# Patient Record
Sex: FEMALE | Race: WHITE | NOT HISPANIC OR LATINO | Employment: OTHER | URBAN - METROPOLITAN AREA
[De-identification: names, ages, dates, MRNs, and addresses within clinical notes are randomized per-mention and may not be internally consistent; named-entity substitution may affect disease eponyms.]

---

## 2017-01-03 ENCOUNTER — TRANSFERRED RECORDS (OUTPATIENT)
Dept: HEALTH INFORMATION MANAGEMENT | Facility: CLINIC | Age: 67
End: 2017-01-03

## 2017-02-20 DIAGNOSIS — G43.719 INTRACTABLE CHRONIC MIGRAINE WITHOUT AURA AND WITHOUT STATUS MIGRAINOSUS: Primary | ICD-10-CM

## 2017-02-20 RX ORDER — ZOLMITRIPTAN 5 MG/1
5 TABLET, FILM COATED ORAL
Qty: 12 TABLET | Refills: 6 | Status: SHIPPED | OUTPATIENT
Start: 2017-02-20 | End: 2017-12-04

## 2017-02-20 NOTE — TELEPHONE ENCOUNTER
Call to patient, reviewed provider message.  Patient is in agreement with this plan.  She has no further questions.  Ivette Land RN

## 2017-02-20 NOTE — TELEPHONE ENCOUNTER
"Insurance won't pay for the 2.5 mg Zomig until March.  States her migraines have been terrible lately, so has been requiring 5 mg much of the time. They vary in frequency a great deal. Quite a bit of stress right now.    Confirmed with pharmacy.  Insurance will only approve a certain \"quantity\", so she goes through the 12 tablets rather quickly.    If new Rx is placed for the 5 mg tabs, this can be filled now. She is completely out of medication now.  She understands she cannot exceed 10 mg/24 hours.    Please advise if dose can be increased so she can  Rx soon.  Thank you,  Karen Avila RN      "

## 2017-02-20 NOTE — TELEPHONE ENCOUNTER
Reason for call: Pt requesting Zomig 5mg tablets.  She has had a lot of severe migraines lately and has used up supply of current rx.  Pharmacist suggested she request 5mg tablets as she takes 2 tabs of 2.5mg now.  Please approve asap as she is out of medication.  She uses "[facility] [address] [facility] pharmacy    Phone Number Pt can be reached at: [phone]  Best Time: Anytime  Can we leave a detailed message on this number? Yes

## 2017-03-07 ENCOUNTER — TRANSFERRED RECORDS (OUTPATIENT)
Dept: HEALTH INFORMATION MANAGEMENT | Facility: CLINIC | Age: 67
End: 2017-03-07

## 2017-05-02 ENCOUNTER — TRANSFERRED RECORDS (OUTPATIENT)
Dept: HEALTH INFORMATION MANAGEMENT | Facility: CLINIC | Age: 67
End: 2017-05-02

## 2017-05-15 ENCOUNTER — HOSPITAL ENCOUNTER (OUTPATIENT)
Dept: MAMMOGRAPHY | Facility: CLINIC | Age: 67
Discharge: HOME | End: 2017-05-15
Attending: OBSTETRICS & GYNECOLOGY | Admitting: OBSTETRICS & GYNECOLOGY
Payer: COMMERCIAL

## 2017-05-15 DIAGNOSIS — Z12.31 ENCOUNTER FOR SCREENING MAMMOGRAM FOR HIGH-RISK PATIENT: ICD-10-CM

## 2017-05-15 PROCEDURE — 77063 BREAST TOMOSYNTHESIS BI: CPT

## 2017-05-22 ENCOUNTER — TRANSFERRED RECORDS (OUTPATIENT)
Dept: HEALTH INFORMATION MANAGEMENT | Facility: CLINIC | Age: 67
End: 2017-05-22

## 2017-06-01 RX ORDER — IPRATROPIUM BROMIDE 42 UG/1
3 SPRAY, METERED NASAL DAILY
COMMUNITY
End: 2018-01-30

## 2017-06-01 RX ORDER — CARBOXYMETHYLCELLULOSE SODIUM 5 MG/ML
1 SOLUTION/ DROPS OPHTHALMIC 3 TIMES DAILY PRN
COMMUNITY

## 2017-06-01 RX ORDER — PAROXETINE HYDROCHLORIDE HEMIHYDRATE 25 MG/1
25 TABLET, FILM COATED, EXTENDED RELEASE ORAL 2 TIMES DAILY
COMMUNITY
End: 2020-12-08

## 2017-06-01 RX ORDER — ZOLPIDEM TARTRATE 12.5 MG/1
12.5 TABLET, FILM COATED, EXTENDED RELEASE ORAL AT BEDTIME
COMMUNITY

## 2017-06-01 RX ORDER — HYDROCODONE BITARTRATE AND ACETAMINOPHEN 10; 300 MG/1; MG/1
2 TABLET ORAL EVERY 4 HOURS
Status: ON HOLD | COMMUNITY
End: 2023-09-02

## 2017-06-02 ENCOUNTER — OFFICE VISIT (OUTPATIENT)
Dept: FAMILY MEDICINE | Facility: CLINIC | Age: 67
End: 2017-06-02
Payer: COMMERCIAL

## 2017-06-02 VITALS
BODY MASS INDEX: 21.91 KG/M2 | WEIGHT: 131.5 LBS | HEIGHT: 65 IN | OXYGEN SATURATION: 98 % | DIASTOLIC BLOOD PRESSURE: 79 MMHG | HEART RATE: 67 BPM | SYSTOLIC BLOOD PRESSURE: 113 MMHG | TEMPERATURE: 97.6 F

## 2017-06-02 DIAGNOSIS — F11.90 CHRONIC NARCOTIC USE: ICD-10-CM

## 2017-06-02 DIAGNOSIS — F41.9 ANXIETY: ICD-10-CM

## 2017-06-02 DIAGNOSIS — F33.41 RECURRENT MAJOR DEPRESSIVE DISORDER, IN PARTIAL REMISSION (H): ICD-10-CM

## 2017-06-02 DIAGNOSIS — Z01.818 PREOP GENERAL PHYSICAL EXAM: Primary | ICD-10-CM

## 2017-06-02 DIAGNOSIS — M25.532 LEFT WRIST PAIN: ICD-10-CM

## 2017-06-02 PROCEDURE — 99214 OFFICE O/P EST MOD 30 MIN: CPT | Performed by: INTERNAL MEDICINE

## 2017-06-02 RX ORDER — METOPROLOL SUCCINATE 25 MG/1
25 TABLET, EXTENDED RELEASE ORAL EVERY EVENING
Qty: 30 TABLET | COMMUNITY
End: 2018-01-30

## 2017-06-02 RX ORDER — LORAZEPAM 1 MG/1
1 TABLET ORAL 4 TIMES DAILY
Qty: 30 TABLET | COMMUNITY

## 2017-06-02 NOTE — MR AVS SNAPSHOT
After Visit Summary   6/2/2017    Laurel Parra    MRN: [id number]           Patient Information     Date Of Birth          1950        Visit Information        Provider Department      6/2/2017 3:00 PM Georges Lebron MD [facility]        Today's Diagnoses     Preop general physical exam    -  1      Care Instructions      Before Your Surgery      Call your surgeon if there is any change in your health. This includes signs of a cold or flu (such as a sore throat, runny nose, cough, rash or fever).    Do not smoke, drink alcohol or take over the counter medicine (unless your surgeon or primary care doctor tells you to) for the 24 hours before and after surgery.    If you take prescribed drugs: Follow your doctor s orders about which medicines to take and which to stop until after surgery.    Eating and drinking prior to surgery: follow the instructions from your surgeon    Take a shower or bath the night before surgery. Use the soap your surgeon gave you to gently clean your skin. If you do not have soap from your surgeon, use your regular soap. Do not shave or scrub the surgery site.  Wear clean pajamas and have clean sheets on your bed.           Follow-ups after your visit        Your next 10 appointments already scheduled     Jun 07, 2017   Procedure with Beatrice Philippe MD   [facility] (--)    [address]  [facility] [id number] [phone]              Who to contact     If you have questions or need follow up information about today's clinic visit or your schedule please contact [facility] directly at [phone].  Normal or non-critical lab and imaging results will be communicated to you by MyChart, letter or phone within 4 business days after the clinic has received the results. If you do not hear from us within 7 days, please contact the clinic through MyChart or phone. If you have a critical or abnormal lab result,  "we will notify you by phone as soon as possible.  Submit refill requests through [employer] or call your pharmacy and they will forward the refill request to us. Please allow 3 business days for your refill to be completed.          Additional Information About Your Visit        [employer] [facility] Information     [employer] gives you secure access to your electronic health record. If you see a primary care provider, you can also send messages to your care team and make appointments. If you have questions, please call your primary care clinic.  If you do not have a primary care provider, please call [phone] and they will assist you.        Care EveryWhere ID     This is your Care EveryWhere ID. This could be used by other organizations to access your [facility] medical records  [id number]        Your Vitals Were     Pulse Temperature Height Pulse Oximetry Breastfeeding? BMI (Body Mass Index)    67 97.6  F (36.4  C) (Oral) 5' 5.25\" (1.657 m) 98% No 21.72 kg/m2       Blood Pressure from Last 3 Encounters:   06/02/17 113/79   12/15/16 121/80   12/18/15 100/70    Weight from Last 3 Encounters:   06/02/17 131 lb 8 oz (59.6 kg)   12/15/16 130 lb (59 kg)   12/18/15 125 lb 3.2 oz (56.8 kg)              Today, you had the following     No orders found for display         Today's Medication Changes          These changes are accurate as of: 6/2/17  3:23 PM.  If you have any questions, ask your nurse or doctor.               These medicines have changed or have updated prescriptions.        Dose/Directions    ATIVAN 1 MG tablet   This may have changed:  medication strength   Generic drug:  LORazepam   Changed by:  Georges Lebron MD        Dose:  1-2 mg   Take 1-2 tablets (1-2 mg) by mouth 4 times daily as needed for anxiety (up to 6 tablets per day)   Quantity:  30 tablet   Refills:  0         Stop taking these medicines if you haven't already. Please contact your care team if you have questions.     DOCUSATE SODIUM PO   Stopped by: "  Georges Lebron MD                    Primary Care Provider Office Phone # Fax #    Georges Lebron [phone]       [facility] [address] [address]        Thank you!     Thank you for choosing [facility]  for your care. Our goal is always to provide you with excellent care. Hearing back from our patients is one way we can continue to improve our services. Please take a few minutes to complete the written survey that you may receive in the mail after your visit with us. Thank you!             Your Updated Medication List - Protect others around you: Learn how to safely use, store and throw away your medicines at www.disposemymeds.org.          This list is accurate as of: 6/2/17  3:23 PM.  Always use your most recent med list.                   Brand Name Dispense Instructions for use    AMBIEN CR PO      Take 12.5 mg by mouth At Bedtime       ATIVAN 1 MG tablet   Generic drug:  LORazepam     30 tablet    Take 1-2 tablets (1-2 mg) by mouth 4 times daily as needed for anxiety (up to 6 tablets per day)       THOR-MAG-ZINC-D PO      Take 1 tablet by mouth daily (with dinner) (333 mg- 133 mg- 5 mg- 200 units)       carboxymethylcellulose 0.5 % Soln ophthalmic solution    REFRESH PLUS     Place 1 drop into both eyes 3 times daily as needed for dry eyes       CLARITIN 10 MG tablet   Generic drug:  loratadine      Take 10 mg by mouth daily       ESTRACE 1 MG tablet   Generic drug:  estradiol      Take 1 mg by mouth daily (with dinner) MUST BE  TEVA       FLEXERIL PO      Take 10-20 mg by mouth 2 times daily as needed for muscle spasms       HYDROcodone Bitartrate 10 MG C12a      Take 10 mg by mouth 2 times daily -Compounded Medication       Hydrocodone-Acetaminophen  MG Tabs      Take 2 tablets by mouth every 4 hours as needed       ipratropium 0.06 % spray    ATROVENT     Spray 3 sprays into both nostrils daily       PAXIL CR PO       Take 25 mg by mouth 2 times daily NAME BRAND PAXIL CR       PROMETRIUM 100 MG capsule   Generic drug:  progesterone      Take 100 mg by mouth daily (with dinner)       TOPROL XL 25 MG 24 hr tablet   Generic drug:  metoprolol     30 tablet    Take 1 tablet (25 mg) by mouth every evening       ZOLMitriptan 5 MG tablet    ZOMIG    12 tablet    Take 1 tablet (5 mg) by mouth at onset of headache for migraine May repeat in 2 hours. Max 2 tablets/24 hours.

## 2017-06-02 NOTE — NURSING NOTE
"Chief Complaint   Patient presents with     Pre-Op Exam       Initial /79 (BP Location: Left arm, Patient Position: Chair, Cuff Size: Adult Regular)  Pulse 67  Temp 97.6  F (36.4  C) (Oral)  Ht 5' 5.25\" (1.657 m)  Wt 131 lb 8 oz (59.6 kg)  SpO2 98%  Breastfeeding? No  BMI 21.72 kg/m2 Estimated body mass index is 21.72 kg/(m^2) as calculated from the following:    Height as of this encounter: 5' 5.25\" (1.657 m).    Weight as of this encounter: 131 lb 8 oz (59.6 kg).  Medication Reconciliation: complete.  Gifty Michaels CMA    "

## 2017-06-02 NOTE — PROGRESS NOTES
[address] [address]  [phone]  Dept: [phone]    PRE-OP EVALUATION:  Today's date: 2017    Laurel Parra (: 1950) presents for pre-operative evaluation assessment as requested by Dr. Philippe.  She requires evaluation and anesthesia risk assessment prior to undergoing surgery/procedure for treatment of left thumb .  Proposed procedure: ARTHROPLASTY CARPOMETACARPAL (THUMB JOINT)    Date of Surgery/ Procedure: 17  Time of Surgery/ Procedure: 1230  Hospital/Surgical Facility: [facility]  Fax number for surgical facility:   Primary Physician: Georges Lebron  Type of Anesthesia Anticipated: other    Patient has a Health Care Directive or Living Will:  NO    The patient is having surgery as noted above for thumb pain.  She has mmp noted, some chronic pain issues, using norco as noted, 10 a day.  She has depression and reg psyche follow up and stable.  Does use lorazepam up to 6 a day.  No chest pain or shortness of breath, not overly active due to her pain.  Plans to get colonoscopy in the fall.  Her migraines have been stable.                  Past Medical History:      Past Medical History:   Diagnosis Date     Anxiety     Dr. Adelina Meehan     Chronic narcotic use     [facility]     Chronic pain     Dr. Orta as of      Chronic urethritis     Dr. Little     Colonic polyp     one polyp, fu 5 years     Depression, major, in partial remission (H)     Dr. Meehan     Diarrhea 2012    eval by [facility], resolved with gluten free diet     DJD (degenerative joint disease)      LBP (low back pain)     Dr. Jae Tong in [place], then seeing [facility] Dr. Orta     Migraines     neuro eval     Palpitations 2006    holter with pvc's and pac's, echo nl     Screening 2010    dexa nl at gyn             Past Surgical History:      Past Surgical History:   Procedure Laterality Date     CARPAL TUNNEL RELEASE RT/LT   2006      SECTION  1986     EXCIS BARTHOLIN GLAND/CYST  2005     HERNIORRHAPHY INGUINAL  70's    x5     ROTATOR CUFF REPAIR RT/LT  2006     SHOULDER SURGERY  2000     thumb surgery  2000 and ,              Social History:     Social History   Substance Use Topics     Smoking status: Former Smoker     Quit date: 10/29/1979     Smokeless tobacco: Never Used     Alcohol use No      Comment: minimal since on norco             Family History:   No family history of bleeding difficulty, anesthesia problems or blood clots x for father with dvt and also some sort of anesthesia issue, not clear.         Allergies:     Allergies   Allergen Reactions     Compazine [Prochlorperazine]      Penicillins              Medications:     Current Outpatient Prescriptions   Medication Sig Dispense Refill     LORazepam (ATIVAN) 1 MG tablet Take 1-2 tablets (1-2 mg) by mouth 4 times daily as needed for anxiety (up to 6 tablets per day) 30 tablet      metoprolol (TOPROL XL) 25 MG 24 hr tablet Take 1 tablet (25 mg) by mouth every evening 30 tablet      ipratropium (ATROVENT) 0.06 % spray Spray 3 sprays into both nostrils daily       Hydrocodone-Acetaminophen  MG TABS Take 2 tablets by mouth every 4 hours as needed       PARoxetine HCl (PAXIL CR PO) Take 25 mg by mouth 2 times daily NAME BRAND PAXIL CR       Zolpidem Tartrate (AMBIEN CR PO) Take 12.5 mg by mouth At Bedtime       HYDROcodone Bitartrate 10 MG C12A Take 10 mg by mouth 2 times daily -Compounded Medication       Multiple Minerals-Vitamins (THOR-MAG-ZINC-D PO) Take 1 tablet by mouth daily (with dinner) (333 mg- 133 mg- 5 mg- 200 units)       carboxymethylcellulose (REFRESH PLUS) 0.5 % SOLN ophthalmic solution Place 1 drop into both eyes 3 times daily as needed for dry eyes       ZOLMitriptan (ZOMIG) 5 MG tablet Take 1 tablet (5 mg) by mouth at onset of headache for migraine May repeat in 2 hours. Max 2 tablets/24 hours. 12 tablet 6     Cyclobenzaprine HCl  "(FLEXERIL PO) Take 10-20 mg by mouth 2 times daily as needed for muscle spasms        loratadine (CLARITIN) 10 MG tablet Take 10 mg by mouth daily       estradiol (ESTRACE) 1 MG tablet Take 1 mg by mouth daily (with dinner) MUST BE  TEVA       PROgesterone (PROMETRIUM) 100 MG capsule Take 100 mg by mouth daily (with dinner)        [DISCONTINUED] Metoprolol Succinate (TOPROL XL PO) Take 25 mg by mouth every evening                 Review of Systems:   No history of bleeding difficulty, anesthesia problems or blood clots.  The 10 point Review of Systems is negative other than noted in the HPI           Physical Exam:   Blood pressure 113/79, pulse 67, temperature 97.6  F (36.4  C), temperature source Oral, height 5' 5.25\" (1.657 m), weight 131 lb 8 oz (59.6 kg), SpO2 98 %, not currently breastfeeding.    Constitutional: healthy appearing, alert and in no distress  Heent: Normocephalic. Head without obvious masses or lesions. PERRLDC, EOMI. Mouth exam within normal limits: tongue, mucous membranes, posterior pharynx all normal, no lesions or abnormalities seen.  Tm's and canals within normal limits bilaterally. Neck supple, no nuchal rigidity or masses. No supraclavicular, or cervical adenopathy. Thyroid symmetric, no masses.  Cardiovascular: Regular rate and rhythm, no murmer, rub or gallops.  JVP not elevated, no edema.  Carotids within normal limits bilaterally, no bruits.  Respiratory: Normal respiratory effort.  Lungs clear, normal flow, no wheezing or crackles.  Gastrointestinal: Normal active bowel sounds.   Soft, not tender, no masses, guarding or rebound.  No hepatosplenomegaly.   Musculoskeletal: extremities normal, no gross deformities noted.  Skin: no suspicious lesions or rashes   Neurologic: Mental status within normal limits.  Speech fluent.  No gross motor abnormalities and gait intact.  Psychiatric: mentation appears normal and affect normal.         Data:   Noted from December        " Assessment:   1. Normal pre op exam, this patient should be low risk for the procedure  2. Chronic pain med use, no signs abuse, managed by pain clinic  3. Chronic bzd use, no signs abuse, has close psyche follow up for this and depressioin  4. Depression, on med, follow up with psyche         Plan:   The patient is ok for the procedure  She can take her am meds day of procedure, including her norco and ativan  ANESTHESIA SHOULD BE AWARE OF HER CHRONIC NARCOTIC AND BZD USE AS WELL AS FH OF ANESTHESIA ISSUES IN FATHER, ALTHOUGH PATIENT HAS NOT HAD ANY  Monitor closely post op, consider pulse ox and telemetry given her narcotic use and what will likely be need for more pain meds post op      Georges Lebron M.D.

## 2017-06-03 ASSESSMENT — PATIENT HEALTH QUESTIONNAIRE - PHQ9: SUM OF ALL RESPONSES TO PHQ QUESTIONS 1-9: 18

## 2017-06-05 NOTE — H&P (VIEW-ONLY)
[address] [address]  [phone]  Dept: [phone]    PRE-OP EVALUATION:  Today's date: 2017    Laurel Parra (: 1950) presents for pre-operative evaluation assessment as requested by Dr. Philippe.  She requires evaluation and anesthesia risk assessment prior to undergoing surgery/procedure for treatment of left thumb .  Proposed procedure: ARTHROPLASTY CARPOMETACARPAL (THUMB JOINT)    Date of Surgery/ Procedure: 17  Time of Surgery/ Procedure: 1230  Hospital/Surgical Facility: [facility]  Fax number for surgical facility:   Primary Physician: Georges Lebron  Type of Anesthesia Anticipated: other    Patient has a Health Care Directive or Living Will:  NO    The patient is having surgery as noted above for thumb pain.  She has mmp noted, some chronic pain issues, using norco as noted, 10 a day.  She has depression and reg psyche follow up and stable.  Does use lorazepam up to 6 a day.  No chest pain or shortness of breath, not overly active due to her pain.  Plans to get colonoscopy in the fall.  Her migraines have been stable.                  Past Medical History:      Past Medical History:   Diagnosis Date     Anxiety     Dr. Adelina Meehan     Chronic narcotic use     [facility]     Chronic pain     Dr. Orta as of      Chronic urethritis     Dr. Little     Colonic polyp     one polyp, fu 5 years     Depression, major, in partial remission (H)     Dr. Meehan     Diarrhea 2012    eval by [facility], resolved with gluten free diet     DJD (degenerative joint disease)      LBP (low back pain)     Dr. Jae Tong in [employer], then seeing [facility] Dr. Orta     Migraines     neuro eval     Palpitations 2006    holter with pvc's and pac's, echo nl     Screening 2010    dexa nl at gyn             Past Surgical History:      Past Surgical History:   Procedure Laterality Date     CARPAL TUNNEL RELEASE RT/LT   2006      SECTION  1986     EXCIS BARTHOLIN GLAND/CYST  2005     HERNIORRHAPHY INGUINAL  70's    x5     ROTATOR CUFF REPAIR RT/LT  2006     SHOULDER SURGERY  2000     thumb surgery  2000 and ,              Social History:     Social History   Substance Use Topics     Smoking status: Former Smoker     Quit date: 10/29/1979     Smokeless tobacco: Never Used     Alcohol use No      Comment: minimal since on norco             Family History:   No family history of bleeding difficulty, anesthesia problems or blood clots x for father with dvt and also some sort of anesthesia issue, not clear.         Allergies:     Allergies   Allergen Reactions     Compazine [Prochlorperazine]      Penicillins              Medications:     Current Outpatient Prescriptions   Medication Sig Dispense Refill     LORazepam (ATIVAN) 1 MG tablet Take 1-2 tablets (1-2 mg) by mouth 4 times daily as needed for anxiety (up to 6 tablets per day) 30 tablet      metoprolol (TOPROL XL) 25 MG 24 hr tablet Take 1 tablet (25 mg) by mouth every evening 30 tablet      ipratropium (ATROVENT) 0.06 % spray Spray 3 sprays into both nostrils daily       Hydrocodone-Acetaminophen  MG TABS Take 2 tablets by mouth every 4 hours as needed       PARoxetine HCl (PAXIL CR PO) Take 25 mg by mouth 2 times daily NAME BRAND PAXIL CR       Zolpidem Tartrate (AMBIEN CR PO) Take 12.5 mg by mouth At Bedtime       HYDROcodone Bitartrate 10 MG C12A Take 10 mg by mouth 2 times daily -Compounded Medication       Multiple Minerals-Vitamins (THOR-MAG-ZINC-D PO) Take 1 tablet by mouth daily (with dinner) (333 mg- 133 mg- 5 mg- 200 units)       carboxymethylcellulose (REFRESH PLUS) 0.5 % SOLN ophthalmic solution Place 1 drop into both eyes 3 times daily as needed for dry eyes       ZOLMitriptan (ZOMIG) 5 MG tablet Take 1 tablet (5 mg) by mouth at onset of headache for migraine May repeat in 2 hours. Max 2 tablets/24 hours. 12 tablet 6     Cyclobenzaprine HCl  "(FLEXERIL PO) Take 10-20 mg by mouth 2 times daily as needed for muscle spasms        loratadine (CLARITIN) 10 MG tablet Take 10 mg by mouth daily       estradiol (ESTRACE) 1 MG tablet Take 1 mg by mouth daily (with dinner) MUST BE  TEVA       PROgesterone (PROMETRIUM) 100 MG capsule Take 100 mg by mouth daily (with dinner)        [DISCONTINUED] Metoprolol Succinate (TOPROL XL PO) Take 25 mg by mouth every evening                 Review of Systems:   No history of bleeding difficulty, anesthesia problems or blood clots.  The 10 point Review of Systems is negative other than noted in the HPI           Physical Exam:   Blood pressure 113/79, pulse 67, temperature 97.6  F (36.4  C), temperature source Oral, height 5' 5.25\" (1.657 m), weight 131 lb 8 oz (59.6 kg), SpO2 98 %, not currently breastfeeding.    Constitutional: healthy appearing, alert and in no distress  Heent: Normocephalic. Head without obvious masses or lesions. PERRLDC, EOMI. Mouth exam within normal limits: tongue, mucous membranes, posterior pharynx all normal, no lesions or abnormalities seen.  Tm's and canals within normal limits bilaterally. Neck supple, no nuchal rigidity or masses. No supraclavicular, or cervical adenopathy. Thyroid symmetric, no masses.  Cardiovascular: Regular rate and rhythm, no murmer, rub or gallops.  JVP not elevated, no edema.  Carotids within normal limits bilaterally, no bruits.  Respiratory: Normal respiratory effort.  Lungs clear, normal flow, no wheezing or crackles.  Gastrointestinal: Normal active bowel sounds.   Soft, not tender, no masses, guarding or rebound.  No hepatosplenomegaly.   Musculoskeletal: extremities normal, no gross deformities noted.  Skin: no suspicious lesions or rashes   Neurologic: Mental status within normal limits.  Speech fluent.  No gross motor abnormalities and gait intact.  Psychiatric: mentation appears normal and affect normal.         Data:   Noted from December        " Assessment:   1. Normal pre op exam, this patient should be low risk for the procedure  2. Chronic pain med use, no signs abuse, managed by pain clinic  3. Chronic bzd use, no signs abuse, has close psyche follow up for this and depressioin  4. Depression, on med, follow up with psyche         Plan:   The patient is ok for the procedure  She can take her am meds day of procedure, including her norco and ativan  ANESTHESIA SHOULD BE AWARE OF HER CHRONIC NARCOTIC AND BZD USE AS WELL AS FH OF ANESTHESIA ISSUES IN FATHER, ALTHOUGH PATIENT HAS NOT HAD ANY  Monitor closely post op, consider pulse ox and telemetry given her narcotic use and what will likely be need for more pain meds post op      Georges Lebron M.D.

## 2017-06-07 ENCOUNTER — ANESTHESIA EVENT (OUTPATIENT)
Dept: SURGERY | Facility: CLINIC | Age: 67
End: 2017-06-07
Payer: COMMERCIAL

## 2017-06-07 ENCOUNTER — APPOINTMENT (OUTPATIENT)
Dept: GENERAL RADIOLOGY | Facility: CLINIC | Age: 67
End: 2017-06-07
Attending: ORTHOPAEDIC SURGERY
Payer: COMMERCIAL

## 2017-06-07 ENCOUNTER — HOSPITAL ENCOUNTER (OUTPATIENT)
Facility: CLINIC | Age: 67
Discharge: HOME | End: 2017-06-08
Attending: ORTHOPAEDIC SURGERY | Admitting: ORTHOPAEDIC SURGERY
Payer: COMMERCIAL

## 2017-06-07 ENCOUNTER — ANESTHESIA (OUTPATIENT)
Dept: SURGERY | Facility: CLINIC | Age: 67
End: 2017-06-07
Payer: COMMERCIAL

## 2017-06-07 DIAGNOSIS — M18.12 PRIMARY OSTEOARTHRITIS OF FIRST CARPOMETACARPAL JOINT OF LEFT HAND: Primary | ICD-10-CM

## 2017-06-07 PROBLEM — G89.18 POST-OPERATIVE PAIN: Status: ACTIVE | Noted: 2017-06-07

## 2017-06-07 PROCEDURE — 37000009 ZZH ANESTHESIA TECHNICAL FEE, EACH ADDTL 15 MIN: Performed by: ORTHOPAEDIC SURGERY

## 2017-06-07 PROCEDURE — 36000056 ZZH SURGERY LEVEL 3 1ST 30 MIN: Performed by: ORTHOPAEDIC SURGERY

## 2017-06-07 PROCEDURE — C1713 ANCHOR/SCREW BN/BN,TIS/BN: HCPCS | Performed by: ORTHOPAEDIC SURGERY

## 2017-06-07 PROCEDURE — 25000132 ZZH RX MED GY IP 250 OP 250 PS 637: Performed by: ANESTHESIOLOGY

## 2017-06-07 PROCEDURE — S0077 INJECTION, CLINDAMYCIN PHOSP: HCPCS | Performed by: ORTHOPAEDIC SURGERY

## 2017-06-07 PROCEDURE — 71000012 ZZH RECOVERY PHASE 1 LEVEL 1 FIRST HR: Performed by: ORTHOPAEDIC SURGERY

## 2017-06-07 PROCEDURE — 25000128 H RX IP 250 OP 636: Performed by: ANESTHESIOLOGY

## 2017-06-07 PROCEDURE — 25000125 ZZHC RX 250: Performed by: ORTHOPAEDIC SURGERY

## 2017-06-07 PROCEDURE — 25000128 H RX IP 250 OP 636: Performed by: NURSE ANESTHETIST, CERTIFIED REGISTERED

## 2017-06-07 PROCEDURE — 36000058 ZZH SURGERY LEVEL 3 EA 15 ADDTL MIN: Performed by: ORTHOPAEDIC SURGERY

## 2017-06-07 PROCEDURE — 25000132 ZZH RX MED GY IP 250 OP 250 PS 637: Performed by: ORTHOPAEDIC SURGERY

## 2017-06-07 PROCEDURE — 37000008 ZZH ANESTHESIA TECHNICAL FEE, 1ST 30 MIN: Performed by: ORTHOPAEDIC SURGERY

## 2017-06-07 PROCEDURE — 25800025 ZZH RX 258: Performed by: ORTHOPAEDIC SURGERY

## 2017-06-07 PROCEDURE — 25000125 ZZHC RX 250: Performed by: ANESTHESIOLOGY

## 2017-06-07 PROCEDURE — 71000013 ZZH RECOVERY PHASE 1 LEVEL 1 EA ADDTL HR: Performed by: ORTHOPAEDIC SURGERY

## 2017-06-07 PROCEDURE — 27210794 ZZH OR GENERAL SUPPLY STERILE: Performed by: ORTHOPAEDIC SURGERY

## 2017-06-07 PROCEDURE — 40000170 ZZH STATISTIC PRE-PROCEDURE ASSESSMENT II: Performed by: ORTHOPAEDIC SURGERY

## 2017-06-07 PROCEDURE — 25000128 H RX IP 250 OP 636: Performed by: ORTHOPAEDIC SURGERY

## 2017-06-07 PROCEDURE — 40000277 XR SURGERY CARM FLUORO LESS THAN 5 MIN W STILLS

## 2017-06-07 PROCEDURE — 25000125 ZZHC RX 250: Performed by: NURSE ANESTHETIST, CERTIFIED REGISTERED

## 2017-06-07 DEVICE — IMPLANTABLE DEVICE
Type: IMPLANTABLE DEVICE | Site: THUMB | Status: NON-FUNCTIONAL
Removed: 2019-08-26

## 2017-06-07 DEVICE — IMPLANTABLE DEVICE: Type: IMPLANTABLE DEVICE | Site: THUMB | Status: FUNCTIONAL

## 2017-06-07 RX ORDER — LORAZEPAM 1 MG/1
1-2 TABLET ORAL 4 TIMES DAILY PRN
Status: DISCONTINUED | OUTPATIENT
Start: 2017-06-07 | End: 2017-06-08 | Stop reason: HOSPADM

## 2017-06-07 RX ORDER — ONDANSETRON 2 MG/ML
4 INJECTION INTRAMUSCULAR; INTRAVENOUS EVERY 6 HOURS PRN
Status: DISCONTINUED | OUTPATIENT
Start: 2017-06-07 | End: 2017-06-08 | Stop reason: HOSPADM

## 2017-06-07 RX ORDER — CLINDAMYCIN PHOSPHATE 900 MG/50ML
900 INJECTION, SOLUTION INTRAVENOUS EVERY 8 HOURS
Status: COMPLETED | OUTPATIENT
Start: 2017-06-07 | End: 2017-06-08

## 2017-06-07 RX ORDER — ZOLMITRIPTAN 5 MG/1
5 TABLET, FILM COATED ORAL
Status: DISCONTINUED | OUTPATIENT
Start: 2017-06-07 | End: 2017-06-08 | Stop reason: HOSPADM

## 2017-06-07 RX ORDER — ONDANSETRON 2 MG/ML
INJECTION INTRAMUSCULAR; INTRAVENOUS PRN
Status: DISCONTINUED | OUTPATIENT
Start: 2017-06-07 | End: 2017-06-07

## 2017-06-07 RX ORDER — DOCUSATE SODIUM 250 MG
250 CAPSULE ORAL
Status: DISCONTINUED | OUTPATIENT
Start: 2017-06-07 | End: 2017-06-07

## 2017-06-07 RX ORDER — NALOXONE HYDROCHLORIDE 0.4 MG/ML
.1-.4 INJECTION, SOLUTION INTRAMUSCULAR; INTRAVENOUS; SUBCUTANEOUS
Status: DISCONTINUED | OUTPATIENT
Start: 2017-06-07 | End: 2017-06-07

## 2017-06-07 RX ORDER — HYDROMORPHONE HYDROCHLORIDE 1 MG/ML
.3-.5 INJECTION, SOLUTION INTRAMUSCULAR; INTRAVENOUS; SUBCUTANEOUS
Status: DISCONTINUED | OUTPATIENT
Start: 2017-06-07 | End: 2017-06-08 | Stop reason: HOSPADM

## 2017-06-07 RX ORDER — HYDROXYZINE HYDROCHLORIDE 25 MG/1
25 TABLET, FILM COATED ORAL EVERY 6 HOURS PRN
Status: DISCONTINUED | OUTPATIENT
Start: 2017-06-07 | End: 2017-06-08 | Stop reason: HOSPADM

## 2017-06-07 RX ORDER — ONDANSETRON 2 MG/ML
4 INJECTION INTRAMUSCULAR; INTRAVENOUS EVERY 30 MIN PRN
Status: DISCONTINUED | OUTPATIENT
Start: 2017-06-07 | End: 2017-06-07

## 2017-06-07 RX ORDER — ONDANSETRON 4 MG/1
4 TABLET, ORALLY DISINTEGRATING ORAL EVERY 6 HOURS PRN
Status: DISCONTINUED | OUTPATIENT
Start: 2017-06-07 | End: 2017-06-08 | Stop reason: HOSPADM

## 2017-06-07 RX ORDER — PROPOFOL 10 MG/ML
INJECTION, EMULSION INTRAVENOUS PRN
Status: DISCONTINUED | OUTPATIENT
Start: 2017-06-07 | End: 2017-06-07

## 2017-06-07 RX ORDER — PAROXETINE HYDROCHLORIDE HEMIHYDRATE 25 MG/1
25 TABLET, FILM COATED, EXTENDED RELEASE ORAL 2 TIMES DAILY
Status: DISCONTINUED | OUTPATIENT
Start: 2017-06-07 | End: 2017-06-08 | Stop reason: HOSPADM

## 2017-06-07 RX ORDER — FENTANYL CITRATE 50 UG/ML
25-50 INJECTION, SOLUTION INTRAMUSCULAR; INTRAVENOUS EVERY 5 MIN PRN
Status: DISCONTINUED | OUTPATIENT
Start: 2017-06-07 | End: 2017-06-07 | Stop reason: HOSPADM

## 2017-06-07 RX ORDER — CLINDAMYCIN PHOSPHATE 900 MG/50ML
900 INJECTION, SOLUTION INTRAVENOUS
Status: COMPLETED | OUTPATIENT
Start: 2017-06-07 | End: 2017-06-07

## 2017-06-07 RX ORDER — LORATADINE 10 MG/1
10 TABLET ORAL DAILY
Status: DISCONTINUED | OUTPATIENT
Start: 2017-06-08 | End: 2017-06-08 | Stop reason: HOSPADM

## 2017-06-07 RX ORDER — CLINDAMYCIN PHOSPHATE 900 MG/50ML
900 INJECTION, SOLUTION INTRAVENOUS
Status: DISCONTINUED | OUTPATIENT
Start: 2017-06-07 | End: 2017-06-07 | Stop reason: HOSPADM

## 2017-06-07 RX ORDER — NALOXONE HYDROCHLORIDE 0.4 MG/ML
.1-.4 INJECTION, SOLUTION INTRAMUSCULAR; INTRAVENOUS; SUBCUTANEOUS
Status: DISCONTINUED | OUTPATIENT
Start: 2017-06-07 | End: 2017-06-08 | Stop reason: HOSPADM

## 2017-06-07 RX ORDER — DEXAMETHASONE SODIUM PHOSPHATE 4 MG/ML
INJECTION, SOLUTION INTRA-ARTICULAR; INTRALESIONAL; INTRAMUSCULAR; INTRAVENOUS; SOFT TISSUE PRN
Status: DISCONTINUED | OUTPATIENT
Start: 2017-06-07 | End: 2017-06-07

## 2017-06-07 RX ORDER — METOPROLOL SUCCINATE 25 MG/1
25 TABLET, EXTENDED RELEASE ORAL EVERY EVENING
Status: DISCONTINUED | OUTPATIENT
Start: 2017-06-07 | End: 2017-06-08 | Stop reason: HOSPADM

## 2017-06-07 RX ORDER — METOCLOPRAMIDE HYDROCHLORIDE 5 MG/ML
5 INJECTION INTRAMUSCULAR; INTRAVENOUS EVERY 6 HOURS PRN
Status: DISCONTINUED | OUTPATIENT
Start: 2017-06-07 | End: 2017-06-08 | Stop reason: HOSPADM

## 2017-06-07 RX ORDER — LORAZEPAM 1 MG/1
1 TABLET ORAL EVERY 4 HOURS PRN
Status: COMPLETED | OUTPATIENT
Start: 2017-06-07 | End: 2017-06-07

## 2017-06-07 RX ORDER — PROPOFOL 10 MG/ML
INJECTION, EMULSION INTRAVENOUS CONTINUOUS PRN
Status: DISCONTINUED | OUTPATIENT
Start: 2017-06-07 | End: 2017-06-07

## 2017-06-07 RX ORDER — SODIUM CHLORIDE, SODIUM LACTATE, POTASSIUM CHLORIDE, CALCIUM CHLORIDE 600; 310; 30; 20 MG/100ML; MG/100ML; MG/100ML; MG/100ML
INJECTION, SOLUTION INTRAVENOUS CONTINUOUS
Status: DISCONTINUED | OUTPATIENT
Start: 2017-06-07 | End: 2017-06-07 | Stop reason: HOSPADM

## 2017-06-07 RX ORDER — HYDROCODONE BITARTRATE AND ACETAMINOPHEN 10; 325 MG/1; MG/1
2 TABLET ORAL EVERY 4 HOURS PRN
Status: DISCONTINUED | OUTPATIENT
Start: 2017-06-07 | End: 2017-06-08 | Stop reason: HOSPADM

## 2017-06-07 RX ORDER — FENTANYL CITRATE 50 UG/ML
25-50 INJECTION, SOLUTION INTRAMUSCULAR; INTRAVENOUS
Status: DISCONTINUED | OUTPATIENT
Start: 2017-06-07 | End: 2017-06-07 | Stop reason: HOSPADM

## 2017-06-07 RX ORDER — BUPIVACAINE HYDROCHLORIDE 2.5 MG/ML
INJECTION, SOLUTION EPIDURAL; INFILTRATION; INTRACAUDAL
Status: DISCONTINUED
Start: 2017-06-07 | End: 2017-06-07 | Stop reason: HOSPADM

## 2017-06-07 RX ORDER — LIDOCAINE HYDROCHLORIDE 20 MG/ML
INJECTION, SOLUTION INFILTRATION; PERINEURAL PRN
Status: DISCONTINUED | OUTPATIENT
Start: 2017-06-07 | End: 2017-06-07

## 2017-06-07 RX ORDER — ONDANSETRON 4 MG/1
4 TABLET, ORALLY DISINTEGRATING ORAL EVERY 30 MIN PRN
Status: DISCONTINUED | OUTPATIENT
Start: 2017-06-07 | End: 2017-06-07

## 2017-06-07 RX ORDER — METOCLOPRAMIDE 5 MG/1
5 TABLET ORAL EVERY 6 HOURS PRN
Status: DISCONTINUED | OUTPATIENT
Start: 2017-06-07 | End: 2017-06-08 | Stop reason: HOSPADM

## 2017-06-07 RX ORDER — SODIUM CHLORIDE, SODIUM LACTATE, POTASSIUM CHLORIDE, CALCIUM CHLORIDE 600; 310; 30; 20 MG/100ML; MG/100ML; MG/100ML; MG/100ML
INJECTION, SOLUTION INTRAVENOUS CONTINUOUS PRN
Status: DISCONTINUED | OUTPATIENT
Start: 2017-06-07 | End: 2017-06-07

## 2017-06-07 RX ORDER — ESTRADIOL 1 MG/1
1 TABLET ORAL
Status: DISCONTINUED | OUTPATIENT
Start: 2017-06-07 | End: 2017-06-08 | Stop reason: HOSPADM

## 2017-06-07 RX ORDER — CYCLOBENZAPRINE HCL 10 MG
10 TABLET ORAL 2 TIMES DAILY PRN
Status: DISCONTINUED | OUTPATIENT
Start: 2017-06-07 | End: 2017-06-08 | Stop reason: HOSPADM

## 2017-06-07 RX ORDER — DEXTROSE MONOHYDRATE, SODIUM CHLORIDE, AND POTASSIUM CHLORIDE 50; 1.49; 4.5 G/1000ML; G/1000ML; G/1000ML
INJECTION, SOLUTION INTRAVENOUS CONTINUOUS
Status: DISCONTINUED | OUTPATIENT
Start: 2017-06-07 | End: 2017-06-08 | Stop reason: HOSPADM

## 2017-06-07 RX ORDER — CARBOXYMETHYLCELLULOSE SODIUM 5 MG/ML
1 SOLUTION/ DROPS OPHTHALMIC 3 TIMES DAILY PRN
Status: DISCONTINUED | OUTPATIENT
Start: 2017-06-07 | End: 2017-06-08 | Stop reason: HOSPADM

## 2017-06-07 RX ORDER — ZOLPIDEM TARTRATE 6.25 MG/1
12.5 TABLET, FILM COATED, EXTENDED RELEASE ORAL AT BEDTIME
Status: DISCONTINUED | OUTPATIENT
Start: 2017-06-07 | End: 2017-06-08 | Stop reason: HOSPADM

## 2017-06-07 RX ORDER — OXYCODONE HYDROCHLORIDE 5 MG/1
5 TABLET ORAL
Status: DISCONTINUED | OUTPATIENT
Start: 2017-06-07 | End: 2017-06-07

## 2017-06-07 RX ORDER — MEPERIDINE HYDROCHLORIDE 25 MG/ML
12.5 INJECTION INTRAMUSCULAR; INTRAVENOUS; SUBCUTANEOUS
Status: DISCONTINUED | OUTPATIENT
Start: 2017-06-07 | End: 2017-06-07 | Stop reason: HOSPADM

## 2017-06-07 RX ADMIN — LORAZEPAM 1 MG: 1 TABLET ORAL at 18:39

## 2017-06-07 RX ADMIN — PAROXETINE HYDROCHLORIDE HEMIHYDRATE 25 MG: 25 TABLET, FILM COATED, EXTENDED RELEASE ORAL at 18:42

## 2017-06-07 RX ADMIN — POTASSIUM CHLORIDE, DEXTROSE MONOHYDRATE AND SODIUM CHLORIDE: 150; 5; 450 INJECTION, SOLUTION INTRAVENOUS at 18:39

## 2017-06-07 RX ADMIN — ROPIVACAINE HYDROCHLORIDE 35 ML: 5 INJECTION, SOLUTION EPIDURAL; INFILTRATION; PERINEURAL at 12:08

## 2017-06-07 RX ADMIN — HYDROMORPHONE HYDROCHLORIDE 0.5 MG: 1 INJECTION, SOLUTION INTRAMUSCULAR; INTRAVENOUS; SUBCUTANEOUS at 20:56

## 2017-06-07 RX ADMIN — PROPOFOL 150 MCG/KG/MIN: 10 INJECTION, EMULSION INTRAVENOUS at 12:42

## 2017-06-07 RX ADMIN — HYDROMORPHONE HYDROCHLORIDE 0.5 MG: 1 INJECTION, SOLUTION INTRAMUSCULAR; INTRAVENOUS; SUBCUTANEOUS at 18:40

## 2017-06-07 RX ADMIN — MIDAZOLAM HYDROCHLORIDE 2 MG: 1 INJECTION, SOLUTION INTRAMUSCULAR; INTRAVENOUS at 11:58

## 2017-06-07 RX ADMIN — LORAZEPAM 1 MG: 1 TABLET ORAL at 15:31

## 2017-06-07 RX ADMIN — PROGESTERONE 100 MG: 100 CAPSULE ORAL at 20:15

## 2017-06-07 RX ADMIN — PROPOFOL 200 MG: 10 INJECTION, EMULSION INTRAVENOUS at 12:38

## 2017-06-07 RX ADMIN — LIDOCAINE HYDROCHLORIDE 60 MG: 20 INJECTION, SOLUTION INFILTRATION; PERINEURAL at 12:38

## 2017-06-07 RX ADMIN — DEXAMETHASONE SODIUM PHOSPHATE 4 MG: 4 INJECTION, SOLUTION INTRA-ARTICULAR; INTRALESIONAL; INTRAMUSCULAR; INTRAVENOUS; SOFT TISSUE at 12:56

## 2017-06-07 RX ADMIN — ESTRADIOL 1 MG: 1 TABLET ORAL at 18:40

## 2017-06-07 RX ADMIN — SODIUM CHLORIDE, POTASSIUM CHLORIDE, SODIUM LACTATE AND CALCIUM CHLORIDE: 600; 310; 30; 20 INJECTION, SOLUTION INTRAVENOUS at 11:01

## 2017-06-07 RX ADMIN — ONDANSETRON 4 MG: 2 SOLUTION INTRAMUSCULAR; INTRAVENOUS at 15:01

## 2017-06-07 RX ADMIN — ONDANSETRON 4 MG: 2 INJECTION INTRAMUSCULAR; INTRAVENOUS at 12:56

## 2017-06-07 RX ADMIN — ZOLPIDEM TARTRATE 12.5 MG: 6.25 TABLET, FILM COATED, EXTENDED RELEASE ORAL at 22:05

## 2017-06-07 RX ADMIN — FENTANYL CITRATE 50 MCG: 50 INJECTION, SOLUTION INTRAMUSCULAR; INTRAVENOUS at 15:51

## 2017-06-07 RX ADMIN — METOPROLOL SUCCINATE 25 MG: 25 TABLET, EXTENDED RELEASE ORAL at 20:15

## 2017-06-07 RX ADMIN — CLINDAMYCIN PHOSPHATE 900 MG: 18 INJECTION, SOLUTION INTRAVENOUS at 12:36

## 2017-06-07 RX ADMIN — ZOLMITRIPTAN 5 MG: 5 TABLET, FILM COATED ORAL at 19:38

## 2017-06-07 RX ADMIN — CLINDAMYCIN PHOSPHATE 900 MG: 18 INJECTION, SOLUTION INTRAVENOUS at 21:09

## 2017-06-07 RX ADMIN — DOCUSATE SODIUM 250 MG: 50 CAPSULE, LIQUID FILLED ORAL at 20:15

## 2017-06-07 RX ADMIN — SODIUM CHLORIDE, POTASSIUM CHLORIDE, SODIUM LACTATE AND CALCIUM CHLORIDE: 600; 310; 30; 20 INJECTION, SOLUTION INTRAVENOUS at 12:36

## 2017-06-07 RX ADMIN — FENTANYL CITRATE 50 MCG: 50 INJECTION, SOLUTION INTRAMUSCULAR; INTRAVENOUS at 16:15

## 2017-06-07 ASSESSMENT — ACTIVITIES OF DAILY LIVING (ADL)
EATING: 0-->INDEPENDENT
BATHING: 0-->INDEPENDENT
TRANSFERRING: 0-->INDEPENDENT
SWALLOWING: 0-->SWALLOWS FOODS/LIQUIDS WITHOUT DIFFICULTY
COMMUNICATION: 0-->UNDERSTANDS/COMMUNICATES WITHOUT DIFFICULTY
FALL_HISTORY_WITHIN_LAST_SIX_MONTHS: NO
TOILETING: 0-->INDEPENDENT
AMBULATION: 0-->INDEPENDENT
DRESS: 0-->INDEPENDENT

## 2017-06-07 ASSESSMENT — LIFESTYLE VARIABLES: TOBACCO_USE: 1

## 2017-06-07 NOTE — PLAN OF CARE
Report called to Obs RN.  Pt remains comfortable, feeling anxious-gave additional 50mcg of fentanyl. All belongings sent with pt to floor.

## 2017-06-07 NOTE — DISCHARGE INSTRUCTIONS
**If you have questions or concerns about your procedure,  call Dr. Philippe at [phone]**        [facility]   Discharge Instructions   Fatou Philippe M.D.      PAIN    You may have numbing medication in you incisional area.  As this medication wears off you can take the pills prescribed for you.     Elevate your arm for the first 24 hours to reduce swelling and pain.  Use ice as needed.    DRESSINGS    Keep your dressing dry and intact as instructed. Cover with plastic wrap or a plastic bag to shower.     Trigger finger release: may change dressings in 3 days, cover incision with band aid.    Endoscopic Carpal tunnel release: may change dressing in 3 days.  Cover incision with band aid and use brace full time until return appointment.       ACTIVITIES    After the surgery, begin moving your fingers immediately.    You may use your hand for light activities and driving.     You may also return to work for light duty or as discussed with your doctor.    No heavy lifting, pushing, or pulling, with your surgical hand.    FOLLOW-UP    If a follow-up appointment was not made for you, call the office, as soon as possible, after your surgery to schedule a follow up appointment for 10-14 days post-operatively.       [facility] patients:  you can be seen at the [place] office on Mondays or alternating Thursdays at:  [address], [place] [address]            [facility] patients:  you can be seen at the [place] office on Wednesdays at  [address] [place] [address]      Call [phone] for your follow-up appointment.     CALL OUR OFFICE, AT [phone] IF:    You develop a fever (101   or above).    You develop redness, swelling or drainage around the incisional area.    You have problems such as rash or continued nausea and/or vomiting with medication.    You have any questions, problems or concerns.      **For emergencies after 5 pm, call  the on-call physician at [phone]**

## 2017-06-07 NOTE — ANESTHESIA PREPROCEDURE EVALUATION
Anesthesia Evaluation     . Pt has had prior anesthetic. Type: General and Regional    No history of anesthetic complications          ROS/MED HX    ENT/Pulmonary:     (+)tobacco use, Past use , . .    Neurologic:     (+)migraines,     Cardiovascular:         METS/Exercise Tolerance:     Hematologic:         Musculoskeletal:         GI/Hepatic:     (+) GERD Asymptomatic on medication,       Renal/Genitourinary:         Endo:         Psychiatric:     (+) psychiatric history anxiety and depression      Infectious Disease:         Malignancy:         Other: Comment: benzodiazipine abuse   (+) H/O Chronic Pain,H/O chronic opiod use ,                    Physical Exam  Normal systems: dental    Airway   Mallampati: I  TM distance: >3 FB  Neck ROM: full    Dental     Cardiovascular   Rhythm and rate: regular and normal      Pulmonary                     Anesthesia Plan      History & Physical Review  History and physical reviewed and following examination; no interval change.    ASA Status:  2 .        Plan for General and Periph. Nerve Block for postop pain with Propofol induction. Maintenance will be TIVA.    PONV prophylaxis:  Ondansetron (or other 5HT-3) and Dexamethasone or Solumedrol  Chronic pain, narcotic and ativan use      Postoperative Care  Postoperative pain management:  IV analgesics and Oral pain medications.      Consents  Anesthetic plan, risks, benefits and alternatives discussed with:  Patient..                          .

## 2017-06-07 NOTE — ANESTHESIA POSTPROCEDURE EVALUATION
Patient: Laurel Parra    Procedure(s):  REVISION  LEFT THUMB CARPOMETACARPAL ARTHROPOASTY WITH 1.1 TIGHT ROPE - Wound Class: I-Clean    Diagnosis:LEFT THUMB CMC ARTHRITIS  Diagnosis Additional Information: No value filed.    Anesthesia Type:  General, Periph. Nerve Block for postop pain    Note:  Anesthesia Post Evaluation    Patient location during evaluation: PACU  Patient participation: Able to fully participate in evaluation  Level of consciousness: awake  Pain management: adequate  Airway patency: patent  Cardiovascular status: acceptable  Respiratory status: acceptable  Hydration status: acceptable  PONV: none     Anesthetic complications: None          Last vitals:  Vitals:    06/07/17 1041 06/07/17 1416 06/07/17 1430   BP: 125/86 116/81 121/82   Resp: 16 27 17   Temp: 36.4  C (97.6  F) 36.3  C (97.4  F) 36.1  C (97  F)   SpO2: 96% 98% 97%         Electronically Signed By: Jae Vazquez MD  June 7, 2017  2:38 PM

## 2017-06-07 NOTE — BRIEF OP NOTE
[facility] Brief Operative Note    Pre-operative diagnosis: LEFT THUMB CMC ARTHRITIS, failing CMC arthroplasty   Post-operative diagnosis same   Procedure: Procedure(s):  REVISION  LEFT THUMB CARPOMETACARPAL ARTHROPOASTY WITH 1.1 TIGHT ROPE - Wound Class: I-Clean   Surgeon(s): Surgeon(s) and Role:     * Beatrice Philippe MD - Primary   Estimated blood loss: 5 mL    Specimens: * No specimens in log *   Findings: No concerns.  Stable reconsrtuction.

## 2017-06-07 NOTE — PROGRESS NOTES
Admission medication history interview status for the 6/7/2017  admission is complete. See EPIC admission navigator for prior to admission medications     Medication history source reliability:Good    Medication history interview source(s):Patient    Medication history resources (including written lists, pill bottles, clinic record):Patient phoned in her medication list prior to surgery.      [id number]    Additional medication history information not noted on PTA med list :None    Time spent in this activity: 60 minutes    Prior to Admission medications    Medication Sig Last Dose Taking? Auth Provider   DOCUSATE SODIUM PO Take 250 mg by mouth daily (with dinner) 6/6/2017 at PM Yes Reported, Patient   LORazepam (ATIVAN) 1 MG tablet Take 1-2 tablets (1-2 mg) by mouth 4 times daily as needed for anxiety (up to 6 tablets per day) 6/7/2017 at 0830 Yes Georges Lebron MD   metoprolol (TOPROL XL) 25 MG 24 hr tablet Take 1 tablet (25 mg) by mouth every evening 6/6/2017 at PM Yes Georges Lebron MD   ipratropium (ATROVENT) 0.06 % spray Spray 3 sprays into both nostrils daily 6/7/2017 at AM Yes Reported, Patient   Hydrocodone-Acetaminophen  MG TABS Take 2 tablets by mouth every 4 hours as needed 6/7/2017 at 0830 Yes Reported, Patient   PARoxetine HCl (PAXIL CR PO) Take 25 mg by mouth 2 times daily NAME BRAND PAXIL CR 6/7/2017 at 0530 Yes Reported, Patient   Zolpidem Tartrate (AMBIEN CR PO) Take 12.5 mg by mouth At Bedtime 6/6/2017 at HS Yes Reported, Patient   HYDROcodone Bitartrate 10 MG C12A Take 10 mg by mouth 2 times daily -Compounded Medication 6/6/2017 at 2000 Yes Reported, Patient   Multiple Minerals-Vitamins (THOR-MAG-ZINC-D PO) Take 1 tablet by mouth daily (with dinner) (333 mg- 133 mg- 5 mg- 200 units) 6/6/2017 at PM Yes Reported, Patient   carboxymethylcellulose (REFRESH PLUS) 0.5 % SOLN ophthalmic solution Place 1 drop into both eyes 3 times daily as needed for dry eyes  6/6/2017 at HS Yes Reported, Patient   ZOLMitriptan (ZOMIG) 5 MG tablet Take 1 tablet (5 mg) by mouth at onset of headache for migraine May repeat in 2 hours. Max 2 tablets/24 hours. 6/2/2017 at PRN Yes Georges Lebron MD   Cyclobenzaprine HCl (FLEXERIL PO) Take 10-20 mg by mouth 2 times daily as needed for muscle spasms  6/4/2017 at PRN Yes Reported, Patient   loratadine (CLARITIN) 10 MG tablet Take 10 mg by mouth daily 6/7/2017 at 0530 Yes Reported, Patient   estradiol (ESTRACE) 1 MG tablet Take 1 mg by mouth daily (with dinner) MUST BE  TEVA 6/6/2017 at PM Yes Reported, Patient   PROgesterone (PROMETRIUM) 100 MG capsule Take 100 mg by mouth daily (with dinner)  6/6/2017 at PM Yes Reported, Patient

## 2017-06-07 NOTE — IP AVS SNAPSHOT
[facility] Observation Unit    [address] [place] [id number]    Phone:  [phone]                                       After Visit Summary   6/7/2017    Laurel Parra    MRN: [id number]           After Visit Summary Signature Page     I have received my discharge instructions, and my questions have been answered. I have discussed any challenges I see with this plan with the nurse or doctor.    ..........................................................................................................................................  Patient/Patient Representative Signature      ..........................................................................................................................................  Patient Representative Print Name and Relationship to Patient    ..................................................               ................................................  Date                                            Time    ..........................................................................................................................................  Reviewed by Signature/Title    ...................................................              ..............................................  Date                                                            Time

## 2017-06-07 NOTE — IP AVS SNAPSHOT
MRN:[id number]                      After Visit Summary   6/7/2017    Laurel Parra    MRN: [id number]           Thank you!     Thank you for choosing [facility] for your care. Our goal is always to provide you with excellent care. Hearing back from our patients is one way we can continue to improve our services. Please take a few minutes to complete the written survey that you may receive in the mail after you visit with us. Thank you!        Patient Information     Date Of Birth          1950        Designated Caregiver       Most Recent Value    Caregiver    Will someone help with your care after discharge? yes    Name of designated caregiver HESHAM    Phone number of caregiver     Caregiver address [address] [place] [address]      About your hospital stay     You were admitted on:  June 7, 2017 You last received care in the:  [facility]    You were discharged on:  June 8, 2017       Who to Call     For medical emergencies, please call 911.  For non-urgent questions about your medical care, please call your primary care provider or clinic, [phone]  For questions related to your surgery, please call your surgery clinic        Attending Provider     Provider Specialty    Beatrice Philippe MD Orthopedics       Primary Care Provider Office Phone # Fax #    Georges Lebron [phone]      After Care Instructions     Discharge Instructions       Follow up appointment as instructed by Surgeon and or RN            Discharge Instructions       Review outpatient procedure discharge instructions with patient as directed by Provider            Ice to affected area       Ice to operative site PRN                  Further instructions from your care team         **If you have questions or concerns about your procedure,  call Dr. Philippe at [phone]**        [facility]   Discharge Instructions   Fatou Philippe  M.D.      PAIN    You may have numbing medication in you incisional area.  As this medication wears off you can take the pills prescribed for you.     Elevate your arm for the first 24 hours to reduce swelling and pain.  Use ice as needed.    DRESSINGS    Keep your dressing dry and intact as instructed. Cover with plastic wrap or a plastic bag to shower.     Trigger finger release: may change dressings in 3 days, cover incision with band aid.    Endoscopic Carpal tunnel release: may change dressing in 3 days.  Cover incision with band aid and use brace full time until return appointment.       ACTIVITIES    After the surgery, begin moving your fingers immediately.    You may use your hand for light activities and driving.     You may also return to work for light duty or as discussed with your doctor.    No heavy lifting, pushing, or pulling, with your surgical hand.    FOLLOW-UP    If a follow-up appointment was not made for you, call the office, as soon as possible, after your surgery to schedule a follow up appointment for 10-14 days post-operatively.       [facility] patients:  you can be seen at the [place] office on Mondays or alternating Thursdays at:  [address], [place] [address]            [facility] patients:  you can be seen at the [place] office on Wednesdays at  [address] [place] [address]      Call [phone] for your follow-up appointment.     CALL OUR OFFICE, AT [phone] IF:    You develop a fever (101   or above).    You develop redness, swelling or drainage around the incisional area.    You have problems such as rash or continued nausea and/or vomiting with medication.    You have any questions, problems or concerns.      **For emergencies after 5 pm, call the on-call physician at [phone]**                   Pending Results     No orders found for last 3 day(s).            Admission Information     Date & Time Provider  "Department Dept. Phone    6/7/2017 Beatrice Philippe MD [facility] Observation Unit [phone]      Your Vitals Were     Blood Pressure Pulse Temperature Respirations Height Weight    110/73 (BP Location: Right arm) 59 97.9  F (36.6  C) (Oral) 16 1.657 m (5' 5.25\") 57.6 kg (127 lb)    Pulse Oximetry BMI (Body Mass Index)                97% 20.97 kg/m2          [employer] Information     [employer] gives you secure access to your electronic health record. If you see a primary care provider, you can also send messages to your care team and make appointments. If you have questions, please call your primary care clinic.  If you do not have a primary care provider, please call [phone] and they will assist you.        Care EveryWhere ID     This is your Care EveryWhere ID. This could be used by other organizations to access your [facility] medical records  [id number]           Review of your medicines      START taking        Dose / Directions    hydrOXYzine 25 MG tablet   Commonly known as:  ATARAX   Used for:  Primary osteoarthritis of first carpometacarpal joint of left hand        Dose:  25-50 mg   Take 1-2 tablets (25-50 mg) by mouth every 6 hours as needed for itching   Quantity:  45 tablet   Refills:  1       * oxyCODONE 5 MG IR tablet   Commonly known as:  ROXICODONE   Used for:  Primary osteoarthritis of first carpometacarpal joint of left hand        Dose:  5-10 mg   Take 1-2 tablets (5-10 mg) by mouth every 4 hours as needed for pain or other (Moderate to Severe)   Quantity:  50 tablet   Refills:  0       * oxyCODONE 5 MG IR tablet   Commonly known as:  ROXICODONE   Used for:  Primary osteoarthritis of first carpometacarpal joint of left hand        Dose:  5-10 mg   Take 1-2 tablets (5-10 mg) by mouth every 3 hours as needed for pain or other (Moderate to Severe)   Quantity:  60 tablet   Refills:  0       * Notice:  This list has 2 medication(s) that are the same as other medications prescribed for you. Read the " directions carefully, and ask your doctor or other care provider to review them with you.      CONTINUE these medicines which have NOT CHANGED        Dose / Directions    AMBIEN CR PO        Dose:  12.5 mg   Take 12.5 mg by mouth At Bedtime   Refills:  0       ATIVAN 1 MG tablet   Generic drug:  LORazepam        Dose:  1-2 mg   Take 1-2 tablets (1-2 mg) by mouth 4 times daily as needed for anxiety (up to 6 tablets per day)   Quantity:  30 tablet   Refills:  0       THOR-MAG-ZINC-D PO        Dose:  1 tablet   Take 1 tablet by mouth daily (with dinner) (333 mg- 133 mg- 5 mg- 200 units)   Refills:  0       carboxymethylcellulose 0.5 % Soln ophthalmic solution   Commonly known as:  REFRESH PLUS        Dose:  1 drop   Place 1 drop into both eyes 3 times daily as needed for dry eyes   Refills:  0       CLARITIN 10 MG tablet   Generic drug:  loratadine        Dose:  10 mg   Take 10 mg by mouth daily   Refills:  0       DOCUSATE SODIUM PO        Dose:  250 mg   Take 250 mg by mouth daily (with dinner)   Refills:  0       ESTRACE 1 MG tablet   Generic drug:  estradiol        Dose:  1 mg   Take 1 mg by mouth daily (with dinner) MUST BE  TEVA   Refills:  0       FLEXERIL PO        Dose:  10-20 mg   Take 10-20 mg by mouth 2 times daily as needed for muscle spasms   Refills:  0       HYDROcodone Bitartrate 10 MG C12a        Dose:  10 mg   Take 10 mg by mouth 2 times daily -Compounded Medication   Refills:  0       Hydrocodone-Acetaminophen  MG Tabs        Dose:  2 tablet   Take 2 tablets by mouth every 4 hours as needed   Refills:  0       ipratropium 0.06 % spray   Commonly known as:  ATROVENT        Dose:  3 spray   Spray 3 sprays into both nostrils daily   Refills:  0       PAXIL CR PO        Dose:  25 mg   Take 25 mg by mouth 2 times daily NAME BRAND PAXIL CR   Refills:  0       PROMETRIUM 100 MG capsule   Generic drug:  progesterone        Dose:  100 mg   Take 100 mg by mouth daily (with dinner)   Refills:   0       TOPROL XL 25 MG 24 hr tablet   Generic drug:  metoprolol        Dose:  25 mg   Take 1 tablet (25 mg) by mouth every evening   Quantity:  30 tablet   Refills:  0       ZOLMitriptan 5 MG tablet   Commonly known as:  ZOMIG   Used for:  Intractable chronic migraine without aura and without status migrainosus        Dose:  5 mg   Take 1 tablet (5 mg) by mouth at onset of headache for migraine May repeat in 2 hours. Max 2 tablets/24 hours.   Quantity:  12 tablet   Refills:  6            Where to get your medicines      These medications were sent to [facility] [place], [place] - [address]  [address], [place] [address]     Phone:  [phone]     hydrOXYzine 25 MG tablet         Some of these will need a paper prescription and others can be bought over the counter. Ask your nurse if you have questions.     Bring a paper prescription for each of these medications     oxyCODONE 5 MG IR tablet    oxyCODONE 5 MG IR tablet                Protect others around you: Learn how to safely use, store and throw away your medicines at www.disposemymeds.org.             Medication List: This is a list of all your medications and when to take them. Check marks below indicate your daily home schedule. Keep this list as a reference.      Medications           Morning Afternoon Evening Bedtime As Needed    AMBIEN CR PO   Take 12.5 mg by mouth At Bedtime   Last time this was given:  12.5 mg on 6/7/2017 10:05 PM                                ATIVAN 1 MG tablet   Take 1-2 tablets (1-2 mg) by mouth 4 times daily as needed for anxiety (up to 6 tablets per day)   Last time this was given:  1 mg on 6/8/2017 11:31 AM   Generic drug:  LORazepam                                THOR-MAG-ZINC-D PO   Take 1 tablet by mouth daily (with dinner) (333 mg- 133 mg- 5 mg- 200 units)                                carboxymethylcellulose 0.5 % Soln ophthalmic solution   Commonly known as:  REFRESH PLUS   Place 1 drop  into both eyes 3 times daily as needed for dry eyes                                CLARITIN 10 MG tablet   Take 10 mg by mouth daily   Last time this was given:  10 mg on 6/8/2017  7:43 AM   Generic drug:  loratadine                                DOCUSATE SODIUM PO   Take 250 mg by mouth daily (with dinner)   Last time this was given:  250 mg on 6/7/2017  8:15 PM                                ESTRACE 1 MG tablet   Take 1 mg by mouth daily (with dinner) MUST BE  TEVA   Last time this was given:  1 mg on 6/7/2017  6:40 PM   Generic drug:  estradiol                                FLEXERIL PO   Take 10-20 mg by mouth 2 times daily as needed for muscle spasms   Last time this was given:  10 mg on 6/8/2017  7:43 AM                                HYDROcodone Bitartrate 10 MG C12a   Take 10 mg by mouth 2 times daily -Compounded Medication                                Hydrocodone-Acetaminophen  MG Tabs   Take 2 tablets by mouth every 4 hours as needed                                hydrOXYzine 25 MG tablet   Commonly known as:  ATARAX   Take 1-2 tablets (25-50 mg) by mouth every 6 hours as needed for itching   Last time this was given:  25 mg on 6/8/2017  4:36 AM                                ipratropium 0.06 % spray   Commonly known as:  ATROVENT   Spray 3 sprays into both nostrils daily                                * oxyCODONE 5 MG IR tablet   Commonly known as:  ROXICODONE   Take 1-2 tablets (5-10 mg) by mouth every 4 hours as needed for pain or other (Moderate to Severe)   Last time this was given:  10 mg on 6/8/2017 10:16 AM                                * oxyCODONE 5 MG IR tablet   Commonly known as:  ROXICODONE   Take 1-2 tablets (5-10 mg) by mouth every 3 hours as needed for pain or other (Moderate to Severe)   Last time this was given:  10 mg on 6/8/2017 10:16 AM                                PAXIL CR PO   Take 25 mg by mouth 2 times daily NAME BRAND PAXIL CR   Last time this was given:   25 mg on 6/8/2017  5:57 AM                                PROMETRIUM 100 MG capsule   Take 100 mg by mouth daily (with dinner)   Last time this was given:  100 mg on 6/7/2017  8:15 PM   Generic drug:  progesterone                                TOPROL XL 25 MG 24 hr tablet   Take 1 tablet (25 mg) by mouth every evening   Last time this was given:  25 mg on 6/7/2017  8:15 PM   Generic drug:  metoprolol                                ZOLMitriptan 5 MG tablet   Commonly known as:  ZOMIG   Take 1 tablet (5 mg) by mouth at onset of headache for migraine May repeat in 2 hours. Max 2 tablets/24 hours.   Last time this was given:  5 mg on 6/7/2017  7:38 PM                                * Notice:  This list has 2 medication(s) that are the same as other medications prescribed for you. Read the directions carefully, and ask your doctor or other care provider to review them with you.

## 2017-06-07 NOTE — ANESTHESIA PROCEDURE NOTES
Procedures  Left axillary block for pain relief at surgeon's request;  After Versed 2mg IV for sedation and with pulse oximeter in place; 22GA; 35cc 1/2% Ropivicaine; 1;200,000 EPI ,brief paresthesia to thumb, none with injection.  Ultrasound guidance

## 2017-06-07 NOTE — ANESTHESIA CARE TRANSFER NOTE
Patient: Laurel Parra    Procedure(s):  REVISION  LEFT THUMB CARPOMETACARPAL ARTHROPOASTY WITH 1.1 TIGHT ROPE - Wound Class: I-Clean    Diagnosis: LEFT THUMB CMC ARTHRITIS  Diagnosis Additional Information: No value filed.    Anesthesia Type:   General, Periph. Nerve Block for postop pain     Note:  Airway :Face Mask  Patient transferred to:PACU  Comments: Pt exhibits spontaneous respirations, follows commands, suctioned, LMA removed, exchanging well, transferred to pacu with O2 @ 10L via mask, all monitors and alarms on, VSS, patent IV, report and transfer of care to RN.        Vitals: (Last set prior to Anesthesia Care Transfer)    CRNA VITALS  6/7/2017 1340 - 6/7/2017 1419      6/7/2017             NIBP: 106/74    NIBP Mean: 77      CRNA VITALS  6/7/2017 1344 - 6/7/2017 1419      6/7/2017             NIBP: 106/74    NIBP Mean: 77                Electronically Signed By: FAVIO Zepeda CRNA  June 7, 2017  2:19 PM

## 2017-06-08 VITALS
OXYGEN SATURATION: 97 % | HEART RATE: 59 BPM | RESPIRATION RATE: 16 BRPM | HEIGHT: 65 IN | SYSTOLIC BLOOD PRESSURE: 110 MMHG | BODY MASS INDEX: 21.16 KG/M2 | TEMPERATURE: 97.9 F | WEIGHT: 127 LBS | DIASTOLIC BLOOD PRESSURE: 73 MMHG

## 2017-06-08 LAB — GLUCOSE BLDC GLUCOMTR-MCNC: 98 MG/DL (ref 70–99)

## 2017-06-08 PROCEDURE — 82962 GLUCOSE BLOOD TEST: CPT

## 2017-06-08 PROCEDURE — 25000132 ZZH RX MED GY IP 250 OP 250 PS 637: Performed by: ORTHOPAEDIC SURGERY

## 2017-06-08 PROCEDURE — 25000125 ZZHC RX 250: Performed by: ORTHOPAEDIC SURGERY

## 2017-06-08 PROCEDURE — S0077 INJECTION, CLINDAMYCIN PHOSP: HCPCS | Performed by: ORTHOPAEDIC SURGERY

## 2017-06-08 PROCEDURE — 25000128 H RX IP 250 OP 636: Performed by: ORTHOPAEDIC SURGERY

## 2017-06-08 RX ORDER — OXYCODONE HYDROCHLORIDE 5 MG/1
5-10 TABLET ORAL
Qty: 60 TABLET | Refills: 0 | Status: SHIPPED | OUTPATIENT
Start: 2017-06-08 | End: 2017-10-26

## 2017-06-08 RX ORDER — OXYCODONE HYDROCHLORIDE 5 MG/1
5-10 TABLET ORAL EVERY 4 HOURS PRN
Qty: 50 TABLET | Refills: 0 | Status: SHIPPED | OUTPATIENT
Start: 2017-06-08 | End: 2017-10-26

## 2017-06-08 RX ORDER — OXYCODONE HYDROCHLORIDE 5 MG/1
5-10 TABLET ORAL
Status: DISCONTINUED | OUTPATIENT
Start: 2017-06-08 | End: 2017-06-08 | Stop reason: HOSPADM

## 2017-06-08 RX ORDER — HYDROXYZINE HYDROCHLORIDE 25 MG/1
25-50 TABLET, FILM COATED ORAL EVERY 6 HOURS PRN
Qty: 45 TABLET | Refills: 1 | Status: SHIPPED | OUTPATIENT
Start: 2017-06-08 | End: 2017-10-26

## 2017-06-08 RX ADMIN — LORAZEPAM 1 MG: 1 TABLET ORAL at 11:31

## 2017-06-08 RX ADMIN — HYDROCODONE BITARTRATE AND ACETAMINOPHEN 2 TABLET: 10; 325 TABLET ORAL at 04:36

## 2017-06-08 RX ADMIN — LORAZEPAM 1 MG: 1 TABLET ORAL at 00:37

## 2017-06-08 RX ADMIN — OXYCODONE HYDROCHLORIDE 10 MG: 5 TABLET ORAL at 10:16

## 2017-06-08 RX ADMIN — HYDROCODONE BITARTRATE AND ACETAMINOPHEN 2 TABLET: 10; 325 TABLET ORAL at 08:39

## 2017-06-08 RX ADMIN — OXYCODONE HYDROCHLORIDE 10 MG: 5 TABLET ORAL at 06:53

## 2017-06-08 RX ADMIN — HYDROMORPHONE HYDROCHLORIDE 0.5 MG: 1 INJECTION, SOLUTION INTRAMUSCULAR; INTRAVENOUS; SUBCUTANEOUS at 01:39

## 2017-06-08 RX ADMIN — CYCLOBENZAPRINE HYDROCHLORIDE 10 MG: 10 TABLET, FILM COATED ORAL at 07:43

## 2017-06-08 RX ADMIN — LORATADINE 10 MG: 10 TABLET ORAL at 07:43

## 2017-06-08 RX ADMIN — PAROXETINE HYDROCHLORIDE HEMIHYDRATE 25 MG: 25 TABLET, FILM COATED, EXTENDED RELEASE ORAL at 05:57

## 2017-06-08 RX ADMIN — HYDROCODONE BITARTRATE AND ACETAMINOPHEN 2 TABLET: 10; 325 TABLET ORAL at 00:37

## 2017-06-08 RX ADMIN — HYDROXYZINE HYDROCHLORIDE 25 MG: 25 TABLET ORAL at 04:36

## 2017-06-08 RX ADMIN — CLINDAMYCIN PHOSPHATE 900 MG: 18 INJECTION, SOLUTION INTRAVENOUS at 04:34

## 2017-06-08 RX ADMIN — LORAZEPAM 1 MG: 1 TABLET ORAL at 05:56

## 2017-06-08 RX ADMIN — HYDROCODONE BITARTRATE AND ACETAMINOPHEN 2 TABLET: 10; 325 TABLET ORAL at 12:48

## 2017-06-08 NOTE — PLAN OF CARE
Problem: Discharge Planning  Goal: Discharge Planning (Adult, OB, Behavioral, Peds)  Outcome: Improving  Discharge Goals:  Outpatient in a Bed discharge goals PRIOR TO DISCHARGE     Comments: List all Outpatient in a Bed discharge goals to be met before discharge home:     ~ Patient able to ambulate as they were prior to admission or with assist devices provided by therapies during their stay. - Met  ~ Nurse to Notify Provider when Outpatient in a Bed discharge goals have been met and patient is ready for discharge. - Met

## 2017-06-08 NOTE — PLAN OF CARE
Problem: Goal Outcome Summary  Goal: Goal Outcome Summary  A&Ox4. SBA. VSS, RA. L arm in sling with dressing-CDI. LUE CMS intact-feeling coming back into left hand. IV SL. Tolerates regular diet. Voiding. PRN Dilaudid, Norco, and Ativan given overnight for incisional pain. Will continue to monitor.

## 2017-06-08 NOTE — PLAN OF CARE
Problem: Discharge Planning  Goal: Discharge Planning (Adult, OB, Behavioral, Peds)  Outcome: Improving  Discharge Goals:  Outpatient in a Bed discharge goals PRIOR TO DISCHARGE     Comments: List all Outpatient in a Bed discharge goals to be met before discharge home:     ~ Patient able to ambulate as they were prior to admission or with assist devices provided by therapies during their stay. - Met  ~ Nurse to Notify Provider when Outpatient in a Bed discharge goals have been met and patient is ready for discharge. - Met      Pt has received oxycodone x2. Reported better pain control following second administration. D/C orders in place,  to  pt at 1300.

## 2017-06-08 NOTE — PLAN OF CARE
Problem: Discharge Planning  Goal: Discharge Planning (Adult, OB, Behavioral, Peds)  Outcome: Improving  Pain controlled with meds, cast dry and intact with sling. Finger warm witjh good refill denies feeling in hand.

## 2017-06-08 NOTE — PLAN OF CARE
Problem: Goal Outcome Summary  Goal: Goal Outcome Summary  Pt VSS, c/o intense pain in L thumb, radiating through top of hand. Received prn flexeril x1, oxy x2, norco x2. Pt reported better pain control following second oxycodone administration. Pt tolerated breakfast diet, drinking fluids and voiding adequate amounts. PIV removed.  Discharge instructions explained, pt denied questions. All of pt's medications in pt's possession. Pt d/c'd in wheelchair via stepforce to pt's 's car.

## 2017-06-08 NOTE — PROGRESS NOTES
POD#1  Required Dilaudid IV overnight  Pain poorly controlled on Norco  Good digital ROM  Dressing intact.  NVI  Will d/c today on Oxycodone/visteril  She will f/u as scheduled for casting.  Fatou Philippe  [phone]

## 2017-06-12 NOTE — OP NOTE
DATE OF SURGERY:  06/07/2017.      DIAGNOSIS:  Failed left thumb carpometacarpal arthroplasty.      PROCEDURES:   1.  Revision left thumb carpometacarpal arthroplasty.   2.  Suspension with FiberTape.   3.  1.1 TightRope.      SURGEON:  Beatrice Philippe MD       ANESTHESIA:  Axillary block with general anesthetic.      INDICATIONS:  Laurel Parra has had progressive left thumb pain following CMC arthroplasty.  X-rays demonstrate some subsidence.  She has no significant synovitis component.  Symptoms have not improved with use of splint and reduction activities.      Treatment was discussed.  Risks and benefits of surgery were reviewed.  Pain management was discussed postoperatively  She understands and agrees to proceed.      DESCRIPTION OF PROCEDURE:  A brachial plexus block was placed preoperatively.  She was brought to the operating room and general anesthesia was induced without difficulty.  A pillow was placed under her legs to support her back comfortably.  Her left upper extremity was prepped and draped below an upper arm tourniquet.  A pause was performed to confirm the site and the procedure planned.  Limb was exsanguinated with an Esmarch and the tourniquet was inflated 250 mmHg.      The previous incision at the base of the thumb was used.  Skin and subcutaneous tissues were divided.  Care was taken to identify and preserve the superficial radial nerve branches in the area.  There was moderate scar in the region of the abductor pollicis longus tendon slips.  Initially, a tendon graft was planned; however, given the potential for additional trauma, scarring and potential nerve symptoms, we elected to proceed with a suture reconstruction.  The radial base of the metacarpal was exposed.  A guidewire was advanced.  The soft tissues were protected and a drill hole was created for placement of a 3.5 mm SwiveLock anchor.  FiberTape was then used and secured to the base of the metacarpal nicely.  With some  distraction, the base of the index metacarpal was exposed.  There was some evidence of bony impingement at this level and a rongeur was used to debride the base of the metacarpal.  Also, the scaphotrapezoid joint was inspected and a minimal portion was resected.      Preparation for a second anchor was then performed.  X-rays were used to confirm placement.  The FiberTape was then tensioned and then the anchor was advanced.  Excellent suspension was obtained with this method, with good secure fixation.      To augment the suspension, a standard 1.1 TightRope was used.  The technique was performed in standard fashion, protecting soft tissues, with advancement of a K-wire from the radial base of the thumb metacarpal to emerge in the dorsal ulnar cortex of the second metacarpal.  The FiberWire was advanced and tightened appropriately.  Final x-rays were satisfactory.  The wound was irrigated thoroughly and the tourniquet was deflated.  Hemostasis was achieved with pressure and bipolar cautery and the skin was closed with Monocryl and Prolene suture.  A bulky sterile dressing was applied, incorporating a thumb spica splint.  She tolerated the procedure well and was taken to the recovery room in satisfactory condition.         VI PHILIPPE MD             D: 2017 07:19   T: 2017 10:02   MT: GINNA      Name:     RACHEL CHRISTINA   MRN:      [id number]        Account:        [id number]   :      1950           Procedure Date: 2017      Document: [id number]       cc: Nash Philippe MD

## 2017-07-19 ENCOUNTER — TRANSFERRED RECORDS (OUTPATIENT)
Dept: HEALTH INFORMATION MANAGEMENT | Facility: CLINIC | Age: 67
End: 2017-07-19

## 2017-07-20 ENCOUNTER — TRANSFERRED RECORDS (OUTPATIENT)
Dept: HEALTH INFORMATION MANAGEMENT | Facility: CLINIC | Age: 67
End: 2017-07-20

## 2017-07-20 LAB — PHQ9 SCORE: 14

## 2017-07-28 ENCOUNTER — TELEPHONE (OUTPATIENT)
Dept: FAMILY MEDICINE | Facility: CLINIC | Age: 67
End: 2017-07-28

## 2017-07-28 NOTE — TELEPHONE ENCOUNTER
Reason for Call:  appointment    Detailed comments: pt is requesting an apt with Dr. Lebron before  His first available due to having migraines every day and being nauseous     Phone Number Patient can be reached at: Home number on file [phone] (home)    Best Time: anytime    Can we leave a detailed message on this number? YES    Call taken on 7/28/2017 at 4:39 PM by Bety Vuong

## 2017-08-11 ENCOUNTER — TELEPHONE (OUTPATIENT)
Dept: FAMILY MEDICINE | Facility: CLINIC | Age: 67
End: 2017-08-11

## 2017-08-16 ENCOUNTER — TRANSFERRED RECORDS (OUTPATIENT)
Dept: HEALTH INFORMATION MANAGEMENT | Facility: CLINIC | Age: 67
End: 2017-08-16

## 2017-09-05 ENCOUNTER — TRANSFERRED RECORDS (OUTPATIENT)
Dept: HEALTH INFORMATION MANAGEMENT | Facility: CLINIC | Age: 67
End: 2017-09-05

## 2017-09-29 ENCOUNTER — TELEPHONE (OUTPATIENT)
Dept: FAMILY MEDICINE | Facility: CLINIC | Age: 67
End: 2017-09-29

## 2017-09-29 NOTE — TELEPHONE ENCOUNTER
Returned call to patient.    Patient calling to ask for advise.     Current symptoms:  Intermittent nausea and HA.  Patient reports Hx of migraines.  Patient believes current HA might be migraine.   Advised patient OK to take her RX Zomig, as directed for HA.  Nausea might be related to HA.   Advised patient stay well hydrated.  Continue to eat bland foods as tolerated.      Patient had 2-3 days earlier in the week of diarrhea.  Now resolved. Reports normal stool today. Denies current abd pain.   Denies any vomiting this past week.   No fever, however Sat experienced some chills and sweating.      Advised to schedule appt if symptoms persist or worsen.  Caller agreed with advise.    Milka LOOMIS RN,BSN

## 2017-09-29 NOTE — TELEPHONE ENCOUNTER
Reason for call:  Patient reporting a symptom    Symptom or request: Chills, diarrhea, headaches, nausea      Duration (how long have symptoms been present): Since Sunday     Have you been treated for this before? No    Additional comments: Laurel Parra is wondering whether there is a GI bug going around. Would like to speak with Dr. Lebron about what she should do.     Phone Number patient can be reached at:  Home number on file [phone] (home)    Best Time:  ASAP     Can we leave a detailed message on this number:  YES    Call taken on 9/29/2017 at 2:37 PM by Fanny Jolley

## 2017-10-16 ENCOUNTER — TRANSFERRED RECORDS (OUTPATIENT)
Dept: HEALTH INFORMATION MANAGEMENT | Facility: CLINIC | Age: 67
End: 2017-10-16

## 2017-10-20 ENCOUNTER — TRANSFERRED RECORDS (OUTPATIENT)
Dept: HEALTH INFORMATION MANAGEMENT | Facility: CLINIC | Age: 67
End: 2017-10-20

## 2017-10-24 ENCOUNTER — TRANSFERRED RECORDS (OUTPATIENT)
Dept: HEALTH INFORMATION MANAGEMENT | Facility: CLINIC | Age: 67
End: 2017-10-24

## 2017-10-24 LAB — PHQ9 SCORE: 13

## 2017-10-25 NOTE — PROGRESS NOTES
[address] [address]  [phone]  Dept: [phone]    PRE-OP EVALUATION:  Today's date: 10/26/2017    Laurel Parra (: 1950) presents for pre-operative evaluation assessment as requested by Dr. Ruvalcaba.  She requires evaluation and anesthesia risk assessment prior to undergoing surgery/procedure for treatment of Right Tenex plantar fasciotomy  .  Proposed procedure:     Date of Surgery/ Procedure: 10/30/17  Time of Surgery/ Procedure: 930am  Hospital/Surgical Facility: [facility]  [phone]  Primary Physician: Georges Lebron  Type of Anesthesia Anticipated: Local with MAC    Patient has a Health Care Directive or Living Will:  YES       The patient is having surgery as noted above for plantar fasciitis.  She will need the other one done as well.  She has chronic pain and on norco 10 a day via pain clinic.  She also uses ativan 4 a day and ambien via psyche for anxiety and depression, both of which have been stable.  No chest pain or shortness of breath but not working out reg.  Some fatigue.  No other c/o.  Migraines stable for years.                 Past Medical History:      Past Medical History:   Diagnosis Date     Anxiety     Dr. Adelina Meehan     Chronic constipation      Chronic narcotic use     [facility]     Chronic pain     Dr. Orta as of      Chronic urethritis     Dr. Little     Colonic polyp     one polyp, fu 5 years     Depression, major, in partial remission (H)     Dr. Meehan     Diarrhea 2012    eval by [facility], resolved with gluten free diet     DJD (degenerative joint disease)      H/O gastroesophageal reflux (GERD)      LBP (low back pain)     Dr. Jae Tong in [place], then seeing [facility] Dr. Orta     Migraines 2009    neuro eval     Palpitations 2006    holter with pvc's and pac's, echo nl     Screening     dexa nl at gyn             Past Surgical History:     "  Past Surgical History:   Procedure Laterality Date     APPENDECTOMY  2016     ARTHROPLASTY CARPOMETACARPAL (THUMB JOINT) Left 2017    Procedure: ARTHROPLASTY CARPOMETACARPAL (THUMB JOINT);  REVISION  LEFT THUMB CARPOMETACARPAL ARTHROPOASTY WITH 1.1 TIGHT ROPE;  Surgeon: Beatrice Philippe MD;  Location:  [facility]     CARPAL TUNNEL RELEASE RT/LT  2006      SECTION       COLONOSCOPY       EXCIS BARTHOLIN GLAND/CYST  2005     HERNIORRHAPHY INGUINAL  70's    x5     ORTHOPEDIC SURGERY Bilateral     SHOULDER ARTHROSCOPY     thumb surgery   and ,              Social History:     Social History   Substance Use Topics     Smoking status: Former Smoker     Quit date: 10/29/1979     Smokeless tobacco: Never Used     Alcohol use No      Comment: minimal since on norco             Family History:   No family history of bleeding difficulty, anesthesia problems or blood clots x for father with dvt and also some sort of anesthesia issue, not clear.         Allergies:     Allergies   Allergen Reactions     Compazine [Prochlorperazine] Other (See Comments)     \"crawl out of my skin\"     Penicillins Nausea and Vomiting and Hives             Medications:     Current Outpatient Prescriptions   Medication Sig Dispense Refill     LORazepam (ATIVAN) 1 MG tablet Take 1-2 tablets (1-2 mg) by mouth 4 times daily as needed for anxiety (up to 6 tablets per day) 30 tablet      metoprolol (TOPROL XL) 25 MG 24 hr tablet Take 1 tablet (25 mg) by mouth every evening 30 tablet      ipratropium (ATROVENT) 0.06 % spray Spray 3 sprays into both nostrils daily       Hydrocodone-Acetaminophen  MG TABS Take 2 tablets by mouth every 4 hours as needed       PARoxetine HCl (PAXIL CR PO) Take 25 mg by mouth 2 times daily NAME BRAND PAXIL CR       Zolpidem Tartrate (AMBIEN CR PO) Take 12.5 mg by mouth At Bedtime       carboxymethylcellulose (REFRESH PLUS) 0.5 % SOLN ophthalmic solution Place 1 drop into both eyes 3 times daily as " "needed for dry eyes       ZOLMitriptan (ZOMIG) 5 MG tablet Take 1 tablet (5 mg) by mouth at onset of headache for migraine May repeat in 2 hours. Max 2 tablets/24 hours. 12 tablet 6     loratadine (CLARITIN) 10 MG tablet Take 10 mg by mouth daily       estradiol (ESTRACE) 1 MG tablet Take 1 mg by mouth daily (with dinner) MUST BE  TEVA       PROgesterone (PROMETRIUM) 100 MG capsule Take 100 mg by mouth daily (with dinner)                  Review of Systems:   No history of bleeding difficulty, anesthesia problems or blood clots.  The 10 point Review of Systems is negative other than noted in the HPI           Physical Exam:   Blood pressure 99/65, pulse 64, height 5' 5.25\" (1.657 m), weight 132 lb (59.9 kg), not currently breastfeeding.    Constitutional: healthy appearing, alert and in no distress  Heent: Normocephalic. Head without obvious masses or lesions. PERRLDC, EOMI. Mouth exam within normal limits: tongue, mucous membranes, posterior pharynx all normal, no lesions or abnormalities seen.  Tm's and canals within normal limits bilaterally. Neck supple, no nuchal rigidity or masses. No supraclavicular, or cervical adenopathy. Thyroid symmetric, no masses.  Cardiovascular: Regular rate and rhythm, no murmer, rub or gallops.  JVP not elevated, no edema.  Carotids within normal limits bilaterally, no bruits.  Respiratory: Normal respiratory effort.  Lungs clear, normal flow, no wheezing or crackles.  Gastrointestinal: Normal active bowel sounds.   Soft, not tender, no masses, guarding or rebound.  No hepatosplenomegaly.   Musculoskeletal: extremities normal, no gross deformities noted.  Skin: no suspicious lesions or rashes   Neurologic: Mental status within normal limits.  Speech fluent.  No gross motor abnormalities and gait intact.  Psychiatric: mentation appears normal and affect normal.         Data:   Noted from December        Assessment:   1. Normal pre op exam, this patient should be low risk " for the procedure  2. Chronic pain med use, no signs abuse, managed by pain clinic  3. Chronic bzd use, no signs abuse, has close psyche follow up for this and depressioin  4. Depression, on med, follow up with psyche  5. Migraines, stable         Plan:   The patient is ok for the procedure  She can take her am meds day of procedure, including her norco and ativan  ANESTHESIA SHOULD BE AWARE OF HER CHRONIC NARCOTIC AND BZD USE AS WELL AS FH OF ANESTHESIA ISSUES IN FATHER, ALTHOUGH PATIENT HAS NOT HAD ANY  Monitor closely post op, consider pulse ox and telemetry given her narcotic use and what will likely be need for more pain meds post op      Georges Lebron M.D.

## 2017-10-26 ENCOUNTER — OFFICE VISIT (OUTPATIENT)
Dept: FAMILY MEDICINE | Facility: CLINIC | Age: 67
End: 2017-10-26
Payer: COMMERCIAL

## 2017-10-26 VITALS
WEIGHT: 132 LBS | SYSTOLIC BLOOD PRESSURE: 99 MMHG | DIASTOLIC BLOOD PRESSURE: 65 MMHG | HEIGHT: 65 IN | HEART RATE: 64 BPM | BODY MASS INDEX: 21.99 KG/M2

## 2017-10-26 DIAGNOSIS — Z23 NEED FOR PNEUMOCOCCAL VACCINATION: ICD-10-CM

## 2017-10-26 DIAGNOSIS — Z23 NEED FOR INFLUENZA VACCINATION: ICD-10-CM

## 2017-10-26 DIAGNOSIS — Z01.818 PREOP GENERAL PHYSICAL EXAM: Primary | ICD-10-CM

## 2017-10-26 DIAGNOSIS — R53.83 OTHER FATIGUE: ICD-10-CM

## 2017-10-26 DIAGNOSIS — G89.29 CHRONIC LOW BACK PAIN, UNSPECIFIED BACK PAIN LATERALITY, WITH SCIATICA PRESENCE UNSPECIFIED: ICD-10-CM

## 2017-10-26 DIAGNOSIS — F33.41 RECURRENT MAJOR DEPRESSIVE DISORDER, IN PARTIAL REMISSION (H): ICD-10-CM

## 2017-10-26 DIAGNOSIS — M54.5 CHRONIC LOW BACK PAIN, UNSPECIFIED BACK PAIN LATERALITY, WITH SCIATICA PRESENCE UNSPECIFIED: ICD-10-CM

## 2017-10-26 DIAGNOSIS — Z23 NEED FOR PROPHYLACTIC VACCINATION AND INOCULATION AGAINST INFLUENZA: ICD-10-CM

## 2017-10-26 PROCEDURE — 90472 IMMUNIZATION ADMIN EACH ADD: CPT | Performed by: INTERNAL MEDICINE

## 2017-10-26 PROCEDURE — 90662 IIV NO PRSV INCREASED AG IM: CPT | Performed by: INTERNAL MEDICINE

## 2017-10-26 PROCEDURE — 84443 ASSAY THYROID STIM HORMONE: CPT | Performed by: INTERNAL MEDICINE

## 2017-10-26 PROCEDURE — 90471 IMMUNIZATION ADMIN: CPT | Performed by: INTERNAL MEDICINE

## 2017-10-26 PROCEDURE — 90670 PCV13 VACCINE IM: CPT | Performed by: INTERNAL MEDICINE

## 2017-10-26 PROCEDURE — 36415 COLL VENOUS BLD VENIPUNCTURE: CPT | Performed by: INTERNAL MEDICINE

## 2017-10-26 PROCEDURE — 99214 OFFICE O/P EST MOD 30 MIN: CPT | Mod: 25 | Performed by: INTERNAL MEDICINE

## 2017-10-26 NOTE — NURSING NOTE
Screening Questionnaire for Adult Immunization    Are you sick today?   No   Do you have allergies to medications, food, a vaccine component or latex?   No   Have you ever had a serious reaction after receiving a vaccination?   No   Do you have a long-term health problem with heart disease, lung disease, asthma, kidney disease, metabolic disease (e.g. diabetes), anemia, or other blood disorder?   No   Do you have cancer, leukemia, HIV/AIDS, or any other immune system problem?   No   In the past 3 months, have you taken medications that affect  your immune system, such as prednisone, other steroids, or anticancer drugs; drugs for the treatment of rheumatoid arthritis, Crohn s disease, or psoriasis; or have you had radiation treatments?   No   Have you had a seizure, or a brain or other nervous system problem?   No   During the past year, have you received a transfusion of blood or blood     products, or been given immune (gamma) globulin or antiviral drug?   No   For women: Are you pregnant or is there a chance you could become        pregnant during the next month?   No   Have you received any vaccinations in the past 4 weeks?   No     Immunization questionnaire answers were all negative.        Per orders of Dr. Lebron, injection of Prevnar 13 and flu given by Ijeoma Felton. Patient instructed to remain in clinic for 15 minutes afterwards, and to report any adverse reaction to me immediately.       Screening performed by Ijeoma Felton on 10/26/2017 at 2:31 PM.  Prior to injection verified patient identity using patient's name and date of birth.

## 2017-10-26 NOTE — PROGRESS NOTES
Injectable Influenza Immunization Documentation    1.  Is the person to be vaccinated sick today?   No    2. Does the person to be vaccinated have an allergy to a component   of the vaccine?   No  Egg Allergy Algorithm Link    3. Has the person to be vaccinated ever had a serious reaction   to influenza vaccine in the past?   No    4. Has the person to be vaccinated ever had Guillain-Barré syndrome?   No    Form completed by Vania MILLER CMA  Prior to injection verified patient identity using patient's name and date of birth.

## 2017-10-26 NOTE — MR AVS SNAPSHOT
After Visit Summary   10/26/2017    Laurel Parra    MRN: [id number]           Patient Information     Date Of Birth          1950        Visit Information        Provider Department      10/26/2017 2:00 PM Georges Lebron MD [facility]        Today's Diagnoses     Preop general physical exam    -  1    Other fatigue        Recurrent major depressive disorder, in partial remission (H)        Chronic low back pain, unspecified back pain laterality, with sciatica presence unspecified        Need for influenza vaccination        Need for pneumococcal vaccination        Need for prophylactic vaccination and inoculation against influenza          Care Instructions      Before Your Surgery      Call your surgeon if there is any change in your health. This includes signs of a cold or flu (such as a sore throat, runny nose, cough, rash or fever).    Do not smoke, drink alcohol or take over the counter medicine (unless your surgeon or primary care doctor tells you to) for the 24 hours before and after surgery.    If you take prescribed drugs: Follow your doctor s orders about which medicines to take and which to stop until after surgery.    Eating and drinking prior to surgery: follow the instructions from your surgeon    Take a shower or bath the night before surgery. Use the soap your surgeon gave you to gently clean your skin. If you do not have soap from your surgeon, use your regular soap. Do not shave or scrub the surgery site.  Wear clean pajamas and have clean sheets on your bed.           Follow-ups after your visit        Who to contact     If you have questions or need follow up information about today's clinic visit or your schedule please contact [facility] directly at [phone].  Normal or non-critical lab and imaging results will be communicated to you by MyChart, letter or phone within 4 business days after the clinic has received the results. If you do  "not hear from us within 7 days, please contact the clinic through [employer] or phone. If you have a critical or abnormal lab result, we will notify you by phone as soon as possible.  Submit refill requests through [employer] or call your pharmacy and they will forward the refill request to us. Please allow 3 business days for your refill to be completed.          Additional Information About Your Visit        "[employer]hart Information     [employer] gives you secure access to your electronic health record. If you see a primary care provider, you can also send messages to your care team and make appointments. If you have questions, please call your primary care clinic.  If you do not have a primary care provider, please call [phone] and they will assist you.        Care EveryWhere ID     This is your Care EveryWhere ID. This could be used by other organizations to access your [facility] medical records  [id number]        Your Vitals Were     Pulse Height Breastfeeding? BMI (Body Mass Index)          64 5' 5.25\" (1.657 m) No 21.8 kg/m2         Blood Pressure from Last 3 Encounters:   10/26/17 99/65   06/08/17 110/73   06/02/17 113/79    Weight from Last 3 Encounters:   10/26/17 132 lb (59.9 kg)   06/07/17 127 lb (57.6 kg)   06/02/17 131 lb 8 oz (59.6 kg)              We Performed the Following     HC FLU VACCINE, INCREASED ANTIGEN, PRESV FREE     PNEUMOCOCCAL CONJ VACCINE 13 VALENT IM     TSH with free T4 reflex          Today's Medication Changes          These changes are accurate as of: 10/26/17  2:33 PM.  If you have any questions, ask your nurse or doctor.               Stop taking these medicines if you haven't already. Please contact your care team if you have questions.     THOR-MAG-ZINC-D PO   Stopped by:  Georges Lebron MD           DOCUSATE SODIUM PO   Stopped by:  Georges Lebron MD           FLEXERIL PO   Stopped by:  Georges Lebron MD           HYDROcodone Bitartrate 10 MG C12a   Stopped by:  " Georges Lebron MD           hydrOXYzine 25 MG tablet   Commonly known as:  ATARAX   Stopped by:  Georges Lebron MD           oxyCODONE 5 MG IR tablet   Commonly known as:  ROXICODONE   Stopped by:  Georges Lebron MD                    Primary Care Provider Office Phone # Fax #    Georges Lebron [phone] [address]  [facility] [address]        Equal Access to Services     [facility] AH: Hadii aad ku hadasho Soomaali, waaxda luqadaha, qaybta kaalmada adeegyada, waxay idiin hayaan adeeg kharash la'aan . So [facility] [phone].    ATENCIÓN: Si habla español, tiene a kumar disposición servicios gratuitos de asistencia lingüística. [facility] [phone].    We comply with applicable federal civil rights laws and [place] laws. We do not discriminate on the basis of race, color, national origin, age, disability, sex, sexual orientation, or gender identity.            Thank you!     Thank you for choosing [facility]  for your care. Our goal is always to provide you with excellent care. Hearing back from our patients is one way we can continue to improve our services. Please take a few minutes to complete the written survey that you may receive in the mail after your visit with us. Thank you!             Your Updated Medication List - Protect others around you: Learn how to safely use, store and throw away your medicines at www.disposemymeds.org.          This list is accurate as of: 10/26/17  2:33 PM.  Always use your most recent med list.                   Brand Name Dispense Instructions for use Diagnosis    AMBIEN CR PO      Take 12.5 mg by mouth At Bedtime        ATIVAN 1 MG tablet   Generic drug:  LORazepam     30 tablet    Take 1-2 tablets (1-2 mg) by mouth 4 times daily as needed for anxiety (up to 6 tablets per day)        carboxymethylcellulose 0.5 % Soln ophthalmic solution    REFRESH PLUS     Place 1 drop into both eyes 3 times daily as needed  for dry eyes        CLARITIN 10 MG tablet   Generic drug:  loratadine      Take 10 mg by mouth daily        ESTRACE 1 MG tablet   Generic drug:  estradiol      Take 1 mg by mouth daily (with dinner) MUST BE  KEI        Hydrocodone-Acetaminophen  MG Tabs      Take 2 tablets by mouth every 4 hours as needed        ipratropium 0.06 % spray    ATROVENT     Spray 3 sprays into both nostrils daily        PAXIL CR PO      Take 25 mg by mouth 2 times daily NAME BRAND PAXIL CR        PROMETRIUM 100 MG capsule   Generic drug:  progesterone      Take 100 mg by mouth daily (with dinner)        TOPROL XL 25 MG 24 hr tablet   Generic drug:  metoprolol     30 tablet    Take 1 tablet (25 mg) by mouth every evening        ZOLMitriptan 5 MG tablet    ZOMIG    12 tablet    Take 1 tablet (5 mg) by mouth at onset of headache for migraine May repeat in 2 hours. Max 2 tablets/24 hours.    Intractable chronic migraine without aura and without status migrainosus

## 2017-10-27 LAB — TSH SERPL DL<=0.005 MIU/L-ACNC: 1.5 MU/L (ref 0.4–4)

## 2017-10-27 NOTE — PROGRESS NOTES
It was nice to see you.  Your thyroid test is normal.  Let me know if any questions.    Georges Lebron M.D.

## 2017-11-09 ENCOUNTER — TRANSFERRED RECORDS (OUTPATIENT)
Dept: HEALTH INFORMATION MANAGEMENT | Facility: CLINIC | Age: 67
End: 2017-11-09

## 2017-11-20 ENCOUNTER — OFFICE VISIT (OUTPATIENT)
Dept: FAMILY MEDICINE | Facility: CLINIC | Age: 67
End: 2017-11-20
Payer: COMMERCIAL

## 2017-11-20 VITALS
HEIGHT: 65 IN | HEART RATE: 64 BPM | SYSTOLIC BLOOD PRESSURE: 110 MMHG | WEIGHT: 132 LBS | BODY MASS INDEX: 21.99 KG/M2 | TEMPERATURE: 97.7 F | OXYGEN SATURATION: 100 % | DIASTOLIC BLOOD PRESSURE: 79 MMHG

## 2017-11-20 DIAGNOSIS — R51.9 ACUTE NONINTRACTABLE HEADACHE, UNSPECIFIED HEADACHE TYPE: ICD-10-CM

## 2017-11-20 DIAGNOSIS — R07.0 THROAT PAIN: Primary | ICD-10-CM

## 2017-11-20 LAB
DEPRECATED S PYO AG THROAT QL EIA: NORMAL
ERYTHROCYTE [DISTWIDTH] IN BLOOD BY AUTOMATED COUNT: 12.2 % (ref 10–15)
HCT VFR BLD AUTO: 39.8 % (ref 35–47)
HGB BLD-MCNC: 13.2 G/DL (ref 11.7–15.7)
MCH RBC QN AUTO: 31.7 PG (ref 26.5–33)
MCHC RBC AUTO-ENTMCNC: 33.2 G/DL (ref 31.5–36.5)
MCV RBC AUTO: 96 FL (ref 78–100)
PLATELET # BLD AUTO: 289 10E9/L (ref 150–450)
RBC # BLD AUTO: 4.16 10E12/L (ref 3.8–5.2)
SPECIMEN SOURCE: NORMAL
WBC # BLD AUTO: 5.9 10E9/L (ref 4–11)

## 2017-11-20 PROCEDURE — 36415 COLL VENOUS BLD VENIPUNCTURE: CPT | Performed by: INTERNAL MEDICINE

## 2017-11-20 PROCEDURE — 85027 COMPLETE CBC AUTOMATED: CPT | Performed by: INTERNAL MEDICINE

## 2017-11-20 PROCEDURE — 87880 STREP A ASSAY W/OPTIC: CPT | Performed by: INTERNAL MEDICINE

## 2017-11-20 PROCEDURE — 87081 CULTURE SCREEN ONLY: CPT | Performed by: INTERNAL MEDICINE

## 2017-11-20 PROCEDURE — 99213 OFFICE O/P EST LOW 20 MIN: CPT | Performed by: INTERNAL MEDICINE

## 2017-11-20 NOTE — PROGRESS NOTES
Laurel Parra is a 67 year old female who presents for sore throat and headache.  She has been ill 2 weeks with sore throat, then last 3 days frontal ha, different then her usual headaches.  No cough, no ear pain, no fevers, chills or night sweats, slight nausea, no emesis, no rash.  Went to u.c 10 days ago and strep neg.  No neck stiffness.  Today her ha is better.  No nasal akash or dc, some cramping with yogurt today.    Past Medical History:   Diagnosis Date     Anxiety     Dr. Adelina Meehan     Chronic constipation      Chronic narcotic use     [facility]     Chronic pain     Dr. Orta as of      Chronic urethritis     Dr. Little     Colonic polyp 2011    one polyp, fu 5 years, fu 10/17 and no lesions     Depression, major, in partial remission (H)     Dr. Meehan     Diarrhea 2012    eval by [facility], resolved with gluten free diet     DJD (degenerative joint disease)      H/O gastroesophageal reflux (GERD)      LBP (low back pain)     Dr. Jae Tong in [place], then seeing [facility] Dr. Orta     Migraines 2009    neuro eval     Palpitations 2006    holter with pvc's and pac's, echo nl     Screening     dexa nl at gyn     Past Surgical History:   Procedure Laterality Date     APPENDECTOMY  2016     ARTHROPLASTY CARPOMETACARPAL (THUMB JOINT) Left 2017    Procedure: ARTHROPLASTY CARPOMETACARPAL (THUMB JOINT);  REVISION  LEFT THUMB CARPOMETACARPAL ARTHROPOASTY WITH 1.1 TIGHT ROPE;  Surgeon: Beatrice Philippe MD;  Location:  [facility]     CARPAL TUNNEL RELEASE RT/LT  2006      SECTION       COLONOSCOPY       EXCIS BARTHOLIN GLAND/CYST  2005     HERNIORRHAPHY INGUINAL  70's    x5     ORTHOPEDIC SURGERY Bilateral     SHOULDER ARTHROSCOPY     ORTHOPEDIC SURGERY Right 2017    right foot for plantar fasciitis     thumb surgery   and 2004, 2007     Social History     Social History     Marital status:      Spouse name: N/A     Number of children: 1  "    Years of education: N/A     Occupational History      Self     Social History Main Topics     Smoking status: Former Smoker     Quit date: 10/29/1979     Smokeless tobacco: Never Used     Alcohol use No      Comment: minimal since on norco     Drug use: No     Sexual activity: Yes     Partners: Male     Other Topics Concern     Not on file     Social History Narrative     Current Outpatient Prescriptions   Medication Sig Dispense Refill     LORazepam (ATIVAN) 1 MG tablet Take 1-2 tablets (1-2 mg) by mouth 4 times daily as needed for anxiety (up to 6 tablets per day) 30 tablet      metoprolol (TOPROL XL) 25 MG 24 hr tablet Take 1 tablet (25 mg) by mouth every evening 30 tablet      ipratropium (ATROVENT) 0.06 % spray Spray 3 sprays into both nostrils daily       Hydrocodone-Acetaminophen  MG TABS Take 2 tablets by mouth every 4 hours as needed       PARoxetine HCl (PAXIL CR PO) Take 25 mg by mouth 2 times daily NAME BRAND PAXIL CR       Zolpidem Tartrate (AMBIEN CR PO) Take 12.5 mg by mouth At Bedtime       carboxymethylcellulose (REFRESH PLUS) 0.5 % SOLN ophthalmic solution Place 1 drop into both eyes 3 times daily as needed for dry eyes       ZOLMitriptan (ZOMIG) 5 MG tablet Take 1 tablet (5 mg) by mouth at onset of headache for migraine May repeat in 2 hours. Max 2 tablets/24 hours. 12 tablet 6     loratadine (CLARITIN) 10 MG tablet Take 10 mg by mouth daily       estradiol (ESTRACE) 1 MG tablet Take 1 mg by mouth daily (with dinner) MUST BE  TEVA       PROgesterone (PROMETRIUM) 100 MG capsule Take 100 mg by mouth daily (with dinner)        Allergies   Allergen Reactions     Compazine [Prochlorperazine] Other (See Comments)     \"crawl out of my skin\"     Penicillins Nausea and Vomiting and Hives     FAMILY HISTORY NOTED AND REVIEWED    REVIEW OF SYSTEMS: above    PHYSICAL EXAM    /79 (BP Location: Left arm, Patient Position: Chair, Cuff Size: Adult Regular)  Pulse 64  Temp 97.7  F " "(36.5  C) (Oral)  Ht 5' 5.25\" (1.657 m)  Wt 132 lb (59.9 kg)  SpO2 100%  Breastfeeding? No  BMI 21.8 kg/m2    Patient appears non toxic  Tympanic membranes and canals: within normal limits bilaterally.   Mouth: Posterior pharynx, mucous membranes and tongue exam within normal limits x for symmetric faint raised papules, yellow, back of throat, not tender to touch and not pussy  Neck: supple, no nuchal rigidity or masses.  No anterior or posterior cervical adenopathy.    Lungs: clear, normal flow and effort.      ASSESSMENT:  Sore throat and headache, I suspect viral process, doubt strep, abscess, meningitis, sinusitis or cns process      PLAN:  Strep and cbc, if normal call if fever, worsens, not gone soon    Georges Lebron M.D.              "

## 2017-11-20 NOTE — PROGRESS NOTES
Let me know if you can not view your labs.  They look very good.  Negative strep test and normal cbc.  For now let's not add anything but if you get worse or it's not going away over the next 3 to 5 days let me know.    Happy Holidays!    Georges

## 2017-11-20 NOTE — NURSING NOTE
"Chief Complaint   Patient presents with     Headache     went to [facility] 1 week ago, sore throat, reflux, gi cramping after yogurt,        Initial /79 (BP Location: Left arm, Patient Position: Chair, Cuff Size: Adult Regular)  Pulse 64  Temp 97.7  F (36.5  C) (Oral)  Ht 5' 5.25\" (1.657 m)  Wt 132 lb (59.9 kg)  SpO2 100%  Breastfeeding? No  BMI 21.8 kg/m2 Estimated body mass index is 21.8 kg/(m^2) as calculated from the following:    Height as of this encounter: 5' 5.25\" (1.657 m).    Weight as of this encounter: 132 lb (59.9 kg).  Medication Reconciliation: complete.  Gifty Michaels CMA    "

## 2017-11-20 NOTE — MR AVS SNAPSHOT
"              After Visit Summary   11/20/2017    Laurel Parra    MRN: [id number]           Patient Information     Date Of Birth          1950        Visit Information        Provider Department      11/20/2017 2:00 PM Georges Lebron MD [facility]        Today's Diagnoses     Throat pain    -  1    Acute nonintractable headache, unspecified headache type           Follow-ups after your visit        Who to contact     If you have questions or need follow up information about today's clinic visit or your schedule please contact [facility] directly at [phone].  Normal or non-critical lab and imaging results will be communicated to you by MyChart, letter or phone within 4 business days after the clinic has received the results. If you do not hear from us within 7 days, please contact the clinic through [employer] or phone. If you have a critical or abnormal lab result, we will notify you by phone as soon as possible.  Submit refill requests through [employer] or call your pharmacy and they will forward the refill request to us. Please allow 3 business days for your refill to be completed.          Additional Information About Your Visit        MyChart Information     [employer] gives you secure access to your electronic health record. If you see a primary care provider, you can also send messages to your care team and make appointments. If you have questions, please call your primary care clinic.  If you do not have a primary care provider, please call [phone] and they will assist you.        Care EveryWhere ID     This is your Care EveryWhere ID. This could be used by other organizations to access your [facility] medical records  [id number]        Your Vitals Were     Pulse Temperature Height Pulse Oximetry Breastfeeding? BMI (Body Mass Index)    64 97.7  F (36.5  C) (Oral) 5' 5.25\" (1.657 m) 100% No 21.8 kg/m2       Blood Pressure from Last 3 Encounters:   11/20/17 110/79 "   10/26/17 99/65   06/08/17 110/73    Weight from Last 3 Encounters:   11/20/17 132 lb (59.9 kg)   10/26/17 132 lb (59.9 kg)   06/07/17 127 lb (57.6 kg)              We Performed the Following     CBC with platelets     Strep, Rapid Screen        Primary Care Provider Office Phone # Fax #    Georges Elijah Lebron [phone] [address] [facility] [address]  [facility] [address]        Equal Access to Services     [facility] Center[facility] : Hadii aad ku hadasho Soomaali, waaxda luqadaha, qaybta kaalmada adeegyada, waxay idiin hayaan adeeg iliana fontenot . So [facility] [phone].    ATENCIÓN: Si habla español, tiene a kumar disposición servicios gratuitos de asistencia lingüística. [facility] [phone].    We comply with applicable federal civil rights laws and [place] laws. We do not discriminate on the basis of race, color, national origin, age, disability, sex, sexual orientation, or gender identity.            Thank you!     Thank you for choosing [facility]  for your care. Our goal is always to provide you with excellent care. Hearing back from our patients is one way we can continue to improve our services. Please take a few minutes to complete the written survey that you may receive in the mail after your visit with us. Thank you!             Your Updated Medication List - Protect others around you: Learn how to safely use, store and throw away your medicines at www.disposemymeds.org.          This list is accurate as of: 11/20/17  2:25 PM.  Always use your most recent med list.                   Brand Name Dispense Instructions for use Diagnosis    AMBIEN CR PO      Take 12.5 mg by mouth At Bedtime        ATIVAN 1 MG tablet   Generic drug:  LORazepam     30 tablet    Take 1-2 tablets (1-2 mg) by mouth 4 times daily as needed for anxiety (up to 6 tablets per day)        carboxymethylcellulose 0.5 % Soln ophthalmic solution    REFRESH PLUS     Place 1 drop into both eyes 3 times daily as needed for dry  eyes        CLARITIN 10 MG tablet   Generic drug:  loratadine      Take 10 mg by mouth daily        ESTRACE 1 MG tablet   Generic drug:  estradiol      Take 1 mg by mouth daily (with dinner) MUST BE  KEI        Hydrocodone-Acetaminophen  MG Tabs      Take 2 tablets by mouth every 4 hours as needed        ipratropium 0.06 % spray    ATROVENT     Spray 3 sprays into both nostrils daily        PAXIL CR PO      Take 25 mg by mouth 2 times daily NAME BRAND PAXIL CR        PROMETRIUM 100 MG capsule   Generic drug:  progesterone      Take 100 mg by mouth daily (with dinner)        TOPROL XL 25 MG 24 hr tablet   Generic drug:  metoprolol     30 tablet    Take 1 tablet (25 mg) by mouth every evening        ZOLMitriptan 5 MG tablet    ZOMIG    12 tablet    Take 1 tablet (5 mg) by mouth at onset of headache for migraine May repeat in 2 hours. Max 2 tablets/24 hours.    Intractable chronic migraine without aura and without status migrainosus

## 2017-11-21 LAB
BACTERIA SPEC CULT: NORMAL
SPECIMEN SOURCE: NORMAL

## 2017-12-04 DIAGNOSIS — G43.719 INTRACTABLE CHRONIC MIGRAINE WITHOUT AURA AND WITHOUT STATUS MIGRAINOSUS: ICD-10-CM

## 2017-12-05 RX ORDER — ZOLMITRIPTAN 5 MG/1
TABLET, FILM COATED ORAL
Qty: 12 TABLET | Refills: 6 | Status: SHIPPED | OUTPATIENT
Start: 2017-12-05 | End: 2018-09-25

## 2017-12-14 ENCOUNTER — TRANSFERRED RECORDS (OUTPATIENT)
Dept: HEALTH INFORMATION MANAGEMENT | Facility: CLINIC | Age: 67
End: 2017-12-14

## 2017-12-20 ENCOUNTER — TRANSFERRED RECORDS (OUTPATIENT)
Dept: HEALTH INFORMATION MANAGEMENT | Facility: CLINIC | Age: 67
End: 2017-12-20

## 2017-12-21 ENCOUNTER — TRANSFERRED RECORDS (OUTPATIENT)
Dept: HEALTH INFORMATION MANAGEMENT | Facility: CLINIC | Age: 67
End: 2017-12-21

## 2017-12-28 ENCOUNTER — TRANSFERRED RECORDS (OUTPATIENT)
Dept: HEALTH INFORMATION MANAGEMENT | Facility: CLINIC | Age: 67
End: 2017-12-28

## 2018-01-17 DIAGNOSIS — G43.719 INTRACTABLE CHRONIC MIGRAINE WITHOUT AURA AND WITHOUT STATUS MIGRAINOSUS: ICD-10-CM

## 2018-01-17 RX ORDER — ZOLMITRIPTAN 2.5 MG/1
TABLET, FILM COATED ORAL
COMMUNITY
End: 2018-01-17

## 2018-01-17 NOTE — TELEPHONE ENCOUNTER
Reason for Call:  Medication or medication refill:    Do you use a [facility]?  Name of the pharmacy and phone number for the current request:     [facility] [place], [place] - [address]        Name of the medication requested: Pt states she is out of ZOLMitriptan (ZOMIG) 5 MG tablet and her insurance will not cover a refill until the 1/28 she is requesting the ZolMitiptan ( ZOMIG) 2.5 MG as a refill   States her insurance company will cover this      Pt is also requesting refills on the 2.5 MG incase she runs into the same issue again    Other request:     Can we leave a detailed message on this number? YES    Phone number patient can be reached at: Cell number on file:    Telephone Information:   Mobile [phone]       Best Time: anytime    Call taken on 1/17/2018 at 9:59 AM by Felicitas Perdue

## 2018-01-17 NOTE — TELEPHONE ENCOUNTER
Requested Prescriptions   Pending Prescriptions Disp Refills     ZOLMitriptan (ZOMIG) 2.5 MG tablet 30 tablet      Sig: Take by mouth at onset of headache    There is no refill protocol information for this order      Signed Prescriptions Disp Refills     ZOLMitriptan (ZOMIG) 2.5 MG tablet       Sig: Take by mouth at onset of headache    There is no refill protocol information for this order      LOV:11/20/2017

## 2018-01-18 RX ORDER — ZOLMITRIPTAN 2.5 MG/1
2.5 TABLET, FILM COATED ORAL
Qty: 30 TABLET | Refills: 1 | Status: SHIPPED | OUTPATIENT
Start: 2018-01-18 | End: 2018-10-26

## 2018-01-18 NOTE — TELEPHONE ENCOUNTER
PG  Pt requesting Zomig 2.5 mg, can't get refills on 5 mg until the 28th. Would like refills on the 2.5 mg in case she runs into this issue again.    Please advise,    Zomig 5 mg      Last Written Prescription Date:  12/5/17  Last Fill Quantity: 12,   # refills: 6  Last Office Visit: 11/20/17  Future Office visit:    Next 5 appointments (look out 90 days)     Jan 30, 2018 11:00 AM CST   [facility] physical with FAVIO Roberts CNP   [facility]    [address] [facility] [address]   [phone]

## 2018-01-19 ENCOUNTER — TRANSFERRED RECORDS (OUTPATIENT)
Dept: HEALTH INFORMATION MANAGEMENT | Facility: CLINIC | Age: 68
End: 2018-01-19

## 2018-01-19 ENCOUNTER — TELEPHONE (OUTPATIENT)
Dept: FAMILY MEDICINE | Facility: CLINIC | Age: 68
End: 2018-01-19

## 2018-01-19 NOTE — TELEPHONE ENCOUNTER
Patient would like to get Zomig to have on hand before weekend.  She is willing to use the Zomig 2.5mg ODT (the only product on hand at any nearby pharmacy.)    Ok to change to ODT version?  Please resend to the [facility] at [address] [place] [address] if ok.    Thanks!  Tierney Gross, PharmD  [facility]  [phone]

## 2018-01-19 NOTE — TELEPHONE ENCOUNTER
Spoke to  pharmacy [facility].  The dosing is exactly the same; the oral dissolvable tablets are the only thing available at this time and patient is requesting them.  This seems reasonable; will fill Rx with ODT version for now.  Karen Avila RN

## 2018-01-30 ENCOUNTER — OFFICE VISIT (OUTPATIENT)
Dept: FAMILY MEDICINE | Facility: CLINIC | Age: 68
End: 2018-01-30
Payer: COMMERCIAL

## 2018-01-30 VITALS
SYSTOLIC BLOOD PRESSURE: 121 MMHG | HEART RATE: 69 BPM | TEMPERATURE: 97.5 F | HEIGHT: 65 IN | WEIGHT: 120 LBS | OXYGEN SATURATION: 96 % | BODY MASS INDEX: 19.99 KG/M2 | DIASTOLIC BLOOD PRESSURE: 78 MMHG

## 2018-01-30 DIAGNOSIS — J31.0 CHRONIC RHINITIS: ICD-10-CM

## 2018-01-30 DIAGNOSIS — R00.2 PALPITATIONS: ICD-10-CM

## 2018-01-30 DIAGNOSIS — Z01.818 PREOP GENERAL PHYSICAL EXAM: Primary | ICD-10-CM

## 2018-01-30 DIAGNOSIS — J31.0 CHRONIC RHINITIS, UNSPECIFIED TYPE: ICD-10-CM

## 2018-01-30 DIAGNOSIS — N84.0 ENDOMETRIAL POLYP: ICD-10-CM

## 2018-01-30 LAB — HGB BLD-MCNC: 13.2 G/DL (ref 11.7–15.7)

## 2018-01-30 PROCEDURE — 82565 ASSAY OF CREATININE: CPT | Performed by: NURSE PRACTITIONER

## 2018-01-30 PROCEDURE — 99214 OFFICE O/P EST MOD 30 MIN: CPT | Performed by: NURSE PRACTITIONER

## 2018-01-30 PROCEDURE — 84132 ASSAY OF SERUM POTASSIUM: CPT | Performed by: NURSE PRACTITIONER

## 2018-01-30 PROCEDURE — 93000 ELECTROCARDIOGRAM COMPLETE: CPT | Performed by: NURSE PRACTITIONER

## 2018-01-30 PROCEDURE — 36415 COLL VENOUS BLD VENIPUNCTURE: CPT | Performed by: NURSE PRACTITIONER

## 2018-01-30 PROCEDURE — 85018 HEMOGLOBIN: CPT | Performed by: NURSE PRACTITIONER

## 2018-01-30 RX ORDER — METOPROLOL SUCCINATE 25 MG/1
25 TABLET, EXTENDED RELEASE ORAL EVERY EVENING
Qty: 90 TABLET | Refills: 1 | Status: SHIPPED | OUTPATIENT
Start: 2018-01-30 | End: 2018-08-01

## 2018-01-30 RX ORDER — IPRATROPIUM BROMIDE 42 UG/1
3 SPRAY, METERED NASAL DAILY
Qty: 45 ML | Refills: 3 | Status: SHIPPED | OUTPATIENT
Start: 2018-01-30

## 2018-01-30 RX ORDER — CYCLOBENZAPRINE HCL 10 MG
10 TABLET ORAL DAILY PRN
COMMUNITY

## 2018-01-30 NOTE — PROGRESS NOTES
[address] [address]  [phone]  Dept: [phone]    PRE-OP EVALUATION:  Today's date: 2018    Laurel Parra (: 1950) presents for pre-operative evaluation assessment as requested by Iris Brar MD.  She requires evaluation and anesthesia risk assessment prior to undergoing surgery/procedure for treatment of endometrial polyps .  Proposed procedure: OPERATIVE HYSTEROSCOPY WITH MORCELLATOR (MARTIN & NEPHEW)    Date of Surgery/ Procedure: 2018  Time of Surgery/ Procedure: 8:20 AM  Hospital/Surgical Facility: [facility]    Primary Physician: Georges Lebron  Type of Anesthesia Anticipated: General    Patient has a Health Care Directive or Living Will:  NO    1. NO - Do you have a history of heart attack, stroke, stent, bypass or surgery on an artery in the head, neck, heart or legs?  2. YES - DO YOU EVER HAVE ANY PAIN OR DISCOMFORT IN YOUR CHEST? Occasional CP for years with neg workup.   3. NO - Do you have a history of  Heart Failure?  4. NO - Are you troubled by shortness of breath when: walking on the level, up a slight hill or at night?  5. NO - Do you currently have a cold, bronchitis or other respiratory infection?  6. NO - Do you have a cough, shortness of breath or wheezing?  7. NO - Do you sometimes get pains in the calves of your legs when you walk?  8. NO - Do you or anyone in your family have previous history of blood clots?  9. NO - Do you or does anyone in your family have a serious bleeding problem such as prolonged bleeding following surgeries or cuts?  10. NO - Have you ever had problems with anemia or been told to take iron pills?  11. YES - HAVE YOU HAD ANY ABNORMAL BLOOD LOSS SUCH AS BLACK, TARRY OR BLOODY STOOLS, OR ABNORMAL VAGINAL BLEEDING? Vag bleeding, cause of surgery  12. NO - Have you ever had a blood transfusion?  13. YES - HAVE YOU OR ANY OF YOUR RELATIVES EVER HAD PROBLEMS WITH ANESTHESIA?  Father.  Pt has not had any problems on mutle times received.   13. NO - Have you or any of your relatives ever had problems with anesthesia?  14. NO - Do you have sleep apnea, excessive snoring or daytime drowsiness?  15. NO - Do you have any prosthetic heart valves?  16. NO - Do you have prosthetic joints?  17. NO - Is there any chance that you may be pregnant?      HPI:                                                      Brief HPI related to upcoming procedure: endometrial polyps with bleeding       See problem list for active medical problems.  Problems all longstanding and stable, except as noted/documented.  See ROS for pertinent symptoms related to these conditions.                                                                                                  .    MEDICAL HISTORY:                                                      Patient Active Problem List    Diagnosis Date Noted     Post-operative pain 06/07/2017     Priority: Medium     Recurrent major depressive disorder, in partial remission (H) 12/15/2016     Priority: Medium     Gastroesophageal reflux disease without esophagitis 12/15/2016     Priority: Medium     Chronic low back pain, unspecified back pain laterality, with sciatica presence unspecified 12/15/2016     Priority: Medium     Intractable chronic migraine without aura and without status migrainosus 12/06/2016     Priority: Medium     Chronic narcotic use      Priority: Medium     [facility]       Palpitations      Priority: Medium     holter with pvc's and pac's, echo nl       Diarrhea      Priority: Medium     eval by [facility]       Chronic urethritis      Priority: Medium     Dr. Little       Colonic polyp      Priority: Medium     one polyp, fu 5 years       Advanced directives, counseling/discussion 07/03/2012     Priority: Medium     Patient states has Advance Directive and will bring in a copy to clinic. 7/3/2012          Anxiety      Priority: Medium     Frequency  of urination and polyuria 2011     Priority: Medium      Past Medical History:   Diagnosis Date     Anxiety     Dr. Adelina Meehan     Chronic constipation      Chronic narcotic use     [facility]     Chronic pain     Dr. Orta as of      Chronic urethritis     Dr. Little     Colonic polyp 2011    one polyp, fu 5 years, fu 10/17 and no lesions     Depression, major, in partial remission (H)     Dr. Meehan     Diarrhea     eval by [facility], resolved with gluten free diet     DJD (degenerative joint disease)      H/O gastroesophageal reflux (GERD)      LBP (low back pain)     Dr. Jae Tong in [place], then seeing [facility] Dr. Orta     Migraines     neuro eval     Palpitations     holter with pvc's and pac's, echo nl     Screening     dexa nl at gyn     Past Surgical History:   Procedure Laterality Date     APPENDECTOMY  2016     ARTHROPLASTY CARPOMETACARPAL (THUMB JOINT) Left 2017    Procedure: ARTHROPLASTY CARPOMETACARPAL (THUMB JOINT);  REVISION  LEFT THUMB CARPOMETACARPAL ARTHROPOASTY WITH 1.1 TIGHT ROPE;  Surgeon: Beatrice Philippe MD;  Location: [facility]     CARPAL TUNNEL RELEASE RT/LT  2006      SECTION  1986     COLONOSCOPY       EXCIS BARTHOLIN GLAND/CYST       HERNIORRHAPHY INGUINAL  70's    x5     ORTHOPEDIC SURGERY Bilateral     SHOULDER ARTHROSCOPY     ORTHOPEDIC SURGERY Right 2017    right foot for plantar fasciitis     thumb surgery   and ,      Current Outpatient Prescriptions   Medication Sig Dispense Refill     ZOLMitriptan (ZOMIG) 2.5 MG tablet Take 1 tablet (2.5 mg) by mouth at onset of headache 30 tablet 1     ZOLMitriptan (ZOMIG) 5 MG tablet TAKE ONE TABLET BY MOUTH AT ONSET OF HEADACHE FOR MIGRAINE. MAY REPEAT IN 2 HOURS. MAX OF 2 TABLETS IN 24 HOURS. 12 tablet 6     LORazepam (ATIVAN) 1 MG tablet Take 1-2 tablets (1-2 mg) by mouth 4 times daily as needed for anxiety (up to 6 tablets per day) 30 tablet       "metoprolol (TOPROL XL) 25 MG 24 hr tablet Take 1 tablet (25 mg) by mouth every evening 30 tablet      ipratropium (ATROVENT) 0.06 % spray Spray 3 sprays into both nostrils daily       Hydrocodone-Acetaminophen  MG TABS Take 2 tablets by mouth every 4 hours as needed       PARoxetine HCl (PAXIL CR PO) Take 25 mg by mouth 2 times daily NAME BRAND PAXIL CR       Zolpidem Tartrate (AMBIEN CR PO) Take 12.5 mg by mouth At Bedtime       carboxymethylcellulose (REFRESH PLUS) 0.5 % SOLN ophthalmic solution Place 1 drop into both eyes 3 times daily as needed for dry eyes       loratadine (CLARITIN) 10 MG tablet Take 10 mg by mouth daily       estradiol (ESTRACE) 1 MG tablet Take 1 mg by mouth daily (with dinner) MUST BE  TEVA       PROgesterone (PROMETRIUM) 100 MG capsule Take 100 mg by mouth daily (with dinner)        OTC products: as above plus Tumeric     Allergies   Allergen Reactions     Compazine [Prochlorperazine] Other (See Comments)     \"crawl out of my skin\"     Penicillins Nausea and Vomiting and Hives      Latex Allergy: NO    Social History   Substance Use Topics     Smoking status: Former Smoker     Quit date: 10/29/1979     Smokeless tobacco: Never Used     Alcohol use No      Comment: minimal since on norco     History   Drug Use No       REVIEW OF SYSTEMS:                                                    Constitutional, HEENT, cardiovascular, pulmonary, gi and gu systems are negative, except as otherwise noted.    EXAM:                                                    /78 (Cuff Size: Adult Regular)  Pulse 69  Temp 97.5  F (36.4  C) (Oral)  Ht 5' 5.25\" (1.657 m)  Wt 120 lb (54.4 kg)  SpO2 96%  Breastfeeding? No  BMI 19.82 kg/m2    GENERAL APPEARANCE: healthy, alert and no distress     EYES: EOMI, PERRL     HENT: ear canals and TM's normal and nose and mouth without ulcers or lesions     NECK: no adenopathy, no asymmetry, masses, or scars and thyroid normal to palpation     " RESP: lungs clear to auscultation - no rales, rhonchi or wheezes     CV: regular rates and rhythm, normal S1 S2, no S3 or S4 and no murmur, click or rub     MS: extremities normal- no gross deformities noted, no evidence of inflammation in joints, FROM in all extremities except for boot on right foot and left wrist brace      SKIN: no suspicious lesions or rashes     NEURO: Normal strength and tone, sensory exam grossly normal, mentation intact and speech normal     PSYCH: mentation appears normal. and affect normal/bright     LYMPHATICS: No axillary, cervical, or supraclavicular nodes    DIAGNOSTICS:                                                      EKG: Normal Sinus Rhythm, normal axis, normal intervals, no acute ST/T changes c/w ischemia, unchanged from previous tracings    Hemoglobin (indicated for history of anemia or procedure with significant blood loss such as tonsillectomy, major intraperitoneal surgery, vascular surgery, major spine surgery, total joint replacement)  Serum Potassium  Serum Creatinine  Labs Resulted Today:   Results for orders placed or performed in visit on 01/30/18   Potassium   Result Value Ref Range    Potassium 4.6 3.4 - 5.3 mmol/L   Creatinine   Result Value Ref Range    Creatinine 0.66 0.52 - 1.04 mg/dL    GFR Estimate >90 >60 mL/min/1.7m2    GFR Estimate If Black >90 >60 mL/min/1.7m2   Hemoglobin   Result Value Ref Range    Hemoglobin 13.2 11.7 - 15.7 g/dL     Labs Drawn and in Process:   Unresulted Labs Ordered in the Past 30 Days of this Admission     No orders found from 12/1/2017 to 1/31/2018.          Recent Labs   Lab Test  11/20/17   1426  12/15/16   0827  10/23/15   0834   HGB  13.2  13.5  13.9   PLT  289  230  256   NA   --   142  138   POTASSIUM   --   4.2  4.0   CR   --   0.76  0.74   A1C   --   5.3   --         IMPRESSION:                                                    Reason for surgery/procedure: endometrial polyps  Diagnosis/reason for consult: medical preop      The proposed surgical procedure is considered INTERMEDIATE risk.    REVISED CARDIAC RISK INDEX  The patient has the following serious cardiovascular risks for perioperative complications such as (MI, PE, VFib and 3  AV Block):  No serious cardiac risks  INTERPRETATION: 0 risks: Class I (very low risk - 0.4% complication rate)    The patient has the following additional risks for perioperative complications:   No identified additional risks      ICD-10-CM    1. Preop general physical exam Z01.818 CYCLOBENZAPRINE HCL PO     Potassium     Creatinine     Hemoglobin     EKG 12-lead complete w/read - Clinics   2. Endometrial polyp N84.0    3. Palpitations R00.2 metoprolol succinate (TOPROL XL) 25 MG 24 hr tablet   4. Chronic rhinitis, unspecified type J31.0 ipratropium (ATROVENT) 0.06 % spray       RECOMMENDATIONS:                                                          --Patient does not take morning medications and will take her beta blocker at night per usual       APPROVAL GIVEN to proceed with proposed procedure, without further diagnostic evaluation       Signed Electronically by: FAVIO Roberts CNP    Copy of this evaluation report is provided to requesting physician.    [facility] Preop Guidelines

## 2018-01-30 NOTE — NURSING NOTE
"Chief Complaint   Patient presents with     Pre-Op Exam       Initial /78 (Cuff Size: Adult Regular)  Pulse 69  Temp 97.5  F (36.4  C) (Oral)  Ht 5' 5.25\" (1.657 m)  Wt 120 lb (54.4 kg)  SpO2 96%  Breastfeeding? No  BMI 19.82 kg/m2 Estimated body mass index is 19.82 kg/(m^2) as calculated from the following:    Height as of this encounter: 5' 5.25\" (1.657 m).    Weight as of this encounter: 120 lb (54.4 kg).  Medication Reconciliation: complete    "

## 2018-01-30 NOTE — MR AVS SNAPSHOT
After Visit Summary   1/30/2018    Laurel Parra    MRN: [id number]           Patient Information     Date Of Birth          1950        Visit Information        Provider Department      1/30/2018 12:00 PM Bere Esparza APRN CNP [facility]        Today's Diagnoses     Preop general physical exam    -  1    Endometrial polyp        Palpitations        Chronic rhinitis, unspecified type          Care Instructions      Before Your Surgery      Call your surgeon if there is any change in your health. This includes signs of a cold or flu (such as a sore throat, runny nose, cough, rash or fever).    Do not smoke, drink alcohol or take over the counter medicine (unless your surgeon or primary care doctor tells you to) for the 24 hours before and after surgery.    If you take prescribed drugs: Follow your doctor s orders about which medicines to take and which to stop until after surgery.    Eating and drinking prior to surgery: follow the instructions from your surgeon    Take a shower or bath the night before surgery. Use the soap your surgeon gave you to gently clean your skin. If you do not have soap from your surgeon, use your regular soap. Do not shave or scrub the surgery site.  Wear clean pajamas and have clean sheets on your bed.           Follow-ups after your visit        Your next 10 appointments already scheduled     Feb 09, 2018   Procedure with Iris Fernandez MD   [facility] (--)    [address]  [facility] [id number] [phone]              Who to contact     If you have questions or need follow up information about today's clinic visit or your schedule please contact [facility] directly at [phone].  Normal or non-critical lab and imaging results will be communicated to you by MyChart, letter or phone within 4 business days after the clinic has received the results. If you do not hear from us within 7  "days, please contact the clinic through [employer] or phone. If you have a critical or abnormal lab result, we will notify you by phone as soon as possible.  Submit refill requests through [employer] or call your pharmacy and they will forward the refill request to us. Please allow 3 business days for your refill to be completed.          Additional Information About Your Visit        [employer] Information     [employer] gives you secure access to your electronic health record. If you see a primary care provider, you can also send messages to your care team and make appointments. If you have questions, please call your primary care clinic.  If you do not have a primary care provider, please call [phone] and they will assist you.        Care EveryWhere ID     This is your Care EveryWhere ID. This could be used by other organizations to access your [facility] medical records  [id number]        Your Vitals Were     Pulse Temperature Height Pulse Oximetry Breastfeeding? BMI (Body Mass Index)    69 97.5  F (36.4  C) (Oral) 5' 5.25\" (1.657 m) 96% No 19.82 kg/m2       Blood Pressure from Last 3 Encounters:   01/30/18 121/78   11/20/17 110/79   10/26/17 99/65    Weight from Last 3 Encounters:   01/30/18 120 lb (54.4 kg)   11/20/17 132 lb (59.9 kg)   10/26/17 132 lb (59.9 kg)              We Performed the Following     Creatinine     EKG 12-lead complete w/read - Clinics     Hemoglobin     Potassium          Where to get your medicines      These medications were sent to [facility] - [address]  [address], [place] [address]     Phone:  [phone]     ipratropium 0.06 % spray    metoprolol succinate 25 MG 24 hr tablet          Primary Care Provider Office Phone # Fax #    Georges Lebron [phone] [address]  [place] [address]        Equal Access to Services     ACACIA GARCIA AH: Hadii nai ku hadasho Soomaali, waaxda luqadapeterson, qaybta virgenalmamackenzie " anabel griffithstramaine hopsonaan ah. Verónica [facility] [phone].    ATENCIÓN: Si annamariala boni, tiene a kumar disposición servicios gratuitos de asistencia lingüística. Gunnar al [phone].    We comply with applicable federal civil rights laws and [place] laws. We do not discriminate on the basis of race, color, national origin, age, disability, sex, sexual orientation, or gender identity.            Thank you!     Thank you for choosing [facility]  for your care. Our goal is always to provide you with excellent care. Hearing back from our patients is one way we can continue to improve our services. Please take a few minutes to complete the written survey that you may receive in the mail after your visit with us. Thank you!             Your Updated Medication List - Protect others around you: Learn how to safely use, store and throw away your medicines at www.disposemymeds.org.          This list is accurate as of 1/30/18 11:59 PM.  Always use your most recent med list.                   Brand Name Dispense Instructions for use Diagnosis    AMBIEN CR PO      Take 12.5 mg by mouth At Bedtime        ATIVAN 1 MG tablet   Generic drug:  LORazepam     30 tablet    Take 1-2 tablets (1-2 mg) by mouth 4 times daily as needed for anxiety (up to 6 tablets per day)        carboxymethylcellulose 0.5 % Soln ophthalmic solution    REFRESH PLUS     Place 1 drop into both eyes 3 times daily as needed for dry eyes        CLARITIN 10 MG tablet   Generic drug:  loratadine      Take 10 mg by mouth daily as needed        CYCLOBENZAPRINE HCL PO      Take 10 mg by mouth daily as needed for muscle spasms    Preop general physical exam       ESTRACE 1 MG tablet   Generic drug:  estradiol      Take 1 mg by mouth daily (with dinner) MUST BE  TEVA        Hydrocodone-Acetaminophen  MG Tabs      Take 2 tablets by mouth every 4 hours as needed        ipratropium 0.06 % spray    ATROVENT    45 mL    Spray  3 sprays into both nostrils daily    Chronic rhinitis, unspecified type       metoprolol succinate 25 MG 24 hr tablet    TOPROL XL    90 tablet    Take 1 tablet (25 mg) by mouth every evening    Palpitations       PAXIL CR PO      Take 25 mg by mouth 2 times daily NAME BRAND PAXIL CR        PROMETRIUM 100 MG capsule   Generic drug:  progesterone      Take 100 mg by mouth daily (with dinner)        * ZOLMitriptan 5 MG tablet    ZOMIG    12 tablet    TAKE ONE TABLET BY MOUTH AT ONSET OF HEADACHE FOR MIGRAINE. MAY REPEAT IN 2 HOURS. MAX OF 2 TABLETS IN 24 HOURS.    Intractable chronic migraine without aura and without status migrainosus       * ZOLMitriptan 2.5 MG tablet    ZOMIG    30 tablet    Take 1 tablet (2.5 mg) by mouth at onset of headache    Intractable chronic migraine without aura and without status migrainosus       * Notice:  This list has 2 medication(s) that are the same as other medications prescribed for you. Read the directions carefully, and ask your doctor or other care provider to review them with you.

## 2018-01-30 NOTE — TELEPHONE ENCOUNTER
Patient in to see Bere at noon.  I pended request if OV if she comfortable oking the historical meds .

## 2018-01-31 ENCOUNTER — TRANSFERRED RECORDS (OUTPATIENT)
Dept: HEALTH INFORMATION MANAGEMENT | Facility: CLINIC | Age: 68
End: 2018-01-31

## 2018-01-31 LAB
CREAT SERPL-MCNC: 0.66 MG/DL (ref 0.52–1.04)
GFR SERPL CREATININE-BSD FRML MDRD: >90 ML/MIN/1.7M2
POTASSIUM SERPL-SCNC: 4.6 MMOL/L (ref 3.4–5.3)

## 2018-01-31 RX ORDER — IPRATROPIUM BROMIDE 42 UG/1
SPRAY, METERED NASAL
Start: 2018-01-31

## 2018-01-31 RX ORDER — METOPROLOL SUCCINATE 25 MG/1
TABLET, EXTENDED RELEASE ORAL
Start: 2018-01-31

## 2018-01-31 ASSESSMENT — PATIENT HEALTH QUESTIONNAIRE - PHQ9: SUM OF ALL RESPONSES TO PHQ QUESTIONS 1-9: 11

## 2018-01-31 NOTE — TELEPHONE ENCOUNTER
"Denied both  Duplicate; R's sent 1/30/2018at OV  Maggie ANDERSEN RN    Requested Prescriptions   Pending Prescriptions Disp Refills     ipratropium (ATROVENT) 0.06 % spray [Pharmacy Med Name: IPRATROPIUM BROMIDE 0.06% SOLN] 45 mL 3     Sig: SPRAY TWO SPRAYS IN EACH NOSTRIL FOUR TIMES A DAY    There is no refill protocol information for this order        metoprolol succinate (TOPROL-XL) 25 MG 24 hr tablet [Pharmacy Med Name: METOPROLOL SUCCINATE ER 25MG TB24] 90 tablet 3     Sig: TAKE ONE TABLET BY MOUTH EVERY DAY    Beta-Blockers Protocol Passed    1/30/2018  2:03 PM       Passed - Blood pressure under 140/90    BP Readings from Last 3 Encounters:   01/30/18 121/78   11/20/17 110/79   10/26/17 99/65                Passed - Patient is age 6 or older       Passed - Recent or future visit with authorizing provider's specialty    Patient had office visit in the last year or has a visit in the next 30 days with authorizing provider.  See \"Patient Info\" tab in inbasket, or \"Choose Columns\" in Meds & Orders section of the refill encounter.                 "

## 2018-02-05 NOTE — H&P (VIEW-ONLY)
[address] [address]  [phone]  Dept: [phone]    PRE-OP EVALUATION:  Today's date: 2018    Laurel Parra (: 1950) presents for pre-operative evaluation assessment as requested by Iris Brar MD.  She requires evaluation and anesthesia risk assessment prior to undergoing surgery/procedure for treatment of endometrial polyps .  Proposed procedure: OPERATIVE HYSTEROSCOPY WITH MORCELLATOR (MARTIN [employer] NEPHEW)    Date of Surgery/ Procedure: 2018  Time of Surgery/ Procedure: 8:20 AM  Hospital/Surgical Facility: [facility]    Primary Physician: Georges Lebron  Type of Anesthesia Anticipated: General    Patient has a Health Care Directive or Living Will:  NO    1. NO - Do you have a history of heart attack, stroke, stent, bypass or surgery on an artery in the head, neck, heart or legs?  2. YES - DO YOU EVER HAVE ANY PAIN OR DISCOMFORT IN YOUR CHEST? Occasional CP for years with neg workup.   3. NO - Do you have a history of  Heart Failure?  4. NO - Are you troubled by shortness of breath when: walking on the level, up a slight hill or at night?  5. NO - Do you currently have a cold, bronchitis or other respiratory infection?  6. NO - Do you have a cough, shortness of breath or wheezing?  7. NO - Do you sometimes get pains in the calves of your legs when you walk?  8. NO - Do you or anyone in your family have previous history of blood clots?  9. NO - Do you or does anyone in your family have a serious bleeding problem such as prolonged bleeding following surgeries or cuts?  10. NO - Have you ever had problems with anemia or been told to take iron pills?  11. YES - HAVE YOU HAD ANY ABNORMAL BLOOD LOSS SUCH AS BLACK, TARRY OR BLOODY STOOLS, OR ABNORMAL VAGINAL BLEEDING? Vag bleeding, cause of surgery  12. NO - Have you ever had a blood transfusion?  13. YES - HAVE YOU OR ANY OF YOUR RELATIVES EVER HAD PROBLEMS WITH ANESTHESIA?  Father.  Pt has not had any problems on mutle times received.   13. NO - Have you or any of your relatives ever had problems with anesthesia?  14. NO - Do you have sleep apnea, excessive snoring or daytime drowsiness?  15. NO - Do you have any prosthetic heart valves?  16. NO - Do you have prosthetic joints?  17. NO - Is there any chance that you may be pregnant?      HPI:                                                      Brief HPI related to upcoming procedure: endometrial polyps with bleeding       See problem list for active medical problems.  Problems all longstanding and stable, except as noted/documented.  See ROS for pertinent symptoms related to these conditions.                                                                                                  .    MEDICAL HISTORY:                                                      Patient Active Problem List    Diagnosis Date Noted     Post-operative pain 06/07/2017     Priority: Medium     Recurrent major depressive disorder, in partial remission (H) 12/15/2016     Priority: Medium     Gastroesophageal reflux disease without esophagitis 12/15/2016     Priority: Medium     Chronic low back pain, unspecified back pain laterality, with sciatica presence unspecified 12/15/2016     Priority: Medium     Intractable chronic migraine without aura and without status migrainosus 12/06/2016     Priority: Medium     Chronic narcotic use      Priority: Medium     [facility]       Palpitations      Priority: Medium     holter with pvc's and pac's, echo nl       Diarrhea      Priority: Medium     eval by [facility]       Chronic urethritis      Priority: Medium     Dr. Little       Colonic polyp      Priority: Medium     one polyp, fu 5 years       Advanced directives, counseling/discussion 07/03/2012     Priority: Medium     Patient states has Advance Directive and will bring in a copy to clinic. 7/3/2012          Anxiety      Priority: Medium     Frequency  of urination and polyuria 2011     Priority: Medium      Past Medical History:   Diagnosis Date     Anxiety     Dr. Adelina Meehan     Chronic constipation      Chronic narcotic use     [facility]     Chronic pain     Dr. Orta as of      Chronic urethritis     Dr. Little     Colonic polyp 2011    one polyp, fu 5 years, fu 10/17 and no lesions     Depression, major, in partial remission (H)     Dr. Meehan     Diarrhea     eval by [facility], resolved with gluten free diet     DJD (degenerative joint disease)      H/O gastroesophageal reflux (GERD)      LBP (low back pain)     Dr. Jae Tong in [place], then seeing [facility] Dr. Orta     Migraines     neuro eval     Palpitations     holter with pvc's and pac's, echo nl     Screening     dexa nl at gyn     Past Surgical History:   Procedure Laterality Date     APPENDECTOMY  2016     ARTHROPLASTY CARPOMETACARPAL (THUMB JOINT) Left 2017    Procedure: ARTHROPLASTY CARPOMETACARPAL (THUMB JOINT);  REVISION  LEFT THUMB CARPOMETACARPAL ARTHROPOASTY WITH 1.1 TIGHT ROPE;  Surgeon: Beatrice Philippe MD;  Location: [facility]     CARPAL TUNNEL RELEASE RT/LT  2006      SECTION  1986     COLONOSCOPY       EXCIS BARTHOLIN GLAND/CYST       HERNIORRHAPHY INGUINAL  70's    x5     ORTHOPEDIC SURGERY Bilateral     SHOULDER ARTHROSCOPY     ORTHOPEDIC SURGERY Right 2017    right foot for plantar fasciitis     thumb surgery   and ,      Current Outpatient Prescriptions   Medication Sig Dispense Refill     ZOLMitriptan (ZOMIG) 2.5 MG tablet Take 1 tablet (2.5 mg) by mouth at onset of headache 30 tablet 1     ZOLMitriptan (ZOMIG) 5 MG tablet TAKE ONE TABLET BY MOUTH AT ONSET OF HEADACHE FOR MIGRAINE. MAY REPEAT IN 2 HOURS. MAX OF 2 TABLETS IN 24 HOURS. 12 tablet 6     LORazepam (ATIVAN) 1 MG tablet Take 1-2 tablets (1-2 mg) by mouth 4 times daily as needed for anxiety (up to 6 tablets per day) 30 tablet       "metoprolol (TOPROL XL) 25 MG 24 hr tablet Take 1 tablet (25 mg) by mouth every evening 30 tablet      ipratropium (ATROVENT) 0.06 % spray Spray 3 sprays into both nostrils daily       Hydrocodone-Acetaminophen  MG TABS Take 2 tablets by mouth every 4 hours as needed       PARoxetine HCl (PAXIL CR PO) Take 25 mg by mouth 2 times daily NAME BRAND PAXIL CR       Zolpidem Tartrate (AMBIEN CR PO) Take 12.5 mg by mouth At Bedtime       carboxymethylcellulose (REFRESH PLUS) 0.5 % SOLN ophthalmic solution Place 1 drop into both eyes 3 times daily as needed for dry eyes       loratadine (CLARITIN) 10 MG tablet Take 10 mg by mouth daily       estradiol (ESTRACE) 1 MG tablet Take 1 mg by mouth daily (with dinner) MUST BE  TEVA       PROgesterone (PROMETRIUM) 100 MG capsule Take 100 mg by mouth daily (with dinner)        OTC products: as above plus Tumeric     Allergies   Allergen Reactions     Compazine [Prochlorperazine] Other (See Comments)     \"crawl out of my skin\"     Penicillins Nausea and Vomiting and Hives      Latex Allergy: NO    Social History   Substance Use Topics     Smoking status: Former Smoker     Quit date: 10/29/1979     Smokeless tobacco: Never Used     Alcohol use No      Comment: minimal since on norco     History   Drug Use No       REVIEW OF SYSTEMS:                                                    Constitutional, HEENT, cardiovascular, pulmonary, gi and gu systems are negative, except as otherwise noted.    EXAM:                                                    /78 (Cuff Size: Adult Regular)  Pulse 69  Temp 97.5  F (36.4  C) (Oral)  Ht 5' 5.25\" (1.657 m)  Wt 120 lb (54.4 kg)  SpO2 96%  Breastfeeding? No  BMI 19.82 kg/m2    GENERAL APPEARANCE: healthy, alert and no distress     EYES: EOMI, PERRL     HENT: ear canals and TM's normal and nose and mouth without ulcers or lesions     NECK: no adenopathy, no asymmetry, masses, or scars and thyroid normal to palpation     " RESP: lungs clear to auscultation - no rales, rhonchi or wheezes     CV: regular rates and rhythm, normal S1 S2, no S3 or S4 and no murmur, click or rub     MS: extremities normal- no gross deformities noted, no evidence of inflammation in joints, FROM in all extremities except for boot on right foot and left wrist brace      SKIN: no suspicious lesions or rashes     NEURO: Normal strength and tone, sensory exam grossly normal, mentation intact and speech normal     PSYCH: mentation appears normal. and affect normal/bright     LYMPHATICS: No axillary, cervical, or supraclavicular nodes    DIAGNOSTICS:                                                      EKG: Normal Sinus Rhythm, normal axis, normal intervals, no acute ST/T changes c/w ischemia, unchanged from previous tracings    Hemoglobin (indicated for history of anemia or procedure with significant blood loss such as tonsillectomy, major intraperitoneal surgery, vascular surgery, major spine surgery, total joint replacement)  Serum Potassium  Serum Creatinine  Labs Resulted Today:   Results for orders placed or performed in visit on 01/30/18   Potassium   Result Value Ref Range    Potassium 4.6 3.4 - 5.3 mmol/L   Creatinine   Result Value Ref Range    Creatinine 0.66 0.52 - 1.04 mg/dL    GFR Estimate >90 >60 mL/min/1.7m2    GFR Estimate If Black >90 >60 mL/min/1.7m2   Hemoglobin   Result Value Ref Range    Hemoglobin 13.2 11.7 - 15.7 g/dL     Labs Drawn and in Process:   Unresulted Labs Ordered in the Past 30 Days of this Admission     No orders found from 12/1/2017 to 1/31/2018.          Recent Labs   Lab Test  11/20/17   1426  12/15/16   0827  10/23/15   0834   HGB  13.2  13.5  13.9   PLT  289  230  256   NA   --   142  138   POTASSIUM   --   4.2  4.0   CR   --   0.76  0.74   A1C   --   5.3   --         IMPRESSION:                                                    Reason for surgery/procedure: endometrial polyps  Diagnosis/reason for consult: medical preop      The proposed surgical procedure is considered INTERMEDIATE risk.    REVISED CARDIAC RISK INDEX  The patient has the following serious cardiovascular risks for perioperative complications such as (MI, PE, VFib and 3  AV Block):  No serious cardiac risks  INTERPRETATION: 0 risks: Class I (very low risk - 0.4% complication rate)    The patient has the following additional risks for perioperative complications:   No identified additional risks      ICD-10-CM    1. Preop general physical exam Z01.818 CYCLOBENZAPRINE HCL PO     Potassium     Creatinine     Hemoglobin     EKG 12-lead complete w/read - Clinics   2. Endometrial polyp N84.0    3. Palpitations R00.2 metoprolol succinate (TOPROL XL) 25 MG 24 hr tablet   4. Chronic rhinitis, unspecified type J31.0 ipratropium (ATROVENT) 0.06 % spray       RECOMMENDATIONS:                                                          --Patient does not take morning medications and will take her beta blocker at night per usual       APPROVAL GIVEN to proceed with proposed procedure, without further diagnostic evaluation       Signed Electronically by: FAVIO Roberts CNP    Copy of this evaluation report is provided to requesting physician.    [facility] Preop Guidelines

## 2018-02-09 ENCOUNTER — ANESTHESIA (OUTPATIENT)
Dept: SURGERY | Facility: CLINIC | Age: 68
End: 2018-02-09
Payer: COMMERCIAL

## 2018-02-09 ENCOUNTER — ANESTHESIA EVENT (OUTPATIENT)
Dept: SURGERY | Facility: CLINIC | Age: 68
End: 2018-02-09
Payer: COMMERCIAL

## 2018-02-09 ENCOUNTER — HOSPITAL ENCOUNTER (OUTPATIENT)
Facility: CLINIC | Age: 68
Discharge: HOME | End: 2018-02-09
Attending: OBSTETRICS & GYNECOLOGY | Admitting: OBSTETRICS & GYNECOLOGY
Payer: COMMERCIAL

## 2018-02-09 VITALS
RESPIRATION RATE: 16 BRPM | BODY MASS INDEX: 20.73 KG/M2 | TEMPERATURE: 97.7 F | SYSTOLIC BLOOD PRESSURE: 109 MMHG | DIASTOLIC BLOOD PRESSURE: 51 MMHG | WEIGHT: 129 LBS | OXYGEN SATURATION: 94 % | HEIGHT: 66 IN

## 2018-02-09 PROCEDURE — 25000125 ZZHC RX 250: Performed by: NURSE ANESTHETIST, CERTIFIED REGISTERED

## 2018-02-09 PROCEDURE — 25000128 H RX IP 250 OP 636: Performed by: NURSE ANESTHETIST, CERTIFIED REGISTERED

## 2018-02-09 PROCEDURE — 88305 TISSUE EXAM BY PATHOLOGIST: CPT | Performed by: OBSTETRICS & GYNECOLOGY

## 2018-02-09 PROCEDURE — 37000008 ZZH ANESTHESIA TECHNICAL FEE, 1ST 30 MIN: Performed by: OBSTETRICS & GYNECOLOGY

## 2018-02-09 PROCEDURE — 25000132 ZZH RX MED GY IP 250 OP 250 PS 637: Performed by: ANESTHESIOLOGY

## 2018-02-09 PROCEDURE — 71000027 ZZH RECOVERY PHASE 2 EACH 15 MINS: Performed by: OBSTETRICS & GYNECOLOGY

## 2018-02-09 PROCEDURE — 25800025 ZZH RX 258: Performed by: OBSTETRICS & GYNECOLOGY

## 2018-02-09 PROCEDURE — 25000132 ZZH RX MED GY IP 250 OP 250 PS 637: Performed by: OBSTETRICS & GYNECOLOGY

## 2018-02-09 PROCEDURE — 27210794 ZZH OR GENERAL SUPPLY STERILE: Performed by: OBSTETRICS & GYNECOLOGY

## 2018-02-09 PROCEDURE — 36000058 ZZH SURGERY LEVEL 3 EA 15 ADDTL MIN: Performed by: OBSTETRICS & GYNECOLOGY

## 2018-02-09 PROCEDURE — 36000056 ZZH SURGERY LEVEL 3 1ST 30 MIN: Performed by: OBSTETRICS & GYNECOLOGY

## 2018-02-09 PROCEDURE — 71000012 ZZH RECOVERY PHASE 1 LEVEL 1 FIRST HR: Performed by: OBSTETRICS & GYNECOLOGY

## 2018-02-09 PROCEDURE — 37000009 ZZH ANESTHESIA TECHNICAL FEE, EACH ADDTL 15 MIN: Performed by: OBSTETRICS & GYNECOLOGY

## 2018-02-09 PROCEDURE — 40000170 ZZH STATISTIC PRE-PROCEDURE ASSESSMENT II: Performed by: OBSTETRICS & GYNECOLOGY

## 2018-02-09 PROCEDURE — 71000013 ZZH RECOVERY PHASE 1 LEVEL 1 EA ADDTL HR: Performed by: OBSTETRICS & GYNECOLOGY

## 2018-02-09 PROCEDURE — 25000128 H RX IP 250 OP 636: Performed by: ANESTHESIOLOGY

## 2018-02-09 PROCEDURE — 88305 TISSUE EXAM BY PATHOLOGIST: CPT | Mod: 26 | Performed by: OBSTETRICS & GYNECOLOGY

## 2018-02-09 RX ORDER — MEPERIDINE HYDROCHLORIDE 25 MG/ML
12.5 INJECTION INTRAMUSCULAR; INTRAVENOUS; SUBCUTANEOUS
Status: DISCONTINUED | OUTPATIENT
Start: 2018-02-09 | End: 2018-02-09 | Stop reason: HOSPADM

## 2018-02-09 RX ORDER — ONDANSETRON 4 MG/1
4 TABLET, ORALLY DISINTEGRATING ORAL EVERY 30 MIN PRN
Status: DISCONTINUED | OUTPATIENT
Start: 2018-02-09 | End: 2018-02-09 | Stop reason: HOSPADM

## 2018-02-09 RX ORDER — ONDANSETRON 2 MG/ML
4 INJECTION INTRAMUSCULAR; INTRAVENOUS EVERY 30 MIN PRN
Status: DISCONTINUED | OUTPATIENT
Start: 2018-02-09 | End: 2018-02-09 | Stop reason: HOSPADM

## 2018-02-09 RX ORDER — ACETAMINOPHEN 325 MG/1
650 TABLET ORAL
Status: DISCONTINUED | OUTPATIENT
Start: 2018-02-09 | End: 2018-02-09 | Stop reason: HOSPADM

## 2018-02-09 RX ORDER — SODIUM CHLORIDE, SODIUM LACTATE, POTASSIUM CHLORIDE, CALCIUM CHLORIDE 600; 310; 30; 20 MG/100ML; MG/100ML; MG/100ML; MG/100ML
INJECTION, SOLUTION INTRAVENOUS CONTINUOUS
Status: DISCONTINUED | OUTPATIENT
Start: 2018-02-09 | End: 2018-02-09 | Stop reason: HOSPADM

## 2018-02-09 RX ORDER — FENTANYL CITRATE 50 UG/ML
INJECTION, SOLUTION INTRAMUSCULAR; INTRAVENOUS PRN
Status: DISCONTINUED | OUTPATIENT
Start: 2018-02-09 | End: 2018-02-09

## 2018-02-09 RX ORDER — FENTANYL CITRATE 50 UG/ML
25-50 INJECTION, SOLUTION INTRAMUSCULAR; INTRAVENOUS
Status: DISCONTINUED | OUTPATIENT
Start: 2018-02-09 | End: 2018-02-09 | Stop reason: HOSPADM

## 2018-02-09 RX ORDER — HYDROCODONE BITARTRATE AND ACETAMINOPHEN 5; 325 MG/1; MG/1
1 TABLET ORAL
Status: DISCONTINUED | OUTPATIENT
Start: 2018-02-09 | End: 2018-02-09 | Stop reason: HOSPADM

## 2018-02-09 RX ORDER — HYDROMORPHONE HYDROCHLORIDE 1 MG/ML
.3-.5 INJECTION, SOLUTION INTRAMUSCULAR; INTRAVENOUS; SUBCUTANEOUS EVERY 10 MIN PRN
Status: DISCONTINUED | OUTPATIENT
Start: 2018-02-09 | End: 2018-02-09 | Stop reason: HOSPADM

## 2018-02-09 RX ORDER — ONDANSETRON 2 MG/ML
INJECTION INTRAMUSCULAR; INTRAVENOUS PRN
Status: DISCONTINUED | OUTPATIENT
Start: 2018-02-09 | End: 2018-02-09

## 2018-02-09 RX ORDER — EPHEDRINE SULFATE 50 MG/ML
INJECTION, SOLUTION INTRAMUSCULAR; INTRAVENOUS; SUBCUTANEOUS PRN
Status: DISCONTINUED | OUTPATIENT
Start: 2018-02-09 | End: 2018-02-09

## 2018-02-09 RX ORDER — LIDOCAINE HYDROCHLORIDE 20 MG/ML
INJECTION, SOLUTION INFILTRATION; PERINEURAL PRN
Status: DISCONTINUED | OUTPATIENT
Start: 2018-02-09 | End: 2018-02-09

## 2018-02-09 RX ORDER — NALOXONE HYDROCHLORIDE 0.4 MG/ML
.1-.4 INJECTION, SOLUTION INTRAMUSCULAR; INTRAVENOUS; SUBCUTANEOUS
Status: DISCONTINUED | OUTPATIENT
Start: 2018-02-09 | End: 2018-02-09 | Stop reason: HOSPADM

## 2018-02-09 RX ORDER — KETOROLAC TROMETHAMINE 30 MG/ML
INJECTION, SOLUTION INTRAMUSCULAR; INTRAVENOUS PRN
Status: DISCONTINUED | OUTPATIENT
Start: 2018-02-09 | End: 2018-02-09

## 2018-02-09 RX ORDER — PROPOFOL 10 MG/ML
INJECTION, EMULSION INTRAVENOUS PRN
Status: DISCONTINUED | OUTPATIENT
Start: 2018-02-09 | End: 2018-02-09

## 2018-02-09 RX ORDER — SODIUM CHLORIDE, SODIUM LACTATE, POTASSIUM CHLORIDE, CALCIUM CHLORIDE 600; 310; 30; 20 MG/100ML; MG/100ML; MG/100ML; MG/100ML
INJECTION, SOLUTION INTRAVENOUS CONTINUOUS PRN
Status: DISCONTINUED | OUTPATIENT
Start: 2018-02-09 | End: 2018-02-09

## 2018-02-09 RX ORDER — OXYCODONE HYDROCHLORIDE 5 MG/1
10 TABLET ORAL EVERY 4 HOURS PRN
Status: DISCONTINUED | OUTPATIENT
Start: 2018-02-09 | End: 2018-02-09 | Stop reason: HOSPADM

## 2018-02-09 RX ORDER — PROPOFOL 10 MG/ML
INJECTION, EMULSION INTRAVENOUS CONTINUOUS PRN
Status: DISCONTINUED | OUTPATIENT
Start: 2018-02-09 | End: 2018-02-09

## 2018-02-09 RX ORDER — PHYSOSTIGMINE SALICYLATE 1 MG/ML
1.2 INJECTION INTRAVENOUS
Status: DISCONTINUED | OUTPATIENT
Start: 2018-02-09 | End: 2018-02-09 | Stop reason: HOSPADM

## 2018-02-09 RX ORDER — FENTANYL CITRATE 50 UG/ML
25-50 INJECTION, SOLUTION INTRAMUSCULAR; INTRAVENOUS EVERY 5 MIN PRN
Status: DISCONTINUED | OUTPATIENT
Start: 2018-02-09 | End: 2018-02-09 | Stop reason: HOSPADM

## 2018-02-09 RX ORDER — DEXAMETHASONE SODIUM PHOSPHATE 4 MG/ML
INJECTION, SOLUTION INTRA-ARTICULAR; INTRALESIONAL; INTRAMUSCULAR; INTRAVENOUS; SOFT TISSUE PRN
Status: DISCONTINUED | OUTPATIENT
Start: 2018-02-09 | End: 2018-02-09

## 2018-02-09 RX ORDER — HYDROCODONE BITARTRATE AND ACETAMINOPHEN 10; 325 MG/1; MG/1
1 TABLET ORAL EVERY 6 HOURS PRN
Status: DISCONTINUED | OUTPATIENT
Start: 2018-02-09 | End: 2018-02-09 | Stop reason: HOSPADM

## 2018-02-09 RX ADMIN — HYDROCODONE BITARTRATE AND ACETAMINOPHEN 1 TABLET: 10; 325 TABLET ORAL at 10:54

## 2018-02-09 RX ADMIN — KETOROLAC TROMETHAMINE 15 MG: 30 INJECTION, SOLUTION INTRAMUSCULAR at 09:13

## 2018-02-09 RX ADMIN — FENTANYL CITRATE 50 MCG: 50 INJECTION, SOLUTION INTRAMUSCULAR; INTRAVENOUS at 10:43

## 2018-02-09 RX ADMIN — Medication 10 MG: at 08:56

## 2018-02-09 RX ADMIN — FENTANYL CITRATE 50 MCG: 50 INJECTION, SOLUTION INTRAMUSCULAR; INTRAVENOUS at 09:03

## 2018-02-09 RX ADMIN — SODIUM CHLORIDE, POTASSIUM CHLORIDE, SODIUM LACTATE AND CALCIUM CHLORIDE: 600; 310; 30; 20 INJECTION, SOLUTION INTRAVENOUS at 08:53

## 2018-02-09 RX ADMIN — ONDANSETRON 4 MG: 2 INJECTION INTRAMUSCULAR; INTRAVENOUS at 08:57

## 2018-02-09 RX ADMIN — PROPOFOL 200 MG: 10 INJECTION, EMULSION INTRAVENOUS at 08:54

## 2018-02-09 RX ADMIN — FENTANYL CITRATE 50 MCG: 50 INJECTION, SOLUTION INTRAMUSCULAR; INTRAVENOUS at 10:28

## 2018-02-09 RX ADMIN — LIDOCAINE HYDROCHLORIDE 60 MG: 20 INJECTION, SOLUTION INFILTRATION; PERINEURAL at 08:54

## 2018-02-09 RX ADMIN — Medication 5 MG: at 08:57

## 2018-02-09 RX ADMIN — MIDAZOLAM 2 MG: 1 INJECTION INTRAMUSCULAR; INTRAVENOUS at 08:53

## 2018-02-09 RX ADMIN — OXYCODONE HYDROCHLORIDE 10 MG: 5 TABLET ORAL at 10:54

## 2018-02-09 RX ADMIN — SODIUM CHLORIDE, POTASSIUM CHLORIDE, SODIUM LACTATE AND CALCIUM CHLORIDE: 600; 310; 30; 20 INJECTION, SOLUTION INTRAVENOUS at 09:19

## 2018-02-09 RX ADMIN — Medication 5 MG: at 09:03

## 2018-02-09 RX ADMIN — PROPOFOL 150 MCG/KG/MIN: 10 INJECTION, EMULSION INTRAVENOUS at 08:54

## 2018-02-09 RX ADMIN — DEXAMETHASONE SODIUM PHOSPHATE 4 MG: 4 INJECTION, SOLUTION INTRA-ARTICULAR; INTRALESIONAL; INTRAMUSCULAR; INTRAVENOUS; SOFT TISSUE at 08:57

## 2018-02-09 RX ADMIN — FENTANYL CITRATE 50 MCG: 50 INJECTION, SOLUTION INTRAMUSCULAR; INTRAVENOUS at 08:54

## 2018-02-09 ASSESSMENT — LIFESTYLE VARIABLES: TOBACCO_USE: 1

## 2018-02-09 NOTE — DISCHARGE INSTRUCTIONS
Same Day Surgery Discharge Instructions for  Sedation and General Anesthesia       It's not unusual to feel dizzy, light-headed or faint for up to 24 hours after surgery or while taking pain medication.  If you have these symptoms: sit for a few minutes before standing and have someone assist you when you get up to walk or use the bathroom.      You should rest and relax for the next 24 hours. We recommend you make arrangements to have an adult stay with you for at least 24 hours after your discharge.  Avoid hazardous and strenuous activity.      DO NOT DRIVE any vehicle or operate mechanical equipment for 24 hours following the end of your surgery.  Even though you may feel normal, your reactions may be affected by the medication you have received.      Do not drink alcoholic beverages for 24 hours following surgery.       Slowly progress to your regular diet as you feel able. It's not unusual to feel nauseated and/or vomit after receiving anesthesia.  If you develop these symptoms, drink clear liquids (apple juice, ginger ale, broth, 7-up, etc. ) until you feel better.  If your nausea and vomiting persists for 24 hours, please notify your surgeon.        All narcotic pain medications, along with inactivity and anesthesia, can cause constipation. Drinking plenty of liquids and increasing fiber intake will help.      For any questions of a medical nature, call your surgeon.      Do not make important decisions for 24 hours.      If you had general anesthesia, you may have a sore throat for a couple of days related to the breathing tube used during surgery.  You may use Cepacol lozenges to help with this discomfort.  If it worsens or if you develop a fever, contact your surgeon.       If you feel your pain is not well managed with the pain medications prescribed by your surgeon, please contact your surgeon's office to let them know so they can address your concerns.       HOME CARE FOLLOWING HYSTEROSCOPY    Diet  You  have no restrictions on your diet.  During the evening following surgery, drink plenty of fluids and eat a light supper.    Nausea  The anesthesia may produce some nausea.  If you feel nauseated, stay in bed and try drinking fluids such as 7-Up, tea, or soup.    Discomfort  The amount of discomfort you can expect is very unpredictable.  If you have pain that cannot be controlled with Tylenol or with the prescription you may have received, you should notify your physician.  Abdominal cramping or low backache is not uncommon and should not be a cause for concern.  You will be drowsy and weak the day of surgery and possibly the following day.  Fever  A low grade fever (not over 100 F) is usual after this procedure.  Do not hesitate to notify your physician if your fever seems excessive or persists.    Activity   You may resume your normal activity.  Avoid heavy lifting for one week.  You may bathe or shower.  Do not douche, use tampons, or resume intercourse until the bleeding has ceased.    Emergency Care  Contact your physician if you have any of these problems:   1.  A fever over 100 F   2.  A large amount of bleeding or drainage   3.  Severe pain       2 oxycodone , total 10mg, and 1 norco 10mg given at 11 am

## 2018-02-09 NOTE — OR NURSING
Patient wanting 10 mg norco and 10 mg oxycodone given at 11 am to stay on track with her narcotic pain schedule, ordered by iris guadarrama   DISPLAY PLAN FREE TEXT

## 2018-02-09 NOTE — ANESTHESIA CARE TRANSFER NOTE
Patient: Laurel Parra    Procedure(s):  OPERATIVE HYSTEROSCOPY WITH TRUECLEAR MORCELLATOR 5C SCOPE  - Wound Class: II-Clean Contaminated    Diagnosis: ENDOMETRIAL POLYP  Diagnosis Additional Information: No value filed.    Anesthesia Type:   General, LMA     Note:  Airway :Face Mask  Patient transferred to:PACU  Comments: Pt awake and able to verbalize needs. ALL monitors on and audible. Vital signs stable. Spontaneous respiration without difficulty. o2 sats 100% on 10Lo2 per face mask. Pt denies pain. Report given to PACU RN.Handoff Report: Identifed the Patient, Identified the Reponsible Provider, Reviewed the pertinent medical history, Discussed the surgical course, Reviewed Intra-OP anesthesia mangement and issues during anesthesia, Set expectations for post-procedure period and Allowed opportunity for questions and acknowledgement of understanding      Vitals: (Last set prior to Anesthesia Care Transfer)    CRNA VITALS  2/9/2018 0855 - 2/9/2018 0931      2/9/2018             Resp Rate (observed): 10                Electronically Signed By: FAVIO Pa CRNA  February 9, 2018  9:31 AM

## 2018-02-09 NOTE — OP NOTE
Procedure Date: 02/09/2018      DATE OF SURGERY:  02/09/2018.      PREOPERATIVE DIAGNOSIS:  Postmenopausal bleeding with polyp noted on office sonohysterogram.  Thickened endometrial stripe.      POSTOPERATIVE DIAGNOSIS:  Postmenopausal bleeding with polyps noted on office sonohysterogram.  Thickened endometrial stripe.      PROCEDURE:  Operative hysteroscopy with TruClear morcellator, dilation and curettage.      SURGEON Iris Fernandez MD      ANESTHESIA:  General with LMA.      DESCRIPTION OF PROCEDURE:  The patient was brought to the operating room and placed on the operating table in the dorsal supine position.  After anesthetic was introduced, she was placed in stirrups.  She was prepped and draped in standard fashion.  Exam under anesthesia revealed a normal anteflexed uterus and benign adnexa.  Pause for the cause was performed.  A Graves bivalve speculum was placed in the vagina and tenaculum placed on the anterior lip of the cervix.  The cervical canal was somewhat stenotic and dilated with the os finder and with graduated dilators dilated up to 6.5.  The hysteroscope was then advanced through the cervical canal and entrance into the uterine cavity was confirmed.  Both ostia were visualized.  There was a large fairly wide-based polyp on the anterior wall of the fundus and its base closer to the left fundus.  With the TruClear morcellator, the polyp was morcellated down to the base.  Because the endometrial stripe was thickened even accounting and subtracting for the polyp, the decision was made to also do a general curettage.  The scope was removed.  A sharp curette was then used to curettage the endometrial cavity.  However, there was scant to no tissue noted just some mucousy blood.  Visually the remainder of the endometrial cavity appeared normal.  All instruments were removed.  Bleeding was minimal.  She was taken to the recovery room in stable condition.  Sponge, needle and instrument count reported as  correct.      ESTIMATED BLOOD LOSS:  5 mL.      SPECIMENS:   1.  Uterine polyp.   2.  Endometrial curettings.      COMPLICATIONS:  None.         JESSI FERNANDEZ MD             D: 2018   T: 2018   MT: ENMANUEL      Name:     RACHEL CHRISTINA   MRN:      [id number]        Account:        [id number]   :      1950           Procedure Date: 2018      Document: [id number]       cc: Jessi Fernandez MD       [facility]

## 2018-02-09 NOTE — ANESTHESIA PREPROCEDURE EVALUATION
"  Anesthesia Evaluation     . Pt has had prior anesthetic.     No history of anesthetic complications          ROS/MED HX    ENT/Pulmonary:     (+)tobacco use, Past use quit 1979 packs/day  , . .    Neurologic:     (+)migraines,     Cardiovascular:     (+) ----. : . . . :. Irregular Heartbeat/Palpitations, .       METS/Exercise Tolerance:     Hematologic:         Musculoskeletal:   (+) , , other musculoskeletal- low back pain;  degenerative joint disease      GI/Hepatic:     (+) GERD Asymptomatic on medication,       Renal/Genitourinary:         Endo:         Psychiatric:     (+) psychiatric history anxiety and depression      Infectious Disease:   (+) Other Infectious Disease       Malignancy:         Other:    (+) H/O Chronic Pain,H/O chronic opiod use ,                    Physical Exam  Normal systems: cardiovascular and pulmonary    Airway   Mallampati: I  TM distance: >3 FB  Neck ROM: full    Dental     Cardiovascular       Pulmonary                     Anesthesia Plan      History & Physical Review  History and physical reviewed and following examination; no interval change.    ASA Status:  2 .    NPO Status:  > 8 hours    Plan for General and LMA with Intravenous and Propofol induction. Maintenance will be TIVA.    PONV prophylaxis:  Ondansetron (or other 5HT-3) and Dexamethasone or Solumedrol  Tordal if OK with surgeon      Postoperative Care  Postoperative pain management:  IV analgesics and Oral pain medications.      Consents  Anesthetic plan, risks, benefits and alternatives discussed with:  Patient..        DPreop diagnosis: ENDOMETRIAL POLYP  Procedure(s):  OPERATIVE HYSTEROSCOPY WITH MORCELLATOR ([employer]IN [employer]PH[employer]oonfed)  Allergies   Allergen Reactions     Compazine [Prochlorperazine] Other (See Comments)     \"crawl out of my skin\"     Gluten Meal Diarrhea     Penicillins Nausea and Vomiting and Hives       No current facility-administered medications on file prior to encounter.   Current Outpatient " Prescriptions on File Prior to Encounter:  ZOLMitriptan (ZOMIG) 5 MG tablet TAKE ONE TABLET BY MOUTH AT ONSET OF HEADACHE FOR MIGRAINE. MAY REPEAT IN 2 HOURS. MAX OF 2 TABLETS IN 24 HOURS.   LORazepam (ATIVAN) 1 MG tablet Take 1-2 tablets (1-2 mg) by mouth 4 times daily as needed for anxiety (up to 6 tablets per day)   Hydrocodone-Acetaminophen  MG TABS Take 2 tablets by mouth every 4 hours as needed   PARoxetine HCl (PAXIL CR PO) Take 25 mg by mouth 2 times daily NAME BRAND PAXIL CR   Zolpidem Tartrate (AMBIEN CR PO) Take 12.5 mg by mouth At Bedtime   estradiol (ESTRACE) 1 MG tablet Take 1 mg by mouth daily (with dinner) MUST BE  TEVA   PROgesterone (PROMETRIUM) 100 MG capsule Take 100 mg by mouth daily (with dinner)    carboxymethylcellulose (REFRESH PLUS) 0.5 % SOLN ophthalmic solution Place 1 drop into both eyes 3 times daily as needed for dry eyes   loratadine (CLARITIN) 10 MG tablet Take 10 mg by mouth daily as needed      Hemoglobin   Date Value Ref Range Status   01/30/2018 13.2 11.7 - 15.7 g/dL Final     Potassium   Date Value Ref Range Status   01/30/2018 4.6 3.4 - 5.3 mmol/L Final                       .

## 2018-02-09 NOTE — BRIEF OP NOTE
[facility] Brief Operative Note    Pre-operative diagnosis: ENDOMETRIAL POLYP   Post-operative diagnosis * No post-op diagnosis entered *  same   Procedure: Procedure(s):  OPERATIVE HYSTEROSCOPY WITH TRUECLEAR MORCELLATOR 5C SCOPE  - Wound Class: II-Clean Contaminated   Surgeon(s): Surgeon(s) and Role:     * Iris Fernandez MD - Primary   Estimated blood loss: 1 mL    Specimens:   ID Type Source Tests Collected by Time Destination   A : uterine polyp Tissue Endometrium SURGICAL PATHOLOGY EXAM Iris Fernandez MD 2/9/2018  9:10 AM    B : endometrial curettings Tissue Endometrium SURGICAL PATHOLOGY EXAM Iris Fernandez MD 2/9/2018  9:11 AM       Findings: Large wide based polyp on anterior left fundus

## 2018-02-09 NOTE — IP AVS SNAPSHOT
MRN:[id number]                      After Visit Summary   2/9/2018    Laurel Parra    MRN: [id number]           Thank you!     Thank you for choosing [facility] for your care. Our goal is always to provide you with excellent care. Hearing back from our patients is one way we can continue to improve our services. Please take a few minutes to complete the written survey that you may receive in the mail after you visit with us. Thank you!        Patient Information     Date Of Birth          1950        About your hospital stay     You were admitted on:  February 9, 2018 [facility]    You were discharged on:  February 9, 2018       Who to Call     For medical emergencies, please call 911.  For non-urgent questions about your medical care, please call your primary care provider or clinic, [phone]  For questions related to your surgery, please call your surgery clinic        Attending Provider     Provider Specialty    Iris Fernandez MD OB/Gyn       Primary Care Provider Office Phone # Fax #    Georges Lebron [phone]      After Care Instructions     Discharge Instructions       Resume pre procedure diet            Discharge Instructions       Pelvic Rest. No tampons, douching or intercourse for  1  weeks.            Discharge Instructions       Patient may drive beginning POD  1            Discharge Instructions       Patient to arrange follow up appointment in 2-4  weeks            No alcohol       NO ALCOHOL for 24 hours post procedure            No driving or operating machinery       No driving or operating machinery until day after procedure            Shower        Shower on Post-op day  1.   DO NOT take a bath                  Further instructions from your care team       Same Day Surgery Discharge Instructions for  Sedation and General Anesthesia       It's not unusual to feel dizzy,  light-headed or faint for up to 24 hours after surgery or while taking pain medication.  If you have these symptoms: sit for a few minutes before standing and have someone assist you when you get up to walk or use the bathroom.      You should rest and relax for the next 24 hours. We recommend you make arrangements to have an adult stay with you for at least 24 hours after your discharge.  Avoid hazardous and strenuous activity.      DO NOT DRIVE any vehicle or operate mechanical equipment for 24 hours following the end of your surgery.  Even though you may feel normal, your reactions may be affected by the medication you have received.      Do not drink alcoholic beverages for 24 hours following surgery.       Slowly progress to your regular diet as you feel able. It's not unusual to feel nauseated and/or vomit after receiving anesthesia.  If you develop these symptoms, drink clear liquids (apple juice, ginger ale, broth, 7-up, etc. ) until you feel better.  If your nausea and vomiting persists for 24 hours, please notify your surgeon.        All narcotic pain medications, along with inactivity and anesthesia, can cause constipation. Drinking plenty of liquids and increasing fiber intake will help.      For any questions of a medical nature, call your surgeon.      Do not make important decisions for 24 hours.      If you had general anesthesia, you may have a sore throat for a couple of days related to the breathing tube used during surgery.  You may use Cepacol lozenges to help with this discomfort.  If it worsens or if you develop a fever, contact your surgeon.       If you feel your pain is not well managed with the pain medications prescribed by your surgeon, please contact your surgeon's office to let them know so they can address your concerns.       HOME CARE FOLLOWING HYSTEROSCOPY    Diet  You have no restrictions on your diet.  During the evening following surgery, drink plenty of fluids and eat a light  "supper.    Nausea  The anesthesia may produce some nausea.  If you feel nauseated, stay in bed and try drinking fluids such as 7-Up, tea, or soup.    Discomfort  The amount of discomfort you can expect is very unpredictable.  If you have pain that cannot be controlled with Tylenol or with the prescription you may have received, you should notify your physician.  Abdominal cramping or low backache is not uncommon and should not be a cause for concern.  You will be drowsy and weak the day of surgery and possibly the following day.  Fever  A low grade fever (not over 100 F) is usual after this procedure.  Do not hesitate to notify your physician if your fever seems excessive or persists.    Activity   You may resume your normal activity.  Avoid heavy lifting for one week.  You may bathe or shower.  Do not douche, use tampons, or resume intercourse until the bleeding has ceased.    Emergency Care  Contact your physician if you have any of these problems:   1.  A fever over 100 F   2.  A large amount of bleeding or drainage   3.  Severe pain       2 oxycodone , total 10mg, and 1 norco 10mg given at 11 am    Pending Results     Date and Time Order Name Status Description    2/9/2018 0910 Surgical pathology exam In process             Admission Information     Date & Time Provider Department Dept. Phone    2/9/2018 Iris Fernandez MD [facility] [phone]      Your Vitals Were     Blood Pressure Temperature Respirations Height Weight Pulse Oximetry    102/70 97.7  F (36.5  C) 9 1.676 m (5' 6\") 58.5 kg (129 lb) 94%    BMI (Body Mass Index)                   20.82 kg/m2           [employer] Information     [employer] gives you secure access to your electronic health record. If you see a primary care provider, you can also send messages to your care team and make appointments. If you have questions, please call your primary care clinic.  If you do not have a primary care provider, please call [phone] " and they will assist you.        Care EveryWhere ID     This is your Care EveryWhere ID. This could be used by other organizations to access your [facility] medical records  [id number]        Equal Access to Services     ACACIA GARCIA : Julio Reed, ismael rogers, napoleonerica newvinaymackenzie griffiths, anabel martinesjose alejandromukesh nassar. So [facility] [phone].    ATENCIÓN: Si habla español, tiene a kumar disposición servicios gratuitos de asistencia lingüística. Llame al [phone].    We comply with applicable federal civil rights laws and [place] laws. We do not discriminate on the basis of race, color, national origin, age, disability, sex, sexual orientation, or gender identity.               Review of your medicines      CONTINUE these medicines which have NOT CHANGED        Dose / Directions    AMBIEN CR PO        Dose:  12.5 mg   Take 12.5 mg by mouth At Bedtime   Refills:  0       ATIVAN 1 MG tablet   Generic drug:  LORazepam        Dose:  1-2 mg   Take 1-2 tablets (1-2 mg) by mouth 4 times daily as needed for anxiety (up to 6 tablets per day)   Quantity:  30 tablet   Refills:  0       carboxymethylcellulose 0.5 % Soln ophthalmic solution   Commonly known as:  REFRESH PLUS        Dose:  1 drop   Place 1 drop into both eyes 3 times daily as needed for dry eyes   Refills:  0       CENTRUM SILVER ADULT 50+ PO        Dose:  1 tablet   Take 1 tablet by mouth daily   Refills:  0       CLARITIN 10 MG tablet   Generic drug:  loratadine        Dose:  10 mg   Take 10 mg by mouth daily as needed   Refills:  0       CYCLOBENZAPRINE HCL PO   Used for:  Preop general physical exam        Dose:  10 mg   Take 10 mg by mouth daily as needed for muscle spasms   Refills:  0       ESTRACE 1 MG tablet   Generic drug:  estradiol        Dose:  1 mg   Take 1 mg by mouth daily (with dinner) MUST BE  TEVA   Refills:  0       Hydrocodone-Acetaminophen  MG Tabs   Notes to Patient:  1 pill at 11 am         Dose:  2 tablet   Take 2 tablets by mouth every 4 hours as needed   Refills:  0       ipratropium 0.06 % spray   Commonly known as:  ATROVENT   Used for:  Chronic rhinitis, unspecified type        Dose:  3 spray   Spray 3 sprays into both nostrils daily   Quantity:  45 mL   Refills:  3       metoprolol succinate 25 MG 24 hr tablet   Commonly known as:  TOPROL XL   Used for:  Palpitations        Dose:  25 mg   Take 1 tablet (25 mg) by mouth every evening   Quantity:  90 tablet   Refills:  1       PAXIL CR PO        Dose:  25 mg   Take 25 mg by mouth 2 times daily NAME BRAND PAXIL CR   Refills:  0       PROMETRIUM 100 MG capsule   Generic drug:  progesterone        Dose:  100 mg   Take 100 mg by mouth daily (with dinner)   Refills:  0       * ZOLMitriptan 5 MG tablet   Commonly known as:  ZOMIG   Used for:  Intractable chronic migraine without aura and without status migrainosus        TAKE ONE TABLET BY MOUTH AT ONSET OF HEADACHE FOR MIGRAINE. MAY REPEAT IN 2 HOURS. MAX OF 2 TABLETS IN 24 HOURS.   Quantity:  12 tablet   Refills:  6       * ZOLMitriptan 2.5 MG tablet   Commonly known as:  ZOMIG   Used for:  Intractable chronic migraine without aura and without status migrainosus        Dose:  2.5 mg   Take 1 tablet (2.5 mg) by mouth at onset of headache   Quantity:  30 tablet   Refills:  1       * Notice:  This list has 2 medication(s) that are the same as other medications prescribed for you. Read the directions carefully, and ask your doctor or other care provider to review them with you.             Protect others around you: Learn how to safely use, store and throw away your medicines at www.disposemymeds.org.             Medication List: This is a list of all your medications and when to take them. Check marks below indicate your daily home schedule. Keep this list as a reference.      Medications           Morning Afternoon Evening Bedtime As Needed    AMBIEN CR PO   Take 12.5 mg by mouth At Bedtime                                 ATIVAN 1 MG tablet   Take 1-2 tablets (1-2 mg) by mouth 4 times daily as needed for anxiety (up to 6 tablets per day)   Generic drug:  LORazepam                                carboxymethylcellulose 0.5 % Soln ophthalmic solution   Commonly known as:  REFRESH PLUS   Place 1 drop into both eyes 3 times daily as needed for dry eyes                                CENTRUM SILVER ADULT 50+ PO   Take 1 tablet by mouth daily                                CLARITIN 10 MG tablet   Take 10 mg by mouth daily as needed   Generic drug:  loratadine                                CYCLOBENZAPRINE HCL PO   Take 10 mg by mouth daily as needed for muscle spasms                                ESTRACE 1 MG tablet   Take 1 mg by mouth daily (with dinner) MUST BE  TEVA   Generic drug:  estradiol                                Hydrocodone-Acetaminophen  MG Tabs   Take 2 tablets by mouth every 4 hours as needed   Notes to Patient:  1 pill at 11 am                                ipratropium 0.06 % spray   Commonly known as:  ATROVENT   Spray 3 sprays into both nostrils daily                                metoprolol succinate 25 MG 24 hr tablet   Commonly known as:  TOPROL XL   Take 1 tablet (25 mg) by mouth every evening                                PAXIL CR PO   Take 25 mg by mouth 2 times daily NAME BRAND PAXIL CR                                PROMETRIUM 100 MG capsule   Take 100 mg by mouth daily (with dinner)   Generic drug:  progesterone                                * ZOLMitriptan 5 MG tablet   Commonly known as:  ZOMIG   TAKE ONE TABLET BY MOUTH AT ONSET OF HEADACHE FOR MIGRAINE. MAY REPEAT IN 2 HOURS. MAX OF 2 TABLETS IN 24 HOURS.                                * ZOLMitriptan 2.5 MG tablet   Commonly known as:  ZOMIG   Take 1 tablet (2.5 mg) by mouth at onset of headache                                * Notice:  This list has 2 medication(s) that are the same as other medications prescribed  for you. Read the directions carefully, and ask your doctor or other care provider to review them with you.

## 2018-02-09 NOTE — IP AVS SNAPSHOT
[facility]    [address]    [place] [address]    Phone:  [phone]    Fax:  [phone]                                       After Visit Summary   2/9/2018    Laurel Parra    MRN: [id number]           After Visit Summary Signature Page     I have received my discharge instructions, and my questions have been answered. I have discussed any challenges I see with this plan with the nurse or doctor.    ..........................................................................................................................................  Patient/Patient Representative Signature      ..........................................................................................................................................  Patient Representative Print Name and Relationship to Patient    ..................................................               ................................................  Date                                            Time    ..........................................................................................................................................  Reviewed by Signature/Title    ...................................................              ..............................................  Date                                                            Time

## 2018-02-09 NOTE — ANESTHESIA POSTPROCEDURE EVALUATION
Patient: Laurel Parra    Procedure(s):  OPERATIVE HYSTEROSCOPY WITH TRUECLEAR MORCELLATOR 5C SCOPE  - Wound Class: II-Clean Contaminated    Diagnosis:ENDOMETRIAL POLYP  Diagnosis Additional Information: No value filed.    Anesthesia Type:  General, LMA    Note:  Anesthesia Post Evaluation    Last vitals:  Vitals:    02/09/18 0724 02/09/18 0927 02/09/18 0930   BP: 116/80 110/70 111/72   Resp: 16 12 8   Temp: 36.7  C (98  F) 35.7  C (96.3  F)    SpO2: 98% 100% 100%         Electronically Signed By: Ok Jennings MD  February 9, 2018  9:40 AM

## 2018-02-12 LAB — COPATH REPORT: NORMAL

## 2018-02-20 ENCOUNTER — TRANSFERRED RECORDS (OUTPATIENT)
Dept: HEALTH INFORMATION MANAGEMENT | Facility: CLINIC | Age: 68
End: 2018-02-20

## 2018-02-22 ENCOUNTER — TRANSFERRED RECORDS (OUTPATIENT)
Dept: HEALTH INFORMATION MANAGEMENT | Facility: CLINIC | Age: 68
End: 2018-02-22

## 2018-03-13 ENCOUNTER — CARE COORDINATION (OUTPATIENT)
Dept: GASTROENTEROLOGY | Facility: CLINIC | Age: 68
End: 2018-03-13

## 2018-03-13 DIAGNOSIS — K64.4 EXTERNAL HEMORRHOIDS: Primary | ICD-10-CM

## 2018-03-13 RX ORDER — HYDROCORTISONE ACETATE 25 MG/1
25 SUPPOSITORY RECTAL 2 TIMES DAILY
Qty: 14 SUPPOSITORY | Refills: 0 | Status: SHIPPED | OUTPATIENT
Start: 2018-03-13 | End: 2019-08-14

## 2018-03-19 ENCOUNTER — TRANSFERRED RECORDS (OUTPATIENT)
Dept: HEALTH INFORMATION MANAGEMENT | Facility: CLINIC | Age: 68
End: 2018-03-19

## 2018-03-22 ENCOUNTER — TELEPHONE (OUTPATIENT)
Dept: ORTHOPEDICS | Facility: CLINIC | Age: 68
End: 2018-03-22

## 2018-03-22 NOTE — TELEPHONE ENCOUNTER
Patient called about scheduling an appt with Dr. Michael for an SI joint issue. She is being referred by Dr. Jae Bae at the [facility] in [place]. She was told that she needs an MRI within 6 months of appt but insisted that her issue cannot be seen on MRI. Appt made by call center and message sent to Lucretia if any follow up is needed.

## 2018-03-28 ENCOUNTER — TRANSFERRED RECORDS (OUTPATIENT)
Dept: HEALTH INFORMATION MANAGEMENT | Facility: CLINIC | Age: 68
End: 2018-03-28

## 2018-03-30 ENCOUNTER — TRANSFERRED RECORDS (OUTPATIENT)
Dept: HEALTH INFORMATION MANAGEMENT | Facility: CLINIC | Age: 68
End: 2018-03-30

## 2018-04-09 ENCOUNTER — TELEPHONE (OUTPATIENT)
Dept: ORTHOPEDICS | Facility: CLINIC | Age: 68
End: 2018-04-09

## 2018-04-09 NOTE — TELEPHONE ENCOUNTER
[facility] Call Center    Phone Message    May a detailed message be left on voicemail: yes    Reason for Call: Other: Pt calling to ask where she is on the waitlist.  Pt was notified that she can be put on one, but schedular does not have access to further details.     Action Taken: Message routed to:  [facility] ([facility] Orthopedics

## 2018-04-09 NOTE — TELEPHONE ENCOUNTER
Patient was called back per her request and notified that she is on the waitlist, but a numerical placement is not provided as the waitlist can change based on symptoms of patients waiting to be scheduled.      Patient need for MRI was also discussed with CRISTO William.  Patient does not need an MRI as she is being referred for SI joint pain.    Salima Sheets LPN

## 2018-04-16 ENCOUNTER — TRANSFERRED RECORDS (OUTPATIENT)
Dept: HEALTH INFORMATION MANAGEMENT | Facility: CLINIC | Age: 68
End: 2018-04-16

## 2018-04-24 ENCOUNTER — TRANSFERRED RECORDS (OUTPATIENT)
Dept: HEALTH INFORMATION MANAGEMENT | Facility: CLINIC | Age: 68
End: 2018-04-24

## 2018-05-23 ENCOUNTER — RADIANT APPOINTMENT (OUTPATIENT)
Dept: GENERAL RADIOLOGY | Facility: CLINIC | Age: 68
End: 2018-05-23
Attending: ORTHOPAEDIC SURGERY
Payer: COMMERCIAL

## 2018-05-23 ENCOUNTER — OFFICE VISIT (OUTPATIENT)
Dept: ORTHOPEDICS | Facility: CLINIC | Age: 68
End: 2018-05-23
Payer: COMMERCIAL

## 2018-05-23 VITALS — WEIGHT: 139.2 LBS | BODY MASS INDEX: 22.37 KG/M2 | HEIGHT: 66 IN

## 2018-05-23 DIAGNOSIS — M46.1 SACROILIITIS (H): Primary | ICD-10-CM

## 2018-05-23 DIAGNOSIS — G57.02 PIRIFORMIS SYNDROME, LEFT: ICD-10-CM

## 2018-05-23 DIAGNOSIS — M46.1 SACROILIITIS (H): ICD-10-CM

## 2018-05-23 DIAGNOSIS — G57.01 PIRIFORMIS SYNDROME, RIGHT: ICD-10-CM

## 2018-05-23 DIAGNOSIS — M53.3 SACRALGIA: Primary | ICD-10-CM

## 2018-05-23 ASSESSMENT — ENCOUNTER SYMPTOMS
BLOOD IN STOOL: 0
HOARSE VOICE: 0
VOMITING: 0
DIFFICULTY URINATING: 0
NECK MASS: 0
SINUS PAIN: 0
CHILLS: 0
TREMORS: 0
LEG PAIN: 0
MUSCLE CRAMPS: 1
BLOATING: 1
TROUBLE SWALLOWING: 0
FATIGUE: 1
ORTHOPNEA: 0
HEARTBURN: 1
SPEECH CHANGE: 0
MYALGIAS: 1
NIGHT SWEATS: 1
POLYPHAGIA: 0
HEADACHES: 1
JOINT SWELLING: 0
SYNCOPE: 0
WEAKNESS: 1
NUMBNESS: 0
DECREASED APPETITE: 0
DYSURIA: 0
RECTAL PAIN: 0
BREAST PAIN: 1
WEIGHT GAIN: 1
SHORTNESS OF BREATH: 0
LIGHT-HEADEDNESS: 1
INCREASED ENERGY: 0
SORE THROAT: 0
COUGH DISTURBING SLEEP: 1
FLANK PAIN: 0
SMELL DISTURBANCE: 0
DOUBLE VISION: 0
POLYDIPSIA: 0
POSTURAL DYSPNEA: 0
SNORES LOUDLY: 0
DIZZINESS: 1
NERVOUS/ANXIOUS: 1
SPUTUM PRODUCTION: 0
FEVER: 0
BOWEL INCONTINENCE: 0
DISTURBANCES IN COORDINATION: 0
MUSCLE WEAKNESS: 1
DEPRESSION: 1
EXERCISE INTOLERANCE: 0
DIARRHEA: 1
ALTERED TEMPERATURE REGULATION: 1
HEMOPTYSIS: 0
STIFFNESS: 1
PALPITATIONS: 1
ABDOMINAL PAIN: 1
COUGH: 1
HEMATURIA: 0
TASTE DISTURBANCE: 0
TINGLING: 1
LOSS OF CONSCIOUSNESS: 0
WEIGHT LOSS: 0
HALLUCINATIONS: 0
PANIC: 1
EYE IRRITATION: 0
BACK PAIN: 1
NECK PAIN: 1
DYSPNEA ON EXERTION: 0
DECREASED CONCENTRATION: 1
NAUSEA: 1
PARALYSIS: 0
WHEEZING: 0
SLEEP DISTURBANCES DUE TO BREATHING: 0
INSOMNIA: 1
BREAST MASS: 0
HYPOTENSION: 1
HYPERTENSION: 0
ARTHRALGIAS: 1
CONSTIPATION: 1
SEIZURES: 0
EYE PAIN: 0
SINUS CONGESTION: 0
JAUNDICE: 0
MEMORY LOSS: 1
EYE WATERING: 1
EYE REDNESS: 0

## 2018-05-23 NOTE — PROGRESS NOTES
Spine Surgery Consultation    REFERRING PHYSICIAN: Jae Tong   PRIMARY CARE PHYSICIAN: Georges Lebron           Chief Complaint:   Consult (Referred by Dr. Tong for bilateral SI joint pain, right SI pain is greater than left SI.)      History of Present Illness:  Symptom Profile Including: location of symptoms, onset, severity, exacerbating/alleviating factors, previous treatments:        Laurel Parra is a 67 year old female otherwise fairly healthy, now presenting for several years of low back/buttock bilateral pain.  This has been a chronic problem for which she has seen several other providers including Dr. Tong - he was performing serial manipulations of the pelvis and SI region with limited positive results. After essentially not receiving the resolution she desired she asked for referral to our clinic for consideration of SI pathology. She has also performed physical therapy in the past though it is been about 4 years since she did this. She says the pain is worse throughout the day with activities, and improves at night when she is able to place heat along the lower back and buttocks. She has had several epidural injections in the past most recently March 2018, however the most recent injection simply causing worsening of a burning pain in her lower back and buttocks and did not provide any relief. She has not had specific SI injections. She does use 60 mg of Norco per day.    She also has right-sided plantar fasciitis for which she had surgery in October 2017 without any relief, and also has left wrist arthritis. She does not have any known systemic hypermobility disorders.         Past Medical History:     Past Medical History:   Diagnosis Date     Anxiety     Dr. Adelina Meehan     Chronic constipation      Chronic narcotic use     [facility]     Chronic pain     Dr. Orta as of 2015     Chronic urethritis     Dr. Little     Colonic polyp 11/2011    one polyp,  "fu 5 years, fu 10/17 and no lesions     Depression, major, in partial remission (H)     Dr. Meehan     Diarrhea 2012    eval by [facility], resolved with gluten free diet     DJD (degenerative joint disease)      H/O gastroesophageal reflux (GERD)      LBP (low back pain)     Dr. Jae Tong in [place], then seeing [facility] Dr. Orta     Migraines     neuro eval     Palpitations     holter with pvc's and pac's, echo nl     Screening     dexa nl at gyn            Past Surgical History:     Past Surgical History:   Procedure Laterality Date     APPENDECTOMY  2016     ARTHROPLASTY CARPOMETACARPAL (THUMB JOINT) Left 2017    Procedure: ARTHROPLASTY CARPOMETACARPAL (THUMB JOINT);  REVISION  LEFT THUMB CARPOMETACARPAL ARTHROPOASTY WITH 1.1 TIGHT ROPE;  Surgeon: Beatrice Philippe MD;  Location: [facility]     CARPAL TUNNEL RELEASE RT/LT  2006      SECTION       COLONOSCOPY       EXCIS BARTHOLIN GLAND/CYST       HERNIORRHAPHY INGUINAL  70's    x5     OPERATIVE HYSTEROSCOPY WITH MORCELLATOR N/A 2018    Procedure: OPERATIVE HYSTEROSCOPY WITH MORCELLATOR (MARTIN & NEPHEW);  OPERATIVE HYSTEROSCOPY WITH TRUECLEAR MORCELLATOR 5C SCOPE ;  Surgeon: Iris Fernandez MD;  Location: [facility]     ORTHOPEDIC SURGERY Bilateral     SHOULDER ARTHROSCOPY     ORTHOPEDIC SURGERY Right 2017    right foot for plantar fasciitis     thumb surgery   and ,             Social History:     Social History   Substance Use Topics     Smoking status: Former Smoker     Quit date: 10/29/1979     Smokeless tobacco: Never Used     Alcohol use No      Comment: minimal since on norco            Family History:     Family History   Problem Relation Age of Onset     DIABETES Father             Allergies:     Allergies   Allergen Reactions     Compazine [Prochlorperazine] Other (See Comments)     \"crawl out of my skin\"     Gluten Meal Diarrhea     Penicillins Nausea and Vomiting and Hives            " "Medications:     Current Outpatient Prescriptions   Medication     carboxymethylcellulose (REFRESH PLUS) 0.5 % SOLN ophthalmic solution     CYCLOBENZAPRINE HCL PO     estradiol (ESTRACE) 1 MG tablet     Fexofenadine HCl (ALLEGRA ALLERGY PO)     Hydrocodone-Acetaminophen  MG TABS     hydrocortisone (ANUSOL-HC) 25 MG Suppository     ipratropium (ATROVENT) 0.06 % spray     loratadine (CLARITIN) 10 MG tablet     LORazepam (ATIVAN) 1 MG tablet     metoprolol succinate (TOPROL XL) 25 MG 24 hr tablet     Multiple Vitamins-Minerals (CENTRUM SILVER ADULT 50+ PO)     PARoxetine HCl (PAXIL CR PO)     PROgesterone (PROMETRIUM) 100 MG capsule     ZOLMitriptan (ZOMIG) 2.5 MG tablet     ZOLMitriptan (ZOMIG) 5 MG tablet     Zolpidem Tartrate (AMBIEN CR PO)     No current facility-administered medications for this visit.              Review of Systems:     A 10 point ROS was performed and reviewed. Specific responses to these questions are noted at the end of the document.         Physical Exam:   Vitals: Ht 1.676 m (5' 6\")  Wt 63.1 kg (139 lb 3.2 oz)  BMI 22.47 kg/m2  Constitutional: awake, alert, cooperative, no apparent distress, appears stated age.    Eyes: The sclera are white.  Ears, Nose, Throat: The trachea is midline.  Psychiatric: The patient has a normal affect.  Respiratory: breathing non-labored  Cardiovascular: The extremities are warm and perfused.  Skin: no obvious rashes or lesions.  Musculoskeletal, Neurologic, and Spine:   Back with midline tenderness over sacrum.  Mild tenderness to bilateral PSIS/SI, does not reproduce her pain.  Tenderness along the piriformis muscle trajectory.  Reproduces her pain with bilateral hip internal rotation.   Lumbar Spine:    Appearance - No gross stepoffs or deformities    SLR negative bilateral.    Motor -     L2-3: Hip flexion 5/5 L and 5/5 R strength.  Irritation of hip flexor bilateral.         L3/4:  Knee extension L 5/5 and R 5/5 strength         L4/5:  Foot " dorsiflexion R 5/5 L 5/5 and       EHL dorsiflexion R 4/5 L 4/5 strength         S1:  Plantarflexion/Peroneal Muscles  L 5/5 and R 5/5 strength    Sensation: intact to light touch L3-S1 distribution BLE    Alignment:  Patient stands with a neutral standing sagittal balance.  Positive trendelenberg sign with one-leg stance bilateral.    SI provocation tests:    Right Left   Terence Finger Test  - -   NOLVIA  - -   Thigh Thrust: - spasm   Pelvic Compression Test  - -   Palpation  - -   Pelvic Gapping  - -   Gaenslen s Test  - -   Sacral Thrust (SI) + +            Imaging:   We ordered and independently reviewed new pelvis radiographs at this clinic visit. These were compared to previous MRI and x-ray.  The results were discussed with the patient.  Findings include:    L2-3 degenerative disc disease, no significant hip arthritis             Assessment and Plan:   Assessment:  67 year old female with many years of pain in the buttocks and midline sacrum, most consistent with sacralgia and likely piriformis syndrome.  No evidence at this time of significant SI contribution to pain, and does not fulfill criteria for a diagnostic SI injection.     Plan:  1. Continue activities as tolerated  2. Referral to Dr. Ann for evaluation of possible piriformis syndrome    Return to clinic p.r.n.      Milad Goldstein MD  PGY-4 Orthopaedic Surgery  [phone]    Patient was seen an examined with Dr. Michael, who agrees with the above assessment and plan.  I saw and evaluated the patient on the day of the visit and I formulated the plan.  Ugo Michael MD    Answers for HPI/ROS submitted by the patient on 5/23/2018   General Symptoms: Yes  Skin Symptoms: No  HENT Symptoms: Yes  EYE SYMPTOMS: Yes  HEART SYMPTOMS: Yes  LUNG SYMPTOMS: Yes  INTESTINAL SYMPTOMS: Yes  URINARY SYMPTOMS: Yes  GYNECOLOGIC SYMPTOMS: No  BREAST SYMPTOMS: Yes  SKELETAL SYMPTOMS: Yes  BLOOD SYMPTOMS: No  NERVOUS SYSTEM SYMPTOMS: Yes  MENTAL HEALTH SYMPTOMS:  Yes  Fever: No  Loss of appetite: No  Weight loss: No  Weight gain: Yes  Fatigue: Yes  Night sweats: Yes  Chills: No  Increased stress: Yes  Excessive hunger: No  Excessive thirst: No  Feeling hot or cold when others believe the temperature is normal: Yes  Loss of height: No  Post-operative complications: No  Surgical site pain: No  Hallucinations: No  Change in or Loss of Energy: No  Hyperactivity: No  Confusion: No  Ear pain: No  Ear discharge: No  Hearing loss: No  Tinnitus: Yes  Nosebleeds: No  Congestion: No  Sinus pain: No  Trouble swallowing: No   Voice hoarseness: No  Mouth sores: No  Sore throat: No  Tooth pain: Yes  Gum tenderness: No  Bleeding gums: No  Change in taste: No  Change in sense of smell: No  Dry mouth: Yes  Hearing aid used: No  Neck lump: No  Eye pain: No  Vision loss: No  Dry eyes: Yes  Watery eyes: Yes  Eye bulging: No  Double vision: No  Flashing of lights: No  Spots: Yes  Floaters: No  Redness: No  Crossed eyes: No  Tunnel Vision: No  Yellowing of eyes: No  Eye irritation: No  Cough: Yes  Sputum or phlegm: No  Coughing up blood: No  Difficulty breating or shortness of breath: No  Snoring: No  Wheezing: No  Difficulty breathing on exertion: No  Nighttime Cough: Yes  Difficulty breathing when lying flat: No  Chest pain or pressure: No  Fast or irregular heartbeat: Yes  Pain in legs with walking: No  Trouble breathing while lying down: No  Fingers or toes appear blue: No  High blood pressure: No  Low blood pressure: Yes  Fainting: No  Murmurs: No  Pacemaker: No  Varicose veins: No  Edema or swelling: No  Wake up at night with shortness of breath: No  Light-headedness: Yes  Exercise intolerance: No  Heart burn or indigestion: Yes  Nausea: Yes  Vomiting: No  Abdominal pain: Yes  Bloating: Yes  Constipation: Yes  Diarrhea: Yes  Blood in stool: No  Black stools: No  Rectal or Anal pain: No  Fecal incontinence: No  Yellowing of skin or eyes: No  Vomit with blood: No  Change in stools: No  Trouble  holding urine or incontinence: Yes  Pain or burning: No  Trouble starting or stopping: Yes  Increased frequency of urination: No  Blood in urine: No  Decreased frequency of urination: No  Frequent nighttime urination: No  Flank pain: No  Difficulty emptying bladder: No  Back pain: Yes  Muscle aches: Yes  Neck pain: Yes  Swollen joints: No  Joint pain: Yes  Bone pain: No  Muscle cramps: Yes  Muscle weakness: Yes  Joint stiffness: Yes  Bone fracture: No  Trouble with coordination: No  Dizziness or trouble with balance: Yes  Fainting or black-out spells: No  Memory loss: Yes  Headache: Yes  Seizures: No  Speech problems: No  Tingling: Yes  Tremor: No  Weakness: Yes  Difficulty walking: Yes  Paralysis: No  Numbness: No  Discharge: No  Lumps: No  Pain: Yes  Nipple retraction: No  Nervous or Anxious: Yes  Depression: Yes  Trouble sleeping: Yes  Trouble thinking or concentrating: Yes  Mood changes: Yes  Panic attacks: Yes

## 2018-05-23 NOTE — LETTER
5/23/2018      RE: Laurel Parra  [address] [place] [facility] [address]       Spine Surgery Consultation    REFERRING PHYSICIAN: Jae Tong   PRIMARY CARE PHYSICIAN: Georges Lebron           Chief Complaint:   Consult (Referred by Dr. Tong for bilateral SI joint pain, right SI pain is greater than left SI.)      History of Present Illness:  Symptom Profile Including: location of symptoms, onset, severity, exacerbating/alleviating factors, previous treatments:        Laurel Parra is a 67 year old female otherwise fairly healthy, now presenting for several years of low back/buttock bilateral pain.  This has been a chronic problem for which she has seen several other providers including Dr. Tong - he was performing serial manipulations of the pelvis and SI region with limited positive results. After essentially not receiving the resolution she desired she asked for referral to our clinic for consideration of SI pathology. She has also performed physical therapy in the past though it is been about 4 years since she did this. She says the pain is worse throughout the day with activities, and improves at night when she is able to place heat along the lower back and buttocks. She has had several epidural injections in the past most recently March 2018, however the most recent injection simply causing worsening of a burning pain in her lower back and buttocks and did not provide any relief. She has not had specific SI injections. She does use 60 mg of Norco per day.    She also has right-sided plantar fasciitis for which she had surgery in October 2017 without any relief, and also has left wrist arthritis. She does not have any known systemic hypermobility disorders.         Past Medical History:     Past Medical History:   Diagnosis Date     Anxiety     Dr. Adelina Meehan     Chronic constipation      Chronic narcotic use     [facility]     Chronic pain      "Dr. Orta as of      Chronic urethritis     Dr. Little     Colonic polyp 2011    one polyp, fu 5 years, fu 10/17 and no lesions     Depression, major, in partial remission (H)     Dr. Meehan     Diarrhea 2012    eval by [facility], resolved with gluten free diet     DJD (degenerative joint disease)      H/O gastroesophageal reflux (GERD)      LBP (low back pain)     Dr. Jae Tong in [place], then seeing [facility] Dr. Orta     Migraines     neuro eval     Palpitations     holter with pvc's and pac's, echo nl     Screening     dexa nl at gyn            Past Surgical History:     Past Surgical History:   Procedure Laterality Date     APPENDECTOMY  2016     ARTHROPLASTY CARPOMETACARPAL (THUMB JOINT) Left 2017    Procedure: ARTHROPLASTY CARPOMETACARPAL (THUMB JOINT);  REVISION  LEFT THUMB CARPOMETACARPAL ARTHROPOASTY WITH 1.1 TIGHT ROPE;  Surgeon: Beatrice Philippe MD;  Location: [facility]     CARPAL TUNNEL RELEASE RT/LT  2006      SECTION  1986     COLONOSCOPY       EXCIS BARTHOLIN GLAND/CYST       HERNIORRHAPHY INGUINAL  70's    x5     OPERATIVE HYSTEROSCOPY WITH MORCELLATOR N/A 2018    Procedure: OPERATIVE HYSTEROSCOPY WITH MORCELLATOR (M[employer]IN [employer] NEPHEW);  OPERATIVE HYSTEROSCOPY WITH TRUECLEAR MORCELLATOR 5C SCOPE ;  Surgeon: Iris Fernandez MD;  Location: [facility]     ORTHOPEDIC SURGERY Bilateral     SHOULDER ARTHROSCOPY     ORTHOPEDIC SURGERY Right 2017    right foot for plantar fasciitis     thumb surgery   and ,             Social History:     Social History   Substance Use Topics     Smoking status: Former Smoker     Quit date: 10/29/1979     Smokeless tobacco: Never Used     Alcohol use No      Comment: minimal since on norco            Family History:     Family History   Problem Relation Age of Onset     DIABETES Father             Allergies:     Allergies   Allergen Reactions     Compazine [Prochlorperazine] Other (See Comments)     \"crawl out of my " "skin\"     Gluten Meal Diarrhea     Penicillins Nausea and Vomiting and Hives            Medications:     Current Outpatient Prescriptions   Medication     carboxymethylcellulose (REFRESH PLUS) 0.5 % SOLN ophthalmic solution     CYCLOBENZAPRINE HCL PO     estradiol (ESTRACE) 1 MG tablet     Fexofenadine HCl (ALLEGRA ALLERGY PO)     Hydrocodone-Acetaminophen  MG TABS     hydrocortisone (ANUSOL-HC) 25 MG Suppository     ipratropium (ATROVENT) 0.06 % spray     loratadine (CLARITIN) 10 MG tablet     LORazepam (ATIVAN) 1 MG tablet     metoprolol succinate (TOPROL XL) 25 MG 24 hr tablet     Multiple Vitamins-Minerals (CENTRUM SILVER ADULT 50+ PO)     PARoxetine HCl (PAXIL CR PO)     PROgesterone (PROMETRIUM) 100 MG capsule     ZOLMitriptan (ZOMIG) 2.5 MG tablet     ZOLMitriptan (ZOMIG) 5 MG tablet     Zolpidem Tartrate (AMBIEN CR PO)     No current facility-administered medications for this visit.              Review of Systems:     A 10 point ROS was performed and reviewed. Specific responses to these questions are noted at the end of the document.         Physical Exam:   Vitals: Ht 1.676 m (5' 6\")  Wt 63.1 kg (139 lb 3.2 oz)  BMI 22.47 kg/m2  Constitutional: awake, alert, cooperative, no apparent distress, appears stated age.    Eyes: The sclera are white.  Ears, Nose, Throat: The trachea is midline.  Psychiatric: The patient has a normal affect.  Respiratory: breathing non-labored  Cardiovascular: The extremities are warm and perfused.  Skin: no obvious rashes or lesions.  Musculoskeletal, Neurologic, and Spine:   Back with midline tenderness over sacrum.  Mild tenderness to bilateral PSIS/SI, does not reproduce her pain.  Tenderness along the piriformis muscle trajectory.  Reproduces her pain with bilateral hip internal rotation.   Lumbar Spine:    Appearance - No gross stepoffs or deformities    SLR negative bilateral.    Motor -     L2-3: Hip flexion 5/5 L and 5/5 R strength.  Irritation of hip flexor " bilateral.         L3/4:  Knee extension L 5/5 and R 5/5 strength         L4/5:  Foot dorsiflexion R 5/5 L 5/5 and       EHL dorsiflexion R 4/5 L 4/5 strength         S1:  Plantarflexion/Peroneal Muscles  L 5/5 and R 5/5 strength    Sensation: intact to light touch L3-S1 distribution BLE    Alignment:  Patient stands with a neutral standing sagittal balance.  Positive trendelenberg sign with one-leg stance bilateral.    SI provocation tests:    Right Left   Terence Finger Test  - -   NOLVIA  - -   Thigh Thrust: - spasm   Pelvic Compression Test  - -   Palpation  - -   Pelvic Gapping  - -   Gaenslen s Test  - -   Sacral Thrust (SI) + +            Imaging:   We ordered and independently reviewed new pelvis radiographs at this clinic visit. These were compared to previous MRI and x-ray.  The results were discussed with the patient.  Findings include:    L2-3 degenerative disc disease, no significant hip arthritis             Assessment and Plan:   Assessment:  67 year old female with many years of pain in the buttocks and midline sacrum, most consistent with sacralgia and likely piriformis syndrome.  No evidence at this time of significant SI contribution to pain, and does not fulfill criteria for a diagnostic SI injection.     Plan:  1. Continue activities as tolerated  2. Referral to Dr. Ann for evaluation of possible piriformis syndrome    Return to clinic p.r.n.      Milad Goldstein MD  PGY-4 Orthopaedic Surgery  [phone]    Patient was seen an examined with Dr. Michael, who agrees with the above assessment and plan.  I saw and evaluated the patient on the day of the visit and I formulated the plan.  Ugo Michael MD

## 2018-05-23 NOTE — LETTER
5/23/2018       RE: Laurel Parra  [address]  [facility] [address]     Dear Colleague,    Thank you for referring your patient, Laurel Parra, to the [facility] at [facility]. Please see a copy of my visit note below.    Spine Surgery Consultation    REFERRING PHYSICIAN: Jae Tnog   PRIMARY CARE PHYSICIAN: Georges Lebron           Chief Complaint:   Consult (Referred by Dr. Tong for bilateral SI joint pain, right SI pain is greater than left SI.)      History of Present Illness:  Symptom Profile Including: location of symptoms, onset, severity, exacerbating/alleviating factors, previous treatments:        Laurel Parra is a 67 year old female otherwise fairly healthy, now presenting for several years of low back/buttock bilateral pain.  This has been a chronic problem for which she has seen several other providers including Dr. Tong - he was performing serial manipulations of the pelvis and SI region with limited positive results. After essentially not receiving the resolution she desired she asked for referral to our clinic for consideration of SI pathology. She has also performed physical therapy in the past though it is been about 4 years since she did this. She says the pain is worse throughout the day with activities, and improves at night when she is able to place heat along the lower back and buttocks. She has had several epidural injections in the past most recently March 2018, however the most recent injection simply causing worsening of a burning pain in her lower back and buttocks and did not provide any relief. She has not had specific SI injections. She does use 60 mg of Norco per day.    She also has right-sided plantar fasciitis for which she had surgery in October 2017 without any relief, and also has left wrist arthritis. She does not have any known systemic hypermobility  disorders.         Past Medical History:     Past Medical History:   Diagnosis Date     Anxiety     Dr. Adelina Meehan     Chronic constipation      Chronic narcotic use     [facility]     Chronic pain     Dr. Orta as of      Chronic urethritis     Dr. Little     Colonic polyp 2011    one polyp, fu 5 years, fu 10/17 and no lesions     Depression, major, in partial remission (H)     Dr. Meehan     Diarrhea     eval by [facility], resolved with gluten free diet     DJD (degenerative joint disease)      H/O gastroesophageal reflux (GERD)      LBP (low back pain)     Dr. Jae Tong in [place], then seeing [facility] Dr. Orta     Migraines     neuro eval     Palpitations     holter with pvc's and pac's, echo nl     Screening     dexa nl at gyn            Past Surgical History:     Past Surgical History:   Procedure Laterality Date     APPENDECTOMY  2016     ARTHROPLASTY CARPOMETACARPAL (THUMB JOINT) Left 2017    Procedure: ARTHROPLASTY CARPOMETACARPAL (THUMB JOINT);  REVISION  LEFT THUMB CARPOMETACARPAL ARTHROPOASTY WITH 1.1 TIGHT ROPE;  Surgeon: Beatrice Philippe MD;  Location: [facility]     CARPAL TUNNEL RELEASE RT/LT  2006      SECTION  1986     COLONOSCOPY       EXCIS BARTHOLIN GLAND/CYST       HERNIORRHAPHY INGUINAL  70's    x5     OPERATIVE HYSTEROSCOPY WITH MORCELLATOR N/A 2018    Procedure: OPERATIVE HYSTEROSCOPY WITH MORCELLATOR (M[employer]IN [employer] NEPHEW);  OPERATIVE HYSTEROSCOPY WITH TRUECLEAR MORCELLATOR 5C SCOPE ;  Surgeon: Iris Fernandez MD;  Location: [facility]     ORTHOPEDIC SURGERY Bilateral     SHOULDER ARTHROSCOPY     ORTHOPEDIC SURGERY Right 2017    right foot for plantar fasciitis     thumb surgery   and ,             Social History:     Social History   Substance Use Topics     Smoking status: Former Smoker     Quit date: 10/29/1979     Smokeless tobacco: Never Used     Alcohol use No      Comment: minimal since on norco             "Family History:     Family History   Problem Relation Age of Onset     DIABETES Father             Allergies:     Allergies   Allergen Reactions     Compazine [Prochlorperazine] Other (See Comments)     \"crawl out of my skin\"     Gluten Meal Diarrhea     Penicillins Nausea and Vomiting and Hives            Medications:     Current Outpatient Prescriptions   Medication     carboxymethylcellulose (REFRESH PLUS) 0.5 % SOLN ophthalmic solution     CYCLOBENZAPRINE HCL PO     estradiol (ESTRACE) 1 MG tablet     Fexofenadine HCl (ALLEGRA ALLERGY PO)     Hydrocodone-Acetaminophen  MG TABS     hydrocortisone (ANUSOL-HC) 25 MG Suppository     ipratropium (ATROVENT) 0.06 % spray     loratadine (CLARITIN) 10 MG tablet     LORazepam (ATIVAN) 1 MG tablet     metoprolol succinate (TOPROL XL) 25 MG 24 hr tablet     Multiple Vitamins-Minerals (CENTRUM SILVER ADULT 50+ PO)     PARoxetine HCl (PAXIL CR PO)     PROgesterone (PROMETRIUM) 100 MG capsule     ZOLMitriptan (ZOMIG) 2.5 MG tablet     ZOLMitriptan (ZOMIG) 5 MG tablet     Zolpidem Tartrate (AMBIEN CR PO)     No current facility-administered medications for this visit.              Review of Systems:     A 10 point ROS was performed and reviewed. Specific responses to these questions are noted at the end of the document.         Physical Exam:   Vitals: Ht 1.676 m (5' 6\")  Wt 63.1 kg (139 lb 3.2 oz)  BMI 22.47 kg/m2  Constitutional: awake, alert, cooperative, no apparent distress, appears stated age.    Eyes: The sclera are white.  Ears, Nose, Throat: The trachea is midline.  Psychiatric: The patient has a normal affect.  Respiratory: breathing non-labored  Cardiovascular: The extremities are warm and perfused.  Skin: no obvious rashes or lesions.  Musculoskeletal, Neurologic, and Spine:   Back with midline tenderness over sacrum.  Mild tenderness to bilateral PSIS/SI, does not reproduce her pain.  Tenderness along the piriformis muscle trajectory.  Reproduces her pain " with bilateral hip internal rotation.   Lumbar Spine:    Appearance - No gross stepoffs or deformities    SLR negative bilateral.    Motor -     L2-3: Hip flexion 5/5 L and 5/5 R strength.  Irritation of hip flexor bilateral.         L3/4:  Knee extension L 5/5 and R 5/5 strength         L4/5:  Foot dorsiflexion R 5/5 L 5/5 and       EHL dorsiflexion R 4/5 L 4/5 strength         S1:  Plantarflexion/Peroneal Muscles  L 5/5 and R 5/5 strength    Sensation: intact to light touch L3-S1 distribution BLE    Alignment:  Patient stands with a neutral standing sagittal balance.  Positive trendelenberg sign with one-leg stance bilateral.    SI provocation tests:    Right Left   Terence Finger Test  - -   NOLVIA  - -   Thigh Thrust: - spasm   Pelvic Compression Test  - -   Palpation  - -   Pelvic Gapping  - -   Gaenslen s Test  - -   Sacral Thrust (SI) + +            Imaging:   We ordered and independently reviewed new pelvis radiographs at this clinic visit. These were compared to previous MRI and x-ray.  The results were discussed with the patient.  Findings include:    L2-3 degenerative disc disease, no significant hip arthritis             Assessment and Plan:   Assessment:  67 year old female with many years of pain in the buttocks and midline sacrum, most consistent with sacralgia and likely piriformis syndrome.  No evidence at this time of significant SI contribution to pain, and does not fulfill criteria for a diagnostic SI injection.     Plan:  1. Continue activities as tolerated  2. Referral to Dr. Ann for evaluation of possible piriformis syndrome    Return to clinic p.r.n.      Milad Goldstein MD  PGY-4 Orthopaedic Surgery  [phone]    Patient was seen an examined with Dr. Michael, who agrees with the above assessment and plan.  I saw and evaluated the patient on the day of the visit and I formulated the plan.  Ugo Michael MD

## 2018-05-23 NOTE — MR AVS SNAPSHOT
After Visit Summary   5/23/2018    Laurel Parra    MRN: [id number]           Patient Information     Date Of Birth          1950        Visit Information        Provider Department      5/23/2018 9:00 AM Ugo Michael MD [facility]        Today's Diagnoses     Sacralgia    -  1    Piriformis syndrome, left        Piriformis syndrome, right           Follow-ups after your visit        Your next 10 appointments already scheduled     Jun 19, 2018  5:15 PM CDT   (Arrive by 4:45 PM)   NEW SPINE with Jordan Ann MD   [facility] ([facility])    [address]  [address] [facility] [id number] [phone]              Who to contact     Please call your clinic at [phone] to:    Ask questions about your health    Make or cancel appointments    Discuss your medicines    Learn about your test results    Speak to your doctor            Additional Information About Your Visit        MyChart Information     [employer] gives you secure access to your electronic health record. If you see a primary care provider, you can also send messages to your care team and make appointments. If you have questions, please call your primary care clinic.  If you do not have a primary care provider, please call [phone] and they will assist you.      [employer] ([employer]) is an electronic gateway that provides easy, online access to your medical records. With [employer] ([employer]), you can request a clinic appointment, read your test results, renew a prescription or communicate with your care team.     To access your existing account, please contact your [facility] or call [phone] for assistance.        Care EveryWhere ID     This is your Care EveryWhere ID. This could be used by other organizations to access your [facility] medical records  [id number]        Your Vitals Were     Height BMI (Body Mass Index)           "      1.676 m (5' 6\") 22.47 kg/m2           Blood Pressure from Last 3 Encounters:   02/09/18 109/51   01/30/18 121/78   11/20/17 110/79    Weight from Last 3 Encounters:   05/23/18 63.1 kg (139 lb 3.2 oz)   02/09/18 58.5 kg (129 lb)   01/30/18 54.4 kg (120 lb)              Today, you had the following     No orders found for display       Primary Care Provider Office Phone # Fax #    Georges Lebron [phone] [address] [facility] [address]  [place] [address]        Equal Access to Services     PINEDA [facility] Hospital[facility]KIA : Julio Reed, ismael rogers, abdoulaye griffiths, anabel fontenot . So [facility] [phone].    ATENCIÓN: Si habla español, tiene a kumar disposición servicios gratuitos de asistencia lingüística. Llame al [phone].    We comply with applicable federal civil rights laws and [place] laws. We do not discriminate on the basis of race, color, national origin, age, disability, sex, sexual orientation, or gender identity.            Thank you!     Thank you for choosing [facility]  for your care. Our goal is always to provide you with excellent care. Hearing back from our patients is one way we can continue to improve our services. Please take a few minutes to complete the written survey that you may receive in the mail after your visit with us. Thank you!             Your Updated Medication List - Protect others around you: Learn how to safely use, store and throw away your medicines at www.disposemymeds.org.          This list is accurate as of 5/23/18 11:59 PM.  Always use your most recent med list.                   Brand Name Dispense Instructions for use Diagnosis    ALLEGRA ALLERGY PO           AMBIEN CR PO      Take 12.5 mg by mouth At Bedtime        ATIVAN 1 MG tablet   Generic drug:  LORazepam     30 tablet    Take 1-2 tablets (1-2 mg) by mouth 4 times daily as needed for anxiety (up to 6 tablets per day)        " carboxymethylcellulose 0.5 % Soln ophthalmic solution    REFRESH PLUS     Place 1 drop into both eyes 3 times daily as needed for dry eyes        CENTRUM SILVER ADULT 50+ PO      Take 1 tablet by mouth daily        CLARITIN 10 MG tablet   Generic drug:  loratadine      Take 10 mg by mouth daily as needed        CYCLOBENZAPRINE HCL PO      Take 10 mg by mouth daily as needed for muscle spasms    Preop general physical exam       ESTRACE 1 MG tablet   Generic drug:  estradiol      Take 1 mg by mouth daily (with dinner) MUST BE  TEVA        Hydrocodone-Acetaminophen  MG Tabs      Take 2 tablets by mouth every 4 hours as needed        hydrocortisone 25 MG Suppository    ANUSOL-HC    14 suppository    Place 1 suppository (25 mg) rectally 2 times daily    External hemorrhoids       ipratropium 0.06 % spray    ATROVENT    45 mL    Spray 3 sprays into both nostrils daily    Chronic rhinitis, unspecified type       metoprolol succinate 25 MG 24 hr tablet    TOPROL XL    90 tablet    Take 1 tablet (25 mg) by mouth every evening    Palpitations       PAXIL CR PO      Take 25 mg by mouth 2 times daily NAME BRAND PAXIL CR        PROMETRIUM 100 MG capsule   Generic drug:  progesterone      Take 100 mg by mouth daily (with dinner)        * ZOLMitriptan 5 MG tablet    ZOMIG    12 tablet    TAKE ONE TABLET BY MOUTH AT ONSET OF HEADACHE FOR MIGRAINE. MAY REPEAT IN 2 HOURS. MAX OF 2 TABLETS IN 24 HOURS.    Intractable chronic migraine without aura and without status migrainosus       * ZOLMitriptan 2.5 MG tablet    ZOMIG    30 tablet    Take 1 tablet (2.5 mg) by mouth at onset of headache    Intractable chronic migraine without aura and without status migrainosus       * Notice:  This list has 2 medication(s) that are the same as other medications prescribed for you. Read the directions carefully, and ask your doctor or other care provider to review them with you.

## 2018-05-23 NOTE — NURSING NOTE
"Reason For Visit:   Chief Complaint   Patient presents with     Consult     Referred by Dr. Tong for bilateral SI joint pain, right SI pain is greater than left SI.       Primary MD: Georges Lebron  Ref. MD: Jae Tong    ?  No    Occupation: [employer], Volunteer at [employer].  Currently working? Yes.  Work status?  Part-time.    Date of injury: None  Type of injury: NA.    Date of surgery: None  Type of surgery: NA.    Smoker: No    Ht 1.676 m (5' 6\")  Wt 63.1 kg (139 lb 3.2 oz)  BMI 22.47 kg/m2    Pain Assessment  Patient Currently in Pain: Yes  0-10 Pain Scale: 4  Primary Pain Location:  (SI joint)  Pain Orientation: Left, Right (greater on right )  Pain Descriptors: Aching  Alleviating Factors: Heat, Pain medication  Aggravating Factors: Walking, Standing, Bending    Oswestry (WAQAR) Questionnaire    OSWESTRY DISABILITY INDEX 5/23/2018   Count 10   Sum 24   Oswestry Score (%) 48   Some recent data might be hidden              Visual Analog Pain Scale  Back Pain Scale 0-10: 2.5  Right leg pain: 0  Left leg pain: 0    Promis 10 Assessment    PROMIS 10 5/23/2018   In general, would you say your health is: Poor   In general, would you say your quality of life is: Poor   In general, how would you rate your physical health? Poor   In general, how would you rate your mental health, including your mood and your ability to think? Poor   In general, how would you rate your satisfaction with your social activities and relationships? Poor   In general, please rate how well you carry out your usual social activities and roles Poor   To what extent are you able to carry out your everyday physical activities such as walking, climbing stairs, carrying groceries, or moving a chair? A little   How often have you been bothered by emotional problems such as feeling anxious, depressed or irritable? Often   How would you rate your fatigue on average? Severe   How would you rate your pain on average?   0 = No Pain  " to  10 = Worst Imaginable Pain 9   In general, would you say your health is: 1   In general, would you say your quality of life is: 1   In general, how would you rate your physical health? 1   In general, how would you rate your mental health, including your mood and your ability to think? 1   In general, how would you rate your satisfaction with your social activities and relationships? 1   In general, please rate how well you carry out your usual social activities and roles. (This includes activities at home, at work and in your community, and responsibilities as a parent, child, spouse, employee, friend, etc.) 1   To what extent are you able to carry out your everyday physical activities such as walking, climbing stairs, carrying groceries, or moving a chair? 2   In the past 7 days, how often have you been bothered by emotional problems such as feeling anxious, depressed, or irritable? 4   In the past 7 days, how would you rate your fatigue on average? 4   In the past 7 days, how would you rate your pain on average, where 0 means no pain, and 10 means worst imaginable pain? 9   Global Mental Health Score 5   Global Physical Health Score 7   PROMIS TOTAL - SUBSCORES 12   Some recent data might be hidden              Lenore Leroy CMA  5/23/2018

## 2018-06-18 ASSESSMENT — ENCOUNTER SYMPTOMS
HEARTBURN: 1
POLYDIPSIA: 0
JAUNDICE: 0
CONSTIPATION: 1
SEIZURES: 0
LIGHT-HEADEDNESS: 1
POSTURAL DYSPNEA: 0
SPUTUM PRODUCTION: 0
JOINT SWELLING: 0
DECREASED APPETITE: 0
ORTHOPNEA: 0
MUSCLE CRAMPS: 1
NECK PAIN: 1
NERVOUS/ANXIOUS: 1
SINUS PAIN: 0
WEIGHT LOSS: 0
SPEECH CHANGE: 0
VOMITING: 0
PALPITATIONS: 1
POLYPHAGIA: 0
LOSS OF CONSCIOUSNESS: 0
INSOMNIA: 1
STIFFNESS: 1
EYE IRRITATION: 1
SORE THROAT: 0
INCREASED ENERGY: 1
TASTE DISTURBANCE: 0
HEADACHES: 1
HOARSE VOICE: 0
HYPERTENSION: 0
WHEEZING: 1
HALLUCINATIONS: 0
FATIGUE: 1
CHILLS: 1
SLEEP DISTURBANCES DUE TO BREATHING: 0
EYE WATERING: 1
SYNCOPE: 0
NUMBNESS: 1
RECTAL PAIN: 1
EYE PAIN: 0
HEMOPTYSIS: 0
BLOATING: 1
SINUS CONGESTION: 0
DIARRHEA: 0
PARALYSIS: 0
MEMORY LOSS: 0
TROUBLE SWALLOWING: 0
PANIC: 1
ARTHRALGIAS: 1
WEIGHT GAIN: 0
COUGH DISTURBING SLEEP: 1
DOUBLE VISION: 0
ALTERED TEMPERATURE REGULATION: 1
NECK MASS: 0
BOWEL INCONTINENCE: 0
FEVER: 0
BACK PAIN: 1
MUSCLE WEAKNESS: 1
DEPRESSION: 1
WEAKNESS: 0
EYE REDNESS: 0
DIZZINESS: 0
EXERCISE INTOLERANCE: 0
HYPOTENSION: 1
ABDOMINAL PAIN: 1
DECREASED CONCENTRATION: 1
TINGLING: 1
MYALGIAS: 0
SHORTNESS OF BREATH: 0
DYSPNEA ON EXERTION: 0
LEG PAIN: 0
SNORES LOUDLY: 0
SMELL DISTURBANCE: 0
NIGHT SWEATS: 1
TREMORS: 0
NAUSEA: 1
BLOOD IN STOOL: 0
COUGH: 1
DISTURBANCES IN COORDINATION: 0

## 2018-06-19 ENCOUNTER — OFFICE VISIT (OUTPATIENT)
Dept: ORTHOPEDICS | Facility: CLINIC | Age: 68
End: 2018-06-19
Payer: COMMERCIAL

## 2018-06-19 VITALS
SYSTOLIC BLOOD PRESSURE: 118 MMHG | HEIGHT: 66 IN | WEIGHT: 139 LBS | BODY MASS INDEX: 22.34 KG/M2 | OXYGEN SATURATION: 97 % | TEMPERATURE: 97.5 F | HEART RATE: 97 BPM | DIASTOLIC BLOOD PRESSURE: 86 MMHG

## 2018-06-19 DIAGNOSIS — G89.29 CHRONIC BILATERAL LOW BACK PAIN WITHOUT SCIATICA: Primary | ICD-10-CM

## 2018-06-19 DIAGNOSIS — M54.50 CHRONIC BILATERAL LOW BACK PAIN WITHOUT SCIATICA: Primary | ICD-10-CM

## 2018-06-19 NOTE — NURSING NOTE
Reason For Visit:   Chief Complaint   Patient presents with     Consult     Bilateral hip     Referred by Dr. Michael    ?  No  Occupation: Volunteer work  Currently working? No.  Work status?  Retired.  Date of injury: No  Type of injury: Chronic 25 years. Tingling into both upper and lower extremities.  Date of surgery: N/A  Type of surgery: N/A  Smoker: No  Request smoking cessation information: No    Pain Assessment  Patient Currently in Pain: Yes  0-10 Pain Scale: 5  Primary Pain Location: Hip  Pain Orientation: Right, Left

## 2018-06-19 NOTE — PROGRESS NOTES
"Chief Complaint:   Chief Complaint   Patient presents with     Consult     Bilateral hip       History of Present Illness:  Laurel is a 68 year old female who presents for evaluation of chronic bilateral buttock pain with occasional radiation into the left proximal thigh.  Symptoms have been present for at least 25 years.  Symptoms are worse with lying on her side and with sitting.  She has had multiple treatments including physical therapy, manual manipulation, steroid injections, and is followed in [facility] by Dr. Tad Orta. Recently she had a trial of autologous blood injections x 3 into the SI joints at [facility] without appreciable benefit.  She had a consult with Dr. Michael, but no surgical intervention was recommended.  He referred her here for further treatment recommendations.     Past Medical History:  Frequency of urination and polyuria  Anxiety  Colonic polyp  Diarrhea  Chronic urethritis  Palpitations  Chronic narcotic use  Intractable chronic migraine without aura and without status migrainosus   Recurrent major depressive disorder, in partial remission  Gastroesophageal reflux disease without esophagitis  Chronic low back pain    Social History:  Social History     Social History     Marital status:      Spouse name: N/A     Number of children: 1     Years of education: N/A     Occupational History      Self     Social History Main Topics     Smoking status: Former Smoker     Quit date: 10/29/1979     Smokeless tobacco: Never Used     Alcohol use No      Comment: minimal since on norco     Drug use: No     Sexual activity: Yes     Partners: Male     Other Topics Concern     Not on file     Social History Narrative       Family History:  Family History   Problem Relation Age of Onset     Diabetes Father            Physical Exam:  Blood pressure 118/86, pulse 97, temperature 97.5  F (36.4  C), temperature source Oral, height 1.676 m (5' 6\"), weight 63 kg (139 lb), SpO2 97 %, not " currently breastfeeding.  Oswestry (WAQAR) Questionnaire    OSWESTRY DISABILITY INDEX 5/23/2018   Count 10   Sum 24   Oswestry Score (%) 48   Some recent data might be hidden       General:  Alert, cooperative and in no acute distress.  Mood and affect are appropriate.   Cardiovascular: Posterior tibial pulses are palpable bilaterally.  Back: Lumbar flexion is full.  Lumbar extension is full.    Neuro: 2+ Achilles and patellar reflexes bilaterally.   Sensation is intact to light touch over the L3-S1 dermatomes bilaterally.  5/5 strength throughout the bilateral lower extremities.       Diagnostic Impression:  Encounter Diagnosis   Name Primary?     Chronic bilateral low back pain without sciatica Yes       Plan:  Discussed treatment options.  She has had multiple treatments without benefit.  She declined a trial of a piriformis injection at this time.  She asked about Pilates, which I agreed may be helpful.  Also recommended a trial of acupuncture which she will likely pursue.  She understands nothing is likely to completely alleviate her pain and she will need to live with some degree of symptoms.  Follow up as needed.       Review of Systems:  Answers for HPI/ROS submitted by the patient on 6/18/2018   General Symptoms: Yes  Skin Symptoms: No  HENT Symptoms: Yes  EYE SYMPTOMS: Yes  HEART SYMPTOMS: Yes  LUNG SYMPTOMS: Yes  INTESTINAL SYMPTOMS: Yes  URINARY SYMPTOMS: No  GYNECOLOGIC SYMPTOMS: No  BREAST SYMPTOMS: No  SKELETAL SYMPTOMS: Yes  BLOOD SYMPTOMS: No  NERVOUS SYSTEM SYMPTOMS: Yes  MENTAL HEALTH SYMPTOMS: Yes  Fever: No  Loss of appetite: No  Weight loss: No  Weight gain: No  Fatigue: Yes  Night sweats: Yes  Chills: Yes  Increased stress: Yes  Excessive hunger: No  Excessive thirst: No  Feeling hot or cold when others believe the temperature is normal: Yes  Loss of height: No  Post-operative complications: No  Surgical site pain: No  Hallucinations: No  Change in or Loss of Energy: Yes  Hyperactivity:  No  Confusion: Yes  Ear pain: Yes  Ear discharge: No  Hearing loss: No  Tinnitus: Yes  Nosebleeds: No  Congestion: No  Sinus pain: No  Trouble swallowing: No   Voice hoarseness: No  Mouth sores: No  Sore throat: No  Tooth pain: No  Gum tenderness: No  Bleeding gums: No  Change in taste: No  Change in sense of smell: No  Dry mouth: No  Hearing aid used: No  Neck lump: No  Eye pain: No  Vision loss: No  Dry eyes: Yes  Watery eyes: Yes  Eye bulging: No  Double vision: No  Flashing of lights: No  Spots: No  Floaters: No  Redness: No  Crossed eyes: No  Tunnel Vision: No  Yellowing of eyes: No  Eye irritation: Yes  Cough: Yes  Sputum or phlegm: No  Coughing up blood: No  Difficulty breating or shortness of breath: No  Snoring: No  Wheezing: Yes  Difficulty breathing on exertion: No  Nighttime Cough: Yes  Difficulty breathing when lying flat: No  Chest pain or pressure: Yes  Fast or irregular heartbeat: Yes  Pain in legs with walking: No  Trouble breathing while lying down: No  Fingers or toes appear blue: No  High blood pressure: No  Low blood pressure: Yes  Fainting: No  Murmurs: No  Pacemaker: No  Varicose veins: No  Edema or swelling: No  Wake up at night with shortness of breath: No  Light-headedness: Yes  Exercise intolerance: No  Heart burn or indigestion: Yes  Nausea: Yes  Vomiting: No  Abdominal pain: Yes  Bloating: Yes  Constipation: Yes  Diarrhea: No  Blood in stool: No  Black stools: No  Rectal or Anal pain: Yes  Fecal incontinence: No  Yellowing of skin or eyes: No  Vomit with blood: No  Change in stools: No  Back pain: Yes  Muscle aches: No  Neck pain: Yes  Swollen joints: No  Joint pain: Yes  Bone pain: No  Muscle cramps: Yes  Muscle weakness: Yes  Joint stiffness: Yes  Bone fracture: No  Trouble with coordination: No  Dizziness or trouble with balance: No  Fainting or black-out spells: No  Memory loss: No  Headache: Yes  Seizures: No  Speech problems: No  Tingling: Yes  Tremor: No  Weakness: No  Difficulty  walking: No  Paralysis: No  Numbness: Yes  Nervous or Anxious: Yes  Depression: Yes  Trouble sleeping: Yes  Trouble thinking or concentrating: Yes  Mood changes: Yes  Panic attacks: Yes

## 2018-06-19 NOTE — MR AVS SNAPSHOT
"              After Visit Summary   6/19/2018    Laurel Parra    MRN: [id number]           Patient Information     Date Of Birth          1950        Visit Information        Provider Department      6/19/2018 5:15 PM Jordan Ann [facility]        Today's Diagnoses     Bilateral low back pain without sciatica, unspecified chronicity    -  1      Care Instructions    Recommend pilates and a  trial of acupuncture.          Follow-ups after your visit        Additional Services     PHYSICAL THERAPY REFERRAL (External-Prints)       Physical Therapy Referral                  Who to contact     Please call your clinic at [phone] to:    Ask questions about your health    Make or cancel appointments    Discuss your medicines    Learn about your test results    Speak to your doctor            Additional Information About Your Visit        [employer] Information     [employer] gives you secure access to your electronic health record. If you see a primary care provider, you can also send messages to your care team and make appointments. If you have questions, please call your primary care clinic.  If you do not have a primary care provider, please call [phone] and they will assist you.      [employer] is an electronic gateway that provides easy, online access to your medical records. With [employer], you can request a clinic appointment, read your test results, renew a prescription or communicate with your care team.     To access your existing account, please contact your [facility] or call [phone] for assistance.        Care EveryWhere ID     This is your Care EveryWhere ID. This could be used by other organizations to access your [place] medical records  [id number]        Your Vitals Were     Pulse Temperature Height Pulse Oximetry BMI (Body Mass Index)       97 97.5  F (36.4  C) (Oral) 1.676 m (5' 6\") 97% 22.44 kg/m2        Blood Pressure " from Last 3 Encounters:   06/19/18 118/86   02/09/18 109/51   01/30/18 121/78    Weight from Last 3 Encounters:   06/19/18 63 kg (139 lb)   05/23/18 63.1 kg (139 lb 3.2 oz)   02/09/18 58.5 kg (129 lb)              We Performed the Following     PHYSICAL THERAPY REFERRAL (External-Prints)        Primary Care Provider Office Phone # Fax #    Georges Lebron [phone] [address] [facility] [address]  [facility] [address]        Equal Access to Services     [facility]: Hadii aad ku hadasho Socandice, waaxda luqadaha, qaybta kaalmada tunde, anabel fontenot . So [facility] [phone].    ATENCIÓN: Si habla español, tiene a kumar disposición servicios gratuitos de asistencia lingüística. [facility] [phone].    We comply with applicable federal civil rights laws and [place] laws. We do not discriminate on the basis of race, color, national origin, age, disability, sex, sexual orientation, or gender identity.            Thank you!     Thank you for choosing [facility]  for your care. Our goal is always to provide you with excellent care. Hearing back from our patients is one way we can continue to improve our services. Please take a few minutes to complete the written survey that you may receive in the mail after your visit with us. Thank you!             Your Updated Medication List - Protect others around you: Learn how to safely use, store and throw away your medicines at www.disposemymeds.org.          This list is accurate as of 6/19/18  6:00 PM.  Always use your most recent med list.                   Brand Name Dispense Instructions for use Diagnosis    ALLEGRA ALLERGY PO           AMBIEN CR PO      Take 12.5 mg by mouth At Bedtime        ATIVAN 1 MG tablet   Generic drug:  LORazepam     30 tablet    Take 1-2 tablets (1-2 mg) by mouth 4 times daily as needed for anxiety (up to 6 tablets per day)        carboxymethylcellulose 0.5 % Soln ophthalmic solution     REFRESH PLUS     Place 1 drop into both eyes 3 times daily as needed for dry eyes        CENTRUM SILVER ADULT 50+ PO      Take 1 tablet by mouth daily        CLARITIN 10 MG tablet   Generic drug:  loratadine      Take 10 mg by mouth daily as needed        CYCLOBENZAPRINE HCL PO      Take 10 mg by mouth daily as needed for muscle spasms    Preop general physical exam       ESTRACE 1 MG tablet   Generic drug:  estradiol      Take 1 mg by mouth daily (with dinner) MUST BE  TEVA        Hydrocodone-Acetaminophen  MG Tabs      Take 2 tablets by mouth every 4 hours as needed        hydrocortisone 25 MG Suppository    ANUSOL-HC    14 suppository    Place 1 suppository (25 mg) rectally 2 times daily    External hemorrhoids       ipratropium 0.06 % spray    ATROVENT    45 mL    Spray 3 sprays into both nostrils daily    Chronic rhinitis, unspecified type       metoprolol succinate 25 MG 24 hr tablet    TOPROL XL    90 tablet    Take 1 tablet (25 mg) by mouth every evening    Palpitations       PAXIL CR PO      Take 25 mg by mouth 2 times daily NAME BRAND PAXIL CR        PROMETRIUM 100 MG capsule   Generic drug:  progesterone      Take 100 mg by mouth daily (with dinner)        * ZOLMitriptan 5 MG tablet    ZOMIG    12 tablet    TAKE ONE TABLET BY MOUTH AT ONSET OF HEADACHE FOR MIGRAINE. MAY REPEAT IN 2 HOURS. MAX OF 2 TABLETS IN 24 HOURS.    Intractable chronic migraine without aura and without status migrainosus       * ZOLMitriptan 2.5 MG tablet    ZOMIG    30 tablet    Take 1 tablet (2.5 mg) by mouth at onset of headache    Intractable chronic migraine without aura and without status migrainosus       * Notice:  This list has 2 medication(s) that are the same as other medications prescribed for you. Read the directions carefully, and ask your doctor or other care provider to review them with you.

## 2018-06-25 ENCOUNTER — TRANSFERRED RECORDS (OUTPATIENT)
Dept: HEALTH INFORMATION MANAGEMENT | Facility: CLINIC | Age: 68
End: 2018-06-25

## 2018-07-03 ENCOUNTER — TRANSFERRED RECORDS (OUTPATIENT)
Dept: HEALTH INFORMATION MANAGEMENT | Facility: CLINIC | Age: 68
End: 2018-07-03

## 2018-07-12 ENCOUNTER — THERAPY VISIT (OUTPATIENT)
Dept: PHYSICAL THERAPY | Facility: CLINIC | Age: 68
End: 2018-07-12
Payer: COMMERCIAL

## 2018-07-12 DIAGNOSIS — M54.50 CHRONIC BILATERAL LOW BACK PAIN WITHOUT SCIATICA: ICD-10-CM

## 2018-07-12 DIAGNOSIS — G89.29 CHRONIC BILATERAL LOW BACK PAIN WITHOUT SCIATICA: ICD-10-CM

## 2018-07-12 PROCEDURE — 97161 PT EVAL LOW COMPLEX 20 MIN: CPT | Mod: GP | Performed by: PHYSICAL THERAPIST

## 2018-07-12 PROCEDURE — 97110 THERAPEUTIC EXERCISES: CPT | Mod: GP | Performed by: PHYSICAL THERAPIST

## 2018-07-12 NOTE — PROGRESS NOTES
[facility] Initial Evaluation  Subjective:  Laurel Parra is a 68 year old female.    Patient s chief complaints: low back pain and gluteal pain.    Condition occurred due to no specific cause known  Date of Onset: has had LBP/pelvic area pain for many years (20 years or so). MD referred to PT on 18  Location of symptoms is B LB and B gluteals .  Symptoms other than pain include: generally has tingling in all appendages but not related specifically to LB   Quality of pain is aching and frequency is constant.    Pain dependence on time of day is: worse both mornings and evenings.   Pain rating is: 6/10.    Symptoms are exacerbated by: sitting, laying down, standing, riding in a car.    Symptoms are relieved by:  Ice, pain medication, laying down, NM massage therapist.    Progression of symptoms is that symptoms are:  variable.  Imaging/Special tests include: x-ray/MRI L2-3 disc degeneration   Previous treatments include: adjustments Dr in past.   Patient reports that general health is: fair.   Pertinent medical history includes:  OA, heart problems, mental illness, depression, hand/swrist problems, headaches, post menopausal, pain at rest/night.    Medical allergies includes: penicillins.   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    HPI                    Objective:  LUMBAR:    Posture: fair  Posture Correction: no effect  Relevant Lateral Shift: no    Neurological:    Motor Deficit:  Myotomes L R   L1-2 (hip flexion) 5 5   L3 (knee  extension) 5 5   L4 (ankle DF) 5 NT in boot   L5 (g. toe ext) 5 NT in boot   S1 (ankle PF or knee flex) 5 NT in boot     Sensory Deficit, Reflexes: intact LE sensation (R foot NT as in cam boot for PF surgery)    Dural Signs:   L R   Slump negative negative   SLR negative negative     AROM: (Major, Moderate, Minimal or Nil loss)  Movement Loss Partha Mod Min Nil Pain   Flexion    X No effect   Extension   X  Pain B LB and gluteals   Side Gliding L   X  Pain R LB   Side Gliding R    X Pain R LB     Repeated movement testing:   (During: produces, abolishes, increases, decreases, no effect, centralizing, peripheralizing; After: better, worse, no better, no worse, no effect, centralized, peripheralized)    Pre-test Symptoms Standing: buttock pain B   Symptoms During Symptoms After ROM increased ROM decreased No Effect   FIS        Rep FIS No effect No effect   X   EIS        Rep EIS LBP at end range No worse   X   Pre-test Symptoms Lying: B LBP    Symptoms During Symptoms After ROM increased ROM decreased No Effect   EDVIN        Rep EDVIN No effect No effect   X   EIL        Rep EIL No tested (wearing wrist braces for elbow pain)         Static Tests: prone lying without pain, BATOOL produced discomfort LB, decreased with time.  Other Tests: palpation tenderness only over L5 spinous process; none over either PSIS    Provisional Classification: chronic LB/SIJ pain, pain only end range extension, will trial  Principle of Management: trial prone progression prone and BATOOL.      System    Physical Exam    General     ROS    Assessment/Plan:    Patient is a 68 year old female with lumbar and sacral complaints.    Patient has the following significant findings with corresponding treatment plan.                Diagnosis 1:  B low back/buttock pain    Pain -  hot/cold therapy, manual therapy and directional preference exercise  Decreased ROM/flexibility - manual therapy and therapeutic exercise  Decreased strength - therapeutic exercise  and therapeutic activities  Decreased proprioception - neuro re-education and therapeutic activities  Decreased function - therapeutic activities  Impaired posture - neuro re-education    Therapy Evaluation Codes:   1) History comprised of:   Personal factors that impact the plan of care:      Time since onset of symptoms.    Comorbidity factors that impact the plan of care are:      None.     Medications impacting care: None.  2) Examination of Body Systems comprised of:   Body structures and functions that impact the plan of care:      Ankle and Elbow.   Activity limitations that impact the plan of care are:      Sitting, Standing and Laying down.  3) Clinical presentation characteristics are:   Stable/Uncomplicated.  4) Decision-Making    Low complexity using standardized patient assessment instrument and/or measureable assessment of functional outcome.  Cumulative Therapy Evaluation is: Low complexity.    Previous and current functional limitations:  (See Goal Flow Sheet for this information)    Short term and Long term goals: (See Goal Flow Sheet for this information)     Communication ability:  Patient appears to be able to clearly communicate and understand verbal and written communication and follow directions correctly.  Treatment Explanation - The following has been discussed with the patient:   RX ordered/plan of care  Anticipated outcomes  Possible risks and side effects  This patient would benefit from PT intervention to resume normal activities.   Rehab potential is good.    Frequency:  1 X week, once daily  Duration:  for 6 weeks  Discharge Plan:  Achieve all LTG.  Independent in home treatment program.  Reach maximal therapeutic benefit.    Please refer to the daily flowsheet for treatment today, total treatment time and time spent performing 1:1 timed codes.

## 2018-07-12 NOTE — LETTER
[facility] ATHLETIC [facility] PHYSICAL [facility]  [address]  [facility] [address]  [phone]    2018    Re: Laurel Parra   :   1950  MRN:  [id number]   REFERRING PHYSICIAN:   Jordan Ann    [facility]THLETIC [facility] PHYSICAL [facility]    Date of Initial Evaluation: 2018  Visits:  Rxs Used: 1  Reason for Referral:  Chronic bilateral low back pain without sciatica    EVALUATION SUMMARY    [facility] Initial Evaluation  Subjective:  Laurel Parra is a 68 year old female.    Patient s chief complaints: low back pain and gluteal pain.    Condition occurred due to no specific cause known  Date of Onset: has had LBP/pelvic area pain for many years (20 years or so). MD referred to PT on 18  Location of symptoms is B LB and B gluteals .  Symptoms other than pain include: generally has tingling in all appendages but not related specifically to LB   Quality of pain is aching and frequency is constant.    Pain dependence on time of day is: worse both mornings and evenings.   Pain rating is: 6/10.    Symptoms are exacerbated by: sitting, laying down, standing, riding in a car.    Symptoms are relieved by:  Ice, pain medication, laying down, NM massage therapist.    Progression of symptoms is that symptoms are:  variable.  Imaging/Special tests include: x-ray/MRI L2-3 disc degeneration   Previous treatments include: adjustments Dr in past.   Patient reports that general health is: fair.   Pertinent medical history includes:  OA, heart problems, mental illness, depression, hand/swrist problems, headaches, post menopausal, pain at rest/night.    Medical allergies includes: penicillins.   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    Objective:  LUMBAR:      Re: Laurel Parra   Posture: fair  Posture Correction: no effect  Relevant Lateral Shift: no    Neurological:    Motor Deficit:  Myotomes L R   L1-2 (hip flexion) 5 5   L3 (knee extension) 5 5   L4 (ankle DF) 5 NT in boot   L5 (g. toe ext) 5 NT in boot   S1 (ankle PF or knee flex) 5 NT in boot     Sensory Deficit, Reflexes: intact LE sensation (R foot NT as in cam boot for PF surgery)    Dural Signs:   L R   Slump negative negative   SLR negative negative     AROM: (Major, Moderate, Minimal or Nil loss)  Movement Loss Partha Mod Min Nil Pain   Flexion    X No effect   Extension   X  Pain B LB and gluteals   Side Gliding L   X  Pain R LB   Side Gliding R    X Pain R LB     Repeated movement testing:   (During: produces, abolishes, increases, decreases, no effect, centralizing, peripheralizing; After: better, worse, no better, no worse, no effect, centralized, peripheralized)    Pre-test Symptoms Standing: buttock pain B   Symptoms During Symptoms After ROM increased ROM decreased No Effect   FIS        Rep FIS No effect No effect   X   EIS        Rep EIS LBP at end range No worse   X   Pre-test Symptoms Lying: B LBP    Symptoms During Symptoms After ROM increased ROM decreased No Effect   EDVIN        Rep EDVIN No effect No effect   X   EIL        Rep EIL No tested (wearing wrist braces for elbow pain)         Static Tests: prone lying without pain, BATOOL produced discomfort LB, decreased with time.  Other Tests: palpation tenderness only over L5 spinous process; none over either PSIS    Provisional Classification: chronic LB/SIJ pain, pain only end range extension, will trial  Principle of Management: trial prone  progression prone and BATOOL.          Re: Laurel Parra   Assessment/Plan:    Patient is a 68 year old female with lumbar and sacral complaints.    Patient has the following significant findings with corresponding treatment plan.                Diagnosis 1:  B low back/buttock pain    Pain -  hot/cold therapy, manual therapy and directional preference exercise  Decreased ROM/flexibility - manual therapy and therapeutic exercise  Decreased strength - therapeutic exercise and therapeutic activities  Decreased proprioception - neuro re-education and therapeutic activities  Decreased function - therapeutic activities  Impaired posture - neuro re-education    Therapy Evaluation Codes:   1) History comprised of:   Personal factors that impact the plan of care:      Time since onset of symptoms.    Comorbidity factors that impact the plan of care are:      None.     Medications impacting care: None.  2) Examination of Body Systems comprised of:   Body structures and functions that impact the plan of care:      Ankle and Elbow.   Activity limitations that impact the plan of care are:      Sitting, Standing and Laying down.  3) Clinical presentation characteristics are:   Stable/Uncomplicated.  4) Decision-Making    Low complexity using standardized patient assessment instrument and/or measureable assessment of functional outcome.  Cumulative Therapy Evaluation is: Low complexity.    Previous and current functional limitations:  (See Goal Flow Sheet for this information)    Short term and Long term goals: (See Goal Flow Sheet for this information)     Communication ability:  Patient appears to be able to clearly communicate and understand verbal and written communication and follow directions correctly.  Treatment Explanation - The following has been discussed with the patient:   RX ordered/plan of care  Anticipated outcomes  Possible risks and side effects  This patient would benefit from PT intervention to resume  normal activities.   Rehab potential is good.    Frequency:  1 X week, once daily  Duration:  for 6 weeks  Discharge Plan:  Achieve all LTG.  Independent in home treatment program.  Reach maximal therapeutic benefit.    Thank you for your referral.  INQUIRIES  Therapist: GENE LynT, SCS, Cert. MDT  [facility] PHYSICAL THERAPY  [address] [place], [place] [address] [address]  Phone: [phone]  Fax: [phone]

## 2018-07-23 ENCOUNTER — HOSPITAL ENCOUNTER (OUTPATIENT)
Dept: MAMMOGRAPHY | Facility: CLINIC | Age: 68
End: 2018-07-23
Attending: OBSTETRICS & GYNECOLOGY
Payer: COMMERCIAL

## 2018-07-23 ENCOUNTER — HOSPITAL ENCOUNTER (OUTPATIENT)
Dept: MAMMOGRAPHY | Facility: CLINIC | Age: 68
Discharge: HOME | End: 2018-07-23
Attending: OBSTETRICS & GYNECOLOGY | Admitting: OBSTETRICS & GYNECOLOGY
Payer: COMMERCIAL

## 2018-07-23 DIAGNOSIS — N63.10 LUMP OF RIGHT BREAST: ICD-10-CM

## 2018-07-23 PROCEDURE — G0279 TOMOSYNTHESIS, MAMMO: HCPCS

## 2018-07-23 PROCEDURE — 76642 ULTRASOUND BREAST LIMITED: CPT | Mod: RT

## 2018-08-01 DIAGNOSIS — R00.2 PALPITATIONS: ICD-10-CM

## 2018-08-02 RX ORDER — METOPROLOL SUCCINATE 25 MG/1
TABLET, EXTENDED RELEASE ORAL
Qty: 30 TABLET | Refills: 0 | Status: SHIPPED | OUTPATIENT
Start: 2018-08-02 | End: 2018-09-04

## 2018-08-02 NOTE — TELEPHONE ENCOUNTER
"Last Written Prescription Date:  1/30/18  Last Fill Quantity: 90,  # refills: 1   Last office visit: 1/30/2018 with prescribing provider:     Future Office Visit:    Requested Prescriptions   Pending Prescriptions Disp Refills     metoprolol succinate (TOPROL-XL) 25 MG 24 hr tablet [Pharmacy Med Name: METOPROLOL SUCCINATE ER 25MG TB24] 90 tablet 1     Sig: TAKE ONE TABLET BY MOUTH EVERY EVENING    Beta-Blockers Protocol Passed    8/1/2018  4:38 PM       Passed - Blood pressure under 140/90 in past 12 months    BP Readings from Last 3 Encounters:   06/19/18 118/86   02/09/18 109/51   01/30/18 121/78                Passed - Patient is age 6 or older       Passed - Recent (12 mo) or future (30 days) visit within the authorizing provider's specialty    Patient had office visit in the last 12 months or has a visit in the next 30 days with authorizing provider or within the authorizing provider's specialty.  See \"Patient Info\" tab in inbasket, or \"Choose Columns\" in Meds & Orders section of the refill encounter.              "

## 2018-08-02 NOTE — TELEPHONE ENCOUNTER
Medication is being filled for 1 time refill only due to:  Patient needs to be seen because due for physical/non-acute appt with PCP.   Maggie ANDERSEN RN

## 2018-08-03 ENCOUNTER — TRANSFERRED RECORDS (OUTPATIENT)
Dept: HEALTH INFORMATION MANAGEMENT | Facility: CLINIC | Age: 68
End: 2018-08-03

## 2018-08-13 ENCOUNTER — TRANSFERRED RECORDS (OUTPATIENT)
Dept: HEALTH INFORMATION MANAGEMENT | Facility: CLINIC | Age: 68
End: 2018-08-13

## 2018-08-14 ENCOUNTER — TRANSFERRED RECORDS (OUTPATIENT)
Dept: HEALTH INFORMATION MANAGEMENT | Facility: CLINIC | Age: 68
End: 2018-08-14

## 2018-09-04 ENCOUNTER — TELEPHONE (OUTPATIENT)
Dept: FAMILY MEDICINE | Facility: CLINIC | Age: 68
End: 2018-09-04

## 2018-09-04 DIAGNOSIS — R00.2 PALPITATIONS: ICD-10-CM

## 2018-09-04 NOTE — TELEPHONE ENCOUNTER
PA needed on Zomig 2.5mg TBDP  Pt insurance is [employer]  Insurance phone number is [phone]  Pt ID number is [id number]    Please let us know when PA is granted/denied. Thank you!

## 2018-09-04 NOTE — TELEPHONE ENCOUNTER
Sent Laurel a [employer] message. Her insurance still covers zolmitriptan however there is a quantity limit of 12 tablets per 25 days for triptans. Insurance tells me they see a paid claim for zolmitriptan 5 mg on 8/29/18 for 9 tablets.     I need to know if she is taking both strengths prn. I see a long history of 2.5 mg, but it looks like about 1 year ago the 5 mg strength was being prescribed. Her insurance will cover either strength, but again limits triptans to a max of 12 tablets per 25 days.    I will send a [employer] message to the patient to inquire further.    Andrew Hoskins, [facility]

## 2018-09-05 NOTE — TELEPHONE ENCOUNTER
"Metoprolol succinate 25 mg    Last Written Prescription Date:  08/02/18  Last Fill Quantity: 30 tablets,  # refills:   Last office visit: 1/30/2018 with prescribing provider:  Bere Esparza   Future Office Visit:      Requested Prescriptions   Pending Prescriptions Disp Refills     metoprolol succinate (TOPROL-XL) 25 MG 24 hr tablet [Pharmacy Med Name: METOPROLOL SUCCINATE ER 25MG TB24] 30 tablet 0     Sig: TAKE ONE TABLET BY MOUTH EVERY EVENING; ONE MONTH REFILL ONLY, DUE FOR PHYSICAL    Beta-Blockers Protocol Passed    9/4/2018 10:20 AM       Passed - Blood pressure under 140/90 in past 12 months    BP Readings from Last 3 Encounters:   06/19/18 118/86   02/09/18 109/51   01/30/18 121/78                Passed - Patient is age 6 or older       Passed - Recent (12 mo) or future (30 days) visit within the authorizing provider's specialty    Patient had office visit in the last 12 months or has a visit in the next 30 days with authorizing provider or within the authorizing provider's specialty.  See \"Patient Info\" tab in inbasket, or \"Choose Columns\" in Meds & Orders section of the refill encounter.              "

## 2018-09-06 NOTE — TELEPHONE ENCOUNTER
TC, patient already had missael refill in August. Could you please call to schedule Px and route back for refill    Pam Espinal RN- Triage [employer]

## 2018-09-12 ENCOUNTER — TELEPHONE (OUTPATIENT)
Dept: FAMILY MEDICINE | Facility: CLINIC | Age: 68
End: 2018-09-12

## 2018-09-12 ENCOUNTER — TRANSFERRED RECORDS (OUTPATIENT)
Dept: HEALTH INFORMATION MANAGEMENT | Facility: CLINIC | Age: 68
End: 2018-09-12

## 2018-09-12 RX ORDER — METOPROLOL SUCCINATE 25 MG/1
25 TABLET, EXTENDED RELEASE ORAL EVERY EVENING
Qty: 30 TABLET | Refills: 0 | Status: SHIPPED | OUTPATIENT
Start: 2018-09-12 | End: 2018-10-05

## 2018-09-12 NOTE — TELEPHONE ENCOUNTER
Reason for Call:  Medication or medication refill:    Do you use a [facility]?  Name of the pharmacy and phone number for the current request:       [facility] [place] [place] - [address]        Name of the medication requested:     metoprolol succinate (TOPROL-XL) 25 MG 24 hr tablet 30 tablet 0 8/2/2018  No   Sig: TAKE ONE TABLET BY MOUTH EVERY EVENING   Class: E-Prescribe   Notes to Pharmacy: 1 month refill only; patient due for appointment (physical)   Order: [id number]         Other request: Pt said that pharmacy did not get this, even though it is signed off on 09/04/2018.     Can we leave a detailed message on this number? YES    Phone number patient can be reached at: Home number on file [phone] (home)    Best Time: all     Call taken on 9/12/2018 at 10:14 AM by Juany Mills

## 2018-09-12 NOTE — TELEPHONE ENCOUNTER
Next 5 appointments (look out 90 days)     Oct 05, 2018 10:00 AM CDT   [facility]YS[facility] with Georges Lebron MD   [facility]    [address] [facility] [address]   [phone]                Medication is being filled for 1 time refill only due to:  Patient needs to be seen because due for OV .  Beatrice MILLER RN

## 2018-09-12 NOTE — TELEPHONE ENCOUNTER
See refill encounter     Medication is being filled for 1 time refill only due to:  Patient needs to be seen because due for annual exam.     Next 5 appointments (look out 90 days)     Oct 05, 2018 10:00 AM CDT   PHYSICAL with Georges Lebron MD   [facility]    [address] [facility] [address]   [phone]                  Beatrice MILLER RN

## 2018-09-25 DIAGNOSIS — G43.719 INTRACTABLE CHRONIC MIGRAINE WITHOUT AURA AND WITHOUT STATUS MIGRAINOSUS: ICD-10-CM

## 2018-09-26 NOTE — TELEPHONE ENCOUNTER
"Requested Prescriptions   Pending Prescriptions Disp Refills     ZOLMitriptan (ZOMIG) 5 MG tablet [Pharmacy Med Name: ZOLMITRIPTAN 5MG TABS]  Last Written Prescription Date:  12/05/2017  Last Fill Quantity: 12,  # refills: 6   Last office visit: 1/30/2018 with prescribing provider:   SORIN  Future Office Visit:   Next 5 appointments (look out 90 days)     Oct 05, 2018 10:00 AM CDT   [facility]    [address] [facility] [address]   [phone]                  12 tablet 6     Sig: TAKE ONE TABLET BY MOUTH AT ONSET OF HEADACHE FOR MIGRAINE. MAY REPEAT IN 2 HOURS. MAX OF 2 TABLETS IN 24 HOURS.    Serotonin Agonists Failed    9/25/2018 12:45 PM       Failed - Serotonin Agonist request needs review.    Please review patient's record. If patient has had 8 or more treatments in the past month, please forward to provider.         Passed - Blood pressure under 140/90 in past 12 months    BP Readings from Last 3 Encounters:   06/19/18 118/86   02/09/18 109/51   01/30/18 121/78                Passed - Recent (12 mo) or future (30 days) visit within the authorizing provider's specialty    Patient had office visit in the last 12 months or has a visit in the next 30 days with authorizing provider or within the authorizing provider's specialty.  See \"Patient Info\" tab in inbasket, or \"Choose Columns\" in Meds & Orders section of the refill encounter.           Passed - Patient is age 18 or older       Passed - No active pregnancy on record       Passed - No positive pregnancy test in past 12 months          "

## 2018-09-27 RX ORDER — ZOLMITRIPTAN 5 MG/1
TABLET, FILM COATED ORAL
Qty: 12 TABLET | Refills: 0 | Status: SHIPPED | OUTPATIENT
Start: 2018-09-27 | End: 2018-10-26

## 2018-09-27 NOTE — TELEPHONE ENCOUNTER
Prescription approved per [facility] Refill Protocol.  Maggie ANDERSEN RN

## 2018-10-05 DIAGNOSIS — R00.2 PALPITATIONS: ICD-10-CM

## 2018-10-08 RX ORDER — METOPROLOL SUCCINATE 25 MG/1
TABLET, EXTENDED RELEASE ORAL
Qty: 30 TABLET | Refills: 0 | Status: SHIPPED | OUTPATIENT
Start: 2018-10-08 | End: 2018-10-26

## 2018-10-08 NOTE — TELEPHONE ENCOUNTER
"Patient given 1 month supply 9/12  Cancelled her appointment with PCP for 10/5.  Rescheduled for 10/26  Raysa Maier RN    Requested Prescriptions   Signed Prescriptions Disp Refills     metoprolol succinate (TOPROL-XL) 25 MG 24 hr tablet 30 tablet 0     Sig: TAKE ONE TABLET BY MOUTH EVERY EVENING; PLAN TO REVIEW AND DISCUSS ADDITIONAL REFILLS AT UPCOMING VISIT    Beta-Blockers Protocol Passed    10/5/2018  2:21 PM       Passed - Blood pressure under 140/90 in past 12 months    BP Readings from Last 3 Encounters:   06/19/18 118/86   02/09/18 109/51   01/30/18 121/78                Passed - Patient is age 6 or older       Passed - Recent (12 mo) or future (30 days) visit within the authorizing provider's specialty    Patient had office visit in the last 12 months or has a visit in the next 30 days with authorizing provider or within the authorizing provider's specialty.  See \"Patient Info\" tab in inbasket, or \"Choose Columns\" in Meds & Orders section of the refill encounter.              "

## 2018-10-15 ENCOUNTER — TRANSFERRED RECORDS (OUTPATIENT)
Dept: HEALTH INFORMATION MANAGEMENT | Facility: CLINIC | Age: 68
End: 2018-10-15

## 2018-10-15 LAB — PHQ9 SCORE: 12

## 2018-10-26 ENCOUNTER — OFFICE VISIT (OUTPATIENT)
Dept: FAMILY MEDICINE | Facility: CLINIC | Age: 68
End: 2018-10-26
Payer: COMMERCIAL

## 2018-10-26 ENCOUNTER — TELEPHONE (OUTPATIENT)
Dept: FAMILY MEDICINE | Facility: CLINIC | Age: 68
End: 2018-10-26

## 2018-10-26 VITALS
OXYGEN SATURATION: 97 % | HEART RATE: 58 BPM | TEMPERATURE: 98.4 F | SYSTOLIC BLOOD PRESSURE: 114 MMHG | BODY MASS INDEX: 22.34 KG/M2 | DIASTOLIC BLOOD PRESSURE: 86 MMHG | WEIGHT: 139 LBS | HEIGHT: 66 IN

## 2018-10-26 DIAGNOSIS — R53.83 EXHAUSTION: ICD-10-CM

## 2018-10-26 DIAGNOSIS — K21.9 GASTROESOPHAGEAL REFLUX DISEASE WITHOUT ESOPHAGITIS: ICD-10-CM

## 2018-10-26 DIAGNOSIS — Z23 FLU VACCINE NEED: ICD-10-CM

## 2018-10-26 DIAGNOSIS — G43.719 INTRACTABLE CHRONIC MIGRAINE WITHOUT AURA AND WITHOUT STATUS MIGRAINOSUS: ICD-10-CM

## 2018-10-26 DIAGNOSIS — R25.2 LEG CRAMPS: ICD-10-CM

## 2018-10-26 DIAGNOSIS — K63.5 POLYP OF COLON, UNSPECIFIED PART OF COLON, UNSPECIFIED TYPE: ICD-10-CM

## 2018-10-26 DIAGNOSIS — Z00.00 ROUTINE GENERAL MEDICAL EXAMINATION AT A HEALTH CARE FACILITY: Primary | ICD-10-CM

## 2018-10-26 DIAGNOSIS — R00.2 PALPITATIONS: ICD-10-CM

## 2018-10-26 DIAGNOSIS — F33.41 RECURRENT MAJOR DEPRESSIVE DISORDER, IN PARTIAL REMISSION (H): ICD-10-CM

## 2018-10-26 DIAGNOSIS — F11.90 CHRONIC NARCOTIC USE: ICD-10-CM

## 2018-10-26 DIAGNOSIS — F41.9 ANXIETY: ICD-10-CM

## 2018-10-26 DIAGNOSIS — G89.29 CHRONIC LOW BACK PAIN, UNSPECIFIED BACK PAIN LATERALITY, WITH SCIATICA PRESENCE UNSPECIFIED: ICD-10-CM

## 2018-10-26 DIAGNOSIS — M54.5 CHRONIC LOW BACK PAIN, UNSPECIFIED BACK PAIN LATERALITY, WITH SCIATICA PRESENCE UNSPECIFIED: ICD-10-CM

## 2018-10-26 DIAGNOSIS — Z23 NEED FOR VACCINATION: ICD-10-CM

## 2018-10-26 PROBLEM — M54.50 CHRONIC BILATERAL LOW BACK PAIN WITHOUT SCIATICA: Status: RESOLVED | Noted: 2018-07-12 | Resolved: 2018-10-26

## 2018-10-26 PROBLEM — G89.18 POST-OPERATIVE PAIN: Status: RESOLVED | Noted: 2017-06-07 | Resolved: 2018-10-26

## 2018-10-26 LAB
ALBUMIN SERPL-MCNC: 3.7 G/DL (ref 3.4–5)
ALP SERPL-CCNC: 78 U/L (ref 40–150)
ALT SERPL W P-5'-P-CCNC: 22 U/L (ref 0–50)
ANION GAP SERPL CALCULATED.3IONS-SCNC: 11 MMOL/L (ref 3–14)
AST SERPL W P-5'-P-CCNC: 23 U/L (ref 0–45)
BILIRUB SERPL-MCNC: 0.3 MG/DL (ref 0.2–1.3)
BUN SERPL-MCNC: 8 MG/DL (ref 7–30)
CALCIUM SERPL-MCNC: 8.6 MG/DL (ref 8.5–10.1)
CHLORIDE SERPL-SCNC: 103 MMOL/L (ref 94–109)
CHOLEST SERPL-MCNC: 240 MG/DL
CO2 SERPL-SCNC: 23 MMOL/L (ref 20–32)
CREAT SERPL-MCNC: 0.73 MG/DL (ref 0.52–1.04)
ERYTHROCYTE [DISTWIDTH] IN BLOOD BY AUTOMATED COUNT: 12.4 % (ref 10–15)
GFR SERPL CREATININE-BSD FRML MDRD: 80 ML/MIN/1.7M2
GLUCOSE SERPL-MCNC: 93 MG/DL (ref 70–99)
HCT VFR BLD AUTO: 41 % (ref 35–47)
HDLC SERPL-MCNC: 64 MG/DL
HGB BLD-MCNC: 13.2 G/DL (ref 11.7–15.7)
LDLC SERPL CALC-MCNC: 143 MG/DL
MAGNESIUM SERPL-MCNC: 1.9 MG/DL (ref 1.6–2.3)
MCH RBC QN AUTO: 30.9 PG (ref 26.5–33)
MCHC RBC AUTO-ENTMCNC: 32.2 G/DL (ref 31.5–36.5)
MCV RBC AUTO: 96 FL (ref 78–100)
NONHDLC SERPL-MCNC: 176 MG/DL
PLATELET # BLD AUTO: 289 10E9/L (ref 150–450)
POTASSIUM SERPL-SCNC: 4.1 MMOL/L (ref 3.4–5.3)
PROT SERPL-MCNC: 6.9 G/DL (ref 6.8–8.8)
RBC # BLD AUTO: 4.27 10E12/L (ref 3.8–5.2)
SODIUM SERPL-SCNC: 137 MMOL/L (ref 133–144)
TRIGL SERPL-MCNC: 165 MG/DL
TSH SERPL DL<=0.005 MIU/L-ACNC: 1.82 MU/L (ref 0.4–4)
WBC # BLD AUTO: 5.8 10E9/L (ref 4–11)

## 2018-10-26 PROCEDURE — 90732 PPSV23 VACC 2 YRS+ SUBQ/IM: CPT | Performed by: INTERNAL MEDICINE

## 2018-10-26 PROCEDURE — 90471 IMMUNIZATION ADMIN: CPT | Performed by: INTERNAL MEDICINE

## 2018-10-26 PROCEDURE — 90662 IIV NO PRSV INCREASED AG IM: CPT | Performed by: INTERNAL MEDICINE

## 2018-10-26 PROCEDURE — 85027 COMPLETE CBC AUTOMATED: CPT | Performed by: INTERNAL MEDICINE

## 2018-10-26 PROCEDURE — 36415 COLL VENOUS BLD VENIPUNCTURE: CPT | Performed by: INTERNAL MEDICINE

## 2018-10-26 PROCEDURE — 90472 IMMUNIZATION ADMIN EACH ADD: CPT | Performed by: INTERNAL MEDICINE

## 2018-10-26 PROCEDURE — 80061 LIPID PANEL: CPT | Performed by: INTERNAL MEDICINE

## 2018-10-26 PROCEDURE — 99397 PER PM REEVAL EST PAT 65+ YR: CPT | Mod: 25 | Performed by: INTERNAL MEDICINE

## 2018-10-26 PROCEDURE — 83735 ASSAY OF MAGNESIUM: CPT | Performed by: INTERNAL MEDICINE

## 2018-10-26 PROCEDURE — 84443 ASSAY THYROID STIM HORMONE: CPT | Performed by: INTERNAL MEDICINE

## 2018-10-26 PROCEDURE — 80053 COMPREHEN METABOLIC PANEL: CPT | Performed by: INTERNAL MEDICINE

## 2018-10-26 RX ORDER — ZOLMITRIPTAN 5 MG/1
TABLET, FILM COATED ORAL
Qty: 12 TABLET | Refills: 11 | Status: SHIPPED | OUTPATIENT
Start: 2018-10-26 | End: 2019-11-04

## 2018-10-26 RX ORDER — ZOLMITRIPTAN 2.5 MG/1
2.5 TABLET, FILM COATED ORAL
Qty: 30 TABLET | Refills: 11 | Status: SHIPPED | OUTPATIENT
Start: 2018-10-26 | End: 2019-08-14 | Stop reason: ALTCHOICE

## 2018-10-26 RX ORDER — METOPROLOL SUCCINATE 25 MG/1
TABLET, EXTENDED RELEASE ORAL
Qty: 90 TABLET | Refills: 3 | Status: SHIPPED | OUTPATIENT
Start: 2018-10-26 | End: 2019-10-21

## 2018-10-26 NOTE — NURSING NOTE
Screening Questionnaire for Adult Immunization    Are you sick today?   No   Do you have allergies to medications, food, a vaccine component or latex?   No   Have you ever had a serious reaction after receiving a vaccination?   No   Do you have a long-term health problem with heart disease, lung disease, asthma, kidney disease, metabolic disease (e.g. diabetes), anemia, or other blood disorder?   No   Do you have cancer, leukemia, HIV/AIDS, or any other immune system problem?   No   In the past 3 months, have you taken medications that affect  your immune system, such as prednisone, other steroids, or anticancer drugs; drugs for the treatment of rheumatoid arthritis, Crohn s disease, or psoriasis; or have you had radiation treatments?   No   Have you had a seizure, or a brain or other nervous system problem?   No   During the past year, have you received a transfusion of blood or blood     products, or been given immune (gamma) globulin or antiviral drug?   No   For women: Are you pregnant or is there a chance you could become        pregnant during the next month?   No   Have you received any vaccinations in the past 4 weeks?   No     Immunization questionnaire answers were all negative.        Per orders of Dr. Lebron, injection of Pneumovax 23 and flu given by Ijeoma Felton. Patient instructed to remain in clinic for 15 minutes afterwards, and to report any adverse reaction to me immediately.       Screening performed by Ijeoma Felton on 10/26/2018 at 10:14 AM.  Prior to injection verified patient identity using patient's name and date of birth.  Due to injection administration, patient instructed to remain in clinic for 15 minutes  afterwards, and to report any adverse reaction to me immediately.

## 2018-10-26 NOTE — PATIENT INSTRUCTIONS
I would recommend getting the new shingles shot called shingrix, but I would do it at your pharmacy as they can check with the insurance company to see if it is paid for.          Preventive Health Recommendations    Female Ages 65 +    Yearly exam:     See your health care provider every year in order to  o Review health changes.   o Discuss preventive care.    o Review your medicines if your doctor has prescribed any.      You no longer need a yearly Pap test unless you've had an abnormal Pap test in the past 10 years. If you have vaginal symptoms, such as bleeding or discharge, be sure to talk with your provider about a Pap test.      Every 1 to 2 years, have a mammogram.  If you are over 69, talk with your health care provider about whether or not you want to continue having screening mammograms.      Every 10 years, have a colonoscopy. Or, have a yearly FIT test (stool test). These exams will check for colon cancer.       Have a cholesterol test every 5 years, or more often if your doctor advises it.       Have a diabetes test (fasting glucose) every three years. If you are at risk for diabetes, you should have this test more often.       At age 65, have a bone density scan (DEXA) to check for osteoporosis (brittle bone disease).    Shots:    Get a flu shot each year.    Get a tetanus shot every 10 years.    Talk to your doctor about your pneumonia vaccines. There are now two you should receive - Pneumovax (PPSV 23) and Prevnar (PCV 13).    Talk to your pharmacist about the shingles vaccine.    Talk to your doctor about the hepatitis B vaccine.    Nutrition:     Eat at least 5 servings of fruits and vegetables each day.      Eat whole-grain bread, whole-wheat pasta and brown rice instead of white grains and rice.      Get adequate Calcium and Vitamin D.     Lifestyle    Exercise at least 150 minutes a week (30 minutes a day, 5 days a week). This will help you control your weight and prevent disease.      Limit  alcohol to one drink per day.      No smoking.       Wear sunscreen to prevent skin cancer.       See your dentist twice a year for an exam and cleaning.      See your eye doctor every 1 to 2 years to screen for conditions such as glaucoma, macular degeneration and cataracts.

## 2018-10-26 NOTE — PROGRESS NOTES
SUBJECTIVE:   Laurel Parra is a 68 year old female who presents for Preventive Visit.    She is up-to-date with her gynecologist.  She sees the pain clinic regularly as well as her psychiatrist.  She has a history of depression and anxiety which she states is well controlled.  She has leg cramps at night for which she uses magnesium.    The patient feels exhausted much of the time but does have a lot of stress.  She sleeps fairly well and does not snore or have apnea.  She takes Ambien nightly and does not feel it is this.    She has chronic pain as noted above.  She has regular follow-up for this.    For years she has had a hemorrhoid that can become irritated.  This is not new or changed.  She has a skin lesion above her right eye that looks like a seborrheic keratosis.  She has had supraclavicular fullness for years without recent changes.  Occasional ringing in the left ear for years.  She is up-to-date in terms of her mammogram and gynecology.  She is not able to exercise due to a foot issue.               Past Medical History:      Past Medical History:   Diagnosis Date     Anxiety     Dr. Adelina Meehan     Chronic constipation      Chronic narcotic use     [facility]     Chronic pain     Dr. Orta as of 2015     Chronic urethritis     Dr. Little     Colonic polyp 11/2011    one polyp, fu 5 years, fu 10/17 and no lesions     Depression, major, in partial remission (H)     Dr. Meehan     Diarrhea 2012    eval by [facility], resolved with gluten free diet     DJD (degenerative joint disease)      H/O gastroesophageal reflux (GERD)      LBP (low back pain) 2013    Dr. Jae Tong in [place], then seeing [facility] Dr. Orta     Migraines 2009    neuro eval     Palpitations 2006    holter with pvc's and pac's, echo nl     Screening 2010    dexa nl at gyn             Past Surgical History:      Past Surgical History:   Procedure Laterality Date     APPENDECTOMY  2016      "ARTHROPLASTY CARPOMETACARPAL (THUMB JOINT) Left 2017    Procedure: ARTHROPLASTY CARPOMETACARPAL (THUMB JOINT);  REVISION  LEFT THUMB CARPOMETACARPAL ARTHROPOASTY WITH 1.1 TIGHT ROPE;  Surgeon: Beatrice Philippe MD;  Location: [facility]     CARPAL TUNNEL RELEASE RT/LT  2006      SECTION       COLONOSCOPY       EXCIS BARTHOLIN GLAND/CYST       HERNIORRHAPHY INGUINAL  70's    x5     OPERATIVE HYSTEROSCOPY WITH MORCELLATOR N/A 2018    Procedure: OPERATIVE HYSTEROSCOPY WITH MORCELLATOR (MARTIN & NEPHEW);  OPERATIVE HYSTEROSCOPY WITH TRUECLEAR MORCELLATOR 5C SCOPE ;  Surgeon: Iris Fernandez MD;  Location: [facility]     ORTHOPEDIC SURGERY Bilateral     SHOULDER ARTHROSCOPY     ORTHOPEDIC SURGERY Right 2017    right foot for plantar fasciitis     thumb surgery   and 2007             Social History:     Social History     Social History     Marital status:      Spouse name: N/A     Number of children: 1     Years of education: N/A     Occupational History      Self     Social History Main Topics     Smoking status: Former Smoker     Quit date: 10/29/1979     Smokeless tobacco: Never Used     Alcohol use No      Comment: minimal since on norco     Drug use: No     Sexual activity: Yes     Partners: Male     Other Topics Concern     Not on file     Social History Narrative             Family History:   reviewed         Allergies:     Allergies   Allergen Reactions     Compazine [Prochlorperazine] Other (See Comments)     \"crawl out of my skin\"     Gluten Meal Diarrhea     Penicillins Nausea and Vomiting and Hives             Medications:     Current Outpatient Prescriptions   Medication Sig Dispense Refill     carboxymethylcellulose (REFRESH PLUS) 0.5 % SOLN ophthalmic solution Place 1 drop into both eyes 3 times daily as needed for dry eyes       CYCLOBENZAPRINE HCL PO Take 10 mg by mouth daily as needed for muscle spasms       estradiol (ESTRACE) 1 MG tablet Take 1 mg by mouth daily (with " dinner) MUST BE  TEVGABRIEL       Fexofenadine HCl (ALLEGRA ALLERGY PO)        Hydrocodone-Acetaminophen  MG TABS Take 2 tablets by mouth every 4 hours as needed       hydrocortisone (ANUSOL-HC) 25 MG Suppository Place 1 suppository (25 mg) rectally 2 times daily 14 suppository 0     ipratropium (ATROVENT) 0.06 % spray Spray 3 sprays into both nostrils daily 45 mL 3     loratadine (CLARITIN) 10 MG tablet Take 10 mg by mouth daily as needed        LORazepam (ATIVAN) 1 MG tablet Take 1-2 tablets (1-2 mg) by mouth 4 times daily as needed for anxiety (up to 6 tablets per day) 30 tablet      metoprolol succinate (TOPROL-XL) 25 MG 24 hr tablet TAKE ONE TABLET BY MOUTH EVERY EVENING; PLAN TO REVIEW AND DISCUSS ADDITIONAL REFILLS AT UPCOMING VISIT 90 tablet 3     Multiple Vitamins-Minerals (CENTRUM SILVER ADULT 50+ PO) Take 1 tablet by mouth daily       PARoxetine HCl (PAXIL CR PO) Take 25 mg by mouth 2 times daily NAME BRAND PAXIL CR       PROgesterone (PROMETRIUM) 100 MG capsule Take 100 mg by mouth daily (with dinner)        ZOLMitriptan (ZOMIG) 2.5 MG tablet Take 1 tablet (2.5 mg) by mouth at onset of headache 30 tablet 11     ZOLMitriptan (ZOMIG) 5 MG tablet TAKE ONE TABLET BY MOUTH AT ONSET OF HEADACHE FOR MIGRAINE. MAY REPEAT IN 2 HOURS. MAX OF 2 TABLETS IN 24 HOURS. 12 tablet 11     Zolpidem Tartrate (AMBIEN CR PO) Take 12.5 mg by mouth At Bedtime       [DISCONTINUED] metoprolol succinate (TOPROL-XL) 25 MG 24 hr tablet TAKE ONE TABLET BY MOUTH EVERY EVENING; PLAN TO REVIEW AND DISCUSS ADDITIONAL REFILLS AT UPCOMING VISIT 30 tablet 0     [DISCONTINUED] ZOLMitriptan (ZOMIG) 2.5 MG tablet Take 1 tablet (2.5 mg) by mouth at onset of headache 30 tablet 1     [DISCONTINUED] ZOLMitriptan (ZOMIG) 5 MG tablet TAKE ONE TABLET BY MOUTH AT ONSET OF HEADACHE FOR MIGRAINE. MAY REPEAT IN 2 HOURS. MAX OF 2 TABLETS IN 24 HOURS. 12 tablet 0               Review of Systems:   The 10 point Review of Systems is negative  "other than noted in the HPI           Physical Exam:   Blood pressure 114/86, pulse 58, temperature 98.4  F (36.9  C), temperature source Oral, height 5' 6\" (1.676 m), weight 139 lb (63 kg), SpO2 97 %, not currently breastfeeding.    Exam:  Constitutional: healthy appearing, alert and in no distress  Heent: Normocephalic. Head without obvious masses or lesions. PERRLDC, EOMI. Mouth exam within normal limits: tongue, mucous membranes, posterior pharynx all normal, no lesions or abnormalities seen.  Tm's and canals within normal limits bilaterally. Neck supple, no nuchal rigidity or masses. No supraclavicular, or cervical adenopathy. Thyroid symmetric, no masses.  Cardiovascular: Regular rate and rhythm, no murmer, rub or gallops.  JVP not elevated, no edema.  Carotids within normal limits bilaterally, no bruits.  Respiratory: Normal respiratory effort.  Lungs clear, normal flow, no wheezing or crackles.  Gastrointestinal: Normal active bowel sounds.   Soft, not tender, no masses, guarding or rebound.  No hepatosplenomegaly.   Musculoskeletal: extremities normal, no gross deformities noted.  Skin: no suspicious lesions or rashes   Neurologic: Mental status within normal limits.  Speech fluent.  No gross motor abnormalities and gait intact.  Psychiatric: mentation appears normal and affect normal.         Data:   Labs sent        Assessment:   1. Normal complete physical exam  2. Anxiety and depression, controlled  3. Leg cramps, no pvd, follow up labs  4. Exhaustion, suspect due to mmp, stress, check labs, doubt bette  5. Chronic migraines, stable  6. Chronic pain, follow up pain clinic  7. Colon polyp, up to date on follow up  8. Gerd, no issues  9. hcm         Plan:   Pneumovax, flu shot, shingrix at pharm  Up to date colon, mammogram  Exercise when able  Letter with labs      Georges Lebron M.D.                Are you in the first 12 months of your Medicare Part B coverage?  No    Healthy Habits:    Do you get at " least three servings of calcium containing foods daily (dairy, green leafy vegetables, etc.)? yes    Amount of exercise or daily activities, outside of work: 7 day(s) per week    Problems taking medications regularly No    Medication side effects: No    Have you had an eye exam in the past two years? no    Do you see a dentist twice per year? yes    Do you have sleep apnea, excessive snoring or daytime drowsiness?yes      Ability to successfully perform activities of daily living: Yes, no assistance needed    Home safety:  none identified     Hearing impairment: No    Fall risk:           COGNITIVE SCREEN  1) Repeat 3 items (Leader, Season, Table)    2) Clock draw:   3) 3 item recall: Recalls 3 objects  Results: 3 items recalled: COGNITIVE IMPAIRMENT LESS LIKELY    Mini-CogTM Copyright S Juan. Licensed by the author for use in [facility] Kick[employer]; reprinted with permission (dheeraj@[email or web address]). All rights reserved.                Reviewed and updated as needed this visit by clinical staff         Reviewed and updated as needed this visit by Provider        Social History   Substance Use Topics     Smoking status: Former Smoker     Quit date: 10/29/1979     Smokeless tobacco: Never Used     Alcohol use No      Comment: minimal since on norco       If you drink alcohol do you typically have >3 drinks per day or >7 drinks per week? No                        Today's PHQ-2 Score:   PHQ-2 ( 1999 Pfizer) 1/30/2018 10/26/2017   Q1: Little interest or pleasure in doing things 0 0   Q2: Feeling down, depressed or hopeless 1 0   PHQ-2 Score 1 0       Do you feel safe in your environment - Yes    Do you have a Health Care Directive?: Yes: Patient states has Advance Directive and will bring in a copy to clinic.    Current providers sharing in care for this patient include:   Patient Care Team:  Georges Lebron MD as PCP - General (Internal Medicine)    The following health maintenance items are reviewed in Epic and correct  "as of today:  Health Maintenance   Topic Date Due     URINE DRUG SCREEN Q1 YR  06/02/1967     ALEXA QUESTIONNAIRE 1 YEAR  06/02/1970     DEXA SCAN SCREENING (SYSTEM ASSIGNED)  06/02/2017     ADVANCE DIRECTIVE PLANNING Q5 YRS  07/03/2017     INFLUENZA VACCINE (1) 09/01/2018     FALL RISK ASSESSMENT  10/26/2018     PNEUMOCOCCAL (2 of 2 - PPSV23) 10/26/2018     PHQ-9 Q1YR  01/30/2019     MAMMO SCREEN Q2 YR (SYSTEM ASSIGNED)  07/23/2020     LIPID SCREEN Q5 YR FEMALE (SYSTEM ASSIGNED)  12/15/2021     TETANUS IMMUNIZATION (SYSTEM ASSIGNED)  10/03/2023     COLON CANCER SCREEN (SYSTEM ASSIGNED)  10/16/2027     HEPATITIS C SCREENING  Completed             ROS:      OBJECTIVE:   There were no vitals taken for this visit. Estimated body mass index is 22.44 kg/(m^2) as calculated from the following:    Height as of 6/19/18: 5' 6\" (1.676 m).    Weight as of 6/19/18: 139 lb (63 kg).  EXAM:           ASSESSMENT / PLAN:       End of Life Planning:  Patient currently has an advanced directive: none, I rec it    COUNSELING:  Reviewed preventive health counseling, as reflected in patient instructions       Regular exercise       Healthy diet/nutrition    BP Readings from Last 1 Encounters:   06/19/18 118/86     Estimated body mass index is 22.44 kg/(m^2) as calculated from the following:    Height as of 6/19/18: 5' 6\" (1.676 m).    Weight as of 6/19/18: 139 lb (63 kg).           reports that she quit smoking about 39 years ago. She has never used smokeless tobacco.      Appropriate preventive services were discussed with this patient, including applicable screening as appropriate for cardiovascular disease, diabetes, osteopenia/osteoporosis, and glaucoma.  As appropriate for age/gender, discussed screening for colorectal cancer, prostate cancer, breast cancer, and cervical cancer. Checklist reviewing preventive services available has been given to the patient.    Reviewed patients plan of care and provided an AVS. The Basic Care Plan " (routine screening as documented in Health Maintenance) for Laurel meets the Care Plan requirement. This Care Plan has been established and reviewed with the Patient.    Counseling Resources:  ATP IV Guidelines  Pooled Cohorts Equation Calculator  Breast Cancer Risk Calculator  FRAX Risk Assessment  ICSI Preventive Guidelines  Dietary Guidelines for Americans, 2010  USDA's MyPlate  ASA Prophylaxis  Lung CA Screening    Georges Lebron MD  [facility]

## 2018-10-26 NOTE — LETTER
[facility]  [address] [facility]  [address]  [place]  [address]  Tel: [phone]    October 29, 2018    Laurel Parra  [address] [facility] [address]        Dear Ms. Parra,    It was a pleasure seeing you for your physical examination.  I wanted to get back to you with your test results.  I have enclosed a copy for your review.      I am happy to report that your cbc or complete blood count is normal with no signs of anemia, leukemia or platelet abnormalities. Your chemistry panel shows no signs of diabetes.  Your blood salts, kidney tests, liver tests, thyroid test, magnesium level, and proteins are all fine.    Your total cholesterol is 240 with the normal range being below 200.  Your HDL or good cholesterol is 64 with the normal range being above 50.  Your LDL or bad cholesterol is 143 with the normal range being below 130.  These number are higher then last year so please get back to exercise when you can.    I am happy to bring you this overall excellent report.  If you have any questions please call me.    Sincerely,    Georges Lebron MD/YAHAIRA          Enclosure: Lab Results  Results for orders placed or performed in visit on 10/26/18   Magnesium   Result Value Ref Range    Magnesium 1.9 1.6 - 2.3 mg/dL   CBC with platelets   Result Value Ref Range    WBC 5.8 4.0 - 11.0 10e9/L    RBC Count 4.27 3.8 - 5.2 10e12/L    Hemoglobin 13.2 11.7 - 15.7 g/dL    Hematocrit 41.0 35.0 - 47.0 %    MCV 96 78 - 100 fl    MCH 30.9 26.5 - 33.0 pg    MCHC 32.2 31.5 - 36.5 g/dL    RDW 12.4 10.0 - 15.0 %    Platelet Count 289 150 - 450 10e9/L   Comprehensive metabolic panel   Result Value Ref Range    Sodium 137 133 - 144 mmol/L    Potassium 4.1 3.4 - 5.3 mmol/L    Chloride 103 94 - 109 mmol/L    Carbon Dioxide 23 20 - 32 mmol/L    Anion Gap 11 3 - 14 mmol/L    Glucose 93 70 - 99 mg/dL    Urea Nitrogen 8 7 - 30 mg/dL    Creatinine 0.73 0.52 - 1.04 mg/dL    GFR Estimate 80 >60 mL/min/1.7m2     GFR Estimate If Black >90 >60 mL/min/1.7m2    Calcium 8.6 8.5 - 10.1 mg/dL    Bilirubin Total 0.3 0.2 - 1.3 mg/dL    Albumin 3.7 3.4 - 5.0 g/dL    Protein Total 6.9 6.8 - 8.8 g/dL    Alkaline Phosphatase 78 40 - 150 U/L    ALT 22 0 - 50 U/L    AST 23 0 - 45 U/L   Lipid panel reflex to direct LDL Non-fasting   Result Value Ref Range    Cholesterol 240 (H) <200 mg/dL    Triglycerides 165 (H) <150 mg/dL    HDL Cholesterol 64 >49 mg/dL    LDL Cholesterol Calculated 143 (H) <100 mg/dL    Non HDL Cholesterol 176 (H) <130 mg/dL   TSH with free T4 reflex   Result Value Ref Range    TSH 1.82 0.40 - 4.00 mU/L

## 2018-10-26 NOTE — TELEPHONE ENCOUNTER
PA needed on Zolmitriptan 2.5mg tabs  Pt insurance is [employer]  Insurance phone number is [phone]  Pt ID number is [id number]    PA needed on Zolmitriptan 5mg  Pt insurance is [employer]  Insurance phone number is [phone]  Pt ID number is [id number]    Please let us know when PA is granted/denied. Thank you!

## 2018-10-28 ENCOUNTER — NURSE TRIAGE (OUTPATIENT)
Dept: NURSING | Facility: CLINIC | Age: 68
End: 2018-10-28

## 2018-10-28 NOTE — PROGRESS NOTES
It was a pleasure seeing you for your physical examination.  I wanted to get back to you with your test results.  I have enclosed a copy for your review.      I am happy to report that your cbc or complete blood count is normal with no signs of anemia, leukemia or platelet abnormalities. Your chemistry panel shows no signs of diabetes.  Your blood salts, kidney tests, liver tests, thyroid test, magnesium level, and proteins are all fine.    Your total cholesterol is 240 with the normal range being below 200.  Your HDL or good cholesterol is 64 with the normal range being above 50.  Your LDL or bad cholesterol is 143 with the normal range being below 130.  These number are higher then last year so please get back to exercise when you can.    I am happy to bring you this overall excellent report.  If you have any questions please call me.    Georges Lebron M.D.

## 2018-10-28 NOTE — TELEPHONE ENCOUNTER
Patient calling reporting she was vaccinated yesterday. States vaccination site is painful. Reporting having a fever of 103.7 last night and shaking chills that lasted more than 30 minutes. Has been taking Tylenol every 4 hours for the fever. This morning's temperature is 100.7 with tylenol. Complaining of a headache. Advised patient to be seen at emergency department. Caller verbalized understanding. Denies further questions.      Yogesh Washington RN  [facility].     Reason for Disposition    [1] Fever > 101 F (38.3 C) AND [2] age > 60    Additional Information    Negative: [1] Difficulty with breathing or swallowing AND [2] starts within 2 hours after injection    Negative: Difficult to awaken or acting confused (disoriented, slurred speech)    Negative: Unresponsive, passed out, or very weak    Negative: Sounds like a life-threatening emergency to the triager    Negative: Fever > 104 F (40 C)    Protocols used: IMMUNIZATION REACTIONS-ADULT-AH

## 2018-10-29 NOTE — TELEPHONE ENCOUNTER
Prior Authorization Retail Medication Request    Medication/Dose: Zolmitriptan 2.5 mg  ICD code (if different than what is on RX):  G43.719  Previously Tried and Failed:  None  Rationale:  History of migraines with use of zomig only as needed for relief    Insurance Name:  [facility]  Insurance ID:  [id number]      Pharmacy Information (if different than what is on RX)  Name:  [facility]  Phone:  [phone]

## 2018-10-29 NOTE — TELEPHONE ENCOUNTER
CENTRAL PRIOR AUTHORIZATION  [phone]    PA Initiation    Medication: Zolmitriptan 2.5 mg-INITIATED   Insurance Company: [employer] - Phone [phone] Fax [phone]  Pharmacy Filling the Rx: [facility] - [address]  Filling Pharmacy Phone: [phone]  Filling Pharmacy Fax:    Start Date: 10/29/2018    '

## 2018-10-30 NOTE — TELEPHONE ENCOUNTER
PRIOR AUTHORIZATION DENIED    Medication: Zolmitriptan 2.5 mg--DENIED     Denial Date: 10/29/2018    Denial Rational: THE REQUEST IS WAS DENIED BECAUSE YOU HAVE REQUESTED MORE THAN THE MAXIMUM QUANTITY ALLOWED BY YOUR PLAN. CURRENT AMERGE, IMITREX, MAXALT, ZOMIG POST LIMIT CRITERIA COVER AN AMOUNT FOR TREATING EIGHT HEADACHES PER MONTH AT A MAXIMUM DAILY DOSE UP TO 18 TABLETS/MONTH (OR QUANTITY OF 5475 PERIOD) OF ZOMIG/ZOMIG-ZMT TABLETS (ZOLMITRIPTAN) YOU HAVE BEEN APPROVED FOR THE MAXIMUM QUANTITY THAT YOU PLAN COVERS. YOUR REQUEST FOR ADDITIONAL QUANTIES OF THE REQUESTED DRUG AND STRENGTH HAS BEEN DENIED.          Appeal Information: IF YOU WANT TO APPEAL THIS DENIAL, PLEASE PROVIDE A LETTER OF MEDICAL NECESSITY TO THE PA TEAM AND NOTIFY ONCE IT HAS BEEN PLACED IN THE PATIENT CHART.  PLEASE INCLUDE ANY CLINICAL INFORMATION YOU WANT INCLUDED IN THE PATIENT'S APPEAL.

## 2018-11-07 ENCOUNTER — TELEPHONE (OUTPATIENT)
Dept: FAMILY MEDICINE | Facility: CLINIC | Age: 68
End: 2018-11-07

## 2018-11-07 NOTE — TELEPHONE ENCOUNTER
"Headache  Swollen/ painful lymph nodes in neck  Running nose  Nasal drainage is clear  Denies chest pain  Denies SOB  Reports GI upset- \"stomach aches and gassy\"  Denies rash  Denies any localized symptoms to injection sites  Symptoms lasting since immunizations (10/26)    Advised to stay hydrated and take Tylenol. Team appt scheduled for tomorrow.    Edgar MIR RN  "

## 2018-11-07 NOTE — TELEPHONE ENCOUNTER
Reason for call:  Patient reporting a symptom    Symptom or request: Chills, Lymph nodes swollen in glands hurt in neck, face hurts  Not sure about a fever cause she's on Tylenol for something else     Duration (how long have symptoms been present): she got a Flu shot on 10-26 and  Pneumonia shot and started feeling icky     Have you been treated for this before? No    Additional comments: call her to discuss     [facility] [place], [place] - [address]      Phone Number patient can be reached at:  Home number on file [phone] (home)    Best Time:  anytime    Can we leave a detailed message on this number:  YES    Call taken on 11/7/2018 at 1:03 PM by Simon Beverly

## 2018-11-08 ENCOUNTER — OFFICE VISIT (OUTPATIENT)
Dept: FAMILY MEDICINE | Facility: CLINIC | Age: 68
End: 2018-11-08
Payer: COMMERCIAL

## 2018-11-08 VITALS
WEIGHT: 139 LBS | HEART RATE: 64 BPM | BODY MASS INDEX: 22.34 KG/M2 | HEIGHT: 66 IN | DIASTOLIC BLOOD PRESSURE: 78 MMHG | SYSTOLIC BLOOD PRESSURE: 110 MMHG | OXYGEN SATURATION: 96 % | TEMPERATURE: 98.7 F

## 2018-11-08 DIAGNOSIS — J06.9 VIRAL URI: ICD-10-CM

## 2018-11-08 PROCEDURE — 99213 OFFICE O/P EST LOW 20 MIN: CPT | Performed by: NURSE PRACTITIONER

## 2018-11-08 ASSESSMENT — ANXIETY QUESTIONNAIRES
1. FEELING NERVOUS, ANXIOUS, OR ON EDGE: MORE THAN HALF THE DAYS
GAD7 TOTAL SCORE: 5
2. NOT BEING ABLE TO STOP OR CONTROL WORRYING: SEVERAL DAYS
7. FEELING AFRAID AS IF SOMETHING AWFUL MIGHT HAPPEN: NOT AT ALL
3. WORRYING TOO MUCH ABOUT DIFFERENT THINGS: SEVERAL DAYS
6. BECOMING EASILY ANNOYED OR IRRITABLE: NOT AT ALL
5. BEING SO RESTLESS THAT IT IS HARD TO SIT STILL: NOT AT ALL

## 2018-11-08 ASSESSMENT — PATIENT HEALTH QUESTIONNAIRE - PHQ9
5. POOR APPETITE OR OVEREATING: SEVERAL DAYS
SUM OF ALL RESPONSES TO PHQ QUESTIONS 1-9: 9

## 2018-11-08 NOTE — MR AVS SNAPSHOT
"              After Visit Summary   11/8/2018    Laurel Parra    MRN: [id number]           Patient Information     Date Of Birth          1950        Visit Information        Provider Department      11/8/2018 10:00 AM Bere Esparza APRN CNP [facility]        Today's Diagnoses     Viral URI           Follow-ups after your visit        Who to contact     If you have questions or need follow up information about today's clinic visit or your schedule please contact [facility] directly at [phone].  Normal or non-critical lab and imaging results will be communicated to you by Diffonhart, letter or phone within 4 business days after the clinic has received the results. If you do not hear from us within 7 days, please contact the clinic through DealDasht or phone. If you have a critical or abnormal lab result, we will notify you by phone as soon as possible.  Submit refill requests through WooWho or call your pharmacy and they will forward the refill request to us. Please allow 3 business days for your refill to be completed.          Additional Information About Your Visit        MyChart Information     WooWho gives you secure access to your electronic health record. If you see a primary care provider, you can also send messages to your care team and make appointments. If you have questions, please call your primary care clinic.  If you do not have a primary care provider, please call [phone] and they will assist you.        Care EveryWhere ID     This is your Care EveryWhere ID. This could be used by other organizations to access your [facility] medical records  [id number]        Your Vitals Were     Pulse Temperature Height Pulse Oximetry BMI (Body Mass Index)       64 98.7  F (37.1  C) (Oral) 5' 6\" (1.676 m) 96% 22.44 kg/m2        Blood Pressure from Last 3 Encounters:   11/08/18 110/78   10/26/18 114/86   06/19/18 118/86    Weight from Last 3 Encounters: "   11/08/18 139 lb (63 kg)   10/26/18 139 lb (63 kg)   06/19/18 139 lb (63 kg)              Today, you had the following     No orders found for display       Primary Care Provider Office Phone # Fax #    Georges Lebron [phone] [address] [facility] [address]  [facility] [address]        Equal Access to Services     PINEDA [facility] : Hadii aad ku hadasho Soomaali, waaxda luqadaha, qaybta kaalmada adeegyada, waxay idiin hayaan adeeg kharash la'aan ah. So [facility] [phone].    ATENCIÓN: Si fernando plata, tiene a kumar disposición servicios gratuitos de asistencia lingüística. Llame al [phone].    We comply with applicable federal civil rights laws and [place] laws. We do not discriminate on the basis of race, color, national origin, age, disability, sex, sexual orientation, or gender identity.            Thank you!     Thank you for choosing [facility]  for your care. Our goal is always to provide you with excellent care. Hearing back from our patients is one way we can continue to improve our services. Please take a few minutes to complete the written survey that you may receive in the mail after your visit with us. Thank you!             Your Updated Medication List - Protect others around you: Learn how to safely use, store and throw away your medicines at www.disposemymeds.org.          This list is accurate as of 11/8/18 11:33 AM.  Always use your most recent med list.                   Brand Name Dispense Instructions for use Diagnosis    ALLEGRA ALLERGY PO           AMBIEN CR PO      Take 12.5 mg by mouth At Bedtime        ATIVAN 1 MG tablet   Generic drug:  LORazepam     30 tablet    Take 1-2 tablets (1-2 mg) by mouth 4 times daily as needed for anxiety (up to 6 tablets per day)        carboxymethylcellulose 0.5 % Soln ophthalmic solution    REFRESH PLUS     Place 1 drop into both eyes 3 times daily as needed for dry eyes        CENTRUM SILVER ADULT 50+ PO      Take 1 tablet by  mouth daily        CLARITIN 10 MG tablet   Generic drug:  loratadine      Take 10 mg by mouth daily as needed        CYCLOBENZAPRINE HCL PO      Take 10 mg by mouth daily as needed for muscle spasms    Preop general physical exam       ESTRACE 1 MG tablet   Generic drug:  estradiol      Take 0.5 mg by mouth daily (with dinner) MUST BE  TEVA        Hydrocodone-Acetaminophen  MG Tabs      Take 2 tablets by mouth every 4 hours as needed        hydrocortisone 25 MG Suppository    ANUSOL-HC    14 suppository    Place 1 suppository (25 mg) rectally 2 times daily    External hemorrhoids       ipratropium 0.06 % spray    ATROVENT    45 mL    Spray 3 sprays into both nostrils daily    Chronic rhinitis, unspecified type       metoprolol succinate 25 MG 24 hr tablet    TOPROL-XL    90 tablet    TAKE ONE TABLET BY MOUTH EVERY EVENING; PLAN TO REVIEW AND DISCUSS ADDITIONAL REFILLS AT UPCOMING VISIT    Palpitations       PAXIL CR PO      Take 25 mg by mouth 2 times daily NAME BRAND PAXIL CR        PROMETRIUM 100 MG capsule   Generic drug:  progesterone      Take 100 mg by mouth daily (with dinner)        * ZOLMitriptan 2.5 MG tablet    ZOMIG    30 tablet    Take 1 tablet (2.5 mg) by mouth at onset of headache    Intractable chronic migraine without aura and without status migrainosus       * ZOLMitriptan 5 MG tablet    ZOMIG    12 tablet    TAKE ONE TABLET BY MOUTH AT ONSET OF HEADACHE FOR MIGRAINE. MAY REPEAT IN 2 HOURS. MAX OF 2 TABLETS IN 24 HOURS.    Intractable chronic migraine without aura and without status migrainosus       * Notice:  This list has 2 medication(s) that are the same as other medications prescribed for you. Read the directions carefully, and ask your doctor or other care provider to review them with you.

## 2018-11-08 NOTE — PROGRESS NOTES
HPI      SUBJECTIVE:   Laurel Parra is a 68 year old female who presents to clinic today for the following health issues:      RESPIRATORY SYMPTOMS      Duration: 8 days    Description  rhinorrhea, facial pain/pressure, chills, headache, fatigue/malaise, nausea and diarrhea    Severity: moderate    Accompanying signs and symptoms: None    History (predisposing factors):  asthma    Precipitating or alleviating factors: None    Therapies tried and outcome:  Ibuprofen    2 weeks of symptoms starting with headache and sinus pain after receiving PNA vaccine  Reports feeling better today  Neck pain present  Clear nasal drainage  Denies cough  Denies ear pain  Tinnitis in L ear chronically but now in bilateral ears  Sratchy sore throat  Allergies and takes claritin daily  Nauseated with diarrhea started same time but is intermittent  Taking Ibuprofen but states that she stopped because she has a very sensitive stomach  Unsure of fevers  Day of PNA shot had a fever 103.7 and none since   Reports sister has same symptoms but has not been in contact with her    Takes Vicodin for pain daily      Past Medical History:   Diagnosis Date     Anxiety     Dr. Adelina Meehan     Chronic constipation      Chronic narcotic use     [facility]     Chronic pain     Dr. Orta as of 2015     Chronic urethritis     Dr. Little     Colonic polyp 11/2011    one polyp, fu 5 years, fu 10/17 and no lesions     Depression, major, in partial remission (H)     Dr. Meehan     Diarrhea 2012    eval by [place], resolved with gluten free diet     DJD (degenerative joint disease)      H/O gastroesophageal reflux (GERD)      LBP (low back pain) 2013    Dr. Jae Tong in [place], then seeing [facility] Dr. Orta     Migraines 2009    neuro eval     Palpitations 2006    holter with pvc's and pac's, echo nl     Screening 2010    dexa nl at gyn     Family History   Problem Relation Age of Onset     Diabetes Father       Past Surgical History:   Procedure Laterality Date     APPENDECTOMY  2016     ARTHROPLASTY CARPOMETACARPAL (THUMB JOINT) Left 2017    Procedure: ARTHROPLASTY CARPOMETACARPAL (THUMB JOINT);  REVISION  LEFT THUMB CARPOMETACARPAL ARTHROPOASTY WITH 1.1 TIGHT ROPE;  Surgeon: Beatrice Philippe MD;  Location: [facility]     CARPAL TUNNEL RELEASE RT/LT        SECTION       COLONOSCOPY       EXCIS BARTHOLIN GLAND/CYST       HERNIORRHAPHY INGUINAL  70's    x5     OPERATIVE HYSTEROSCOPY WITH MORCELLATOR N/A 2018    Procedure: OPERATIVE HYSTEROSCOPY WITH MORCELLATOR (MARTIN & NEPHEW);  OPERATIVE HYSTEROSCOPY WITH TRUECLEAR MORCELLATOR 5C SCOPE ;  Surgeon: Iris Fernandez MD;  Location: [facility]     ORTHOPEDIC SURGERY Bilateral     SHOULDER ARTHROSCOPY     ORTHOPEDIC SURGERY Right 2017    right foot for plantar fasciitis     thumb surgery   and ,      Social History   Substance Use Topics     Smoking status: Former Smoker     Quit date: 10/29/1979     Smokeless tobacco: Never Used     Alcohol use No     Current Outpatient Prescriptions   Medication Sig Dispense Refill     carboxymethylcellulose (REFRESH PLUS) 0.5 % SOLN ophthalmic solution Place 1 drop into both eyes 3 times daily as needed for dry eyes       CYCLOBENZAPRINE HCL PO Take 10 mg by mouth daily as needed for muscle spasms       estradiol (ESTRACE) 1 MG tablet Take 0.5 mg by mouth daily (with dinner) MUST BE  TEVA       Fexofenadine HCl (ALLEGRA ALLERGY PO)        Hydrocodone-Acetaminophen  MG TABS Take 2 tablets by mouth every 4 hours as needed       hydrocortisone (ANUSOL-HC) 25 MG Suppository Place 1 suppository (25 mg) rectally 2 times daily 14 suppository 0     ipratropium (ATROVENT) 0.06 % spray Spray 3 sprays into both nostrils daily 45 mL 3     loratadine (CLARITIN) 10 MG tablet Take 10 mg by mouth daily as needed        LORazepam (ATIVAN) 1 MG tablet Take 1-2 tablets (1-2 mg) by mouth 4 times daily as  "needed for anxiety (up to 6 tablets per day) 30 tablet      metoprolol succinate (TOPROL-XL) 25 MG 24 hr tablet TAKE ONE TABLET BY MOUTH EVERY EVENING; PLAN TO REVIEW AND DISCUSS ADDITIONAL REFILLS AT UPCOMING VISIT 90 tablet 3     Multiple Vitamins-Minerals (CENTRUM SILVER ADULT 50+ PO) Take 1 tablet by mouth daily       PARoxetine HCl (PAXIL CR PO) Take 25 mg by mouth 2 times daily NAME BRAND PAXIL CR       PROgesterone (PROMETRIUM) 100 MG capsule Take 100 mg by mouth daily (with dinner)        ZOLMitriptan (ZOMIG) 2.5 MG tablet Take 1 tablet (2.5 mg) by mouth at onset of headache 30 tablet 11     ZOLMitriptan (ZOMIG) 5 MG tablet TAKE ONE TABLET BY MOUTH AT ONSET OF HEADACHE FOR MIGRAINE. MAY REPEAT IN 2 HOURS. MAX OF 2 TABLETS IN 24 HOURS. 12 tablet 11     Zolpidem Tartrate (AMBIEN CR PO) Take 12.5 mg by mouth At Bedtime       Allergies   Allergen Reactions     Compazine [Prochlorperazine] Other (See Comments)     \"crawl out of my skin\"     Gluten Meal Diarrhea     Penicillins Nausea and Vomiting and Hives       Reviewed and updated as needed this visit by clinical staff and provider     ROS   Detailed as above       /78 (BP Location: Right arm, Cuff Size: Adult Regular)  Pulse 64  Temp 98.7  F (37.1  C) (Oral)  Ht 5' 6\" (1.676 m)  Wt 139 lb (63 kg)  SpO2 96%  BMI 22.44 kg/m2    Physical Exam   Constitutional: She is well-developed, well-nourished, and in no distress.   HENT:   Head: Normocephalic.   Right Ear: Tympanic membrane, external ear and ear canal normal.   Left Ear: Tympanic membrane, external ear and ear canal normal.   Nose: No mucosal edema.   Mouth/Throat: Oropharynx is clear and moist. No oropharyngeal exudate.   Clear drainage. Cobblestoning present   Eyes: Conjunctivae are normal.   Neck: Normal range of motion.   Cardiovascular: Normal rate, regular rhythm and normal heart sounds.    No murmur heard.  Pulmonary/Chest: Effort normal and breath sounds normal. No respiratory distress. "   Lymphadenopathy:     She has no cervical adenopathy.   Neurological: She is alert.   Skin: Skin is warm and dry.   Psychiatric: Mood and affect normal.   Vitals reviewed.      Assessment and Plan:       ICD-10-CM    1. Viral URI J06.9      Headache and sinus pain likely related to viral URI. Is feeling better today. Continue with supportive care and increasing fluid intake. RTC or call with questions or worsening symptoms      Pt seen in conjunction with Bindu Valencia NP Student     Bere Esparza APRN, CNP  [facility]

## 2018-11-09 ASSESSMENT — ANXIETY QUESTIONNAIRES: GAD7 TOTAL SCORE: 5

## 2018-11-21 ENCOUNTER — TRANSFERRED RECORDS (OUTPATIENT)
Dept: HEALTH INFORMATION MANAGEMENT | Facility: CLINIC | Age: 68
End: 2018-11-21

## 2018-12-04 ENCOUNTER — TELEPHONE (OUTPATIENT)
Dept: FAMILY MEDICINE | Facility: CLINIC | Age: 68
End: 2018-12-04

## 2018-12-04 NOTE — TELEPHONE ENCOUNTER
TO Bere:  Patient returned call    Patient was scheduled to see Ashley at the same time her son is seeing Bere today (1:30pm)   She received a call to cancel her appointment yesterday for today but sick on/off for quite a while and really wanted to be seen today   Throat is very sore - ongoing seven days, started down near larynx, moved up to tonsils   Cough? Yes, dry cough   Stomach ache - upper center - comes/goes, taking Tums   Headache too   Fever? No     She is asking for a quick throat check and lab to check for strep. She does not want to come back at a separate time and is asking if Bere can squeeze her in when she brings her son in for his visit this afternoon. She is aware of UC hours but really does not want to come back later     Please advise   Beatrice MILLER RN

## 2018-12-04 NOTE — TELEPHONE ENCOUNTER
Reason for Call:  Other appointment    Detailed comments:  Pt made arrangement w/son to come in at 1:30p with him at his appt (1:30p) today . Pt states that she needs to come with her son at the same time today. patient is not feeling well. Please advise     Phone Number Patient can be reached at: Home number on file [phone] (home)     Best Time: any    Can we leave a detailed message on this number? YES    Call taken on 12/4/2018 at 11:22 AM by Valentino Barreto

## 2018-12-06 ENCOUNTER — OFFICE VISIT (OUTPATIENT)
Dept: FAMILY MEDICINE | Facility: CLINIC | Age: 68
End: 2018-12-06
Payer: COMMERCIAL

## 2018-12-06 VITALS
HEIGHT: 66 IN | TEMPERATURE: 97.1 F | OXYGEN SATURATION: 97 % | DIASTOLIC BLOOD PRESSURE: 74 MMHG | BODY MASS INDEX: 22.44 KG/M2 | HEART RATE: 70 BPM | SYSTOLIC BLOOD PRESSURE: 107 MMHG

## 2018-12-06 DIAGNOSIS — J06.9 VIRAL URI: Primary | ICD-10-CM

## 2018-12-06 PROCEDURE — 99213 OFFICE O/P EST LOW 20 MIN: CPT | Performed by: NURSE PRACTITIONER

## 2018-12-06 NOTE — PATIENT INSTRUCTIONS
Keep up with the fluids and the humidifier  Begin plain mucinex 1200mg twice a day  Use flonase 2 sprays each nostril once a day  If not over this by Monday please call me - we may need to start an antibiotic

## 2018-12-06 NOTE — PROGRESS NOTES
SUBJECTIVE:   Laurel Parra is a 68 year old female who presents to clinic today for the following health issues:      Sore throat, headache, facial pressure, stomach ache and ear pain comes and goes for about a month.  This episode has lasted about 12 days, but she had been feeling well for about 10 days prior to this bout of illness  Feeling better today , throat is not sore , but feels exhausted.  Has been in bed for the past 2 days  No fever , no chilling or sweats and no myalgia  Has been taking tums for stomach only    Problem list and histories reviewed & adjusted, as indicated.  Additional history: as documented    Patient Active Problem List   Diagnosis     Frequency of urination and polyuria     Anxiety     Advanced directives, counseling/discussion     Colonic polyp     Chronic urethritis     Palpitations     Chronic narcotic use     Intractable chronic migraine without aura and without status migrainosus     Recurrent major depressive disorder, in partial remission (H)     Gastroesophageal reflux disease without esophagitis     Chronic low back pain, unspecified back pain laterality, with sciatica presence unspecified     Viral URI     Past Surgical History:   Procedure Laterality Date     APPENDECTOMY  2016     ARTHROPLASTY CARPOMETACARPAL (THUMB JOINT) Left 2017    Procedure: ARTHROPLASTY CARPOMETACARPAL (THUMB JOINT);  REVISION  LEFT THUMB CARPOMETACARPAL ARTHROPOASTY WITH 1.1 TIGHT ROPE;  Surgeon: Beatrice Philippe MD;  Location: [facility]     CARPAL TUNNEL RELEASE RT/LT        SECTION       COLONOSCOPY       EXCIS BARTHOLIN GLAND/CYST       HERNIORRHAPHY INGUINAL  70's    x5     OPERATIVE HYSTEROSCOPY WITH MORCELLATOR N/A 2018    Procedure: OPERATIVE HYSTEROSCOPY WITH MORCELLATOR (MARTIN & NEPHEW);  OPERATIVE HYSTEROSCOPY WITH TRUECLEAR MORCELLATOR 5C SCOPE ;  Surgeon: Iris Fernandez MD;  Location: [facility]     ORTHOPEDIC SURGERY Bilateral     SHOULDER ARTHROSCOPY      ORTHOPEDIC SURGERY Right 11/2017    right foot for plantar fasciitis     thumb surgery  2000 and 2004, 2007       Social History   Substance Use Topics     Smoking status: Former Smoker     Quit date: 10/29/1979     Smokeless tobacco: Never Used     Alcohol use No     Family History   Problem Relation Age of Onset     Diabetes Father          Current Outpatient Prescriptions   Medication Sig Dispense Refill     carboxymethylcellulose (REFRESH PLUS) 0.5 % SOLN ophthalmic solution Place 1 drop into both eyes 3 times daily as needed for dry eyes       CYCLOBENZAPRINE HCL PO Take 10 mg by mouth daily as needed for muscle spasms       estradiol (ESTRACE) 1 MG tablet Take 0.5 mg by mouth daily (with dinner) MUST BE  TEVA       Fexofenadine HCl (ALLEGRA ALLERGY PO)        Hydrocodone-Acetaminophen  MG TABS Take 2 tablets by mouth every 4 hours as needed       hydrocortisone (ANUSOL-HC) 25 MG Suppository Place 1 suppository (25 mg) rectally 2 times daily 14 suppository 0     ipratropium (ATROVENT) 0.06 % spray Spray 3 sprays into both nostrils daily 45 mL 3     loratadine (CLARITIN) 10 MG tablet Take 10 mg by mouth daily as needed        LORazepam (ATIVAN) 1 MG tablet Take 1-2 tablets (1-2 mg) by mouth 4 times daily as needed for anxiety (up to 6 tablets per day) 30 tablet      metoprolol succinate (TOPROL-XL) 25 MG 24 hr tablet TAKE ONE TABLET BY MOUTH EVERY EVENING; PLAN TO REVIEW AND DISCUSS ADDITIONAL REFILLS AT UPCOMING VISIT 90 tablet 3     Multiple Vitamins-Minerals (CENTRUM SILVER ADULT 50+ PO) Take 1 tablet by mouth daily       PARoxetine HCl (PAXIL CR PO) Take 25 mg by mouth 2 times daily NAME BRAND PAXIL CR       PROgesterone (PROMETRIUM) 100 MG capsule Take 100 mg by mouth daily (with dinner)        ZOLMitriptan (ZOMIG) 2.5 MG tablet Take 1 tablet (2.5 mg) by mouth at onset of headache 30 tablet 11     ZOLMitriptan (ZOMIG) 5 MG tablet TAKE ONE TABLET BY MOUTH AT ONSET OF HEADACHE FOR MIGRAINE.  "MAY REPEAT IN 2 HOURS. MAX OF 2 TABLETS IN 24 HOURS. 12 tablet 11     Zolpidem Tartrate (AMBIEN CR PO) Take 12.5 mg by mouth At Bedtime       Allergies   Allergen Reactions     Compazine [Prochlorperazine] Other (See Comments)     \"crawl out of my skin\"     Gluten Meal Diarrhea     Penicillins Nausea and Vomiting and Hives       Reviewed and updated as needed this visit by clinical staff       Reviewed and updated as needed this visit by Provider         ROS:  Constitutional, HEENT, cardiovascular, pulmonary, gi and gu systems are negative, except as otherwise noted.    OBJECTIVE:     /74 (BP Location: Left arm, Cuff Size: Adult Regular)  Pulse 70  Temp 97.1  F (36.2  C) (Oral)  Ht 5' 6\" (1.676 m)  SpO2 97%  BMI 22.44 kg/m2  Body mass index is 22.44 kg/(m^2).  GENERAL: healthy, alert and no distress  EYES: Eyes grossly normal to inspection, PERRL and conjunctivae and sclerae normal  HENT: ear canals and TM's normal, nose and mouth without ulcers or lesions  NECK: no adenopathy, no asymmetry, masses, or scars and thyroid normal to palpation  RESP: lungs clear to auscultation - no rales, rhonchi or wheezes  CV: regular rate and rhythm, normal S1 S2, no S3 or S4, no murmur, click or rub, no peripheral edema and peripheral pulses strong  MS: no gross musculoskeletal defects noted, no edema    Diagnostic Test Results:  none     ASSESSMENT/PLAN:       ICD-10-CM    1. Viral URI J06.9        Patient Instructions   Keep up with the fluids and the humidifier  Begin plain mucinex 1200mg twice a day  Use flonase 2 sprays each nostril once a day  If not over this by Monday please call me - we may need to start an antibiotic      FAVIO Odell [facility]    "

## 2018-12-06 NOTE — MR AVS SNAPSHOT
After Visit Summary   12/6/2018    Laurel Parra    MRN: [id number]           Patient Information     Date Of Birth          1950        Visit Information        Provider Department      12/6/2018 11:00 AM Ashley Kincaid APRN CNP [facility]        Care Instructions    Keep up with the fluids and the humidifier  Begin plain mucinex 1200mg twice a day  Use flonase 2 sprays each nostril once a day  If not over this by Monday please call me - we may need to start an antibiotic          Follow-ups after your visit        Who to contact     If you have questions or need follow up information about today's clinic visit or your schedule please contact [facility] directly at [phone].  Normal or non-critical lab and imaging results will be communicated to you by MyChart, letter or phone within 4 business days after the clinic has received the results. If you do not hear from us within 7 days, please contact the clinic through PreAppshart or phone. If you have a critical or abnormal lab result, we will notify you by phone as soon as possible.  Submit refill requests through Moneythink or call your pharmacy and they will forward the refill request to us. Please allow 3 business days for your refill to be completed.          Additional Information About Your Visit        MyChart Information     Moneythink gives you secure access to your electronic health record. If you see a primary care provider, you can also send messages to your care team and make appointments. If you have questions, please call your primary care clinic.  If you do not have a primary care provider, please call [phone] and they will assist you.        Care EveryWhere ID     This is your Care EveryWhere ID. This could be used by other organizations to access your [place] medical records  [id number]        Your Vitals Were     Pulse Temperature Height Pulse Oximetry BMI (Body Mass Index)       70 97.1  " F (36.2  C) (Oral) 5' 6\" (1.676 m) 97% 22.44 kg/m2        Blood Pressure from Last 3 Encounters:   12/06/18 107/74   11/08/18 110/78   10/26/18 114/86    Weight from Last 3 Encounters:   11/08/18 139 lb (63 kg)   10/26/18 139 lb (63 kg)   06/19/18 139 lb (63 kg)              Today, you had the following     No orders found for display       Primary Care Provider Office Phone # Fax #    Georges Lebron [phone] [address]  [facility] [address]        Equal Access to Services     PINEDA GARCIA : Hadii nai Reed, wahansel rogers, qaybta kaalmada adetramaineyamackenzie, anabel fontenot . So [facility] [phone].    ATENCIÓN: Si habla español, tiene a kumar disposición servicios gratuitos de asistencia lingüística. Llame al [phone].    We comply with applicable federal civil rights laws and [place] laws. We do not discriminate on the basis of race, color, national origin, age, disability, sex, sexual orientation, or gender identity.            Thank you!     Thank you for choosing [facility]  for your care. Our goal is always to provide you with excellent care. Hearing back from our patients is one way we can continue to improve our services. Please take a few minutes to complete the written survey that you may receive in the mail after your visit with us. Thank you!             Your Updated Medication List - Protect others around you: Learn how to safely use, store and throw away your medicines at www.disposemymeds.org.          This list is accurate as of 12/6/18 11:26 AM.  Always use your most recent med list.                   Brand Name Dispense Instructions for use Diagnosis    ALLEGRA ALLERGY PO           AMBIEN CR PO      Take 12.5 mg by mouth At Bedtime        ATIVAN 1 MG tablet   Generic drug:  LORazepam     30 tablet    Take 1-2 tablets (1-2 mg) by mouth 4 times daily as needed for anxiety (up to 6 tablets per day)        " carboxymethylcellulose 0.5 % Soln ophthalmic solution    REFRESH PLUS     Place 1 drop into both eyes 3 times daily as needed for dry eyes        CENTRUM SILVER ADULT 50+ PO      Take 1 tablet by mouth daily        CLARITIN 10 MG tablet   Generic drug:  loratadine      Take 10 mg by mouth daily as needed        CYCLOBENZAPRINE HCL PO      Take 10 mg by mouth daily as needed for muscle spasms    Preop general physical exam       ESTRACE 1 MG tablet   Generic drug:  estradiol      Take 0.5 mg by mouth daily (with dinner) MUST BE  TEVA        Hydrocodone-Acetaminophen  MG Tabs      Take 2 tablets by mouth every 4 hours as needed        hydrocortisone 25 MG suppository    ANUSOL-HC    14 suppository    Place 1 suppository (25 mg) rectally 2 times daily    External hemorrhoids       ipratropium 0.06 % nasal spray    ATROVENT    45 mL    Spray 3 sprays into both nostrils daily    Chronic rhinitis, unspecified type       metoprolol succinate ER 25 MG 24 hr tablet    TOPROL-XL    90 tablet    TAKE ONE TABLET BY MOUTH EVERY EVENING; PLAN TO REVIEW AND DISCUSS ADDITIONAL REFILLS AT UPCOMING VISIT    Palpitations       PAXIL CR PO      Take 25 mg by mouth 2 times daily NAME BRAND PAXIL CR        PROMETRIUM 100 MG capsule   Generic drug:  progesterone      Take 100 mg by mouth daily (with dinner)        * ZOLMitriptan 2.5 MG tablet    ZOMIG    30 tablet    Take 1 tablet (2.5 mg) by mouth at onset of headache    Intractable chronic migraine without aura and without status migrainosus       * ZOLMitriptan 5 MG tablet    ZOMIG    12 tablet    TAKE ONE TABLET BY MOUTH AT ONSET OF HEADACHE FOR MIGRAINE. MAY REPEAT IN 2 HOURS. MAX OF 2 TABLETS IN 24 HOURS.    Intractable chronic migraine without aura and without status migrainosus       * Notice:  This list has 2 medication(s) that are the same as other medications prescribed for you. Read the directions carefully, and ask your doctor or other care provider to  review them with you.

## 2018-12-10 ENCOUNTER — TELEPHONE (OUTPATIENT)
Dept: FAMILY MEDICINE | Facility: CLINIC | Age: 68
End: 2018-12-10

## 2018-12-10 DIAGNOSIS — J32.9 SINUSITIS, UNSPECIFIED CHRONICITY, UNSPECIFIED LOCATION: Primary | ICD-10-CM

## 2018-12-10 RX ORDER — AZITHROMYCIN 250 MG/1
TABLET, FILM COATED ORAL
Qty: 6 TABLET | Refills: 0 | Status: SHIPPED | OUTPATIENT
Start: 2018-12-10 | End: 2019-06-21

## 2018-12-10 NOTE — TELEPHONE ENCOUNTER
Reason for call:  Patient reporting a symptom    Symptom or request: headache, pressure on front of face,     Duration (how long have symptoms been present): weeks    Have you been treated for this before? Yes    Additional comments: Pt was seen by COURTNEY Kincaid on 12/6 pt did try OTC medication but nothig helped, pt states that Ashley Kincaid told her to contact the office on Monday if not feeling better    Phone Number patient can be reached at:  Home number on file [phone] (home)    Best Time:  any    Can we leave a detailed message on this number:  YES    Call taken on 12/10/2018 at 12:45 PM by Felicitas Perdue

## 2018-12-10 NOTE — TELEPHONE ENCOUNTER
"Patient Instructions   Keep up with the fluids and the humidifier  Begin plain mucinex 1200mg twice a day  Use flonase 2 sprays each nostril once a day  If not over this by Monday please call me - we may need to start an antibiotic      FAVIO [facility]  ---------------  SPOKE TO PATIENT;  Headache and facial pain (over nose and under eyes is heavy pressure.) Sore throat persists, but slight better.  Neck \"nodes\" are tender.   Chills, no fever.  Sleeps okay, (other than chronic pain which causes frequent disruption of sleep)    States this has been going on for a \"very long time\".  See provider notes re: likely antibiotic if symptoms persisted.      Has a sensitive stomach. Doesn't want steroids. Await abx order.      [address].      To COURTNEY Funes, to advise.  Thank you,  Karen Avila RN    "

## 2018-12-10 NOTE — TELEPHONE ENCOUNTER
Left message to call triage back  Looks like Ashley was thinking of starting antibiotic if still not well  Pam Espinal RN- Triage [employer]

## 2018-12-18 ENCOUNTER — TRANSFERRED RECORDS (OUTPATIENT)
Dept: HEALTH INFORMATION MANAGEMENT | Facility: CLINIC | Age: 68
End: 2018-12-18

## 2018-12-27 ENCOUNTER — TRANSFERRED RECORDS (OUTPATIENT)
Dept: HEALTH INFORMATION MANAGEMENT | Facility: CLINIC | Age: 68
End: 2018-12-27

## 2019-01-14 ENCOUNTER — TRANSFERRED RECORDS (OUTPATIENT)
Dept: HEALTH INFORMATION MANAGEMENT | Facility: CLINIC | Age: 69
End: 2019-01-14

## 2019-01-21 ENCOUNTER — TRANSFERRED RECORDS (OUTPATIENT)
Dept: HEALTH INFORMATION MANAGEMENT | Facility: CLINIC | Age: 69
End: 2019-01-21

## 2019-02-19 ENCOUNTER — TELEPHONE (OUTPATIENT)
Dept: FAMILY MEDICINE | Facility: CLINIC | Age: 69
End: 2019-02-19

## 2019-02-19 NOTE — TELEPHONE ENCOUNTER
Prior Authorization Retail Medication Request    Medication/Dose: Zolmitriptan  ICD code (if different than what is on RX):      Previously Tried and Failed: None  Rationale: Patient stable on current medication since 2011    Insurance Name:  [employer]  Insurance ID:  [id number]  [phone]      Pharmacy Information (if different than what is on RX)  Name:  [facility]  Phone:  [phone]

## 2019-02-19 NOTE — TELEPHONE ENCOUNTER
Prior Authorization    Name of drug: Zolmitriptan  Date Initiated: 2/18/19  Date Completed: 2/18/19  Prior Auth Type: Quantity Limit     Status: Denied    Denial Date: 2/18/19   Denial Reasoning: Other   Appeal Info: Called insurance to discuss denial with Clinical Reviewing Pharmacist.   Per Raphael Nascimento, the insurance company wants to see that patient has either attempted prophylactic/preventative migraine medication, or has a legitimate clinical rationale as to why she cannot attempt such medications. (It would not meet criteria if either patient refused this therapy or on the basis of continuation of therapy. Clinical rationale needs to be provided.) Raphael Nascimento noted that such medications might include topiramate (topamax), divalproex (depakote), or propranolol (inderal). This is first line of therapy versus large quantities of triptan medications because of the risk for medication overuse headache as a side effect of taking significant amounts of triptans. Raphael Nascimento noted that an appeal can certainly be submitted, but the aforementioned additional information would be needed if that appeal were to be approved.     Appeal: No - Not at this time. Will submit appeal if Dr. Hutson requests this, and deems clinically appropriate.    Liaison advises that, considering that this patient needs significant quantities of this medication, that she no longer fill 2.5mg tablets, but instead consistently obtain 5mg zolmitriptan tablets, taking only 1/2 tablets if she thinks that may be enough for treating her migraine. Patient obtained #30 zolmitriptan 2.5mg tabs on 12/11/18, #12 zolmitriptan 5mg tabs on same date 12/11/18, and then #12 zolmitriptan 5mg tabs on 1/21/2019. If Dr. Lebron deems this usage inappropriate, he should certainly call the pharmacy to discuss changes to the order, as the current order does not have any restrictions, and the pharmacy does not impose restrictions unless specified by doctor for  non-controlled medications.    Additionally, patient did inform the pharmacy on 2/18/19 that she had a migraine and needed her medicine. Office may want to reach out to patient. If patient calls pharmacy back, pharmacy will explain all of this information to her so as to keep her informed about the process.    Copay: NA - Med not covered until 2/24/19, cost of medication without insurance is $18.03 per tablet (with drug coupon applied).  [place]ner Filled: No  Insurance: [employer] via [employer]  Case Number: NA  Submitted Via: Telephone     France Cohen CphT  [facility] Discharge Pharm[facility] Liaison  Liaison Cell: [phone]  Pharmacy: [phone]

## 2019-02-25 NOTE — TELEPHONE ENCOUNTER
According to chart notes from telephone encounter dated 2/19/2019 patient no longer taking the 2.5mg tablets. If a P/A is still needed route back to P/A team. Thank you.

## 2019-03-11 ENCOUNTER — TRANSFERRED RECORDS (OUTPATIENT)
Dept: HEALTH INFORMATION MANAGEMENT | Facility: CLINIC | Age: 69
End: 2019-03-11

## 2019-03-21 ENCOUNTER — TELEPHONE (OUTPATIENT)
Dept: FAMILY MEDICINE | Facility: CLINIC | Age: 69
End: 2019-03-21

## 2019-03-21 NOTE — TELEPHONE ENCOUNTER
Non-detailed message left for patient to return call  JOSE SánchezN, RN  [employer] Triage

## 2019-03-21 NOTE — TELEPHONE ENCOUNTER
Reason for call:  Patient reporting a symptom    Symptom or request: pressure across bridge of nose, headache across forehead, puffy cheeks, left ear pain, sore throat-had gone away a bit, drainage in back of throat    Duration (how long have symptoms been present): 5 days    Have you been treated for this before? No    Additional comments:     Phone Number patient can be reached at:  Home number on file [phone] (home)    Best Time:  any    Can we leave a detailed message on this number:  YES    Call taken on 3/21/2019 at 3:07 PM by Tatiana Chavez

## 2019-03-27 NOTE — TELEPHONE ENCOUNTER
"Spoke with patient:     S: Pt c/o migraines most days     A:   Pt states her face feels swollen and \"odd\"   Onset: 1 month   Neck pain     Last week experienced clear drainage and pressure across the bridge of her nose.     Denies vision changes  Denies weakness     She had started feeling better, but symptoms have returned this AM.     R: OV with Bere tomorrow morning. Call back with concerns prior.    Ashly PERALTA RN            "

## 2019-03-28 ENCOUNTER — OFFICE VISIT (OUTPATIENT)
Dept: FAMILY MEDICINE | Facility: CLINIC | Age: 69
End: 2019-03-28
Payer: COMMERCIAL

## 2019-03-28 VITALS
BODY MASS INDEX: 21.38 KG/M2 | TEMPERATURE: 97.2 F | DIASTOLIC BLOOD PRESSURE: 76 MMHG | HEART RATE: 63 BPM | SYSTOLIC BLOOD PRESSURE: 112 MMHG | OXYGEN SATURATION: 98 % | WEIGHT: 133 LBS | HEIGHT: 66 IN

## 2019-03-28 DIAGNOSIS — G43.719 INTRACTABLE CHRONIC MIGRAINE WITHOUT AURA AND WITHOUT STATUS MIGRAINOSUS: Primary | ICD-10-CM

## 2019-03-28 PROCEDURE — 99214 OFFICE O/P EST MOD 30 MIN: CPT | Performed by: NURSE PRACTITIONER

## 2019-03-28 ASSESSMENT — MIFFLIN-ST. JEOR: SCORE: 1160.03

## 2019-03-28 NOTE — PROGRESS NOTES
"HPI      SUBJECTIVE:   Laurel Parra is a 68 year old female who presents to clinic today for the following health issues:      Has been having frequent migraines  Swelling on left cheek with itching      Not feeling well for 2 months 4-5 migraines a week.   Left cheek slightly swollen. Left ear pain/pressure intermittent.  Left cheek itchy and sinus pressure upon awakening.   No dental pain   Slept all day yesterday but still felt tired.   \"Increase in heart arrhythmia\"  One day had pain in chest, hard to tell because chronic pain in back and shoulder  Less appetite, drinking adequate fluids.  No cough, wheezing, N/V.  Chronic rhinitis  Concerned about need for daily migraine meds  Occasional aura with migraines  No symptoms in left side of face during migraine    Started taking claritin in January   Started migraines with menopause. Relieved with Zomig  Does not have neurologist      Past Medical History:   Diagnosis Date     Anxiety     Dr. Adelina Meehan     Chronic constipation      Chronic narcotic use     [facility]     Chronic pain     Dr. Orta as of 2015     Chronic urethritis     Dr. Little     Colonic polyp 11/2011    one polyp, fu 5 years, fu 10/17 and no lesions     Depression, major, in partial remission (H)     Dr. Meehan     Diarrhea 2012    eval by [facility], resolved with gluten free diet     DJD (degenerative joint disease)      H/O gastroesophageal reflux (GERD)      LBP (low back pain) 2013    Dr. Jae Tong in [place], then seeing [facility] Dr. Orta     Migraines 2009    neuro eval     Palpitations 2006    holter with pvc's and pac's, echo nl     Screening 2010    dexa nl at gyn     Family History   Problem Relation Age of Onset     Diabetes Father      Past Surgical History:   Procedure Laterality Date     APPENDECTOMY  2016     ARTHROPLASTY CARPOMETACARPAL (THUMB JOINT) Left 6/7/2017    Procedure: ARTHROPLASTY CARPOMETACARPAL (THUMB JOINT);  REVISION  " LEFT THUMB CARPOMETACARPAL ARTHROPOASTY WITH 1.1 TIGHT ROPE;  Surgeon: Beatrice Philippe MD;  Location: [facility]     CARPAL TUNNEL RELEASE RT/LT        SECTION       COLONOSCOPY       EXCIS BARTHOLIN GLAND/CYST       HERNIORRHAPHY INGUINAL  70's    x5     OPERATIVE HYSTEROSCOPY WITH MORCELLATOR N/A 2018    Procedure: OPERATIVE HYSTEROSCOPY WITH MORCELLATOR (MARTIN & NEPHEW);  OPERATIVE HYSTEROSCOPY WITH TRUECLEAR MORCELLATOR 5C SCOPE ;  Surgeon: Iris Fernandez MD;  Location: [facility]     ORTHOPEDIC SURGERY Bilateral     SHOULDER ARTHROSCOPY     ORTHOPEDIC SURGERY Right 2017    right foot for plantar fasciitis     thumb surgery   and ,      Social History     Tobacco Use     Smoking status: Former Smoker     Last attempt to quit: 10/29/1979     Years since quittin.4     Smokeless tobacco: Never Used   Substance Use Topics     Alcohol use: No     Alcohol/week: 0.0 oz     Current Outpatient Medications   Medication Sig Dispense Refill     azithromycin (ZITHROMAX) 250 MG tablet Two tablets first day, then one tablet daily for four days. 6 tablet 0     carboxymethylcellulose (REFRESH PLUS) 0.5 % SOLN ophthalmic solution Place 1 drop into both eyes 3 times daily as needed for dry eyes       CYCLOBENZAPRINE HCL PO Take 10 mg by mouth daily as needed for muscle spasms       estradiol (ESTRACE) 1 MG tablet Take 0.5 mg by mouth daily (with dinner) MUST BE  TEVA       Fexofenadine HCl (ALLEGRA ALLERGY PO)        Hydrocodone-Acetaminophen  MG TABS Take 2 tablets by mouth every 4 hours as needed       hydrocortisone (ANUSOL-HC) 25 MG Suppository Place 1 suppository (25 mg) rectally 2 times daily 14 suppository 0     ipratropium (ATROVENT) 0.06 % spray Spray 3 sprays into both nostrils daily 45 mL 3     loratadine (CLARITIN) 10 MG tablet Take 10 mg by mouth daily as needed        LORazepam (ATIVAN) 1 MG tablet Take 1-2 tablets (1-2 mg) by mouth 4 times daily as needed for  "anxiety (up to 6 tablets per day) 30 tablet      metoprolol succinate (TOPROL-XL) 25 MG 24 hr tablet TAKE ONE TABLET BY MOUTH EVERY EVENING; PLAN TO REVIEW AND DISCUSS ADDITIONAL REFILLS AT UPCOMING VISIT 90 tablet 3     Multiple Vitamins-Minerals (CENTRUM SILVER ADULT 50+ PO) Take 1 tablet by mouth daily       PARoxetine HCl (PAXIL CR PO) Take 25 mg by mouth 2 times daily NAME BRAND PAXIL CR       PROgesterone (PROMETRIUM) 100 MG capsule Take 100 mg by mouth daily (with dinner)        ZOLMitriptan (ZOMIG) 2.5 MG tablet Take 1 tablet (2.5 mg) by mouth at onset of headache 30 tablet 11     ZOLMitriptan (ZOMIG) 5 MG tablet TAKE ONE TABLET BY MOUTH AT ONSET OF HEADACHE FOR MIGRAINE. MAY REPEAT IN 2 HOURS. MAX OF 2 TABLETS IN 24 HOURS. 12 tablet 11     Zolpidem Tartrate (AMBIEN CR PO) Take 12.5 mg by mouth At Bedtime       Allergies   Allergen Reactions     Compazine [Prochlorperazine] Other (See Comments)     \"crawl out of my skin\"     Gluten Meal Diarrhea     Penicillins Nausea and Vomiting and Hives       Reviewed and updated as needed this visit by clinical staff and provider     ROS  Detailed as above     /76 (BP Location: Right arm, Patient Position: Sitting, Cuff Size: Adult Regular)   Pulse 63   Temp 97.2  F (36.2  C) (Oral)   Ht 1.676 m (5' 6\")   Wt 60.3 kg (133 lb)   SpO2 98%   BMI 21.47 kg/m     Physical Exam   Constitutional: She is oriented to person, place, and time. Vital signs are normal. She appears well-developed.   HENT:   Head: Normocephalic.   Right Ear: Tympanic membrane, external ear and ear canal normal.   Left Ear: Tympanic membrane, external ear and ear canal normal.   Nose: Right sinus exhibits no maxillary sinus tenderness and no frontal sinus tenderness. Left sinus exhibits no maxillary sinus tenderness and no frontal sinus tenderness.   Mouth/Throat: Oropharynx is clear and moist and mucous membranes are normal.   Neck: Normal range of motion. Neck supple.   Cardiovascular: " Normal rate, regular rhythm and intact distal pulses.   Pulmonary/Chest: Effort normal and breath sounds normal.   Musculoskeletal: Normal range of motion.   Lymphadenopathy:     She has no cervical adenopathy.   Neurological: She is alert and oriented to person, place, and time. GCS eye subscore is 4. GCS verbal subscore is 5. GCS motor subscore is 6.   Skin: Skin is warm and dry.   Psychiatric: She has a normal mood and affect. Her behavior is normal.           Assessment and Plan:       ICD-10-CM    1. Intractable chronic migraine without aura and without status migrainosus G43.719        Chronic migraines with increased frequency. No red flags. No new identifiable changes related to frequency.  Referred to neurology consult. Discussed using OTC migraine medication. Return to PCP if symptoms continue or PRN.       Pt seen in conjunction with Yoel Silvestre NP Student     Bere Esparza, APRN, CNP  [facility]

## 2019-04-15 ENCOUNTER — TRANSFERRED RECORDS (OUTPATIENT)
Dept: HEALTH INFORMATION MANAGEMENT | Facility: CLINIC | Age: 69
End: 2019-04-15

## 2019-06-11 ENCOUNTER — TRANSFERRED RECORDS (OUTPATIENT)
Dept: HEALTH INFORMATION MANAGEMENT | Facility: CLINIC | Age: 69
End: 2019-06-11

## 2019-06-17 ENCOUNTER — TRANSFERRED RECORDS (OUTPATIENT)
Dept: HEALTH INFORMATION MANAGEMENT | Facility: CLINIC | Age: 69
End: 2019-06-17

## 2019-06-20 ENCOUNTER — TELEPHONE (OUTPATIENT)
Dept: FAMILY MEDICINE | Facility: CLINIC | Age: 69
End: 2019-06-20

## 2019-06-20 NOTE — TELEPHONE ENCOUNTER
Reason for call:  Patient reporting a symptom    Symptom or request: Lymphedema in LT Cheek, and a fluid bulge by LT collar bone. PT cheek becomes red and hot, mornings are worse. Pt had epidural in her neck this morning.       Duration (how long have symptoms been present): Couple of months     Have you been treated for this before? No    Additional comments: Pt would like to discuss her symptoms with Jacquelyn.     Phone Number patient can be reached at:  Home number on file [phone] (home)    Best Time:  Anytime     Can we leave a detailed message on this number:  YES    Call taken on 6/20/2019 at 2:18 PM by Sienna Wagoner

## 2019-06-20 NOTE — TELEPHONE ENCOUNTER
"Spoke with patient:     Swelling and redness/hot feeling in left cheek   Swelling present pretty much all the time  Cheek inside of mouth -problems with being worried about biting cheek   If squeezes \"horizontally\" is painful/tender   Pain on cheekbone  Denies any recent injury   Feels squishy     Also has fluid filled are \"golf-ball\" size above collar bone above it same size  Had this for years - no concerns in the past     Pt worried if this is a circulatory or heart issue   Fever/illness? No     Epidural this morning, no symptoms from this - no changes or improvement     She states she cannot come in today due to epidural (supposed to sit quietly and not do much activity) but scheduled an OV for tomorrow    Beatrice Hernandez RN on 6/20/2019 at 2:38 PM    "

## 2019-06-21 ENCOUNTER — OFFICE VISIT (OUTPATIENT)
Dept: FAMILY MEDICINE | Facility: CLINIC | Age: 69
End: 2019-06-21
Payer: COMMERCIAL

## 2019-06-21 VITALS
OXYGEN SATURATION: 96 % | BODY MASS INDEX: 20.57 KG/M2 | HEIGHT: 66 IN | TEMPERATURE: 98.7 F | WEIGHT: 128 LBS | DIASTOLIC BLOOD PRESSURE: 88 MMHG | HEART RATE: 106 BPM | SYSTOLIC BLOOD PRESSURE: 123 MMHG

## 2019-06-21 DIAGNOSIS — R59.9 ENLARGED LYMPH NODES: ICD-10-CM

## 2019-06-21 DIAGNOSIS — R22.0 SWELLING OF FACE: Primary | ICD-10-CM

## 2019-06-21 PROCEDURE — 99213 OFFICE O/P EST LOW 20 MIN: CPT | Performed by: NURSE PRACTITIONER

## 2019-06-21 RX ORDER — NORTRIPTYLINE HCL 25 MG
25 CAPSULE ORAL AT BEDTIME
Refills: 5 | COMMUNITY
Start: 2019-06-12 | End: 2020-09-16

## 2019-06-21 ASSESSMENT — MIFFLIN-ST. JEOR: SCORE: 1132.35

## 2019-06-21 NOTE — PROGRESS NOTES
"Subjective     Laurel Parra is a 69 year old female who presents to clinic today for the following health issues:    HPI     Chief Complaint   Patient presents with     Facial Swelling     Swelling and redness/hot feeling in left cheek      Neck Problem     swelling left side of neck     She reports some to her left cheek, with some days the swelling being worse.  Some days is also red.  Denies ever seeing a rash or boil.  Sometimes after using her phone on that side she has redness.  Sometimes feels a burning sensation to her cheek.  Denies fever or chills but has been having night sweats though he OB-GYN has increased her hormone replacement.  Reports her cheek feels \"boggy\"  Says physician who recent gave her epidural injection in her neck spoke about her cheek swelling.  Reports she notices when she speaks her left inside of her cheek is caught between her teeth.  Also has noticed a small area of swelling above her left clavicle which has been occurring for the past 6 years, this is unchanged though it does increase in size after waking up in morning.  Denies pain, or redness.    Past Medical History:   Diagnosis Date     Anxiety     Dr. Adelina Meehan     Chronic constipation      Chronic narcotic use     [facility]     Chronic pain     Dr. Orta as of 2015     Chronic urethritis     Dr. Little     Colonic polyp 11/2011    one polyp, fu 5 years, fu 10/17 and no lesions     Depression, major, in partial remission (H)     Dr. Meehan     Diarrhea 2012    eval by [facility], resolved with gluten free diet     DJD (degenerative joint disease)      H/O gastroesophageal reflux (GERD)      LBP (low back pain) 2013    Dr. Jae Tong in [place], then seeing [facility] Dr. Orta     Migraines 2009    neuro eval     Palpitations 2006    holter with pvc's and pac's, echo nl     Screening 2010    dexa nl at gyn     Family History   Problem Relation Age of Onset     Diabetes Father  "     Past Surgical History:   Procedure Laterality Date     APPENDECTOMY  2016     ARTHROPLASTY CARPOMETACARPAL (THUMB JOINT) Left 2017    Procedure: ARTHROPLASTY CARPOMETACARPAL (THUMB JOINT);  REVISION  LEFT THUMB CARPOMETACARPAL ARTHROPOASTY WITH 1.1 TIGHT ROPE;  Surgeon: Beatrice Philippe MD;  Location: [facility]     CARPAL TUNNEL RELEASE RT/LT        SECTION       COLONOSCOPY       EXCIS BARTHOLIN GLAND/CYST       HERNIORRHAPHY INGUINAL  70's    x5     OPERATIVE HYSTEROSCOPY WITH MORCELLATOR N/A 2018    Procedure: OPERATIVE HYSTEROSCOPY WITH MORCELLATOR (MARTIN & NEPHEW);  OPERATIVE HYSTEROSCOPY WITH TRUECLEAR MORCELLATOR 5C SCOPE ;  Surgeon: Iris Fernandez MD;  Location: [facility]     ORTHOPEDIC SURGERY Bilateral     SHOULDER ARTHROSCOPY     ORTHOPEDIC SURGERY Right 2017    right foot for plantar fasciitis     thumb surgery   and ,      Social History     Tobacco Use     Smoking status: Former Smoker     Last attempt to quit: 10/29/1979     Years since quittin.7     Smokeless tobacco: Never Used   Substance Use Topics     Alcohol use: No     Alcohol/week: 0.0 oz     Current Outpatient Medications   Medication Sig Dispense Refill     carboxymethylcellulose (REFRESH PLUS) 0.5 % SOLN ophthalmic solution Place 1 drop into both eyes 3 times daily as needed for dry eyes       CYCLOBENZAPRINE HCL PO Take 10 mg by mouth daily as needed for muscle spasms       estradiol (ESTRACE) 1 MG tablet Take 1.5 mg by mouth daily (with dinner) MUST BE  TEVA       Fexofenadine HCl (ALLEGRA ALLERGY PO)        Hydrocodone-Acetaminophen  MG TABS Take 2 tablets by mouth every 4 hours as needed       hydrocortisone (ANUSOL-HC) 25 MG Suppository Place 1 suppository (25 mg) rectally 2 times daily 14 suppository 0     ipratropium (ATROVENT) 0.06 % spray Spray 3 sprays into both nostrils daily 45 mL 3     loratadine (CLARITIN) 10 MG tablet Take 10 mg by mouth daily as needed         "LORazepam (ATIVAN) 1 MG tablet Take 1-2 tablets (1-2 mg) by mouth 4 times daily as needed for anxiety (up to 6 tablets per day) 30 tablet      metoprolol succinate (TOPROL-XL) 25 MG 24 hr tablet TAKE ONE TABLET BY MOUTH EVERY EVENING; PLAN TO REVIEW AND DISCUSS ADDITIONAL REFILLS AT UPCOMING VISIT 90 tablet 3     Multiple Vitamins-Minerals (CENTRUM SILVER ADULT 50+ PO) Take 1 tablet by mouth daily       nortriptyline (PAMELOR) 25 MG capsule Take 25 mg by mouth At Bedtime  5     PARoxetine HCl (PAXIL CR PO) Take 25 mg by mouth 2 times daily NAME BRAND PAXIL CR       PROgesterone (PROMETRIUM) 100 MG capsule Take 200 mg by mouth daily (with dinner)        ZOLMitriptan (ZOMIG) 2.5 MG tablet Take 1 tablet (2.5 mg) by mouth at onset of headache 30 tablet 11     ZOLMitriptan (ZOMIG) 5 MG tablet TAKE ONE TABLET BY MOUTH AT ONSET OF HEADACHE FOR MIGRAINE. MAY REPEAT IN 2 HOURS. MAX OF 2 TABLETS IN 24 HOURS. 12 tablet 11     Zolpidem Tartrate (AMBIEN CR PO) Take 12.5 mg by mouth At Bedtime       Allergies   Allergen Reactions     Compazine [Prochlorperazine] Other (See Comments)     \"crawl out of my skin\"     Gluten Meal Diarrhea     Penicillins Nausea and Vomiting and Hives       Reviewed and updated as needed this visit by clinical staff and provider     Review of Systems   Detailed as above      Objective    /88 (BP Location: Right arm, Patient Position: Sitting, Cuff Size: Adult Regular)   Pulse 106   Temp 98.7  F (37.1  C) (Oral)   Ht 1.676 m (5' 6\")   Wt 58.1 kg (128 lb)   SpO2 96%   BMI 20.66 kg/m    Body mass index is 20.66 kg/m .  Physical Exam   Constitutional: She is oriented to person, place, and time. She appears well-developed and well-nourished.   Neck: Normal range of motion.   Cardiovascular: Normal rate, regular rhythm, normal heart sounds and intact distal pulses.   Pulmonary/Chest: Effort normal and breath sounds normal.   Musculoskeletal: Normal range of motion.   Lymphadenopathy:   Palpable, " mobile left supraclavicular lymph node   Neurological: She is alert and oriented to person, place, and time.   Skin: Skin is warm and dry.   Psychiatric: She has a normal mood and affect. Her behavior is normal. Judgment and thought content normal.      Assessment and Plan:       ICD-10-CM    1. Swelling of face R22.0     No currently occuring   2. Enlarged lymph nodes R59.9        Patient currently not experiencing any swelling of her face. HEENT exam benign and on exam there is no evidence of oral abscess or other infection. She does have a dental bridge on this side. Recommended she follow up with her dentist for further examination of causes of her facial swelling. If following dentist she continues to have swelling she should follow up with her PCP.    She has a palpable and mobile left supraclavicular lymph node that has been unchanged for the past 6 years. At this time recommended she continue to monitor.      Pt seen in conjunction with Miguel Jimenez NP Student    FAVIO Dsouza, CNP  [facility]

## 2019-07-29 ENCOUNTER — TRANSFERRED RECORDS (OUTPATIENT)
Dept: HEALTH INFORMATION MANAGEMENT | Facility: CLINIC | Age: 69
End: 2019-07-29

## 2019-08-07 ENCOUNTER — TRANSFERRED RECORDS (OUTPATIENT)
Dept: HEALTH INFORMATION MANAGEMENT | Facility: CLINIC | Age: 69
End: 2019-08-07

## 2019-08-14 ENCOUNTER — OFFICE VISIT (OUTPATIENT)
Dept: FAMILY MEDICINE | Facility: CLINIC | Age: 69
End: 2019-08-14
Payer: COMMERCIAL

## 2019-08-14 VITALS
HEART RATE: 61 BPM | DIASTOLIC BLOOD PRESSURE: 84 MMHG | HEIGHT: 66 IN | TEMPERATURE: 97.6 F | BODY MASS INDEX: 19.93 KG/M2 | SYSTOLIC BLOOD PRESSURE: 119 MMHG | OXYGEN SATURATION: 99 % | WEIGHT: 124 LBS

## 2019-08-14 DIAGNOSIS — Z01.818 PREOP GENERAL PHYSICAL EXAM: Primary | ICD-10-CM

## 2019-08-14 DIAGNOSIS — M79.642 PAIN OF LEFT HAND: ICD-10-CM

## 2019-08-14 DIAGNOSIS — K62.89 RECTAL PAIN: ICD-10-CM

## 2019-08-14 PROCEDURE — 99215 OFFICE O/P EST HI 40 MIN: CPT | Performed by: NURSE PRACTITIONER

## 2019-08-14 PROCEDURE — 93000 ELECTROCARDIOGRAM COMPLETE: CPT | Performed by: NURSE PRACTITIONER

## 2019-08-14 RX ORDER — MULTIVIT-MIN/IRON/FOLIC ACID/K 18-600-40
2000 CAPSULE ORAL DAILY
COMMUNITY
End: 2021-04-02

## 2019-08-14 RX ORDER — ZINC GLUCONATE 50 MG
50 TABLET ORAL DAILY
COMMUNITY
End: 2020-03-11

## 2019-08-14 RX ORDER — MULTIVIT WITH MINERALS/LUTEIN
1000 TABLET ORAL EVERY EVENING
COMMUNITY

## 2019-08-14 ASSESSMENT — MIFFLIN-ST. JEOR: SCORE: 1114.21

## 2019-08-14 ASSESSMENT — PATIENT HEALTH QUESTIONNAIRE - PHQ9: SUM OF ALL RESPONSES TO PHQ QUESTIONS 1-9: 11

## 2019-08-14 NOTE — PATIENT INSTRUCTIONS
Before Your Surgery      Call your surgeon if there is any change in your health. This includes signs of a cold or flu (such as a sore throat, runny nose, cough, rash or fever).    Do not smoke, drink alcohol or take over the counter medicine (unless your surgeon or primary care doctor tells you to) for the 24 hours before and after surgery.    If you take prescribed drugs: Follow your doctor s orders about which medicines to take and which to stop until after surgery.    Eating and drinking prior to surgery: follow the instructions from your surgeon    Take a shower or bath the night before surgery. Use the soap your surgeon gave you to gently clean your skin. If you do not have soap from your surgeon, use your regular soap. Do not shave or scrub the surgery site.  Wear clean pajamas and have clean sheets on your bed.     Hold vitamins morning of surgery

## 2019-08-14 NOTE — PROGRESS NOTES
[address] [address]  [phone]  Dept: [phone]    PRE-OP EVALUATION:  Today's date: 2019    Laurel Parra (: 1950) presents for pre-operative evaluation assessment as requested by Beatrice Hernandez MD.  She requires evaluation and anesthesia risk assessment prior to undergoing surgery/procedure for treatment of L hand pain .    Proposed Surgery/ Procedure: LEFT WRIST ARTHROSCOPY REMOVAL OF TIGHT ROPE ; LEFT EXTENSOR POLLISIS LONGUS /EXTENSOR CARPI RADIALIS BREVIS /EXTENSOR CARPI RADIALUS  LONGUS SYNOVECTOMY ; EXTENSOR POLICIS LONGUS TRANSPOSITION ; ANTERIOR INTEROSCEOUS NEURECTOMY ; POSTERIOR INTEROSCEOUS NEURECTOMY  ( MINI TOSHIA ; SMALL KOINT SCOPE ; CONCEPT TRACTION TOWER )  Date of Surgery/ Procedure: 2019  Time of Surgery/ Procedure: 1:00 pm  Hospital/Surgical Facility:  OR  Fax number for surgical facility: [facility]   Primary Physician: Georges Lebron  Type of Anesthesia Anticipated: ?MAC     Patient has a Health Care Directive or Living Will:  NO    1. NO - Do you have a history of heart attack, stroke, stent, bypass or surgery on an artery in the head, neck, heart or legs?  2. YES - DO YOU EVER HAVE ANY PAIN OR DISCOMFORT IN YOUR CHEST? Occasional long term CP with previous workup. Nothing acute or new.   3. NO - Do you have a history of  Heart Failure?  4. NO - Are you troubled by shortness of breath when: walking on the level, up a slight hill or at night?  5. NO - Do you currently have a cold, bronchitis or other respiratory infection?  6. NO - Do you have a cough, shortness of breath or wheezing?  7. NO - Do you sometimes get pains in the calves of your legs when you walk?  8. NO - Do you or anyone in your family have previous history of blood clots?  9. NO - Do you or does anyone in your family have a serious bleeding problem such as prolonged bleeding following surgeries or cuts?  10. NO - Have you ever had problems  with anemia or been told to take iron pills?  11. YES - HAVE YOU HAD ANY ABNORMAL BLOOD LOSS SUCH AS BLACK, TARRY OR BLOODY STOOLS, OR ABNORMAL VAGINAL BLEEDING? Abnormal Vaginal Bleeding for 2 weeeks with thorough workup just a couple months ago.  12. NO - Have you ever had a blood transfusion?  13. YES - HAVE YOU OR ANY OF YOUR RELATIVES EVER HAD PROBLEMS WITH ANESTHESIA? Father. NO person problems with many surgeries    14. NO - Do you have sleep apnea, excessive snoring or daytime drowsiness?  15. NO - Do you have any prosthetic heart valves?  16. NO - Do you have prosthetic joints?  17. NO - Is there any chance that you may be pregnant?      HPI:     HPI related to upcoming procedure: L hand surgery for chronic pain. Has been feeling well overall without significant concerns.     Has constant rectal discomfort. Thinks she may have a hemorrhoid. No pain with BM. No blood with BM or wiping        See problem list for active medical problems.  Problems all longstanding and stable, except as noted/documented.  See ROS for pertinent symptoms related to these conditions.      MEDICAL HISTORY:     Patient Active Problem List    Diagnosis Date Noted     Viral URI 11/08/2018     Priority: Medium     Recurrent major depressive disorder, in partial remission (H) 12/15/2016     Priority: Medium     Gastroesophageal reflux disease without esophagitis 12/15/2016     Priority: Medium     Chronic low back pain, unspecified back pain laterality, with sciatica presence unspecified 12/15/2016     Priority: Medium     Intractable chronic migraine without aura and without status migrainosus 12/06/2016     Priority: Medium     Chronic narcotic use      Priority: Medium     [facility]       Palpitations      Priority: Medium     holter with pvc's and pac's, echo nl       Chronic urethritis      Priority: Medium     Dr. Little       Colonic polyp      Priority: Medium     one polyp, fu 5 years       Advanced directives,  counseling/discussion 2012     Priority: Medium     Patient states has Advance Directive and will bring in a copy to clinic. 7/3/2012          Anxiety      Priority: Medium     Frequency of urination and polyuria 2011     Priority: Medium      Past Medical History:   Diagnosis Date     Anxiety     Dr. Adelina Meehan     Chronic constipation      Chronic narcotic use     [facility]     Chronic pain     Dr. Orta as of      Chronic urethritis     Dr. Little     Colonic polyp 2011    one polyp, fu 5 years, fu 10/17 and no lesions     Depression, major, in partial remission (H)     Dr. Meehan     Diarrhea 2012    eval by [facility], resolved with gluten free diet     DJD (degenerative joint disease)      H/O gastroesophageal reflux (GERD)      LBP (low back pain)     Dr. Jae Tong in [place], then seeing [facility] Dr. Orta     Migraines 2009    neuro eval     Palpitations     holter with pvc's and pac's, echo nl     Screening     dexa nl at gyn     Past Surgical History:   Procedure Laterality Date     APPENDECTOMY  2016     ARTHROPLASTY CARPOMETACARPAL (THUMB JOINT) Left 2017    Procedure: ARTHROPLASTY CARPOMETACARPAL (THUMB JOINT);  REVISION  LEFT THUMB CARPOMETACARPAL ARTHROPOASTY WITH 1.1 TIGHT ROPE;  Surgeon: Beatrice Philippe MD;  Location: [facility]     CARPAL TUNNEL RELEASE RT/LT        SECTION  1986     COLONOSCOPY       EXCIS BARTHOLIN GLAND/CYST       HERNIORRHAPHY INGUINAL  70's    x5     OPERATIVE HYSTEROSCOPY WITH MORCELLATOR N/A 2018    Procedure: OPERATIVE HYSTEROSCOPY WITH MORCELLATOR (MARTIN & NEPHEW);  OPERATIVE HYSTEROSCOPY WITH TRUECLEAR MORCELLATOR 5C SCOPE ;  Surgeon: Iris Fernandez MD;  Location: [facility]     ORTHOPEDIC SURGERY Bilateral     SHOULDER ARTHROSCOPY     ORTHOPEDIC SURGERY Right 2017    right foot for plantar fasciitis     thumb surgery   and ,      Current Outpatient Medications   Medication Sig  "Dispense Refill     carboxymethylcellulose (REFRESH PLUS) 0.5 % SOLN ophthalmic solution Place 1 drop into both eyes 3 times daily as needed for dry eyes       CYCLOBENZAPRINE HCL PO Take 10 mg by mouth daily as needed for muscle spasms       estradiol (ESTRACE) 1 MG tablet Take 1.5 mg by mouth daily (with dinner) MUST BE  TEVA       Fexofenadine HCl (ALLEGRA ALLERGY PO)        Hydrocodone-Acetaminophen  MG TABS Take 2 tablets by mouth every 4 hours as needed       hydrocortisone (ANUSOL-HC) 25 MG Suppository Place 1 suppository (25 mg) rectally 2 times daily 14 suppository 0     ipratropium (ATROVENT) 0.06 % spray Spray 3 sprays into both nostrils daily 45 mL 3     loratadine (CLARITIN) 10 MG tablet Take 10 mg by mouth daily as needed        LORazepam (ATIVAN) 1 MG tablet Take 1-2 tablets (1-2 mg) by mouth 4 times daily as needed for anxiety (up to 6 tablets per day) 30 tablet      metoprolol succinate (TOPROL-XL) 25 MG 24 hr tablet TAKE ONE TABLET BY MOUTH EVERY EVENING; PLAN TO REVIEW AND DISCUSS ADDITIONAL REFILLS AT UPCOMING VISIT 90 tablet 3     Multiple Vitamins-Minerals (CENTRUM SILVER ADULT 50+ PO) Take 1 tablet by mouth daily       nortriptyline (PAMELOR) 25 MG capsule Take 25 mg by mouth At Bedtime  5     PARoxetine HCl (PAXIL CR PO) Take 25 mg by mouth 2 times daily NAME BRAND PAXIL CR       PROgesterone (PROMETRIUM) 100 MG capsule Take 200 mg by mouth daily (with dinner)        ZOLMitriptan (ZOMIG) 2.5 MG tablet Take 1 tablet (2.5 mg) by mouth at onset of headache 30 tablet 11     ZOLMitriptan (ZOMIG) 5 MG tablet TAKE ONE TABLET BY MOUTH AT ONSET OF HEADACHE FOR MIGRAINE. MAY REPEAT IN 2 HOURS. MAX OF 2 TABLETS IN 24 HOURS. 12 tablet 11     Zolpidem Tartrate (AMBIEN CR PO) Take 12.5 mg by mouth At Bedtime       OTC products: None, except as noted above    Allergies   Allergen Reactions     Compazine [Prochlorperazine] Other (See Comments)     \"crawl out of my skin\"     Gluten Meal " "Diarrhea     Penicillins Nausea and Vomiting and Hives      Latex Allergy: NO    Social History     Tobacco Use     Smoking status: Former Smoker     Last attempt to quit: 10/29/1979     Years since quittin.8     Smokeless tobacco: Never Used   Substance Use Topics     Alcohol use: No     Alcohol/week: 0.0 oz     History   Drug Use No       REVIEW OF SYSTEMS:   Constitutional, neuro, ENT, endocrine, pulmonary, cardiac, gastrointestinal, genitourinary, musculoskeletal, integument and psychiatric systems are negative, except as otherwise noted.    EXAM:   /84 (BP Location: Right arm, Patient Position: Sitting, Cuff Size: Adult Regular)   Pulse 61   Temp 97.6  F (36.4  C) (Oral)   Ht 1.676 m (5' 6\")   Wt 56.2 kg (124 lb)   SpO2 99%   Breastfeeding? No   BMI 20.01 kg/m      GENERAL APPEARANCE: healthy, alert and no distress     EYES: EOMI, PERRL     HENT:  mouth without ulcers or lesions     RESP: lungs clear to auscultation - no rales, rhonchi or wheezes     CV: regular rates and rhythm, normal S1 S2, no S3 or S4 and no murmur, click or rub     RECTAL: normal rectal exam without hemorrhoids or pain      MS: extremities normal- no gross deformities noted, no evidence of inflammation in joints, FROM in all extremities.     SKIN: no suspicious lesions or rashes     NEURO: Normal strength and tone, sensory exam grossly normal, mentation intact and speech normal     PSYCH: mentation appears normal. and affect normal/bright      DIAGNOSTICS:     EKG: appears normal, NSR, normal axis, normal intervals, no acute ST/T changes c/w ischemia, no LVH by voltage criteria, unchanged from previous tracings        Recent Labs   Lab Test 10/26/18  1002 18  1258 17  1426 12/15/16  0827   HGB 13.2 13.2 13.2 13.5     --  289 230     --   --  142   POTASSIUM 4.1 4.6  --  4.2   CR 0.73 0.66  --  0.76   A1C  --   --   --  5.3        IMPRESSION:   Reason for surgery/procedure: L hand " surgery  Diagnosis/reason for consult: medical preop    The proposed surgical procedure is considered INTERMEDIATE risk.    REVISED CARDIAC RISK INDEX  The patient has the following serious cardiovascular risks for perioperative complications such as (MI, PE, VFib and 3  AV Block):  No serious cardiac risks  INTERPRETATION: 0 risks: Class I (very low risk - 0.4% complication rate)    The patient has the following additional risks for perioperative complications:  No identified additional risks  High tolerance to opioid analgesics due to chronic use       ICD-10-CM    1. Preop general physical exam Z01.818 EKG 12-lead complete w/read - Clinics   2. Pain of left hand M79.642    3. Rectal pain K62.89 COLORECTAL SURGERY REFERRAL     She is referred to colorectal for persistent rectal pain.     RECOMMENDATIONS:         --Patient is to take all scheduled medications on the day of surgery EXCEPT for modifications listed below.    Hold vitamins morning of surgery     APPROVAL GIVEN to proceed with proposed procedure, without further diagnostic evaluation       Signed Electronically by: FAVIO Roberts CNP    Copy of this evaluation report is provided to requesting physician.    [facility] Preop Guidelines    Revised Cardiac Risk Index

## 2019-08-20 ENCOUNTER — TRANSFERRED RECORDS (OUTPATIENT)
Dept: HEALTH INFORMATION MANAGEMENT | Facility: CLINIC | Age: 69
End: 2019-08-20

## 2019-08-26 ENCOUNTER — ANESTHESIA EVENT (OUTPATIENT)
Dept: SURGERY | Facility: CLINIC | Age: 69
End: 2019-08-26
Payer: COMMERCIAL

## 2019-08-26 ENCOUNTER — ANESTHESIA (OUTPATIENT)
Dept: SURGERY | Facility: CLINIC | Age: 69
End: 2019-08-26
Payer: COMMERCIAL

## 2019-08-26 ENCOUNTER — SURGERY (OUTPATIENT)
Age: 69
End: 2019-08-26
Payer: COMMERCIAL

## 2019-08-26 ENCOUNTER — HOSPITAL ENCOUNTER (OUTPATIENT)
Facility: CLINIC | Age: 69
Discharge: HOME | End: 2019-08-27
Attending: ORTHOPAEDIC SURGERY | Admitting: ORTHOPAEDIC SURGERY
Payer: COMMERCIAL

## 2019-08-26 DIAGNOSIS — M19.032 ARTHRITIS OF WRIST, LEFT: Primary | ICD-10-CM

## 2019-08-26 PROBLEM — G89.18 POST-OPERATIVE PAIN: Status: ACTIVE | Noted: 2019-08-26

## 2019-08-26 PROCEDURE — 37000008 ZZH ANESTHESIA TECHNICAL FEE, 1ST 30 MIN: Performed by: ORTHOPAEDIC SURGERY

## 2019-08-26 PROCEDURE — 25000125 ZZHC RX 250: Performed by: NURSE ANESTHETIST, CERTIFIED REGISTERED

## 2019-08-26 PROCEDURE — 25800025 ZZH RX 258: Performed by: ORTHOPAEDIC SURGERY

## 2019-08-26 PROCEDURE — 25000132 ZZH RX MED GY IP 250 OP 250 PS 637: Performed by: ORTHOPAEDIC SURGERY

## 2019-08-26 PROCEDURE — 40000936 ZZH STATISTIC OUTPATIENT (NON-OBS) NIGHT

## 2019-08-26 PROCEDURE — 36000056 ZZH SURGERY LEVEL 3 1ST 30 MIN: Performed by: ORTHOPAEDIC SURGERY

## 2019-08-26 PROCEDURE — 25800030 ZZH RX IP 258 OP 636: Performed by: ANESTHESIOLOGY

## 2019-08-26 PROCEDURE — 40000170 ZZH STATISTIC PRE-PROCEDURE ASSESSMENT II: Performed by: ORTHOPAEDIC SURGERY

## 2019-08-26 PROCEDURE — 27210794 ZZH OR GENERAL SUPPLY STERILE: Performed by: ORTHOPAEDIC SURGERY

## 2019-08-26 PROCEDURE — 25000566 ZZH SEVOFLURANE, EA 15 MIN: Performed by: ORTHOPAEDIC SURGERY

## 2019-08-26 PROCEDURE — 37000009 ZZH ANESTHESIA TECHNICAL FEE, EACH ADDTL 15 MIN: Performed by: ORTHOPAEDIC SURGERY

## 2019-08-26 PROCEDURE — 40000935 ZZH STATISTIC OUTPATIENT (NON-OBS) EVE

## 2019-08-26 PROCEDURE — 25000128 H RX IP 250 OP 636: Performed by: NURSE ANESTHETIST, CERTIFIED REGISTERED

## 2019-08-26 PROCEDURE — 25000128 H RX IP 250 OP 636: Performed by: ANESTHESIOLOGY

## 2019-08-26 PROCEDURE — 36000058 ZZH SURGERY LEVEL 3 EA 15 ADDTL MIN: Performed by: ORTHOPAEDIC SURGERY

## 2019-08-26 PROCEDURE — 25000128 H RX IP 250 OP 636: Performed by: ORTHOPAEDIC SURGERY

## 2019-08-26 PROCEDURE — 71000012 ZZH RECOVERY PHASE 1 LEVEL 1 FIRST HR: Performed by: ORTHOPAEDIC SURGERY

## 2019-08-26 PROCEDURE — 71000013 ZZH RECOVERY PHASE 1 LEVEL 1 EA ADDTL HR: Performed by: ORTHOPAEDIC SURGERY

## 2019-08-26 PROCEDURE — 25000125 ZZHC RX 250: Performed by: ORTHOPAEDIC SURGERY

## 2019-08-26 RX ORDER — LIDOCAINE HYDROCHLORIDE 20 MG/ML
INJECTION, SOLUTION INFILTRATION; PERINEURAL PRN
Status: DISCONTINUED | OUTPATIENT
Start: 2019-08-26 | End: 2019-08-26

## 2019-08-26 RX ORDER — ONDANSETRON 4 MG/1
4 TABLET, ORALLY DISINTEGRATING ORAL EVERY 30 MIN PRN
Status: DISCONTINUED | OUTPATIENT
Start: 2019-08-26 | End: 2019-08-26 | Stop reason: HOSPADM

## 2019-08-26 RX ORDER — TRIAMCINOLONE ACETONIDE 40 MG/ML
INJECTION, SUSPENSION INTRA-ARTICULAR; INTRAMUSCULAR
Status: DISCONTINUED
Start: 2019-08-26 | End: 2019-08-26 | Stop reason: HOSPADM

## 2019-08-26 RX ORDER — ONDANSETRON 2 MG/ML
4 INJECTION INTRAMUSCULAR; INTRAVENOUS EVERY 6 HOURS PRN
Status: DISCONTINUED | OUTPATIENT
Start: 2019-08-26 | End: 2019-08-27 | Stop reason: HOSPADM

## 2019-08-26 RX ORDER — ONDANSETRON 2 MG/ML
4 INJECTION INTRAMUSCULAR; INTRAVENOUS EVERY 30 MIN PRN
Status: DISCONTINUED | OUTPATIENT
Start: 2019-08-26 | End: 2019-08-26 | Stop reason: HOSPADM

## 2019-08-26 RX ORDER — KETOROLAC TROMETHAMINE 30 MG/ML
15 INJECTION, SOLUTION INTRAMUSCULAR; INTRAVENOUS EVERY 6 HOURS PRN
Status: DISCONTINUED | OUTPATIENT
Start: 2019-08-26 | End: 2019-08-26 | Stop reason: HOSPADM

## 2019-08-26 RX ORDER — PAROXETINE HYDROCHLORIDE HEMIHYDRATE 25 MG/1
25 TABLET, FILM COATED, EXTENDED RELEASE ORAL 2 TIMES DAILY
Status: DISCONTINUED | OUTPATIENT
Start: 2019-08-26 | End: 2019-08-27 | Stop reason: HOSPADM

## 2019-08-26 RX ORDER — HYDROMORPHONE HYDROCHLORIDE 1 MG/ML
.3-.5 INJECTION, SOLUTION INTRAMUSCULAR; INTRAVENOUS; SUBCUTANEOUS EVERY 5 MIN PRN
Status: DISCONTINUED | OUTPATIENT
Start: 2019-08-26 | End: 2019-08-26 | Stop reason: HOSPADM

## 2019-08-26 RX ORDER — SODIUM CHLORIDE, SODIUM LACTATE, POTASSIUM CHLORIDE, CALCIUM CHLORIDE 600; 310; 30; 20 MG/100ML; MG/100ML; MG/100ML; MG/100ML
INJECTION, SOLUTION INTRAVENOUS CONTINUOUS
Status: DISCONTINUED | OUTPATIENT
Start: 2019-08-26 | End: 2019-08-26 | Stop reason: HOSPADM

## 2019-08-26 RX ORDER — HYDROXYZINE HYDROCHLORIDE 25 MG/1
25 TABLET, FILM COATED ORAL EVERY 6 HOURS PRN
Status: DISCONTINUED | OUTPATIENT
Start: 2019-08-26 | End: 2019-08-27 | Stop reason: HOSPADM

## 2019-08-26 RX ORDER — PROPOFOL 10 MG/ML
INJECTION, EMULSION INTRAVENOUS CONTINUOUS PRN
Status: DISCONTINUED | OUTPATIENT
Start: 2019-08-26 | End: 2019-08-26

## 2019-08-26 RX ORDER — HYDROMORPHONE HYDROCHLORIDE 1 MG/ML
.3-.5 INJECTION, SOLUTION INTRAMUSCULAR; INTRAVENOUS; SUBCUTANEOUS
Status: DISCONTINUED | OUTPATIENT
Start: 2019-08-26 | End: 2019-08-27 | Stop reason: HOSPADM

## 2019-08-26 RX ORDER — LORAZEPAM 1 MG/1
1-2 TABLET ORAL 4 TIMES DAILY PRN
Status: DISCONTINUED | OUTPATIENT
Start: 2019-08-26 | End: 2019-08-27 | Stop reason: HOSPADM

## 2019-08-26 RX ORDER — HYDROXYZINE HYDROCHLORIDE 25 MG/1
25 TABLET, FILM COATED ORAL EVERY 6 HOURS PRN
Qty: 30 TABLET | Refills: 0 | Status: SHIPPED | OUTPATIENT
Start: 2019-08-26 | End: 2020-07-30

## 2019-08-26 RX ORDER — HYDROMORPHONE HYDROCHLORIDE 2 MG/1
2 TABLET ORAL EVERY 4 HOURS PRN
Qty: 40 TABLET | Refills: 0 | Status: SHIPPED | OUTPATIENT
Start: 2019-08-26 | End: 2019-11-19

## 2019-08-26 RX ORDER — EPHEDRINE SULFATE 50 MG/ML
INJECTION, SOLUTION INTRAMUSCULAR; INTRAVENOUS; SUBCUTANEOUS PRN
Status: DISCONTINUED | OUTPATIENT
Start: 2019-08-26 | End: 2019-08-26

## 2019-08-26 RX ORDER — KETOROLAC TROMETHAMINE 15 MG/ML
15 INJECTION, SOLUTION INTRAMUSCULAR; INTRAVENOUS ONCE
Status: COMPLETED | OUTPATIENT
Start: 2019-08-26 | End: 2019-08-26

## 2019-08-26 RX ORDER — BUPIVACAINE HYDROCHLORIDE 2.5 MG/ML
INJECTION, SOLUTION INFILTRATION; PERINEURAL PRN
Status: DISCONTINUED | OUTPATIENT
Start: 2019-08-26 | End: 2019-08-26 | Stop reason: HOSPADM

## 2019-08-26 RX ORDER — LIDOCAINE 40 MG/G
CREAM TOPICAL
Status: DISCONTINUED | OUTPATIENT
Start: 2019-08-26 | End: 2019-08-27 | Stop reason: HOSPADM

## 2019-08-26 RX ORDER — BUPIVACAINE HYDROCHLORIDE 2.5 MG/ML
INJECTION, SOLUTION EPIDURAL; INFILTRATION; INTRACAUDAL
Status: DISCONTINUED
Start: 2019-08-26 | End: 2019-08-26 | Stop reason: HOSPADM

## 2019-08-26 RX ORDER — FENTANYL CITRATE 50 UG/ML
INJECTION, SOLUTION INTRAMUSCULAR; INTRAVENOUS PRN
Status: DISCONTINUED | OUTPATIENT
Start: 2019-08-26 | End: 2019-08-26

## 2019-08-26 RX ORDER — HYDROMORPHONE HYDROCHLORIDE 2 MG/1
2-4 TABLET ORAL EVERY 4 HOURS PRN
Status: DISCONTINUED | OUTPATIENT
Start: 2019-08-26 | End: 2019-08-27 | Stop reason: HOSPADM

## 2019-08-26 RX ORDER — MAGNESIUM HYDROXIDE 1200 MG/15ML
LIQUID ORAL PRN
Status: DISCONTINUED | OUTPATIENT
Start: 2019-08-26 | End: 2019-08-26 | Stop reason: HOSPADM

## 2019-08-26 RX ORDER — NALOXONE HYDROCHLORIDE 0.4 MG/ML
.1-.4 INJECTION, SOLUTION INTRAMUSCULAR; INTRAVENOUS; SUBCUTANEOUS
Status: DISCONTINUED | OUTPATIENT
Start: 2019-08-26 | End: 2019-08-27

## 2019-08-26 RX ORDER — DEXAMETHASONE SODIUM PHOSPHATE 4 MG/ML
INJECTION, SOLUTION INTRA-ARTICULAR; INTRALESIONAL; INTRAMUSCULAR; INTRAVENOUS; SOFT TISSUE PRN
Status: DISCONTINUED | OUTPATIENT
Start: 2019-08-26 | End: 2019-08-26

## 2019-08-26 RX ORDER — TOBRAMYCIN AND DEXAMETHASONE 3; 1 MG/ML; MG/ML
1-2 SUSPENSION/ DROPS OPHTHALMIC 4 TIMES DAILY
COMMUNITY
End: 2019-11-19

## 2019-08-26 RX ORDER — METOCLOPRAMIDE HYDROCHLORIDE 5 MG/ML
5 INJECTION INTRAMUSCULAR; INTRAVENOUS EVERY 6 HOURS PRN
Status: DISCONTINUED | OUTPATIENT
Start: 2019-08-26 | End: 2019-08-27 | Stop reason: HOSPADM

## 2019-08-26 RX ORDER — PROPOFOL 10 MG/ML
INJECTION, EMULSION INTRAVENOUS PRN
Status: DISCONTINUED | OUTPATIENT
Start: 2019-08-26 | End: 2019-08-26

## 2019-08-26 RX ORDER — KETOROLAC TROMETHAMINE 15 MG/ML
15 INJECTION, SOLUTION INTRAMUSCULAR; INTRAVENOUS EVERY 6 HOURS
Status: DISCONTINUED | OUTPATIENT
Start: 2019-08-26 | End: 2019-08-27 | Stop reason: HOSPADM

## 2019-08-26 RX ORDER — ZOLPIDEM TARTRATE 5 MG/1
5-10 TABLET ORAL
Status: DISCONTINUED | OUTPATIENT
Start: 2019-08-26 | End: 2019-08-27

## 2019-08-26 RX ORDER — CLINDAMYCIN PHOSPHATE 900 MG/50ML
900 INJECTION, SOLUTION INTRAVENOUS
Status: COMPLETED | OUTPATIENT
Start: 2019-08-26 | End: 2019-08-26

## 2019-08-26 RX ORDER — FENTANYL CITRATE 50 UG/ML
25-50 INJECTION, SOLUTION INTRAMUSCULAR; INTRAVENOUS
Status: DISCONTINUED | OUTPATIENT
Start: 2019-08-26 | End: 2019-08-26 | Stop reason: HOSPADM

## 2019-08-26 RX ORDER — ONDANSETRON 4 MG/1
4 TABLET, ORALLY DISINTEGRATING ORAL EVERY 6 HOURS PRN
Status: DISCONTINUED | OUTPATIENT
Start: 2019-08-26 | End: 2019-08-27 | Stop reason: HOSPADM

## 2019-08-26 RX ORDER — METOCLOPRAMIDE 5 MG/1
5 TABLET ORAL EVERY 6 HOURS PRN
Status: DISCONTINUED | OUTPATIENT
Start: 2019-08-26 | End: 2019-08-27 | Stop reason: HOSPADM

## 2019-08-26 RX ORDER — ONDANSETRON 2 MG/ML
INJECTION INTRAMUSCULAR; INTRAVENOUS PRN
Status: DISCONTINUED | OUTPATIENT
Start: 2019-08-26 | End: 2019-08-26

## 2019-08-26 RX ORDER — NALOXONE HYDROCHLORIDE 0.4 MG/ML
.1-.4 INJECTION, SOLUTION INTRAMUSCULAR; INTRAVENOUS; SUBCUTANEOUS
Status: DISCONTINUED | OUTPATIENT
Start: 2019-08-26 | End: 2019-08-27 | Stop reason: HOSPADM

## 2019-08-26 RX ORDER — TRIAMCINOLONE ACETONIDE 40 MG/ML
INJECTION, SUSPENSION INTRA-ARTICULAR; INTRAMUSCULAR PRN
Status: DISCONTINUED | OUTPATIENT
Start: 2019-08-26 | End: 2019-08-26 | Stop reason: HOSPADM

## 2019-08-26 RX ORDER — METOPROLOL SUCCINATE 25 MG/1
25 TABLET, EXTENDED RELEASE ORAL EVERY EVENING
Status: DISCONTINUED | OUTPATIENT
Start: 2019-08-26 | End: 2019-08-27 | Stop reason: HOSPADM

## 2019-08-26 RX ORDER — ESTRADIOL 0.5 MG/1
1.5 TABLET ORAL
Status: DISCONTINUED | OUTPATIENT
Start: 2019-08-26 | End: 2019-08-27 | Stop reason: HOSPADM

## 2019-08-26 RX ADMIN — TRIAMCINOLONE ACETONIDE 40 MG: 40 INJECTION, SUSPENSION INTRA-ARTICULAR; INTRAMUSCULAR at 15:27

## 2019-08-26 RX ADMIN — FENTANYL CITRATE 25 MCG: 50 INJECTION, SOLUTION INTRAMUSCULAR; INTRAVENOUS at 14:29

## 2019-08-26 RX ADMIN — KETOROLAC TROMETHAMINE 15 MG: 15 INJECTION, SOLUTION INTRAMUSCULAR; INTRAVENOUS at 16:23

## 2019-08-26 RX ADMIN — PROPOFOL 120 MCG/KG/MIN: 10 INJECTION, EMULSION INTRAVENOUS at 14:19

## 2019-08-26 RX ADMIN — FENTANYL CITRATE 50 MCG: 0.05 INJECTION, SOLUTION INTRAMUSCULAR; INTRAVENOUS at 16:15

## 2019-08-26 RX ADMIN — FENTANYL CITRATE 50 MCG: 50 INJECTION, SOLUTION INTRAMUSCULAR; INTRAVENOUS at 15:45

## 2019-08-26 RX ADMIN — HYDROMORPHONE HYDROCHLORIDE 0.5 MG: 1 INJECTION, SOLUTION INTRAMUSCULAR; INTRAVENOUS; SUBCUTANEOUS at 22:22

## 2019-08-26 RX ADMIN — BUPIVACAINE HYDROCHLORIDE 4 ML: 2.5 INJECTION, SOLUTION EPIDURAL; INFILTRATION; INTRACAUDAL; PERINEURAL at 15:28

## 2019-08-26 RX ADMIN — ESTRADIOL 1.5 MG: 0.5 TABLET ORAL at 21:00

## 2019-08-26 RX ADMIN — LIDOCAINE HYDROCHLORIDE 60 MG: 20 INJECTION, SOLUTION INFILTRATION; PERINEURAL at 14:19

## 2019-08-26 RX ADMIN — HYDROMORPHONE HYDROCHLORIDE 0.5 MG: 1 INJECTION, SOLUTION INTRAMUSCULAR; INTRAVENOUS; SUBCUTANEOUS at 16:02

## 2019-08-26 RX ADMIN — LORAZEPAM 1 MG: 1 TABLET ORAL at 20:54

## 2019-08-26 RX ADMIN — METOPROLOL SUCCINATE 25 MG: 25 TABLET, EXTENDED RELEASE ORAL at 20:54

## 2019-08-26 RX ADMIN — SODIUM CHLORIDE 1000 ML: 900 IRRIGANT IRRIGATION at 15:01

## 2019-08-26 RX ADMIN — PROPOFOL 30 MG: 10 INJECTION, EMULSION INTRAVENOUS at 14:19

## 2019-08-26 RX ADMIN — KETOROLAC TROMETHAMINE 15 MG: 15 INJECTION, SOLUTION INTRAMUSCULAR; INTRAVENOUS at 22:22

## 2019-08-26 RX ADMIN — BUPIVACAINE HYDROCHLORIDE 20 ML: 2.5 INJECTION, SOLUTION EPIDURAL; INFILTRATION; INTRACAUDAL; PERINEURAL at 15:25

## 2019-08-26 RX ADMIN — PAROXETINE HYDROCHLORIDE HEMIHYDRATE 25 MG: 25 TABLET, FILM COATED, EXTENDED RELEASE ORAL at 21:00

## 2019-08-26 RX ADMIN — FENTANYL CITRATE 50 MCG: 50 INJECTION, SOLUTION INTRAMUSCULAR; INTRAVENOUS at 15:12

## 2019-08-26 RX ADMIN — DEXAMETHASONE SODIUM PHOSPHATE 4 MG: 4 INJECTION, SOLUTION INTRA-ARTICULAR; INTRALESIONAL; INTRAMUSCULAR; INTRAVENOUS; SOFT TISSUE at 14:39

## 2019-08-26 RX ADMIN — HYDROMORPHONE HYDROCHLORIDE 0.5 MG: 1 INJECTION, SOLUTION INTRAMUSCULAR; INTRAVENOUS; SUBCUTANEOUS at 16:43

## 2019-08-26 RX ADMIN — SODIUM CHLORIDE 3000 ML: 900 IRRIGANT IRRIGATION at 15:01

## 2019-08-26 RX ADMIN — CLINDAMYCIN PHOSPHATE 900 MG: 18 INJECTION, SOLUTION INTRAVENOUS at 14:22

## 2019-08-26 RX ADMIN — ONDANSETRON 4 MG: 2 INJECTION INTRAMUSCULAR; INTRAVENOUS at 14:39

## 2019-08-26 RX ADMIN — Medication 10 MG: at 14:42

## 2019-08-26 RX ADMIN — HYDROMORPHONE HYDROCHLORIDE 2 MG: 2 TABLET ORAL at 20:54

## 2019-08-26 RX ADMIN — HYDROMORPHONE HYDROCHLORIDE 0.5 MG: 1 INJECTION, SOLUTION INTRAMUSCULAR; INTRAVENOUS; SUBCUTANEOUS at 18:12

## 2019-08-26 RX ADMIN — HYDROXYZINE HYDROCHLORIDE 25 MG: 25 TABLET ORAL at 16:34

## 2019-08-26 RX ADMIN — FENTANYL CITRATE 25 MCG: 50 INJECTION, SOLUTION INTRAMUSCULAR; INTRAVENOUS at 14:19

## 2019-08-26 RX ADMIN — HYDROMORPHONE HYDROCHLORIDE 0.5 MG: 1 INJECTION, SOLUTION INTRAMUSCULAR; INTRAVENOUS; SUBCUTANEOUS at 20:18

## 2019-08-26 RX ADMIN — SODIUM CHLORIDE, POTASSIUM CHLORIDE, SODIUM LACTATE AND CALCIUM CHLORIDE: 600; 310; 30; 20 INJECTION, SOLUTION INTRAVENOUS at 15:32

## 2019-08-26 RX ADMIN — PROPOFOL 70 MG: 10 INJECTION, EMULSION INTRAVENOUS at 14:29

## 2019-08-26 RX ADMIN — ZOLPIDEM TARTRATE 10 MG: 5 TABLET, FILM COATED ORAL at 23:14

## 2019-08-26 RX ADMIN — SODIUM CHLORIDE, POTASSIUM CHLORIDE, SODIUM LACTATE AND CALCIUM CHLORIDE: 600; 310; 30; 20 INJECTION, SOLUTION INTRAVENOUS at 14:15

## 2019-08-26 RX ADMIN — MIDAZOLAM 2 MG: 1 INJECTION INTRAMUSCULAR; INTRAVENOUS at 14:16

## 2019-08-26 RX ADMIN — FENTANYL CITRATE 50 MCG: 50 INJECTION, SOLUTION INTRAMUSCULAR; INTRAVENOUS at 15:55

## 2019-08-26 ASSESSMENT — MIFFLIN-ST. JEOR: SCORE: 1107.41

## 2019-08-26 NOTE — ANESTHESIA POSTPROCEDURE EVALUATION
Patient: Laurel Parra    Procedure(s):  LEFT WRIST ARTHROSCOPY REMOVAL OF TIGHT ROPE ; LEFT EXTENSOR POLLICIS LONGUS /EXTENSOR CARPI RADIALIS BREVIS /EXTENSOR CARPI RADIALUS LONGUS SYNOVECTOMY ; EXTENSOR POLLICIS LONGUS TRANSPOSITION ; ANTERIOR INTEROSSEOUS NEURECTOMY ; POSTERIOR INTEROSSEOUS NEURECTOMY  RIGHT WRIST CORTISONE INJECTION    Diagnosis:WRIST ARTHRITIS ; TENDONITIS  Diagnosis Additional Information: No value filed.    Anesthesia Type:  MAC    Note:  Anesthesia Post Evaluation    Patient location during evaluation: PACU  Patient participation: Able to fully participate in evaluation  Level of consciousness: awake and alert  Pain management: adequate  Airway patency: patent  Cardiovascular status: acceptable  Respiratory status: acceptable  Hydration status: acceptable  PONV: none     Anesthetic complications: None          Last vitals:  Vitals:    08/26/19 1645 08/26/19 1700 08/26/19 1742   BP: 120/77 120/69 117/75   Pulse: 81 86 79   Resp: 14 11 18   Temp: 36.7  C (98.1  F) 36.8  C (98.2  F) 37.1  C (98.8  F)   SpO2: 97% 98% 94%         Electronically Signed By: Carrillo Lomas MD  August 26, 2019  5:57 PM

## 2019-08-26 NOTE — PROGRESS NOTES
Paged on call Dr. Hull regarding reordering PTA meds. Pt requesting all PTA meds reordered.     Home meds reordered.

## 2019-08-26 NOTE — PROGRESS NOTES
Ortho Hand    Admitted for post operative pain control.  Observation care  Anticipate discharge tomorrow.  Elevate and ice to arm  Sling use as needed only.  Follow up scheduled  Discharge on PO Jaleel (#[id number]), Vistaril  Please call me if questions or concerns.  I will not be rounding in am.  Fatou Philippe  [phone]

## 2019-08-26 NOTE — PROGRESS NOTES
Admission medication history interview status for the 8/26/2019  admission is complete. See EPIC admission navigator for prior to admission medications     Medication history source reliability:Good    Medication history interview source(s):Patient    Medication history resources (including written lists, pill bottles, clinic record):None    Primary pharmacy.[facility]    Additional medication history information not noted on PTA med list :None    Time spent in this activity: 45 minutes    Prior to Admission medications    Medication Sig Last Dose Taking? Auth Provider   Artificial Saliva (BIOTENE DRY MOUTH) LOZG Take 1-2 lozenges by mouth daily as needed 8/26/2019 at prn Yes Reported, Patient   carboxymethylcellulose (REFRESH PLUS) 0.5 % SOLN ophthalmic solution Place 1 drop into both eyes 3 times daily as needed for dry eyes 8/25/2019 at pm Yes Reported, Patient   Cyanocobalamin (B-12 DOTS PO) Take 1 tablet by mouth daily  8/24/2019 Yes Reported, Patient   cyclobenzaprine (FLEXERIL) 10 MG tablet Take 10 mg by mouth daily as needed for muscle spasms  8/24/2019 Yes Reported, Patient   estradiol (ESTRACE) 1 MG tablet Take 1.5 mg by mouth daily (with dinner) MUST BE  TEVA 8/25/2019 at pm Yes Reported, Patient   Hydrocodone-Acetaminophen  MG TABS Take 2 tablets by mouth every 4 hours as needed 8/26/2019 at 1000 Yes Reported, Patient   ipratropium (ATROVENT) 0.06 % spray Spray 3 sprays into both nostrils daily 8/26/2019 at am Yes Bere Esparza APRN CNP   loratadine (CLARITIN) 10 MG tablet Take 10 mg by mouth daily as needed  8/25/2019 at prn Yes Reported, Patient   LORazepam (ATIVAN) 1 MG tablet Take 1-2 tablets (1-2 mg) by mouth 4 times daily as needed for anxiety (up to 6 tablets per day) 8/26/2019 at 1000 Yes Georges Lebron MD   MAGNESIUM PO Take 1 tablet by mouth daily (OTC) 8/22/2019 Yes Reported, Patient   metoprolol succinate (TOPROL-XL) 25 MG 24 hr tablet TAKE ONE TABLET BY MOUTH EVERY  EVENING; PLAN TO REVIEW AND DISCUSS ADDITIONAL REFILLS AT UPCOMING VISIT 8/25/2019 at pm Yes Georges Lebron MD   NONFORMULARY Take 10 mg by mouth 2 times daily as needed Compounded Hydrocodone Bitartrate Powder -- compounded by [facility] 8/26/2019 at am Yes Reported, Patient   nortriptyline (PAMELOR) 25 MG capsule Take 25 mg by mouth At Bedtime 8/25/2019 at pm Yes Reported, Patient   PARoxetine (PAXIL CR) 25 MG 24 hr tablet Take 25 mg by mouth 2 times daily NAME BRAND PAXIL CR  8/26/2019 at 0400 Yes Reported, Patient   PROgesterone (PROMETRIUM) 100 MG capsule Take 200 mg by mouth daily (with dinner) (Takes 2 x 100mg = 200mg dose) 8/25/2019 at hs Yes Reported, Patient   tobramycin-dexamethasone (TOBRADEX) 0.3-0.1 % ophthalmic suspension Place 1-2 drops into both eyes 4 times daily 8/26/2019 at am Yes Reported, Patient   vitamin C (ASCORBIC ACID) 1000 MG TABS Take 1,000 mg by mouth daily 8/22/2019 Yes Reported, Patient   Vitamin D, Cholecalciferol, 1000 units TABS Take 2,000 Units by mouth daily  8/25/2019 at pm Yes Reported, Patient   zinc gluconate 50 MG tablet Take 50 mg by mouth daily 8/22/2019 Yes Reported, Patient   ZOLMitriptan (ZOMIG) 5 MG tablet TAKE ONE TABLET BY MOUTH AT ONSET OF HEADACHE FOR MIGRAINE. MAY REPEAT IN 2 HOURS. MAX OF 2 TABLETS IN 24 HOURS. 8/25/2019 at prn Yes Georges Lebron MD   Zolpidem Tartrate (AMBIEN CR PO) Take 12.5 mg by mouth At Bedtime 8/25/2019 at hs Yes Reported, Patient

## 2019-08-26 NOTE — OP NOTE
Procedure Date: 08/26/2019      PREOPERATIVE DIAGNOSES:     1.  Left wrist pain.   2.  Left mid carpal arthritis.   3.  Extensor pollicis longus, extensor carpi radialis longus and extensor carpi radialis brevis tenosynovitis.   4.  Retained hardware (1.1 TightRope).   5.  Right thumb carpometacarpal joint pain.      POSTOPERATIVE DIAGNOSES:     1.  Left wrist pain.   2.  Left mid carpal arthritis.   3.  Extensor pollicis longus, extensor carpi radialis longus and extensor carpi radialis brevis tenosynovitis.   4.  Retained hardware (1.1 TightRope).   5.  Right thumb carpometacarpal joint pain.      PROCEDURES:   1.  Wrist arthroscopy.   2.  Partial wrist tenosynovectomy and debridement of small radial fibrocartilage tear.   3.  Extensor pollicis longus, extensor carpi radialis longus and extensor carpi radialis brevis synovectomy.   4.  Extensor pollicis longus transposition.   5.  Neurectomy, anterior and posterior interosseous nerve.   6.  A 1.1 TightRope removal.   7.  Right thumb CMC joint cortisone injection       SURGEON:  Beatrice Philippe.      ANESTHESIA:  General.      INDICATIONS:  The patient has increasing wrist pain.  Recent MRI demonstrates significant tenosynovitis involving the extensor pollicis longus as well as the radial wrist extensors.  Given the nature of the process, surgery was recommended to perform a tenosynovectomy and protect EPL function.      Additionally, she has had longstanding wrist pain and multiple surgeries for thumb carpometacarpal arthritis and instability.  She has an inflammatory-type presentation although rheumatologic evaluation has been unremarkable.  She also has ligamentous laxity.  She has mid carpal changes noted on the MRI and treatment for this was discussed preoperatively.  Given localized findings in the central wrist, neurectomy may be a reasonable way to address this rather than a partial wrist fusion.  Arthroscopy and evaluation was recommended to assess end-stage  arthritis and neurectomy was reviewed as well.  At the same time, we will remove a 1.1 TightRope which is symptomatic.      DESCRIPTION OF PROCEDURE:  The patient was brought to the operating room.  General anesthesia was induced without difficulty.  Her left upper extremity was prepped and draped below an upper arm tourniquet.  A pause was performed to confirm the site and the procedure planned.      The venous anatomy was marked and the limb was exsanguinated.  The tourniquet was inflated to 250 mmHg.  The arm was carefully padded and placed in the [facility] traction tower  and 12 pounds of traction was used for wrist arthroscopy.      A standard portal was created at the 3, 4 position.  The incision was made vertically to use later in the extensor tenosynovectomy.  A portal was established in standard fashion using a #15 blade in the skin and spreading dissection to the level of the wrist capsule.  A blunt trocar was used to enter the joint.  The arthroscope was introduced.  The wrist was evaluated and there were no significant proximal row changes.  Outflow was established with a 20-gauge needle at the 6U position and a probe was introduced through a 6R portal made in similar fashion.  The articular surfaces were preserved at the scaphoid, lunate and triquetrum as well as the distal radius.  There was some synovitis present on the radial aspect of the wrist and dorsal capsule.  The volar radiocarpal ligaments were normal in appearance.  The TFC was probed and a small radial tear was present with mildly redundant margins.  The peripheral insertion was intact and the ulnocarpal ligaments were also normal in appearance.  The shaver was introduced through the ulnar portal and the tear was debrided.  With the shaver and the 3, 4 portal, minimal scapholunate ligament changes as well as the radial capsular changes were debrided.      Mid carpal arthroscopy was then performed.  A probe was introduced through the ulnar  midcarpal portal.  The most significant change was noted at the proximal hamate and capitate articulation.  The head of the capitate was relatively well-preserved, although there were some changes on its dorsal surface and corresponding dorsal rim of the lunate.  There was some capsular synovitis noted.  The shaver was introduced and the dorsal capsule was debrided.  The volar capsule was inspected and some synovitis was present.  This was also debrided and the volar capsule was relatively deficient with her laxity likely contributing to this.  The most interesting finding in the mid carpal joint was an erosion with obvious inflamed synovium extending onto the radial margin of the scaphoid articular surface.  The area was debrided and cartilage loss was appreciated, again most consistent with an inflammatory arthritis process.  Somewhat surprisingly, the scapholunate and lunotriquetral joints when probed demonstrate some laxity but no grade IV type changes which were anticipated.      The arthroscopy was then completed and the scope was withdrawn.  The arm was placed on the arm table.  The radial portals were connected longitudinally and extended.  Full-thickness flaps were elevated and the venous branch was cauterized to allow exposure of the extensor retinaculum.  The extensor pollicis longus tendon had significant surrounding synovial fluid.  Surprisingly, the tendon was in good condition, however, but the inflammation extended along the length from Kaz's tubercle distally.  At the intersection with the second dorsal compartment tendons, there was also some synovitis noted and the radial wrist extensors were carefully debrided.  All tendons were retracted to inspect the floor and there was no identifiable pathology distally creating the irritation.  The thumb tendon was then transposed dorsally and the extensor retinaculum was repaired with the extensor pollicis longus was safely retracted.      Next, the  previous incisions were used to locate the washers for the 1.1 TightRope.  These were identified easily and exposed carefully to protect adjacent superficial nerves.  Once identified, the washer on the thumb metacarpal side was removed and the suture and second washer withdrawn from the second metacarpal incision without difficulty.      Finally, neurectomy was performed through a separate incision proximal to the distal radioulnar joint.  Skin and subcutaneous tissues were divided.  The digital extensors and muscle were retracted radially and the large posterior interosseous nerve was identified and resected.  The interosseous membrane was then divided and the anterior interosseous nerve was visualized adjacent to the anterior interosseous artery and it was isolated and also resected.        All wounds were irrigated thoroughly.  The tourniquet was deflated.  Hemostasis was assured at all incision sites and the wounds were closed with Monocryl and Prolene suture.  A bulky sterile dressing was then applied, incorporating a volar plaster splint.      Before the patient was transferred from the operating table, the right wrist was prepped with alcohol and the dorsal aspect of the wrist proximal to the thumb metacarpal was injected with a mixture of 3 mL of Marcaine mixed with 1 mL of Kenalog, 40 mg/mL, a total of 1 mL was injected without difficulty.        The patient tolerated the procedures well and was taken to the recovery room in satisfactory condition.         VI RANDLE MD             D: 2019   T: 2019   MT: PEPE      Name:     RACHEL CHRISTINA   MRN:      [id number]        Account:        [id number]   :      1950           Procedure Date: 2019      Document: [id number]

## 2019-08-26 NOTE — BRIEF OP NOTE
[facility]    Brief Operative Note    Pre-operative diagnosis: WRIST ARTHRITIS ; TENDONITIS  Post-operative diagnosis same  Procedure: Procedure(s):  LEFT WRIST ARTHROSCOPY REMOVAL OF TIGHT ROPE ; LEFT EXTENSOR POLLICIS LONGUS /EXTENSOR CARPI RADIALIS BREVIS /EXTENSOR CARPI RADIALUS LONGUS SYNOVECTOMY ; EXTENSOR POLLICIS LONGUS TRANSPOSITION ; ANTERIOR INTEROSSEOUS NEURECTOMY ; POSTERIOR INTEROSSEOUS NEURECTOMY  RIGHT WRIST CORTISONE INJECTION  Surgeon: Surgeon(s) and Role:  Panel 1:     * Beatrice Philippe MD - Primary  Panel 2:     * Beatrice Philippe MD - Primary  Anesthesia: General   Estimated blood loss: Minimal  Drains: None  Specimens: * No specimens in log *  Findings:   mid carpal arthritis change. Wrist synovitis.  EPL, radial wrist extensor tenosynovitis  Complications: None.  Implants:    Implant Name Type Inv. Item Serial No.  Lot No. LRB No. Used   IMP KIT ARTHREX MINI TIGHTROPE 1.1MM SS [id number] Metallic Hardware/[place] IMP KIT ARTHREX MINI TIGHTROPE 1.1MM SS [id number]  ARTHREX [id number] Left 1

## 2019-08-26 NOTE — ANESTHESIA PREPROCEDURE EVALUATION
"Procedure: Procedure(s):  LEFT WRIST ARTHROSCOPY REMOVAL OF TIGHT ROPE ; LEFT EXTENSOR POLLISIS LONGUS /EXTENSOR CARPI RADIALIS BREVIS /EXTENSOR CARPI RADIALUS  LONGUS SYNOVECTOMY ; EXTENSOR POLICIS LONGUS TRANSPOSITION ; ANTERIOR INTEROSCEOUS NEURECTOMY ; POSTERIOR INTEROSCEOUS NEURECTOMY  ( MINI TOSHIA ; SMALL KOINT SCOPE ; CONCEPT TRACTION TOWER )  Preop diagnosis: WRIST ARTHRITIS ; TENDONITIS    Allergies   Allergen Reactions     Compazine [Prochlorperazine] Other (See Comments)     \"crawl out of my skin\"     Gluten Meal Diarrhea     Penicillins Nausea and Vomiting and Hives     Past Medical History:   Diagnosis Date     Anxiety     Dr. Adelina Meehan     Chronic constipation      Chronic narcotic use     [facility]     Chronic pain     Dr. Orta as of      Chronic urethritis     Dr. Little     Colonic polyp 2011    one polyp, fu 5 years, fu 10/17 and no lesions     Depression, major, in partial remission (H)     Dr. Meehan     Diarrhea 2012    eval by [facility], resolved with gluten free diet     DJD (degenerative joint disease)      H/O gastroesophageal reflux (GERD)      LBP (low back pain)     Dr. Jae Tong in [place], then seeing [facility] Dr. Orta     Migraines 2009    neuro eval     Palpitations 2006    holter with pvc's and pac's, echo nl     Screening     dexa nl at gyn     Past Surgical History:   Procedure Laterality Date     APPENDECTOMY  2016     ARTHROPLASTY CARPOMETACARPAL (THUMB JOINT) Left 2017    Procedure: ARTHROPLASTY CARPOMETACARPAL (THUMB JOINT);  REVISION  LEFT THUMB CARPOMETACARPAL ARTHROPOASTY WITH 1.1 TIGHT ROPE;  Surgeon: Beatrice Philippe MD;  Location:  [facility]     CARPAL TUNNEL RELEASE RT/LT  2006      SECTION  1986     COLONOSCOPY       EXCIS BARTHOLIN GLAND/CYST  2005     HERNIORRHAPHY INGUINAL  70's    x5     OPERATIVE HYSTEROSCOPY WITH MORCELLATOR N/A 2018    Procedure: OPERATIVE HYSTEROSCOPY WITH MORCELLATOR (MARTIN & NEPHEW);  " OPERATIVE HYSTEROSCOPY WITH TRUECLEAR MORCELLATOR 5C SCOPE ;  Surgeon: Iris Fernandez MD;  Location: [facility]     ORTHOPEDIC SURGERY Bilateral     SHOULDER ARTHROSCOPY     ORTHOPEDIC SURGERY Right 2017    right foot for plantar fasciitis     thumb surgery   and ,      Social History     Tobacco Use     Smoking status: Former Smoker     Last attempt to quit: 10/29/1979     Years since quittin.8     Smokeless tobacco: Never Used   Substance Use Topics     Alcohol use: No     Alcohol/week: 0.0 oz     Prior to Admission medications    Medication Sig Start Date End Date Taking? Authorizing Provider   Artificial Saliva (BIOTENE DRY MOUTH) LOZG Take 1-2 lozenges by mouth daily as needed   Yes Reported, Patient   carboxymethylcellulose (REFRESH PLUS) 0.5 % SOLN ophthalmic solution Place 1 drop into both eyes 3 times daily as needed for dry eyes   Yes Reported, Patient   Cyanocobalamin (B-12 DOTS PO) Take 1 tablet by mouth daily    Yes Reported, Patient   cyclobenzaprine (FLEXERIL) 10 MG tablet Take 10 mg by mouth daily as needed for muscle spasms    Yes Reported, Patient   estradiol (ESTRACE) 1 MG tablet Take 1.5 mg by mouth daily (with dinner) MUST BE  TEVA   Yes Reported, Patient   Hydrocodone-Acetaminophen  MG TABS Take 2 tablets by mouth every 4 hours as needed   Yes Reported, Patient   ipratropium (ATROVENT) 0.06 % spray Spray 3 sprays into both nostrils daily 18  Yes Bere Esparza APRN CNP   loratadine (CLARITIN) 10 MG tablet Take 10 mg by mouth daily as needed    Yes Reported, Patient   LORazepam (ATIVAN) 1 MG tablet Take 1-2 tablets (1-2 mg) by mouth 4 times daily as needed for anxiety (up to 6 tablets per day)   Yes Georges Lebron MD   MAGNESIUM PO Take 1 tablet by mouth daily (OTC)   Yes Reported, Patient   metoprolol succinate (TOPROL-XL) 25 MG 24 hr tablet TAKE ONE TABLET BY MOUTH EVERY EVENING; PLAN TO REVIEW AND DISCUSS ADDITIONAL REFILLS AT [facility]PC[facility]ING  VISIT 10/26/18  Yes Georges Lebron MD   NONFORMULARY Take 10 mg by mouth 2 times daily as needed Compounded Hydrocodone Bitartrate Powder -- compounded by [facility]   Yes Reported, Patient   nortriptyline (PAMELOR) 25 MG capsule Take 25 mg by mouth At Bedtime 6/12/19  Yes Reported, Patient   PARoxetine (PAXIL CR) 25 MG 24 hr tablet Take 25 mg by mouth 2 times daily NAME BRAND PAXIL CR    Yes Reported, Patient   PROgesterone (PROMETRIUM) 100 MG capsule Take 200 mg by mouth daily (with dinner) (Takes 2 x 100mg = 200mg dose)   Yes Reported, Patient   tobramycin-dexamethasone (TOBRADEX) 0.3-0.1 % ophthalmic suspension Place 1-2 drops into both eyes 4 times daily   Yes Reported, Patient   vitamin C (ASCORBIC ACID) 1000 MG TABS Take 1,000 mg by mouth daily   Yes Reported, Patient   Vitamin D, Cholecalciferol, 1000 units TABS Take 2,000 Units by mouth daily    Yes Reported, Patient   zinc gluconate 50 MG tablet Take 50 mg by mouth daily   Yes Reported, Patient   ZOLMitriptan (ZOMIG) 5 MG tablet TAKE ONE TABLET BY MOUTH AT ONSET OF HEADACHE FOR MIGRAINE. MAY REPEAT IN 2 HOURS. MAX OF 2 TABLETS IN 24 HOURS. 10/26/18  Yes Georges Lebron MD   Zolpidem Tartrate (AMBIEN CR PO) Take 12.5 mg by mouth At Bedtime   Yes Reported, Patient     Current Facility-Administered Medications Ordered in Epic   Medication Dose Route Frequency Last Rate Last Dose     clindamycin (CLEOCIN) infusion 900 mg  900 mg Intravenous Pre-Op/Pre-procedure x 1 dose         fentaNYL (PF) (SUBLIMAZE) injection 25-50 mcg  25-50 mcg Intravenous Q2 Min PRN         HYDROmorphone (PF) (DILAUDID) injection 0.3-0.5 mg  0.3-0.5 mg Intravenous Q5 Min PRN         lactated ringers infusion   Intravenous Continuous         ondansetron (ZOFRAN-ODT) ODT tab 4 mg  4 mg Oral Q30 Min PRN        Or     ondansetron (ZOFRAN) injection 4 mg  4 mg Intravenous Q30 Min PRN         prochlorperazine (COMPAZINE) injection 5 mg  5 mg Intravenous Q6H PRN          No current [facility]-ordered outpatient medications on file.       lactated ringers       Wt Readings from Last 1 Encounters:   19 55.6 kg (122 lb 8 oz)     Temp Readings from Last 1 Encounters:   19 36.1  C (97  F) (Temporal)     BP Readings from Last 6 Encounters:   19 120/84   19 119/84   19 123/88   19 112/76   18 107/74   18 110/78     Pulse Readings from Last 4 Encounters:   19 70   19 61   19 106   19 63     Resp Readings from Last 1 Encounters:   19     SpO2 Readings from Last 1 Encounters:   19 98%     Recent Labs   Lab Test 10/26/18  1002 18  1258 12/15/16  0827     --  142   POTASSIUM 4.1 4.6 4.2   CHLORIDE 103  --  107   CO2 23  --  26   ANIONGAP 11  --  9   GLC 93  --  88   BUN 8  --  7   CR 0.73 0.66 0.76   THOR 8.6  --  8.8     Recent Labs   Lab Test 10/26/18  1002 12/15/16  0827   AST 23 13   ALT 22 19   ALKPHOS 78 56   BILITOTAL 0.3 0.4     Recent Labs   Lab Test 10/26/18  1002 18  1258 17  1426   WBC 5.8  --  5.9   HGB 13.2 13.2 13.2     --  289     No results for input(s): ABO, RH in the last 64410 hours.  No results for input(s): INR, PTT in the last 95371 hours.   No results for input(s): TROPI in the last 23866 hours.  No results for input(s): PH, PCO2, PO2, HCO3 in the last 78387 hours.  No results for input(s): HCG in the last 63925 hours.  No results found for this or any previous visit (from the past 744 hour(s)).    RECENT LABS:   ECG:   ECHO:   Anesthesia Pre-Procedure Evaluation    Patient: Laurel Parra   MRN: [id number] : 1950          Preoperative Diagnosis: WRIST ARTHRITIS ; TENDONITIS    Procedure(s):  LEFT WRIST ARTHROSCOPY REMOVAL OF TIGHT ROPE ; LEFT EXTENSOR POLLISIS LONGUS /EXTENSOR CARPI RADIALIS BREVIS /EXTENSOR CARPI RADIALUS  LONGUS SYNOVECTOMY ; EXTENSOR POLICIS LONGUS TRANSPOSITION ; ANTERIOR INTEROSCEOUS NEURECTOMY ; POSTERIOR  INTEROSCEOUS NEURECTOMY  ( MINI TOSHIA ; SMALL KOINT SCOPE ; CONCEPT TRACTION TOWER )    Past Medical History:   Diagnosis Date     Anxiety     Dr. Adelina Meehan     Chronic constipation      Chronic narcotic use     [facility]     Chronic pain     Dr. Orta as of      Chronic urethritis     Dr. Little     Colonic polyp 2011    one polyp, fu 5 years, fu 10/17 and no lesions     Depression, major, in partial remission (H)     Dr. Meehan     Diarrhea     eval by [facility], resolved with gluten free diet     DJD (degenerative joint disease)      H/O gastroesophageal reflux (GERD)      LBP (low back pain)     Dr. Jae Tong in [place], then seeing [facility] Dr. Orta     Migraines     neuro eval     Palpitations     holter with pvc's and pac's, echo nl     Screening     dexa nl at gyn     Past Surgical History:   Procedure Laterality Date     APPENDECTOMY  2016     ARTHROPLASTY CARPOMETACARPAL (THUMB JOINT) Left 2017    Procedure: ARTHROPLASTY CARPOMETACARPAL (THUMB JOINT);  REVISION  LEFT THUMB CARPOMETACARPAL ARTHROPOASTY WITH 1.1 TIGHT ROPE;  Surgeon: Beatrice Philippe MD;  Location: [facility]     CARPAL TUNNEL RELEASE RT/LT  2006      SECTION  1986     COLONOSCOPY       EXCIS BARTHOLIN GLAND/CYST       HERNIORRHAPHY INGUINAL  70's    x5     OPERATIVE HYSTEROSCOPY WITH MORCELLATOR N/A 2018    Procedure: OPERATIVE HYSTEROSCOPY WITH MORCELLATOR (M[employer] NEPHEW);  OPERATIVE HYSTEROSCOPY WITH TRUECLEAR MORCELLATOR 5C SCOPE ;  Surgeon: Iris Fernandez MD;  Location: [facility]     ORTHOPEDIC SURGERY Bilateral     SHOULDER ARTHROSCOPY     ORTHOPEDIC SURGERY Right 2017    right foot for plantar fasciitis     thumb surgery   and ,        Anesthesia Evaluation     . Pt has had prior anesthetic.     No history of anesthetic complications          ROS/MED HX    ENT/Pulmonary:       Neurologic:     (+)migraines,     Cardiovascular:         METS/Exercise  "Tolerance:     Hematologic:         Musculoskeletal:   (+) arthritis,  -       GI/Hepatic:     (+) GERD       Renal/Genitourinary:         Endo:         Psychiatric:     (+) psychiatric history depression and anxiety      Infectious Disease:         Malignancy:         Other:    (+) H/O Chronic Pain,H/O chronic opiod use ,                         Physical Exam  Normal systems: cardiovascular and pulmonary    Airway   Mallampati: I  Neck ROM: full    Dental   (+) caps    Cardiovascular       Pulmonary             Lab Results   Component Value Date    WBC 5.8 10/26/2018    HGB 13.2 10/26/2018    HCT 41.0 10/26/2018     10/26/2018     10/26/2018    POTASSIUM 4.1 10/26/2018    CHLORIDE 103 10/26/2018    CO2 23 10/26/2018    BUN 8 10/26/2018    CR 0.73 10/26/2018    GLC 93 10/26/2018    THOR 8.6 10/26/2018    MAG 1.9 10/26/2018    ALBUMIN 3.7 10/26/2018    PROTTOTAL 6.9 10/26/2018    ALT 22 10/26/2018    AST 23 10/26/2018    ALKPHOS 78 10/26/2018    BILITOTAL 0.3 10/26/2018    TSH 1.82 10/26/2018    HCG Negative 01/17/2006       Preop Vitals  BP Readings from Last 3 Encounters:   08/26/19 120/84   08/14/19 119/84   06/21/19 123/88    Pulse Readings from Last 3 Encounters:   08/26/19 70   08/14/19 61   06/21/19 106      Resp Readings from Last 3 Encounters:   08/26/19 18   02/09/18 16   06/08/17 16    SpO2 Readings from Last 3 Encounters:   08/26/19 98%   08/14/19 99%   06/21/19 96%      Temp Readings from Last 1 Encounters:   08/26/19 36.1  C (97  F) (Temporal)    Ht Readings from Last 1 Encounters:   08/26/19 1.676 m (5' 6\")      Wt Readings from Last 1 Encounters:   08/26/19 55.6 kg (122 lb 8 oz)    Estimated body mass index is 19.77 kg/m  as calculated from the following:    Height as of this encounter: 1.676 m (5' 6\").    Weight as of this encounter: 55.6 kg (122 lb 8 oz).       Anesthesia Plan      History & Physical Review  History and physical reviewed and following examination; no interval " change.    ASA Status:  3 .    NPO Status:  > 8 hours    Plan for MAC Reason for MAC:  Deep or markedly invasive procedure (G8)  PONV prophylaxis:  Ondansetron (or other 5HT-3)       Postoperative Care  Postoperative pain management:  IV analgesics.      Consents  Anesthetic plan, risks, benefits and alternatives discussed with:  Patient..                 Shabbir Ramirez MD

## 2019-08-26 NOTE — ANESTHESIA CARE TRANSFER NOTE
Patient: Laurel Parra    Procedure(s):  LEFT WRIST ARTHROSCOPY REMOVAL OF TIGHT ROPE ; LEFT EXTENSOR POLLICIS LONGUS /EXTENSOR CARPI RADIALIS BREVIS /EXTENSOR CARPI RADIALUS LONGUS SYNOVECTOMY ; EXTENSOR POLLICIS LONGUS TRANSPOSITION ; ANTERIOR INTEROSSEOUS NEURECTOMY ; POSTERIOR INTEROSSEOUS NEURECTOMY  RIGHT WRIST CORTISONE INJECTION    Diagnosis: WRIST ARTHRITIS ; TENDONITIS  Diagnosis Additional Information: No value filed.    Anesthesia Type:   MAC     Note:  Airway :Face Mask  Patient transferred to:PACU  Handoff Report: Identifed the Patient, Identified the Reponsible Provider, Reviewed the pertinent medical history, Discussed the surgical course, Reviewed Intra-OP anesthesia mangement and issues during anesthesia, Set expectations for post-procedure period and Allowed opportunity for questions and acknowledgement of understanding      Vitals: (Last set prior to Anesthesia Care Transfer)    CRNA VITALS  8/26/2019 1520 - 8/26/2019 1600      8/26/2019             Resp Rate (set):  10                Electronically Signed By: FAVIO Cobian CRNA  August 26, 2019  4:00 PM

## 2019-08-27 VITALS
SYSTOLIC BLOOD PRESSURE: 121 MMHG | HEIGHT: 66 IN | BODY MASS INDEX: 19.69 KG/M2 | WEIGHT: 122.5 LBS | TEMPERATURE: 98.9 F | OXYGEN SATURATION: 97 % | HEART RATE: 72 BPM | RESPIRATION RATE: 16 BRPM | DIASTOLIC BLOOD PRESSURE: 72 MMHG

## 2019-08-27 LAB — GLUCOSE BLDC GLUCOMTR-MCNC: 115 MG/DL (ref 70–99)

## 2019-08-27 PROCEDURE — 25000132 ZZH RX MED GY IP 250 OP 250 PS 637: Performed by: ORTHOPAEDIC SURGERY

## 2019-08-27 PROCEDURE — 40000934 ZZH STATISTIC OUTPATIENT (NON-OBS) DAY

## 2019-08-27 PROCEDURE — 25000128 H RX IP 250 OP 636: Performed by: ORTHOPAEDIC SURGERY

## 2019-08-27 PROCEDURE — 82962 GLUCOSE BLOOD TEST: CPT

## 2019-08-27 RX ADMIN — LORAZEPAM 1 MG: 1 TABLET ORAL at 12:07

## 2019-08-27 RX ADMIN — PAROXETINE HYDROCHLORIDE HEMIHYDRATE 25 MG: 25 TABLET, FILM COATED, EXTENDED RELEASE ORAL at 07:53

## 2019-08-27 RX ADMIN — KETOROLAC TROMETHAMINE 15 MG: 15 INJECTION, SOLUTION INTRAMUSCULAR; INTRAVENOUS at 09:50

## 2019-08-27 RX ADMIN — KETOROLAC TROMETHAMINE 15 MG: 15 INJECTION, SOLUTION INTRAMUSCULAR; INTRAVENOUS at 04:40

## 2019-08-27 RX ADMIN — HYDROMORPHONE HYDROCHLORIDE 4 MG: 2 TABLET ORAL at 12:33

## 2019-08-27 RX ADMIN — HYDROXYZINE HYDROCHLORIDE 25 MG: 25 TABLET ORAL at 01:41

## 2019-08-27 RX ADMIN — LORAZEPAM 1 MG: 1 TABLET ORAL at 07:53

## 2019-08-27 RX ADMIN — HYDROMORPHONE HYDROCHLORIDE 4 MG: 2 TABLET ORAL at 08:19

## 2019-08-27 RX ADMIN — HYDROMORPHONE HYDROCHLORIDE 4 MG: 2 TABLET ORAL at 01:41

## 2019-08-27 RX ADMIN — HYDROXYZINE HYDROCHLORIDE 25 MG: 25 TABLET ORAL at 09:50

## 2019-08-27 RX ADMIN — HYDROMORPHONE HYDROCHLORIDE 0.5 MG: 1 INJECTION, SOLUTION INTRAMUSCULAR; INTRAVENOUS; SUBCUTANEOUS at 05:16

## 2019-08-27 ASSESSMENT — ACTIVITIES OF DAILY LIVING (ADL)
TRANSFERRING: 0-->INDEPENDENT
BATHING: 0-->INDEPENDENT
RETIRED_COMMUNICATION: 0-->UNDERSTANDS/COMMUNICATES WITHOUT DIFFICULTY
FALL_HISTORY_WITHIN_LAST_SIX_MONTHS: NO
TOILETING: 0-->INDEPENDENT
RETIRED_EATING: 0-->INDEPENDENT
DRESS: 0-->INDEPENDENT
AMBULATION: 0-->INDEPENDENT
COGNITION: 0 - NO COGNITION ISSUES REPORTED
SWALLOWING: 0-->SWALLOWS FOODS/LIQUIDS WITHOUT DIFFICULTY

## 2019-08-27 NOTE — PLAN OF CARE
A&Ox4. Pleasant and cooperative. SBA to BR. Voiding adequately. Pain managed with scheduled Toradol, PO and IV dilaudid. CMS intact. Able to move fingers. Tolerating regular diet. Pt would like to speak with pt relations tomorrow. Ace wrap CDI. L arm in sling for comfort. VSS on RA, capno WNL.

## 2019-08-27 NOTE — DISCHARGE INSTRUCTIONS
Same Day Surgery Discharge Instructions for  Sedation and General Anesthesia       It's not unusual to feel dizzy, light-headed or faint for up to 24 hours after surgery or while taking pain medication.  If you have these symptoms: sit for a few minutes before standing and have someone assist you when you get up to walk or use the bathroom.      You should rest and relax for the next 24 hours. We recommend you make arrangements to have an adult stay with you for at least 24 hours after your discharge.  Avoid hazardous and strenuous activity.      DO NOT DRIVE any vehicle or operate mechanical equipment for 24 hours following the end of your surgery.  Even though you may feel normal, your reactions may be affected by the medication you have received.      Do not drink alcoholic beverages for 24 hours following surgery.       Slowly progress to your regular diet as you feel able. It's not unusual to feel nauseated and/or vomit after receiving anesthesia.  If you develop these symptoms, drink clear liquids (apple juice, ginger ale, broth, 7-up, etc. ) until you feel better.  If your nausea and vomiting persists for 24 hours, please notify your surgeon.        All narcotic pain medications, along with inactivity and anesthesia, can cause constipation. Drinking plenty of liquids and increasing fiber intake will help.      For any questions of a medical nature, call your surgeon.      Do not make important decisions for 24 hours.      If you had general anesthesia, you may have a sore throat for a couple of days related to the breathing tube used during surgery.  You may use Cepacol lozenges to help with this discomfort.  If it worsens or if you develop a fever, contact your surgeon.       If you feel your pain is not well managed with the pain medications prescribed by your surgeon, please contact your surgeon's office to let them know so they can address your concerns.       Today 8/27 you received Toradol, an  antiinflammatory medication similar to Ibuprofen.  You should not take other antiinflammatory medication, such as Ibuprofen, Motrin, Advil, Aleve, Naprosyn, etc until 4pm.         8/27/19  Dr. Philippe is ok with you taking up to 6mg of dilaudid every 4 hours as needed for pain for the first 1-2 days after surgery. If you run out of Dilaudid at home, Dr. Philippe will have a prescription written for you at the [place] office. It is ok if you take ibuprofen at home.     Patient Relations: [phone]

## 2019-08-27 NOTE — PLAN OF CARE
Pt a/ox4. Up independently. Reg diet. Baseline neuropathy in LUE. Dressing CDI. L wrist incision pain decreased with dilaudid and atarax. VSS. Voiding adequately. Pt states pain is feeling much better this afternoon with getting 4mg of dilaudid q4h prn. Although MD said pt could take 6mg prn at home, pt states she thinks 4mg will be enough. Pt educated on importance of not taking dilaudid and norco at the same time and using caution with muscle relaxants. Also educated on acute surgical pain vs chronic pain. Pt verbalizes understanding. Discharge meds, instructions, follow up and AVS reviewed with patient prior to discharge.

## 2019-08-27 NOTE — PROVIDER NOTIFICATION
Spoke with Dr. Philippe. She does not plan on rounding today, but pt is ok to discharge.   Writer told MD that pt is concerned about pain, dilaudid not helping much & pt requiring IV dilaudid overnight.   Per MD, ok for pt to have 6mg dilaudid q4h prn pain.   At time of discharge pt is ok to have 2-6mg of dilaudid q4h for the first couple days after surgery. If she runs out of pills, MD will have a script waiting for her at the [place] office. Pt can also take ibuprofen at home.

## 2019-08-27 NOTE — PLAN OF CARE
Pt A&O, VSS, pt continues to have pain in wrist and chronic pain from lying in bed.  Pt treated with Dilaudid po and Dilaudid IV.  Pt informed that we have to try and transition to po pain management so pt is able to return home.  To reduce pain writer tried to keep pts arm elevated and ice applied to wrist to reduce amount of pain pt was experiencing.  Pt is ambulating without difficulty, and voiding with out difficulty.  Will continue to manage.

## 2019-08-29 ENCOUNTER — DOCUMENTATION ONLY (OUTPATIENT)
Dept: OTHER | Facility: CLINIC | Age: 69
End: 2019-08-29

## 2019-09-09 ENCOUNTER — TRANSFERRED RECORDS (OUTPATIENT)
Dept: HEALTH INFORMATION MANAGEMENT | Facility: CLINIC | Age: 69
End: 2019-09-09

## 2019-09-30 ENCOUNTER — HEALTH MAINTENANCE LETTER (OUTPATIENT)
Age: 69
End: 2019-09-30

## 2019-09-30 ENCOUNTER — TRANSFERRED RECORDS (OUTPATIENT)
Dept: HEALTH INFORMATION MANAGEMENT | Facility: CLINIC | Age: 69
End: 2019-09-30

## 2019-10-02 ENCOUNTER — TRANSFERRED RECORDS (OUTPATIENT)
Dept: HEALTH INFORMATION MANAGEMENT | Facility: CLINIC | Age: 69
End: 2019-10-02

## 2019-10-03 ENCOUNTER — TELEPHONE (OUTPATIENT)
Dept: CONSULT | Facility: CLINIC | Age: 69
End: 2019-10-03

## 2019-10-09 ENCOUNTER — TRANSFERRED RECORDS (OUTPATIENT)
Dept: HEALTH INFORMATION MANAGEMENT | Facility: CLINIC | Age: 69
End: 2019-10-09

## 2019-10-15 ENCOUNTER — TRANSFERRED RECORDS (OUTPATIENT)
Dept: HEALTH INFORMATION MANAGEMENT | Facility: CLINIC | Age: 69
End: 2019-10-15

## 2019-10-15 LAB — PHQ9 SCORE: 15

## 2019-10-21 DIAGNOSIS — R00.2 PALPITATIONS: ICD-10-CM

## 2019-10-22 RX ORDER — METOPROLOL SUCCINATE 25 MG/1
TABLET, EXTENDED RELEASE ORAL
Qty: 90 TABLET | Refills: 0 | Status: SHIPPED | OUTPATIENT
Start: 2019-10-22 | End: 2019-11-19

## 2019-10-22 NOTE — TELEPHONE ENCOUNTER
Medication is being filled for 1 time refill only due to:  Patient needs to be seen because due for physical. Sent mychart reminder due for Px

## 2019-10-22 NOTE — TELEPHONE ENCOUNTER
"metoprolol succinate (TOPROL-XL) 25 MG 24 hr tablet 90 tablet 3 10/26/2018     Last Written Prescription Date:  10/26/18  Last Fill Quantity: 90,  # refills: 3   Last office visit: 8/14/2019 with prescribing provider:  Bernardino   Future Office Visit:  None    Requested Prescriptions   Pending Prescriptions Disp Refills     metoprolol succinate ER (TOPROL-XL) 25 MG 24 hr tablet [Pharmacy Med Name: METOPROLOL SUCCINATE ER 25MG TB24] 90 tablet 3     Sig: TAKE ONE TABLET BY MOUTH EVERY EVENING       Beta-Blockers Protocol Passed - 10/21/2019  3:26 PM        Passed - Blood pressure under 140/90 in past 12 months     BP Readings from Last 3 Encounters:   08/27/19 121/72   08/14/19 119/84   06/21/19 123/88                 Passed - Patient is age 6 or older        Passed - Recent (12 mo) or future (30 days) visit within the authorizing provider's specialty     Patient has had an office visit with the authorizing provider or a provider within the authorizing providers department within the previous 12 mos or has a future within next 30 days. See \"Patient Info\" tab in inbasket, or \"Choose Columns\" in Meds & Orders section of the refill encounter.              Passed - Medication is active on med list        [facility] Follow-up to PHQ 1/30/2018 11/8/2018 8/14/2019   PHQ-9 9. Suicide Ideation past 2 weeks Not at all Not at all Not at all         "

## 2019-11-03 DIAGNOSIS — G43.719 INTRACTABLE CHRONIC MIGRAINE WITHOUT AURA AND WITHOUT STATUS MIGRAINOSUS: ICD-10-CM

## 2019-11-05 RX ORDER — ZOLMITRIPTAN 5 MG/1
TABLET, FILM COATED ORAL
Qty: 12 TABLET | Refills: 0 | Status: SHIPPED | OUTPATIENT
Start: 2019-11-05 | End: 2019-11-19

## 2019-11-05 NOTE — TELEPHONE ENCOUNTER
"ZOLMitriptan (ZOMIG) 5 MG tablet 12 tablet 11 10/26/2018  No   Sig: TAKE ONE TABLET BY MOUTH AT ONSET OF HEADACHE FOR MIGRAINE. MAY REPEAT IN 2 HOURS. MAX OF 2 TABLETS IN 24 HOURS     Last Written Prescription Date:  10/26/2018  Last Fill Quantity: 12,  # refills: 11   Last office visit: 8/14/2019 with prescribing provider:     Future Office Visit:   Next 5 appointments (look out 90 days)    Nov 19, 2019  9:00 AM CST  [facility] with Georges [facility]bron [facility] [address] [place] [id number] [phone]         Requested Prescriptions   Pending Prescriptions Disp Refills     ZOLMitriptan (ZOMIG) 5 MG tablet 12 tablet 0     Sig: TAKE ONE TABLET BY MOUTH AT ONSET OF HEADACHE FOR MIGRAINE. MAY REPEAT IN 2 HOURS. MAX OF 2 TABLETS IN 24 HOURS.       Serotonin Agonists Failed - 11/4/2019  3:01 PM        Failed - Serotonin Agonist request needs review.     Please review patient's record. If patient has had 8 or more treatments in the past month, please forward to provider.          Passed - Blood pressure under 140/90 in past 12 months     BP Readings from Last 3 Encounters:   08/27/19 121/72   08/14/19 119/84   06/21/19 123/88                 Passed - Recent (12 mo) or future (30 days) visit within the authorizing provider's specialty     Patient has had an office visit with the authorizing provider or a provider within the authorizing providers department within the previous 12 mos or has a future within next 30 days. See \"Patient Info\" tab in inbasket, or \"Choose Columns\" in Meds & Orders section of the refill encounter.              Passed - Medication is active on med list        Passed - Patient is age 18 or older        Passed - No active pregnancy on record        Passed - No positive pregnancy test in past 12 months          "

## 2019-11-11 ENCOUNTER — TRANSFERRED RECORDS (OUTPATIENT)
Dept: HEALTH INFORMATION MANAGEMENT | Facility: CLINIC | Age: 69
End: 2019-11-11

## 2019-11-14 ENCOUNTER — TRANSFERRED RECORDS (OUTPATIENT)
Dept: HEALTH INFORMATION MANAGEMENT | Facility: CLINIC | Age: 69
End: 2019-11-14

## 2019-11-14 LAB — PHQ9 SCORE: 16

## 2019-11-18 ASSESSMENT — PATIENT HEALTH QUESTIONNAIRE - PHQ9
10. IF YOU CHECKED OFF ANY PROBLEMS, HOW DIFFICULT HAVE THESE PROBLEMS MADE IT FOR YOU TO DO YOUR WORK, TAKE CARE OF THINGS AT HOME, OR GET ALONG WITH OTHER PEOPLE: VERY DIFFICULT
SUM OF ALL RESPONSES TO PHQ QUESTIONS 1-9: 10
SUM OF ALL RESPONSES TO PHQ QUESTIONS 1-9: 10

## 2019-11-18 ASSESSMENT — ACTIVITIES OF DAILY LIVING (ADL)
CURRENT_FUNCTION: HOUSEWORK REQUIRES ASSISTANCE
CURRENT_FUNCTION: PREPARING MEALS REQUIRES ASSISTANCE

## 2019-11-19 ENCOUNTER — OFFICE VISIT (OUTPATIENT)
Dept: FAMILY MEDICINE | Facility: CLINIC | Age: 69
End: 2019-11-19
Payer: COMMERCIAL

## 2019-11-19 VITALS
HEIGHT: 66 IN | TEMPERATURE: 97 F | WEIGHT: 123.5 LBS | SYSTOLIC BLOOD PRESSURE: 110 MMHG | HEART RATE: 73 BPM | BODY MASS INDEX: 19.85 KG/M2 | DIASTOLIC BLOOD PRESSURE: 75 MMHG | OXYGEN SATURATION: 100 %

## 2019-11-19 DIAGNOSIS — F41.9 ANXIETY: ICD-10-CM

## 2019-11-19 DIAGNOSIS — F33.41 RECURRENT MAJOR DEPRESSIVE DISORDER, IN PARTIAL REMISSION (H): ICD-10-CM

## 2019-11-19 DIAGNOSIS — F11.90 CHRONIC NARCOTIC USE: ICD-10-CM

## 2019-11-19 DIAGNOSIS — Z00.00 ROUTINE GENERAL MEDICAL EXAMINATION AT A HEALTH CARE FACILITY: Primary | ICD-10-CM

## 2019-11-19 DIAGNOSIS — R20.2 PARESTHESIA: ICD-10-CM

## 2019-11-19 DIAGNOSIS — M54.50 CHRONIC LOW BACK PAIN, UNSPECIFIED BACK PAIN LATERALITY, UNSPECIFIED WHETHER SCIATICA PRESENT: ICD-10-CM

## 2019-11-19 DIAGNOSIS — Z13.79 VISIT FOR GENETIC SCREENING: ICD-10-CM

## 2019-11-19 DIAGNOSIS — G89.29 CHRONIC LOW BACK PAIN, UNSPECIFIED BACK PAIN LATERALITY, UNSPECIFIED WHETHER SCIATICA PRESENT: ICD-10-CM

## 2019-11-19 DIAGNOSIS — G43.719 INTRACTABLE CHRONIC MIGRAINE WITHOUT AURA AND WITHOUT STATUS MIGRAINOSUS: ICD-10-CM

## 2019-11-19 DIAGNOSIS — R11.0 NAUSEA: ICD-10-CM

## 2019-11-19 DIAGNOSIS — R51.9 LEFT FACIAL PAIN: ICD-10-CM

## 2019-11-19 DIAGNOSIS — Z23 NEED FOR PROPHYLACTIC VACCINATION AND INOCULATION AGAINST INFLUENZA: ICD-10-CM

## 2019-11-19 DIAGNOSIS — N95.1 MENOPAUSAL SYNDROME (HOT FLASHES): ICD-10-CM

## 2019-11-19 DIAGNOSIS — K63.5 POLYP OF COLON, UNSPECIFIED PART OF COLON, UNSPECIFIED TYPE: ICD-10-CM

## 2019-11-19 DIAGNOSIS — K21.9 GASTROESOPHAGEAL REFLUX DISEASE WITHOUT ESOPHAGITIS: ICD-10-CM

## 2019-11-19 DIAGNOSIS — R00.2 PALPITATIONS: ICD-10-CM

## 2019-11-19 PROBLEM — G89.18 POST-OPERATIVE PAIN: Status: RESOLVED | Noted: 2019-08-26 | Resolved: 2019-11-19

## 2019-11-19 PROBLEM — J06.9 VIRAL URI: Status: RESOLVED | Noted: 2018-11-08 | Resolved: 2019-11-19

## 2019-11-19 LAB
ALBUMIN SERPL-MCNC: 3.4 G/DL (ref 3.4–5)
ALP SERPL-CCNC: 74 U/L (ref 40–150)
ALT SERPL W P-5'-P-CCNC: 16 U/L (ref 0–50)
ANION GAP SERPL CALCULATED.3IONS-SCNC: 7 MMOL/L (ref 3–14)
AST SERPL W P-5'-P-CCNC: 13 U/L (ref 0–45)
BASOPHILS # BLD AUTO: 0 10E9/L (ref 0–0.2)
BASOPHILS NFR BLD AUTO: 0.5 %
BILIRUB SERPL-MCNC: 0.4 MG/DL (ref 0.2–1.3)
BUN SERPL-MCNC: 10 MG/DL (ref 7–30)
CALCIUM SERPL-MCNC: 8.6 MG/DL (ref 8.5–10.1)
CHLORIDE SERPL-SCNC: 101 MMOL/L (ref 94–109)
CHOLEST SERPL-MCNC: 244 MG/DL
CO2 SERPL-SCNC: 25 MMOL/L (ref 20–32)
CORTIS SERPL-MCNC: 6.4 UG/DL (ref 4–22)
CREAT SERPL-MCNC: 0.6 MG/DL (ref 0.52–1.04)
DIFFERENTIAL METHOD BLD: NORMAL
EOSINOPHIL # BLD AUTO: 0.1 10E9/L (ref 0–0.7)
EOSINOPHIL NFR BLD AUTO: 1.1 %
ERYTHROCYTE [DISTWIDTH] IN BLOOD BY AUTOMATED COUNT: 12.8 % (ref 10–15)
FOLATE SERPL-MCNC: 5.6 NG/ML
GFR SERPL CREATININE-BSD FRML MDRD: >90 ML/MIN/{1.73_M2}
GLUCOSE SERPL-MCNC: 97 MG/DL (ref 70–99)
HCT VFR BLD AUTO: 39.5 % (ref 35–47)
HDLC SERPL-MCNC: 66 MG/DL
HGB BLD-MCNC: 13 G/DL (ref 11.7–15.7)
LDLC SERPL CALC-MCNC: 114 MG/DL
LYMPHOCYTES # BLD AUTO: 1.9 10E9/L (ref 0.8–5.3)
LYMPHOCYTES NFR BLD AUTO: 29.7 %
MCH RBC QN AUTO: 31.6 PG (ref 26.5–33)
MCHC RBC AUTO-ENTMCNC: 32.9 G/DL (ref 31.5–36.5)
MCV RBC AUTO: 96 FL (ref 78–100)
MONOCYTES # BLD AUTO: 0.6 10E9/L (ref 0–1.3)
MONOCYTES NFR BLD AUTO: 9.2 %
NEUTROPHILS # BLD AUTO: 3.8 10E9/L (ref 1.6–8.3)
NEUTROPHILS NFR BLD AUTO: 59.5 %
NONHDLC SERPL-MCNC: 168 MG/DL
PLATELET # BLD AUTO: 290 10E9/L (ref 150–450)
POTASSIUM SERPL-SCNC: 4.1 MMOL/L (ref 3.4–5.3)
PROT SERPL-MCNC: 6.5 G/DL (ref 6.8–8.8)
RBC # BLD AUTO: 4.11 10E12/L (ref 3.8–5.2)
SODIUM SERPL-SCNC: 133 MMOL/L (ref 133–144)
TRIGL SERPL-MCNC: 266 MG/DL
TSH SERPL DL<=0.005 MIU/L-ACNC: 1.32 MU/L (ref 0.4–4)
VIT B12 SERPL-MCNC: 1938 PG/ML (ref 193–986)
WBC # BLD AUTO: 6.4 10E9/L (ref 4–11)

## 2019-11-19 PROCEDURE — 82533 TOTAL CORTISOL: CPT | Performed by: INTERNAL MEDICINE

## 2019-11-19 PROCEDURE — 82746 ASSAY OF FOLIC ACID SERUM: CPT | Performed by: INTERNAL MEDICINE

## 2019-11-19 PROCEDURE — 80050 GENERAL HEALTH PANEL: CPT | Performed by: INTERNAL MEDICINE

## 2019-11-19 PROCEDURE — 90662 IIV NO PRSV INCREASED AG IM: CPT | Performed by: INTERNAL MEDICINE

## 2019-11-19 PROCEDURE — 99397 PER PM REEVAL EST PAT 65+ YR: CPT | Mod: 25 | Performed by: INTERNAL MEDICINE

## 2019-11-19 PROCEDURE — 36415 COLL VENOUS BLD VENIPUNCTURE: CPT | Performed by: INTERNAL MEDICINE

## 2019-11-19 PROCEDURE — 80061 LIPID PANEL: CPT | Performed by: INTERNAL MEDICINE

## 2019-11-19 PROCEDURE — 99214 OFFICE O/P EST MOD 30 MIN: CPT | Mod: 25 | Performed by: INTERNAL MEDICINE

## 2019-11-19 PROCEDURE — 84165 PROTEIN E-PHORESIS SERUM: CPT | Performed by: INTERNAL MEDICINE

## 2019-11-19 PROCEDURE — 90471 IMMUNIZATION ADMIN: CPT | Performed by: INTERNAL MEDICINE

## 2019-11-19 PROCEDURE — 82607 VITAMIN B-12: CPT | Performed by: INTERNAL MEDICINE

## 2019-11-19 PROCEDURE — 96127 BRIEF EMOTIONAL/BEHAV ASSMT: CPT | Performed by: INTERNAL MEDICINE

## 2019-11-19 PROCEDURE — 00000402 ZZHCL STATISTIC TOTAL PROTEIN: Performed by: INTERNAL MEDICINE

## 2019-11-19 RX ORDER — ZOLMITRIPTAN 5 MG/1
TABLET, FILM COATED ORAL
Qty: 12 TABLET | Refills: 1 | Status: SHIPPED | OUTPATIENT
Start: 2019-11-19 | End: 2020-02-01

## 2019-11-19 RX ORDER — METOPROLOL SUCCINATE 25 MG/1
TABLET, EXTENDED RELEASE ORAL
Qty: 90 TABLET | Refills: 3 | Status: SHIPPED | OUTPATIENT
Start: 2019-11-19 | End: 2021-01-13

## 2019-11-19 ASSESSMENT — ANXIETY QUESTIONNAIRES
3. WORRYING TOO MUCH ABOUT DIFFERENT THINGS: SEVERAL DAYS
1. FEELING NERVOUS, ANXIOUS, OR ON EDGE: MORE THAN HALF THE DAYS
6. BECOMING EASILY ANNOYED OR IRRITABLE: SEVERAL DAYS
2. NOT BEING ABLE TO STOP OR CONTROL WORRYING: SEVERAL DAYS
IF YOU CHECKED OFF ANY PROBLEMS ON THIS QUESTIONNAIRE, HOW DIFFICULT HAVE THESE PROBLEMS MADE IT FOR YOU TO DO YOUR WORK, TAKE CARE OF THINGS AT HOME, OR GET ALONG WITH OTHER PEOPLE: NOT DIFFICULT AT ALL
GAD7 TOTAL SCORE: 6
7. FEELING AFRAID AS IF SOMETHING AWFUL MIGHT HAPPEN: SEVERAL DAYS
5. BEING SO RESTLESS THAT IT IS HARD TO SIT STILL: NOT AT ALL

## 2019-11-19 ASSESSMENT — PATIENT HEALTH QUESTIONNAIRE - PHQ9
5. POOR APPETITE OR OVEREATING: NOT AT ALL
SUM OF ALL RESPONSES TO PHQ QUESTIONS 1-9: 10

## 2019-11-19 ASSESSMENT — MIFFLIN-ST. JEOR: SCORE: 1111.94

## 2019-11-19 NOTE — PROGRESS NOTES
SUBJECTIVE:   Laurel Parra is a 69 year old female who presents for Preventive Visit.    The patient is here for a physical but also has multiple issues to discuss today.    The patient notes for the last year or so about once a month or once every 2 months she will get hot flashes, night sweats, nausea, and not feel well.  This can last 5 to 7 days and then it goes away and in between she does not have it.  She often will have a migraine preceding it.  She is on hormones.  She does take 6 Vicodin a day but this is not changed and it is not associated with any change in dose.    She has had burning of her lips for several years.  She also has rectal burning at times.  She did see colon rectal surgery as noted and reviewed and really they did not find anything.    At times she finds it hard to read for long periods of.  No eye pain.    She has had difficulty in the left cheek for the last 6 months.  Initially it was hot and red in the morning but this is resolved.  Now she finds it can be puffy and tender to the touch.  It also hurts when she lies on it.  No real sinus symptoms.  She saw a dentist and ENT and they did not find anything.    For quite a long time she has had burning or tingling at night in her hands or arms.  She also can have this on any part of the body that touches the bed.    She has depression and has regular follow.    Her migraines have been stable and she does see neurology.  She is up-to-date on her colon exam.  She has the chronic back pain.  She has anxiety.  No complaints of palpitations.  She is wondering about Erler's Danlos syndrome.  She does see gynecology regularly and is up-to-date there.  A complete review of systems is otherwise negative.                 Past Medical History:      Past Medical History:   Diagnosis Date     Anxiety     Dr. Adelina Meehan     Chronic constipation      Chronic narcotic use     [facility]     Chronic pain     Dr. Orta as of  2015     Chronic urethritis     Dr. Little     Colonic polyp 2011    one polyp, fu 5 years, fu 10/17 and no lesions     Depression, major, in partial remission (H)     Dr. Meehan     Diarrhea 2012    eval by [facility], resolved with gluten free diet     DJD (degenerative joint disease)      H/O gastroesophageal reflux (GERD)      LBP (low back pain)     Dr. Jae Tong in [place], then seeing [facility] Dr. Orta     Migraines     neuro eval     Palpitations     holter with pvc's and pac's, echo nl     Screening     dexa nl at gyn             Past Surgical History:      Past Surgical History:   Procedure Laterality Date     APPENDECTOMY  2016     ARTHROPLASTY CARPOMETACARPAL (THUMB JOINT) Left 2017    Procedure: ARTHROPLASTY CARPOMETACARPAL (THUMB JOINT);  REVISION  LEFT THUMB CARPOMETACARPAL ARTHROPOASTY WITH 1.1 TIGHT ROPE;  Surgeon: Beatrice Philippe MD;  Location: [facility]     ARTHROSCOPY WRIST Left 2019    Procedure: LEFT WRIST ARTHROSCOPY REMOVAL OF TIGHT ROPE ; LEFT EXTENSOR POLLICIS LONGUS /EXTENSOR CARPI RADIALIS BREVIS /EXTENSOR CARPI RADIALUS LONGUS SYNOVECTOMY ; EXTENSOR POLLICIS LONGUS TRANSPOSITION ; ANTERIOR INTEROSSEOUS NEURECTOMY ; POSTERIOR INTEROSSEOUS NEURECTOMY;  Surgeon: Beatrice Philippe MD;  Location:  OR     CARPAL TUNNEL RELEASE RT/LT        SECTION       COLONOSCOPY       EXCIS BARTHOLIN GLAND/CYST  2005     HERNIORRHAPHY INGUINAL  70's    x5     INJECT STEROID (LOCATION) Right 2019    Procedure: RIGHT WRIST CORTISONE INJECTION;  Surgeon: Beatrice Philippe MD;  Location:  OR     OPERATIVE HYSTEROSCOPY WITH MORCELLATOR N/A 2018    Procedure: OPERATIVE HYSTEROSCOPY WITH MORCELLATOR (MARTIN [employer] NEPHEW);  OPERATIVE HYSTEROSCOPY WITH TRUECLEAR MORCELLATOR 5C SCOPE ;  Surgeon: Iris Fernandez MD;  Location: [facility]     ORTHOPEDIC SURGERY Bilateral     SHOULDER ARTHROSCOPY     ORTHOPEDIC SURGERY Right 2017    right foot for plantar fasciitis  "    thumb surgery   and 2007             Social History:     Social History     Socioeconomic History     Marital status:      Spouse name: Not on file     Number of children: 1     Years of education: Not on file     Highest education level: Not on file   Occupational History     Employer: SELF   Social Needs     Financial resource strain: Not on file     Food insecurity:     Worry: Not on file     Inability: Not on file     Transportation needs:     Medical: Not on file     Non-medical: Not on file   Tobacco Use     Smoking status: Former Smoker     Packs/day: 0.00     Years: 0.00     Pack years: 0.00     Last attempt to quit: 10/29/1979     Years since quittin.0     Smokeless tobacco: Never Used   Substance and Sexual Activity     Alcohol use: No     Alcohol/week: 0.0 standard drinks     Drug use: No     Sexual activity: Not Currently     Partners: Male     Birth control/protection: Post-menopausal   Lifestyle     Physical activity:     Days per week: Not on file     Minutes per session: Not on file     Stress: Not on file   Relationships     Social connections:     Talks on phone: Not on file     Gets together: Not on file     Attends Pent[employer]al service: Not on file     Active member of club or organization: Not on file     Attends meetings of clubs or organizations: Not on file     Relationship status: Not on file     Intimate partner violence:     Fear of current or ex partner: Not on file     Emotionally abused: Not on file     Physically abused: Not on file     Forced sexual activity: Not on file   Other Topics Concern     Parent/sibling w/ CABG, MI or angioplasty before 65F 55M? No   Social History Narrative     Not on file             Family History:   reviewed         Allergies:     Allergies   Allergen Reactions     Compazine [Prochlorperazine] Other (See Comments)     \"crawl out of my skin\"     Gluten Meal Diarrhea     Penicillins Nausea and Vomiting and Hives             " Medications:     Current Outpatient Medications   Medication Sig Dispense Refill     Artificial Saliva (BIOTENE DRY MOUTH) LOZG Take 1-2 lozenges by mouth daily as needed       carboxymethylcellulose (REFRESH PLUS) 0.5 % SOLN ophthalmic solution Place 1 drop into both eyes 3 times daily as needed for dry eyes       Cyanocobalamin (B-12 DOTS PO) Take 1 tablet by mouth daily        cyclobenzaprine (FLEXERIL) 10 MG tablet Take 10 mg by mouth daily as needed for muscle spasms        estradiol (ESTRACE) 1 MG tablet Take 1.5 mg by mouth daily (with dinner) MUST BE  TEVA       Hydrocodone-Acetaminophen  MG TABS Take 2 tablets by mouth every 4 hours as needed       hydrOXYzine (ATARAX) 25 MG tablet Take 1 tablet (25 mg) by mouth every 6 hours as needed for itching (and nausea) 30 tablet 0     ipratropium (ATROVENT) 0.06 % spray Spray 3 sprays into both nostrils daily 45 mL 3     loratadine (CLARITIN) 10 MG tablet Take 10 mg by mouth daily as needed        LORazepam (ATIVAN) 1 MG tablet Take 1-2 tablets (1-2 mg) by mouth 4 times daily as needed for anxiety (up to 6 tablets per day) 30 tablet      MAGNESIUM PO Take 1 tablet by mouth daily (OTC)       metoprolol succinate ER (TOPROL-XL) 25 MG 24 hr tablet TAKE ONE TABLET BY MOUTH EVERY EVENING 90 tablet 3     NONFORMULARY Take 10 mg by mouth 2 times daily as needed Compounded Hydrocodone Bitartrate Powder -- compounded by [facility]       nortriptyline (PAMELOR) 25 MG capsule Take 25 mg by mouth At Bedtime  5     PARoxetine (PAXIL CR) 25 MG 24 hr tablet Take 25 mg by mouth 2 times daily NAME BRAND PAXIL CR        PROgesterone (PROMETRIUM) 100 MG capsule Take 200 mg by mouth daily (with dinner) (Takes 2 x 100mg = 200mg dose)       vitamin C (ASCORBIC ACID) 1000 MG TABS Take 1,000 mg by mouth daily       Vitamin D, Cholecalciferol, 1000 units TABS Take 2,000 Units by mouth daily        zinc gluconate 50 MG tablet Take 50 mg by mouth daily    "    ZOLMitriptan (ZOMIG) 5 MG tablet TAKE ONE TABLET BY MOUTH AT ONSET OF HEADACHE FOR MIGRAINE. MAY REPEAT IN 2 HOURS. MAX OF 2 TABLETS IN 24 HOURS. 12 tablet 1     Zolpidem Tartrate (AMBIEN CR PO) Take 12.5 mg by mouth At Bedtime                 Review of Systems:   The 10 point Review of Systems is negative other than noted in the HPI           Physical Exam:   Blood pressure 110/75, pulse 73, temperature 97  F (36.1  C), temperature source Oral, height 1.676 m (5' 6\"), weight 56 kg (123 lb 8 oz), SpO2 100 %, not currently breastfeeding.    Exam:  Constitutional: healthy appearing, alert and in no distress  Heent: Normocephalic. Head without obvious masses or lesions. PERRLDC, EOMI. Mouth exam within normal limits: tongue, mucous membranes, posterior pharynx all normal, no lesions or abnormalities seen.  Tm's and canals within normal limits bilaterally. Neck supple, no nuchal rigidity or masses. No supraclavicular, or cervical adenopathy. Thyroid symmetric, no masses.  Cardiovascular: Regular rate and rhythm, no murmer, rub or gallops.  JVP not elevated, no edema.  Carotids within normal limits bilaterally, no bruits.  Respiratory: Normal respiratory effort.  Lungs clear, normal flow, no wheezing or crackles.  Gastrointestinal: Normal active bowel sounds.   Soft, not tender, no masses, guarding or rebound.  No hepatosplenomegaly.   Musculoskeletal: extremities normal, no gross deformities noted.  Skin: no suspicious lesions or rashes   Neurologic: Mental status within normal limits.  Speech fluent.  No gross motor abnormalities and gait intact.  Psychiatric: mentation appears normal and affect normal.         Data:   Labs sent        Assessment:   1. Normal complete physical exam  2. Chronic pain/narcotic use, no signs abuse, follow up pain clinic  3. Intermittent hot flashes, nausea, etc, cause not clear, will check labs today  4. Lip burning, hand and arm burning, ?cause, check labs and if neg to neuro  5. " "Cheek discomfort, neg exam, given time frame will get ct  6. Migraines, stable  7. Gerd, n issues  8. Polyp, up to date on follow up  9. Depression and anxiety, stable  10. hcm         Plan:   shingrix at pharm  Letter with labs  Ct face  Exercise, diet      Georges Lebron M.D.              Are you in the first 12 months of your Medicare coverage?  No    Healthy Habits:     In general, how would you rate your overall health?  Fair    Frequency of exercise:  None    Do you usually eat at least 4 servings of fruit and vegetables a day, include whole grains    & fiber and avoid regularly eating high fat or \"junk\" foods?  No    Taking medications regularly:  Yes    Ability to successfully perform activities of daily living:  Preparing meals requires assistance and housework requires assistance    Home Safety:  Lack of grab bars in the bathroom    Hearing Impairment:  No hearing concerns    In the past 6 months, have you been bothered by leaking of urine?  No    In general, how would you rate your overall mental or emotional health?  Fair      PHQ-2 Total Score: 4    Additional concerns today:  Yes    Do you feel safe in your environment? Yes    Have you ever done Advance Care Planning? (For example, a Health Directive, POLST, or a discussion with a medical provider or your loved ones about your wishes): Yes, advance care planning is on file.      Fall risk  Fallen 2 or more times in the past year?: No  Any fall with injury in the past year?: No    Cognitive Screening   1) Repeat 3 items (Leader, Season, Table)    2) Clock draw: NORMAL  3) 3 item recall: Recalls 1 object   Results: NORMAL clock, 1-2 items recalled: COGNITIVE IMPAIRMENT LESS LIKELY    Mini-CogTM Copyright ARNOLD Martinez. Licensed by the author for use in [facility]; reprinted with permission (dheeraj@.[facility]). All rights reserved.      Do you have sleep apnea, excessive snoring or daytime drowsiness?: no    Reviewed and updated as needed this visit by " "clinical staff         Reviewed and updated as needed this visit by Provider        Social History     Tobacco Use     Smoking status: Former Smoker     Last attempt to quit: 10/29/1979     Years since quittin.0     Smokeless tobacco: Never Used   Substance Use Topics     Alcohol use: No     Alcohol/week: 0.0 standard drinks     If you drink alcohol do you typically have >3 drinks per day or >7 drinks per week? No    Alcohol Use 2019   Prescreen: >3 drinks/day or >7 drinks/week? Not Applicable   Prescreen: >3 drinks/day or >7 drinks/week? -               Current providers sharing in care for this patient include:   Patient Care Team:  Georges Lebron MD as PCP - General (Internal Medicine)  Georges Lebron MD as Assigned PCP    The following health maintenance items are reviewed in Epic and correct as of today:  Health Maintenance   Topic Date Due     URINE DRUG SCREEN  1950     ZOSTER IMMUNIZATION (2 of 3) 2013     INFLUENZA VACCINE (1) 2019     ALEXA ASSESSMENT  2019     MEDICARE ANNUAL WELLNESS VISIT  10/26/2019     MAMMO SCREENING  2020     FALL RISK ASSESSMENT  2020     PHQ-9  10/15/2020     DTAP/TDAP/TD IMMUNIZATION (3 - Td) 10/03/2023     LIPID  10/26/2023     ADVANCE CARE PLANNING  2024     COLONOSCOPY  10/16/2027     DEXA  Completed     HEPATITIS C SCREENING  Completed     DEPRESSION ACTION PLAN  Completed     PNEUMOCOCCAL IMMUNIZATION 65+ LOW/MEDIUM RISK  Completed     IPV IMMUNIZATION  Aged Out     MENINGITIS IMMUNIZATION  Aged Out           Review of Systems      OBJECTIVE:   There were no vitals taken for this visit. Estimated body mass index is 19.77 kg/m  as calculated from the following:    Height as of 19: 1.676 m (5' 6\").    Weight as of 19: 55.6 kg (122 lb 8 oz).  Physical Exam          ASSESSMENT / PLAN:       COUNSELING:  Reviewed preventive health counseling, as reflected in patient instructions       Regular exercise      " " Healthy diet/nutrition    Estimated body mass index is 19.77 kg/m  as calculated from the following:    Height as of 8/26/19: 1.676 m (5' 6\").    Weight as of 8/26/19: 55.6 kg (122 lb 8 oz).         reports that she quit smoking about 40 years ago. She has never used smokeless tobacco.      Appropriate preventive services were discussed with this patient, including applicable screening as appropriate for cardiovascular disease, diabetes, osteopenia/osteoporosis, and glaucoma.  As appropriate for age/gender, discussed screening for colorectal cancer, prostate cancer, breast cancer, and cervical cancer. Checklist reviewing preventive services available has been given to the patient.    Reviewed patients plan of care and provided an AVS. The Basic Care Plan (routine screening as documented in Health Maintenance) for Laurel meets the Care Plan requirement. This Care Plan has been established and reviewed with the Patient.    Counseling Resources:  ATP IV Guidelines  Pooled Cohorts Equation Calculator  Breast Cancer Risk Calculator  FRAX Risk Assessment  ICSI Preventive Guidelines  Dietary Guidelines for Americans, 2010  USDA's MyPlate  ASA Prophylaxis  Lung CA Screening    Georges Lebron MD  [facility]    Identified Health Risks:  "

## 2019-11-19 NOTE — PROGRESS NOTES
Prior to immunization administration, verified patients identity using patient s name and date of birth. Please see Immunization Activity for additional information.     Screening Questionnaire for Adult Immunization    Are you sick today?   No   Do you have allergies to medications, food, a vaccine component or latex?   No   Have you ever had a serious reaction after receiving a vaccination?   No   Do you have a long-term health problem with heart disease, lung disease, asthma, kidney disease, metabolic disease (e.g. diabetes), anemia, or other blood disorder?   No   Do you have cancer, leukemia, HIV/AIDS, or any other immune system problem?      In the past 3 months, have you taken medications that affect  your immune system, such as prednisone, other steroids, or anticancer drugs; drugs for the treatment of rheumatoid arthritis, Crohn s disease, or psoriasis; or have you had radiation treatments?   No   Have you had a seizure, or a brain or other nervous system problem?   No   During the past year, have you received a transfusion of blood or blood     products, or been given immune (gamma) globulin or antiviral drug?   No   For women: Are you pregnant or is there a chance you could become        pregnant during the next month?   No   Have you received any vaccinations in the past 4 weeks?   No     Immunization questionnaire answers were all negative.        Per orders of Dr. Lebron, injection of flu given by Ijeoma Felton CMA. Patient instructed to remain in clinic for 15 minutes afterwards, and to report any adverse reaction to me immediately.       Screening performed by Ijeoma Felton CMA on 11/19/2019 at 9:34 AM.  Due to injection administration, patient instructed to remain in clinic for 15 minutes  afterwards, and to report any adverse reaction to me immediately.

## 2019-11-20 ASSESSMENT — ANXIETY QUESTIONNAIRES: GAD7 TOTAL SCORE: 6

## 2019-11-21 LAB
ALBUMIN SERPL ELPH-MCNC: 4.1 G/DL (ref 3.7–5.1)
ALPHA1 GLOB SERPL ELPH-MCNC: 0.3 G/DL (ref 0.2–0.4)
ALPHA2 GLOB SERPL ELPH-MCNC: 0.9 G/DL (ref 0.5–0.9)
B-GLOBULIN SERPL ELPH-MCNC: 0.7 G/DL (ref 0.6–1)
GAMMA GLOB SERPL ELPH-MCNC: 0.5 G/DL (ref 0.7–1.6)
M PROTEIN SERPL ELPH-MCNC: 0 G/DL
PROT PATTERN SERPL ELPH-IMP: ABNORMAL

## 2019-11-25 NOTE — RESULT ENCOUNTER NOTE
It was very nice to see you for your physical.  You should be able to review all your test results.    Your CBC or complete blood count is normal with no signs of blood abnormalities.  Your blood salts, kidney tests, sugar test, and liver tests are all normal.  The protein is just slightly low but I believe this is due to your thin stature and I am not concerned.    Your thyroid test is normal.  Your folic acid level is just on the lower end of normal.  I think it is unlikely that this is causing any of your tingling or numbness but it cannot hurt to take a folic acid pill.  1 mg daily should do the trick.  Your B12 level is high so do not take b12 supplements.    Your cholesterol is slightly high at 244.  However, your HDL or good cholesterol is super at 66 and your LDL or bad cholesterol is very good at 114.  Your triglycerides are just a bit higher at this time but not at all concerning.  I would simply watch the fats and cholesterol intake for this.    Your cortisol level is in the normal range but on the low end of normal.  While I think it is unlikely that low cortisol is causing your symptoms it is a possibility and something we should follow-up on.  There is a test called a cosyntropin stim test which will let us know if you have any signs of cortisol deficiency.  We do this here in the office.  We take blood for cortisol and then inject a very safe medication and repeat your cortisol level about an hour later.  It is very safe and easy to do.  I would like to get this arranged at some point in the near future just to be certain.  Please call our office and we will arrange this.    As you can see overall things look quite good.  Please get the CT scan of the face.  Please call to schedule the above tests.    If you have any questions let me know.    Georges

## 2019-12-02 ENCOUNTER — TELEPHONE (OUTPATIENT)
Dept: FAMILY MEDICINE | Facility: CLINIC | Age: 69
End: 2019-12-02

## 2019-12-02 DIAGNOSIS — M62.81 GENERALIZED MUSCLE WEAKNESS: Primary | ICD-10-CM

## 2019-12-02 RX ORDER — COSYNTROPIN 0.25 MG/ML
0.25 INJECTION, POWDER, LYOPHILIZED, FOR SOLUTION INTRAMUSCULAR; INTRAVENOUS ONCE
Status: COMPLETED | OUTPATIENT
Start: 2019-12-03 | End: 2019-12-06

## 2019-12-02 NOTE — TELEPHONE ENCOUNTER
"Per lab notes    \"Your cortisol level is in the normal range but on the low end of normal.  While I think it is unlikely that low cortisol is causing your symptoms it is a possibility and something we should follow-up on.  There is a test called a cosyntropin stim test which will let us know if you have any signs of cortisol deficiency.  We do this here in the office\"     PCP - see pended orders for this (please review - unsure if pended correctly)     Beatrice MILLER RN    "

## 2019-12-02 NOTE — TELEPHONE ENCOUNTER
Reason for Call: Request for an order or referral:    Order or referral being requested: Order for lab to test cortisone levels     Date needed: as soon as possible    Has the patient been seen by the PCP for this problem? YES    Additional comments: Pt called states that PCP text her requesting that she have labs done to test her cortisone levels. Please call with any questions.     Phone number Patient can be reached at:  Home number on file [phone] (home)    Best Time:  Anytime     Can we leave a detailed message on this number?  YES    Call taken on 12/2/2019 at 10:02 AM by Sienna Wagoner

## 2019-12-03 ENCOUNTER — HOSPITAL ENCOUNTER (OUTPATIENT)
Dept: CT IMAGING | Facility: CLINIC | Age: 69
Discharge: HOME | End: 2019-12-03
Attending: INTERNAL MEDICINE | Admitting: INTERNAL MEDICINE
Payer: COMMERCIAL

## 2019-12-03 DIAGNOSIS — R51.9 LEFT FACIAL PAIN: ICD-10-CM

## 2019-12-03 PROCEDURE — 70486 CT MAXILLOFACIAL W/O DYE: CPT

## 2019-12-03 NOTE — TELEPHONE ENCOUNTER
Cortrosyn in cupboard. Recall.  Will offer 8 am appointment. (should be on a day that  is in office)    Karen Avila RN on 12/3/2019 at 5:04 PM

## 2019-12-03 NOTE — TELEPHONE ENCOUNTER
Checking on Cortrosyn availability in clinic; or if we need to order.   Call patient to schedule when med.available.    Karen Avila RN on 12/3/2019 at 9:10 AM

## 2019-12-04 NOTE — TELEPHONE ENCOUNTER
Detailed message asking patient to call back to set up lab visit for ACTH stimulation test     Will need lab visit (preferably 8am), then lab recheck 30 mins and 60mins post-medication     Beatrice MILLER RN

## 2019-12-06 ENCOUNTER — ALLIED HEALTH/NURSE VISIT (OUTPATIENT)
Dept: NURSING | Facility: CLINIC | Age: 69
End: 2019-12-06
Payer: COMMERCIAL

## 2019-12-06 DIAGNOSIS — M62.81 GENERALIZED MUSCLE WEAKNESS: ICD-10-CM

## 2019-12-06 DIAGNOSIS — M62.81 GENERALIZED MUSCLE WEAKNESS: Primary | ICD-10-CM

## 2019-12-06 LAB
CORTICOSTER 1H P 250 UG ACTH SERPL-SCNC: 33.9 UG/DL
CORTICOSTER 30M P 250 UG ACTH SERPL-SCNC: 29.8 UG/DL
CORTICOSTER SERPL-MCNC: 16.6 UG/DL (ref 4–22)

## 2019-12-06 PROCEDURE — 36415 COLL VENOUS BLD VENIPUNCTURE: CPT | Performed by: INTERNAL MEDICINE

## 2019-12-06 PROCEDURE — 99207 ZZC NO CHARGE NURSE ONLY: CPT

## 2019-12-06 PROCEDURE — 82533 TOTAL CORTISOL: CPT | Performed by: INTERNAL MEDICINE

## 2019-12-06 RX ADMIN — COSYNTROPIN 0.25 MG: 0.25 INJECTION, POWDER, LYOPHILIZED, FOR SOLUTION INTRAMUSCULAR; INTRAVENOUS at 08:00

## 2019-12-06 NOTE — NURSING NOTE
Clinic Administered Medication Documentation    MEDICATION LIST:   Injectable Medication Documentation    Patient was given Cosynotripin 0.25mg (reconstituted in 1mL 0.9% NaCl). Prior to medication administration, verified patients identity using patient s name and date of birth. Please see MAR and medication order for additional information. Patient instructed to remain in clinic, post-med lab draw at 30 mins after injection and 60mins after injection.      Was entire vial of medication used? Yes  Vial/Syringe: Single dose vial  Expiration Date:  07/2020  Was this medication supplied by the patient? No     NaCl:     Single use vial  0.9% NaCl  NDC: 1439-3130-94  LOT: [id number]  Exp: 01 Apr 2020  Wasted 9 mL of 10mL vial     Beatrice MILLER RN

## 2019-12-06 NOTE — RESULT ENCOUNTER NOTE
Great news, you do not have Jose Francisco's disease, your cortisol levels are very normal and rise appropriately.  Let me know if you have questions.    Georges

## 2019-12-09 ENCOUNTER — TRANSFERRED RECORDS (OUTPATIENT)
Dept: HEALTH INFORMATION MANAGEMENT | Facility: CLINIC | Age: 69
End: 2019-12-09

## 2019-12-10 ENCOUNTER — TRANSFERRED RECORDS (OUTPATIENT)
Dept: HEALTH INFORMATION MANAGEMENT | Facility: CLINIC | Age: 69
End: 2019-12-10

## 2020-01-08 ENCOUNTER — TRANSFERRED RECORDS (OUTPATIENT)
Dept: HEALTH INFORMATION MANAGEMENT | Facility: CLINIC | Age: 70
End: 2020-01-08

## 2020-01-15 ENCOUNTER — TRANSFERRED RECORDS (OUTPATIENT)
Dept: HEALTH INFORMATION MANAGEMENT | Facility: CLINIC | Age: 70
End: 2020-01-15

## 2020-01-20 ENCOUNTER — TRANSFERRED RECORDS (OUTPATIENT)
Dept: HEALTH INFORMATION MANAGEMENT | Facility: CLINIC | Age: 70
End: 2020-01-20

## 2020-01-31 DIAGNOSIS — G43.719 INTRACTABLE CHRONIC MIGRAINE WITHOUT AURA AND WITHOUT STATUS MIGRAINOSUS: ICD-10-CM

## 2020-01-31 NOTE — TELEPHONE ENCOUNTER
"ZOLMitriptan (ZOMIG) 5 MG tablet    Last Written Prescription Date:  11/19/2019  Last Fill Quantity: 12,  # refills: 1   Last office visit: 11/19/2019 with prescribing provider:  Dr. Lebron    Future Office Visit:  Unknown     Requested Prescriptions   Pending Prescriptions Disp Refills     ZOLMitriptan (ZOMIG) 5 MG tablet [Pharmacy Med Name: ZOLMITRIPTAN 5MG TABS] 12 tablet 1     Sig: TAKE ONE TABLET AT ONSET OF HEADACHE FOR MIGRAINE, MAY REPEAT IN 2 HOURS, MAX OF 2 TABLETS IN 24 HOURS       Serotonin Agonists Failed - 1/31/2020  1:50 PM        Failed - Serotonin Agonist request needs review.     Please review patient's record. If patient has had 8 or more treatments in the past month, please forward to provider.          Passed - Blood pressure under 140/90 in past 12 months     BP Readings from Last 3 Encounters:   11/19/19 110/75   08/27/19 121/72   08/14/19 119/84                 Passed - Recent (12 mo) or future (30 days) visit within the authorizing provider's specialty     Patient has had an office visit with the authorizing provider or a provider within the authorizing providers department within the previous 12 mos or has a future within next 30 days. See \"Patient Info\" tab in inbasket, or \"Choose Columns\" in Meds & Orders section of the refill encounter.              Passed - Medication is active on med list        Passed - Patient is age 18 or older        Passed - No active pregnancy on record        Passed - No positive pregnancy test in past 12 months          "

## 2020-02-01 RX ORDER — ZOLMITRIPTAN 5 MG/1
TABLET, FILM COATED ORAL
Qty: 12 TABLET | Refills: 1 | Status: SHIPPED | OUTPATIENT
Start: 2020-02-01 | End: 2020-03-18

## 2020-02-19 ENCOUNTER — TRANSFERRED RECORDS (OUTPATIENT)
Dept: HEALTH INFORMATION MANAGEMENT | Facility: CLINIC | Age: 70
End: 2020-02-19

## 2020-02-26 ENCOUNTER — HOSPITAL ENCOUNTER (OUTPATIENT)
Facility: CLINIC | Age: 70
End: 2020-02-26
Attending: ORTHOPAEDIC SURGERY | Admitting: ORTHOPAEDIC SURGERY
Payer: COMMERCIAL

## 2020-02-26 ENCOUNTER — TRANSFERRED RECORDS (OUTPATIENT)
Dept: HEALTH INFORMATION MANAGEMENT | Facility: CLINIC | Age: 70
End: 2020-02-26

## 2020-03-04 DIAGNOSIS — G43.719 INTRACTABLE CHRONIC MIGRAINE WITHOUT AURA AND WITHOUT STATUS MIGRAINOSUS: ICD-10-CM

## 2020-03-04 RX ORDER — ZOLMITRIPTAN 5 MG/1
TABLET, FILM COATED ORAL
Start: 2020-03-04

## 2020-03-04 NOTE — TELEPHONE ENCOUNTER
"ZOLMitriptan (ZOMIG) 5 MG tablet     Last Written Prescription Date:  2/1/2020  Last Fill Quantity: 12,  # refills: 1   Last office visit: 11/19/2019 with prescribing provider:  Dr. Lebron   Future Office Visit:   Next 5 appointments (look out 90 days)    Mar 11, 2020 10:30 AM CDT  [facility] [address] [facility] [address] [phone]         Requested Prescriptions   Pending Prescriptions Disp Refills     ZOLMitriptan (ZOMIG) 5 MG tablet [Pharmacy Med Name: ZOLMITRIPTAN 5MG TABS] 12 tablet 1     Sig: TAKE ONE TABLET AT ONSET OF HEADACHE FOR MIGRAINE, MAY REPEAT IN 2 HOURS, MAX OF 2 TABLETS IN 24 HOURS       Serotonin Agonists Failed - 3/4/2020  7:34 AM        Failed - Serotonin Agonist request needs review.     Please review patient's record. If patient has had 8 or more treatments in the past month, please forward to provider.          Passed - Blood pressure under 140/90 in past 12 months     BP Readings from Last 3 Encounters:   11/19/19 110/75   08/27/19 121/72   08/14/19 119/84                 Passed - Recent (12 mo) or future (30 days) visit within the authorizing provider's specialty     Patient has had an office visit with the authorizing provider or a provider within the authorizing providers department within the previous 12 mos or has a future within next 30 days. See \"Patient Info\" tab in inbasket, or \"Choose Columns\" in Meds & Orders section of the refill encounter.              Passed - Medication is active on med list        Passed - Patient is age 18 or older        Passed - No active pregnancy on record        Passed - No positive pregnancy test in past 12 months          "

## 2020-03-11 ENCOUNTER — OFFICE VISIT (OUTPATIENT)
Dept: FAMILY MEDICINE | Facility: CLINIC | Age: 70
End: 2020-03-11
Payer: COMMERCIAL

## 2020-03-11 VITALS
HEART RATE: 68 BPM | SYSTOLIC BLOOD PRESSURE: 109 MMHG | BODY MASS INDEX: 20.09 KG/M2 | DIASTOLIC BLOOD PRESSURE: 77 MMHG | TEMPERATURE: 98.3 F | OXYGEN SATURATION: 95 % | HEIGHT: 66 IN | WEIGHT: 125 LBS

## 2020-03-11 DIAGNOSIS — M25.532 LEFT WRIST PAIN: ICD-10-CM

## 2020-03-11 DIAGNOSIS — Z01.818 PREOP GENERAL PHYSICAL EXAM: Primary | ICD-10-CM

## 2020-03-11 PROCEDURE — 99214 OFFICE O/P EST MOD 30 MIN: CPT | Performed by: NURSE PRACTITIONER

## 2020-03-11 RX ORDER — ONDANSETRON 4 MG/1
4 TABLET, FILM COATED ORAL EVERY 8 HOURS PRN
COMMUNITY
End: 2020-09-16

## 2020-03-11 RX ORDER — ESTRADIOL 0.5 MG/1
0.5 TABLET ORAL EVERY EVENING
COMMUNITY
Start: 2020-01-08 | End: 2021-05-11

## 2020-03-11 ASSESSMENT — MIFFLIN-ST. JEOR: SCORE: 1118.75

## 2020-03-11 NOTE — PROGRESS NOTES
[address] [address]  [phone]  Dept: [phone]    PRE-OP EVALUATION:  Today's date: 3/11/2020    Laurel Parra (: 1950) presents for pre-operative evaluation assessment as requested by Beatrice Hernandez.  She requires evaluation and anesthesia risk assessment prior to undergoing surgery/procedure for treatment of Arthritis of left forearm  .    Proposed Surgery/ Procedure: LEFT TOTAL WRIST FUSION   Date of Surgery/ Procedure: 2020  Time of Surgery/ Procedure: 12:30 PM  Hospital/Surgical Facility:  OR  Fax number for surgical facility:   Primary Physician: Georges Lebron  Type of Anesthesia Anticipated: MAC with Block    Patient has a Health Care Directive or Living Will:  YES    1. NO - Do you have a history of heart attack, stroke, stent, bypass or surgery on an artery in the head, neck, heart or legs?  2. NO - Do you ever have any pain or discomfort in your chest?  3. NO - Do you have a history of  Heart Failure?  4. NO - Are you troubled by shortness of breath when: walking on the level, up a slight hill or at night?  5. NO - Do you currently have a cold, bronchitis or other respiratory infection?  6. NO - Do you have a cough, shortness of breath or wheezing?  7. NO - Do you sometimes get pains in the calves of your legs when you walk?  8. YES- Do you or anyone in your family have previous history of blood clots? Father. After a surgery   9. NO - Do you or does anyone in your family have a serious bleeding problem such as prolonged bleeding following surgeries or cuts?  10. NO - Have you ever had problems with anemia or been told to take iron pills?  11. YES - Have you had any abnormal blood loss such as black, tarry or bloody stools, or abnormal vaginal bleeding? Has had multiple episodes of vaginal bleeding   12. NO - Have you ever had a blood transfusion?   13. YES - Have you or any of your relatives ever had problems with  anesthesia? Father. No personal problems with anesthesia   14. YES - Do you have sleep apnea, excessive snoring or daytime drowsiness? Poor sleep d/t pain   15. NO - Do you have any prosthetic heart valves?  16. NO - Do you have prosthetic joints?  17. NO - Is there any chance that you may be pregnant?      HPI:     HPI related to upcoming procedure:  Had L wrist surgery in Aug 2019. Now going for fusion d/t severe pain.     Overall doesn't feel great that sounds more hormonal as she is having hot flashes. Had estrogen and progesterone increased a few months ago. Still has had occasional vaginal bleeding so OB was hoping increase in hormones would help.     See problem list for active medical problems.  Problems all longstanding and stable, except as noted/documented.  See ROS for pertinent symptoms related to these conditions.      MEDICAL HISTORY:     Patient Active Problem List    Diagnosis Date Noted     Recurrent major depressive disorder, in partial remission (H) 12/15/2016     Priority: Medium     Gastroesophageal reflux disease without esophagitis 12/15/2016     Priority: Medium     Chronic low back pain, unspecified back pain laterality, with sciatica presence unspecified 12/15/2016     Priority: Medium     Intractable chronic migraine without aura and without status migrainosus 12/06/2016     Priority: Medium     Chronic narcotic use      Priority: Medium     [facility]       Palpitations      Priority: Medium     holter with pvc's and pac's, echo nl       Chronic urethritis      Priority: Medium     Dr. Little       Colonic polyp      Priority: Medium     one polyp, fu 5 years       Anxiety      Priority: Medium     Frequency of urination and polyuria 12/28/2011     Priority: Medium      Past Medical History:   Diagnosis Date     Anxiety     Dr. Adelina Meehan     Chronic constipation      Chronic narcotic use     [facility]     Chronic pain     Dr. Orta as of 2015     Chronic  urethritis     Dr. Little     Colonic polyp 2011    one polyp, fu 5 years, fu 10/17 and no lesions     Depression, major, in partial remission (H)     Dr. Meehan     Diarrhea 2012    eval by [facility], resolved with gluten free diet     DJD (degenerative joint disease)      H/O gastroesophageal reflux (GERD)      LBP (low back pain)     Dr. Jae Tong in [place], then seeing [facility] Dr. Orta     Migraines     neuro eval     Palpitations     holter with pvc's and pac's, echo nl     Screening     dexa nl at gyn     Past Surgical History:   Procedure Laterality Date     APPENDECTOMY  2016     ARTHROPLASTY CARPOMETACARPAL (THUMB JOINT) Left 2017    Procedure: ARTHROPLASTY CARPOMETACARPAL (THUMB JOINT);  REVISION  LEFT THUMB CARPOMETACARPAL ARTHROPOASTY WITH 1.1 TIGHT ROPE;  Surgeon: Beatrice Philippe MD;  Location:  [facility]     ARTHROSCOPY WRIST Left 2019    Procedure: LEFT WRIST ARTHROSCOPY REMOVAL OF TIGHT ROPE ; LEFT EXTENSOR POLLICIS LONGUS /EXTENSOR CARPI RADIALIS BREVIS /EXTENSOR CARPI RADIALUS LONGUS SYNOVECTOMY ; EXTENSOR POLLICIS LONGUS TRANSPOSITION ; ANTERIOR INTEROSSEOUS NEURECTOMY ; POSTERIOR INTEROSSEOUS NEURECTOMY;  Surgeon: Beatrice Philippe MD;  Location:  OR     CARPAL TUNNEL RELEASE RT/LT        SECTION       COLONOSCOPY       EXCIS BARTHOLIN GLAND/CYST  2005     HERNIORRHAPHY INGUINAL  70's    x5     INJECT STEROID (LOCATION) Right 2019    Procedure: RIGHT WRIST CORTISONE INJECTION;  Surgeon: Beatrice Philippe MD;  Location:  OR     OPERATIVE HYSTEROSCOPY WITH MORCELLATOR N/A 2018    Procedure: OPERATIVE HYSTEROSCOPY WITH MORCELLATOR ([employer]IN [employer]PHEW);  OPERATIVE HYSTEROSCOPY WITH TRUECLEAR MORCELLATOR 5C SCOPE ;  Surgeon: Iris Fernandez MD;  Location: [facility]     ORTHOPEDIC SURGERY Bilateral     SHOULDER ARTHROSCOPY     ORTHOPEDIC SURGERY Right 2017    right foot for plantar fasciitis     thumb surgery   and , 2007     Current  Outpatient Medications   Medication Sig Dispense Refill     Artificial Saliva (BIOTENE DRY MOUTH) LOZG Take 1-2 lozenges by mouth daily as needed       carboxymethylcellulose (REFRESH PLUS) 0.5 % SOLN ophthalmic solution Place 1 drop into both eyes 3 times daily as needed for dry eyes       Cyanocobalamin (B-12 DOTS PO) Take 1 tablet by mouth daily        cyclobenzaprine (FLEXERIL) 10 MG tablet Take 10 mg by mouth daily as needed for muscle spasms        estradiol (ESTRACE) 1 MG tablet Take 1.5 mg by mouth daily (with dinner) MUST BE  TEVA       Hydrocodone-Acetaminophen  MG TABS Take 2 tablets by mouth every 4 hours as needed       hydrOXYzine (ATARAX) 25 MG tablet Take 1 tablet (25 mg) by mouth every 6 hours as needed for itching (and nausea) 30 tablet 0     ipratropium (ATROVENT) 0.06 % spray Spray 3 sprays into both nostrils daily 45 mL 3     loratadine (CLARITIN) 10 MG tablet Take 10 mg by mouth daily as needed        LORazepam (ATIVAN) 1 MG tablet Take 1-2 tablets (1-2 mg) by mouth 4 times daily as needed for anxiety (up to 6 tablets per day) 30 tablet      MAGNESIUM PO Take 1 tablet by mouth daily (OTC)       metoprolol succinate ER (TOPROL-XL) 25 MG 24 hr tablet TAKE ONE TABLET BY MOUTH EVERY EVENING 90 tablet 3     NONFORMULARY Take 10 mg by mouth 2 times daily as needed Compounded Hydrocodone Bitartrate Powder -- compounded by [facility]       nortriptyline (PAMELOR) 25 MG capsule Take 25 mg by mouth At Bedtime  5     PARoxetine (PAXIL CR) 25 MG 24 hr tablet Take 25 mg by mouth 2 times daily NAME BRAND PAXIL CR        PROgesterone (PROMETRIUM) 100 MG capsule Take 200 mg by mouth daily (with dinner) (Takes 2 x 100mg = 200mg dose)       vitamin C (ASCORBIC ACID) 1000 MG TABS Take 1,000 mg by mouth daily       Vitamin D, Cholecalciferol, 1000 units TABS Take 2,000 Units by mouth daily        zinc gluconate 50 MG tablet Take 50 mg by mouth daily       ZOLMitriptan (ZOMIG) 5  "MG tablet TAKE ONE TABLET AT ONSET OF HEADACHE FOR MIGRAINE, MAY REPEAT IN 2 HOURS, MAX OF 2 TABLETS IN 24 HOURS 12 tablet 1     Zolpidem Tartrate (AMBIEN CR PO) Take 12.5 mg by mouth At Bedtime       OTC products: None, except as noted above    Allergies   Allergen Reactions     Compazine [Prochlorperazine] Other (See Comments)     \"crawl out of my skin\"     Gluten Meal Diarrhea     Penicillins Nausea and Vomiting and Hives      Latex Allergy: NO    Social History     Tobacco Use     Smoking status: Former Smoker     Packs/day: 0.00     Years: 0.00     Pack years: 0.00     Last attempt to quit: 10/29/1979     Years since quittin.3     Smokeless tobacco: Never Used   Substance Use Topics     Alcohol use: No     Alcohol/week: 0.0 standard drinks     History   Drug Use No       REVIEW OF SYSTEMS:   Constitutional, neuro, ENT, endocrine, pulmonary, cardiac, gastrointestinal, genitourinary, musculoskeletal, integument and psychiatric systems are negative, except as otherwise noted.    EXAM:   /77 (BP Location: Right arm, Patient Position: Sitting, Cuff Size: Adult Regular)   Pulse 68   Temp 98.3  F (36.8  C) (Oral)   Ht 1.676 m (5' 6\")   Wt 56.7 kg (125 lb)   SpO2 95%   BMI 20.18 kg/m      GENERAL APPEARANCE: healthy, alert and no distress     EYES: EOMI,      RESP: lungs clear to auscultation - no rales, rhonchi or wheezes     CV: regular rates and rhythm, normal S1 S2, no S3 or S4 and no murmur, click or rub     MS: extremities normal- no gross deformities noted, no evidence of inflammation in joints, FROM in all extremities.     SKIN: no suspicious lesions or rashes     NEURO: Normal strength and tone, sensory exam grossly normal, mentation intact and speech normal     PSYCH: mentation appears normal. and affect normal/bright    DIAGNOSTICS:   EKG 29 with sinus benigno     Recent Labs   Lab Test 19  0941 10/26/18  1002  12/15/16  0827   HGB 13.0 13.2   < > 13.5    289   < > 230   NA " 133 137  --  142   POTASSIUM 4.1 4.1   < > 4.2   CR 0.60 0.73   < > 0.76   A1C  --   --   --  5.3    < > = values in this interval not displayed.        IMPRESSION:   Reason for surgery/procedure: L wrist fusion   Diagnosis/reason for consult: medical preop     The proposed surgical procedure is considered INTERMEDIATE risk.    REVISED CARDIAC RISK INDEX  The patient has the following serious cardiovascular risks for perioperative complications such as (MI, PE, VFib and 3  AV Block):  No serious cardiac risks  INTERPRETATION: 0 risks: Class I (very low risk - 0.4% complication rate)    The patient has the following additional risks for perioperative complications:  No identified additional risks      ICD-10-CM    1. Preop general physical exam  Z01.818    2. Left wrist pain  M25.532        RECOMMENDATIONS:         --Patient is to take all scheduled medications on the day of surgery EXCEPT for modifications listed below.  Hold vitamins morning of surgery     APPROVAL GIVEN to proceed with proposed procedure, without further diagnostic evaluation       Signed Electronically by: FAVIO Roberts CNP    Copy of this evaluation report is provided to requesting physician.    [facility] Preop Guidelines    Revised Cardiac Risk Index

## 2020-03-18 DIAGNOSIS — G43.719 INTRACTABLE CHRONIC MIGRAINE WITHOUT AURA AND WITHOUT STATUS MIGRAINOSUS: ICD-10-CM

## 2020-03-18 RX ORDER — ZOLMITRIPTAN 5 MG/1
TABLET, FILM COATED ORAL
Qty: 12 TABLET | Refills: 1 | Status: SHIPPED | OUTPATIENT
Start: 2020-03-18 | End: 2020-05-12

## 2020-03-18 NOTE — TELEPHONE ENCOUNTER
"ZOLMitriptan (ZOMIG) 5 MG tablet  12 tablet  1  2/1/2020      Last Written Prescription Date:  2/1/2020  Last Fill Quantity: 12,  # refills: 1   Last office visit: 3/11/2020 with prescribing provider: GAIL Esparza    Future Office Visit: Unknown    Requested Prescriptions   Pending Prescriptions Disp Refills     ZOLMitriptan (ZOMIG) 5 MG tablet [Pharmacy Med Name: ZOLMITRIPTAN 5MG TABS] 12 tablet 1     Sig: TAKE ONE TABLET BY MOUTH AT ONSET OF HEADACHE FOR MIGRAINE, MAY REPEAT ONCE AFTER 2 HOURS. MAX OF 2 TABLETS IN 24 HOURS       Serotonin Agonists Failed - 3/18/2020 12:02 PM        Failed - Serotonin Agonist request needs review.     Please review patient's record. If patient has had 8 or more treatments in the past month, please forward to provider.          Passed - Blood pressure under 140/90 in past 12 months     BP Readings from Last 3 Encounters:   03/11/20 109/77   11/19/19 110/75   08/27/19 121/72                 Passed - Recent (12 mo) or future (30 days) visit within the authorizing provider's specialty     Patient has had an office visit with the authorizing provider or a provider within the authorizing providers department within the previous 12 mos or has a future within next 30 days. See \"Patient Info\" tab in inbasket, or \"Choose Columns\" in Meds & Orders section of the refill encounter.              Passed - Medication is active on med list        Passed - Patient is age 18 or older        Passed - No active pregnancy on record        Passed - No positive pregnancy test in past 12 months             " no

## 2020-04-16 ENCOUNTER — VIRTUAL VISIT (OUTPATIENT)
Dept: FAMILY MEDICINE | Facility: CLINIC | Age: 70
End: 2020-04-16
Payer: COMMERCIAL

## 2020-04-16 DIAGNOSIS — R00.2 PALPITATIONS: ICD-10-CM

## 2020-04-16 DIAGNOSIS — F41.9 ANXIETY: ICD-10-CM

## 2020-04-16 DIAGNOSIS — F11.90 CHRONIC NARCOTIC USE: ICD-10-CM
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
## 2020-04-16 PROCEDURE — 99214 OFFICE O/P EST MOD 30 MIN: CPT | Mod: TEL | Performed by: NURSE PRACTITIONER

## 2020-04-16 NOTE — PROGRESS NOTES
"Laurel Parra is a 69 year old female who is being evaluated via a billable telephone visit.      The patient has been notified of following:     \"This telephone visit will be conducted via a call between you and your physician/provider. We have found that certain health care needs can be provided without the need for a physical exam.  This service lets us provide the care you need with a short phone conversation.  If a prescription is necessary we can send it directly to your pharmacy.  If lab work is needed we can place an order for that and you can then stop by our lab to have the test done at a later time.    Telephone visits are billed at different rates depending on your insurance coverage. During this emergency period, for some insurers they may be billed the same as an in-person visit.  Please reach out to your insurance provider with any questions.    If during the course of the call the physician/provider feels a telephone visit is not appropriate, you will not be charged for this service.\"    Patient has given verbal consent for Telephone visit?  Yes    How would you like to obtain your AVS? Rose Edwards     Laurel Parra is a 69 year old female who presents to clinic today for the following health issues:  Last week Chayo had 2 falls last week on different days.  Once going up some stairs and another while standing and reaching for something on her bed.  She did not feel SOB, weak,nauseated , sweaty and had no chest pain or awareness of sensation of irregular heart rate at the time the incidents occurred.  She did not trip and did not injure herself in the falls.  She did not loose consciousness and was able to stand and walk and carry on immediately after falling.  Denies weakness, numbness or pain in legs , and no change in low back pain which mostly just bothers her getting out of bed in the morning.  She feels quite certain that it was related to a drop in her " "blood pressure which she describes having had in the past.  She feels briefly dizzy when this occurs.  And she says she typically runs a \"lower BP\"  Today BP sitting was 123/78 ( no pulse taken) and standing was 100/80.  And she felt well no dizziness.  Has had no recurrence this week.    She had a bad week with migraine and palpitations last week but okay now.  She took her norco 2 q 4 hours for chronic MSK pain problems including neck, shoulders, wrists, back and knees , she also takes benzo lorazepam 1mg tid and paxil 25mg bid (anxiety) and nortriptyline (migraine prevention) .  Due to daily migraine was also taking zomig daily last week.   She takes metoprolol 25 mg daily for her palpitations but was experiencing frequent palpitations at rest last week  Had reported this to her son who insisted on her calling the clinic today for a virtual visit ( [facility] in home mandate in place)  She reports that her anxiety level is somewhat higher than usual but that she is not feeling panic.    She is eating and drinking normally and says that she drinks a lot of water.  Sleeping well.    Holter monitor in 2011 demonstrated low burden VE    Patient Active Problem List   Diagnosis     Frequency of urination and polyuria     Anxiety     Colonic polyp     Chronic urethritis     Palpitations     Chronic narcotic use     Intractable chronic migraine without aura and without status migrainosus     Recurrent major depressive disorder, in partial remission (H)     Gastroesophageal reflux disease without esophagitis     Chronic low back pain, unspecified back pain laterality, with sciatica presence unspecified     Past Surgical History:   Procedure Laterality Date     APPENDECTOMY  2016     ARTHROPLASTY CARPOMETACARPAL (THUMB JOINT) Left 6/7/2017    Procedure: ARTHROPLASTY CARPOMETACARPAL (THUMB JOINT);  REVISION  LEFT THUMB CARPOMETACARPAL ARTHROPOASTY WITH 1.1 TIGHT ROPE;  Surgeon: Beatrice Philippe MD;  Location: [facility]     " ARTHROSCOPY WRIST Left 2019    Procedure: LEFT WRIST ARTHROSCOPY REMOVAL OF TIGHT ROPE ; LEFT EXTENSOR POLLICIS LONGUS /EXTENSOR CARPI RADIALIS BREVIS /EXTENSOR CARPI RADIALUS LONGUS SYNOVECTOMY ; EXTENSOR POLLICIS LONGUS TRANSPOSITION ; ANTERIOR INTEROSSEOUS NEURECTOMY ; POSTERIOR INTEROSSEOUS NEURECTOMY;  Surgeon: Beatrice Philippe MD;  Location:  OR     CARPAL TUNNEL RELEASE RT/LT  2006      SECTION       COLONOSCOPY       EXCIS BARTHOLIN GLAND/CYST  2005     HERNIORRHAPHY INGUINAL  70's    x5     INJECT STEROID (LOCATION) Right 2019    Procedure: RIGHT WRIST CORTISONE INJECTION;  Surgeon: Beatrice Philippe MD;  Location:  OR     OPERATIVE HYSTEROSCOPY WITH MORCELLATOR N/A 2018    Procedure: OPERATIVE HYSTEROSCOPY WITH MORCELLATOR ([employer]);  OPERATIVE HYSTEROSCOPY WITH TRUECLEAR MORCELLATOR 5C SCOPE ;  Surgeon: Iris Fernandez MD;  [facility]     ORTHOPEDIC SURGERY Bilateral     SHOULDER ARTHROSCOPY     ORTHOPEDIC SURGERY Right 2017    right foot for plantar fasciitis     thumb surgery   and 2007       Social History     Tobacco Use     Smoking status: Former Smoker     Packs/day: 0.00     Years: 0.00     Pack years: 0.00     Last attempt to quit: 10/29/1979     Years since quittin.4     Smokeless tobacco: Never Used   Substance Use Topics     Alcohol use: No     Alcohol/week: 0.0 standard drinks     Family History   Problem Relation Age of Onset     Diabetes Father      Hypertension Father      Depression Father      Substance Abuse Father      Anesthesia Reaction Father      Obesity Father      Diabetes Mother      Cerebrovascular Disease Mother         Cadasils disease     Depression Mother      Mental Illness Mother         borderline personality disorder, Chemical dependency     Substance Abuse Mother      Genetic Disorder Mother         Cadasils     Obesity Mother      Cerebrovascular Disease Paternal Grandfather      Cerebrovascular Disease Brother          Cadasils disease     Depression Brother      Anxiety Disorder Brother      Substance Abuse Brother      Genetic Disorder Brother         cadasils     Obesity Brother      Cerebrovascular Disease Sister         Cadasils disease     Depression Sister      Anxiety Disorder Sister      Genetic Disorder Sister         cadasils     Obesity Sister      Cerebrovascular Disease Sister         Cadasils disease     Genetic Disorder Sister         cadasils     Breast Cancer Sister      Depression Sister      Anxiety Disorder Sister      Obesity Sister          Current Outpatient Medications   Medication Sig Dispense Refill     Artificial Saliva (BIOTENE DRY MOUTH) LOZG Take 1-2 lozenges by mouth daily as needed       carboxymethylcellulose (REFRESH PLUS) 0.5 % SOLN ophthalmic solution Place 1 drop into both eyes 3 times daily as needed for dry eyes       Cyanocobalamin (B-12 DOTS PO) Take 1 tablet by mouth daily        cyclobenzaprine (FLEXERIL) 10 MG tablet Take 10 mg by mouth daily as needed for muscle spasms        estradiol (ESTRACE) 0.5 MG tablet Take 0.5 mg by mouth daily Take total 1.5 daily       estradiol (ESTRACE) 1 MG tablet Take 1 mg by mouth daily (with dinner) MUST BE  TEVA . Take total 1.5 mg daily       Hydrocodone-Acetaminophen  MG TABS Take 2 tablets by mouth every 4 hours as needed       hydrOXYzine (ATARAX) 25 MG tablet Take 1 tablet (25 mg) by mouth every 6 hours as needed for itching (and nausea) 30 tablet 0     ipratropium (ATROVENT) 0.06 % spray Spray 3 sprays into both nostrils daily 45 mL 3     loratadine (CLARITIN) 10 MG tablet Take 10 mg by mouth daily as needed        LORazepam (ATIVAN) 1 MG tablet Take 1-2 tablets (1-2 mg) by mouth 4 times daily as needed for anxiety (up to 6 tablets per day) 30 tablet      MAGNESIUM PO Take 240 mg by mouth daily (OTC)        metoprolol succinate ER (TOPROL-XL) 25 MG 24 hr tablet TAKE ONE TABLET BY MOUTH EVERY EVENING 90 tablet 3      "NONFORMULARY Take 10 mg by mouth 2 times daily as needed Compounded Hydrocodone Bitartrate Powder -- compounded by [facility]       nortriptyline (PAMELOR) 25 MG capsule Take 25 mg by mouth At Bedtime  5     ondansetron (ZOFRAN) 4 MG tablet Take 4 mg by mouth every 8 hours as needed for nausea       PARoxetine (PAXIL CR) 25 MG 24 hr tablet Take 25 mg by mouth 2 times daily NAME BRAND PAXIL CR        PROgesterone (PROMETRIUM) 100 MG capsule Take 200 mg by mouth daily (with dinner) (Takes 2 x 100mg = 200mg dose)       vitamin C (ASCORBIC ACID) 1000 MG TABS Take 1,000 mg by mouth daily       Vitamin D, Cholecalciferol, 1000 units TABS Take 2,000 Units by mouth daily        ZOLMitriptan (ZOMIG) 5 MG tablet TAKE ONE TABLET BY MOUTH AT ONSET OF HEADACHE FOR MIGRAINE, MAY REPEAT ONCE AFTER 2 HOURS. MAX OF 2 TABLETS IN 24 HOURS 12 tablet 1     Zolpidem Tartrate (AMBIEN CR PO) Take 12.5 mg by mouth At Bedtime       Allergies   Allergen Reactions     Compazine [Prochlorperazine] Other (See Comments)     \"crawl out of my skin\"     Gluten Meal Diarrhea     Penicillins Nausea and Vomiting and Hives       Reviewed and updated as needed this visit by Provider  Problems         Review of Systems   ROS COMP: Constitutional, HEENT, cardiovascular, pulmonary, GI, , musculoskeletal, neuro, skin, endocrine and psych systems are negative, except as otherwise noted.       Objective   Reported vitals:  There were no vitals taken for this visit. due to covid shelter in home mandate Pt not in clinic   GEN: healthy, alert and no distress ( able to see on video virtual)   PSYCH: Alert and oriented times 3; coherent speech, normal   rate and volume, able to articulate logical thoughts, able   to abstract reason, no tangential thoughts, no hallucinations   or delusions  Her affect is normal, pleasant and does not appear or sound anxious  RESP: No cough, no audible wheezing, able to talk in full sentences  Remainder of exam " unable to be completed due to telephone visits    Diagnostic Test Results:  Labs reviewed in Epic        Assessment/Plan:  1. Fall, initial encounter- no injuries occurred , VS stable  I have asked that she take BP and pulse should she have recurrence and call clinic at the time for additional assessment.  She is comfortable with this plan    2. Palpitations.  May have had drop in BP due to active paroxysmal palpitations last week. If she continues to have frequent bouts she will call clinic and we can order a zio patch . She will continue metoprolol 25mg daily    3. Anxiety. Does not require additional treatment for anxiety at this time. Encouraged only bid dosing of ativan    4. Chronic narcotic use.  Mrs Parra is taking both benzodiazepam and opioid and her falls may be related to this combination.           Phone call duration:  25 minutes    FAVIO Odell CNP

## 2020-04-16 NOTE — PROGRESS NOTES
"Laurel Parra is a 69 year old female who is being evaluated via a billable video visit.      The patient has been notified of following:     \"This video visit will be conducted via a call between you and your physician/provider. We have found that certain health care needs can be provided without the need for an in-person physical exam.  This service lets us provide the care you need with a video conversation.  If a prescription is necessary we can send it directly to your pharmacy.  If lab work is needed we can place an order for that and you can then stop by our lab to have the test done at a later time.    Video visits are billed at different rates depending on your insurance coverage.  Please reach out to your insurance provider with any questions.    If during the course of the call the physician/provider feels a video visit is not appropriate, you will not be charged for this service.\"    Patient has given verbal consent for Video visit? Yes    How would you like to obtain your AVS? Charisma[employer]ig    Patient would like the video invitation sent by: Text to cell phone: ***  {If patient encounters technical issues they should call [phone] :427095}    Subjective     Laurel Parra is a 69 year old female who presents to clinic today for the following health issues:    Dizziness ongoing for about 2 weeks and has had a couple of falls recently as well. Has noticed some episodes of hypotension       Video Start Time: {video visit start time for provider to select:841224}    {additonal problems for provider to add (Optional):459207}    {HIST REVIEW/ LINKS 2 (Optional):658431}    Reviewed and updated as needed this visit by Provider         Review of Systems   {ROS COMP (Optional):504506}      Objective    There were no vitals taken for this visit.  Estimated body mass index is 20.18 kg/m  as calculated from the following:    Height as of 3/11/20: 1.676 m (5' 6\").    Weight as of 3/11/20: 56.7 kg " "(125 lb).  Physical Exam   {Unselected :764896::\"GENERAL: healthy, alert and no distress\"}    {Diagnostic Test Results (Optional):220292::\"Diagnostic Test Results:\",\"Labs reviewed in Epic\"}        {PROVIDER CHARTING PREFERENCE:135589}      Video-Visit Details    Type of service:  Video Visit    Video End Time (time video stopped): ***    Originating Location (pt. Location): {video visit patient location:378487::\"Home\"}    Distant Location (provider location):  [facility]     Mode of Communication:  Video Conference via [employer]    No follow-ups on file.       {signature options:518271}      "

## 2020-04-20 ENCOUNTER — TRANSFERRED RECORDS (OUTPATIENT)
Dept: HEALTH INFORMATION MANAGEMENT | Facility: CLINIC | Age: 70
End: 2020-04-20

## 2020-05-06 ENCOUNTER — TRANSFERRED RECORDS (OUTPATIENT)
Dept: HEALTH INFORMATION MANAGEMENT | Facility: CLINIC | Age: 70
End: 2020-05-06

## 2020-05-06 ENCOUNTER — MYC MEDICAL ADVICE (OUTPATIENT)
Dept: FAMILY MEDICINE | Facility: CLINIC | Age: 70
End: 2020-05-06

## 2020-05-07 NOTE — TELEPHONE ENCOUNTER
To PCP:     Please see Pt's message regarding her spouse. I have requested that she please send through Dom's [employer] in order to have this important information in his Medical Record.     May be best to respond through Dom's chart- I have tried to find him in system, but cannot pull up through [employer], wondering if there is a different last name?     Will copy/paste message to Spouse's chart when identifying information is provided.     Thank you,   Ashly PERALTA RN

## 2020-05-11 DIAGNOSIS — K21.9 GASTROESOPHAGEAL REFLUX DISEASE WITHOUT ESOPHAGITIS: Primary | ICD-10-CM

## 2020-05-11 DIAGNOSIS — G43.719 INTRACTABLE CHRONIC MIGRAINE WITHOUT AURA AND WITHOUT STATUS MIGRAINOSUS: ICD-10-CM

## 2020-05-12 RX ORDER — ZOLMITRIPTAN 5 MG/1
TABLET, FILM COATED ORAL
Qty: 12 TABLET | Refills: 1 | Status: SHIPPED | OUTPATIENT
Start: 2020-05-12 | End: 2020-06-11

## 2020-05-12 NOTE — TELEPHONE ENCOUNTER
"ZOLMitriptan (ZOMIG) 5 MG tablet  12 tablet  1  3/18/2020   No    Sig: TAKE ONE TABLET BY MOUTH AT ONSET OF HEADACHE FOR MIGRAINE, MAY REPEAT ONCE AFTER 2 HOURS. MAX OF 2 TABLETS IN 24 HOURS      Last Written Prescription Date:  3-  Last Fill Quantity: 12,  # refills: 1   Last office visit: 3/11/2020 with prescribing provider:     Future Office Visit:  Unknown    Requested Prescriptions   Pending Prescriptions Disp Refills     ZOLMitriptan (ZOMIG) 5 MG tablet [Pharmacy Med Name: ZOLMITRIPTAN 5MG TABS] 12 tablet 1     Sig: TAKE ONE TBALET BY MOUTH AT ONSET OF HEADACHE FOR MIGRAINE, MAY REPEAT ONCE AFTER 2 HOURS. MAX OF TWO TABLETS IN 24 HOURS       Serotonin Agonists Failed - 5/11/2020 11:59 AM        Failed - Serotonin Agonist request needs review.     Please review patient's record. If patient has had 8 or more treatments in the past month, please forward to provider.          Passed - Blood pressure under 140/90 in past 12 months     BP Readings from Last 3 Encounters:   03/11/20 109/77   11/19/19 110/75   08/27/19 121/72                 Passed - Recent (12 mo) or future (30 days) visit within the authorizing provider's specialty     Patient has had an office visit with the authorizing provider or a provider within the authorizing providers department within the previous 12 mos or has a future within next 30 days. See \"Patient Info\" tab in inbasket, or \"Choose Columns\" in Meds & Orders section of the refill encounter.              Passed - Medication is active on med list        Passed - Patient is age 18 or older        Passed - No active pregnancy on record        Passed - No positive pregnancy test in past 12 months                     "

## 2020-05-20 ENCOUNTER — TRANSFERRED RECORDS (OUTPATIENT)
Dept: HEALTH INFORMATION MANAGEMENT | Facility: CLINIC | Age: 70
End: 2020-05-20

## 2020-05-26 RX ORDER — FAMOTIDINE 20 MG/1
20 TABLET, FILM COATED ORAL 2 TIMES DAILY PRN
Qty: 180 TABLET | Refills: 0 | Status: SHIPPED | OUTPATIENT
Start: 2020-05-26 | End: 2020-09-16

## 2020-05-26 NOTE — TELEPHONE ENCOUNTER
Pt requesting new rx for Famotidine 20mg. If authorized, please send new rx to [facility].    Thank you,    Annalisa Jimenez PharmD  [facility]   [phone]

## 2020-06-11 DIAGNOSIS — G43.719 INTRACTABLE CHRONIC MIGRAINE WITHOUT AURA AND WITHOUT STATUS MIGRAINOSUS: ICD-10-CM

## 2020-06-11 NOTE — TELEPHONE ENCOUNTER
Spoke with Pt:     She states she is seeing Neurology (Dr. Robledo) and they have her taking Nortriptyline at bedtime     Takes Zolmitriptan PRN for Migraines (says can be 3-4 days/month)     Requests great amount of refills that #12, R-1     Please advise on RX and number of refills- pended for completion     Thank you,   Ashly PERALTA RN

## 2020-06-12 RX ORDER — ZOLMITRIPTAN 5 MG/1
TABLET, FILM COATED ORAL
Qty: 12 TABLET | Refills: 1 | Status: SHIPPED | OUTPATIENT
Start: 2020-06-12 | End: 2020-07-30

## 2020-06-22 ENCOUNTER — TRANSFERRED RECORDS (OUTPATIENT)
Dept: HEALTH INFORMATION MANAGEMENT | Facility: CLINIC | Age: 70
End: 2020-06-22

## 2020-06-26 ENCOUNTER — TELEPHONE (OUTPATIENT)
Dept: FAMILY MEDICINE | Facility: CLINIC | Age: 70
End: 2020-06-26

## 2020-06-26 ENCOUNTER — MYC MEDICAL ADVICE (OUTPATIENT)
Dept: FAMILY MEDICINE | Facility: CLINIC | Age: 70
End: 2020-06-26

## 2020-06-26 NOTE — TELEPHONE ENCOUNTER
I sent patient Phq9 on [place].  Not sure what to make of the rest of the call.   Felicitas Corrales MA

## 2020-06-26 NOTE — TELEPHONE ENCOUNTER
Reason for Call:  Other appointment    Detailed comments:  rachel wanted mitra to know shes had a ph appt yesterday 6/25  with her to talk about goals and general health . rachel needs to report phq 9 . Please advise    Phone Number Patient can be reached at: Other phone number:  [phone]    Best Time: any    Can we leave a detailed message on this number? YES    Call taken on 6/26/2020 at 9:20 AM by Valentino Barreto

## 2020-06-29 ASSESSMENT — PATIENT HEALTH QUESTIONNAIRE - PHQ9
10. IF YOU CHECKED OFF ANY PROBLEMS, HOW DIFFICULT HAVE THESE PROBLEMS MADE IT FOR YOU TO DO YOUR WORK, TAKE CARE OF THINGS AT HOME, OR GET ALONG WITH OTHER PEOPLE: VERY DIFFICULT
SUM OF ALL RESPONSES TO PHQ QUESTIONS 1-9: 15
SUM OF ALL RESPONSES TO PHQ QUESTIONS 1-9: 15

## 2020-06-30 ASSESSMENT — PATIENT HEALTH QUESTIONNAIRE - PHQ9: SUM OF ALL RESPONSES TO PHQ QUESTIONS 1-9: 15

## 2020-07-06 ENCOUNTER — TRANSFERRED RECORDS (OUTPATIENT)
Dept: HEALTH INFORMATION MANAGEMENT | Facility: CLINIC | Age: 70
End: 2020-07-06

## 2020-07-13 ENCOUNTER — VIRTUAL VISIT (OUTPATIENT)
Dept: CARDIOLOGY | Facility: CLINIC | Age: 70
End: 2020-07-13
Payer: COMMERCIAL

## 2020-07-13 DIAGNOSIS — R07.89 ATYPICAL CHEST PAIN: ICD-10-CM

## 2020-07-13 DIAGNOSIS — R42 LIGHTHEADEDNESS: Primary | ICD-10-CM

## 2020-07-13 PROCEDURE — 99214 OFFICE O/P EST MOD 30 MIN: CPT | Mod: 95 | Performed by: INTERNAL MEDICINE

## 2020-07-13 NOTE — PROGRESS NOTES
"Laurel Parra is a 70 year old female who is being evaluated via a billable video visit.      The patient has been notified of following:     \"This video visit will be conducted via a call between you and your physician/provider. We have found that certain health care needs can be provided without the need for an in-person physical exam.  This service lets us provide the care you need with a video conversation.  If a prescription is necessary we can send it directly to your pharmacy.  If lab work is needed we can place an order for that and you can then stop by our lab to have the test done at a later time.    Video visits are billed at different rates depending on your insurance coverage.  Please reach out to your insurance provider with any questions.    If during the course of the call the physician/provider feels a video visit is not appropriate, you will not be charged for this service.\"    Patient has given verbal consent for Video visit? Yes  How would you like to obtain your AVS? Mail a copy     Video MYchart     Patient would like the video invitation sent by:   Will anyone else be joining your video visit?         Video-Visit Details    Type of service:  Video Visit    Video Start Time: 3:00 PM  Video End Time:  3:15    Originating Location (pt. Location):  [facility] ( [place] failed due to technical problems)    Distant Location (provider location):  [facility]     Platform used for Video Visit: Ben WHELAN failed    See dictation  HISTORY OF PRESENT ILLNESS:  Concerned about lightheadedness with activity. Feels blood pressure falls with exercise. One fall without injury or syncope. Has been on chronic pain meds  Anti anxiety medications.  Atypical chest discomfort.  Orders this Visit:  Orders Placed This Encounter   Procedures     Exercise Stress Echocardiogram     No orders of the defined types were placed in this encounter.    There are no " discontinued medications.    Encounter Diagnoses   Name Primary?     Lightheadedness Yes     Atypical chest pain        CURRENT MEDICATIONS:  Current Outpatient Medications   Medication Sig Dispense Refill     carboxymethylcellulose (REFRESH PLUS) 0.5 % SOLN ophthalmic solution Place 1 drop into both eyes 3 times daily as needed for dry eyes       Cyanocobalamin (B-12 DOTS PO) Take 1 tablet by mouth daily        cyclobenzaprine (FLEXERIL) 10 MG tablet Take 10 mg by mouth daily as needed for muscle spasms        estradiol (ESTRACE) 0.5 MG tablet Take 0.5 mg by mouth daily Take total 1.5 daily       estradiol (ESTRACE) 1 MG tablet Take 1 mg by mouth daily (with dinner) MUST BE  TEVA . Take total 1.5 mg daily       famotidine (PEPCID) 20 MG tablet Take 1 tablet (20 mg) by mouth 2 times daily as needed 180 tablet 0     Hydrocodone-Acetaminophen  MG TABS Take 2 tablets by mouth every 4 hours as needed       ipratropium (ATROVENT) 0.06 % spray Spray 3 sprays into both nostrils daily 45 mL 3     loratadine (CLARITIN) 10 MG tablet Take 10 mg by mouth daily as needed        LORazepam (ATIVAN) 1 MG tablet Take 1-2 tablets (1-2 mg) by mouth 4 times daily as needed for anxiety (up to 6 tablets per day) 30 tablet      MAGNESIUM PO Take 240 mg by mouth daily (OTC)        metoprolol succinate ER (TOPROL-XL) 25 MG 24 hr tablet TAKE ONE TABLET BY MOUTH EVERY EVENING 90 tablet 3     NONFORMULARY Take 10 mg by mouth 2 times daily as needed Compounded Hydrocodone Bitartrate Powder -- compounded by [facility]       nortriptyline (PAMELOR) 25 MG capsule Take 25 mg by mouth At Bedtime  5     ondansetron (ZOFRAN) 4 MG tablet Take 4 mg by mouth every 8 hours as needed for nausea       PARoxetine (PAXIL CR) 25 MG 24 hr tablet Take 25 mg by mouth 2 times daily NAME BRAND PAXIL CR        PROgesterone (PROMETRIUM) 100 MG capsule Take 200 mg by mouth daily (with dinner) (Takes 2 x 100mg = 200mg dose)        "vitamin C (ASCORBIC ACID) 1000 MG TABS Take 1,000 mg by mouth daily       Vitamin D, Cholecalciferol, 1000 units TABS Take 2,000 Units by mouth daily        ZOLMitriptan (ZOMIG) 5 MG tablet TAKE ONE TBALET BY MOUTH AT ONSET OF HEADACHE FOR MIGRAINE, MAY REPEAT ONCE AFTER 2 HOURS. MAX OF TWO TABLETS IN 24 HOURS 12 tablet 1     Zolpidem Tartrate (AMBIEN CR PO) Take 12.5 mg by mouth At Bedtime       Artificial Saliva (BIOTENE DRY MOUTH) LOZG Take 1-2 lozenges by mouth daily as needed       hydrOXYzine (ATARAX) 25 MG tablet Take 1 tablet (25 mg) by mouth every 6 hours as needed for itching (and nausea) (Patient not taking: Reported on 7/13/2020) 30 tablet 0       ALLERGIES     Allergies   Allergen Reactions     Compazine [Prochlorperazine] Other (See Comments)     \"crawl out of my skin\"     Gluten Meal Diarrhea     Penicillins Nausea and Vomiting and Hives       PAST MEDICAL, SURGICAL, FAMILY, SOCIAL HISTORY:  History was reviewed and updated as needed, see medical record.    Review of Systems:  A 12-point review of systems was completed, see medical record for detailed review of systems information.    Physical Exam:  Vitals: There were no vitals taken for this visit.    Constitutional:           Skin:           Head:           Eyes:           ENT:           Neck:           Chest:           Cardiac:                    Abdomen:           Vascular:                                        Extremities and Back:           Neurological:           ASSESSMENT: I am uncertain as to the cause of her symptoms and accuracy of home blood pressure measurements. Formal assessment with stress echo seems to be best step to document bp, heart rate response check LVEF. If negative I would be hesitant to advise further testing       RECOMMENDATIONS:   1) stress echocardiogram. She wishes [facility]      Recent Lab Results:  LIPID RESULTS:  Lab Results   Component Value Date    CHOL 244 (H) 11/19/2019    HDL 66 11/19/2019     (H) " 11/19/2019    TRIG 266 (H) 11/19/2019    CHOLHDLRATIO 3.4 10/23/2015       LIVER ENZYME RESULTS:  Lab Results   Component Value Date    AST 13 11/19/2019    ALT 16 11/19/2019       CBC RESULTS:  Lab Results   Component Value Date    WBC 6.4 11/19/2019    RBC 4.11 11/19/2019    HGB 13.0 11/19/2019    HCT 39.5 11/19/2019    MCV 96 11/19/2019    MCH 31.6 11/19/2019    MCHC 32.9 11/19/2019    RDW 12.8 11/19/2019     11/19/2019       BMP RESULTS:  Lab Results   Component Value Date     11/19/2019    POTASSIUM 4.1 11/19/2019    CHLORIDE 101 11/19/2019    CO2 25 11/19/2019    ANIONGAP 7 11/19/2019    GLC 97 11/19/2019    BUN 10 11/19/2019    CR 0.60 11/19/2019    GFRESTIMATED >90 11/19/2019    GFRESTBLACK >90 11/19/2019    THOR 8.6 11/19/2019        A1C RESULTS:  Lab Results   Component Value Date    A1C 5.3 12/15/2016       INR RESULTS:  No results found for: INR    We greatly appreciate the opportunity to be involved in the care of your patient, Laurel Parra.    Sincerely,  Tyler England MD        Georges Lebron MD  [address] [facility] [address]  [place] [address]                                                                             Patient reported vitals:  BP:83/56  Heart rate:99  Weight:120    Review Of Systems  Skin: negative  Eyes: visual blurring, glasses  Ears/Nose/Throat: negative  Respiratory: No shortness of breath, dyspnea on exertion, cough, or hemoptysis  Cardiovascular: palpitations and chest pain  Gastrointestinal: nausea, constipation and diarrhea  Genitourinary: frequency  Musculoskeletal: back pain, neck pain and arthritis  Neurologic: migraine headaches, numbness or tingling of hands and numbness or tingling of feet  Psychiatric: sleep disturbance and anxiety  Hematologic/Lymphatic/Immunologic: negative  Endocrine: negative    RM Brown

## 2020-07-13 NOTE — PROGRESS NOTES
"Service Date: 07/13/2020      VIDEO VISIT      The video visit took place between 3:00 p.m. and 3:15 p.m. on 07/13/2020.  The patient location is at home and the provider location is [facility].  The platform used was TheraVida.  The [employer] platform failed.        HISTORY OF PRESENT ILLNESS:  Laurel Parra, a 70-year-old woman with chronic anxiety, osteoarthritis, chronic pain syndrome and migraine headaches, was evaluated in consultation at her request for concerns regarding exertional lightheadedness and hypotension.      The patient has no known history of cardiac disease.  She was most recently seen in our clinic in 2012 for evaluation of palpitations and atypical chest discomfort.  Stress echo showed a normal ejection fraction without objective signs of ischemia.  Holter monitor revealed only rare premature ventricular and atrial contractions.  At her last visit, metoprolol had been discontinued.  The patient had no symptoms and her monitoring has been unremarkable.      The patient indicated she has been under a great deal of stress over the last several months because of concerns regarding the COVID crisis.      She has been on a number of psychotropic and narcotic medications for pain and anxiety.      Over the last several weeks, the patient has been concerned about exercise-associated lightheadedness without true syncope.  After walking up steps, she has checked her blood pressure on a number of occasions and she feels it is abnormally low.  Resting blood pressure is usually in the 100s systolic range.  She has had occasional feelings of \"a hollowness\" in her chest without definite chest pressure.  She saw her neurologist in 05/2020 for followup of migraine headaches.  During that visit, she told her neurologist that she could not increase her Pamelor because it would cause lightheadedness and falls.  She reports 1 episode of falling without true syncope.  She did not " injure herself.      She does not describe sustained tachycardia or palpitations.      PAST MEDICAL HISTORY:   1.  Osteoarthritis.   a.  History of left thumb joint arthroplasty.   b.  History of left wrist arthroscopy.   c.  History of carpal tunnel release.   d.  History of bilateral shoulder arthroscopy with local injections.   2.  Status post appendectomy.   3.  Chronic anxiety/depression.   4.  Migraine headaches.   5.  Low back pain.      ALLERGIES:  Compazine, penicillin.      SOCIAL HISTORY:  The patient is , does not smoke cigarettes, use drugs or alcohol.      MEDICATIONS:   1.  Estradiol 1.5 mg daily.   2.  Famotidine 20 mg b.i.d.   3.  Hydroxyzine 25 mg every 6 hours for itching.   4.  Atrovent inhaler to both nostrils twice a day.   5.  Loratadine.   6.  Lorazepam 1-2 mg 4 times per day as needed for anxiety, no more than 6 pills per day.   7.  Nortriptyline 25 mg at bedtime.   8.  Paroxetine 25 mg twice a day.      REVIEW OF SYSTEMS:  A 12-point review of systems was performed.  Pertinent positives include a history of nausea, constipation and diarrhea.  She reported genitourinary frequency.  She has chronic back pain, neck pain and osteoarthritis.  She described chronic migraine headaches, tingling of her hands.  She admitted to sleep disturbance and anxiety.  Her 12-point review of systems was unremarkable.      PHYSICAL EXAMINATION:  Self-reported blood pressure was 83/56, heart rate is 99, weight of 120.  On exam, she appears comfortable at rest.  She is anxious.   The rest of her physical exam is limited by the telemedicine nature of his visit.      DIAGNOSTIC STUDIES:  Most recent creatinine 0.6, potassium 4.1, hemoglobin 13.0.  Most recent ECG from 2019 showed a sinus rhythm with normal axis and normal intervals.      ASSESSMENT:  I am uncertain as to the accuracy of her self-reported blood pressures.  She is on a number of psychotropic and antianxiety medications, which could contribute  to hypotension.  I believe we need formal assessment of her blood pressure and rhythm with exercise.  I would be hesitant to make any changes or recommend any intervention without objective evidence of exercise-associated hypotension, LV dysfunction or arrhythmia.      RECOMMENDATIONS:   1.  Stress echocardiogram.   2.  Further recommendations to follow after the stress test is completed.      cc:      Georges Lebron MD    [facility]   [address]   [place] [address]         TOSHIA GUERRA MD             D: 2020   T: 2020   MT: SHANNON      Name:     RACHEL CHRISTINA   MRN:      [id number]        Account:      [id number]   :      1950           Service Date: 2020      Document: [id number]

## 2020-07-13 NOTE — LETTER
7/13/2020    Georges Lebron MD  [address]  [facility] [address]    RE: Laurel Parra       Dear Colleague,    I had the pleasure of seeing Laurel Parra in the [facility].    Laurel Parra is a 70 year old female who is being evaluated via a billable video visit.        HISTORY OF PRESENT ILLNESS:  Concerned about lightheadedness with activity. Feels blood pressure falls with exercise. One fall without injury or syncope. Has been on chronic pain meds  Anti anxiety medications.  Atypical chest discomfort.  Orders this Visit:  Orders Placed This Encounter   Procedures     Exercise Stress Echocardiogram     No orders of the defined types were placed in this encounter.    There are no discontinued medications.    Encounter Diagnoses   Name Primary?     Lightheadedness Yes     Atypical chest pain        CURRENT MEDICATIONS:  Current Outpatient Medications   Medication Sig Dispense Refill     carboxymethylcellulose (REFRESH PLUS) 0.5 % SOLN ophthalmic solution Place 1 drop into both eyes 3 times daily as needed for dry eyes       Cyanocobalamin (B-12 DOTS PO) Take 1 tablet by mouth daily        cyclobenzaprine (FLEXERIL) 10 MG tablet Take 10 mg by mouth daily as needed for muscle spasms        estradiol (ESTRACE) 0.5 MG tablet Take 0.5 mg by mouth daily Take total 1.5 daily       estradiol (ESTRACE) 1 MG tablet Take 1 mg by mouth daily (with dinner) MUST BE  TEVA . Take total 1.5 mg daily       famotidine (PEPCID) 20 MG tablet Take 1 tablet (20 mg) by mouth 2 times daily as needed 180 tablet 0     Hydrocodone-Acetaminophen  MG TABS Take 2 tablets by mouth every 4 hours as needed       ipratropium (ATROVENT) 0.06 % spray Spray 3 sprays into both nostrils daily 45 mL 3     loratadine (CLARITIN) 10 MG tablet Take 10 mg by mouth daily as needed        LORazepam (ATIVAN) 1 MG tablet Take 1-2 tablets (1-2 mg) by mouth 4 times daily as  "needed for anxiety (up to 6 tablets per day) 30 tablet      MAGNESIUM PO Take 240 mg by mouth daily (OTC)        metoprolol succinate ER (TOPROL-XL) 25 MG 24 hr tablet TAKE ONE TABLET BY MOUTH EVERY EVENING 90 tablet 3     NONFORMULARY Take 10 mg by mouth 2 times daily as needed Compounded Hydrocodone Bitartrate Powder -- compounded by [facility]       nortriptyline (PAMELOR) 25 MG capsule Take 25 mg by mouth At Bedtime  5     ondansetron (ZOFRAN) 4 MG tablet Take 4 mg by mouth every 8 hours as needed for nausea       PARoxetine (PAXIL CR) 25 MG 24 hr tablet Take 25 mg by mouth 2 times daily NAME BRAND PAXIL CR        PROgesterone (PROMETRIUM) 100 MG capsule Take 200 mg by mouth daily (with dinner) (Takes 2 x 100mg = 200mg dose)       vitamin C (ASCORBIC ACID) 1000 MG TABS Take 1,000 mg by mouth daily       Vitamin D, Cholecalciferol, 1000 units TABS Take 2,000 Units by mouth daily        ZOLMitriptan (ZOMIG) 5 MG tablet TAKE ONE TBALET BY MOUTH AT ONSET OF HEADACHE FOR MIGRAINE, MAY REPEAT ONCE AFTER 2 HOURS. MAX OF TWO TABLETS IN 24 HOURS 12 tablet 1     Zolpidem Tartrate (AMBIEN CR PO) Take 12.5 mg by mouth At Bedtime       Artificial Saliva (BIOTENE DRY MOUTH) LOZG Take 1-2 lozenges by mouth daily as needed       hydrOXYzine (ATARAX) 25 MG tablet Take 1 tablet (25 mg) by mouth every 6 hours as needed for itching (and nausea) (Patient not taking: Reported on 7/13/2020) 30 tablet 0       ALLERGIES     Allergies   Allergen Reactions     Compazine [Prochlorperazine] Other (See Comments)     \"crawl out of my skin\"     Gluten Meal Diarrhea     Penicillins Nausea and Vomiting and Hives       PAST MEDICAL, SURGICAL, FAMILY, SOCIAL HISTORY:  History was reviewed and updated as needed, see medical record.    Review of Systems:  A 12-point review of systems was completed, see medical record for detailed review of systems information.    Physical Exam:  Vitals: There were no vitals taken for this " visit.    Constitutional:           Skin:           Head:           Eyes:           ENT:           Neck:           Chest:           Cardiac:                    Abdomen:           Vascular:                                        Extremities and Back:           Neurological:           ASSESSMENT: I am uncertain as to the cause of her symptoms and accuracy of home blood pressure measurements. Formal assessment with stress echo seems to be best step to document bp, heart rate response check LVEF. If negative I would be hesitant to advise further testing       RECOMMENDATIONS:   1) stress echocardiogram. She wishes [place] location      Recent Lab Results:  LIPID RESULTS:  Lab Results   Component Value Date    CHOL 244 (H) 11/19/2019    HDL 66 11/19/2019     (H) 11/19/2019    TRIG 266 (H) 11/19/2019    CHOLHDLRATIO 3.4 10/23/2015       LIVER ENZYME RESULTS:  Lab Results   Component Value Date    AST 13 11/19/2019    ALT 16 11/19/2019       CBC RESULTS:  Lab Results   Component Value Date    WBC 6.4 11/19/2019    RBC 4.11 11/19/2019    HGB 13.0 11/19/2019    HCT 39.5 11/19/2019    MCV 96 11/19/2019    MCH 31.6 11/19/2019    MCHC 32.9 11/19/2019    RDW 12.8 11/19/2019     11/19/2019       BMP RESULTS:  Lab Results   Component Value Date     11/19/2019    POTASSIUM 4.1 11/19/2019    CHLORIDE 101 11/19/2019    CO2 25 11/19/2019    ANIONGAP 7 11/19/2019    GLC 97 11/19/2019    BUN 10 11/19/2019    CR 0.60 11/19/2019    GFRESTIMATED >90 11/19/2019    GFRESTBLACK >90 11/19/2019    THOR 8.6 11/19/2019        A1C RESULTS:  Lab Results   Component Value Date    A1C 5.3 12/15/2016       INR RESULTS:  No results found for: INR      Service Date: 07/13/2020      VIDEO VISIT      The video visit took place between 3:00 p.m. and 3:15 p.m. on 07/13/2020.  The patient location is at home and the provider location is [facility].  The platform used was [employer]cloud.  The [employer] platform failed.  "       HISTORY OF PRESENT ILLNESS:  Laurel Parra, a 70-year-old woman with chronic anxiety, osteoarthritis, chronic pain syndrome and migraine headaches, was evaluated in consultation at her request for concerns regarding exertional lightheadedness and hypotension.      The patient has no known history of cardiac disease.  She was most recently seen in our clinic in 2012 for evaluation of palpitations and atypical chest discomfort.  Stress echo showed a normal ejection fraction without objective signs of ischemia.  Holter monitor revealed only rare premature ventricular and atrial contractions.  At her last visit, metoprolol had been discontinued.  The patient had no symptoms and her monitoring has been unremarkable.      The patient indicated she has been under a great deal of stress over the last several months because of concerns regarding the COVID crisis.      She has been on a number of psychotropic and narcotic medications for pain and anxiety.      Over the last several weeks, the patient has been concerned about exercise-associated lightheadedness without true syncope.  After walking up steps, she has checked her blood pressure on a number of occasions and she feels it is abnormally low.  Resting blood pressure is usually in the 100s systolic range.  She has had occasional feelings of \"a hollowness\" in her chest without definite chest pressure.  She saw her neurologist in 05/2020 for followup of migraine headaches.  During that visit, she told her neurologist that she could not increase her Pamelor because it would cause lightheadedness and falls.  She reports 1 episode of falling without true syncope.  She did not injure herself.      She does not describe sustained tachycardia or palpitations.      PAST MEDICAL HISTORY:   1.  Osteoarthritis.   a.  History of left thumb joint arthroplasty.   b.  History of left wrist arthroscopy.   c.  History of carpal tunnel release.   d.  History of bilateral " shoulder arthroscopy with local injections.   2.  Status post appendectomy.   3.  Chronic anxiety/depression.   4.  Migraine headaches.   5.  Low back pain.      ALLERGIES:  Compazine, penicillin.      SOCIAL HISTORY:  The patient is , does not smoke cigarettes, use drugs or alcohol.      MEDICATIONS:   1.  Estradiol 1.5 mg daily.   2.  Famotidine 20 mg b.i.d.   3.  Hydroxyzine 25 mg every 6 hours for itching.   4.  Atrovent inhaler to both nostrils twice a day.   5.  Loratadine.   6.  Lorazepam 1-2 mg 4 times per day as needed for anxiety, no more than 6 pills per day.   7.  Nortriptyline 25 mg at bedtime.   8.  Paroxetine 25 mg twice a day.      REVIEW OF SYSTEMS:  A 12-point review of systems was performed.  Pertinent positives include a history of nausea, constipation and diarrhea.  She reported genitourinary frequency.  She has chronic back pain, neck pain and osteoarthritis.  She described chronic migraine headaches, tingling of her hands.  She admitted to sleep disturbance and anxiety.  Her 12-point review of systems was unremarkable.      PHYSICAL EXAMINATION:  Self-reported blood pressure was 83/56, heart rate is 99, weight of 120.  On exam, she appears comfortable at rest.  She is anxious.   The rest of her physical exam is limited by the telemedicine nature of his visit.      DIAGNOSTIC STUDIES:  Most recent creatinine 0.6, potassium 4.1, hemoglobin 13.0.  Most recent ECG from 2019 showed a sinus rhythm with normal axis and normal intervals.      ASSESSMENT:  I am uncertain as to the accuracy of her self-reported blood pressures.  She is on a number of psychotropic and antianxiety medications, which could contribute to hypotension.  I believe we need formal assessment of her blood pressure and rhythm with exercise.  I would be hesitant to make any changes or recommend any intervention without objective evidence of exercise-associated hypotension, LV dysfunction or arrhythmia.      RECOMMENDATIONS:    1.  Stress echocardiogram.   2.  Further recommendations to follow after the stress test is completed.        Thank you for allowing me to participate in the care of your patient.    Sincerely,     Tyler England MD     [facility]

## 2020-07-20 ENCOUNTER — HOSPITAL ENCOUNTER (OUTPATIENT)
Dept: CARDIOLOGY | Facility: CLINIC | Age: 70
Discharge: HOME | End: 2020-07-20
Attending: INTERNAL MEDICINE | Admitting: INTERNAL MEDICINE
Payer: COMMERCIAL

## 2020-07-20 DIAGNOSIS — R42 LIGHTHEADEDNESS: ICD-10-CM

## 2020-07-20 DIAGNOSIS — R07.89 ATYPICAL CHEST PAIN: ICD-10-CM

## 2020-07-20 PROCEDURE — 93321 DOPPLER ECHO F-UP/LMTD STD: CPT | Mod: TC

## 2020-07-20 PROCEDURE — 93016 CV STRESS TEST SUPVJ ONLY: CPT | Performed by: INTERNAL MEDICINE

## 2020-07-20 PROCEDURE — 93350 STRESS TTE ONLY: CPT | Mod: 26 | Performed by: INTERNAL MEDICINE

## 2020-07-20 PROCEDURE — 93018 CV STRESS TEST I&R ONLY: CPT | Performed by: INTERNAL MEDICINE

## 2020-07-20 PROCEDURE — 93325 DOPPLER ECHO COLOR FLOW MAPG: CPT | Mod: 26 | Performed by: INTERNAL MEDICINE

## 2020-07-20 PROCEDURE — 93321 DOPPLER ECHO F-UP/LMTD STD: CPT | Mod: 26 | Performed by: INTERNAL MEDICINE

## 2020-07-21 NOTE — RESULT ENCOUNTER NOTE
Reviewed stress echo from 7/20/20 with patient. No further recommendations per . Pt to follow up with PMD or further workup. Pt agrees w/ plan. CARLOS Hester

## 2020-07-30 ENCOUNTER — VIRTUAL VISIT (OUTPATIENT)
Dept: FAMILY MEDICINE | Facility: CLINIC | Age: 70
End: 2020-07-30
Payer: COMMERCIAL

## 2020-07-30 DIAGNOSIS — R11.0 NAUSEA: ICD-10-CM

## 2020-07-30 DIAGNOSIS — G43.719 INTRACTABLE CHRONIC MIGRAINE WITHOUT AURA AND WITHOUT STATUS MIGRAINOSUS: ICD-10-CM
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
## 2020-07-30 PROCEDURE — 99214 OFFICE O/P EST MOD 30 MIN: CPT | Mod: GT | Performed by: INTERNAL MEDICINE

## 2020-07-30 RX ORDER — ZOLMITRIPTAN 5 MG/1
TABLET, FILM COATED ORAL
Qty: 12 TABLET | Refills: 1 | Status: SHIPPED | OUTPATIENT
Start: 2020-07-30 | End: 2020-11-03

## 2020-07-30 NOTE — PROGRESS NOTES
"Laurel Parra is a 70 year old female who is being evaluated via a billable video visit.      The patient has been notified of following:     \"This video visit will be conducted via a call between you and your physician/provider. We have found that certain health care needs can be provided without the need for an in-person physical exam.  This service lets us provide the care you need with a video conversation.  If a prescription is necessary we can send it directly to your pharmacy.  If lab work is needed we can place an order for that and you can then stop by our lab to have the test done at a later time.    Video visits are billed at different rates depending on your insurance coverage.  Please reach out to your insurance provider with any questions.    If during the course of the call the physician/provider feels a video visit is not appropriate, you will not be charged for this service.\"    Patient has given verbal consent for Video visit? Yes  How would you like to obtain your AVS? MyChart  If you are dropped from the video visit, the video invite should be resent to: Text to cell phone: [phone]  Will anyone else be joining your video visit? No      Subjective     Laurel Parra is a 70 year old female who presents today via video visit for the following health issues:    The patient had a fall  2 months, ago down the stairs and actually had 3 prior falls but that was over 4 years.  She had just come up the from basement and  and legs gave out from under her. With the falls she is light headed.  She notes when comes up the steps blood pressure drops and gets muffled ears and has to put her head down.  Her blood pressure lowest has been 69/47 after coming up the stairs, usually systolic in the 80's.  These blood pressure readings are after coming up the stairs.  She does check blood pressure at other times and around 100's/50's.  When in that range not l.h.  This all started a few " months ago.  She was seen by cards and had a normal est as noted.  She was seen by neuro as well and had eeg and mri and both normal.    In addition she is not feeling well.  She notes nausea and not feel like eating and lethargic feeling.  Sometimes lower part of the intestine hurts.  Her bowel movement have been unchanged, using miralax every other day. She is having more diarrhea, even without the miralax.  Gas has foul odor.  No fevers, has occasionally had ns.  She is not sure if her weight has changed.    She did have more migraines lately that come and go.      She takes pain meds as noted.  She takes 12 pain pills a day.  She does not use flexeril often.  She takes ativan tid, 1mg.  She is on metoprolol for rhythm issues.  She takes pamelor for a year.  She takes nightly ambien.  None of these are new x for the pamelor in the last year.  She also take zofran prn but not often.    She does have anxiety and depression and feels as if that is not an issue.      As noted in December a cosyntropin stim test was normal.    Past Medical History:   Diagnosis Date     Anxiety     Dr. Adelina Meehan     Chronic constipation      Chronic narcotic use     [facility]     Chronic pain     Dr. Orta as of 2015     Chronic urethritis     Dr. Little     Colonic polyp 11/2011    one polyp, fu 5 years, fu 10/17 and no lesions     Depression, major, in partial remission (H)     Dr. Meehan     Diarrhea 2012    eval by [place] [facility], resolved with gluten free diet     DJD (degenerative joint disease)      H/O gastroesophageal reflux (GERD)      LBP (low back pain) 2013    Dr. Jae Tong in [place], then seeing [facility] Dr. Orta     Migraines 2009    neuro eval     Palpitations 2006    holter with pvc's and pac's, echo nl     Screening 2010    dexa nl at gyn     Past Surgical History:   Procedure Laterality Date     APPENDECTOMY  2016     ARTHROPLASTY CARPOMETACARPAL (THUMB JOINT) Left 6/7/2017     Procedure: ARTHROPLASTY CARPOMETACARPAL (THUMB JOINT);  REVISION  LEFT THUMB CARPOMETACARPAL ARTHROPOASTY WITH 1.1 TIGHT ROPE;  Surgeon: Beatrice Philippe MD;  Location: [facility]     ARTHROSCOPY WRIST Left 2019    Procedure: LEFT WRIST ARTHROSCOPY REMOVAL OF TIGHT ROPE ; LEFT EXTENSOR POLLICIS LONGUS /EXTENSOR CARPI RADIALIS BREVIS /EXTENSOR CARPI RADIALUS LONGUS SYNOVECTOMY ; EXTENSOR POLLICIS LONGUS TRANSPOSITION ; ANTERIOR INTEROSSEOUS NEURECTOMY ; POSTERIOR INTEROSSEOUS NEURECTOMY;  Surgeon: Beatrice Philippe MD;  Location:  OR     CARPAL TUNNEL RELEASE RT/LT  2006      SECTION       COLONOSCOPY       EXCIS BARTHOLIN GLAND/CYST  2005     HERNIORRHAPHY INGUINAL  70's    x5     INJECT STEROID (LOCATION) Right 2019    Procedure: RIGHT WRIST CORTISONE INJECTION;  Surgeon: Beatrice Philippe MD;  Location:  OR     OPERATIVE HYSTEROSCOPY WITH MORCELLATOR N/A 2018    Procedure: OPERATIVE HYSTEROSCOPY WITH MORCELLATOR ([employer]);  OPERATIVE HYSTEROSCOPY WITH TRUECLEAR MORCELLATOR 5C SCOPE ;  Surgeon: Iris Fernandez MD;  Location: [facility]     ORTHOPEDIC SURGERY Bilateral     SHOULDER ARTHROSCOPY     ORTHOPEDIC SURGERY Right 2017    right foot for plantar fasciitis     thumb surgery   and 2007     Social History     Socioeconomic History     Marital status:      Spouse name: Not on file     Number of children: 1     Years of education: Not on file     Highest education level: Not on file   Occupational History     Employer: SELF   Social Needs     Financial resource strain: Not on file     Food insecurity     Worry: Not on file     Inability: Not on file     Transportation needs     Medical: Not on file     Non-medical: Not on file   Tobacco Use     Smoking status: Former Smoker     Packs/day: 0.00     Years: 0.00     Pack years: 0.00     Last attempt to quit: 10/29/1979     Years since quittin.7     Smokeless tobacco: Never Used   Substance and Sexual Activity     Alcohol  use: No     Alcohol/week: 0.0 standard drinks     Drug use: No     Sexual activity: Not Currently     Partners: Male     Birth control/protection: Post-menopausal   Lifestyle     Physical activity     Days per week: Not on file     Minutes per session: Not on file     Stress: Not on file   Relationships     Social connections     Talks on phone: Not on file     Gets together: Not on file     Attends Orthodox service: Not on file     Active member of club or organization: Not on file     Attends meetings of clubs or organizations: Not on file     Relationship status: Not on file     Intimate partner violence     Fear of current or ex partner: Not on file     Emotionally abused: Not on file     Physically abused: Not on file     Forced sexual activity: Not on file   Other Topics Concern     Parent/sibling w/ CABG, MI or angioplasty before 65F 55M? No   Social History Narrative     Not on file     Current Outpatient Medications   Medication Sig Dispense Refill     Artificial Saliva (BIOTENE DRY MOUTH) LOZG Take 1-2 lozenges by mouth daily as needed       carboxymethylcellulose (REFRESH PLUS) 0.5 % SOLN ophthalmic solution Place 1 drop into both eyes 3 times daily as needed for dry eyes       Cyanocobalamin (B-12 DOTS PO) Take 1 tablet by mouth daily        cyclobenzaprine (FLEXERIL) 10 MG tablet Take 10 mg by mouth daily as needed for muscle spasms        estradiol (ESTRACE) 0.5 MG tablet Take 0.5 mg by mouth daily Take total 1.5 daily       estradiol (ESTRACE) 1 MG tablet Take 1 mg by mouth daily (with dinner) MUST BE  TEVA . Take total 1.5 mg daily       famotidine (PEPCID) 20 MG tablet Take 1 tablet (20 mg) by mouth 2 times daily as needed 180 tablet 0     Hydrocodone-Acetaminophen  MG TABS Take 2 tablets by mouth every 4 hours as needed       ipratropium (ATROVENT) 0.06 % spray Spray 3 sprays into both nostrils daily 45 mL 3     loratadine (CLARITIN) 10 MG tablet Take 10 mg by mouth daily as  "needed        LORazepam (ATIVAN) 1 MG tablet Take 1-2 tablets (1-2 mg) by mouth 4 times daily as needed for anxiety (up to 6 tablets per day) 30 tablet      MAGNESIUM PO Take 240 mg by mouth daily (OTC)        metoprolol succinate ER (TOPROL-XL) 25 MG 24 hr tablet TAKE ONE TABLET BY MOUTH EVERY EVENING 90 tablet 3     NONFORMULARY Take 10 mg by mouth 2 times daily as needed Compounded Hydrocodone Bitartrate Powder -- compounded by [facility]       nortriptyline (PAMELOR) 25 MG capsule Take 25 mg by mouth At Bedtime  5     ondansetron (ZOFRAN) 4 MG tablet Take 4 mg by mouth every 8 hours as needed for nausea       PARoxetine (PAXIL CR) 25 MG 24 hr tablet Take 25 mg by mouth 2 times daily NAME BRAND PAXIL CR        PROgesterone (PROMETRIUM) 100 MG capsule Take 200 mg by mouth daily (with dinner) (Takes 2 x 100mg = 200mg dose)       vitamin C (ASCORBIC ACID) 1000 MG TABS Take 1,000 mg by mouth daily       Vitamin D, Cholecalciferol, 1000 units TABS Take 2,000 Units by mouth daily        ZOLMitriptan (ZOMIG) 5 MG tablet TAKE ONE TBALET BY MOUTH AT ONSET OF HEADACHE FOR MIGRAINE, MAY REPEAT ONCE AFTER 2 HOURS. MAX OF TWO TABLETS IN 24 HOURS 12 tablet 1     Zolpidem Tartrate (AMBIEN CR PO) Take 12.5 mg by mouth At Bedtime       Allergies   Allergen Reactions     Compazine [Prochlorperazine] Other (See Comments)     \"crawl out of my skin\"     Gluten Meal Diarrhea     Penicillins Nausea and Vomiting and Hives     FAMILY HISTORY NOTED AND REVIEWED    REVIEW OF SYSTEMS: above    PHYSICAL EXAM    There were no vitals taken for this visit.    Patient appears non toxic      Video Start Time: 9:53 AM    Physical Exam     GENERAL: Healthy, alert and no distress  EYES: Eyes grossly normal to inspection.  No discharge or erythema, or obvious scleral/conjunctival abnormalities.  RESP: No audible wheeze, cough, or visible cyanosis.  No visible retractions or increased work of breathing.    SKIN: Visible skin clear. " No significant rash, abnormal pigmentation or lesions.  NEURO: Cranial nerves grossly intact.  Mentation and speech appropriate for age.  PSYCH: Mentation appears normal, affect normal/bright, judgement and insight intact, normal speech and appearance well-groomed.    ASSESSMENT:  1. Falls, seem related to low blood pressure, doubt cv ischemia, rhythm issue, neuro issues, anemia  2. Low blood pressure, suspect may relate to poor oral intake but also multiple meds  3. Chronic pain med use  4. Nausea, ?due to meds, less likely pud, tumor    PLAN:  Labs  If neg consider upper gi endoscopy, ct abdomen and pelvis  Take pepcid bid  Try to increase liquid intake  Stop miguel Lebron M.D.      Video-Visit Details    Type of service:  Video Visit    Video End Time:10:23 AM    Originating Location (pt. Location): Home    Distant Location (provider location):  [facility]     Platform used for Video Visit: Doximity    No follow-ups on file.       Georges Lebron MD

## 2020-07-31 DIAGNOSIS — R11.0 NAUSEA: ICD-10-CM
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
## 2020-07-31 LAB
ERYTHROCYTE [DISTWIDTH] IN BLOOD BY AUTOMATED COUNT: 12.9 % (ref 10–15)
HCT VFR BLD AUTO: 41 % (ref 35–47)
HGB BLD-MCNC: 13.9 G/DL (ref 11.7–15.7)
MCH RBC QN AUTO: 31.7 PG (ref 26.5–33)
MCHC RBC AUTO-ENTMCNC: 33.9 G/DL (ref 31.5–36.5)
MCV RBC AUTO: 94 FL (ref 78–100)
PLATELET # BLD AUTO: 374 10E9/L (ref 150–450)
RBC # BLD AUTO: 4.38 10E12/L (ref 3.8–5.2)
WBC # BLD AUTO: 10.7 10E9/L (ref 4–11)

## 2020-07-31 PROCEDURE — 84443 ASSAY THYROID STIM HORMONE: CPT | Performed by: INTERNAL MEDICINE

## 2020-07-31 PROCEDURE — 80053 COMPREHEN METABOLIC PANEL: CPT | Performed by: INTERNAL MEDICINE

## 2020-07-31 PROCEDURE — 36415 COLL VENOUS BLD VENIPUNCTURE: CPT | Performed by: INTERNAL MEDICINE

## 2020-07-31 PROCEDURE — 85027 COMPLETE CBC AUTOMATED: CPT | Performed by: INTERNAL MEDICINE

## 2020-08-01 LAB
ALBUMIN SERPL-MCNC: 3.7 G/DL (ref 3.4–5)
ALP SERPL-CCNC: 60 U/L (ref 40–150)
ALT SERPL W P-5'-P-CCNC: 15 U/L (ref 0–50)
ANION GAP SERPL CALCULATED.3IONS-SCNC: 4 MMOL/L (ref 3–14)
AST SERPL W P-5'-P-CCNC: 10 U/L (ref 0–45)
BILIRUB SERPL-MCNC: 0.5 MG/DL (ref 0.2–1.3)
BUN SERPL-MCNC: 9 MG/DL (ref 7–30)
CALCIUM SERPL-MCNC: 8.6 MG/DL (ref 8.5–10.1)
CHLORIDE SERPL-SCNC: 104 MMOL/L (ref 94–109)
CO2 SERPL-SCNC: 27 MMOL/L (ref 20–32)
CREAT SERPL-MCNC: 0.7 MG/DL (ref 0.52–1.04)
GFR SERPL CREATININE-BSD FRML MDRD: 89 ML/MIN/{1.73_M2}
GLUCOSE SERPL-MCNC: 104 MG/DL (ref 70–99)
POTASSIUM SERPL-SCNC: 4.2 MMOL/L (ref 3.4–5.3)
PROT SERPL-MCNC: 7 G/DL (ref 6.8–8.8)
SODIUM SERPL-SCNC: 135 MMOL/L (ref 133–144)
TSH SERPL DL<=0.005 MIU/L-ACNC: 0.81 MU/L (ref 0.4–4)

## 2020-08-02 NOTE — RESULT ENCOUNTER NOTE
As you can see the labs look very good.  I would like to go ahead with an upper gi endoscopy if that is ok with you.  I will order it and have them contact you.    If you have any questions please call me.    Georges Lebron M.D.

## 2020-08-03 ENCOUNTER — TRANSFERRED RECORDS (OUTPATIENT)
Dept: HEALTH INFORMATION MANAGEMENT | Facility: CLINIC | Age: 70
End: 2020-08-03

## 2020-08-05 DIAGNOSIS — R63.0 ANOREXIA: Primary | ICD-10-CM

## 2020-08-07 ENCOUNTER — TELEPHONE (OUTPATIENT)
Dept: FAMILY MEDICINE | Facility: CLINIC | Age: 70
End: 2020-08-07

## 2020-08-07 NOTE — TELEPHONE ENCOUNTER
Tried to call pt. Other encounter is in PCP basket for review of covid test (mychart)  Call went right to vm.  Did send a mychart follow up to pt .

## 2020-08-07 NOTE — TELEPHONE ENCOUNTER
Reason for call:  Patient reporting a symptom    Symptom or request: Weight loss, chills, nausea, diarrhea, headache, slight sore throat    Duration (how long have symptoms been present): ongoing for the past month    Have you been treated for this before? No    Additional comments: See [employer] message dated 08/04/2020 for further information regarding symptoms    Phone Number patient can be reached at:  Cell number on file:    Telephone Information:   Mobile [phone]       Best Time:  Any    Can we leave a detailed message on this number:  YES    Call taken on 8/7/2020 at 2:16 PM by Joseph [facility]

## 2020-08-10 NOTE — TELEPHONE ENCOUNTER
"Patient returned call.     Symptoms:  Patient is complaining of intermittent left sided chest pain and  pressure----worse when lying down on left side.    Patient not sure if pain is radiating to left shoulder due to \"rotator cuff\" pain.   Patient wouldn't \"rate\" the pain she is experiencing.     Onset for pain, pressure and palpitations---3 days.     Other symptoms:  Sweating, intermittent dizziness, nausea,      Advised ED for eval---james with complaint of left chest pain, pressure and palpitations.    Patient stated understanding and agreement with advise/plan.      Milka LOOMIS RN,BSN      "

## 2020-08-11 ENCOUNTER — VIRTUAL VISIT (OUTPATIENT)
Dept: FAMILY MEDICINE | Facility: OTHER | Age: 70
End: 2020-08-11
Payer: COMMERCIAL

## 2020-08-11 DIAGNOSIS — Z20.822 SUSPECTED 2019 NOVEL CORONAVIRUS INFECTION: Primary | ICD-10-CM

## 2020-08-11 PROCEDURE — 99421 OL DIG E/M SVC 5-10 MIN: CPT | Performed by: PHYSICIAN ASSISTANT

## 2020-08-11 NOTE — PROGRESS NOTES
"Date: 2020 12:33:35  Clinician: Jordan Barrios  Clinician NPI: [id number]  Patient: Laurel Parra  Patient : 1950  [facility] [place] [address]  Patient Phone: [phone]  Visit Protocol: URI  Patient Summary:  Laurel is a 70 year old ( : 1950 ) female who initiated a Visit for COVID-19 (Coronavirus) evaluation and screening. When asked the question \"Please sign me up to receive news, health information and promotions from [employer].\", Laurel responded \"No\".    Laurel states her symptoms started gradually 1 month or more ago.   Her symptoms consist of a headache, chills, nausea, a sore throat, ageusia, diarrhea, rhinitis, facial pain or pressure, and malaise. Laurel also feels feverish.   Symptom details     Nasal secretions: The color of her mucus is clear.    Sore throat: Laurel reports having mild throat pain (1-3 on a 10 point pain scale), has exudate on her tonsils, and can swallow liquids. She is not sure if the lymph nodes in her neck are enlarged. A rash has not appeared on the skin since the sore throat started.     Temperature: Her current temperature is 98.0 degrees Fahrenheit.     Facial pain or pressure: She has not had the facial pain or pressure for 12 weeks or more. The facial pain or pressure feels worse when bending over or leaning forward.     Headache: Laurel has not had the headache for 12 weeks or more. She states the headache is mild (1-3 on a 10 point pain scale).      Laurel denies having ear pain, teeth pain, cough, nasal congestion, vomiting, myalgias, anosmia, and wheezing. She also denies having a sinus infection within the past year, taking antibiotic medication in the past month, double sickening (worsening symptoms after initial improvement), and having recent facial or sinus surgery in the past 60 days. She is not experiencing dyspnea.   Precipitating events  Within the past week, Laurel has not been " exposed to someone with strep throat. She has not recently been exposed to someone with influenza. Laurel has been in close contact with the following high risk individuals: immunocompromised people and adults 65 or older.   Pertinent COVID-19 (Coronavirus) information  In the past 14 days, Laurel has not worked in a congregate living setting.   She does not work or volunteer as healthcare worker or a  and does not work or volunteer in a healthcare facility.   Laurel also has not lived in a congregate living setting in the past 14 days. She does not live with a healthcare worker.   Laurel has not had a close contact with a laboratory-confirmed COVID-19 patient within 14 days of symptom onset.   Since December 2019, Laurel and has not had upper respiratory infection or influenza-like illness. Has not been diagnosed with lab-confirmed COVID-19 test   Pertinent medical history  Laurel does not get yeast infections when she takes antibiotics.   Laurel does not need a return to work/school note.   Weight: 117 lbs   Laurel does not smoke or use smokeless tobacco.   Additional information as reported by the patient (free text): I was to have an endoscopy tomorrow but due to chills, and lack of taste they cancelled my appointment and told me to get a covid test.  I have not been well for over a month and have seen my internist and had blood work done and all came back normal.  I had been having great difficulty with low blood pressure and also increased arrhythmia.  I had an echo stress test that was normal.  I have been having GI problems with nausea and episodic diarrhea.  I have chills and not able to taste.   Weight: 117 lbs    MEDICATIONS: Hydrocodone Compound oral, Norco oral, zolmitriptan oral, ipratropium bromide nasal, ondansetron oral, progesterone micronized oral, zolpidem oral, lorazepam oral, metoprolol succinate oral, Paxil CR oral, ALLERGIES: Penicillins  Clinician  "Response:  Dear Laurel,   Your symptoms show that you may have coronavirus (COVID-19). This illness can cause fever, cough and trouble breathing. Many people get a mild case and get better on their own. Some people can get very sick.  What should I do?  We would like to test you for this virus.   1. Please call [phone] to schedule your visit. Explain that you were [facility] to have a COVID-19 test. Be ready to share your [facility] visit ID number.  The following will serve as your written order for this COVID Test, ordered by me, for the indication of suspected COVID [Z20.828]: The test will be ordered in [employer], our electronic health record, after you are scheduled. It will show as ordered and authorized by Ramin Trinidad MD.  Order: COVID-19 (Coronavirus) PCR for SYMPTOMATIC testing from [facility].      2. When it's time for your COVID test:  Stay at least 6 feet away from others. (If someone will drive you to your test, stay in the backseat, as far away from the  as you can.)   Cover your mouth and nose with a mask, tissue or washcloth.  Go straight to the testing site. Don't make any stops on the way there or back.      3.Starting now: Stay home and away from others (self-isolate) until:   You've had no fever---and no medicine that reduces fever---for one full day (24 hours). And...   Your other symptoms have gotten better. For example, your cough or breathing has improved. And...   At least 10 days have passed since your symptoms started.       During this time, don't leave the house except for testing or medical care.   Stay in your own room, even for meals. Use your own bathroom if you can.   Stay away from others in your home. No hugging, kissing or shaking hands. No visitors.  Don't go to work, school or anywhere else.    Clean \"high touch\" surfaces often (doorknobs, counters, handles, etc.). Use a household cleaning spray or wipes. You'll find a full list of  on the EPA website: " www.epa.gov/pesticide-registration/list-n-disinfectants-use-against-sars-cov-2.   Cover your mouth and nose with a mask, tissue or washcloth to avoid spreading germs.  Wash your hands and face often. Use soap and water.  Caregivers in these groups are at risk for severe illness due to COVID-19:  o People 65 years and older  o People who live in a nursing home or long-term care facility  o People with chronic disease (lung, heart, cancer, diabetes, kidney, liver, immunologic)  o People who have a weakened immune system, including those who:   Are in cancer treatment  Take medicine that weakens the immune system, such as corticosteroids  Had a bone marrow or organ transplant  Have an immune deficiency  Have poorly controlled HIV or AIDS  Are obese (body mass index of 40 or higher)  Smoke regularly   o Caregivers should wear gloves while washing dishes, handling laundry and cleaning bedrooms and bathrooms.  o Use caution when washing and drying laundry: Don't shake dirty laundry, and use the warmest water setting that you can.  o For more tips, go to www.cdc.gov/coronavirus/2019-ncov/downloads/10Things.pdf.    4.Sign up for [employer]. We know it's scary to hear that you might have COVID-19. We want to track your symptoms to make sure you're okay over the next 2 weeks. Please look for an email from [employer]---this is a free, online program that we'll use to keep in touch. To sign up, follow the link in the email. Learn more at [email or web address]  How can I take care of myself?   Get lots of rest. Drink extra fluids (unless a doctor has told you not to).   Take Tylenol (acetaminophen) for fever or pain. If you have liver or kidney problems, ask your family doctor if it's okay to take Tylenol.   Adults can take either:    650 mg (two 325 mg pills) every 4 to 6 hours, or...   1,000 mg (two 500 mg pills) every 8 hours as needed.    Note: Don't take more than 3,000 mg in one day. Acetaminophen is found  in many medicines (both prescribed and over-the-counter medicines). Read all labels to be sure you don't take too much.   For children, check the Tylenol bottle for the right dose. The dose is based on the child's age or weight.    If you have other health problems (like cancer, heart failure, an organ transplant or severe kidney disease): Call your specialty clinic if you don't feel better in the next 2 days.       Know when to call 911. Emergency warning signs include:    Trouble breathing or shortness of breath Pain or pressure in the chest that doesn't go away Feeling confused like you haven't felt before, or not being able to wake up Bluish-colored lips or face.  Where can I get more information?    [facility] -- About COVID-19: [email or web address]   CDC -- What to Do If You're Sick: www.cdc.gov/coronavirus/2019-ncov/about/steps-when-sick.html   [facility] -- Ending Home Isolation: www.cdc.gov/coronavirus/2019-ncov/hcp/disposition-in-home-patients.html   CDC -- Caring for Someone: www.cdc.gov/coronavirus/2019-ncov/if-you-are-sick/care-for-someone.html   [facility] -- Interim Guidance for Hospital Discharge to Home: www.health.[facility].[place]/diseases/coronavirus/hcp/hospdischarge.pdf   [facility] clinical trials (COVID-19 research studies): clinicalaffairs.[facility]-clinical-trials    Below are the COVID-19 hotlines at the [facility] of Health ([facility]). Interpreters are available.    For health questions: Call [phone] or 1-103.595.5856 (7 a.m. to 7 p.m.) For questions about schools and childcare: Call 411-727-1935 or 1-554.454.6632 (7 a.m. to 7 p.m.)    Diagnosis: Acute upper respiratory infection, unspecified  Diagnosis ICD: J06.9

## 2020-08-12 DIAGNOSIS — Z20.822 SUSPECTED 2019 NOVEL CORONAVIRUS INFECTION: ICD-10-CM

## 2020-08-12 PROCEDURE — U0003 INFECTIOUS AGENT DETECTION BY NUCLEIC ACID (DNA OR RNA); SEVERE ACUTE RESPIRATORY SYNDROME CORONAVIRUS 2 (SARS-COV-2) (CORONAVIRUS DISEASE [COVID-19]), AMPLIFIED PROBE TECHNIQUE, MAKING USE OF HIGH THROUGHPUT TECHNOLOGIES AS DESCRIBED BY CMS-2020-01-R: HCPCS | Performed by: FAMILY MEDICINE

## 2020-08-13 LAB
SARS-COV-2 RNA SPEC QL NAA+PROBE: NOT DETECTED
SPECIMEN SOURCE: NORMAL

## 2020-08-14 DIAGNOSIS — Z11.59 ENCOUNTER FOR SCREENING FOR OTHER VIRAL DISEASES: Primary | ICD-10-CM

## 2020-08-16 DIAGNOSIS — Z11.59 ENCOUNTER FOR SCREENING FOR OTHER VIRAL DISEASES: ICD-10-CM

## 2020-08-16 PROCEDURE — U0003 INFECTIOUS AGENT DETECTION BY NUCLEIC ACID (DNA OR RNA); SEVERE ACUTE RESPIRATORY SYNDROME CORONAVIRUS 2 (SARS-COV-2) (CORONAVIRUS DISEASE [COVID-19]), AMPLIFIED PROBE TECHNIQUE, MAKING USE OF HIGH THROUGHPUT TECHNOLOGIES AS DESCRIBED BY CMS-2020-01-R: HCPCS | Performed by: SPECIALIST

## 2020-08-17 LAB
SARS-COV-2 RNA SPEC QL NAA+PROBE: NOT DETECTED
SPECIMEN SOURCE: NORMAL

## 2020-08-18 ENCOUNTER — HOSPITAL ENCOUNTER (OUTPATIENT)
Facility: CLINIC | Age: 70
Discharge: HOME | End: 2020-08-18
Attending: SPECIALIST | Admitting: SPECIALIST
Payer: COMMERCIAL

## 2020-08-18 VITALS
DIASTOLIC BLOOD PRESSURE: 80 MMHG | BODY MASS INDEX: 18.32 KG/M2 | HEART RATE: 63 BPM | WEIGHT: 114 LBS | TEMPERATURE: 98.2 F | RESPIRATION RATE: 13 BRPM | OXYGEN SATURATION: 95 % | HEIGHT: 66 IN | SYSTOLIC BLOOD PRESSURE: 106 MMHG

## 2020-08-18 LAB — UPPER GI ENDOSCOPY: NORMAL

## 2020-08-18 PROCEDURE — 99153 MOD SED SAME PHYS/QHP EA: CPT | Performed by: SPECIALIST

## 2020-08-18 PROCEDURE — G0500 MOD SEDAT ENDO SERVICE >5YRS: HCPCS | Performed by: SPECIALIST

## 2020-08-18 PROCEDURE — 25000128 H RX IP 250 OP 636: Performed by: SPECIALIST

## 2020-08-18 PROCEDURE — 00000159 ZZHCL STATISTIC H-SEND OUTS PREP: Performed by: SPECIALIST

## 2020-08-18 PROCEDURE — 88305 TISSUE EXAM BY PATHOLOGIST: CPT | Performed by: SPECIALIST

## 2020-08-18 PROCEDURE — 88305 TISSUE EXAM BY PATHOLOGIST: CPT | Mod: 26,59 | Performed by: SPECIALIST

## 2020-08-18 PROCEDURE — 43239 EGD BIOPSY SINGLE/MULTIPLE: CPT | Performed by: SPECIALIST

## 2020-08-18 PROCEDURE — 25000125 ZZHC RX 250: Performed by: SPECIALIST

## 2020-08-18 RX ORDER — FENTANYL CITRATE 50 UG/ML
INJECTION, SOLUTION INTRAMUSCULAR; INTRAVENOUS PRN
Status: DISCONTINUED | OUTPATIENT
Start: 2020-08-18 | End: 2020-08-18 | Stop reason: HOSPADM

## 2020-08-18 RX ORDER — ONDANSETRON 2 MG/ML
4 INJECTION INTRAMUSCULAR; INTRAVENOUS
Status: DISCONTINUED | OUTPATIENT
Start: 2020-08-18 | End: 2020-08-18 | Stop reason: HOSPADM

## 2020-08-18 RX ORDER — LIDOCAINE 40 MG/G
CREAM TOPICAL
Status: DISCONTINUED | OUTPATIENT
Start: 2020-08-18 | End: 2020-08-18 | Stop reason: HOSPADM

## 2020-08-18 ASSESSMENT — MIFFLIN-ST. JEOR: SCORE: 1063.85

## 2020-08-18 NOTE — H&P
Pre-Endoscopy History and Physical     Laurel Parra MRN# [id number]   YOB: 1950 Age: 70 year old     Date of Procedure: 8/18/2020  Primary care provider: Georges Lebron  Type of Endoscopy: Esophagogastroduodenoscopy with possible biopsy, possible dilation, possible foreign body removal  Reason for Procedure: Nausea, diarrhea, abdominal distress, weight loss.   Type of Anesthesia Anticipated: Conscious Sedation    HPI:    Laurel is a 70 year old female who will be undergoing the above procedure.      A history and physical has been performed. The patient's medications and allergies have been reviewed. The risks and benefits of the procedure and the sedation options and risks were discussed with the patient.  All questions were answered and informed consent was obtained.      She denies a personal or family history of anesthesia complications or bleeding disorders.     Patient Active Problem List   Diagnosis     Frequency of urination and polyuria     Anxiety     Colonic polyp     Chronic urethritis     Palpitations     Chronic narcotic use     Intractable chronic migraine without aura and without status migrainosus     Recurrent major depressive disorder, in partial remission (H)     Gastroesophageal reflux disease without esophagitis     Chronic low back pain, unspecified back pain laterality, with sciatica presence unspecified        Past Medical History:   Diagnosis Date     Anxiety     Dr. Adelina Meehan     Chronic constipation      Chronic narcotic use     [facility]     Chronic pain     Dr. Orta as of 2015     Chronic urethritis     Dr. Little     Colonic polyp 11/2011    one polyp, fu 5 years, fu 10/17 and no lesions     Depression, major, in partial remission (H)     Dr. Meehan     Diarrhea 2012    eval by [facility], resolved with gluten free diet     DJD (degenerative joint disease)      H/O gastroesophageal reflux (GERD)      LBP (low back pain) 2013      [facility] in [place], then seeing [facility] Dr. Orta     Migraines 2009    neuro eval     Palpitations 2006    holter with pvc's and pac's, echo nl     Screening     dexa nl at gyn        Past Surgical History:   Procedure Laterality Date     APPENDECTOMY  2016     ARTHROPLASTY CARPOMETACARPAL (THUMB JOINT) Left 2017    Procedure: ARTHROPLASTY CARPOMETACARPAL (THUMB JOINT);  REVISION  LEFT THUMB CARPOMETACARPAL ARTHROPOASTY WITH 1.1 TIGHT ROPE;  Surgeon: Beatrice Philippe MD;  Location:  [facility]     ARTHROSCOPY WRIST Left 2019    Procedure: LEFT WRIST ARTHROSCOPY REMOVAL OF TIGHT ROPE ; LEFT EXTENSOR POLLICIS LONGUS /EXTENSOR CARPI RADIALIS BREVIS /EXTENSOR CARPI RADIALUS LONGUS SYNOVECTOMY ; EXTENSOR POLLICIS LONGUS TRANSPOSITION ; ANTERIOR INTEROSSEOUS NEURECTOMY ; POSTERIOR INTEROSSEOUS NEURECTOMY;  Surgeon: Beatrice Philippe MD;  Location:  OR     CARPAL TUNNEL RELEASE RT/LT  2006      SECTION  1986     COLONOSCOPY       EXCIS BARTHOLIN GLAND/CYST  2005     HERNIORRHAPHY INGUINAL  70's    x5     INJECT STEROID (LOCATION) Right 2019    Procedure: RIGHT WRIST CORTISONE INJECTION;  Surgeon: Beatrice Philippe MD;  Location:  OR     OPERATIVE HYSTEROSCOPY WITH MORCELLATOR N/A 2018    Procedure: OPERATIVE HYSTEROSCOPY WITH MORCELLATOR ([employer]);  OPERATIVE HYSTEROSCOPY WITH TRUECLEAR MORCELLATOR 5C SCOPE ;  Surgeon: Iris Fernandez MD;  Location: [facility]     ORTHOPEDIC SURGERY Bilateral     SHOULDER ARTHROSCOPY     ORTHOPEDIC SURGERY Right 2017    right foot for plantar fasciitis     thumb surgery   and ,        Social History     Tobacco Use     Smoking status: Former Smoker     Packs/day: 0.00     Years: 0.00     Pack years: 0.00     Last attempt to quit: 10/29/1979     Years since quittin.8     Smokeless tobacco: Never Used   Substance Use Topics     Alcohol use: No     Alcohol/week: 0.0 standard drinks       Family History   Problem Relation Age of  Onset     Diabetes Father      Hypertension Father      Depression Father      Substance Abuse Father      Anesthesia Reaction Father      Obesity Father      Diabetes Mother      Cerebrovascular Disease Mother         Cadasils disease     Depression Mother      Mental Illness Mother         borderline personality disorder, Chemical dependency     Substance Abuse Mother      Genetic Disorder Mother         Cadasils     Obesity Mother      Cerebrovascular Disease Paternal Grandfather      Cerebrovascular Disease Brother         Cadasils disease     Depression Brother      Anxiety Disorder Brother      Substance Abuse Brother      Genetic Disorder Brother         cadasils     Obesity Brother      Cerebrovascular Disease Sister         Cadasils disease     Depression Sister      Anxiety Disorder Sister      Genetic Disorder Sister         cadasils     Obesity Sister      Cerebrovascular Disease Sister         Cadasils disease     Genetic Disorder Sister         cadasils     Breast Cancer Sister      Depression Sister      Anxiety Disorder Sister      Obesity Sister        Prior to Admission medications    Medication Sig Start Date End Date Taking? Authorizing Provider   carboxymethylcellulose (REFRESH PLUS) 0.5 % SOLN ophthalmic solution Place 1 drop into both eyes 3 times daily as needed for dry eyes   Yes Reported, Patient   Cyanocobalamin (B-12 DOTS PO) Take 1 tablet by mouth daily    Yes Reported, Patient   cyclobenzaprine (FLEXERIL) 10 MG tablet Take 10 mg by mouth daily as needed for muscle spasms    Yes Reported, Patient   estradiol (ESTRACE) 0.5 MG tablet Take 0.5 mg by mouth daily Take total 1.5 daily 1/8/20  Yes Reported, Patient   Hydrocodone-Acetaminophen  MG TABS Take 2 tablets by mouth every 4 hours as needed   Yes Reported, Patient   ipratropium (ATROVENT) 0.06 % spray Spray 3 sprays into both nostrils daily 1/30/18  Yes Bere Esparza APRN CNP   loratadine (CLARITIN) 10 MG tablet Take 10 mg  "by mouth daily as needed    Yes Reported, Patient   LORazepam (ATIVAN) 1 MG tablet Take 1-2 tablets (1-2 mg) by mouth 4 times daily as needed for anxiety (up to 6 tablets per day)   Yes Georges Lebron MD   MAGNESIUM PO Take 240 mg by mouth daily (OTC)    Yes Reported, Patient   metoprolol succinate ER (TOPROL-XL) 25 MG 24 hr tablet TAKE ONE TABLET BY MOUTH EVERY EVENING 11/19/19  Yes Georges Lebron MD   PARoxetine (PAXIL CR) 25 MG 24 hr tablet Take 25 mg by mouth 2 times daily NAME BRAND PAXIL CR    Yes Reported, Patient   PROgesterone (PROMETRIUM) 100 MG capsule Take 200 mg by mouth daily (with dinner) (Takes 2 x 100mg = 200mg dose)   Yes Reported, Patient   vitamin C (ASCORBIC ACID) 1000 MG TABS Take 1,000 mg by mouth daily   Yes Reported, Patient   Vitamin D, Cholecalciferol, 1000 units TABS Take 2,000 Units by mouth daily    Yes Reported, Patient   Zolpidem Tartrate (AMBIEN CR PO) Take 12.5 mg by mouth At Bedtime   Yes Reported, Patient   estradiol (ESTRACE) 1 MG tablet Take 1 mg by mouth daily (with dinner) MUST BE  TEVA . Take total 1.5 mg daily    Reported, Patient   famotidine (PEPCID) 20 MG tablet Take 1 tablet (20 mg) by mouth 2 times daily as needed 5/26/20   Jc Castaneda MD   NONFORMULARY Take 10 mg by mouth 2 times daily as needed Compounded Hydrocodone Bitartrate Powder -- compounded by [facility]    Reported, Patient   nortriptyline (PAMELOR) 25 MG capsule Take 25 mg by mouth At Bedtime 6/12/19   Reported, Patient   ondansetron (ZOFRAN) 4 MG tablet Take 4 mg by mouth every 8 hours as needed for nausea    Reported, Patient   ZOLMitriptan (ZOMIG) 5 MG tablet TAKE ONE TBALET BY MOUTH AT ONSET OF HEADACHE FOR MIGRAINE, MAY REPEAT ONCE AFTER 2 HOURS. MAX OF TWO TABLETS IN 24 HOURS 7/30/20   Georges Lebron MD       Allergies   Allergen Reactions     Betamethasone      Compazine [Prochlorperazine] Other (See Comments)     \"crawl out of " "my skin\"     Fentanyl      Other reaction(s): Headache/sedation/diffic urinate     Gluten Meal Diarrhea     Methadone      Other reaction(s): Headache and sedation     Morphine      Other reaction(s): Constipation     Penicillins Nausea and Vomiting and Hives     Pneumococcal Vaccine      Tizanidine      Other reaction(s): Severe agitation     I discussed the history of allergy to fentanyl; she says the fentanyl patch could give her a headache. She has not had a severe reaction to it, and has tolerated it before with previous endoscopic procedures.     REVIEW OF SYSTEMS:   5 point ROS negative except as noted above in HPI, including Gen., Resp., CV, GI &  system review.    PHYSICAL EXAM:   /89   Pulse 64   Temp 98.2  F (36.8  C) (Oral)   Ht 1.676 m (5' 6\")   Wt 51.7 kg (114 lb)   SpO2 93%   BMI 18.40 kg/m   Estimated body mass index is 18.4 kg/m  as calculated from the following:    Height as of this encounter: 1.676 m (5' 6\").    Weight as of this encounter: 51.7 kg (114 lb).   GENERAL APPEARANCE: alert, and oriented  MENTAL STATUS: alert  AIRWAY EXAM: Mallampatti Class I (visualization of the soft palate, fauces, uvula, anterior and posterior pillars)  RESP: lungs clear to auscultation - no rales, rhonchi or wheezes  CV: regular rates and rhythm  DIAGNOSTICS:    Not indicated    IMPRESSION   ASA Class 2 - Mild systemic disease    PLAN:   Plan for Esophagogastroduodenoscopy with possible biopsy, possible dilation, possible foreign body removal. We discussed the risks, benefits and alternatives and the patient wished to proceed.    The above has been forwarded to the consulting provider.      Signed Electronically by: Georges Pop MD  August 18, 2020          "

## 2020-08-19 LAB — COPATH REPORT: NORMAL

## 2020-08-19 NOTE — RESULT ENCOUNTER NOTE
Assessment: A reactive gastropathy is a non specific finding. Although architectural changes are noted in the cells of the stomach lining, no active inflammation is present. It can be caused by medications (usually aspirin or NSAIDs), alcohol and intestinal contents that reflux back into the stomach (usually in the setting of post surgical anatomy). Treatment is focused on removing the offending agent (if known), reducing stomach acidity (with histamine type II blockers or proton pump inhibitors) or protecting the gastric mucosa (with agents like Pepto-Bismol , Carafate and Misoprostol). Although this may explain nausea and epigastric distress, it does not explain diarrhea or weight loss ... the duodenal biopsies are normal.     Recommendations: Consider treatment options as noted above.

## 2020-08-20 ENCOUNTER — HOSPITAL ENCOUNTER (OUTPATIENT)
Dept: CT IMAGING | Facility: CLINIC | Age: 70
Discharge: HOME | End: 2020-08-20
Attending: INTERNAL MEDICINE | Admitting: INTERNAL MEDICINE
Payer: COMMERCIAL

## 2020-08-20 DIAGNOSIS — R63.0 ANOREXIA: ICD-10-CM

## 2020-08-20 PROCEDURE — 25000128 H RX IP 250 OP 636: Performed by: INTERNAL MEDICINE

## 2020-08-20 PROCEDURE — 25000125 ZZHC RX 250: Performed by: INTERNAL MEDICINE

## 2020-08-20 PROCEDURE — 74177 CT ABD & PELVIS W/CONTRAST: CPT

## 2020-08-20 RX ORDER — IOPAMIDOL 755 MG/ML
56 INJECTION, SOLUTION INTRAVASCULAR ONCE
Status: COMPLETED | OUTPATIENT
Start: 2020-08-20 | End: 2020-08-20

## 2020-08-20 RX ADMIN — IOPAMIDOL 56 ML: 755 INJECTION, SOLUTION INTRAVENOUS at 16:46

## 2020-08-20 RX ADMIN — SODIUM CHLORIDE 56 ML: 9 INJECTION, SOLUTION INTRAVENOUS at 16:46

## 2020-08-21 ENCOUNTER — TELEPHONE (OUTPATIENT)
Dept: FAMILY MEDICINE | Facility: CLINIC | Age: 70
End: 2020-08-21

## 2020-08-21 NOTE — TELEPHONE ENCOUNTER
I called and discussed the CT results the patient.  It is unclear to me if the findings are significant.  Given the way she feels and the recent normal endoscopy this requires further evaluation.  The plan at this time is:    1. See gyn for pelvic  2. Call and see [facility] asap.  If has issues getting in will let me know    Georges Lebron M.D.

## 2020-08-24 ENCOUNTER — TRANSFERRED RECORDS (OUTPATIENT)
Dept: HEALTH INFORMATION MANAGEMENT | Facility: CLINIC | Age: 70
End: 2020-08-24

## 2020-09-02 ENCOUNTER — TRANSFERRED RECORDS (OUTPATIENT)
Dept: HEALTH INFORMATION MANAGEMENT | Facility: CLINIC | Age: 70
End: 2020-09-02

## 2020-09-03 ENCOUNTER — TRANSFERRED RECORDS (OUTPATIENT)
Dept: HEALTH INFORMATION MANAGEMENT | Facility: CLINIC | Age: 70
End: 2020-09-03

## 2020-09-16 ENCOUNTER — VIRTUAL VISIT (OUTPATIENT)
Dept: PHARMACY | Facility: CLINIC | Age: 70
End: 2020-09-16
Payer: COMMERCIAL

## 2020-09-16 DIAGNOSIS — F41.9 ANXIETY: Primary | ICD-10-CM

## 2020-09-16 DIAGNOSIS — G43.719 INTRACTABLE CHRONIC MIGRAINE WITHOUT AURA AND WITHOUT STATUS MIGRAINOSUS: ICD-10-CM

## 2020-09-16 DIAGNOSIS — K21.9 GASTROESOPHAGEAL REFLUX DISEASE WITHOUT ESOPHAGITIS: ICD-10-CM

## 2020-09-16 DIAGNOSIS — F11.90 CHRONIC NARCOTIC USE: ICD-10-CM

## 2020-09-16 DIAGNOSIS — G47.00 INSOMNIA, UNSPECIFIED TYPE: ICD-10-CM

## 2020-09-16 DIAGNOSIS — F33.41 RECURRENT MAJOR DEPRESSIVE DISORDER, IN PARTIAL REMISSION (H): ICD-10-CM

## 2020-09-16 DIAGNOSIS — Z79.890 HORMONE REPLACEMENT THERAPY (HRT): ICD-10-CM

## 2020-09-16 DIAGNOSIS — R00.2 PALPITATIONS: ICD-10-CM

## 2020-09-16 PROCEDURE — 99607 MTMS BY PHARM ADDL 15 MIN: CPT | Mod: TEL | Performed by: PHARMACIST

## 2020-09-16 PROCEDURE — 99605 MTMS BY PHARM NP 15 MIN: CPT | Mod: TEL | Performed by: PHARMACIST

## 2020-09-16 RX ORDER — OMEPRAZOLE 40 MG/1
40 CAPSULE, DELAYED RELEASE ORAL
COMMUNITY

## 2020-09-16 RX ORDER — POLYETHYLENE GLYCOL 3350 17 G/17G
1 POWDER, FOR SOLUTION ORAL PRN
COMMUNITY

## 2020-09-16 NOTE — PROGRESS NOTES
"MTM ENCOUNTER  SUBJECTIVE/OBJECTIVE:                           Laurel Parra is a 70 year old female called for an initial visit. She was referred to me from her .      Patient consented to a telehealth visit: yes  Telemedicine Visit Details  Type of service:  Telephone visit  Start Time: 11:02 AM  End Time: 12:11 PM  Originating Location (pt. Location): Home  Distant Location (provider location):  [facility] MTM  Mode of Communication:  Telephone    Chief Complaint: She'll be going on Medicare next month - none of the pharmacy coverage options she's looking into will cover Paxil CR that she's taking.  Would also to talk about tapering off opioids and HRT.    Personal Healthcare Goals: Improve pain control, reduce migraine frequency, be able to exercise and not worsen her pain, reduce # of medications she's taking and feel better overall    Allergies/ADRs: Reviewed in EHR  Tobacco: She reports that she quit smoking about 40 years ago. She smoked 0.00 packs per day for 0.00 years. She has never used smokeless tobacco.  Alcohol: not currently using  Caffeine: 1 cup/day of coffee  Activity: \"Not doing well with that due to chronic pain.\"  She walks her dog daily for 45-60 minutes.    PMH: Reviewed in EHR    Medication Adherence/Access: Patient takes medications directly from bottles, she generally uses pill boxes but she's recently been using bottles.  Plans to go back to pill boxes soon.  Patient takes medications several time(s) per day (has several PRN medications so takes these throughout the day).  Takes maintenance medications BID.  Per patient, misses medication 0 times per week.     Depression/Anxiety:  Current medications include: Paxil CR 25mg BID (brand name) and lorazepam 1mg TID (Rx is written for 1-2mg TID PRN).  She's tried Zoloft, Prozac, generic Paxil CR, Paxil, Wellbutrin, Celexa - none of which were effective for her.  Pt reports that depression " "symptoms are relatively stable.  She denies suicidal ideation.  She does not believe she's ever had atypical psychotic therapy added on.  She's very fearful of the thought of changing anything with her antidepressants.  She does see a therapist weekly.  She sees her psychiatrist 2x/year (Thelma Meehan with [facility]).  PHQ-9 SCORE 8/14/2019 11/18/2019 6/29/2020   PHQ-9 Total Score - - -   PHQ-9 Total Score MyChart - 10 (Moderate depression) 15 (Moderately severe depression)   PHQ-9 Total Score 11 10 15      Chronic Pain:  Pt is currently using hydrocodone/APAP 10/300mg 2 tablets every 4 hours PRN and compounded hydrocodone 10mg BID PRN.  She also has cyclobenzaprine available for use - she estimates she uses this about 3x/month for muscle spasms in her back.  She's been finding she's not taking the full amounts she's allowed recently as she hasn't been feeling well - she generally is taking 12 tablets/day currently (either hydrocodone/APAP or hydrocodone alone), some days is more around 8-10 tablets.  She also uses heat for pain, also lays on tennis balls and uses foam rollers.  Pre-pandemic she was getting dry needling every other week as well as therapeutic massage regularly, is no longer receiving.    Pt has been in discussion with the pain clinic re: tapering off opioids.  She [facility] and she has monthly visits with them.  She prefers to not take these, also has upcoming surgery to have wrist fused and would like to get off opioids prior to surgery.  She does not have surgery scheduled yet, but may have done this winter.  She's found pain has been controlled better during pandemic because she's not \"on the go\" as much.  She's tried medical cannabis in the past, which was ineffective for her.    HRT:  Pt is currently taking estradiol 1.5mg daily and progesterone 200mg daily.  She'd like to try to get off HRT, she's tried in the past but bleeding has " "returned.  She reports being told cause of bleeding is unknown.  She has not seen gynecology recently.    GI:   Pt has been working with [facility].  She c/o epigastric pain, cramping, nausea, occasional diarrhea and unintentional 10 lb weight loss and wonders if these sx might be medication related.  These sx developed suddenly at the end of July.  Pain is not associated with eating, but appetite has been reduced due to nausea.  She had EGD on 8/18 which showed normal esophagus, 2cm hiatal hernia, moderate erythremia in stomach and normal duodenum.  CT scan on 8/20 showed mild biliary dilation, no gallstones, and question of colitis vs incomplete distension.  Per GI note on 8/24 - pt was also to have MRI and colonoscopy done, if those were normal then plan was for 4 hr gastric emptying study, CT scan of the chest and trial of peppermint oil.    Colonoscopy was completed 9/3 - findings included diverticulosis and hemorrhoids, remainder of exam was normal.    She's currently taking omeprazole 40mg daily (new following GI visit), Miralax 17g daily PRN, and Metamucil 1.5 tsp daily (this is relatively new for her).  She reports sx are much improved since starting omeprazole.  She's still having occasional sx, but she feels they're more manageable.      Wt Readings from Last 4 Encounters:   08/18/20 114 lb (51.7 kg)   03/11/20 125 lb (56.7 kg)   11/19/19 123 lb 8 oz (56 kg)   08/26/19 122 lb 8 oz (55.6 kg)       Migraines: She has Zomig available for use for abortive therapy for migraines.  She's been having more migraines the last few months (about 10/month).  She has an appointment with her neurologist upcoming.  She was previously taking nortriptyline for prophylaxis but this was stopped due to drug interactions and falls.  She's started taking loratadine 10mg daily again, and this has seemed to help with her migraines.    Insomnia:  She's taking Ambien CR 12.5mg at bedtime.  She says \"I would get no sleep without that.\"  " "She denies side effects.    Palpitations: Current medications include metoprolol XR 25mg daily.  Patient does not self-monitor BP.  Patient reports no current medication side effects.  BP Readings from Last 3 Encounters:   08/18/20 106/80   03/11/20 109/77   11/19/19 110/75        Today's Vitals: There were no vitals taken for this visit. - telephone visit due to COVID-19 pandemic    ASSESSMENT:                            Medication Adherence: No issues identified    Depression/Anxiety: Sx are currently stable, but she'll have to decide on treatment plan prior to switching to Medicare.  Options would include 1) staying with current regimen.  She may be able to have formulary exception done to see if Paxil CR may be covered.  She may qualify for assistance through the  or [employer].  2) Could consider reducing Paxil CR to 37.5mg once daily.  She'd still be paying out of pocket, but costs would be lower at 1 tablet/day vs 2 tablets/day).  3) Could consider switch to alternative therapy.  One option might be IR paroxetine +/- addition of atypical antipsychotic.  She prefers to not switch medication.    Chronic Pain: I agree it'd be best to taper her off opioids if possible.  She is on high doses.  Would benefit from working with the pain clinic, but was interested in learning general strategies.  Of note, patient does have several CNS depressing agents available to her - the \"triple threat' and more.  May benefit from having Narcan available.    HRT: Would need to f/up with gynecology due to concerns of bleeding when tapering off tx in the past.    GI: Improved, will continue to monitor.    Migraines: Improved, plan in place.    Insomnia:  Stable, but would prefer she's not on long term zolpidem.    Palpitations:  Stable per her report.  Unfortunately switching to less selective beta-blocker such as propranolol, which may help with migraine prophylaxis, likely wouldn't control palpitations.    PLAN:      "                       1.  Discussed various strategies which could be used for depression management once she goes on Medicare (see assessment).  She'll start by calling [facility] [facility] Assistance [facility] to see if she'd be eligible for any assistance for Paxil CR.  If not, she'll discuss alternatives with her psychiatrist.  2.  When tapering opioids used for chronic pain - would recommend decreasing hydrocodone total daily dose by 10-20% of total original dose every 1-4 weeks until gone.  As a starting point - reduce to 10-11 tablets/day total for 1-4 weeks.  She'll discuss further with pain clinic.  3.  We discussed that Laurel dose have a possibly dangerous combination of drugs on board: hydrocodone/APAP, hydrocodone alone, lorazepam, Ambien CR and cyclobenzaprine.  Could consider Rx for Narcan.  4.  Encouraged Laurel to f/up with gynecology re: hopes of tapering off HRT.    I spent 69 minutes with this patient today. I offer these suggestions for consideration by Laurel's psychiatrist and pain management provider.  Will touch base with PCP re: Narcan. A copy of the visit note was provided to the patient's primary care provider, psychiatrist, and pain management provider.    Will follow up in 3 months, sooner if needed.    The patient declined a summary of these recommendations.     Mandi Peng, PharmD, [facility]  Medication Therapy Management Provider  Pager: [phone]

## 2020-09-16 NOTE — Clinical Note
Hi Dr. Lebron, I hope you're doing well.  I spoke with Laurel earlier this week - she was referred by her  with her insurance plan.  I'm wondering your thoughts on getting her Narcan to have available?  Her meds seem high risk - high doses of opioids, cyclobenzaprine, lorazepam and Ambien CR.  I have recommendations in for pain management to reduce opioids (she's wishing to taper off), so that's a starting point.  She rarely uses cyclobenzaprine but does have it available.  Let me know your thoughts.  Thanks!  Mandi Peng, PharmD, [facility]  Medication Therapy Management Provider  Pager: [phone]

## 2020-09-17 ENCOUNTER — HOSPITAL ENCOUNTER (OUTPATIENT)
Dept: MRI IMAGING | Facility: CLINIC | Age: 70
Discharge: HOME | End: 2020-09-17
Attending: INTERNAL MEDICINE | Admitting: INTERNAL MEDICINE
Payer: COMMERCIAL

## 2020-09-17 DIAGNOSIS — R11.0 NAUSEA: ICD-10-CM

## 2020-09-17 DIAGNOSIS — R10.13 EPIGASTRIC ABDOMINAL PAIN: ICD-10-CM

## 2020-09-17 DIAGNOSIS — R19.4 BOWEL HABIT CHANGES: ICD-10-CM

## 2020-09-17 DIAGNOSIS — R93.3 ABNORMAL CT SCAN, GASTROINTESTINAL TRACT: ICD-10-CM

## 2020-09-17 DIAGNOSIS — K83.8 DILATION OF BILIARY TRACT: ICD-10-CM

## 2020-09-17 DIAGNOSIS — R68.81 EARLY SATIETY: ICD-10-CM

## 2020-09-17 DIAGNOSIS — R19.5 LOOSE STOOLS: ICD-10-CM

## 2020-09-17 DIAGNOSIS — R63.4 UNINTENDED WEIGHT LOSS: ICD-10-CM

## 2020-09-17 PROCEDURE — A9585 GADOBUTROL INJECTION: HCPCS

## 2020-09-17 PROCEDURE — 25500064 ZZH RX 255 OP 636

## 2020-09-17 PROCEDURE — 74183 MRI ABD W/O CNTR FLWD CNTR: CPT

## 2020-09-17 RX ORDER — GADOBUTROL 604.72 MG/ML
6 INJECTION INTRAVENOUS ONCE
Status: COMPLETED | OUTPATIENT
Start: 2020-09-17 | End: 2020-09-17

## 2020-09-17 RX ADMIN — GADOBUTROL 6 ML: 604.72 INJECTION INTRAVENOUS at 15:18

## 2020-09-18 ENCOUNTER — TRANSFERRED RECORDS (OUTPATIENT)
Dept: HEALTH INFORMATION MANAGEMENT | Facility: CLINIC | Age: 70
End: 2020-09-18

## 2020-09-22 NOTE — PROGRESS NOTES
Per Dr. Bernardino encinas to send in Rx for Narcan.  Sent pt myChart message to discuss prior to sending in Rx.    Mandi Peng, PharmD, [facility]  Medication Therapy Management Provider  Pager: [phone]

## 2020-10-20 ENCOUNTER — TRANSFERRED RECORDS (OUTPATIENT)
Dept: HEALTH INFORMATION MANAGEMENT | Facility: CLINIC | Age: 70
End: 2020-10-20

## 2020-10-20 ENCOUNTER — HOSPITAL ENCOUNTER (OUTPATIENT)
Facility: CLINIC | Age: 70
End: 2020-10-20
Attending: ORTHOPAEDIC SURGERY | Admitting: ORTHOPAEDIC SURGERY
Payer: MEDICARE

## 2020-10-20 DIAGNOSIS — Z11.59 ENCOUNTER FOR SCREENING FOR OTHER VIRAL DISEASES: Primary | ICD-10-CM

## 2020-10-28 ENCOUNTER — TRANSFERRED RECORDS (OUTPATIENT)
Dept: HEALTH INFORMATION MANAGEMENT | Facility: CLINIC | Age: 70
End: 2020-10-28

## 2020-11-03 ENCOUNTER — OFFICE VISIT (OUTPATIENT)
Dept: FAMILY MEDICINE | Facility: CLINIC | Age: 70
End: 2020-11-03
Payer: MEDICARE

## 2020-11-03 VITALS
HEIGHT: 66 IN | WEIGHT: 121 LBS | BODY MASS INDEX: 19.44 KG/M2 | TEMPERATURE: 97.6 F | DIASTOLIC BLOOD PRESSURE: 80 MMHG | OXYGEN SATURATION: 97 % | HEART RATE: 57 BPM | SYSTOLIC BLOOD PRESSURE: 113 MMHG

## 2020-11-03 DIAGNOSIS — G43.719 INTRACTABLE CHRONIC MIGRAINE WITHOUT AURA AND WITHOUT STATUS MIGRAINOSUS: ICD-10-CM

## 2020-11-03 DIAGNOSIS — Z01.818 PRE-OP EXAM: Primary | ICD-10-CM

## 2020-11-03 DIAGNOSIS — R10.13 DYSPEPSIA: ICD-10-CM

## 2020-11-03 DIAGNOSIS — M25.532 LEFT WRIST PAIN: ICD-10-CM

## 2020-11-03 DIAGNOSIS — F33.41 RECURRENT MAJOR DEPRESSIVE DISORDER, IN PARTIAL REMISSION (H): ICD-10-CM

## 2020-11-03 DIAGNOSIS — F11.90 CHRONIC NARCOTIC USE: ICD-10-CM

## 2020-11-03 DIAGNOSIS — R19.8 IRREGULAR BOWEL HABITS: ICD-10-CM

## 2020-11-03 DIAGNOSIS — R51.9 PRESSURE IN HEAD: ICD-10-CM

## 2020-11-03 PROCEDURE — 99215 OFFICE O/P EST HI 40 MIN: CPT | Performed by: INTERNAL MEDICINE

## 2020-11-03 RX ORDER — ZOLMITRIPTAN 5 MG/1
TABLET, FILM COATED ORAL
Qty: 12 TABLET | Refills: 1 | Status: SHIPPED | OUTPATIENT
Start: 2020-11-03 | End: 2020-11-19

## 2020-11-03 RX ORDER — TRIMETHOBENZAMIDE HYDROCHLORIDE 300 MG/1
300 CAPSULE ORAL 3 TIMES DAILY PRN
COMMUNITY
Start: 2020-08-28 | End: 2022-05-03

## 2020-11-03 ASSESSMENT — MIFFLIN-ST. JEOR: SCORE: 1095.6

## 2020-11-03 NOTE — PROGRESS NOTES
[place] [address] [facility] [address]  Phone: [phone]  Primary Provider: Georges Lebron  Pre-op Performing Provider: GEORGES LEBRON    PREOPERATIVE EVALUATION:  Today's date: 11/3/2020    Laurel Parra is a 70 year old female who presents for a preoperative evaluation.    Surgical Information:  Surgery/Procedure: Left Total wrist Fusion  Surgery Location:  OR  Surgeon: Dr. Philippe  Surgery Date: 11/09/2020  Time of Surgery: 3:10 p.m.  Where patient plans to recover: At home with family  Fax number for surgical facility: Note does not need to be faxed, will be available electronically in Epic.    Type of Anesthesia Anticipated: MAC with Block    Subjective     HPI related to upcoming procedure:     This is a complicated patient who is having wrist fusion is noted.  She has multiple medical problems including chronic narcotic use.  On average she takes about 10 Vicodin a day as well as 3 lorazepam a day.  She is getting this through the [facility] pain clinic and has been on pain meds for quite some time.    She recently was having lightheaded and has not had a stress echo that was negative.    She has had chronic GI issues for quite some time with abdominal discomfort, nausea, and irregular bowels.  She had an extensive work-up for this including a CT, upper endoscopy, colonoscopy, as well as MRI.  She has been evaluated through GI.  No malignant cause has been found.  She continues to have issues mostly with loose stools.  No bloody or black stools.    She has depression.  She has chronic low back pain.  She has had a pressure in her head off-and-on mostly in the mornings for quite some time.    She can do 4 METS without difficulty.  She has chronic migraines and requests a refill of her headache medications.                Past Medical History:      Past Medical History:   Diagnosis Date     Anxiety     Dr. Adelina Meehan     Chronic constipation       Chronic narcotic use     [facility]     Chronic pain     Dr. Orta as of      Chronic urethritis     Dr. Little     Colonic polyp 2011    one polyp, fu 5 years, fu 10/17 and no lesions     Depression, major, in partial remission (H)     Dr. Meehan     Depressive disorder 1971     Diarrhea     eval by [facility], resolved with gluten free diet     DJD (degenerative joint disease)      Dyspepsia     eval by [facility], had colonoscopy, egd, ct abd and pelvis, mrcp.  [place] to be dyspepsia and constipation     H/O gastroesophageal reflux (GERD)      LBP (low back pain)     Dr. Jae Tong in [place], then seeing [facility] Dr. Orta     Light headed 2020    nl est echo     Migraines     neuro eval     Palpitations     holter with pvc's and pac's, echo nl     Screening     dexa nl at gyn             Past Surgical History:      Past Surgical History:   Procedure Laterality Date     APPENDECTOMY  2016     ARTHROPLASTY CARPOMETACARPAL (THUMB JOINT) Left 2017    Procedure: ARTHROPLASTY CARPOMETACARPAL (THUMB JOINT);  REVISION  LEFT THUMB CARPOMETACARPAL ARTHROPOASTY WITH 1.1 TIGHT ROPE;  Surgeon: Beatrice Philippe MD;  Location:  [facility]     ARTHROSCOPY WRIST Left 2019    Procedure: LEFT WRIST ARTHROSCOPY REMOVAL OF TIGHT ROPE ; LEFT EXTENSOR POLLICIS LONGUS /EXTENSOR CARPI RADIALIS BREVIS /EXTENSOR CARPI RADIALUS LONGUS SYNOVECTOMY ; EXTENSOR POLLICIS LONGUS TRANSPOSITION ; ANTERIOR INTEROSSEOUS NEURECTOMY ; POSTERIOR INTEROSSEOUS NEURECTOMY;  Surgeon: Beatrice Philippe MD;  Location:  OR     BIOPSY       CARPAL TUNNEL RELEASE RT/LT        SECTION       COLONOSCOPY       ESOPHAGOSCOPY, GASTROSCOPY, DUODENOSCOPY (EGD), COMBINED N/A 2020    Procedure: ESOPHAGOGASTRODUODENOSCOPY, WITH BIOPSY;  Surgeon: Georges Pop MD;  Location:  GI     EXCIS BARTHOLIN GLAND/CYST  2005     HERNIORRHAPHY INGUINAL  70's    x5     INJECT STEROID (LOCATION)  "Right 2019    Procedure: RIGHT WRIST CORTISONE INJECTION;  Surgeon: Beatrice Philippe MD;  Location:  OR     OPERATIVE HYSTEROSCOPY WITH MORCELLATOR N/A 2018    Procedure: OPERATIVE HYSTEROSCOPY WITH MORCELLATOR ([employer]);  OPERATIVE HYSTEROSCOPY WITH TRUECLEAR MORCELLATOR 5C SCOPE ;  Surgeon: Iris Fernandez MD;  Location: [facility]     ORTHOPEDIC SURGERY Bilateral     SHOULDER ARTHROSCOPY     ORTHOPEDIC SURGERY Right 2017    right foot for plantar fasciitis     thumb surgery   and ,              Social History:     Social History     Tobacco Use     Smoking status: Former Smoker     Packs/day: 0.00     Years: 5.00     Pack years: 0.00     Quit date: 10/29/1979     Years since quittin.0     Smokeless tobacco: Never Used     Tobacco comment: infrequent use for about 5 years   Substance Use Topics     Alcohol use: No     Alcohol/week: 0.0 standard drinks             Family History:   No family history of bleeding difficulty, anesthesia problems or blood clots x for father with possible anesthetic reaction.         Allergies:     Allergies   Allergen Reactions     Betamethasone Other (See Comments)     agitation     Chocolate      Triggers migraines     Compazine [Prochlorperazine] Other (See Comments)     \"crawl out of my skin\"     Fentanyl      Other reaction(s): Headache/sedation/diffic urinate     Gluten Meal Diarrhea     Methadone      Other reaction(s): Headache and sedation     Morphine      Other reaction(s): Constipation     Penicillins Nausea and Vomiting and Hives     Pneumococcal Vaccine      Temporary paralization     Tizanidine      Other reaction(s): Severe agitation             Medications:     Current Outpatient Medications   Medication Sig Dispense Refill     carboxymethylcellulose (REFRESH PLUS) 0.5 % SOLN ophthalmic solution Place 1 drop into both eyes 3 times daily as needed for dry eyes       Cyanocobalamin (B-12 DOTS PO) Take 1 tablet by mouth daily        " cyclobenzaprine (FLEXERIL) 10 MG tablet Take 10 mg by mouth daily as needed for muscle spasms        estradiol (ESTRACE) 0.5 MG tablet Take 0.5 mg by mouth daily Take total 1.5 daily       estradiol (ESTRACE) 1 MG tablet Take 1 mg by mouth daily (with dinner) MUST BE  TEVA . Take total 1.5 mg daily       Hydrocodone-Acetaminophen  MG TABS Take 2 tablets by mouth every 4 hours as needed       ipratropium (ATROVENT) 0.06 % spray Spray 3 sprays into both nostrils daily 45 mL 3     loratadine (CLARITIN) 10 MG tablet Take 10 mg by mouth daily as needed        LORazepam (ATIVAN) 1 MG tablet Take 1-2 mg by mouth 3 times daily as needed for anxiety (up to 6 tablets per day)  30 tablet      MAGNESIUM PO Take 240 mg by mouth daily (OTC)        metoprolol succinate ER (TOPROL-XL) 25 MG 24 hr tablet TAKE ONE TABLET BY MOUTH EVERY EVENING 90 tablet 3     naloxone (NARCAN) 4 MG/0.1ML nasal spray Spray 1 spray (4 mg) into one nostril alternating nostrils as needed for opioid reversal Every 2-3 minutes in alternating nostrils 2 each 0     NONFORMULARY Take 10 mg by mouth 2 times daily as needed Compounded Hydrocodone Bitartrate Powder -- compounded by [facility]       omeprazole (PRILOSEC) 40 MG DR capsule Take 40 mg by mouth daily 30 minutes prior to breakfast       PARoxetine (PAXIL CR) 25 MG 24 hr tablet Take 25 mg by mouth 2 times daily NAME BRAND PAXIL CR        polyethylene glycol (MIRALAX) 17 GM/Dose powder Take 1 capful by mouth daily as needed for constipation       PROgesterone (PROMETRIUM) 100 MG capsule Take 200 mg by mouth daily (with dinner) (Takes 2 x 100mg = 200mg dose)       psyllium (METAMUCIL/KONSYL) 58.6 % powder Take 1.5 teaspoonful by mouth daily       trimethobenzamide (TIGAN) 300 MG capsule        vitamin C (ASCORBIC ACID) 1000 MG TABS Take 1,000 mg by mouth daily       Vitamin D, Cholecalciferol, 1000 units TABS Take 2,000 Units by mouth daily        ZOLMitriptan (ZOMIG)  "5 MG tablet TAKE ONE TBALET BY MOUTH AT ONSET OF HEADACHE FOR MIGRAINE, MAY REPEAT ONCE AFTER 2 HOURS. MAX OF TWO TABLETS IN 24 HOURS 12 tablet 1     Zolpidem Tartrate (AMBIEN CR PO) Take 12.5 mg by mouth At Bedtime                 Review of Systems:   No history of bleeding difficulty, anesthesia problems or blood clots.  The 10 point Review of Systems is negative other than noted in the HPI           Physical Exam:   Blood pressure 113/80, pulse 57, temperature 97.6  F (36.4  C), temperature source Tympanic, height 1.676 m (5' 6\"), weight 54.9 kg (121 lb), SpO2 97 %, not currently breastfeeding.    Constitutional: healthy appearing, alert and in no distress  Heent: Normocephalic. Head without obvious masses or lesions. PERRLDC, EOMI. Mouth exam within normal limits: tongue, mucous membranes, posterior pharynx all normal, no lesions or abnormalities seen.  Tm's and canals within normal limits bilaterally. Neck supple, no nuchal rigidity or masses. No supraclavicular, or cervical adenopathy. Thyroid symmetric, no masses.  Cardiovascular: Regular rate and rhythm, no murmer, rub or gallops.  JVP not elevated, no edema.  Carotids within normal limits bilaterally, no bruits.  Respiratory: Normal respiratory effort.  Lungs clear, normal flow, no wheezing or crackles.  Gastrointestinal: Normal active bowel sounds.   Soft, not tender, no masses, guarding or rebound.  No hepatosplenomegaly.   Musculoskeletal: extremities normal, no gross deformities noted.  Skin: no suspicious lesions or rashes   Neurologic: Mental status within normal limits.  Speech fluent.  No gross motor abnormalities and gait intact.  Psychiatric: mentation appears normal and affect normal.         Data:   None done today, prior noted        Assessment:   1. Normal pre op exam, this patient should be low risk for the procedure.  She has had multiple anesthetics so despite family history in father should be at low risk.  2. Family history of possible " anesthesia reaction in father  3. Chronic narcotic use.  Please be careful with narcotics post op.  I would recommend pain meds be managed by her pain clinic  4. Chronic migraines  5. Probable ibs  6. Pressure in head, neg exam, suspect sinus, to try otc flonase  7. depression         Plan:   The patient is ok for the procedure  Careful with narcotics post op, I would recommend they come through her pain clinic  Follow up neuro for migraines and gi for gi issues        Georges Lebron M.D.

## 2020-11-06 DIAGNOSIS — Z11.59 ENCOUNTER FOR SCREENING FOR OTHER VIRAL DISEASES: ICD-10-CM

## 2020-11-06 LAB
SARS-COV-2 RNA SPEC QL NAA+PROBE: NOT DETECTED
SPECIMEN SOURCE: NORMAL

## 2020-11-06 PROCEDURE — U0003 INFECTIOUS AGENT DETECTION BY NUCLEIC ACID (DNA OR RNA); SEVERE ACUTE RESPIRATORY SYNDROME CORONAVIRUS 2 (SARS-COV-2) (CORONAVIRUS DISEASE [COVID-19]), AMPLIFIED PROBE TECHNIQUE, MAKING USE OF HIGH THROUGHPUT TECHNOLOGIES AS DESCRIBED BY CMS-2020-01-R: HCPCS | Performed by: ORTHOPAEDIC SURGERY

## 2020-11-16 ENCOUNTER — MYC MEDICAL ADVICE (OUTPATIENT)
Dept: FAMILY MEDICINE | Facility: CLINIC | Age: 70
End: 2020-11-16

## 2020-11-16 DIAGNOSIS — G43.719 INTRACTABLE CHRONIC MIGRAINE WITHOUT AURA AND WITHOUT STATUS MIGRAINOSUS: ICD-10-CM

## 2020-11-18 ENCOUNTER — TRANSFERRED RECORDS (OUTPATIENT)
Dept: HEALTH INFORMATION MANAGEMENT | Facility: CLINIC | Age: 70
End: 2020-11-18

## 2020-11-19 RX ORDER — ZOLMITRIPTAN 5 MG/1
TABLET, FILM COATED ORAL
Qty: 12 TABLET | Refills: 2 | Status: SHIPPED | OUTPATIENT
Start: 2020-11-19 | End: 2021-02-25

## 2020-12-08 ENCOUNTER — OFFICE VISIT (OUTPATIENT)
Dept: NEUROSURGERY | Facility: CLINIC | Age: 70
End: 2020-12-08
Attending: PSYCHIATRY & NEUROLOGY
Payer: MEDICARE

## 2020-12-08 VITALS
SYSTOLIC BLOOD PRESSURE: 110 MMHG | WEIGHT: 120 LBS | HEIGHT: 66 IN | DIASTOLIC BLOOD PRESSURE: 74 MMHG | OXYGEN SATURATION: 99 % | HEART RATE: 83 BPM | BODY MASS INDEX: 19.29 KG/M2

## 2020-12-08 DIAGNOSIS — G43.109 MIGRAINE WITH AURA AND WITHOUT STATUS MIGRAINOSUS, NOT INTRACTABLE: Primary | ICD-10-CM

## 2020-12-08 DIAGNOSIS — G44.209 TENSION HEADACHE: ICD-10-CM

## 2020-12-08 PROCEDURE — 99205 OFFICE O/P NEW HI 60 MIN: CPT | Performed by: PSYCHIATRY & NEUROLOGY

## 2020-12-08 RX ORDER — VORTIOXETINE 10 MG/1
10 TABLET, FILM COATED ORAL DAILY
COMMUNITY
Start: 2020-11-17 | End: 2021-03-29

## 2020-12-08 RX ORDER — ONDANSETRON 4 MG/1
TABLET, FILM COATED ORAL
COMMUNITY
Start: 2020-08-08 | End: 2021-03-29

## 2020-12-08 ASSESSMENT — MIFFLIN-ST. JEOR: SCORE: 1091.07

## 2020-12-08 NOTE — PATIENT INSTRUCTIONS
AFTER VISIT SUMMARY (AVS):    At today's visit we thoroughly discussed various diagnostic possibilities for your symptoms that are most likely consistent with multifactorial headache disorder.  We also reviewed available treatment options, and the plan.    For headache prevention:  1.  Riboflavin 400 mg every morning for the next 3 months.  Please contact my clinic with any intolerable side effects and give me an update in 4-6 weeks after starting this supplement.  2. Other future options include Effexor, Topamax, Depakote, propranolol, verapamil, CGRP antagonists, and neurostimulator devices (Cefaly).  For acute headache therapy:  1.  Continue zolmitriptan at the onset of intolerable headache. Limit use to less than 9 days/month.  2. May also use Excedrin or Naproxen 500 mg, but take it with food and limit use of all analgesics to less that 15 days/month.    Reviewed non-pharmacological headache prevention measures include proper sleep hygiene, regular meals, adequate hydration, regular aerobic exercise, stress reduction techniques, and limiting caffeine intake.    Please keep the headache diary and bring it to the next follow up visit or upload it via My Chart.    Next follow-up appointment is in the next 3 months or earlier if needed.    Please do not hesitate to call me with any questions or concerns.    Thanks.

## 2020-12-08 NOTE — PROGRESS NOTES
"INITIAL NEUROLOGY CONSULTATION    DATE OF VISIT: 12/8/2020  CLINIC LOCATION: [facility]  MRN: [id number]  PATIENT NAME: Laurel Parra  YOB: 1950    PRIMARY CARE PROVIDER: Georges Lebron MD     REASON FOR VISIT:   Chief Complaint   Patient presents with     Headache     HISTORY OF PRESENT ILLNESS:                                                    Ms. Laurel Parra is 70 year old right handed female patient with past medical history of anxiety, migraine, depression, and chronic pain syndrome, who was seen in consultation today requested by Georges Lebron MD, for migraines.    Per patient's report and review of past medical records ([facility]), she developed persistent headaches when she went into menopause (her late 40's).  She was diagnosed with migraines.  They are rarely preceded by visual aura in the form of scintillating scotoma.  She feels queasy the day before.  At the present time she has multiple headaches per month.  We reviewed her calendar together.  In December so far she had 4 headaches, in November 12, in October 3, and in September 6.  They could be triggered by chocolate and possibly weather changes.  She did not notice any other aggravating or alleviating factors.    Headache is located over one of her eyes, across the bridge of her nose or forehead.  Occasionally she wakes up with the headache, but most of the time headache starts in the afternoon.  She feels that she has more than 1 type of headache.    First type is intermittent sharp (\"like hatchet\") periorbital unilateral up to 8/10 headache that occurs up to 2 times per week lasting for a few hours.  Left eye is predominantly affected, but rarely it could happen on the right side.    Second type of headache is also intermittent.  It is located over the bifrontal and upper face areas, feels like pressure, and could get up to 6-8/10 in intensity lasting for the " "rest of the day and sometimes into the next day.  These headaches comprise of 50 to 60% of all.    Finally, she also reports bioccipital headaches that she describes as \"ache\" up to 7/10 intensity that could be every day.    Associated symptoms include nausea and intolerance of physical activity.  No photophobia, phonophobia, or other focal neurological symptoms.    The patient reports restful sleep, though mentions that it is interrupted by frequent awakenings.  She is on a gluten-free diet and eats 2-3 meals per day, but tends to skip breakfast, as it makes her nauseous.  She drinks more than 64 ounces of fluids.  1 cup of coffee in the morning without additional caffeinated beverages throughout the day.  She rates her stress level as moderate due to COVID-19 pandemic.    In the past the patient tried nortriptyline (dizziness and hypotension), magnesium (currently takes), and co-Q10 (not effective) for preventive therapy.  It felt that she cannot use propranolol or verapamil due to low blood pressure and gabapentin due to dizziness.  Topamax was contemplated in the past.  She reports that zolmitriptan 5 mg works well for headaches, but she feels mildly nauseated after she takes it.    Recent labs from November 2020 include negative COVID-19 PCR.    According to scanned notes from [facility], the patient was recently followed by Dr. Robledo and his physician assistant with the latest visit on 8/3/2020.  EEG from 6/22/2020 was normal.  Brain MRI from 6/26/2020, performed for extremity numbness and frequent falls demonstrated mild stable (compared to April 2019) generalized atrophy and minimal stable punctate supratentorial white matter changes with wide differential.    Her prior imaging from April 2019 included normal MRA of the head and neck (aside from tortuous cervical internal carotid arteries), and C-spine MRI that demonstrated multilevel degenerative disc disease with most pronounced " changes at C5-6 and C6-7 without significant neural foraminal impingement, central canal stenosis, or spinal cord abnormality.    Prior to that, the patient was followed by Dr. France Weebr (2014) for bilateral hand and left leg numbness.  EMG at that time was normal, and laboratory work-up was unrevealing.  In 2004 the patient was seen for low back pain by Dr. Lyon.    No additional useful information is available in Care Everywhere, which was reviewed.    Review of Systems - the patient endorses multiple chronic complaints on 14-point comprehensive review of systems of the Patient Health History Form, that were reviewed. Her primary care and other medical providers are aware and addressing all of them.  PAST MEDICAL/SURGICAL HISTORY:                                                    I personally reviewed patient's past medical and surgical history with the patient at today's visit.  Patient Active Problem List   Diagnosis     Frequency of urination and polyuria     Anxiety     Colonic polyp     Chronic urethritis     Palpitations     Chronic narcotic use     Intractable chronic migraine without aura and without status migrainosus     Recurrent major depressive disorder, in partial remission (H)     Gastroesophageal reflux disease without esophagitis     Chronic low back pain, unspecified back pain laterality, with sciatica presence unspecified     Dyspepsia     Past Medical History:   Diagnosis Date     Anxiety     Dr. Adelina Meehan     Chronic constipation      Chronic narcotic use     [facility]     Chronic pain     Dr. Orta as of 2015     Chronic urethritis     Dr. Little     Colonic polyp 11/2011    one polyp, fu 5 years, fu 10/17 and no lesions     Depression, major, in partial remission (H)     Dr. Meehan     Depressive disorder 1971     Diarrhea 2012    eval by [facility], resolved with gluten free diet     DJD (degenerative joint disease)      Dyspepsia 2020    eval by [facility], had  colonoscopy, egd, ct abd and pelvis, mrcp.  Ewing to be dyspepsia and constipation     H/O gastroesophageal reflux (GERD)      LBP (low back pain)     Dr. Jae Tong in [place], then seeing [facility] Dr. You Cannon headed 2020    nl est echo     Migraines 2009    neuro eval     Palpitations     holter with pvc's and pac's, echo nl     Screening     dexa nl at gyn     Past Surgical History:   Procedure Laterality Date     APPENDECTOMY  2016     ARTHROPLASTY CARPOMETACARPAL (THUMB JOINT) Left 2017    Procedure: ARTHROPLASTY CARPOMETACARPAL (THUMB JOINT);  REVISION  LEFT THUMB CARPOMETACARPAL ARTHROPOASTY WITH 1.1 TIGHT ROPE;  Surgeon: Beatrice Philippe MD;  Location: [facility]     ARTHROSCOPY WRIST Left 2019    Procedure: LEFT WRIST ARTHROSCOPY REMOVAL OF TIGHT ROPE ; LEFT EXTENSOR POLLICIS LONGUS /EXTENSOR CARPI RADIALIS BREVIS /EXTENSOR CARPI RADIALUS LONGUS SYNOVECTOMY ; EXTENSOR POLLICIS LONGUS TRANSPOSITION ; ANTERIOR INTEROSSEOUS NEURECTOMY ; POSTERIOR INTEROSSEOUS NEURECTOMY;  Surgeon: Beatrice Philippe MD;  Location:  OR     BIOPSY       CARPAL TUNNEL RELEASE RT/LT        SECTION       COLONOSCOPY       ESOPHAGOSCOPY, GASTROSCOPY, DUODENOSCOPY (EGD), COMBINED N/A 2020    Procedure: ESOPHAGOGASTRODUODENOSCOPY, WITH BIOPSY;  Surgeon: Georges Pop MD;  Location:  GI     EXCIS BARTHOLIN GLAND/CYST  2005     HERNIORRHAPHY INGUINAL  70's    x5     INJECT STEROID (LOCATION) Right 2019    Procedure: RIGHT WRIST CORTISONE INJECTION;  Surgeon: Beatrice Philippe MD;  Location:  OR     OPERATIVE HYSTEROSCOPY WITH MORCELLATOR N/A 2018    Procedure: OPERATIVE HYSTEROSCOPY WITH MORCELLATOR ([employer]);  OPERATIVE HYSTEROSCOPY WITH TRUECLEAR MORCELLATOR 5C SCOPE ;  Surgeon: Iris Fernandez MD;  Location: [facility]     ORTHOPEDIC SURGERY Bilateral     SHOULDER ARTHROSCOPY     ORTHOPEDIC SURGERY Right 2017    right foot for plantar fasciitis      thumb surgery  2000 and 2004, 2007     MEDICATIONS:                                                    I personally reviewed patient's medications and allergies with the patient at today's visit.       carboxymethylcellulose (REFRESH PLUS) 0.5 % SOLN ophthalmic solution, Place 1 drop into both eyes 3 times daily as needed for dry eyes       Cyanocobalamin (B-12 DOTS PO), Take 1 tablet by mouth daily        estradiol (ESTRACE) 0.5 MG tablet, Take 0.5 mg by mouth daily Take total 1.5 daily       estradiol (ESTRACE) 1 MG tablet, Take 1 mg by mouth daily (with dinner) MUST BE  TEVA . Take total 1.5 mg daily       Hydrocodone-Acetaminophen  MG TABS, Take 2 tablets by mouth every 4 hours as needed       ipratropium (ATROVENT) 0.06 % spray, Spray 3 sprays into both nostrils daily       loratadine (CLARITIN) 10 MG tablet, Take 10 mg by mouth daily as needed        LORazepam (ATIVAN) 1 MG tablet, Take 1-2 mg by mouth 3 times daily as needed for anxiety (up to 6 tablets per day)        MAGNESIUM PO, Take 240 mg by mouth daily (OTC)        metoprolol succinate ER (TOPROL-XL) 25 MG 24 hr tablet, TAKE ONE TABLET BY MOUTH EVERY EVENING       naloxone (NARCAN) 4 MG/0.1ML nasal spray, Spray 1 spray (4 mg) into one nostril alternating nostrils as needed for opioid reversal Every 2-3 minutes in alternating nostrils       NONFORMULARY, Take 10 mg by mouth 2 times daily as needed Compounded Hydrocodone Bitartrate Powder -- compounded by [facility]       omeprazole (PRILOSEC) 40 MG DR capsule, Take 40 mg by mouth daily 30 minutes prior to breakfast       ondansetron (ZOFRAN) 4 MG tablet,        polyethylene glycol (MIRALAX) 17 GM/Dose powder, Take 1 capful by mouth daily as needed for constipation       PROgesterone (PROMETRIUM) 100 MG capsule, Take 200 mg by mouth daily (with dinner) (Takes 2 x 100mg = 200mg dose)       psyllium (METAMUCIL/KONSYL) 58.6 % powder, Take 1.5 teaspoonful by mouth  "daily       trimethobenzamide (TIGAN) 300 MG capsule,        TRINTELLIX 10 MG tablet, Take 10 mg by mouth daily       vitamin C (ASCORBIC ACID) 1000 MG TABS, Take 1,000 mg by mouth daily       Vitamin D, Cholecalciferol, 1000 units TABS, Take 2,000 Units by mouth daily        ZOLMitriptan (ZOMIG) 5 MG tablet, TAKE ONE TBALET BY MOUTH AT ONSET OF HEADACHE FOR MIGRAINE, MAY REPEAT ONCE AFTER 2 HOURS. MAX OF TWO TABLETS IN 24 HOURS       Zolpidem Tartrate (AMBIEN CR PO), Take 12.5 mg by mouth At Bedtime       cyclobenzaprine (FLEXERIL) 10 MG tablet, Take 10 mg by mouth daily as needed for muscle spasms        PARoxetine (PAXIL CR) 25 MG 24 hr tablet, Take 25 mg by mouth 2 times daily NAME BRAND PAXIL CR     No current facility-administered medications on file prior to visit.     ALLERGIES:                                                      Allergies   Allergen Reactions     Betamethasone Other (See Comments)     agitation     Chocolate      Triggers migraines     Compazine [Prochlorperazine] Other (See Comments)     \"crawl out of my skin\"     Fentanyl      Other reaction(s): Headache/sedation/diffic urinate     Gluten Meal Diarrhea     Methadone      Other reaction(s): Headache and sedation     Morphine      Other reaction(s): Constipation     Penicillins Nausea and Vomiting and Hives     Pneumococcal Vaccine      Temporary paralization     Tizanidine      Other reaction(s): Severe agitation     FAMILY/SOCIAL HISTORY:                                                    Family and social history was reviewed with the patient at today's visit.  Family history is positive for CADASIL (reportedly, patient's testing was negative), stroke, migraine/headache, and dementia/Alzheimer's disease.  , lives with  and her grown son.  Retired  and shop owner.  Former smoker, quit in 1979.  Denies current alcohol and recreational drug use.  REVIEW OF SYSTEMS:                                                  " "  Patient has completed a [facility] Patient Health History, including a 14-system review, which was personally reviewed, and pertinent positives are listed in HPI. She denies any additional problems on the further questioning.  EXAM:                                                    VITAL SIGNS:   /74   Pulse 83   Ht 5' 6\" (1.676 m)   Wt 120 lb (54.4 kg)   SpO2 99%   BMI 19.37 kg/m    Mini-Cog Assessment:  Mini Cog Assessment  Clock Draw Score: 2 Normal  3 Item Recall: 3 objects recalled  Mini Cog Total Score: 5    General: pt is in NAD, cooperative.  Skin: normal turgor, moist mucous membranes, no lesions/rashes noticed.  HEENT: ATNC, EOMI, PERRL, white sclera, normal conjunctiva, no nystagmus or ptosis. No carotid bruits bilaterally.  Respiratory: lung sounds clear to auscultation bilaterally, no crackles, wheezes, rhonchi. Symmetric lung excursion, no accessory respiratory muscle use.  Cardiovascular: normal S1/S2, no murmurs/rubs/gallops.   Abdomen: Not distended.  : deferred.    Neurological:  Mental: alert, follows commands, Mini Cog Total Score: 5/5 with 3/3 on memory recall, no aphasia or dysarthria. Fund of knowledge is appropriate for age.  Cranial Nerves:  CN II: visual acuity - able to accurately count fingers with each eye. Visual fields intact, fundi: discs sharp, no papilledema and normal vessels bilaterally.  CN III, IV, VI: EOM intact, pupils equal and reactive  CN V: facial sensation nl  CN VII: face symmetric, no facial droop  CN VIII: hearing normal  CN IX: palate elevation symmetric, uvula at midline  CN XI SCM normal, shoulder shrug nl  CN XII: tongue midline  Motor: Strength: 5/5 in all major groups of all extremities. Normal tone. No abnormal movements. No pronator drift b/l.  Reflexes: Triceps, biceps, brachioradialis, patellar, and achilles reflexes normal and symmetric. No clonus noted. Toes are down-going b/l.   Sensory: temperature, light touch, pinprick, and " vibration intact. Romberg: negative.  Coordination: FNF and heel-shin tests intact b/l.   Gait:  Normal, able to tandem, toe, and heel walk.  Tenderness to palpation over bilateral greater and lesser occipital nerves.  DATA:   LABS/IMAGING/OTHER STUDIES: I reviewed pertinent medical records, including prior neurology notes, tabulated data from EMG report, and Care Everywhere, as detailed in the history of present illness.  ASSESSMENT AND PLAN:      ASSESSMENT: Laurel Parra is a 70 year old female patient with listed above past medical history, who presents with migraine headaches.    We had a detailed discussion with the patient regarding her presenting complaints.  The neurological exam today is non-focal.  Recent brain imaging is unrevealing.    The clinical presentation is most likely consistent with multifactorial headache disorder, including migrainous, tension, possibly medication overuse, and occipital neuralgia components (might need an occipital nerve block bilaterally).  We reviewed the suspected diagnosis, available treatment options, and the plan, as summarized below.    DIAGNOSES:    ICD-10-CM    1. Migraine with aura and without status migrainosus, not intractable  G43.109    2. Tension headache  G44.209      PLAN: At today's visit we thoroughly discussed various diagnostic possibilities for patient's symptoms that are most likely consistent with multifactorial headache disorder.  We also reviewed available treatment options, and the plan.    For headache prevention:  1.  Riboflavin 400 mg every morning for the next 3 months.  I counseled the patient to contact my clinic with any intolerable side effects and give me an update in 4-6 weeks after starting this supplement.  2. Other future options include Effexor (would require to stop Trintellix), Topamax, Depakote, propranolol, verapamil (the latter 2 would require to stop metoprolol), CGRP antagonists, and neurostimulator devices  (Cefaly).  For acute headache therapy:  1.  She will continue zolmitriptan at the onset of intolerable headache.  I advised her to limit use to less than 9 days/month to prevent rebound component.  2. May also use Excedrin or Naproxen 500 mg, but should take it with food and limit use of all analgesics to less that 15 days/month.    Reviewed non-pharmacological headache prevention measures include proper sleep hygiene, regular meals, adequate hydration, regular aerobic exercise, stress reduction techniques, and limiting caffeine intake.    I instructed the patient to keep the headache diary and bring it to the next follow up visit or upload it via My Chart.    Next follow-up appointment is in the next 3 months or earlier if needed.    Total Time:  85 minutes with > 50% spent counseling the patient on stated above assessment and recommendations, including nature of the diagnosis, available treatment options, and proposed plan.  Additional time was used to answer questions regarding patient's symptoms, my recommendations, and the plan.    Ej Castanon MD  [facility]  [place]  (Chart documentation was completed in part with Dragon voice-recognition software. Even though reviewed, some grammatical, spelling, and word errors may remain.)

## 2020-12-08 NOTE — LETTER
"    12/8/2020         RE: Laurel Parra  [address]  [facility] [place] [address]        Dear Colleague,    Thank you for referring your patient, Laurel Parra, to the [facility]UR[facility]IC [place]. Please see a copy of my visit note below.    INITIAL NEUROLOGY CONSULTATION    DATE OF VISIT: 12/8/2020  CLINIC LOCATION: [facility]  MRN: [id number]  PATIENT NAME: Laurel Parra  YOB: 1950    PRIMARY CARE PROVIDER: Georges Lebron MD     REASON FOR VISIT:   Chief Complaint   Patient presents with     Headache     HISTORY OF PRESENT ILLNESS:                                                    Ms. Laurel Parra is 70 year old right handed female patient with past medical history of anxiety, migraine, depression, and chronic pain syndrome, who was seen in consultation today requested by Georges Lebron MD, for migraines.    Per patient's report and review of past medical records ([facility]), she developed persistent headaches when she went into menopause (her late 40's).  She was diagnosed with migraines.  They are rarely preceded by visual aura in the form of scintillating scotoma.  She feels queasy the day before.  At the present time she has multiple headaches per month.  We reviewed her calendar together.  In December so far she had 4 headaches, in November 12, in October 3, and in September 6.  They could be triggered by chocolate and possibly weather changes.  She did not notice any other aggravating or alleviating factors.    Headache is located over one of her eyes, across the bridge of her nose or forehead.  Occasionally she wakes up with the headache, but most of the time headache starts in the afternoon.  She feels that she has more than 1 type of headache.    First type is intermittent sharp (\"like hatchet\") periorbital unilateral up to 8/10 headache that occurs up to 2 times per week " "lasting for a few hours.  Left eye is predominantly affected, but rarely it could happen on the right side.    Second type of headache is also intermittent.  It is located over the bifrontal and upper face areas, feels like pressure, and could get up to 6-8/10 in intensity lasting for the rest of the day and sometimes into the next day.  These headaches comprise of 50 to 60% of all.    Finally, she also reports bioccipital headaches that she describes as \"ache\" up to 7/10 intensity that could be every day.    Associated symptoms include nausea and intolerance of physical activity.  No photophobia, phonophobia, or other focal neurological symptoms.    The patient reports restful sleep, though mentions that it is interrupted by frequent awakenings.  She is on a gluten-free diet and eats 2-3 meals per day, but tends to skip breakfast, as it makes her nauseous.  She drinks more than 64 ounces of fluids.  1 cup of coffee in the morning without additional caffeinated beverages throughout the day.  She rates her stress level as moderate due to COVID-19 pandemic.    In the past the patient tried nortriptyline (dizziness and hypotension), magnesium (currently takes), and co-Q10 (not effective) for preventive therapy.  It felt that she cannot use propranolol or verapamil due to low blood pressure and gabapentin due to dizziness.  Topamax was contemplated in the past.  She reports that zolmitriptan 5 mg works well for headaches, but she feels mildly nauseated after she takes it.    Recent labs from November 2020 include negative COVID-19 PCR.    According to scanned notes from [facility], the patient was recently followed by Dr. Robledo and his physician assistant with the latest visit on 8/3/2020.  EEG from 6/22/2020 was normal.  Brain MRI from 6/26/2020, performed for extremity numbness and frequent falls demonstrated mild stable (compared to April 2019) generalized atrophy and minimal stable punctate " supratentorial white matter changes with wide differential.    Her prior imaging from April 2019 included normal MRA of the head and neck (aside from tortuous cervical internal carotid arteries), and C-spine MRI that demonstrated multilevel degenerative disc disease with most pronounced changes at C5-6 and C6-7 without significant neural foraminal impingement, central canal stenosis, or spinal cord abnormality.    Prior to that, the patient was followed by Dr. France Weber (2014) for bilateral hand and left leg numbness.  EMG at that time was normal, and laboratory work-up was unrevealing.  In 2004 the patient was seen for low back pain by Dr. Lyon.    No additional useful information is available in Care Everywhere, which was reviewed.    Review of Systems - the patient endorses multiple chronic complaints on 14-point comprehensive review of systems of the Patient Health History Form, that were reviewed. Her primary care and other medical providers are aware and addressing all of them.  PAST MEDICAL/SURGICAL HISTORY:                                                    I personally reviewed patient's past medical and surgical history with the patient at today's visit.  Patient Active Problem List   Diagnosis     Frequency of urination and polyuria     Anxiety     Colonic polyp     Chronic urethritis     Palpitations     Chronic narcotic use     Intractable chronic migraine without aura and without status migrainosus     Recurrent major depressive disorder, in partial remission (H)     Gastroesophageal reflux disease without esophagitis     Chronic low back pain, unspecified back pain laterality, with sciatica presence unspecified     Dyspepsia     Past Medical History:   Diagnosis Date     Anxiety     Dr. Adelina Meehan     Chronic constipation      Chronic narcotic use     [facility]     Chronic pain     Dr. Orta as of 2015     Chronic urethritis     Dr. Little     Colonic polyp 11/2011    one  polyp, fu 5 years, fu 10/17 and no lesions     Depression, major, in partial remission (H)     Dr. Meehan     Depressive disorder 1971     Diarrhea     eval by [facility], resolved with gluten free diet     DJD (degenerative joint disease)      Dyspepsia     eval by [facility], had colonoscopy, egd, ct abd and pelvis, mrcp.  Finley to be dyspepsia and constipation     H/O gastroesophageal reflux (GERD)      LBP (low back pain)     Dr. Jae Tong in [place], then seeing [facility] Dr. You Cannon headed 2020    nl est echo     Migraines     neuro eval     Palpitations     holter with pvc's and pac's, echo nl     Screening     dexa nl at gyn     Past Surgical History:   Procedure Laterality Date     APPENDECTOMY       ARTHROPLASTY CARPOMETACARPAL (THUMB JOINT) Left 2017    Procedure: ARTHROPLASTY CARPOMETACARPAL (THUMB JOINT);  REVISION  LEFT THUMB CARPOMETACARPAL ARTHROPOASTY WITH 1.1 TIGHT ROPE;  Surgeon: Beatrice Philippe MD;  Location:  [facility]     ARTHROSCOPY WRIST Left 2019    Procedure: LEFT WRIST ARTHROSCOPY REMOVAL OF TIGHT ROPE ; LEFT EXTENSOR POLLICIS LONGUS /EXTENSOR CARPI RADIALIS BREVIS /EXTENSOR CARPI RADIALUS LONGUS SYNOVECTOMY ; EXTENSOR POLLICIS LONGUS TRANSPOSITION ; ANTERIOR INTEROSSEOUS NEURECTOMY ; POSTERIOR INTEROSSEOUS NEURECTOMY;  Surgeon: Beatrice Philippe MD;  Location:  OR     BIOPSY       CARPAL TUNNEL RELEASE RT/LT        SECTION       COLONOSCOPY       ESOPHAGOSCOPY, GASTROSCOPY, DUODENOSCOPY (EGD), COMBINED N/A 2020    Procedure: ESOPHAGOGASTRODUODENOSCOPY, WITH BIOPSY;  Surgeon: Georges Pop MD;  Location:  GI     EXCIS BARTHOLIN GLAND/CYST       HERNIORRHAPHY INGUINAL  70's    x5     INJECT STEROID (LOCATION) Right 2019    Procedure: RIGHT WRIST CORTISONE INJECTION;  Surgeon: Beatrice Philippe MD;  Location:  OR     OPERATIVE HYSTEROSCOPY WITH MORCELLATOR N/A 2018    Procedure: OPERATIVE HYSTEROSCOPY  WITH MORCELLATOR ([employer]);  OPERATIVE HYSTEROSCOPY WITH TRUECLEAR MORCELLATOR 5C SCOPE ;  Surgeon: Iris Fernandez MD;  Location: [facility]     ORTHOPEDIC SURGERY Bilateral     SHOULDER ARTHROSCOPY     ORTHOPEDIC SURGERY Right 11/2017    right foot for plantar fasciitis     thumb surgery  2000 and 2004, 2007     MEDICATIONS:                                                    I personally reviewed patient's medications and allergies with the patient at today's visit.       carboxymethylcellulose (REFRESH PLUS) 0.5 % SOLN ophthalmic solution, Place 1 drop into both eyes 3 times daily as needed for dry eyes       Cyanocobalamin (B-12 DOTS PO), Take 1 tablet by mouth daily        estradiol (ESTRACE) 0.5 MG tablet, Take 0.5 mg by mouth daily Take total 1.5 daily       estradiol (ESTRACE) 1 MG tablet, Take 1 mg by mouth daily (with dinner) MUST BE  TEVA . Take total 1.5 mg daily       Hydrocodone-Acetaminophen  MG TABS, Take 2 tablets by mouth every 4 hours as needed       ipratropium (ATROVENT) 0.06 % spray, Spray 3 sprays into both nostrils daily       loratadine (CLARITIN) 10 MG tablet, Take 10 mg by mouth daily as needed        LORazepam (ATIVAN) 1 MG tablet, Take 1-2 mg by mouth 3 times daily as needed for anxiety (up to 6 tablets per day)        MAGNESIUM PO, Take 240 mg by mouth daily (OTC)        metoprolol succinate ER (TOPROL-XL) 25 MG 24 hr tablet, TAKE ONE TABLET BY MOUTH EVERY EVENING       naloxone (NARCAN) 4 MG/0.1ML nasal spray, Spray 1 spray (4 mg) into one nostril alternating nostrils as needed for opioid reversal Every 2-3 minutes in alternating nostrils       NONFORMULARY, Take 10 mg by mouth 2 times daily as needed Compounded Hydrocodone Bitartrate Powder -- compounded by [facility]       omeprazole (PRILOSEC) 40 MG DR capsule, Take 40 mg by mouth daily 30 minutes prior to breakfast       ondansetron (ZOFRAN) 4 MG tablet,        polyethylene glycol (MIRALAX) 17  "GM/Dose powder, Take 1 capful by mouth daily as needed for constipation       PROgesterone (PROMETRIUM) 100 MG capsule, Take 200 mg by mouth daily (with dinner) (Takes 2 x 100mg = 200mg dose)       psyllium (METAMUCIL/KONSYL) 58.6 % powder, Take 1.5 teaspoonful by mouth daily       trimethobenzamide (TIGAN) 300 MG capsule,        TRINTELLIX 10 MG tablet, Take 10 mg by mouth daily       vitamin C (ASCORBIC ACID) 1000 MG TABS, Take 1,000 mg by mouth daily       Vitamin D, Cholecalciferol, 1000 units TABS, Take 2,000 Units by mouth daily        ZOLMitriptan (ZOMIG) 5 MG tablet, TAKE ONE TBALET BY MOUTH AT ONSET OF HEADACHE FOR MIGRAINE, MAY REPEAT ONCE AFTER 2 HOURS. MAX OF TWO TABLETS IN 24 HOURS       Zolpidem Tartrate (AMBIEN CR PO), Take 12.5 mg by mouth At Bedtime       cyclobenzaprine (FLEXERIL) 10 MG tablet, Take 10 mg by mouth daily as needed for muscle spasms        PARoxetine (PAXIL CR) 25 MG 24 hr tablet, Take 25 mg by mouth 2 times daily NAME BRAND PAXIL CR     No current facility-administered medications on file prior to visit.     ALLERGIES:                                                      Allergies   Allergen Reactions     Betamethasone Other (See Comments)     agitation     Chocolate      Triggers migraines     Compazine [Prochlorperazine] Other (See Comments)     \"crawl out of my skin\"     Fentanyl      Other reaction(s): Headache/sedation/diffic urinate     Gluten Meal Diarrhea     Methadone      Other reaction(s): Headache and sedation     Morphine      Other reaction(s): Constipation     Penicillins Nausea and Vomiting and Hives     Pneumococcal Vaccine      Temporary paralization     Tizanidine      Other reaction(s): Severe agitation     FAMILY/SOCIAL HISTORY:                                                    Family and social history was reviewed with the patient at today's visit.  Family history is positive for CADASIL (reportedly, patient's testing was negative), stroke, migraine/headache, " "and dementia/Alzheimer's disease.  , lives with  and her grown son.  Retired  and shop owner.  Former smoker, quit in 1979.  Denies current alcohol and recreational drug use.  REVIEW OF SYSTEMS:                                                    Patient has completed a [facility] Patient Health History, including a 14-system review, which was personally reviewed, and pertinent positives are listed in HPI. She denies any additional problems on the further questioning.  EXAM:                                                    VITAL SIGNS:   /74   Pulse 83   Ht 5' 6\" (1.676 m)   Wt 120 lb (54.4 kg)   SpO2 99%   BMI 19.37 kg/m    Mini-Cog Assessment:  Mini Cog Assessment  Clock Draw Score: 2 Normal  3 Item Recall: 3 objects recalled  Mini Cog Total Score: 5    General: pt is in NAD, cooperative.  Skin: normal turgor, moist mucous membranes, no lesions/rashes noticed.  HEENT: ATNC, EOMI, PERRL, white sclera, normal conjunctiva, no nystagmus or ptosis. No carotid bruits bilaterally.  Respiratory: lung sounds clear to auscultation bilaterally, no crackles, wheezes, rhonchi. Symmetric lung excursion, no accessory respiratory muscle use.  Cardiovascular: normal S1/S2, no murmurs/rubs/gallops.   Abdomen: Not distended.  : deferred.    Neurological:  Mental: alert, follows commands, Mini Cog Total Score: 5/5 with 3/3 on memory recall, no aphasia or dysarthria. Fund of knowledge is appropriate for age.  Cranial Nerves:  CN II: visual acuity - able to accurately count fingers with each eye. Visual fields intact, fundi: discs sharp, no papilledema and normal vessels bilaterally.  CN III, IV, VI: EOM intact, pupils equal and reactive  CN V: facial sensation nl  CN VII: face symmetric, no facial droop  CN VIII: hearing normal  CN IX: palate elevation symmetric, uvula at midline  CN XI SCM normal, shoulder shrug nl  CN XII: tongue midline  Motor: Strength: 5/5 in all major groups of all " extremities. Normal tone. No abnormal movements. No pronator drift b/l.  Reflexes: Triceps, biceps, brachioradialis, patellar, and achilles reflexes normal and symmetric. No clonus noted. Toes are down-going b/l.   Sensory: temperature, light touch, pinprick, and vibration intact. Romberg: negative.  Coordination: FNF and heel-shin tests intact b/l.   Gait:  Normal, able to tandem, toe, and heel walk.  Tenderness to palpation over bilateral greater and lesser occipital nerves.  DATA:   LABS/IMAGING/OTHER STUDIES: I reviewed pertinent medical records, including prior neurology notes, tabulated data from EMG report, and Care Everywhere, as detailed in the history of present illness.  ASSESSMENT AND PLAN:      ASSESSMENT: Laurel Parra is a 70 year old female patient with listed above past medical history, who presents with migraine headaches.    We had a detailed discussion with the patient regarding her presenting complaints.  The neurological exam today is non-focal.  Recent brain imaging is unrevealing.    The clinical presentation is most likely consistent with multifactorial headache disorder, including migrainous, tension, possibly medication overuse, and occipital neuralgia components (might need an occipital nerve block bilaterally).  We reviewed the suspected diagnosis, available treatment options, and the plan, as summarized below.    DIAGNOSES:    ICD-10-CM    1. Migraine with aura and without status migrainosus, not intractable  G43.109    2. Tension headache  G44.209      PLAN: At today's visit we thoroughly discussed various diagnostic possibilities for patient's symptoms that are most likely consistent with multifactorial headache disorder.  We also reviewed available treatment options, and the plan.    For headache prevention:  1.  Riboflavin 400 mg every morning for the next 3 months.  I counseled the patient to contact my clinic with any intolerable side effects and give me an update in  4-6 weeks after starting this supplement.  2. Other future options include Effexor (would require to stop Trintellix), Topamax, Depakote, propranolol, verapamil (the latter 2 would require to stop metoprolol), CGRP antagonists, and neurostimulator devices (Cefaly).  For acute headache therapy:  1.  She will continue zolmitriptan at the onset of intolerable headache.  I advised her to limit use to less than 9 days/month to prevent rebound component.  2. May also use Excedrin or Naproxen 500 mg, but should take it with food and limit use of all analgesics to less that 15 days/month.    Reviewed non-pharmacological headache prevention measures include proper sleep hygiene, regular meals, adequate hydration, regular aerobic exercise, stress reduction techniques, and limiting caffeine intake.    I instructed the patient to keep the headache diary and bring it to the next follow up visit or upload it via My Chart.    Next follow-up appointment is in the next 3 months or earlier if needed.    Total Time:  85 minutes with > 50% spent counseling the patient on stated above assessment and recommendations, including nature of the diagnosis, available treatment options, and proposed plan.  Additional time was used to answer questions regarding patient's symptoms, my recommendations, and the plan.    Ej Castanon MD  [facility]  [place]  (Chart documentation was completed in part with Dragon voice-recognition software. Even though reviewed, some grammatical, spelling, and word errors may remain.)                Again, thank you for allowing me to participate in the care of your patient.        Sincerely,        Ej Castanon MD

## 2020-12-09 ENCOUNTER — TRANSFERRED RECORDS (OUTPATIENT)
Dept: HEALTH INFORMATION MANAGEMENT | Facility: CLINIC | Age: 70
End: 2020-12-09

## 2020-12-17 ENCOUNTER — NURSE TRIAGE (OUTPATIENT)
Dept: FAMILY MEDICINE | Facility: CLINIC | Age: 70
End: 2020-12-17

## 2020-12-17 ENCOUNTER — OFFICE VISIT (OUTPATIENT)
Dept: URGENT CARE | Facility: URGENT CARE | Age: 70
End: 2020-12-17
Payer: MEDICARE

## 2020-12-17 VITALS
SYSTOLIC BLOOD PRESSURE: 134 MMHG | HEART RATE: 91 BPM | OXYGEN SATURATION: 98 % | WEIGHT: 122 LBS | DIASTOLIC BLOOD PRESSURE: 100 MMHG | BODY MASS INDEX: 19.69 KG/M2 | TEMPERATURE: 98.1 F

## 2020-12-17 DIAGNOSIS — R03.0 ELEVATED BLOOD PRESSURE READING WITHOUT DIAGNOSIS OF HYPERTENSION: Primary | ICD-10-CM

## 2020-12-17 DIAGNOSIS — Z13.29 SCREENING FOR THYROID DISORDER: ICD-10-CM

## 2020-12-17 LAB
ERYTHROCYTE [DISTWIDTH] IN BLOOD BY AUTOMATED COUNT: 12.3 % (ref 10–15)
HCT VFR BLD AUTO: 40.6 % (ref 35–47)
HGB BLD-MCNC: 13.4 G/DL (ref 11.7–15.7)
MCH RBC QN AUTO: 31.6 PG (ref 26.5–33)
MCHC RBC AUTO-ENTMCNC: 33 G/DL (ref 31.5–36.5)
MCV RBC AUTO: 96 FL (ref 78–100)
PLATELET # BLD AUTO: 326 10E9/L (ref 150–450)
RBC # BLD AUTO: 4.24 10E12/L (ref 3.8–5.2)
WBC # BLD AUTO: 7.1 10E9/L (ref 4–11)

## 2020-12-17 PROCEDURE — 99214 OFFICE O/P EST MOD 30 MIN: CPT | Performed by: NURSE PRACTITIONER

## 2020-12-17 PROCEDURE — 36415 COLL VENOUS BLD VENIPUNCTURE: CPT | Performed by: NURSE PRACTITIONER

## 2020-12-17 PROCEDURE — 84443 ASSAY THYROID STIM HORMONE: CPT | Performed by: NURSE PRACTITIONER

## 2020-12-17 PROCEDURE — 85027 COMPLETE CBC AUTOMATED: CPT | Performed by: NURSE PRACTITIONER

## 2020-12-17 PROCEDURE — 80053 COMPREHEN METABOLIC PANEL: CPT | Performed by: NURSE PRACTITIONER

## 2020-12-17 PROCEDURE — 93000 ELECTROCARDIOGRAM COMPLETE: CPT | Performed by: NURSE PRACTITIONER

## 2020-12-17 ASSESSMENT — ENCOUNTER SYMPTOMS
RESPIRATORY NEGATIVE: 1
DIZZINESS: 1
EYES NEGATIVE: 1
MUSCULOSKELETAL NEGATIVE: 1
HEADACHES: 1
CARDIOVASCULAR NEGATIVE: 1
SEIZURES: 0
HEMATOLOGIC/LYMPHATIC NEGATIVE: 1
ENDOCRINE NEGATIVE: 1
FACIAL ASYMMETRY: 0
CONSTITUTIONAL NEGATIVE: 1
WEAKNESS: 0
GASTROINTESTINAL NEGATIVE: 1
SPEECH DIFFICULTY: 0

## 2020-12-17 NOTE — TELEPHONE ENCOUNTER
See mc    Additional Information    Negative: Sounds like a life-threatening emergency to the triager    Negative: Pregnant > 20 weeks or postpartum (< 6 weeks after delivery) and new hand or face swelling    Negative: Pregnant > 20 weeks and BP > 140/90    Negative: Systolic BP >= 160 OR Diastolic >= 100, and any cardiac or neurologic symptoms (e.g., chest pain, difficulty breathing, unsteady gait, blurred vision)    Negative: Patient sounds very sick or weak to the triager    Negative: Systolic BP >= 180 OR Diastolic >= 110, and missed most recent dose of blood pressure medication    Negative: BP Systolic BP >= 140 OR Diastolic >= 90 and postpartum (from 0 to 6 weeks after delivery)    Negative: Ran out of BP medications    Negative: Taking BP medications and feels is having side effects (e.g., impotence, cough, dizziness)    Systolic BP >= 160 OR Diastolic >= 100    Protocols used: HIGH BLOOD PRESSURE-A-OH

## 2020-12-18 ENCOUNTER — OFFICE VISIT (OUTPATIENT)
Dept: FAMILY MEDICINE | Facility: CLINIC | Age: 70
End: 2020-12-18
Payer: MEDICARE

## 2020-12-18 VITALS
WEIGHT: 120 LBS | OXYGEN SATURATION: 99 % | BODY MASS INDEX: 19.29 KG/M2 | HEART RATE: 71 BPM | DIASTOLIC BLOOD PRESSURE: 90 MMHG | SYSTOLIC BLOOD PRESSURE: 139 MMHG | TEMPERATURE: 97 F | HEIGHT: 66 IN

## 2020-12-18 DIAGNOSIS — R03.0 ELEVATED BP WITHOUT DIAGNOSIS OF HYPERTENSION: Primary | ICD-10-CM

## 2020-12-18 LAB
ALBUMIN SERPL-MCNC: 3.7 G/DL (ref 3.4–5)
ALP SERPL-CCNC: 64 U/L (ref 40–150)
ALT SERPL W P-5'-P-CCNC: 18 U/L (ref 0–50)
ANION GAP SERPL CALCULATED.3IONS-SCNC: 6 MMOL/L (ref 3–14)
AST SERPL W P-5'-P-CCNC: 18 U/L (ref 0–45)
BILIRUB SERPL-MCNC: 0.3 MG/DL (ref 0.2–1.3)
BUN SERPL-MCNC: 10 MG/DL (ref 7–30)
CALCIUM SERPL-MCNC: 8.8 MG/DL (ref 8.5–10.1)
CHLORIDE SERPL-SCNC: 103 MMOL/L (ref 94–109)
CO2 SERPL-SCNC: 27 MMOL/L (ref 20–32)
CREAT SERPL-MCNC: 0.59 MG/DL (ref 0.52–1.04)
GFR SERPL CREATININE-BSD FRML MDRD: >90 ML/MIN/{1.73_M2}
GLUCOSE SERPL-MCNC: 85 MG/DL (ref 70–99)
POTASSIUM SERPL-SCNC: 4.2 MMOL/L (ref 3.4–5.3)
PROT SERPL-MCNC: 7 G/DL (ref 6.8–8.8)
SODIUM SERPL-SCNC: 136 MMOL/L (ref 133–144)
TSH SERPL DL<=0.005 MIU/L-ACNC: 1 MU/L (ref 0.4–4)

## 2020-12-18 PROCEDURE — 99213 OFFICE O/P EST LOW 20 MIN: CPT | Performed by: NURSE PRACTITIONER

## 2020-12-18 ASSESSMENT — MIFFLIN-ST. JEOR: SCORE: 1091.07

## 2020-12-18 NOTE — PROGRESS NOTES
Subjective     Laurel Parra is a 70 year old female who presents to clinic today for the following health issues:    HPI         Had normal BP at neuro appt on 12/8  Then took her BP on Tuesday for a pain clinic appt, 3 days ago, and was elevated   She kept checking it and it kept going up   This morning BP was 110/78   Chronic frequent migraines  Yesterday had a bad HA   This morning still had it and migraine medication helped   Took another one that helped   Starting to get a little lightheaded and mild HA again currently.     Had an episode where she took dulcolax, got up in the middle of the night and was chilled, nauseous, very lightheaded, unsteady where she was falling to the right, and had diarrhea. Felt better in the morning.     Past Medical History:   Diagnosis Date     Anxiety     Dr. Adelina Meehan     Chronic constipation      Chronic narcotic use     [facility]     Chronic pain     Dr. Orta as of 2015     Chronic urethritis     Dr. Little     Colonic polyp 11/2011    one polyp, fu 5 years, fu 10/17 and no lesions     Depression, major, in partial remission (H)     Dr. Meehan     Depressive disorder 1971     Diarrhea 2012    eval by [facility], resolved with gluten free diet     DJD (degenerative joint disease)      Dyspepsia 2020    eval by [facility], had colonoscopy, egd, ct abd and pelvis, mrcp.  [place] to be dyspepsia and constipation     H/O gastroesophageal reflux (GERD)      LBP (low back pain) 2013    Dr. Jae Tong in [place], then seeing [facility] Dr. You Cannon headed 07/2020    nl est echo     Migraines 2009    neuro eval     Palpitations 2006    holter with pvc's and pac's, echo nl     Screening 2010    dexa nl at gyn     Family History   Problem Relation Age of Onset     Diabetes Father      Hypertension Father      Depression Father      Substance Abuse Father      Anesthesia Reaction Father         went into shock with surgery      Obesity Father       Anxiety Disorder Father      Diabetes Mother      Cerebrovascular Disease Mother         Cadasils disease     Depression Mother      Mental Illness Mother         borderline personality disorder, Chemical dependency     Substance Abuse Mother      Genetic Disorder Mother         Cadasils     Obesity Mother      Anxiety Disorder Mother      Cerebrovascular Disease Paternal Grandfather      Cerebrovascular Disease Brother         Cadasils disease     Depression Brother      Anxiety Disorder Brother      Substance Abuse Brother      Genetic Disorder Brother         cadasils     Obesity Brother      Hypertension Brother      Cerebrovascular Disease Sister         Cadasils disease     Depression Sister      Anxiety Disorder Sister      Genetic Disorder Sister         cadasils     Obesity Sister      Cerebrovascular Disease Sister         Cadasils disease     Genetic Disorder Sister         cadasils     Breast Cancer Sister      Depression Sister      Anxiety Disorder Sister      Obesity Sister      Hypertension Sister      Past Surgical History:   Procedure Laterality Date     APPENDECTOMY  2016     ARTHROPLASTY CARPOMETACARPAL (THUMB JOINT) Left 2017    Procedure: ARTHROPLASTY CARPOMETACARPAL (THUMB JOINT);  REVISION  LEFT THUMB CARPOMETACARPAL ARTHROPOASTY WITH 1.1 TIGHT ROPE;  Surgeon: Beatrice Philippe MD;  Location:  [facility]     ARTHROSCOPY WRIST Left 2019    Procedure: LEFT WRIST ARTHROSCOPY REMOVAL OF TIGHT ROPE ; LEFT EXTENSOR POLLICIS LONGUS /EXTENSOR CARPI RADIALIS BREVIS /EXTENSOR CARPI RADIALUS LONGUS SYNOVECTOMY ; EXTENSOR POLLICIS LONGUS TRANSPOSITION ; ANTERIOR INTEROSSEOUS NEURECTOMY ; POSTERIOR INTEROSSEOUS NEURECTOMY;  Surgeon: Beatrice Philippe MD;  Location:  OR     BIOPSY       CARPAL TUNNEL RELEASE RT/LT        SECTION       COLONOSCOPY       ESOPHAGOSCOPY, GASTROSCOPY, DUODENOSCOPY (EGD), COMBINED N/A 2020    Procedure: ESOPHAGOGASTRODUODENOSCOPY, WITH BIOPSY;   Surgeon: Georges Pop MD;  Location:  GI     EXCIS BARTHOLIN GLAND/CYST  2005     HERNIORRHAPHY INGUINAL  70's    x5     INJECT STEROID (LOCATION) Right 2019    Procedure: RIGHT WRIST CORTISONE INJECTION;  Surgeon: Beatrice Philippe MD;  Location:  OR     OPERATIVE HYSTEROSCOPY WITH MORCELLATOR N/A 2018    Procedure: OPERATIVE HYSTEROSCOPY WITH MORCELLATOR ([employer]);  OPERATIVE HYSTEROSCOPY WITH TRUECLEAR MORCELLATOR 5C SCOPE ;  Surgeon: Iris Fernandez MD;  Location:  [facility]     ORTHOPEDIC SURGERY Bilateral     SHOULDER ARTHROSCOPY     ORTHOPEDIC SURGERY Right 2017    right foot for plantar fasciitis     thumb surgery   and ,      Social History     Tobacco Use     Smoking status: Former Smoker     Packs/day: 0.00     Years: 5.00     Pack years: 0.00     Quit date: 10/29/1979     Years since quittin.1     Smokeless tobacco: Never Used     Tobacco comment: infrequent use for about 5 years   Substance Use Topics     Alcohol use: No     Alcohol/week: 0.0 standard drinks     Current Outpatient Medications   Medication Sig Dispense Refill     carboxymethylcellulose (REFRESH PLUS) 0.5 % SOLN ophthalmic solution Place 1 drop into both eyes 3 times daily as needed for dry eyes       Cyanocobalamin (B-12 DOTS PO) Take 1 tablet by mouth daily        cyclobenzaprine (FLEXERIL) 10 MG tablet Take 10 mg by mouth daily as needed for muscle spasms        estradiol (ESTRACE) 0.5 MG tablet Take 0.5 mg by mouth daily Take total 1.5 daily       estradiol (ESTRACE) 1 MG tablet Take 1 mg by mouth daily (with dinner) MUST BE  TEVA . Take total 1.5 mg daily       Hydrocodone-Acetaminophen  MG TABS Take 2 tablets by mouth every 4 hours as needed       ipratropium (ATROVENT) 0.06 % spray Spray 3 sprays into both nostrils daily 45 mL 3     loratadine (CLARITIN) 10 MG tablet Take 10 mg by mouth daily as needed        LORazepam (ATIVAN) 1 MG tablet Take 1-2 mg by mouth 3 times daily as  "needed for anxiety (up to 6 tablets per day)  30 tablet      MAGNESIUM PO Take 240 mg by mouth daily (OTC)        metoprolol succinate ER (TOPROL-XL) 25 MG 24 hr tablet TAKE ONE TABLET BY MOUTH EVERY EVENING 90 tablet 3     naloxone (NARCAN) 4 MG/0.1ML nasal spray Spray 1 spray (4 mg) into one nostril alternating nostrils as needed for opioid reversal Every 2-3 minutes in alternating nostrils 2 each 0     NONFORMULARY Take 10 mg by mouth 2 times daily as needed Compounded Hydrocodone Bitartrate Powder -- compounded by [facility]       omeprazole (PRILOSEC) 40 MG DR capsule Take 40 mg by mouth daily 30 minutes prior to breakfast       ondansetron (ZOFRAN) 4 MG tablet        Plecanatide (TRULANCE PO)        polyethylene glycol (MIRALAX) 17 GM/Dose powder Take 1 capful by mouth daily as needed for constipation       PROgesterone (PROMETRIUM) 100 MG capsule Take 200 mg by mouth daily (with dinner) (Takes 2 x 100mg = 200mg dose)       psyllium (METAMUCIL/KONSYL) 58.6 % powder Take 1.5 teaspoonful by mouth daily       trimethobenzamide (TIGAN) 300 MG capsule        TRINTELLIX 10 MG tablet Take 10 mg by mouth daily       vitamin C (ASCORBIC ACID) 1000 MG TABS Take 1,000 mg by mouth daily       Vitamin D, Cholecalciferol, 1000 units TABS Take 2,000 Units by mouth daily        ZOLMitriptan (ZOMIG) 5 MG tablet TAKE ONE TBALET BY MOUTH AT ONSET OF HEADACHE FOR MIGRAINE, MAY REPEAT ONCE AFTER 2 HOURS. MAX OF TWO TABLETS IN 24 HOURS 12 tablet 2     Zolpidem Tartrate (AMBIEN CR PO) Take 12.5 mg by mouth At Bedtime       Allergies   Allergen Reactions     Betamethasone Other (See Comments)     agitation     Chocolate      Triggers migraines     Compazine [Prochlorperazine] Other (See Comments)     \"crawl out of my skin\"     Fentanyl      Other reaction(s): Headache/sedation/diffic urinate     Gluten Meal Diarrhea     Methadone      Other reaction(s): Headache and sedation     Morphine      Other reaction(s): " "Constipation     Penicillins Nausea and Vomiting and Hives     Pneumococcal Vaccine      Temporary paralization     Tizanidine      Other reaction(s): Severe agitation       Reviewed and updated as needed this visit by clinical staff and provider     Review of Systems   Detailed as above       Objective    BP (!) 143/96 (BP Location: Left arm, Patient Position: Chair, Cuff Size: Adult Large)   Pulse 71   Temp 97  F (36.1  C) (Oral)   Ht 1.676 m (5' 6\")   Wt 54.4 kg (120 lb)   SpO2 99%   Breastfeeding No   BMI 19.37 kg/m      Physical Exam  Constitutional:       Appearance: Normal appearance.   Pulmonary:      Effort: Pulmonary effort is normal.   Neurological:      Mental Status: She is alert.   Psychiatric:         Mood and Affect: Mood normal.                Assessment & Plan     Elevated BP without diagnosis of hypertension  Elevated BP for 3 days that was resolved this morning. Here is office is a little elevated. No need to start meds today. She will continue to monitor her BP weekly. Return with persistent elevated BP                 FAVIO Roberts CNP  [facility]    "

## 2020-12-18 NOTE — PROGRESS NOTES
SUBJECTIVE:   Laurel Parra is a 70 year old female presenting with a chief complaint of   Chief Complaint   Patient presents with     Urgent Care     Hypertension     Blood pressure high, was low before. Has a headache.       70-year-old female comes in for evaluation of high blood pressure.  This is a new finding for her.  States has been going on for some time now and she has been recording her blood pressure at home with readings from 140-160's/'s.  She denies any chest pain or shortness of breath.  No current weakness or dizziness, no change in vision, no difficulty speaking or forming thoughts.  Approximately 7 to 10 days ago, she was in the bathroom due to diarrhea and felt dizzy and fell to the floor.  Did not have chest pain at that time, but was quite scared of the incident.  Was able to get up and go back to bed.  She did not seek treatment for this episode.  She does have a strong family history of strokes, and has a personal history of migraines on Zomig, and is currently on estrogen and progesterone for vaginal bleeding.  Currently has a headache, which she states she has migraines and daily headaches.  States this headache feels slightly different.  States her Vicodin does not make a difference for her headache.  Has chronic pain and is seen in pain clinic and is on narcotics.     Review of Systems   Constitutional: Negative.    HENT: Negative.    Eyes: Negative.    Respiratory: Negative.    Cardiovascular: Negative.    Gastrointestinal: Negative.    Endocrine: Negative.    Musculoskeletal: Negative.    Skin: Negative.    Neurological: Positive for dizziness and headaches. Negative for seizures, facial asymmetry, speech difficulty and weakness.   Hematological: Negative.        Past Medical History:   Diagnosis Date     Anxiety     Dr. Adelina Meehan     Chronic constipation      Chronic narcotic use     [facility]     Chronic pain     Dr. Orta as of 2015     Chronic  urethritis     Dr. Little     Colonic polyp 11/2011    one polyp, fu 5 years, fu 10/17 and no lesions     Depression, major, in partial remission (H)     Dr. Meehan     Depressive disorder 1971     Diarrhea 2012    eval by [facility] gi, resolved with gluten free diet     DJD (degenerative joint disease)      Dyspepsia 2020    eval by [facility] gi, had colonoscopy, egd, ct abd and pelvis, mrcp.  [place] to be dyspepsia and constipation     H/O gastroesophageal reflux (GERD)      LBP (low back pain) 2013    Dr. Jae Tong in [place], then seeing [facility] Dr. You Cannon headed 07/2020    nl est echo     Migraines 2009    neuro eval     Palpitations 2006    holter with pvc's and pac's, echo nl     Screening 2010    dexa nl at gyn     Family History   Problem Relation Age of Onset     Diabetes Father      Hypertension Father      Depression Father      Substance Abuse Father      Anesthesia Reaction Father         went into shock with surgery      Obesity Father      Anxiety Disorder Father      Diabetes Mother      Cerebrovascular Disease Mother         Cadasils disease     Depression Mother      Mental Illness Mother         borderline personality disorder, Chemical dependency     Substance Abuse Mother      Genetic Disorder Mother         Cadasils     Obesity Mother      Anxiety Disorder Mother      Cerebrovascular Disease Paternal Grandfather      Cerebrovascular Disease Brother         Cadasils disease     Depression Brother      Anxiety Disorder Brother      Substance Abuse Brother      Genetic Disorder Brother         cadasils     Obesity Brother      Hypertension Brother      Cerebrovascular Disease Sister         Cadasils disease     Depression Sister      Anxiety Disorder Sister      Genetic Disorder Sister         cadasils     Obesity Sister      Cerebrovascular Disease Sister         Cadasils disease     Genetic Disorder Sister         cadasils     Breast Cancer Sister      Depression Sister       Anxiety Disorder Sister      Obesity Sister      Hypertension Sister      Current Outpatient Medications   Medication Sig Dispense Refill     carboxymethylcellulose (REFRESH PLUS) 0.5 % SOLN ophthalmic solution Place 1 drop into both eyes 3 times daily as needed for dry eyes       Cyanocobalamin (B-12 DOTS PO) Take 1 tablet by mouth daily        cyclobenzaprine (FLEXERIL) 10 MG tablet Take 10 mg by mouth daily as needed for muscle spasms        estradiol (ESTRACE) 0.5 MG tablet Take 0.5 mg by mouth daily Take total 1.5 daily       estradiol (ESTRACE) 1 MG tablet Take 1 mg by mouth daily (with dinner) MUST BE  TEVA . Take total 1.5 mg daily       Hydrocodone-Acetaminophen  MG TABS Take 2 tablets by mouth every 4 hours as needed       ipratropium (ATROVENT) 0.06 % spray Spray 3 sprays into both nostrils daily 45 mL 3     loratadine (CLARITIN) 10 MG tablet Take 10 mg by mouth daily as needed        LORazepam (ATIVAN) 1 MG tablet Take 1-2 mg by mouth 3 times daily as needed for anxiety (up to 6 tablets per day)  30 tablet      MAGNESIUM PO Take 240 mg by mouth daily (OTC)        metoprolol succinate ER (TOPROL-XL) 25 MG 24 hr tablet TAKE ONE TABLET BY MOUTH EVERY EVENING 90 tablet 3     naloxone (NARCAN) 4 MG/0.1ML nasal spray Spray 1 spray (4 mg) into one nostril alternating nostrils as needed for opioid reversal Every 2-3 minutes in alternating nostrils 2 each 0     NONFORMULARY Take 10 mg by mouth 2 times daily as needed Compounded Hydrocodone Bitartrate Powder -- compounded by [facility]       omeprazole (PRILOSEC) 40 MG DR capsule Take 40 mg by mouth daily 30 minutes prior to breakfast       ondansetron (ZOFRAN) 4 MG tablet        polyethylene glycol (MIRALAX) 17 GM/Dose powder Take 1 capful by mouth daily as needed for constipation       PROgesterone (PROMETRIUM) 100 MG capsule Take 200 mg by mouth daily (with dinner) (Takes 2 x 100mg = 200mg dose)       psyllium  (METAMUCIL/KONSYL) 58.6 % powder Take 1.5 teaspoonful by mouth daily       trimethobenzamide (TIGAN) 300 MG capsule        TRINTELLIX 10 MG tablet Take 10 mg by mouth daily       vitamin C (ASCORBIC ACID) 1000 MG TABS Take 1,000 mg by mouth daily       Vitamin D, Cholecalciferol, 1000 units TABS Take 2,000 Units by mouth daily        ZOLMitriptan (ZOMIG) 5 MG tablet TAKE ONE TBALET BY MOUTH AT ONSET OF HEADACHE FOR MIGRAINE, MAY REPEAT ONCE AFTER 2 HOURS. MAX OF TWO TABLETS IN 24 HOURS 12 tablet 2     Zolpidem Tartrate (AMBIEN CR PO) Take 12.5 mg by mouth At Bedtime       Social History     Tobacco Use     Smoking status: Former Smoker     Packs/day: 0.00     Years: 5.00     Pack years: 0.00     Quit date: 10/29/1979     Years since quittin.1     Smokeless tobacco: Never Used     Tobacco comment: infrequent use for about 5 years   Substance Use Topics     Alcohol use: No     Alcohol/week: 0.0 standard drinks       OBJECTIVE  BP (!) 134/100 (BP Location: Left arm, Patient Position: Sitting, Cuff Size: Adult Regular)   Pulse 91   Temp 98.1  F (36.7  C) (Tympanic)   Wt 55.3 kg (122 lb)   SpO2 98%   BMI 19.69 kg/m      Physical Exam  Vitals signs reviewed.   Constitutional:       General: She is not in acute distress.     Appearance: Normal appearance.   HENT:      Head: Normocephalic and atraumatic.      Right Ear: Tympanic membrane, ear canal and external ear normal.      Left Ear: Tympanic membrane, ear canal and external ear normal.      Mouth/Throat:      Mouth: Mucous membranes are moist.      Pharynx: Oropharynx is clear. No oropharyngeal exudate.   Eyes:      Extraocular Movements: Extraocular movements intact.      Conjunctiva/sclera: Conjunctivae normal.      Pupils: Pupils are equal, round, and reactive to light.   Cardiovascular:      Rate and Rhythm: Normal rate and regular rhythm.      Heart sounds: Normal heart sounds.   Pulmonary:      Effort: Pulmonary effort is normal. No respiratory  distress.      Breath sounds: Normal breath sounds.   Musculoskeletal: Normal range of motion.   Skin:     General: Skin is warm and dry.   Neurological:      Mental Status: She is alert and oriented to person, place, and time.      Cranial Nerves: Cranial nerves are intact.      Sensory: Sensation is intact.      Motor: Motor function is intact.      Coordination: Coordination is intact.      Gait: Gait is intact.      Deep Tendon Reflexes: Reflexes are normal and symmetric.       Laurel was seen today for urgent care and hypertension.    Diagnoses and all orders for this visit:    Elevated blood pressure reading without diagnosis of hypertension  -     Comprehensive metabolic panel (BMP + Alb, Alk Phos, ALT, AST, Total. Bili, TP)  -     EKG 12-lead complete w/read - Clinics  -     CBC with platelets  -     MR Brain w/o Contrast    Screening for thyroid disorder  -     TSH with free T4 reflex    EKG shows sinus rhythm with a regular rate of 75.  QTc interval 403, no ST segment abnormalities, no T wave changes.  Normal access.    Laurel is scheduled to see someone at the clinic tomorrow for her blood pressure.  Labs and EKG were obtained tonight, an MRI of the brain was ordered.  This should all be followed up by her primary care team.    Am concerned she is on hormones with elevated blood pressure and has a strong family history of stroke.  This needs to be addressed.  Will defer this to her primary care team tomorrow.    We discussed options on how to proceed with her high blood pressure.  She would like to avoid going to the emergency room and I agree with that since she is neurologically stable.  Recheck blood pressure manually was 146/96.  She is not having any chest pain, shortness of breath dizziness vision changes ringing in the ears, trouble speaking.  She does have a headache, and this could be contributing to her high blood pressure.  However, she states she has headaches every day.  She is on  medications that could elevate her blood pressure such as her Zomig.  This will need to be addressed tomorrow as well.    Episode she had 10 days ago could be vasovagal, however will need an MRI done.  This was ordered tonight.    Discussed that I am hesitant to start her on any blood pressure medication today and would rather she wait to see primary care tomorrow .  Recommend she monitor her blood pressure daily and record. She is on Toprol XL currently for arrhythmia and she asked if she should take more of this and was told no.     Discussed red flag symptoms and when to call 911.    Laurel is in agreement with this plan and will follow up tomorrow as scheduled.    Recheck /96 with manual cuff.

## 2020-12-18 NOTE — PATIENT INSTRUCTIONS
Please follow-up tomorrow in clinic as scheduled.    Will let your provider tomorrow decide what medication if any you should be on for your blood pressure, or if medications you are now on need to be discontinued or adjusted.     Record blood pressure daily.    Call 911 for any chest pain, shortness of breath, weakness, dizziness, disorientation, headache pain getting worse, or vision changes.

## 2021-01-12 DIAGNOSIS — R00.2 PALPITATIONS: ICD-10-CM

## 2021-01-13 RX ORDER — METOPROLOL SUCCINATE 25 MG/1
TABLET, EXTENDED RELEASE ORAL
Qty: 90 TABLET | Refills: 3 | Status: SHIPPED | OUTPATIENT
Start: 2021-01-13 | End: 2021-09-29

## 2021-01-13 NOTE — TELEPHONE ENCOUNTER
Failed protocol.  Kimberlee PARKERRN BSN  [facility]  [phone]

## 2021-01-15 ENCOUNTER — HEALTH MAINTENANCE LETTER (OUTPATIENT)
Age: 71
End: 2021-01-15

## 2021-01-31 ENCOUNTER — NURSE TRIAGE (OUTPATIENT)
Dept: NURSING | Facility: CLINIC | Age: 71
End: 2021-01-31

## 2021-01-31 ENCOUNTER — OFFICE VISIT (OUTPATIENT)
Dept: URGENT CARE | Facility: URGENT CARE | Age: 71
End: 2021-01-31
Payer: MEDICARE

## 2021-01-31 VITALS
DIASTOLIC BLOOD PRESSURE: 86 MMHG | WEIGHT: 120 LBS | SYSTOLIC BLOOD PRESSURE: 123 MMHG | BODY MASS INDEX: 19.37 KG/M2 | OXYGEN SATURATION: 99 % | HEART RATE: 63 BPM | TEMPERATURE: 96 F | RESPIRATION RATE: 13 BRPM

## 2021-01-31 DIAGNOSIS — R30.0 DYSURIA: ICD-10-CM

## 2021-01-31 DIAGNOSIS — R82.90 NONSPECIFIC FINDING ON EXAMINATION OF URINE: ICD-10-CM

## 2021-01-31 DIAGNOSIS — N39.0 URINARY TRACT INFECTION WITHOUT HEMATURIA, SITE UNSPECIFIED: Primary | ICD-10-CM

## 2021-01-31 LAB
ALBUMIN UR-MCNC: ABNORMAL MG/DL
APPEARANCE UR: ABNORMAL
BACTERIA #/AREA URNS HPF: ABNORMAL /HPF
BILIRUB UR QL STRIP: NEGATIVE
COLOR UR AUTO: YELLOW
GLUCOSE UR STRIP-MCNC: NEGATIVE MG/DL
HGB UR QL STRIP: ABNORMAL
KETONES UR STRIP-MCNC: NEGATIVE MG/DL
LEUKOCYTE ESTERASE UR QL STRIP: ABNORMAL
NITRATE UR QL: NEGATIVE
PH UR STRIP: 6 PH (ref 5–7)
RBC #/AREA URNS AUTO: ABNORMAL /HPF
SOURCE: ABNORMAL
SP GR UR STRIP: 1.02 (ref 1–1.03)
UROBILINOGEN UR STRIP-ACNC: 0.2 EU/DL (ref 0.2–1)
WBC #/AREA URNS AUTO: ABNORMAL /HPF

## 2021-01-31 PROCEDURE — 87186 SC STD MICRODIL/AGAR DIL: CPT | Performed by: FAMILY MEDICINE

## 2021-01-31 PROCEDURE — 87088 URINE BACTERIA CULTURE: CPT | Performed by: FAMILY MEDICINE

## 2021-01-31 PROCEDURE — 99213 OFFICE O/P EST LOW 20 MIN: CPT | Performed by: FAMILY MEDICINE

## 2021-01-31 PROCEDURE — 87086 URINE CULTURE/COLONY COUNT: CPT | Performed by: FAMILY MEDICINE

## 2021-01-31 PROCEDURE — 81001 URINALYSIS AUTO W/SCOPE: CPT | Performed by: FAMILY MEDICINE

## 2021-01-31 RX ORDER — CEFDINIR 300 MG/1
300 CAPSULE ORAL 2 TIMES DAILY
Qty: 10 CAPSULE | Refills: 0 | Status: SHIPPED | OUTPATIENT
Start: 2021-01-31 | End: 2021-02-05

## 2021-01-31 RX ORDER — PAROXETINE HYDROCHLORIDE HEMIHYDRATE 25 MG/1
25 TABLET, FILM COATED, EXTENDED RELEASE ORAL 2 TIMES DAILY
Status: ON HOLD | COMMUNITY
Start: 2021-01-15 | End: 2021-04-05

## 2021-01-31 NOTE — PATIENT INSTRUCTIONS
Patient Education     Urinary Tract Infections in Women    Urinary tract infections (UTIs) are most often caused by bacteria. These bacteria enter the urinary tract. The bacteria may come from inside the body. Or they may travel from the skin outside the rectum or vagina into the urethra. Female anatomy makes it easy for bacteria from the bowel to enter a woman s urinary tract, which is the most common source of UTI. This means women develop UTIs more often than men. Pain in or around the urinary tract is a common UTI symptom. But the only way to know for sure if you have a UTI for the healthcare provider to test your urine. The two tests that may be done are the urinalysis and urine culture.   Types of UTIs    Cystitis. A bladder infection (cystitis) is the most common UTI in women. You may have urgent or frequent need to pee. You may also have pain, burning when you pee, and bloody urine.    Urethritis. This is an inflamed urethra, which is the tube that carries urine from the bladder to outside the body. You may have lower stomach or back pain. You may also have urgent or frequent need to pee.    Pyelonephritis. This is a kidney infection. If not treated, it can be serious and damage your kidneys. In severe cases, you may need to stay in the hospital. You may have a fever and lower back pain.    Medicines to treat a UTI  Most UTIs are treated with antibiotics. These kill the bacteria. The length of time you need to take them depends on the type of infection. It may be as short as 3 days. If you have repeated UTIs, you may need a low-dose antibiotic for several months. Take antibiotics exactly as directed. Don t stop taking them until all of the medicine is gone. If you stop taking the antibiotic too soon, the infection may not go away. You may also develop a resistance to the antibiotic. This can make it much harder to treat.   Lifestyle changes to treat and prevent UTIs   The lifestyle changes below will help  get rid of your UTI. They may also help prevent future UTIs.     Drink plenty of fluids. This includes water, juice, or other caffeine-free drinks. Fluids help flush bacteria out of your body.    Empty your bladder. Always empty your bladder when you feel the urge to pee. And always pee before going to sleep. Urine that stays in your bladder can lead to infection. Try to pee before and after sex as well.    Practice good personal hygiene. Wipe yourself from front to back after using the toilet. This helps keep bacteria from getting into the urethra.    Use condoms during sex. These help prevent UTIs caused by sexually transmitted bacteria. Also don't use spermicides during sex. These can increase the risk for UTIs. Choose other forms of birth control instead. For women who tend to get UTIs after sex, a low-dose of a preventive antibiotic may be used. Be sure to discuss this option with your healthcare provider.    Follow up with your healthcare provider as directed. He or she may test to make sure the infection has cleared. If needed, more treatment may be started.  [employer] last reviewed this educational content on 7/1/2019 2000-2020 [employer]. [address] [place] [address], [place] [address]. All rights reserved. This information is not intended as a substitute for professional medical care. Always follow your healthcare professional's instructions.

## 2021-01-31 NOTE — PROGRESS NOTES
"SUBJECTIVE: Laurel Parra is a 70 year old female who  presents today for a possible UTI.   Symptoms of dysuria and frequency have been going on for 3day(s).    Hematuria no.  sudden onsetand moderate.    There is no history of fever, chills, nausea or vomiting.   This patient does have a history of urinary tract infections.   Patient denies long duration and flank pain    Past Medical History:   Diagnosis Date     Anxiety     Dr. Adelina Meehan     Chronic constipation      Chronic narcotic use     [facility]     Chronic pain     Dr. Orta as of 2015     Chronic urethritis     Dr. Little     Colonic polyp 11/2011    one polyp, fu 5 years, fu 10/17 and no lesions     Depression, major, in partial remission (H)     Dr. Meehan     Depressive disorder 1971     Diarrhea 2012    eval by [facility], resolved with gluten free diet     DJD (degenerative joint disease)      Dyspepsia 2020    eval by [facility], had colonoscopy, egd, ct abd and pelvis, mrcp.  [place] to be dyspepsia and constipation     H/O gastroesophageal reflux (GERD)      LBP (low back pain) 2013    Dr. Jae Tong in [place], then seeing [facility] Dr. Orta     Light headed 07/2020    nl est echo     Migraines 2009    neuro eval     Palpitations 2006    holter with pvc's and pac's, echo nl     Screening 2010    dexa nl at gyn     Allergies   Allergen Reactions     Betamethasone Other (See Comments)     agitation     Chocolate      Triggers migraines     Compazine [Prochlorperazine] Other (See Comments)     \"crawl out of my skin\"     Fentanyl      Other reaction(s): Headache/sedation/diffic urinate     Gluten Meal Diarrhea     Methadone      Other reaction(s): Headache and sedation     Morphine      Other reaction(s): Constipation     Penicillins Nausea and Vomiting and Hives     Pneumococcal Vaccine      Temporary paralization     Tizanidine      Other reaction(s): Severe agitation     Social History     Tobacco Use     " Smoking status: Former Smoker     Packs/day: 0.00     Years: 5.00     Pack years: 0.00     Quit date: 10/29/1979     Years since quittin.2     Smokeless tobacco: Never Used     Tobacco comment: infrequent use for about 5 years   Substance Use Topics     Alcohol use: No     Alcohol/week: 0.0 standard drinks       ROS: CONSTITUTIONAL:NEGATIVE for fever, chills, change in weight    OBJECTIVE:  /86   Pulse 63   Temp 96  F (35.6  C) (Temporal)   Resp 13   Wt 54.4 kg (120 lb)   SpO2 99%   BMI 19.37 kg/m    NAD  abd pain      ICD-10-CM    1. Urinary tract infection without hematuria, site unspecified  N39.0 cefdinir (OMNICEF) 300 MG capsule   2. Dysuria  R30.0 UA with Microscopic reflex to Culture   3. Nonspecific finding on examination of urine  R82.90 Urine Culture Aerobic Bacterial       Drink plenty of fluids.  Prevention and treatment of UTI's discussed.Signs and symptoms of pyelonephritis mentioned.  Follow up with primary care physician if not improving

## 2021-01-31 NOTE — TELEPHONE ENCOUNTER
Patient states she has an UTI; has had in the past and needs antibiotic.  Advised patient she would have to be seen at UC and provided information for [facility].

## 2021-02-01 LAB
BACTERIA SPEC CULT: ABNORMAL
Lab: ABNORMAL
SPECIMEN SOURCE: ABNORMAL

## 2021-02-04 ENCOUNTER — TELEPHONE (OUTPATIENT)
Dept: FAMILY MEDICINE | Facility: CLINIC | Age: 71
End: 2021-02-04

## 2021-02-04 NOTE — TELEPHONE ENCOUNTER
I don't think a different antibiotic will help  The e coli was sensitive to cephalosporins  I would recommend an office visit appointment if symptoms are persistent

## 2021-02-04 NOTE — TELEPHONE ENCOUNTER
"Patient called clinic.      Symptoms:  Ongoing UTI symptoms.   Requesting new (possibly different) abx.     [facility]:  See notes  UA and Urine culture reports available.    Dx'd UTI.   Today is final day of Rx Cefdinir 300 m cap BId x 5 days.   Symptoms not resolved.     Reports decreased burning, however no change with lower pelvic pressure and urgency.   Denies hematuria  Denies fever  Denies flank pain  Denies cloudy, odorous urine.      Advised appointment.   Patient unable to leave spouse at this time as he was just discharged post cardiac surgery.     Onset/Duration:  1 wk  Fever: Denies    Plan: route to on-call provider for review/advise    Caller agrees with this plan/advise.    Advise per RN protocols.   See protocol, assessment, disposition, and care advise.     Pharmacy pended.      Allergies   Allergen Reactions     Betamethasone Other (See Comments)     agitation     Chocolate      Triggers migraines     Compazine [Prochlorperazine] Other (See Comments)     \"crawl out of my skin\"     Fentanyl      Other reaction(s): Headache/sedation/diffic urinate     Gluten Meal Diarrhea     Methadone      Other reaction(s): Headache and sedation     Morphine      Other reaction(s): Constipation     Penicillins Nausea and Vomiting and Hives     Pneumococcal Vaccine      Temporary paralization     Tizanidine      Other reaction(s): Severe agitation       [phone]  Triage may leave detailed msg/orders on voice mail.       Thank you,  Milka DONG, RN            "

## 2021-02-04 NOTE — TELEPHONE ENCOUNTER
Burning and urgency has resolved.  Still with some frequency and some pressure in low abd/bladder area.        She will push fluids and complete course of ABX. Discussed urgent care hours. If no worsening over weekend, yet if sx persists, call on Monday.  May need recheck UA (this will be 48 hours after stopping abx)    She agrees with plan.  Karen Avila RN on 2/4/2021 at 5:14 PM

## 2021-02-08 ENCOUNTER — VIRTUAL VISIT (OUTPATIENT)
Dept: FAMILY MEDICINE | Facility: CLINIC | Age: 71
End: 2021-02-08
Payer: MEDICARE

## 2021-02-08 ENCOUNTER — TELEPHONE (OUTPATIENT)
Dept: FAMILY MEDICINE | Facility: CLINIC | Age: 71
End: 2021-02-08

## 2021-02-08 DIAGNOSIS — N30.00 ACUTE CYSTITIS WITHOUT HEMATURIA: Primary | ICD-10-CM

## 2021-02-08 PROCEDURE — 99441 PR PHYSICIAN TELEPHONE EVALUATION 5-10 MIN: CPT | Mod: 95 | Performed by: NURSE PRACTITIONER

## 2021-02-08 RX ORDER — SULFAMETHOXAZOLE/TRIMETHOPRIM 800-160 MG
1 TABLET ORAL 2 TIMES DAILY
Qty: 10 TABLET | Refills: 0 | Status: SHIPPED | OUTPATIENT
Start: 2021-02-08 | End: 2021-02-13

## 2021-02-08 ASSESSMENT — ANXIETY QUESTIONNAIRES
1. FEELING NERVOUS, ANXIOUS, OR ON EDGE: MORE THAN HALF THE DAYS
GAD7 TOTAL SCORE: 2
6. BECOMING EASILY ANNOYED OR IRRITABLE: NOT AT ALL
3. WORRYING TOO MUCH ABOUT DIFFERENT THINGS: NOT AT ALL
7. FEELING AFRAID AS IF SOMETHING AWFUL MIGHT HAPPEN: NOT AT ALL
5. BEING SO RESTLESS THAT IT IS HARD TO SIT STILL: NOT AT ALL
2. NOT BEING ABLE TO STOP OR CONTROL WORRYING: NOT AT ALL
IF YOU CHECKED OFF ANY PROBLEMS ON THIS QUESTIONNAIRE, HOW DIFFICULT HAVE THESE PROBLEMS MADE IT FOR YOU TO DO YOUR WORK, TAKE CARE OF THINGS AT HOME, OR GET ALONG WITH OTHER PEOPLE: NOT DIFFICULT AT ALL

## 2021-02-08 ASSESSMENT — PATIENT HEALTH QUESTIONNAIRE - PHQ9: 5. POOR APPETITE OR OVEREATING: NOT AT ALL

## 2021-02-08 NOTE — PROGRESS NOTES
"Laurel is a 70 year old who is being evaluated via a billable telephone visit.      What phone number would you like to be contacted at? [phone]  How would you like to obtain your AVS? Rose  {PROVIDER CHARTING PREFERENCE:809281}    Subjective     Laurel is a 70 year old who presents to clinic today for the following health issues {ACCOMPANIED BY STATEMENT (Optional):572566}    HPI       Triage note from today:  \"Pt called c/o:     Burning, pressure, and frequency    Was treated at  on 1/31/21 for UTI (given Cefdinir) - burning went away but pressure and urgency never resolved, now burning is back again      just came home from open heart surgery     She cannot leave her  alone for long, she is with him post-surgery. Cannot do an OV. But if need she said she could quickly come in and leave a UA/UC    Please advise     Detailed message is okay:  [phone]    Beatrice MILLER RN\"      jagjit    {additonal problems for provider to add (Optional):173184}    Review of Systems   {ROS COMP (Optional):488643}      Objective           Vitals:  No vitals were obtained today due to virtual visit.    Physical Exam   {GENERAL APPEARANCE:50::\"healthy\",\"alert\",\"no distress\"}  PSYCH: Alert and oriented times 3; coherent speech, normal   rate and volume, able to articulate logical thoughts, able   to abstract reason, no tangential thoughts, no hallucinations   or delusions  Her affect is { :0372216::\"normal\"}  RESP: No cough, no audible wheezing, able to talk in full sentences  Remainder of exam unable to be completed due to telephone visits    {Diagnostic Test Results (Optional):731418}    {AMBULATORY ATTESTATION (Optional):833308}        Phone call duration: *** minutes  "

## 2021-02-08 NOTE — PROGRESS NOTES
Laurel is a 70 year old who is being evaluated via a billable telephone visit.      What phone number would you like to be contacted at? [phone]  How would you like to obtain your AVS? [facility]  Assessment & Plan   Problem List Items Addressed This Visit     None      Visit Diagnoses     Acute cystitis without hematuria    -  Primary    Relevant Medications    sulfamethoxazole-trimethoprim (BACTRIM DS) 800-160 MG tablet         Pt had recent positive urine culture. Symptoms didn't improve. Will try different abx. She should call if symptoms do not resolve. She may need urology if symptoms are persisting.       FAVIO Foley CNP  [facility]    Subjective     Laurel is a 70 year old who presents to clinic today for the following health issues    HPI       ED/UC Followup:    [facility]  Date of visit: 01/31/2021  Reason for visit: UTI  Current Status: no improvement       Seen in UC on 1/31 for UTI and abx was sensitive to the bacteria   Continues with burning, urgency, frequency and pressure   Burning improved but everything stayed the same   Getting up a lot at night   Her  just recently had open heart surgery       Review of Systems   Detailed as above       Objective           Vitals:  No vitals were obtained today due to virtual visit.    Physical Exam   healthy, alert and no distress  PSYCH: Alert and oriented times 3; coherent speech, normal   rate and volume, able to articulate logical thoughts, able   to abstract reason, no tangential thoughts, no hallucinations   or delusions  Her affect is normal, pleasant   RESP: No cough, no audible wheezing, able to talk in full sentences  Remainder of exam unable to be completed due to telephone visits        Phone call duration: 6 minutes

## 2021-02-08 NOTE — TELEPHONE ENCOUNTER
Called patient (see below cannot do OV)     She agreed to phone visit with team     If needed after could leave a UA/UC sample     Beatrice MILLER RN

## 2021-02-08 NOTE — TELEPHONE ENCOUNTER
Reason for Call:  Other Still has bladder infection    Detailed comments: The patient said she still has burning, Pressure and frequency and she has finished her ABX. It was finished on Thursday 2/4    Phone Number Patient can be reached at: Home number on file [phone] (home)    Best Time: Transferred to Triage  Please see Previous TE from 2/4    Can we leave a detailed message on this number? YES    Call taken on 2/8/2021 at 1:30 PM by Sveta Elizondo

## 2021-02-08 NOTE — TELEPHONE ENCOUNTER
Pt called c/o:     Burning, pressure, and frequency    Was treated at UC on 1/31/21 for UTI (given Cefdinir) - burning went away but pressure and urgency never resolved, now burning is back again      just came home from open heart surgery     She cannot leave her  alone for long, she is with him post-surgery. Cannot do an OV. But if need she said she could quickly come in and leave a UA/UC    Please advise     Detailed message is okay:  [phone]    Beatrice MILLER RN

## 2021-02-09 ASSESSMENT — ANXIETY QUESTIONNAIRES: GAD7 TOTAL SCORE: 2

## 2021-02-12 ENCOUNTER — VIRTUAL VISIT (OUTPATIENT)
Dept: FAMILY MEDICINE | Facility: CLINIC | Age: 71
End: 2021-02-12
Payer: MEDICARE

## 2021-02-12 DIAGNOSIS — J02.9 ACUTE PHARYNGITIS, UNSPECIFIED ETIOLOGY: Primary | ICD-10-CM

## 2021-02-12 PROCEDURE — 99441 PR PHYSICIAN TELEPHONE EVALUATION 5-10 MIN: CPT | Mod: 95 | Performed by: INTERNAL MEDICINE

## 2021-02-12 RX ORDER — NYSTATIN 100000/ML
500000 SUSPENSION, ORAL (FINAL DOSE FORM) ORAL 4 TIMES DAILY
Qty: 150 ML | Refills: 0 | Status: SHIPPED | OUTPATIENT
Start: 2021-02-12 | End: 2021-03-29

## 2021-02-12 NOTE — PROGRESS NOTES
Laurel is a 70 year old who is being evaluated via a billable telephone visit.      What phone number would you like to be contacted at? [phone]  How would you like to obtain your AVS? MyChart    Assessment & Plan     Uncertain form.  The patient has been on Bactrim for 4 days for urinary tract infection.  Her sore throat recently started.  She notes some white spots on the back of the throat more on one side.  And his throat hurts more on that side as well.  She has a little bit of fullness of the lymph nodes and some tenderness on the similar side.  Given the history I suspect that this may reflect candidiasis.  It would be odd for a strep pharyngitis to start in this setting.  Other viral illness is possible as well such as Justa-Barr virus.  Hard to make the diagnosis by the phone.  We will treat most common possibility, if she has no response to therapy recommend that she be seen face-to-face and a physical exam be performed  - nystatin (MYCOSTATIN) 347208 UNIT/ML suspension; Take 5 mLs (500,000 Units) by mouth 4 times daily     See Patient Instructions    No follow-ups on file.    Fortino Cosme MD  [facility]    Jerry Barragan is a 70 year old who presents for the following health issues     HPI 70-year-old female with several days of sore throat.  She has been on Bactrim for a urinary tract infection.  She states that one side of the throat is more sore and does have some red spots associated with it.  No fevers but then she is taking Tylenol medications.    She has some tenderness of a lymph node on the right side additionally.    Chief Complaint   Patient presents with     URI     pt report sore throat x days- sweating on and off, no cough, white areas on left tonsil and also report redness on her throat. Pt is currently on antibiotics for UTI sx, updated in med list       New Patient/Transfer of Care    Review of Systems         Objective           Vitals:  No  vitals were obtained today due to virtual visit.    Physical Exam   healthy, alert and no distress  PSYCH: Alert and oriented times 3; coherent speech, normal   rate and volume, able to articulate logical thoughts, able   to abstract reason, no tangential thoughts, no hallucinations   or delusions  Her affect is normal  RESP: No cough, no audible wheezing, able to talk in full sentences  Remainder of exam unable to be completed due to telephone visits    Office Visit on 01/31/2021   Component Date Value Ref Range Status     Color Urine 01/31/2021 Yellow   Final     Appearance Urine 01/31/2021 Slightly Cloudy   Final     Glucose Urine 01/31/2021 Negative  NEG^Negative mg/dL Final     Bilirubin Urine 01/31/2021 Negative  NEG^Negative Final     Ketones Urine 01/31/2021 Negative  NEG^Negative mg/dL Final     Specific Gravity Urine 01/31/2021 1.025  1.003 - 1.035 Final     pH Urine 01/31/2021 6.0  5.0 - 7.0 pH Final     Protein Albumin Urine 01/31/2021 Trace* NEG^Negative mg/dL Final     Urobilinogen Urine 01/31/2021 0.2  0.2 - 1.0 EU/dL Final     Nitrite Urine 01/31/2021 Negative  NEG^Negative Final     Blood Urine 01/31/2021 Large* NEG^Negative Final     Leukocyte Esterase Urine 01/31/2021 Small* NEG^Negative Final     Source 01/31/2021 Midstream Urine   Final     WBC Urine 01/31/2021 * OTO5^0 - 5 /HPF Final     RBC Urine 01/31/2021 10-25* OTO2^O - 2 /HPF Final     Bacteria Urine 01/31/2021 Few* NEG^Negative /HPF Final     Specimen Description 01/31/2021 Midstream Urine   Final     Special Requests 01/31/2021 Specimen received in preservative   Final     Culture Micro 01/31/2021 *  Final                    Value:50,000 to 100,000 colonies/mL  Escherichia coli                   Phone call duration: 10 minutes

## 2021-02-18 ENCOUNTER — TELEPHONE (OUTPATIENT)
Dept: FAMILY MEDICINE | Facility: CLINIC | Age: 71
End: 2021-02-18

## 2021-02-18 NOTE — TELEPHONE ENCOUNTER
Prior Authorization Retail Medication Request    Medication/Dose: Zolmitriptan 5 mg  ICD code (if different than what is on RX):  G43.719  Previously Tried and Failed:  None  Rationale:  Patient has been stable on medication for relief from migraines    Insurance Name:  [facility]  Insurance ID:  [id number]      Pharmacy Information (if different than what is on RX)  Name:  [id number]  Phone:  [phone]

## 2021-02-22 NOTE — TELEPHONE ENCOUNTER
Prior Authorization Not Needed per Insurance    Medication: Zolmitriptan 5 mg  Insurance Company: [employer] - Phone [phone] Fax [phone]  Expected CoPay:      Pharmacy Filling the Rx: [id number] - [place], [place] - [address]  Pharmacy Notified: Yes  Patient Notified: Yes  **Instructed pharmacy to notify patient when script is ready to /ship.**

## 2021-02-26 ENCOUNTER — VIRTUAL VISIT (OUTPATIENT)
Dept: FAMILY MEDICINE | Facility: CLINIC | Age: 71
End: 2021-02-26
Payer: MEDICARE

## 2021-02-26 DIAGNOSIS — N39.0 URINARY TRACT INFECTION WITHOUT HEMATURIA, SITE UNSPECIFIED: Primary | ICD-10-CM

## 2021-02-26 PROCEDURE — 99441 PR PHYSICIAN TELEPHONE EVALUATION 5-10 MIN: CPT | Mod: 95 | Performed by: INTERNAL MEDICINE

## 2021-02-26 RX ORDER — NITROFURANTOIN 25; 75 MG/1; MG/1
100 CAPSULE ORAL 2 TIMES DAILY
Qty: 20 CAPSULE | Refills: 0 | Status: SHIPPED | OUTPATIENT
Start: 2021-02-26 | End: 2021-03-08

## 2021-02-26 NOTE — PROGRESS NOTES
Laurel is a 70 year old who is being evaluated via a billable telephone visit.      What phone number would you like to be contacted at? [phone]  How would you like to obtain your AVS? MyChart    Assessment & Plan     Urinary tract infection without hematuria, site unspecified  Recurrent.  No history of nephrolithiasis.  No change in behavior.  If recurrent I would recommend an ultrasound to rule out bladder stone or bladder abnormality/thickening  - nitroFURantoin macrocrystal-monohydrate (MACROBID) 100 MG capsule; Take 1 capsule (100 mg) by mouth 2 times daily for 10 days     See Patient Instructions    No follow-ups on file.    Fortino Cosme MD  [facility]    Jerry Barragan is a 70 year old who presents for the following health issues     HPI       Follow up  UTI - just finished cefdinir 300 mg x 5days and then bactrim 800/160 mg x 5 days; symptoms are back: burning, frequency, urgency and pelvic pressure.    Finished her most recent antibiotic on 13 February.  Felt good for several days but now has recurrent symptoms.  She has had similar process with previous UTIs historically.  I did review her ultrasound from 2013 which showed no evidence of nephrolithiasis however bladder abnormality      New Patient/Transfer of Care    Review of Systems   Constitutional, HEENT, cardiovascular, pulmonary, gi and gu systems are negative, except as otherwise noted.      Objective           Vitals:  No vitals were obtained today due to virtual visit.    Physical Exam   healthy, alert and no distress  PSYCH: Alert and oriented times 3; coherent speech, normal   rate and volume, able to articulate logical thoughts, able   to abstract reason, no tangential thoughts, no hallucinations   or delusions  Her affect is normal  RESP: No cough, no audible wheezing, able to talk in full sentences  Remainder of exam unable to be completed due to telephone visits    Office Visit on 01/31/2021    Component Date Value Ref Range Status     Color Urine 01/31/2021 Yellow   Final     Appearance Urine 01/31/2021 Slightly Cloudy   Final     Glucose Urine 01/31/2021 Negative  NEG^Negative mg/dL Final     Bilirubin Urine 01/31/2021 Negative  NEG^Negative Final     Ketones Urine 01/31/2021 Negative  NEG^Negative mg/dL Final     Specific Gravity Urine 01/31/2021 1.025  1.003 - 1.035 Final     pH Urine 01/31/2021 6.0  5.0 - 7.0 pH Final     Protein Albumin Urine 01/31/2021 Trace* NEG^Negative mg/dL Final     Urobilinogen Urine 01/31/2021 0.2  0.2 - 1.0 EU/dL Final     Nitrite Urine 01/31/2021 Negative  NEG^Negative Final     Blood Urine 01/31/2021 Large* NEG^Negative Final     Leukocyte Esterase Urine 01/31/2021 Small* NEG^Negative Final     Source 01/31/2021 Midstream Urine   Final     WBC Urine 01/31/2021 * OTO5^0 - 5 /HPF Final     RBC Urine 01/31/2021 10-25* OTO2^O - 2 /HPF Final     Bacteria Urine 01/31/2021 Few* NEG^Negative /HPF Final     Specimen Description 01/31/2021 Midstream Urine   Final     Special Requests 01/31/2021 Specimen received in preservative   Final     Culture Micro 01/31/2021 *  Final                    Value:50,000 to 100,000 colonies/mL  Escherichia coli                   Phone call duration: 5 minutes

## 2021-03-01 ENCOUNTER — TRANSFERRED RECORDS (OUTPATIENT)
Dept: HEALTH INFORMATION MANAGEMENT | Facility: CLINIC | Age: 71
End: 2021-03-01

## 2021-03-03 ENCOUNTER — MEDICAL CORRESPONDENCE (OUTPATIENT)
Dept: HEALTH INFORMATION MANAGEMENT | Facility: CLINIC | Age: 71
End: 2021-03-03

## 2021-03-05 ENCOUNTER — TELEPHONE (OUTPATIENT)
Dept: NEUROLOGY | Facility: CLINIC | Age: 71
End: 2021-03-05

## 2021-03-05 NOTE — TELEPHONE ENCOUNTER
Spoke with Dr. Castanon; stated that patient can download PDF on her phone and attach it to a my chart. She is going to call if she has any questions.

## 2021-03-09 ENCOUNTER — VIRTUAL VISIT (OUTPATIENT)
Dept: NEUROLOGY | Facility: CLINIC | Age: 71
End: 2021-03-09
Attending: PSYCHIATRY & NEUROLOGY
Payer: MEDICARE

## 2021-03-09 VITALS — BODY MASS INDEX: 19.29 KG/M2 | WEIGHT: 120 LBS | HEIGHT: 66 IN

## 2021-03-09 DIAGNOSIS — G44.209 TENSION HEADACHE: ICD-10-CM

## 2021-03-09 DIAGNOSIS — M54.81 BILATERAL OCCIPITAL NEURALGIA: ICD-10-CM

## 2021-03-09 DIAGNOSIS — G43.109 MIGRAINE WITH AURA AND WITHOUT STATUS MIGRAINOSUS, NOT INTRACTABLE: Primary | ICD-10-CM

## 2021-03-09 PROCEDURE — 99213 OFFICE O/P EST LOW 20 MIN: CPT | Mod: 95 | Performed by: PSYCHIATRY & NEUROLOGY

## 2021-03-09 ASSESSMENT — MIFFLIN-ST. JEOR: SCORE: 1091.07

## 2021-03-09 NOTE — LETTER
"    3/9/2021         RE: Laurel Parra  [address] [place] [facility] [address]        Dear Colleague,    Thank you for referring your patient, Laurel Parra, to the [facility] [place]. Please see a copy of my visit note below.    ESTABLISHED NEUROLOGY TELEMEDICINE VISIT NOTE.    DATE OF VISIT: 3/9/2021  MRN: [id number]  PATIENT NAME: Laurel Parra  YOB: 1950    Laurel Parra is a 70 year old female who is being evaluated via a billable video visit due to COVID-19 precautions.      The patient has been notified of following:     \"This video visit will be conducted via a call between you and your physician/provider. We have found that certain health care needs can be provided without the need for an in-person physical exam.  This service lets us provide the care you need with a video conversation.  If a prescription is necessary we can send it directly to your pharmacy.  If lab work is needed we can place an order for that and you can then stop by our lab to have the test done at a later time.    Video visits are billed at different rates depending on your insurance coverage.  Please reach out to your insurance provider with any questions.    If during the course of the call the physician/provider feels a video visit is not appropriate, you will not be charged for this service.\"    Patient has given verbal consent for Video visit? Yes    Will anyone else be joining your video visit? No  {If patient encounters technical issues they should call [phone].    I have reviewed and updated the patient's Past Medical History, Social History, Family History and Medication List.    August Braxton CMA on 3/9/2021 at 8:37 AM   (MA signature)    Additional provider notes:    This is a telemedicine visit that was initiated by the patient and performed with the originating site at patient's home and the distant location, as specified " below.  Verbal consent to participate in video visit was obtained.  This visit occurred during the Coronavirus (COVID-19) Public Health Emergency.  I discussed with the patient the nature of our telemedicine visits, that:  - I would evaluate the patient and recommend diagnostics and treatments based on my assessment  - Our sessions are not being recorded and that personal health information is protected  - Our team would provide follow-up care in person if/when the patient needs it.    REASON FOR VISIT:   Chief Complaint   Patient presents with     Headache     follow up     HISTORY OF PRESENT ILLNESS:                                                    Ms. Laurel Parra is 70 year old female patient, who was evaluated today by telemedicine visit for multifactorial headache disorder.  Please refer to my initial note from 12/8/2020 for further information.    Since the last visit, the patient reports that her headaches notably improved.  We reviewed her headache diary together.  In December she had 10 headaches, in January 2, in February 1, and in March 1.    For headache prevention she tried riboflavin 400 mg every morning and feels that it is helpful.  For acute therapy she uses zolmitriptan and occasionally ibuprofen or naproxen.  They work well.  Dry needling helps. No interval development of new focal neurological symptoms.    On review of systems, patient endorses no additional active complaints.  She reports upcoming surgery (left total wrist fusion) on 4/5/2021.  Plans to be fully vaccinated from COVID-19 by that time.  Medications, allergies, family and social history were also reviewed. There are no changes reported by patient.  EXAM:                                                    General: pt is in NAD, cooperative.  Skin: no lesions/rashes noticed.  HEENT: ATNC.  Neurological:  awake, cooperative, follows commands, no aphasia or dysarthria noted, cranial nerves II-XII: no ptosis, face is  symmetric, equally moves all extremities, no dysmetria bilaterally, gait was not evaluated today.  ASSESSMENT AND PLAN:      ASSESSMENT: Laurel Parra is a 70 year old female patient, who due to COVID-19 precautions is evaluated via a telemedicine follow-up visit for multifactorial (migrainous, tension, and possibly bilateral occipital neuralgia component) headache disorder.    We had a detailed discussion with the patient regarding her presenting complaints, available treatment options, and the plan.  In the past, we discussed that bilateral occipital nerve blocks might be helpful for treatment of her occipital neuralgia, but at the present time the patient reports headache improvement after starting riboflavin.  Additional preventive options include Effexor (would require to stop Trintellix), Topamax, Depakote, propranolol, verapamil (latter 2 would require to stop metoprolol), and CGRP antagonists.    Zolmitriptan works well for acute therapy, but the patient was told that it might not be covered by insurance.  She will reach out to me if a different triptan is needed.  We discussed the use of sumatriptan or rizatriptan if insurance coverage is an issue in the future.    DIAGNOSES:    ICD-10-CM    1. Migraine with aura and without status migrainosus, not intractable  G43.109    2. Tension headache  G44.209    3. Bilateral occipital neuralgia  M54.81      PLAN: At today's visit we thoroughly discussed current symptoms, available treatment options, and the plan.  I am pleased to hear that patient's headaches improved.    We decided not to make any medication changes.  Advised the patient to contact my clinic if zolmitriptan is not covered by her insurance.    I instructed the patient to keep the headache diary and bring it to the next follow-up visit or upload via My Chart.    Next follow-up appointment is in the next 3 months or earlier if needed.    Video-Visit Details    Type of service:  Video  Visit    Video Start Time: 2:05 PM.    Video End Time (time video stopped): 2:20 PM.    Originating Location (pt. Location): Home    Distant Location (provider location):  [facility] [place]     Mode of Communication:  Video Conference via [employer]    Total Time: 28 minutes.  15 minutes were spent in video call and the rest for chart review in preparation for this visit and documentation.    Ej Castanon MD    [facility] Neurology    (Chart documentation was completed in part with Dragon voice-recognition software. Even though reviewed, some grammatical, spelling, and word errors may remain.)      Again, thank you for allowing me to participate in the care of your patient.        Sincerely,        Ej Castanon MD

## 2021-03-09 NOTE — PATIENT INSTRUCTIONS
AFTER VISIT SUMMARY (AVS):    At today's visit we thoroughly discussed current symptoms, available treatment options, and the plan.  I am pleased to hear that your headaches improved.    We decided not to make any medication changes.  Please contact my clinic if zolmitriptan is not covered by your insurance.    Please keep the headache diary and bring it to the next follow-up visit or upload via My Chart.    Next follow-up appointment is in the next 3 months or earlier if needed.    Please do not hesitate to call me with any questions or concerns.    Thanks.

## 2021-03-09 NOTE — PROGRESS NOTES
"ESTABLISHED NEUROLOGY TELEMEDICINE VISIT NOTE.    DATE OF VISIT: 3/9/2021  MRN: [id number]  PATIENT NAME: Laurel Parra  YOB: 1950    Laurel Parra is a 70 year old female who is being evaluated via a billable video visit due to COVID-19 precautions.      The patient has been notified of following:     \"This video visit will be conducted via a call between you and your physician/provider. We have found that certain health care needs can be provided without the need for an in-person physical exam.  This service lets us provide the care you need with a video conversation.  If a prescription is necessary we can send it directly to your pharmacy.  If lab work is needed we can place an order for that and you can then stop by our lab to have the test done at a later time.    Video visits are billed at different rates depending on your insurance coverage.  Please reach out to your insurance provider with any questions.    If during the course of the call the physician/provider feels a video visit is not appropriate, you will not be charged for this service.\"    Patient has given verbal consent for Video visit? Yes    Will anyone else be joining your video visit? No  {If patient encounters technical issues they should call [phone].    I have reviewed and updated the patient's Past Medical History, Social History, Family History and Medication List.    August Braxton, RM on 3/9/2021 at 8:37 AM   (MA signature)    Additional provider notes:    This is a telemedicine visit that was initiated by the patient and performed with the originating site at patient's home and the distant location, as specified below.  Verbal consent to participate in video visit was obtained.  This visit occurred during the Coronavirus (COVID-19) Public Health Emergency.  I discussed with the patient the nature of our telemedicine visits, that:  - I would evaluate the patient and recommend diagnostics and " treatments based on my assessment  - Our sessions are not being recorded and that personal health information is protected  - Our team would provide follow-up care in person if/when the patient needs it.    REASON FOR VISIT:   Chief Complaint   Patient presents with     Headache     follow up     HISTORY OF PRESENT ILLNESS:                                                    Ms. Laurel Parra is 70 year old female patient, who was evaluated today by telemedicine visit for multifactorial headache disorder.  Please refer to my initial note from 12/8/2020 for further information.    Since the last visit, the patient reports that her headaches notably improved.  We reviewed her headache diary together.  In December she had 10 headaches, in January 2, in February 1, and in March 1.    For headache prevention she tried riboflavin 400 mg every morning and feels that it is helpful.  For acute therapy she uses zolmitriptan and occasionally ibuprofen or naproxen.  They work well.  Dry needling helps. No interval development of new focal neurological symptoms.    On review of systems, patient endorses no additional active complaints.  She reports upcoming surgery (left total wrist fusion) on 4/5/2021.  Plans to be fully vaccinated from COVID-19 by that time.  Medications, allergies, family and social history were also reviewed. There are no changes reported by patient.  EXAM:                                                    General: pt is in NAD, cooperative.  Skin: no lesions/rashes noticed.  HEENT: ATNC.  Neurological:  awake, cooperative, follows commands, no aphasia or dysarthria noted, cranial nerves II-XII: no ptosis, face is symmetric, equally moves all extremities, no dysmetria bilaterally, gait was not evaluated today.  ASSESSMENT AND PLAN:      ASSESSMENT: Laurel Parra is a 70 year old female patient, who due to COVID-19 precautions is evaluated via a telemedicine follow-up visit for  multifactorial (migrainous, tension, and possibly bilateral occipital neuralgia component) headache disorder.    We had a detailed discussion with the patient regarding her presenting complaints, available treatment options, and the plan.  In the past, we discussed that bilateral occipital nerve blocks might be helpful for treatment of her occipital neuralgia, but at the present time the patient reports headache improvement after starting riboflavin.  Additional preventive options include Effexor (would require to stop Trintellix), Topamax, Depakote, propranolol, verapamil (latter 2 would require to stop metoprolol), and CGRP antagonists.    Zolmitriptan works well for acute therapy, but the patient was told that it might not be covered by insurance.  She will reach out to me if a different triptan is needed.  We discussed the use of sumatriptan or rizatriptan if insurance coverage is an issue in the future.    DIAGNOSES:    ICD-10-CM    1. Migraine with aura and without status migrainosus, not intractable  G43.109    2. Tension headache  G44.209    3. Bilateral occipital neuralgia  M54.81      PLAN: At today's visit we thoroughly discussed current symptoms, available treatment options, and the plan.  I am pleased to hear that patient's headaches improved.    We decided not to make any medication changes.  Advised the patient to contact my clinic if zolmitriptan is not covered by her insurance.    I instructed the patient to keep the headache diary and bring it to the next follow-up visit or upload via My Chart.    Next follow-up appointment is in the next 3 months or earlier if needed.    Video-Visit Details    Type of service:  Video Visit    Video Start Time: 2:05 PM.    Video End Time (time video stopped): 2:20 PM.    Originating Location (pt. Location): Home    Distant Location (provider location):  [facility] [place]     Mode of Communication:  Video Conference via [employer]    Total  Time: 28 minutes.  15 minutes were spent in video call and the rest for chart review in preparation for this visit and documentation.    Ej Castanon MD    [facility] Neurology    (Chart documentation was completed in part with Dragon voice-recognition software. Even though reviewed, some grammatical, spelling, and word errors may remain.)

## 2021-03-10 RX ORDER — SUMATRIPTAN 50 MG/1
50 TABLET, FILM COATED ORAL
Qty: 18 TABLET | Refills: 5 | Status: SHIPPED | OUTPATIENT
Start: 2021-03-10 | End: 2021-09-29

## 2021-03-15 ENCOUNTER — VIRTUAL VISIT (OUTPATIENT)
Dept: FAMILY MEDICINE | Facility: CLINIC | Age: 71
End: 2021-03-15
Payer: MEDICARE

## 2021-03-15 ENCOUNTER — NURSE TRIAGE (OUTPATIENT)
Dept: FAMILY MEDICINE | Facility: CLINIC | Age: 71
End: 2021-03-15

## 2021-03-15 ENCOUNTER — TELEPHONE (OUTPATIENT)
Dept: UROLOGY | Facility: CLINIC | Age: 71
End: 2021-03-15

## 2021-03-15 DIAGNOSIS — R30.0 DYSURIA: Primary | ICD-10-CM

## 2021-03-15 DIAGNOSIS — R82.90 NONSPECIFIC FINDING ON EXAMINATION OF URINE: ICD-10-CM

## 2021-03-15 LAB
ALBUMIN UR-MCNC: NEGATIVE MG/DL
APPEARANCE UR: CLEAR
BACTERIA #/AREA URNS HPF: ABNORMAL /HPF
BILIRUB UR QL STRIP: NEGATIVE
COLOR UR AUTO: YELLOW
GLUCOSE UR STRIP-MCNC: NEGATIVE MG/DL
HGB UR QL STRIP: ABNORMAL
KETONES UR STRIP-MCNC: NEGATIVE MG/DL
LEUKOCYTE ESTERASE UR QL STRIP: ABNORMAL
NITRATE UR QL: NEGATIVE
PH UR STRIP: 5.5 PH (ref 5–7)
RBC #/AREA URNS AUTO: ABNORMAL /HPF
SOURCE: ABNORMAL
SP GR UR STRIP: 1.02 (ref 1–1.03)
UROBILINOGEN UR STRIP-ACNC: 0.2 EU/DL (ref 0.2–1)
WBC #/AREA URNS AUTO: >100 /HPF

## 2021-03-15 PROCEDURE — 87088 URINE BACTERIA CULTURE: CPT | Performed by: NURSE PRACTITIONER

## 2021-03-15 PROCEDURE — 87186 SC STD MICRODIL/AGAR DIL: CPT | Performed by: NURSE PRACTITIONER

## 2021-03-15 PROCEDURE — 81001 URINALYSIS AUTO W/SCOPE: CPT | Performed by: NURSE PRACTITIONER

## 2021-03-15 PROCEDURE — 99441 PR PHYSICIAN TELEPHONE EVALUATION 5-10 MIN: CPT | Mod: 95 | Performed by: NURSE PRACTITIONER

## 2021-03-15 PROCEDURE — 87086 URINE CULTURE/COLONY COUNT: CPT | Performed by: NURSE PRACTITIONER

## 2021-03-15 RX ORDER — CEFDINIR 300 MG/1
300 CAPSULE ORAL 2 TIMES DAILY
Qty: 10 CAPSULE | Refills: 0 | Status: SHIPPED | OUTPATIENT
Start: 2021-03-15 | End: 2021-03-20

## 2021-03-15 NOTE — PROGRESS NOTES
Laurel is a 70 year old who is being evaluated via a billable telephone visit.      What phone number would you like to be contacted at? [phone]  How would you like to obtain your AVS? [facility]    Assessment & Plan   Problem List Items Addressed This Visit     None      Visit Diagnoses     Dysuria    -  Primary    Relevant Medications    cefdinir (OMNICEF) 300 MG capsule    Other Relevant Orders    UA with Microscopic reflex to Culture (Completed)    Nonspecific finding on examination of urine        Relevant Orders    Urine Culture Aerobic Bacterial (Completed)           REcurrent UTI, today with dysuria and urgency. UA will be completed and she can start abx that was sent. She is on HRT which would hopefully be helping as well. She has appt scheduled with urology.     FAVIO Foley CNP  [facility]    Jerry Barragan is a 70 year old who presents for the following health issues     HPI     Chief Complaint   Patient presents with     Urinary Problem     frequent UTI's, finished latest antibiotic 3-10-21 per triage note.   Urologist Dr. Anabel lock, will be seeing partner but not until April 13th        Having dysuria and urgency where she doesn't make it to the bathroom   Has urology appt April 13 for recurrent UTIs which is new for her   Stopped last abx on 3/5       Review of Systems   Detailed as above       Objective           Vitals:  No vitals were obtained today due to virtual visit.    Physical Exam   healthy, alert and no distress  PSYCH: Alert and oriented times 3; coherent speech, normal   rate and volume, able to articulate logical thoughts, able   to abstract reason, no tangential thoughts, no hallucinations   or delusions  Her affect is normal  RESP: No cough, no audible wheezing, able to talk in full sentences  Remainder of exam unable to be completed due to telephone visits            Phone call duration: 8 minutes

## 2021-03-15 NOTE — TELEPHONE ENCOUNTER
Pt called concerned that she has another UTI. She has burning with urination, frequency and a full bladder feeling. Pt able to void normal amounts. Pt just finished 3rd course of antibiotics on 3-10-21. A few days later started noticing above symptoms. Pt has referral for urologist but unable to get an appointment for some time. No fever, SOB or genital pain. Scheduled patient for telephone visit today. Instructed pt to call back if she develops fever or is unable to void.     Additional Information    Negative: Severe difficulty breathing (e.g., struggling for each breath, speaks in single words)    Negative: Sounds like a life-threatening emergency to the triager    Negative: Sinus infection and taking an antibiotic    Negative: Wound infection and taking an antibiotic    Negative: MODERATE difficulty breathing (e.g., speaks in phrases, SOB even at rest, pulse 100-120)    Negative: Fever > 103 F (39.4 C)    Negative: Patient sounds very sick or weak to the triager    Negative: Finished taking antibiotics and symptoms are BETTER but are not completely gone    Patient wants to be seen    Negative: Shock suspected (e.g., cold/pale/clammy skin, too weak to stand, low BP, rapid pulse)    Negative: Sounds like a life-threatening emergency to the triager    Negative: Followed a genital area injury    Negative: Followed a genital area injury (penis, scrotum)    Negative: Vaginal discharge    Negative: Pus (white, yellow) or bloody discharge from end of penis    [1] Taking antibiotic for urinary tract infection (UTI) AND [2] female    Negative: Shock suspected (e.g., cold/pale/clammy skin, too weak to stand, low BP, rapid pulse)    Negative: Sounds like a life-threatening emergency to the triager    Urinary tract infection suspected, but not taking antibiotics    Negative: Shock suspected (e.g., cold/pale/clammy skin, too weak to stand, low BP, rapid pulse)    Negative: Sounds like a life-threatening emergency to the  "triager    Negative: Followed a genital area injury    Negative: Taking antibiotic for urinary tract infection (UTI)    Negative: Pregnant    Negative: Postpartum (from 0 to 6 weeks after delivery)    Negative: [1] Unable to urinate (or only a few drops) > 4 hours AND [2] bladder feels very full (e.g., palpable bladder or strong urge to urinate)    Negative: Vomiting    Negative: Patient sounds very sick or weak to the triager    Negative: [1] SEVERE pain with urination  (e.g., excruciating) AND [2] not improved after 2 hours of pain medicine and Sitz bath    Negative: Fever > 100.5 F (38.1 C)    Negative: Side (flank) or lower back pain present    Negative: Diabetes mellitus or weak immune system (e.g., HIV positive, cancer chemo, splenectomy, organ transplant, chronic steroids)    Negative: Bedridden (e.g., nursing home patient, CVA, chronic illness, recovering from surgery)    Negative: Artificial heart valve or artificial joint    Age > 50 years    Answer Assessment - Initial Assessment Questions  1. INFECTION: \"What infection is the antibiotic being given for?\"      UTI   2. ANTIBIOTIC: \"What antibiotic are you taking\" \"How many times per day?\"      Nitrofurantoin mono/mac 100mcg BID for 10 days. Last dose taken on 3-5-21  3. DURATION: \"When was the antibiotic started?\"      10 days. Previously on 2 different antibiotics for the same UTI. UA/UC done.   4. MAIN CONCERN OR SYMPTOM:  \"What is your main concern right now?\"      Burning on urination, frequent voids,bladder pressure   5. BETTER-SAME-WORSE: \"Are you getting better, staying the same, or getting worse compared to when you first started the antibiotics?\" If getting worse, ask: \"In what way?\"       Symptoms got better, when I stopped the antibiotics the symptoms came back a few days later.   6. FEVER: \"Do you have a fever?\" If so, ask: \"What is your temperature, how was it measured, and when did it start?\"      Had pt take tympanic temperature on the " "phone 98.2. Pt can't be sure but thinks she might have been sweaty a couple nights ago.   7. SYMPTOMS: \"Are there any other symptoms you're concerned about?\" If so, ask: \"When did it start?\"      no  8. FOLLOW-UP APPOINTMENT: \"Do you have a follow-up appointment with your doctor?\"      Referred to urology; waiting to get in to see them.    Answer Assessment - Initial Assessment Questions  1. SYMPTOM: \"What's the main symptom you're concerned about?\" (e.g., frequency, incontinence)      Burning on urination, urinary frequency.   2. ONSET: \"When did the  Burning/frequency  start?\"      Pt unable to give exact date but completed 3rd round of antibiotic on 3-10-21. A few days later developed above symptoms.   3. PAIN: \"Is there any pain?\" If so, ask: \"How bad is it?\" (Scale: 1-10; mild, moderate, severe)      3/10-very uncomfortable/burning  4. CAUSE: \"What do you think is causing the symptoms?\"      UTI. Pt has been referred to urologist but hasn't been able to get an appointment yet.   5. OTHER SYMPTOMS: \"Do you have any other symptoms?\" (e.g., fever, flank pain, blood in urine, pain with urination)      Had patient take temperature with me on the phone was normal.  6. PREGNANCY: \"Is there any chance you are pregnant?\" \"When was your last menstrual period?\"      NA    Protocols used: INFECTION ON ANTIBIOTIC FOLLOW-UP CALL-A-OH, URINARY SYMPTOMS-A-AH, URINARY TRACT INFECTION ON ANTIBIOTIC FOLLOW-UP CALL - FEMALE-A-AH, URINATION PAIN - FEMALE-A-AH  Lexi Eller RN      "

## 2021-03-15 NOTE — TELEPHONE ENCOUNTER
Pt referred to Dr. Little urology. Dr. Little is retiring and Pt needs a new referral.     Lexi Eller RN

## 2021-03-15 NOTE — TELEPHONE ENCOUNTER
Called patient with the information/advise from Dr Lebron.      Also sent this information in MC message to patient per patient request.     Milka DONG, RN      March 15, 2021  5:11 PM

## 2021-03-15 NOTE — TELEPHONE ENCOUNTER
[facility]    Phone Message    May a detailed message be left on voicemail: yes     Reason for Call: Other: Pt calling to see if she can get an appointment with . She reports that she has frequent UTI's, and has tried different antibiotics but they don't seem to work, and she is very uncomfortable. She was wondering when the soonest available time she could be fit in would be. She has seen  in the past but would be willing to switch providers. Please call back to discuss.     Action Taken: Message routed to:  [facility] uro    Travel Screening: Not Applicable

## 2021-03-18 LAB
BACTERIA SPEC CULT: ABNORMAL
Lab: ABNORMAL
SPECIMEN SOURCE: ABNORMAL

## 2021-03-20 DIAGNOSIS — Z11.59 ENCOUNTER FOR SCREENING FOR OTHER VIRAL DISEASES: ICD-10-CM

## 2021-03-22 ENCOUNTER — MYC MEDICAL ADVICE (OUTPATIENT)
Dept: FAMILY MEDICINE | Facility: CLINIC | Age: 71
End: 2021-03-22

## 2021-03-22 DIAGNOSIS — R30.0 DYSURIA: Primary | ICD-10-CM

## 2021-03-23 NOTE — TELEPHONE ENCOUNTER
See below pt reports continued UT symptoms, requesting to recheck UA/UC     Please advise  Beatrice MILLER RN

## 2021-03-24 ENCOUNTER — TELEPHONE (OUTPATIENT)
Dept: PHARMACY | Facility: CLINIC | Age: 71
End: 2021-03-24

## 2021-03-24 DIAGNOSIS — R30.0 DYSURIA: ICD-10-CM

## 2021-03-24 LAB
ALBUMIN UR-MCNC: NEGATIVE MG/DL
APPEARANCE UR: CLEAR
BACTERIA #/AREA URNS HPF: ABNORMAL /HPF
BILIRUB UR QL STRIP: NEGATIVE
COLOR UR AUTO: YELLOW
GLUCOSE UR STRIP-MCNC: NEGATIVE MG/DL
HGB UR QL STRIP: NEGATIVE
KETONES UR STRIP-MCNC: NEGATIVE MG/DL
LEUKOCYTE ESTERASE UR QL STRIP: NEGATIVE
NITRATE UR QL: NEGATIVE
NON-SQ EPI CELLS #/AREA URNS LPF: ABNORMAL /LPF
PH UR STRIP: 7 PH (ref 5–7)
RBC #/AREA URNS AUTO: ABNORMAL /HPF
SOURCE: ABNORMAL
SP GR UR STRIP: 1.01 (ref 1–1.03)
UROBILINOGEN UR STRIP-ACNC: 0.2 EU/DL (ref 0.2–1)
WBC #/AREA URNS AUTO: ABNORMAL /HPF

## 2021-03-24 PROCEDURE — 81001 URINALYSIS AUTO W/SCOPE: CPT | Performed by: NURSE PRACTITIONER

## 2021-03-24 NOTE — TELEPHONE ENCOUNTER
We have been unable to reach this patient for MTM follow-up after several attempts. We will stop reaching out to the patient at this time. Please let us know if we can assist in this patient's care in the future.    Routing to PCP as Anu AburtoD, [facility]  Medication Therapy Management Provider  Pager: [phone]

## 2021-03-24 NOTE — RESULT ENCOUNTER NOTE
Miguel Barragan,    I have had the opportunity to review your recent results and an interpretation is as follows:  Your follow up urinalysis does not show any evidence of urinary tract infection      Sincerely,  Alonso Lyon MD

## 2021-03-25 ENCOUNTER — TELEPHONE (OUTPATIENT)
Dept: NEUROLOGY | Facility: CLINIC | Age: 71
End: 2021-03-25

## 2021-03-25 NOTE — TELEPHONE ENCOUNTER
My scheduling staff called the patient to schedule a follow-up visit.  The patient reported to them that Imitrex also is not covered by her insurance according to received letter.  I asked the patient to send me a copy of this letter via My Chart.  Will review it and contact her insurance if necessary.  Ej Castanon MD.

## 2021-03-29 ENCOUNTER — MYC MEDICAL ADVICE (OUTPATIENT)
Dept: NEUROLOGY | Facility: CLINIC | Age: 71
End: 2021-03-29

## 2021-03-29 ENCOUNTER — TRANSFERRED RECORDS (OUTPATIENT)
Dept: HEALTH INFORMATION MANAGEMENT | Facility: CLINIC | Age: 71
End: 2021-03-29

## 2021-03-31 ENCOUNTER — OFFICE VISIT (OUTPATIENT)
Dept: FAMILY MEDICINE | Facility: CLINIC | Age: 71
End: 2021-03-31
Payer: MEDICARE

## 2021-03-31 VITALS
DIASTOLIC BLOOD PRESSURE: 82 MMHG | TEMPERATURE: 97 F | OXYGEN SATURATION: 98 % | WEIGHT: 126 LBS | SYSTOLIC BLOOD PRESSURE: 119 MMHG | HEART RATE: 74 BPM | HEIGHT: 66 IN | BODY MASS INDEX: 20.25 KG/M2

## 2021-03-31 DIAGNOSIS — Z01.818 PREOP GENERAL PHYSICAL EXAM: Primary | ICD-10-CM

## 2021-03-31 PROCEDURE — 99214 OFFICE O/P EST MOD 30 MIN: CPT | Performed by: NURSE PRACTITIONER

## 2021-03-31 ASSESSMENT — MIFFLIN-ST. JEOR: SCORE: 1118.28

## 2021-03-31 NOTE — PROGRESS NOTES
[address] [address]  Phone: [phone]  Primary Provider: Georges Lebron  Pre-op Performing Provider: TIM SHERMAN      PREOPERATIVE EVALUATION:  Today's date: 3/31/2021    Laurel Parra is a 70 year old female who presents for a preoperative evaluation.    Surgical Information:  Surgery/Procedure: LEFT TOTAL WRIST FUSION  Surgery Location: [facility]  Surgeon:   Surgery Date: 4/5/21  Time of Surgery: 12:30  Where patient plans to recover: At home with family  Fax number for surgical facility: Note does not need to be faxed, will be available electronically in Epic.    Type of Anesthesia Anticipated: General    Assessment & Plan     The proposed surgical procedure is considered INTERMEDIATE risk.    Problem List Items Addressed This Visit     None      Visit Diagnoses     Preop general physical exam    -  Primary                 Risks and Recommendations:  The patient has the following additional risks and recommendations for perioperative complications:   - No identified additional risk factors other than previously addressed    Medication Instructions:  Patient is to take all scheduled medications on the day of surgery EXCEPT for modifications listed below:   OK to take all morning meds    RECOMMENDATION:  APPROVAL GIVEN to proceed with proposed procedure, without further diagnostic evaluation.        Subjective     HPI related to upcoming procedure: Going for L wrist fusion.   Has recently struggled with recurrent UTIs.   Just got 2nd covid vaccine         Preop Questions 3/19/2021   1. Have you ever had a heart attack or stroke? No   2. Have you ever had surgery on your heart or blood vessels, such as a stent placement, a coronary artery bypass, or surgery on an artery in your head, neck, heart, or legs? No   3. Do you have chest pain with activity? No   4. Do you have a history of  heart failure? No   5. Do you currently have a  cold, bronchitis or symptoms of other infection? No    6. Do you have a cough, shortness of breath, or wheezing? No   7. Do you or anyone in your family have previous history of blood clots? YES - Father after surgery    8. Do you or does anyone in your family have a serious bleeding problem such as prolonged bleeding following surgeries or cuts? No   9. Have you ever had problems with anemia or been told to take iron pills? No   10. Have you had any abnormal blood loss such as black, tarry or bloody stools, or abnormal vaginal bleeding? No   11. Have you ever had a blood transfusion? No   12. Are you willing to have a blood transfusion if it is medically needed before, during, or after your surgery? Yes   13. Have you or any of your relatives ever had problems with anesthesia? YES - father. Pt has had multiple surgeries without issues     14. Do you have sleep apnea, excessive snoring or daytime drowsiness? No    14a. Do you have a CPAP machine? No   15. Do you have any artifical heart valves or other implanted medical devices like a pacemaker, defibrillator, or continuous glucose monitor? No   16. Do you have artificial joints? No   17. Are you allergic to latex? No     Health Care Directive:  Patient has a Health Care Directive on file        Status of Chronic Conditions:  See problem list for active medical problems.  Problems all longstanding and stable, except as noted/documented.  See ROS for pertinent symptoms related to these conditions.      Review of Systems  Constitutional, neuro, ENT, endocrine, pulmonary, cardiac, gastrointestinal, genitourinary, musculoskeletal, integument and psychiatric systems are negative, except as otherwise noted.    Patient Active Problem List    Diagnosis Date Noted     Dyspepsia 2020     Priority: Medium     eval by [facility], had colonoscopy, egd, ct abd and pelvis, mrcp.  [place] to be dyspepsia and constipation       Recurrent major depressive disorder, in partial remission (H)  12/15/2016     Priority: Medium     Gastroesophageal reflux disease without esophagitis 12/15/2016     Priority: Medium     Chronic low back pain, unspecified back pain laterality, with sciatica presence unspecified 12/15/2016     Priority: Medium     Intractable chronic migraine without aura and without status migrainosus 12/06/2016     Priority: Medium     Chronic narcotic use      Priority: Medium     [facility]       Palpitations      Priority: Medium     holter with pvc's and pac's, echo nl       Chronic urethritis      Priority: Medium     Dr. Little       Colonic polyp      Priority: Medium     one polyp, fu 5 years       Anxiety      Priority: Medium     Frequency of urination and polyuria 12/28/2011     Priority: Medium      Past Medical History:   Diagnosis Date     Anxiety     Dr. Adelina Meehan     Chronic constipation      Chronic narcotic use     [facility]     Chronic pain     Dr. Orta as of 2015     Chronic urethritis     Dr. Little     Colonic polyp 11/2011    one polyp, fu 5 years, fu 10/17 and no lesions     Depression, major, in partial remission (H)     Dr. Meehan     Depressive disorder 1971     Diarrhea 2012    eval by [facility], resolved with gluten free diet     DJD (degenerative joint disease)      Dyspepsia 2020    eval by [facility], had colonoscopy, egd, ct abd and pelvis, mrcp.  [place] to be dyspepsia and constipation     H/O gastroesophageal reflux (GERD)      LBP (low back pain) 2013    Dr. Jae Tong in [place], then seeing [facility] Dr. Orta     Light headed 07/2020    nl est echo     Migraines 2009    neuro eval     Palpitations 2006    holter with pvc's and pac's, echo nl     Screening 2010    dexa nl at gyn     Past Surgical History:   Procedure Laterality Date     APPENDECTOMY  2016     ARTHROPLASTY CARPOMETACARPAL (THUMB JOINT) Left 6/7/2017    Procedure: ARTHROPLASTY CARPOMETACARPAL (THUMB JOINT);  REVISION  LEFT THUMB CARPOMETACARPAL  ARTHROPOASTY WITH 1.1 TIGHT ROPE;  Surgeon: Beatrice Philippe MD;  Location:  [facility]     ARTHROSCOPY WRIST Left 2019    Procedure: LEFT WRIST ARTHROSCOPY REMOVAL OF TIGHT ROPE ; LEFT EXTENSOR POLLICIS LONGUS /EXTENSOR CARPI RADIALIS BREVIS /EXTENSOR CARPI RADIALUS LONGUS SYNOVECTOMY ; EXTENSOR POLLICIS LONGUS TRANSPOSITION ; ANTERIOR INTEROSSEOUS NEURECTOMY ; POSTERIOR INTEROSSEOUS NEURECTOMY;  Surgeon: Beatrice Philippe MD;  Location:  OR     BIOPSY       CARPAL TUNNEL RELEASE RT/LT        SECTION       COLONOSCOPY       ESOPHAGOSCOPY, GASTROSCOPY, DUODENOSCOPY (EGD), COMBINED N/A 2020    Procedure: ESOPHAGOGASTRODUODENOSCOPY, WITH BIOPSY;  Surgeon: Georges Pop MD;  Location:  GI     EXCIS BARTHOLIN GLAND/CYST  2005     HERNIORRHAPHY INGUINAL  70's    x5     INJECT STEROID (LOCATION) Right 2019    Procedure: RIGHT WRIST CORTISONE INJECTION;  Surgeon: Beatrice Philippe MD;  Location:  OR     OPERATIVE HYSTEROSCOPY WITH MORCELLATOR N/A 2018    Procedure: OPERATIVE HYSTEROSCOPY WITH MORCELLATOR ([employer]);  OPERATIVE HYSTEROSCOPY WITH TRUECLEAR MORCELLATOR 5C SCOPE ;  Surgeon: Iris Fernandez MD;  Location: [facility]     ORTHOPEDIC SURGERY Bilateral     SHOULDER ARTHROSCOPY     ORTHOPEDIC SURGERY Right 2017    right foot for plantar fasciitis     thumb surgery   and 2007     Current Outpatient Medications   Medication Sig Dispense Refill     carboxymethylcellulose (REFRESH PLUS) 0.5 % SOLN ophthalmic solution Place 1 drop into both eyes 3 times daily as needed for dry eyes       cyclobenzaprine (FLEXERIL) 10 MG tablet Take 10 mg by mouth daily as needed for muscle spasms        estradiol (ESTRACE) 0.5 MG tablet Take 0.5 mg by mouth daily Take total 1.5 daily       estradiol (ESTRACE) 1 MG tablet Take 1 mg by mouth daily (with dinner) MUST BE  TEVA . Take total 1.5 mg daily       Hydrocodone-Acetaminophen  MG TABS Take 2 tablets by mouth every 4  "hours as needed       ipratropium (ATROVENT) 0.06 % spray Spray 3 sprays into both nostrils daily 45 mL 3     loratadine (CLARITIN) 10 MG tablet Take 10 mg by mouth daily as needed        LORazepam (ATIVAN) 1 MG tablet Take 1-2 mg by mouth 3 times daily as needed for anxiety (up to 6 tablets per day)  30 tablet      MAGNESIUM PO Take 240 mg by mouth daily (OTC)        metoprolol succinate ER (TOPROL-XL) 25 MG 24 hr tablet TAKE ONE TABLET BY MOUTH EVERY EVENING 90 tablet 3     naloxone (NARCAN) 4 MG/0.1ML nasal spray Spray 1 spray (4 mg) into one nostril alternating nostrils as needed for opioid reversal Every 2-3 minutes in alternating nostrils 2 each 0     NONFORMULARY Take 10 mg by mouth 2 times daily as needed Compounded Hydrocodone Bitartrate Powder -- compounded by [facility]       omeprazole (PRILOSEC) 40 MG DR capsule Take 40 mg by mouth daily 30 minutes prior to breakfast       PARoxetine (PAXIL-CR) 25 MG 24 hr tablet Take 25 mg by mouth 2 times daily       polyethylene glycol (MIRALAX) 17 GM/Dose powder Take 1 capful by mouth daily as needed for constipation       PROgesterone (PROMETRIUM) 100 MG capsule Take 200 mg by mouth daily (with dinner) (Takes 2 x 100mg = 200mg dose)       SUMAtriptan (IMITREX) 50 MG tablet Take 1 tablet (50 mg) by mouth at onset of headache for migraine (May repeat x1 after 2hour if HA recurs) Limit use to less than 9 days/month. 18 tablet 5     trimethobenzamide (TIGAN) 300 MG capsule        vitamin C (ASCORBIC ACID) 1000 MG TABS Take 1,000 mg by mouth daily       Vitamin D, Cholecalciferol, 1000 units TABS Take 2,000 Units by mouth daily        Zolpidem Tartrate (AMBIEN CR PO) Take 12.5 mg by mouth At Bedtime         Allergies   Allergen Reactions     Betamethasone Other (See Comments)     agitation     Chocolate Other (See Comments)     Triggers migraines     Compazine [Prochlorperazine] Other (See Comments)     \"crawl out of my skin\"     Fentanyl Other (See " Comments) and Headache     Headache / sedation / difficult urinate     Gluten Meal Diarrhea     Methadone Other (See Comments) and Headache      Headache and sedation     Morphine Other (See Comments)     Constipation     Penicillins Nausea and Vomiting and Hives     Pneumococcal Vaccine Other (See Comments)     Temporary paralization     Tizanidine Other (See Comments)     Severe agitation        Social History     Tobacco Use     Smoking status: Former Smoker     Packs/day: 0.00     Years: 5.00     Pack years: 0.00     Quit date: 10/29/1979     Years since quittin.4     Smokeless tobacco: Never Used     Tobacco comment: infrequent use for about 5 years   Substance Use Topics     Alcohol use: No     Alcohol/week: 0.0 standard drinks     Family History   Problem Relation Age of Onset     Diabetes Father      Hypertension Father      Depression Father      Substance Abuse Father      Anesthesia Reaction Father         went into shock with surgery      Obesity Father      Anxiety Disorder Father      Diabetes Mother      Cerebrovascular Disease Mother         Cadasils disease     Depression Mother      Mental Illness Mother         borderline personality disorder, Chemical dependency     Substance Abuse Mother      Genetic Disorder Mother         Cadasils     Obesity Mother      Anxiety Disorder Mother      Cerebrovascular Disease Paternal Grandfather      Cerebrovascular Disease Brother         Cadasils disease     Depression Brother      Anxiety Disorder Brother      Substance Abuse Brother      Genetic Disorder Brother         cadasils     Obesity Brother      Hypertension Brother      Cerebrovascular Disease Sister         Cadasils disease     Depression Sister      Anxiety Disorder Sister      Genetic Disorder Sister         cadasils     Obesity Sister      Cerebrovascular Disease Sister         Cadasils disease     Genetic Disorder Sister         cadasils     Breast Cancer Sister      Depression Sister       "Anxiety Disorder Sister      Obesity Sister      Hypertension Sister      History   Drug Use No         Objective     /82 (BP Location: Left arm, Patient Position: Chair, Cuff Size: Adult Regular)   Pulse 74   Temp 97  F (36.1  C) (Temporal)   Ht 1.676 m (5' 6\")   Wt 57.2 kg (126 lb)   SpO2 98%   BMI 20.34 kg/m      Physical Exam    GENERAL APPEARANCE: healthy, alert and no distress     EYES: EOMI, PERRL     RESP: lungs clear to auscultation - no rales, rhonchi or wheezes     CV: regular rates and rhythm, normal S1 S2, no S3 or S4 and no murmur, click or rub     MS: extremities normal- no gross deformities noted, no evidence of inflammation in joints, FROM in all extremities.     SKIN: no suspicious lesions or rashes     NEURO: Normal strength and tone, sensory exam grossly normal, mentation intact and speech normal     PSYCH: mentation appears normal. and affect normal/bright    Recent Labs   Lab Test 12/17/20  1909 07/31/20  1034   HGB 13.4 13.9    374    135   POTASSIUM 4.2 4.2   CR 0.59 0.70        Diagnostics:  No labs were ordered during this visit.   No EKG required, no history of coronary heart disease, significant arrhythmia, peripheral arterial disease or other structural heart disease.  No EKG this visit, completed in the last 90 days.    Revised Cardiac Risk Index (RCRI):  The patient has the following serious cardiovascular risks for perioperative complications:   - No serious cardiac risks = 0 points     RCRI Interpretation: 0 points: Class I (very low risk - 0.4% complication rate)           Signed Electronically by: FAVIO Foley CNP  Copy of this evaluation report is provided to requesting physician.      "

## 2021-03-31 NOTE — PATIENT INSTRUCTIONS
Preparing for Your Surgery  Getting started  A nurse will call you to review your health history and instructions. They will give you an arrival time based on your scheduled surgery time.  Please be ready to share the following:    Your doctor's clinic name and phone number    Your medical, surgical and anesthesia history    A list of allergies and sensitivities    A list of medicines, including herbal treatments and over-the-counter drugs    Whether the patient has a legal guardian (ask how to send us the papers in advance)  If you have a child who's having surgery, please ask for a copy of Preparing for Your Child's Surgery.    Preparing for surgery    Within 30 days of surgery: Have a pre-op exam (sometimes called an H&P, or History and Physical). This can be done at a clinic or pre-operative center.  ? If you're having a , you may not need this exam. Talk to your care team    At your pre-op exam, talk to your care team about all medicines you take. If you need to stop any medicines before surgery, ask when to start taking them again.  ? We do this for your safety. Many medicines can make you bleed too much during surgery. Some change how well surgery (anesthesia) drugs work.    Call your insurance company to let them know you're having surgery. (If you don't have insurance, call [phone].)    Call your clinic if there's any change in your health. This includes signs of a cold or flu (sore throat, runny nose, cough, rash, fever). It also includes a scrape or scratch near the surgery site.    If you have questions on the day of surgery, call your hospital or surgery center.  Eating and drinking guidelines  For your safety: Unless your surgeon tells you otherwise, follow the guidelines below.    Eat and drink as usual until 8 hours before surgery. After that, no food or milk.    Drink clear liquids until 2 hours before surgery. These are liquids you can see through, like water, Gatorade and Propel  Water. You may also have black coffee and tea (no cream or milk).    Nothing by mouth within 2 hours of surgery. This includes gum, candy and breath mints.    If you drink, stop drinking alcohol the night before surgery.    If your care team tells you to take medicine on the morning of surgery, it's okay to take it with a sip of water.  Preventing infection    Shower or bathe the night before and morning of your surgery. Follow the instructions your clinic gave you. (If no instructions, use regular soap.)    Don't shave or clip hair near your surgery site. We'll remove the hair if needed.    Don't smoke or vape the morning of surgery. You may chew nicotine gum up to 2 hours before surgery. A nicotine patch is okay.  ? Note: Some surgeries require you to completely quit smoking and nicotine. Check with your surgeon.    Your care team will make every effort to keep you safe from infection. We will:  ? Clean our hands often with soap and water (or an alcohol-based hand rub).  ? Clean the skin at your surgery site with a special soap that kills germs.  ? Give you a special gown to keep you warm. (Cold raises the risk of infection.)  ? Wear special hair covers, masks, gowns and gloves during surgery.  ? Give antibiotic medicine, if prescribed. Not all surgeries need antibiotics.  What to bring on the day of surgery    Photo ID and insurance card    Copy of your health care directive, if you have one    Glasses and hearing aides (bring cases)  ? You can't wear contacts during surgery    Inhaler and eye drops, if you use them (tell us about these when you arrive)    CPAP machine or breathing device, if you use them    A few personal items, if spending the night    If you have . . .  ? A pacemaker or ICD (cardiac defibrillator): Bring the ID card.  ? An implanted stimulator: Bring the remote control.  ? A legal guardian: Bring a copy of the certified (court-stamped) guardianship papers.  Please remove any jewelry, including  body piercings. Leave jewelry and other valuables at home.  If you're going home the day of surgery  Important: If you don't follow the rules below, we must cancel your surgery.     Arrange for someone to drive you home after surgery. You may not drive, take a taxi or take public transportation by yourself (unless you'll have local anesthesia only).    Arrange for a responsible adult to stay with you overnight. If you don't, we may keep you in the hospital overnight, and you may need to pay the costs yourself.  Questions?   If you have any questions for your care team, list them here: _________________________________________________________________________________________________________________________________________________________________________________________________________________________________________________________________________________________________________________________  For informational purposes only. Not to replace the advice of your health care provider. Copyright   2003, 2019 [employer]. All rights reserved. Clinically reviewed by Steffany Cat MD. SMARTworks 742693 - REV 4/20.    Before Your Procedure or Hospital Admission  Testing for COVID-19 (Coronavirus)  Thank you for choosing [facility] for your health care needs. This is a very challenging time for everyone. The World Health Organization and the [facility] have declared the COVID-19 virus a pandemic.   Our goal is to keep you and our team here at [facility] safe and healthy. We've taken several steps to make this happen. For example:    We screen our staff, care teams and patients for COVID-19.    Everyone at [facility] must wear a mask and stay 6 feet apart.    We are limiting hospital and clinic visitors.  Before you come in  All patients must get tested for COVID-19. Your test needs to happen 2 to 4 days before you check in to the hospital or surgery site.   A clinic scheduler will call  "about a week in advance to set up a testing time at one of our labs where we'll take a swab of your nose or throat.  Note: If you go to a clinic or pharmacy like [facility] or [facility] for your test, make sure it's a \"RT-PCR\" test, not a \"rapid\" COVID-19 test. (See Questions and Answers below.)  After the test, please stay at home and stay out of contact with other people. It will be harder for you to recover if you get COVID-19 before your treatment.  Please follow all current safety guidelines, including:    Limit trips outside your home.    Limit the number of people you see.    Always wear a mask outside your home.    Use social distancing. (Stay 6 feet away from others whenever you can.)    Wash your hands often.  If your test shows you have COVID-19  If your test is positive, we'll let you know. A positive test means that you have the virus.   We'll probably have to postpone your admission, surgery or procedure. Your doctor will discuss this with you. After that, we'll let you know what to do and when you can reschedule.   We may need to cancel your treatment on short notice for other reasons, too.  If your test shows you DON'T have COVID-19  Even if your test is negative, you may still get COVID-19. It's rare but, sometimes, the test result is wrong. You could also catch the virus after taking the test.   There's a very small chance that you could catch COVID-19 in the hospital or surgery center. [facility] has taken many steps to prevent this from happening.   Day of your surgery or procedure    Please come wearing a mask or something else that covers both your nose and mouth.    When you arrive, we'll ask you some questions to find out if you have any signs or symptoms of COVID-19.    Ask your care team if you can have visitors. All visitors must wear masks and will be screened for signs of COVID-19.  ? Even if no visitors are allowed, you can still have with you:    Your legal guardian or legal decision " "maker    A parent and one other visitor, if you are younger than 18 years old    A partner and a , if you are in labor  ? We might need to teach you about taking care of yourself after surgery. If so, a visitor can come into the hospital to learn about it, too.  ? The rules for visitors change often, depending on how much the virus is spreading. To learn more, see Visiting a Loved One in the Hospital during the COVID-19 Outbreak.  Please call your care team, hospital or surgery center if you have any questions. We thank you for your understanding and for choosing [facility] for your care.   Questions and Answers  Does it matter where I get tested for COVID-19?  Yes. We urge you to get tested at one of our [facility] COVID-19 testing sites. We process these tests in our lab and can get the results quickly. Your [facility] care team needs to get your results before you check in.  What should I do if I can't get tested at [facility]?  You can get tested somewhere else, but you'll need to take these extra steps:  1. Contact your family doctor or clinic to arrange your test.  2. Take the test within 4 days of your surgery or procedure. We can't accept tests older than 4 days.  3. Make sure your doctor or clinic faxes your results to [facility] at [phone].  If we don't get your results in time, we may have to postpone or cancel your treatment.  Ask if you're getting a \"RT-PCR\" COVID-19 test. It should NOT be a \"rapid\" COVID-19 test. Many drug stores use \"rapid\" tests, but they may not be as accurate. We don't accept the results of \"rapid\" tests.  For informational purposes only. Not to replace the advice of your health care provider. Copyright   2020 [facility] [employer]. All rights reserved. Clinically reviewed by Infection Prevention and the [facility] COVID-19 Clinical Team. [employer] [id number] - REV 10/20.    "

## 2021-04-02 DIAGNOSIS — Z11.59 ENCOUNTER FOR SCREENING FOR OTHER VIRAL DISEASES: ICD-10-CM

## 2021-04-02 LAB
SARS-COV-2 RNA RESP QL NAA+PROBE: NORMAL
SPECIMEN SOURCE: NORMAL

## 2021-04-02 PROCEDURE — U0003 INFECTIOUS AGENT DETECTION BY NUCLEIC ACID (DNA OR RNA); SEVERE ACUTE RESPIRATORY SYNDROME CORONAVIRUS 2 (SARS-COV-2) (CORONAVIRUS DISEASE [COVID-19]), AMPLIFIED PROBE TECHNIQUE, MAKING USE OF HIGH THROUGHPUT TECHNOLOGIES AS DESCRIBED BY CMS-2020-01-R: HCPCS | Performed by: ORTHOPAEDIC SURGERY

## 2021-04-02 PROCEDURE — U0005 INFEC AGEN DETEC AMPLI PROBE: HCPCS | Performed by: ORTHOPAEDIC SURGERY

## 2021-04-02 RX ORDER — ASCORBIC ACID 125 MG
2 TABLET,CHEWABLE ORAL EVERY MORNING
COMMUNITY
End: 2022-05-03

## 2021-04-02 RX ORDER — RIBOFLAVIN (VITAMIN B2) 400 MG
400 TABLET ORAL EVERY MORNING
COMMUNITY
End: 2021-06-30

## 2021-04-02 NOTE — PROGRESS NOTES
PTA medications updated by Medication Scribe prior to surgery via phone call with patient        Comments:    Medication history sources: Patient, Surescripts, H&P and Patient's home med list  Medication history source reliability: Good  Adherence assessment: N/A Not Observed    Significant changes made to the medication list:  Patient may be taking meds differently than prescribed; See PTA entries for:   Compounded Hydrocodone Bitartrate Powder - [facility]      Additional medication history information:   Patient brought own home meds: Refresh Eye Drops        Prior to Admission medications    Medication Sig Last Dose Taking? Auth Provider   carboxymethylcellulose (REFRESH PLUS) 0.5 % SOLN ophthalmic solution Place 1 drop into both eyes 3 times daily as needed for dry eyes  at PRN Yes Reported, Patient   cyclobenzaprine (FLEXERIL) 10 MG tablet Take 10 mg by mouth daily as needed for muscle spasms   at PRN Yes Reported, Patient   docusate sodium (COLACE) 50 MG capsule Take 100 mg by mouth 2 times daily as needed for constipation  at PRN Yes Reported, Patient   estradiol (ESTRACE) 0.5 MG tablet Take 0.5 mg by mouth every evening (in addition to 1 mg dose to = 1.5 mg daily)  at PM Yes Reported, Patient   estradiol (ESTRACE) 1 MG tablet Take 1 mg by mouth daily (with dinner) (in addition to 0.5 mg dose to = 1.5 mg dose)  at PM Yes Reported, Patient   Hydrocodone-Acetaminophen  MG TABS Take 2 tablets by mouth every 4 hours as needed  at AM Yes Reported, Patient   ipratropium (ATROVENT) 0.06 % spray Spray 3 sprays into both nostrils daily  Patient taking differently: Spray 3 sprays into both nostrils every morning   at AM Yes Bere Fabian APRN CNP   linaclotide (LINZESS) 72 MCG capsule Take 72 mcg by mouth every morning (before breakfast)  at AM Yes Reported, Patient   loratadine (CLARITIN) 10 MG tablet Take 10 mg by mouth every morning   at AM Yes Reported, Patient    LORazepam (ATIVAN) 1 MG tablet Take 1-2 mg by mouth 3 times daily   at AM Yes Georges Lebron MD   Magnesium Citrate 125 MG CAPS Take 2 capsules by mouth every morning  at AM Yes Reported, Patient   metoprolol succinate ER (TOPROL-XL) 25 MG 24 hr tablet TAKE ONE TABLET BY MOUTH EVERY EVENING  Patient taking differently: Take 25 mg by mouth every evening TAKE ONE TABLET BY MOUTH EVERY EVENING  at PM Yes Georges Lebron MD   naloxone (NARCAN) 4 MG/0.1ML nasal spray Spray 1 spray (4 mg) into one nostril alternating nostrils as needed for opioid reversal Every 2-3 minutes in alternating nostrils  at PRN Yes Georges Lebron MD   NONFORMULARY Take 10 mg by mouth 2 times daily Compounded Hydrocodone Bitartrate Powder -- compounded by [facility] (last filled 9/2020)  (alternates throughout the day with hydrocodone-apap  mg)  at AM Yes Reported, Patient   omeprazole (PRILOSEC) 40 MG DR capsule Take 40 mg by mouth every morning (before breakfast) 30 minutes prior to breakfast   at AM Yes Reported, Patient   PARoxetine (PAXIL-CR) 25 MG 24 hr tablet Take 25 mg by mouth 2 times daily  at AM Yes Reported, Patient   polyethylene glycol (MIRALAX) 17 GM/Dose powder Take 1 capful by mouth every evening   at PM Yes Reported, Patient   progesterone (PROMETRIUM) 200 MG capsule Take 200 mg by mouth At Bedtime   at HS Yes Reported, Patient   Riboflavin 400 MG TABS Take 400 mg by mouth every morning  at AM Yes Reported, Patient   SUMAtriptan (IMITREX) 50 MG tablet Take 1 tablet (50 mg) by mouth at onset of headache for migraine (May repeat x1 after 2hour if HA recurs) Limit use to less than 9 days/month.  at PRN Yes Ej Castanon MD   trimethobenzamide (TIGAN) 300 MG capsule Take 300 mg by mouth 3 times daily as needed for nausea   at PRN Yes Reported, Patient   vitamin C (ASCORBIC ACID) 1000 MG TABS Take 1,000 mg by mouth every evening   at PM Yes Reported, Patient   Vitamin  D3 (CHOLECALCIFEROL) 125 MCG (5000 UT) tablet Take 2 tablets by mouth every evening  at PM Yes Reported, Patient   Zolpidem Tartrate (AMBIEN CR PO) Take 12.5 mg by mouth At Bedtime  at HS Yes Reported, Patient

## 2021-04-03 LAB
LABORATORY COMMENT REPORT: NORMAL
SARS-COV-2 RNA RESP QL NAA+PROBE: NEGATIVE
SPECIMEN SOURCE: NORMAL

## 2021-04-05 ENCOUNTER — ANESTHESIA (OUTPATIENT)
Dept: SURGERY | Facility: CLINIC | Age: 71
DRG: 514 | End: 2021-04-05
Payer: MEDICARE

## 2021-04-05 ENCOUNTER — APPOINTMENT (OUTPATIENT)
Dept: GENERAL RADIOLOGY | Facility: CLINIC | Age: 71
DRG: 514 | End: 2021-04-05
Attending: ORTHOPAEDIC SURGERY
Payer: MEDICARE

## 2021-04-05 ENCOUNTER — ANESTHESIA EVENT (OUTPATIENT)
Dept: SURGERY | Facility: CLINIC | Age: 71
DRG: 514 | End: 2021-04-05
Payer: MEDICARE

## 2021-04-05 ENCOUNTER — HOSPITAL ENCOUNTER (INPATIENT)
Facility: CLINIC | Age: 71
LOS: 1 days | Discharge: HOME | DRG: 514 | End: 2021-04-07
Attending: ORTHOPAEDIC SURGERY | Admitting: ORTHOPAEDIC SURGERY
Payer: MEDICARE

## 2021-04-05 DIAGNOSIS — M19.032 ARTHRITIS OF LEFT WRIST: Primary | ICD-10-CM

## 2021-04-05 PROCEDURE — 272N000001 HC OR GENERAL SUPPLY STERILE: Performed by: ORTHOPAEDIC SURGERY

## 2021-04-05 PROCEDURE — 250N000013 HC RX MED GY IP 250 OP 250 PS 637: Performed by: ORTHOPAEDIC SURGERY

## 2021-04-05 PROCEDURE — 0RGP0KZ FUSION OF LEFT WRIST JOINT WITH NONAUTOLOGOUS TISSUE SUBSTITUTE, OPEN APPROACH: ICD-10-PCS | Performed by: ORTHOPAEDIC SURGERY

## 2021-04-05 PROCEDURE — 258N000003 HC RX IP 258 OP 636: Performed by: ANESTHESIOLOGY

## 2021-04-05 PROCEDURE — 360N000076 HC SURGERY LEVEL 3, PER MIN: Performed by: ORTHOPAEDIC SURGERY

## 2021-04-05 PROCEDURE — 370N000017 HC ANESTHESIA TECHNICAL FEE, PER MIN: Performed by: ORTHOPAEDIC SURGERY

## 2021-04-05 PROCEDURE — 999N000141 HC STATISTIC PRE-PROCEDURE NURSING ASSESSMENT: Performed by: ORTHOPAEDIC SURGERY

## 2021-04-05 PROCEDURE — 250N000011 HC RX IP 250 OP 636: Performed by: ORTHOPAEDIC SURGERY

## 2021-04-05 PROCEDURE — 250N000011 HC RX IP 250 OP 636: Performed by: NURSE ANESTHETIST, CERTIFIED REGISTERED

## 2021-04-05 PROCEDURE — 250N000009 HC RX 250: Performed by: ORTHOPAEDIC SURGERY

## 2021-04-05 PROCEDURE — C1713 ANCHOR/SCREW BN/BN,TIS/BN: HCPCS | Performed by: ORTHOPAEDIC SURGERY

## 2021-04-05 PROCEDURE — 258N000003 HC RX IP 258 OP 636: Performed by: NURSE ANESTHETIST, CERTIFIED REGISTERED

## 2021-04-05 PROCEDURE — 250N000009 HC RX 250: Performed by: ANESTHESIOLOGY

## 2021-04-05 PROCEDURE — 250N000011 HC RX IP 250 OP 636: Performed by: ANESTHESIOLOGY

## 2021-04-05 PROCEDURE — 999N000179 XR SURGERY CARM FLUORO LESS THAN 5 MIN W STILLS

## 2021-04-05 PROCEDURE — 250N000009 HC RX 250: Performed by: NURSE ANESTHETIST, CERTIFIED REGISTERED

## 2021-04-05 PROCEDURE — 710N000009 HC RECOVERY PHASE 1, LEVEL 1, PER MIN: Performed by: ORTHOPAEDIC SURGERY

## 2021-04-05 PROCEDURE — C1762 CONN TISS, HUMAN(INC FASCIA): HCPCS | Performed by: ORTHOPAEDIC SURGERY

## 2021-04-05 DEVICE — SCREW NON-LOCKING HEXALOBE 3.5MM X 16MM: Type: IMPLANTABLE DEVICE | Site: WRIST | Status: FUNCTIONAL

## 2021-04-05 DEVICE — SCREW NON-LOCKING HEXALOBE 3.5MM X 14MM: Type: IMPLANTABLE DEVICE | Site: WRIST | Status: FUNCTIONAL

## 2021-04-05 DEVICE — SCREW NON-LOCKING HEXALOBE 3.5MM X 12MM: Type: IMPLANTABLE DEVICE | Site: WRIST | Status: FUNCTIONAL

## 2021-04-05 DEVICE — IMPLANTABLE DEVICE: Type: IMPLANTABLE DEVICE | Site: WRIST | Status: FUNCTIONAL

## 2021-04-05 DEVICE — GRAFT BONE CHIPS CANC 15ML 400145: Type: IMPLANTABLE DEVICE | Site: WRIST | Status: FUNCTIONAL

## 2021-04-05 RX ORDER — IPRATROPIUM BROMIDE 42 UG/1
3 SPRAY, METERED NASAL EVERY MORNING
Status: DISCONTINUED | OUTPATIENT
Start: 2021-04-06 | End: 2021-04-07 | Stop reason: HOSPADM

## 2021-04-05 RX ORDER — PROPOFOL 10 MG/ML
INJECTION, EMULSION INTRAVENOUS CONTINUOUS PRN
Status: DISCONTINUED | OUTPATIENT
Start: 2021-04-05 | End: 2021-04-05

## 2021-04-05 RX ORDER — HYDROXYZINE HYDROCHLORIDE 10 MG/1
10 TABLET, FILM COATED ORAL EVERY 6 HOURS PRN
Status: DISCONTINUED | OUTPATIENT
Start: 2021-04-05 | End: 2021-04-07 | Stop reason: HOSPADM

## 2021-04-05 RX ORDER — LIDOCAINE 40 MG/G
CREAM TOPICAL
Status: DISCONTINUED | OUTPATIENT
Start: 2021-04-05 | End: 2021-04-07 | Stop reason: HOSPADM

## 2021-04-05 RX ORDER — PAROXETINE HYDROCHLORIDE HEMIHYDRATE 25 MG/1
25 TABLET, FILM COATED, EXTENDED RELEASE ORAL 2 TIMES DAILY
COMMUNITY

## 2021-04-05 RX ORDER — NALOXONE HYDROCHLORIDE 0.4 MG/ML
0.4 INJECTION, SOLUTION INTRAMUSCULAR; INTRAVENOUS; SUBCUTANEOUS
Status: DISCONTINUED | OUTPATIENT
Start: 2021-04-05 | End: 2021-04-05

## 2021-04-05 RX ORDER — ASCORBIC ACID 500 MG
1000 TABLET ORAL EVERY EVENING
Status: DISCONTINUED | OUTPATIENT
Start: 2021-04-05 | End: 2021-04-07 | Stop reason: HOSPADM

## 2021-04-05 RX ORDER — ZOLPIDEM TARTRATE 6.25 MG/1
12.5 TABLET, FILM COATED, EXTENDED RELEASE ORAL AT BEDTIME
Status: DISCONTINUED | OUTPATIENT
Start: 2021-04-05 | End: 2021-04-07 | Stop reason: HOSPADM

## 2021-04-05 RX ORDER — HYDROMORPHONE HYDROCHLORIDE 2 MG/1
2-4 TABLET ORAL EVERY 6 HOURS PRN
Qty: 40 TABLET | Refills: 0 | Status: SHIPPED | OUTPATIENT
Start: 2021-04-05 | End: 2021-05-11

## 2021-04-05 RX ORDER — METOPROLOL SUCCINATE 25 MG/1
25 TABLET, EXTENDED RELEASE ORAL EVERY EVENING
Status: DISCONTINUED | OUTPATIENT
Start: 2021-04-05 | End: 2021-04-07 | Stop reason: HOSPADM

## 2021-04-05 RX ORDER — ONDANSETRON 4 MG/1
4 TABLET, ORALLY DISINTEGRATING ORAL EVERY 6 HOURS PRN
Status: DISCONTINUED | OUTPATIENT
Start: 2021-04-05 | End: 2021-04-07 | Stop reason: HOSPADM

## 2021-04-05 RX ORDER — HYDROMORPHONE HYDROCHLORIDE 2 MG/1
2-4 TABLET ORAL
Status: DISCONTINUED | OUTPATIENT
Start: 2021-04-05 | End: 2021-04-07 | Stop reason: HOSPADM

## 2021-04-05 RX ORDER — SENNOSIDES A AND B 8.6 MG/1
2 TABLET, FILM COATED ORAL DAILY PRN
COMMUNITY
End: 2021-05-11

## 2021-04-05 RX ORDER — POLYETHYLENE GLYCOL 3350 17 G/17G
17 POWDER, FOR SOLUTION ORAL EVERY EVENING
Status: DISCONTINUED | OUTPATIENT
Start: 2021-04-05 | End: 2021-04-07 | Stop reason: HOSPADM

## 2021-04-05 RX ORDER — HYDROMORPHONE HYDROCHLORIDE 1 MG/ML
.3-.5 INJECTION, SOLUTION INTRAMUSCULAR; INTRAVENOUS; SUBCUTANEOUS
Status: DISCONTINUED | OUTPATIENT
Start: 2021-04-05 | End: 2021-04-06

## 2021-04-05 RX ORDER — ONDANSETRON 4 MG/1
4 TABLET, ORALLY DISINTEGRATING ORAL EVERY 30 MIN PRN
Status: DISCONTINUED | OUTPATIENT
Start: 2021-04-05 | End: 2021-04-05

## 2021-04-05 RX ORDER — CARBOXYMETHYLCELLULOSE SODIUM 5 MG/ML
1 SOLUTION/ DROPS OPHTHALMIC 3 TIMES DAILY PRN
Status: DISCONTINUED | OUTPATIENT
Start: 2021-04-05 | End: 2021-04-07 | Stop reason: HOSPADM

## 2021-04-05 RX ORDER — OXYCODONE HCL 10 MG/1
10 TABLET, FILM COATED, EXTENDED RELEASE ORAL EVERY 12 HOURS
Status: DISCONTINUED | OUTPATIENT
Start: 2021-04-05 | End: 2021-04-07 | Stop reason: HOSPADM

## 2021-04-05 RX ORDER — HYDROMORPHONE HYDROCHLORIDE 1 MG/ML
.3-.5 INJECTION, SOLUTION INTRAMUSCULAR; INTRAVENOUS; SUBCUTANEOUS EVERY 10 MIN PRN
Status: DISCONTINUED | OUTPATIENT
Start: 2021-04-05 | End: 2021-04-05

## 2021-04-05 RX ORDER — SUMATRIPTAN 50 MG/1
50 TABLET, FILM COATED ORAL
Status: DISCONTINUED | OUTPATIENT
Start: 2021-04-05 | End: 2021-04-07 | Stop reason: HOSPADM

## 2021-04-05 RX ORDER — CYCLOBENZAPRINE HCL 10 MG
10 TABLET ORAL DAILY PRN
Status: DISCONTINUED | OUTPATIENT
Start: 2021-04-05 | End: 2021-04-07 | Stop reason: HOSPADM

## 2021-04-05 RX ORDER — LIDOCAINE HYDROCHLORIDE 20 MG/ML
INJECTION, SOLUTION INFILTRATION; PERINEURAL PRN
Status: DISCONTINUED | OUTPATIENT
Start: 2021-04-05 | End: 2021-04-05

## 2021-04-05 RX ORDER — SENNOSIDES 8.6 MG
2 TABLET ORAL DAILY PRN
Status: DISCONTINUED | OUTPATIENT
Start: 2021-04-05 | End: 2021-04-07 | Stop reason: HOSPADM

## 2021-04-05 RX ORDER — LORAZEPAM 1 MG/1
1-2 TABLET ORAL 3 TIMES DAILY
Status: DISCONTINUED | OUTPATIENT
Start: 2021-04-05 | End: 2021-04-07 | Stop reason: HOSPADM

## 2021-04-05 RX ORDER — NALOXONE HYDROCHLORIDE 0.4 MG/ML
0.2 INJECTION, SOLUTION INTRAMUSCULAR; INTRAVENOUS; SUBCUTANEOUS
Status: DISCONTINUED | OUTPATIENT
Start: 2021-04-05 | End: 2021-04-05

## 2021-04-05 RX ORDER — PAROXETINE 20 MG/1
20 TABLET, FILM COATED ORAL 2 TIMES DAILY
Status: DISCONTINUED | OUTPATIENT
Start: 2021-04-05 | End: 2021-04-06

## 2021-04-05 RX ORDER — ESTRADIOL 0.5 MG/1
0.5 TABLET ORAL EVERY EVENING
Status: DISCONTINUED | OUTPATIENT
Start: 2021-04-05 | End: 2021-04-05

## 2021-04-05 RX ORDER — RIBOFLAVIN (VITAMIN B2) 400 MG
400 TABLET ORAL EVERY MORNING
Status: DISCONTINUED | OUTPATIENT
Start: 2021-04-06 | End: 2021-04-05 | Stop reason: CLARIF

## 2021-04-05 RX ORDER — ONDANSETRON 2 MG/ML
4 INJECTION INTRAMUSCULAR; INTRAVENOUS EVERY 6 HOURS PRN
Status: DISCONTINUED | OUTPATIENT
Start: 2021-04-05 | End: 2021-04-07 | Stop reason: HOSPADM

## 2021-04-05 RX ORDER — TRIMETHOBENZAMIDE HYDROCHLORIDE 300 MG/1
300 CAPSULE ORAL 3 TIMES DAILY PRN
Status: DISCONTINUED | OUTPATIENT
Start: 2021-04-05 | End: 2021-04-07 | Stop reason: HOSPADM

## 2021-04-05 RX ORDER — OXYCODONE HCL 10 MG/1
10 TABLET, FILM COATED, EXTENDED RELEASE ORAL EVERY 12 HOURS
Qty: 14 TABLET | Refills: 0 | Status: SHIPPED | OUTPATIENT
Start: 2021-04-05 | End: 2021-05-05

## 2021-04-05 RX ORDER — SODIUM CHLORIDE, SODIUM LACTATE, POTASSIUM CHLORIDE, CALCIUM CHLORIDE 600; 310; 30; 20 MG/100ML; MG/100ML; MG/100ML; MG/100ML
INJECTION, SOLUTION INTRAVENOUS CONTINUOUS
Status: DISCONTINUED | OUTPATIENT
Start: 2021-04-05 | End: 2021-04-05

## 2021-04-05 RX ORDER — CLINDAMYCIN PHOSPHATE 900 MG/50ML
900 INJECTION, SOLUTION INTRAVENOUS
Status: COMPLETED | OUTPATIENT
Start: 2021-04-05 | End: 2021-04-05

## 2021-04-05 RX ORDER — SODIUM CHLORIDE, SODIUM LACTATE, POTASSIUM CHLORIDE, CALCIUM CHLORIDE 600; 310; 30; 20 MG/100ML; MG/100ML; MG/100ML; MG/100ML
INJECTION, SOLUTION INTRAVENOUS CONTINUOUS
Status: DISCONTINUED | OUTPATIENT
Start: 2021-04-05 | End: 2021-04-05 | Stop reason: HOSPADM

## 2021-04-05 RX ORDER — MAGNESIUM HYDROXIDE 1200 MG/15ML
LIQUID ORAL PRN
Status: DISCONTINUED | OUTPATIENT
Start: 2021-04-05 | End: 2021-04-05 | Stop reason: HOSPADM

## 2021-04-05 RX ORDER — ONDANSETRON 2 MG/ML
4 INJECTION INTRAMUSCULAR; INTRAVENOUS EVERY 30 MIN PRN
Status: DISCONTINUED | OUTPATIENT
Start: 2021-04-05 | End: 2021-04-05

## 2021-04-05 RX ORDER — LORATADINE 10 MG/1
10 TABLET ORAL EVERY MORNING
Status: DISCONTINUED | OUTPATIENT
Start: 2021-04-06 | End: 2021-04-07 | Stop reason: HOSPADM

## 2021-04-05 RX ORDER — ONDANSETRON 2 MG/ML
INJECTION INTRAMUSCULAR; INTRAVENOUS PRN
Status: DISCONTINUED | OUTPATIENT
Start: 2021-04-05 | End: 2021-04-05

## 2021-04-05 RX ORDER — ESTRADIOL 0.5 MG/1
1.5 TABLET ORAL
Status: DISCONTINUED | OUTPATIENT
Start: 2021-04-05 | End: 2021-04-07 | Stop reason: HOSPADM

## 2021-04-05 RX ADMIN — HYDROMORPHONE HYDROCHLORIDE 0.5 MG: 1 INJECTION, SOLUTION INTRAMUSCULAR; INTRAVENOUS; SUBCUTANEOUS at 16:01

## 2021-04-05 RX ADMIN — OXYCODONE HYDROCHLORIDE 10 MG: 10 TABLET, FILM COATED, EXTENDED RELEASE ORAL at 19:45

## 2021-04-05 RX ADMIN — PROPOFOL 100 MCG/KG/MIN: 10 INJECTION, EMULSION INTRAVENOUS at 12:56

## 2021-04-05 RX ADMIN — ESTRADIOL 1.5 MG: 0.5 TABLET ORAL at 19:24

## 2021-04-05 RX ADMIN — CHOLECALCIFEROL TAB 125 MCG (5000 UNIT) 250 MCG: 125 TAB at 19:45

## 2021-04-05 RX ADMIN — PROGESTERONE 200 MG: 100 CAPSULE ORAL at 21:45

## 2021-04-05 RX ADMIN — LIDOCAINE HYDROCHLORIDE 100 MG: 20 INJECTION, SOLUTION INFILTRATION; PERINEURAL at 12:56

## 2021-04-05 RX ADMIN — MIDAZOLAM 1 MG: 1 INJECTION INTRAMUSCULAR; INTRAVENOUS at 12:50

## 2021-04-05 RX ADMIN — ONDANSETRON 4 MG: 2 INJECTION INTRAMUSCULAR; INTRAVENOUS at 13:11

## 2021-04-05 RX ADMIN — PHENYLEPHRINE HYDROCHLORIDE 50 MCG: 10 INJECTION INTRAVENOUS at 13:36

## 2021-04-05 RX ADMIN — POLYETHYLENE GLYCOL 3350 17 G: 17 POWDER, FOR SOLUTION ORAL at 19:46

## 2021-04-05 RX ADMIN — HYDROMORPHONE HYDROCHLORIDE 0.3 MG: 1 INJECTION, SOLUTION INTRAMUSCULAR; INTRAVENOUS; SUBCUTANEOUS at 22:25

## 2021-04-05 RX ADMIN — MIDAZOLAM HYDROCHLORIDE 2 MG: 1 INJECTION, SOLUTION INTRAMUSCULAR; INTRAVENOUS at 12:37

## 2021-04-05 RX ADMIN — METOPROLOL SUCCINATE 25 MG: 25 TABLET, EXTENDED RELEASE ORAL at 19:44

## 2021-04-05 RX ADMIN — SODIUM CHLORIDE, POTASSIUM CHLORIDE, SODIUM LACTATE AND CALCIUM CHLORIDE: 600; 310; 30; 20 INJECTION, SOLUTION INTRAVENOUS at 12:01

## 2021-04-05 RX ADMIN — PROPOFOL: 10 INJECTION, EMULSION INTRAVENOUS at 14:15

## 2021-04-05 RX ADMIN — BUPIVACAINE HYDROCHLORIDE 25 ML: 5 INJECTION, SOLUTION EPIDURAL; INTRACAUDAL; PERINEURAL at 15:07

## 2021-04-05 RX ADMIN — CLINDAMYCIN PHOSPHATE 900 MG: 900 INJECTION, SOLUTION INTRAVENOUS at 12:53

## 2021-04-05 RX ADMIN — PROPOFOL 140 MCG/KG/MIN: 10 INJECTION, EMULSION INTRAVENOUS at 14:48

## 2021-04-05 RX ADMIN — ONDANSETRON 4 MG: 2 INJECTION INTRAMUSCULAR; INTRAVENOUS at 22:25

## 2021-04-05 RX ADMIN — OXYCODONE HYDROCHLORIDE AND ACETAMINOPHEN 1000 MG: 500 TABLET ORAL at 19:44

## 2021-04-05 RX ADMIN — LORAZEPAM 1 MG: 1 TABLET ORAL at 19:44

## 2021-04-05 RX ADMIN — ZOLPIDEM TARTRATE 12.5 MG: 6.25 TABLET, FILM COATED, EXTENDED RELEASE ORAL at 22:23

## 2021-04-05 RX ADMIN — PAROXETINE HYDROCHLORIDE 20 MG: 20 TABLET, FILM COATED ORAL at 22:24

## 2021-04-05 RX ADMIN — MIDAZOLAM 1 MG: 1 INJECTION INTRAMUSCULAR; INTRAVENOUS at 12:56

## 2021-04-05 ASSESSMENT — LIFESTYLE VARIABLES: TOBACCO_USE: 0

## 2021-04-05 ASSESSMENT — COPD QUESTIONNAIRES: COPD: 0

## 2021-04-05 ASSESSMENT — MIFFLIN-ST. JEOR: SCORE: 1104.67

## 2021-04-05 NOTE — ANESTHESIA CARE TRANSFER NOTE
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    Anesthesia Type:   Peripheral Nerve Block     Note:    Oropharynx: oropharynx clear of all foreign objects  Level of Consciousness: awake  Oxygen Supplementation: nasal cannula  Level of Supplemental Oxygen (L/min / FiO2): 2  Independent Airway: airway patency satisfactory and stable  Dentition: dentition unchanged      Patient transferred to: PACU  Comments: At end of procedure, spontaneous respirations, patient alert to voice, able to follow commands. Oxygen via nasal cannula at 2 liters per minute to PACU. Oxygen tubing connected to wall O2 in PACU, SpO2, NiBP, and EKG monitors and alarms on and functioning, Abram Hugger warmer connected to patient gown, report on patient's clinical status given to PACU RN, RN questions answered.  Handoff Report: Identifed the Patient, Identified the Reponsible Provider, Reviewed the pertinent medical history, Discussed the surgical course, Reviewed Intra-OP anesthesia mangement and issues during anesthesia, Set expectations for post-procedure period and Allowed opportunity for questions and acknowledgement of understanding      Vitals: (Last set prior to Anesthesia Care Transfer)  CRNA VITALS  4/5/2021 1507 - 4/5/2021 1545      4/5/2021             Resp Rate (set):  10        Electronically Signed By: FAVIO Monroy CRNA  April 5, 2021  3:45 PM

## 2021-04-05 NOTE — PROGRESS NOTES
Med Scribe completed PTA meds and last doses.  ( original progress note by: Andrew Martinez )    Prior to Admission medications    Medication Sig Last Dose Taking? Auth Provider   carboxymethylcellulose (REFRESH PLUS) 0.5 % SOLN ophthalmic solution Place 1 drop into both eyes 3 times daily as needed for dry eyes 4/5/2021 at PRN Yes Reported, Patient   cyclobenzaprine (FLEXERIL) 10 MG tablet Take 10 mg by mouth daily as needed for muscle spasms  4/3/2021 at PRN Yes Reported, Patient   estradiol (ESTRACE) 0.5 MG tablet Take 0.5 mg by mouth every evening (in addition to 1 mg dose to = 1.5 mg daily) 4/4/2021 at 1800 Yes Reported, Patient   estradiol (ESTRACE) 1 MG tablet Take 1 mg by mouth daily (with dinner) (in addition to 0.5 mg dose to = 1.5 mg dose) 4/4/2021 at 1800 Yes Reported, Patient   Hydrocodone-Acetaminophen  MG TABS Take 2 tablets by mouth every 4 hours as needed 4/5/2021 at 0830 Yes Reported, Patient   ipratropium (ATROVENT) 0.06 % spray Spray 3 sprays into both nostrils daily  Patient taking differently: Spray 3 sprays into both nostrils every morning  4/5/2021 at 0930 Yes Bere Fabian APRN CNP   linaclotide (LINZESS) 72 MCG capsule Take 72 mcg by mouth every morning (before breakfast) 4/4/2021 at am Yes Reported, Patient   loratadine (CLARITIN) 10 MG tablet Take 10 mg by mouth every morning  4/5/2021 at 0730 Yes Reported, Patient   LORazepam (ATIVAN) 1 MG tablet Take 1-2 mg by mouth 3 times daily  4/5/2021 at 0730 Yes Georges Lebron MD   Magnesium Citrate 125 MG CAPS Take 2 capsules by mouth every morning 4/3/2021 at AM Yes Reported, Patient   metoprolol succinate ER (TOPROL-XL) 25 MG 24 hr tablet TAKE ONE TABLET BY MOUTH EVERY EVENING  Patient taking differently: Take 25 mg by mouth every evening TAKE ONE TABLET BY MOUTH EVERY EVENING 4/4/2021 at 1800 Yes Georges Lebron MD   naloxone (NARCAN) 4 MG/0.1ML nasal spray Spray 1 spray (4 mg) into one nostril alternating nostrils as  needed for opioid reversal Every 2-3 minutes in alternating nostrils never used at PRN Yes Georges Lebron MD   NONFORMULARY Take 10 mg by mouth 2 times daily Compounded Hydrocodone Bitartrate Powder -- compounded by [facility] (last filled 9/2020)  (alternates throughout the day with hydrocodone-apap  mg) 4/4/2021 Yes Reported, Patient   omeprazole (PRILOSEC) 40 MG DR capsule Take 40 mg by mouth every morning (before breakfast) 30 minutes prior to breakfast  4/5/2021 at 0730 Yes Reported, Patient   PARoxetine (PAXIL-CR) 25 MG 24 hr tablet Take 25 mg by mouth 2 times daily 4/5/2021 at 0730 Yes Reported, Patient   polyethylene glycol (MIRALAX) 17 GM/Dose powder Take 1 capful by mouth every evening  Past Week at PM Yes Reported, Patient   progesterone (PROMETRIUM) 200 MG capsule Take 200 mg by mouth At Bedtime  4/4/2021 at 2030 Yes Reported, Patient   Riboflavin 400 MG TABS Take 400 mg by mouth every morning 4/5/2021 at 0730 Yes Reported, Patient   senna (SENOKOT) 8.6 MG tablet Take 2 tablets by mouth daily as needed for constipation 4/3/2021 at PRN Yes Reported, Patient   SUMAtriptan (IMITREX) 50 MG tablet Take 1 tablet (50 mg) by mouth at onset of headache for migraine (May repeat x1 after 2hour if HA recurs) Limit use to less than 9 days/month. 4/4/2021 at 2000 Yes Ej Castanon MD   trimethobenzamide (TIGAN) 300 MG capsule Take 300 mg by mouth 3 times daily as needed for nausea  4/3/2021 at PRN Yes Reported, Patient   vitamin C (ASCORBIC ACID) 1000 MG TABS Take 1,000 mg by mouth every evening  4/3/2021 at PM Yes Reported, Patient   Vitamin D3 (CHOLECALCIFEROL) 125 MCG (5000 UT) tablet Take 2 tablets by mouth every evening 4/3/2021 at PM Yes Reported, Patient   zolpidem ER (AMBIEN CR) 12.5 MG CR tablet Take 12.5 mg by mouth At Bedtime  4/4/2021 at 2300 Yes Reported, Patient         PTA medications updated by Medication Scribe prior to surgery via phone call with  patient        Comments:    Medication history sources: Patient, Surescripts, H&P and Patient's home med list  Medication history source reliability: Good  Adherence assessment: N/A Not Observed    Significant changes made to the medication list:  Patient may be taking meds differently than prescribed; See PTA entries for:   Compounded Hydrocodone Bitartrate Powder - [facility] 9/2020 at  [facility].      Additional medication history information:   Patient brought own home meds: Refresh Eye Drops        Prior to Admission medications    Medication Sig Last Dose Taking? Auth Provider   carboxymethylcellulose (REFRESH PLUS) 0.5 % SOLN ophthalmic solution Place 1 drop into both eyes 3 times daily as needed for dry eyes  at PRN Yes Reported, Patient   cyclobenzaprine (FLEXERIL) 10 MG tablet Take 10 mg by mouth daily as needed for muscle spasms   at PRN Yes Reported, Patient   docusate sodium (COLACE) 50 MG capsule Take 100 mg by mouth 2 times daily as needed for constipation  at PRN Yes Reported, Patient   estradiol (ESTRACE) 0.5 MG tablet Take 0.5 mg by mouth every evening (in addition to 1 mg dose to = 1.5 mg daily)  at PM Yes Reported, Patient   estradiol (ESTRACE) 1 MG tablet Take 1 mg by mouth daily (with dinner) (in addition to 0.5 mg dose to = 1.5 mg dose)  at PM Yes Reported, Patient   Hydrocodone-Acetaminophen  MG TABS Take 2 tablets by mouth every 4 hours as needed  at AM Yes Reported, Patient   ipratropium (ATROVENT) 0.06 % spray Spray 3 sprays into both nostrils daily  Patient taking differently: Spray 3 sprays into both nostrils every morning   at AM Yes Bere Fabian APRN CNP   linaclotide (LINZESS) 72 MCG capsule Take 72 mcg by mouth every morning (before breakfast)  at AM Yes Reported, Patient   loratadine (CLARITIN) 10 MG tablet Take 10 mg by mouth every morning   at AM Yes Reported, Patient   LORazepam (ATIVAN) 1 MG tablet Take 1-2 mg by mouth 3 times daily   at AM Yes  Georges Lebron MD   Magnesium Citrate 125 MG CAPS Take 2 capsules by mouth every morning  at AM Yes Reported, Patient   metoprolol succinate ER (TOPROL-XL) 25 MG 24 hr tablet TAKE ONE TABLET BY MOUTH EVERY EVENING  Patient taking differently: Take 25 mg by mouth every evening TAKE ONE TABLET BY MOUTH EVERY EVENING  at PM Yes Georges Lebron MD   naloxone (NARCAN) 4 MG/0.1ML nasal spray Spray 1 spray (4 mg) into one nostril alternating nostrils as needed for opioid reversal Every 2-3 minutes in alternating nostrils  at PRN Yes Georges Lebron MD   NONFORMULARY Take 10 mg by mouth 2 times daily Compounded Hydrocodone Bitartrate Powder -- compounded by [facility] (last filled 9/2020)  (alternates throughout the day with hydrocodone-apap  mg)  at AM Yes Reported, Patient   omeprazole (PRILOSEC) 40 MG DR capsule Take 40 mg by mouth every morning (before breakfast) 30 minutes prior to breakfast   at AM Yes Reported, Patient   PARoxetine (PAXIL-CR) 25 MG 24 hr tablet Take 25 mg by mouth 2 times daily  at AM Yes Reported, Patient   polyethylene glycol (MIRALAX) 17 GM/Dose powder Take 1 capful by mouth every evening   at PM Yes Reported, Patient   progesterone (PROMETRIUM) 200 MG capsule Take 200 mg by mouth At Bedtime   at HS Yes Reported, Patient   Riboflavin 400 MG TABS Take 400 mg by mouth every morning  at AM Yes Reported, Patient   SUMAtriptan (IMITREX) 50 MG tablet Take 1 tablet (50 mg) by mouth at onset of headache for migraine (May repeat x1 after 2hour if HA recurs) Limit use to less than 9 days/month.  at PRN Yes Ej Castanon MD   trimethobenzamide (TIGAN) 300 MG capsule Take 300 mg by mouth 3 times daily as needed for nausea   at PRN Yes Reported, Patient   vitamin C (ASCORBIC ACID) 1000 MG TABS Take 1,000 mg by mouth every evening   at PM Yes Reported, Patient   Vitamin D3 (CHOLECALCIFEROL) 125 MCG (5000 UT) tablet Take 2 tablets by mouth every  evening  at PM Yes Reported, Patient   Zolpidem Tartrate (AMBIEN CR PO) Take 12.5 mg by mouth At Bedtime  at HS Yes Reported, Patient

## 2021-04-05 NOTE — ADDENDUM NOTE
Addendum  created 04/05/21 1612 by Carrillo Magallanes, DO    Intraprocedure Attestations deleted

## 2021-04-05 NOTE — ANESTHESIA PREPROCEDURE EVALUATION
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 Adelina Meehan     Chronic constipation      Chronic narcotic use     [facility]     Chronic pain     Dr. Orta as of      Chronic urethritis     Dr. Little     Colonic polyp 2011    one polyp, fu 5 years, fu 10/17 and no lesions     Depression, major, in partial remission (H)     Dr. Meehan     Depressive disorder      Diarrhea     eval by [place] [facility], resolved with gluten free diet     DJD (degenerative joint disease)      Dyspepsia     eval by [place] [facility], had colonoscopy, egd, ct abd and pelvis, mrcp.  [place] to be dyspepsia and constipation     H/O gastroesophageal reflux (GERD)      LBP (low back pain)     Dr. Jae Tong in [place], then seeing [facility] Dr. Orta     Light headed 2020    nl est echo     Migraines     neuro eval     Palpitations     holter with pvc's and pac's, echo nl     Screening     dexa nl at gyn      Past Surgical History:   Procedure Laterality Date     APPENDECTOMY  2016     ARTHROPLASTY CARPOMETACARPAL (THUMB JOINT) Left 2017    Procedure: ARTHROPLASTY CARPOMETACARPAL (THUMB JOINT);  REVISION  LEFT THUMB CARPOMETACARPAL ARTHROPOASTY WITH 1.1 TIGHT ROPE;  Surgeon: Beatrice Philippe MD;  Location:  [facility]     ARTHROSCOPY WRIST Left 2019    Procedure: LEFT WRIST ARTHROSCOPY REMOVAL OF TIGHT ROPE ; LEFT EXTENSOR POLLICIS LONGUS /EXTENSOR CARPI RADIALIS BREVIS /EXTENSOR CARPI RADIALUS LONGUS SYNOVECTOMY ; EXTENSOR POLLICIS LONGUS TRANSPOSITION ; ANTERIOR INTEROSSEOUS NEURECTOMY ; POSTERIOR INTEROSSEOUS NEURECTOMY;  Surgeon: Beatrice Philippe MD;  Location:  OR     BIOPSY       CARPAL TUNNEL RELEASE RT/LT        SECTION    "    COLONOSCOPY       ESOPHAGOSCOPY, GASTROSCOPY, DUODENOSCOPY (EGD), COMBINED N/A 2020    Procedure: ESOPHAGOGASTRODUODENOSCOPY, WITH BIOPSY;  Surgeon: Georges Pop MD;  Location:  GI     EXCIS BARTHOLIN GLAND/CYST  2005     HERNIORRHAPHY INGUINAL  70's    x5     INJECT STEROID (LOCATION) Right 2019    Procedure: RIGHT WRIST CORTISONE INJECTION;  Surgeon: Beatrice Philippe MD;  Location:  OR     OPERATIVE HYSTEROSCOPY WITH MORCELLATOR N/A 2018    Procedure: OPERATIVE HYSTEROSCOPY WITH MORCELLATOR ([employer]IN [employer]);  OPERATIVE HYSTEROSCOPY WITH TRUECLEAR MORCELLATOR 5C SCOPE ;  Surgeon: Iris Fernandez MD;  Location:  [facility]     ORTHOPEDIC SURGERY Bilateral     SHOULDER ARTHROSCOPY     ORTHOPEDIC SURGERY Right 2017    right foot for plantar fasciitis     thumb surgery   and ,       Allergies   Allergen Reactions     Betamethasone Other (See Comments)     agitation     Chocolate Other (See Comments)     Triggers migraines     Compazine [Prochlorperazine] Other (See Comments)     \"crawl out of my skin\"     Fentanyl Other (See Comments) and Headache     Headache / sedation / difficult urinate     Gluten Meal Diarrhea     Methadone Other (See Comments) and Headache      Headache and sedation     Morphine Other (See Comments)     Constipation     Penicillins Nausea and Vomiting and Hives     Pneumococcal Vaccine Other (See Comments)     Temporary paralization     Tizanidine Other (See Comments)     Severe agitation      Social History     Tobacco Use     Smoking status: Former Smoker     Packs/day: 0.00     Years: 5.00     Pack years: 0.00     Quit date: 10/29/1979     Years since quittin.4     Smokeless tobacco: Never Used     Tobacco comment: infrequent use for about 5 years   Substance Use Topics     Alcohol use: No     Alcohol/week: 0.0 standard drinks      Wt Readings from Last 1 Encounters:   21 55.8 kg (123 lb)        Anesthesia Evaluation   Pt has had prior anesthetic. " Type: General and Regional.    No history of anesthetic complications       ROS/MED HX  ENT/Pulmonary:    (-) tobacco use, asthma, COPD and sleep apnea   Neurologic:     (+) migraines,     Cardiovascular:     (+) -----Previous cardiac testing   Echo: Date:  Results:  Narrative & Impression    [id number]  [id number]  [id number]  [id number]^CHARLIE^TOSHIA^TERENCE        [facility]  [facility]  [address]  [address]  [place] [address]  Phone [phone]  Fax [phone]        Name: RACHEL CHRISTINA  MRN: [id number]  : 1950  Study Date: 2020 12:55 PM  Age: 70 yrs  Gender: Female  Patient Location: [facility]  Reason For Study: Lightheadedness, Atypical chest pain  Ordering Physician: TOSHIA GUERRA  Referring Physician: ALVA VIEIRA  Performed By: Elisa Nicholas     BSA: 1.6 m2  Height: 66 in  Weight: 125 lb  HR: 88  BP: 100/68 mmHg  _____________________________________________________________________________  __     Procedure  Stress Echo Complete.     _____________________________________________________________________________  __        Interpretation Summary  1. This was a normal stress echocardiogram with no evidence of stress-induced  ischemia. The apical images are significantly forshortened with poor  endocardial definition. This makes interpretation difficult and decreases  senstivity of the test.  2. Normal stress EKG. Occassional PVC's.  3. The Duke treadmill score was intermediate risk ( -11< Loya score <5).  4. Patient reported 5/10 chest tightness during stress which spontaneously  resolved 3 mins into recovery.  _____________________________________________________________________________  __  Stress  The patient exercised 6:41.  The patient exhibited chest pain during exercise.  Exercise was stopped due to fatigue.  A moderate workload was achieved.  There was a normal BP response to exercise.  A treadmill exercise test  according to the Conner protocol was performed.  Target Heart Rate was achieved.  There were no ST segment changes observed with stress.  Occasional premature ventricular contractions.  The Duke treadmill score was intermediate risk ( -11< Loya score <5).  Left ventricular cavity size decreases with exercise.  Global LV systolic function augments with exercise.  Normal left ventricular function and wall motion at rest and post-stress.  The visual ejection fraction is estimated at 65-70%.     Baseline  Normal baseline electrocardiogram.  Normal left ventricular function and wall motion at rest.  The visual ejection fraction is estimated at 55-60%.        Stress Results   Protocol:  Conner Protocol        Maximum Predicted HR:   152 bpm         Target HR: 129 bpm               % Maximum Predicted HR: 96 %           Stage  DurationHeart Rate  BP                 Comment               (mm:ss)   (bpm)       Stage 1   3:00     123    106/70       Stage 2   3:00     142    114/66Chest tightness 5/10 at 4:30       Stage 3   0:41     146      /   RPP: 16,644; FAC: AVG; DUKE: 1      RECOVERYR  5:00      83    98/74 Chest tightness resolved at 3 minutes                Stress Duration:   6:41 mm:ss        Recovery Time: 5:00 mm:ss          Maximum Stress HR: 146 bpm           METS:          8  _____________________________________________________________________________  __     MMode/2D Measurements & Calculations  Ao root diam: 3.4 cm  asc Aorta Diam: 3.3 cm  LVOT diam: 2.0 cm  LVOT area: 3.0 cm2         Stress Test:  Date: Results:    ECG Reviewed:  Date: Results:    Cath:  Date: Results:   (-) hypertension and CAD   METS/Exercise Tolerance:     Hematologic:       Musculoskeletal: Comment: Low back pain      GI/Hepatic:     (+) GERD,  (-) liver disease   Renal/Genitourinary:    (-) renal disease   Endo:    (-) Type I DM and Type II DM   Psychiatric/Substance Use:     (+) psychiatric history anxiety and depression     Infectious  Disease:       Malignancy:       Other:      (+) , H/O Chronic Pain,        Physical Exam    Airway        Mallampati: II   TM distance: > 3 FB   Neck ROM: full   Mouth opening: > 3 cm    Respiratory Devices and Support         Dental  no notable dental history         Cardiovascular   cardiovascular exam normal          Pulmonary   pulmonary exam normal                OUTSIDE LABS:  CBC:   Lab Results   Component Value Date    WBC 7.1 12/17/2020    WBC 10.7 07/31/2020    HGB 13.4 12/17/2020    HGB 13.9 07/31/2020    HCT 40.6 12/17/2020    HCT 41.0 07/31/2020     12/17/2020     07/31/2020     BMP:   Lab Results   Component Value Date     12/17/2020     07/31/2020    POTASSIUM 4.2 12/17/2020    POTASSIUM 4.2 07/31/2020    CHLORIDE 103 12/17/2020    CHLORIDE 104 07/31/2020    CO2 27 12/17/2020    CO2 27 07/31/2020    BUN 10 12/17/2020    BUN 9 07/31/2020    CR 0.59 12/17/2020    CR 0.70 07/31/2020    GLC 85 12/17/2020     (H) 07/31/2020     COAGS: No results found for: PTT, INR, FIBR  POC:   Lab Results   Component Value Date     (H) 08/27/2019    HCG Negative 01/17/2006     HEPATIC:   Lab Results   Component Value Date    ALBUMIN 3.7 12/17/2020    PROTTOTAL 7.0 12/17/2020    ALT 18 12/17/2020    AST 18 12/17/2020    ALKPHOS 64 12/17/2020    BILITOTAL 0.3 12/17/2020     OTHER:   Lab Results   Component Value Date    A1C 5.3 12/15/2016    THOR 8.8 12/17/2020    MAG 1.9 10/26/2018    TSH 1.00 12/17/2020       Anesthesia Plan    ASA Status:  2   NPO Status:  NPO Appropriate    Anesthesia Type: Peripheral Nerve Block.              Consents    Anesthesia Plan(s) and associated risks, benefits, and realistic alternatives discussed. Questions answered and patient/representative(s) expressed understanding.     - Discussed with:  Patient      - Extended Intubation/Ventilatory Support Discussed: No.      - Patient is DNR/DNI Status: No    Use of blood products discussed: No .      Postoperative Care    Pain management: Peripheral nerve block (Single Shot).   PONV prophylaxis: Ondansetron (or other 5HT-3)     Comments:                Carrillo Lomas MD

## 2021-04-05 NOTE — OR NURSING
PNDS criteria met.  Order received to transfer to [facility] for continued recovery.  Awaiting availability of RN for hand-off report.

## 2021-04-05 NOTE — ANESTHESIA POSTPROCEDURE EVALUATION
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    Anesthesia Type:  Peripheral Nerve Block    Note:  Disposition: Outpatient   Postop Pain Control: Uneventful            Sign Out: Well controlled pain   PONV: No   Neuro/Psych: Uneventful            Sign Out: Acceptable/Baseline neuro status   Airway/Respiratory: Uneventful            Sign Out: Acceptable/Baseline resp. status   CV/Hemodynamics: Uneventful            Sign Out: Acceptable CV status   Other NRE: NONE   DID A NON-ROUTINE EVENT OCCUR? No         Last vitals:  Vitals:    04/05/21 1238 04/05/21 1547 04/05/21 1600   BP: 126/82 114/77 118/89   Pulse: 77 61 61   Resp: 16 18 16   Temp: 36.1  C (97  F) 36.3  C (97.3  F)    SpO2: 98% 99% 99%       Last vitals prior to Anesthesia Care Transfer:  CRNA VITALS  4/5/2021 1507 - 4/5/2021 1607      4/5/2021             Resp Rate (set):  10          Electronically Signed By: Carrillo Magallanes DO, DO  April 5, 2021  4:11 PM

## 2021-04-05 NOTE — OR NURSING
Hand-off report given to RN.  To 531-2 per cart with all belongings.  Will transport with Capnography monitoring.

## 2021-04-05 NOTE — ANESTHESIA PROCEDURE NOTES
Brachial plexus (axillary approach) Procedure Note  Pre-Procedure   Staff -        Anesthesiologist:  Carrillo Lomas MD       Performed By: anesthesiologist       Location: pre-op       Pre-Anesthestic Checklist: patient identified, IV checked, site marked, risks and benefits discussed, informed consent, monitors and equipment checked, pre-op evaluation, at physician/surgeon's request and post-op pain management  Timeout:       Correct Patient: Yes        Correct Procedure: Yes        Correct Site: Yes        Correct Position: Yes        Correct Laterality: Yes        Site Marked: Yes  Procedure Documentation  Procedure: Brachial plexus (axillary approach)       Laterality: left       Patient Position: supine       Patient Prep/Sterile Barriers: sterile gloves, mask       Skin prep: Chloraprep      Local skin infiltrated with mL of 1% lidocaine.        Needle Type: insulated and short bevel       Needle Gauge: 22.        Needle Length (millimeters): 50        - Ultrasound guided       - Ultrasound used to identify targeted nerve, plexus, vascular marker, or fascial plane and place a needle adjacent to it in real-time       - Ultrasound was used to visualize the spread of anesthetic in close proximity to the above referenced structure       - A permanent image is entered into the patient's record.       - The nerve(s) appeared anatomically normal       - There were no apparent abnormal pathologic findings    Assessment/Narrative         The placement was negative for: blood aspirated, painful injection and site bleeding       Paresthesias: No.     Bolus given via needle..        Secured via.        Insertion/Infusion Method: Single Shot       Complications: none    Medication(s) Administered   Bupivacaine 0.5% w/ 1:400K Epi (Injection), 25 mL

## 2021-04-06 LAB
CREAT SERPL-MCNC: 0.61 MG/DL (ref 0.52–1.04)
GFR SERPL CREATININE-BSD FRML MDRD: >90 ML/MIN/{1.73_M2}
GLUCOSE BLDC GLUCOMTR-MCNC: 100 MG/DL (ref 70–99)

## 2021-04-06 PROCEDURE — 250N000011 HC RX IP 250 OP 636: Performed by: ORTHOPAEDIC SURGERY

## 2021-04-06 PROCEDURE — 82565 ASSAY OF CREATININE: CPT | Performed by: ORTHOPAEDIC SURGERY

## 2021-04-06 PROCEDURE — 120N000001 HC R&B MED SURG/OB

## 2021-04-06 PROCEDURE — 999N001017 HC STATISTIC GLUCOSE BY METER IP

## 2021-04-06 PROCEDURE — 250N000013 HC RX MED GY IP 250 OP 250 PS 637: Performed by: ORTHOPAEDIC SURGERY

## 2021-04-06 PROCEDURE — 36415 COLL VENOUS BLD VENIPUNCTURE: CPT | Performed by: ORTHOPAEDIC SURGERY

## 2021-04-06 RX ORDER — KETOROLAC TROMETHAMINE 30 MG/ML
30 INJECTION, SOLUTION INTRAMUSCULAR; INTRAVENOUS ONCE
Status: DISCONTINUED | OUTPATIENT
Start: 2021-04-06 | End: 2021-04-06

## 2021-04-06 RX ORDER — PAROXETINE HYDROCHLORIDE HEMIHYDRATE 25 MG/1
25 TABLET, FILM COATED, EXTENDED RELEASE ORAL 2 TIMES DAILY
Status: DISCONTINUED | OUTPATIENT
Start: 2021-04-06 | End: 2021-04-07 | Stop reason: HOSPADM

## 2021-04-06 RX ORDER — KETOROLAC TROMETHAMINE 15 MG/ML
15 INJECTION, SOLUTION INTRAMUSCULAR; INTRAVENOUS EVERY 6 HOURS PRN
Status: DISCONTINUED | OUTPATIENT
Start: 2021-04-06 | End: 2021-04-07 | Stop reason: HOSPADM

## 2021-04-06 RX ORDER — HYDROMORPHONE HYDROCHLORIDE 1 MG/ML
1 INJECTION, SOLUTION INTRAMUSCULAR; INTRAVENOUS; SUBCUTANEOUS ONCE
Status: COMPLETED | OUTPATIENT
Start: 2021-04-06 | End: 2021-04-06

## 2021-04-06 RX ORDER — HYDROMORPHONE HYDROCHLORIDE 1 MG/ML
.5-1 INJECTION, SOLUTION INTRAMUSCULAR; INTRAVENOUS; SUBCUTANEOUS
Status: DISCONTINUED | OUTPATIENT
Start: 2021-04-06 | End: 2021-04-07 | Stop reason: HOSPADM

## 2021-04-06 RX ADMIN — METOPROLOL SUCCINATE 25 MG: 25 TABLET, EXTENDED RELEASE ORAL at 20:13

## 2021-04-06 RX ADMIN — OXYCODONE HYDROCHLORIDE AND ACETAMINOPHEN 1000 MG: 500 TABLET ORAL at 20:13

## 2021-04-06 RX ADMIN — HYDROMORPHONE HYDROCHLORIDE 4 MG: 2 TABLET ORAL at 14:11

## 2021-04-06 RX ADMIN — HYDROXYZINE HYDROCHLORIDE 10 MG: 10 TABLET, FILM COATED ORAL at 10:13

## 2021-04-06 RX ADMIN — LORAZEPAM 1 MG: 1 TABLET ORAL at 08:10

## 2021-04-06 RX ADMIN — LORAZEPAM 1 MG: 1 TABLET ORAL at 20:12

## 2021-04-06 RX ADMIN — HYDROMORPHONE HYDROCHLORIDE 0.5 MG: 1 INJECTION, SOLUTION INTRAMUSCULAR; INTRAVENOUS; SUBCUTANEOUS at 10:13

## 2021-04-06 RX ADMIN — SUMATRIPTAN SUCCINATE 50 MG: 50 TABLET ORAL at 20:12

## 2021-04-06 RX ADMIN — HYDROMORPHONE HYDROCHLORIDE 0.3 MG: 1 INJECTION, SOLUTION INTRAMUSCULAR; INTRAVENOUS; SUBCUTANEOUS at 00:29

## 2021-04-06 RX ADMIN — ZOLPIDEM TARTRATE 12.5 MG: 6.25 TABLET, FILM COATED, EXTENDED RELEASE ORAL at 21:31

## 2021-04-06 RX ADMIN — MAGNESIUM 64 MG (MAGNESIUM CHLORIDE) TABLET,DELAYED RELEASE 535 MG: at 08:10

## 2021-04-06 RX ADMIN — KETOROLAC TROMETHAMINE 15 MG: 15 INJECTION, SOLUTION INTRAMUSCULAR; INTRAVENOUS at 12:09

## 2021-04-06 RX ADMIN — CHOLECALCIFEROL TAB 125 MCG (5000 UNIT) 250 MCG: 125 TAB at 20:12

## 2021-04-06 RX ADMIN — OXYCODONE HYDROCHLORIDE 10 MG: 10 TABLET, FILM COATED, EXTENDED RELEASE ORAL at 08:10

## 2021-04-06 RX ADMIN — LORAZEPAM 1 MG: 1 TABLET ORAL at 14:11

## 2021-04-06 RX ADMIN — SUMATRIPTAN SUCCINATE 50 MG: 50 TABLET ORAL at 04:44

## 2021-04-06 RX ADMIN — PROGESTERONE 200 MG: 100 CAPSULE ORAL at 21:31

## 2021-04-06 RX ADMIN — HYDROMORPHONE HYDROCHLORIDE 4 MG: 2 TABLET ORAL at 17:13

## 2021-04-06 RX ADMIN — HYDROMORPHONE HYDROCHLORIDE 2 MG: 2 TABLET ORAL at 09:03

## 2021-04-06 RX ADMIN — HYDROXYZINE HYDROCHLORIDE 10 MG: 10 TABLET, FILM COATED ORAL at 17:13

## 2021-04-06 RX ADMIN — PAROXETINE HYDROCHLORIDE 20 MG: 20 TABLET, FILM COATED ORAL at 08:10

## 2021-04-06 RX ADMIN — IPRATROPIUM BROMIDE 3 SPRAY: 42 SPRAY NASAL at 09:04

## 2021-04-06 RX ADMIN — OXYCODONE HYDROCHLORIDE 10 MG: 10 TABLET, FILM COATED, EXTENDED RELEASE ORAL at 21:34

## 2021-04-06 RX ADMIN — HYDROMORPHONE HYDROCHLORIDE 4 MG: 2 TABLET ORAL at 11:04

## 2021-04-06 RX ADMIN — SUMATRIPTAN SUCCINATE 50 MG: 50 TABLET ORAL at 16:06

## 2021-04-06 RX ADMIN — HYDROMORPHONE HYDROCHLORIDE 2 MG: 2 TABLET ORAL at 08:10

## 2021-04-06 RX ADMIN — HYDROMORPHONE HYDROCHLORIDE 4 MG: 2 TABLET ORAL at 20:12

## 2021-04-06 RX ADMIN — HYDROMORPHONE HYDROCHLORIDE 1 MG: 1 INJECTION, SOLUTION INTRAMUSCULAR; INTRAVENOUS; SUBCUTANEOUS at 12:09

## 2021-04-06 RX ADMIN — HYDROXYZINE HYDROCHLORIDE 10 MG: 10 TABLET, FILM COATED ORAL at 02:22

## 2021-04-06 RX ADMIN — HYDROMORPHONE HYDROCHLORIDE 0.5 MG: 1 INJECTION, SOLUTION INTRAMUSCULAR; INTRAVENOUS; SUBCUTANEOUS at 04:44

## 2021-04-06 RX ADMIN — HYDROMORPHONE HYDROCHLORIDE 0.5 MG: 1 INJECTION, SOLUTION INTRAMUSCULAR; INTRAVENOUS; SUBCUTANEOUS at 07:04

## 2021-04-06 RX ADMIN — HYDROMORPHONE HYDROCHLORIDE 0.3 MG: 1 INJECTION, SOLUTION INTRAMUSCULAR; INTRAVENOUS; SUBCUTANEOUS at 02:24

## 2021-04-06 RX ADMIN — LORATADINE 10 MG: 10 TABLET ORAL at 08:10

## 2021-04-06 RX ADMIN — ESTRADIOL 1.5 MG: 0.5 TABLET ORAL at 17:14

## 2021-04-06 ASSESSMENT — ACTIVITIES OF DAILY LIVING (ADL): ADLS_ACUITY_SCORE: 11

## 2021-04-06 NOTE — UTILIZATION REVIEW
[facility]   Admission Status; Secondary Review Determination   Admission date:  4/5/2021 10:32 AM    Under the authority of the Utilization Management Committee, the utilization review process indicated a secondary review on the above patient. The review outcome is based on review of the medical records, discussions with staff, and applying clinical experience noted on the date of the review.     (x) Inpatient Status Appropriate - This patient's medical care is consistent with medical management for inpatient care and reasonable inpatient medical practice.     RATIONALE FOR DETERMINATION   70-year-old female underwent a left total wrist fusion with allograft bone yesterday.  There were no immediate complications with the surgical intervention.  Today she is continuing to experience severe pain requiring frequent IV Dilaudid.  She will remain hospitalized for ongoing pain control as well as a steroid injection tomorrow.  This patient is complicated, continues to require IV narcotics, requires improved pain management,and will require a greater than 2 midnight hospitalization so per Medicare guidelines is appropriate for inpatient hospitalization.  This recommendation was texted to Dr. Philippe.  The severity of illness, intensity of service provided, expected LOS and risk for adverse outcome make the care complex, high risk and appropriate for hospital admission.    At the time of admission with the information available to the attending physician more than 2 nights Hospital complex care was anticipated, based on patient risk of adverse outcome if treated as outpatient and complex care required.  Inpatient admission is appropriate based on the Medicare guidelines.      The information on this document is developed by the utilization review team in order for the business office to ensure compliance. This only denotes the appropriateness of proper admission status and does not reflect the quality of care  rendered.   The definitions of Inpatient Status and Observation Status used in making the determination above are those provided in the CMS Coverage Manual, Chapter 1 and Chapter 6, section 70.4.     Sincerely,   Jae Duran DO MPH   Physician Advisor  Utilization Review  [facility]

## 2021-04-06 NOTE — OP NOTE
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      PROCEDURES:     1.  Left total wrist fusion.   2.  Use of allograft bone graft.      SURGEON:  Beatrice Philippe MD      ANESTHESIA:  Brachial plexus block with sedation.      INDICATIONS:  The patient has progressive pain involving the left wrist.  She has had progressive carpal collapse with time.  It is increasingly symptomatic.  With previous surgeries, she has had failures related to soft tissue laxity and with mid carpal arthritis changes, she is a poor candidate for a proximal row carpectomy.  Partial wrist and total wrist fusion discussed.  A partial wrist fusion would be reasonable if the lunate fossa is not involved significantly.  The nature of surgery and recovery was reviewed.  She understands and agrees to proceed.      DESCRIPTION OF PROCEDURE:  The patient was brought to the operating room.  Her left upper extremity was anesthetized with a brachial plexus block.  IV sedation and 900 of clindamycin was given.  A pause was performed to confirm the site and the procedure planned.      The limb was exsanguinated with an Esmarch and the tourniquet was inflated to 250 mmHg.  A longitudinal incision was used, incorporating a previous incision in the proximal wrist.  Skin and subcutaneous tissues were divided.  Great care was taken to protect cutaneous nerve branches, as well as her skin integrity throughout with limiting excessive retraction.  The extensor retinaculum was exposed.  The extensor pollicis longus tendon had been previously transposed dorsally.  This was retracted and the retinaculum was opened, exposing the second through fourth compartments.  Initially, a ligament-sparing arthrotomy was created and the scaphoid and lunate articulations were inspected.  There was abundant fluid and  synovitis and full-thickness change in the proximal scaphoid.  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  A K-wire was placed in the lunate and the lunate was rotated into a more anatomic position and the proximal surface also demonstrated thinning of the articular cartilage.  The mid carpal joint was also inspected and similar changes were noted.  Given the overall picture, the plan was made to proceed with fusion.      The incision was extended proximally and distally to allow plate application.  The surfaces were then prepared with a rongeur and series of curettes, as well as a small tulio, to bleeding cancellous bone.  More time was spent on the radioscaphoid articulation because of the more chronic changes there and the increased bony changes as a result.      Once completed, an Acumed plate was selected.  A small plate appeared to fit the dimensions better than the standard plate.  It was secured distally into the second metacarpal with multiple screws achieving excellent purchase.  The distal screws were locked in position obliquely.  The carpus was then compressed and the cortical screws and the slots were used to obtain additional compression.  The final proximal and distal radial screws were placed in locked fashion.  An oblique screw was then placed across the mid carpal joint.      It should be noted that after preparation of the bony surfaces, bone graft was packed into the radiocarpal and mid carpal joint carefully.  The graft was morcellized, allograft bone.      The final construct was provided in nice clinical posture with appropriate wrist extension and neutral deviation.  She has full range of motion of the distal radioulnar joint without crepitation.  Screw lengths and positions were checked and several minor substitutions were made.  After final irrigation, the capsule was repaired with 3-0 Vicryl suture.  The retinaculum was repaired with 3-0 Vicryl suture,  leaving the extensor tendon transposed dorsally.  The tourniquet was then deflated and hemostasis was achieved with pressure and bipolar cautery.  Skin was closed with Monocryl and Prolene suture and a bulky sterile dressing was applied.  She tolerated the procedure well and was taken to the recovery room in satisfactory condition.         VI RANDLE MD             D: 2021   T: 2021   MT: JOSSY      Name:     RACHEL CHRISTINA   MRN:      [id number]        Account:        [id number]   :      1950           Procedure Date: 2021      Document: [id number]

## 2021-04-06 NOTE — PLAN OF CARE
Patient vital signs are at baseline: Yes  Patient able to ambulate as they were prior to admission or with assist devices provided by therapies during their stay:  Yes  Patient MUST void prior to discharge:  Yes.   Patient able to tolerate oral intake:  No,  Reason:  fairly well.  Pain has adequate pain control using Oral analgesics:  Yes

## 2021-04-06 NOTE — PHARMACY
"The following home medications were NOT continued on inpatient admission per \"Discontinuation of nonessential home medications during hospitalization\" policy: Riboflavin    If a therapeutic holiday is deemed inappropriate per the prescriber, please notify the pharmacist regarding the medication order.    The pharmacist is available to answer any questions and/or concerns the patient may have regarding discontinuation of non-essential medications.    Please ensure that these medications are restarted as needed upon discharge via the medication reconciliation discharge process and included on the discharge medication reconciliation report.    Thank you,  Manuela Smiley RPH    "

## 2021-04-06 NOTE — PROGRESS NOTES
Ortho Hand    POD#1 wrist fusion  AVSS  Dressing intact/dry  Good digital ROM  Sensation intact    Difficulty with pain control  Increased IV Dilaudid and Toradol  On oxycontin    Cannot discharge because of continued IV pain medication needs  Anticipate conversion to po meds tomorrow and later pm discharge if doing well  Right wrist cortisone injection will be performed tomorrow.    Fatou Philippe  [phone]

## 2021-04-06 NOTE — PLAN OF CARE
Outpatient in a Bed discharge goals PRIOR TO DISCHARGE     Comments: List all Outpatient in a Bed discharge goals to be met before discharge home:   ~ Patient able to ambulate as they were prior to admission or with assist devices provided by therapies during their stay. Met   ~ Nurse to Notify Provider when Outpatient in a Bed discharge goals have been met and patient is ready for discharge.      Pain improving but still an issue.  Up with SBA, wrist casted and elevated on pillows.PO dilaudid, IV dilaudid, atarax  and toradol for pain.  Plan for discharge tomorrow pending pain control.

## 2021-04-06 NOTE — PROGRESS NOTES
Patient vital signs are at baseline: Yes  Patient able to ambulate as they were prior to admission or with assist devices provided by therapies during their stay:  Yes  Patient MUST void prior to discharge:  Yes  Patient able to tolerate oral intake:  Yes  Pain has adequate pain control using Oral analgesics:  No,  Reason:  IV Dilaudid administered overnight. Minimal pain relief with IV dilaudid.     A&O x4. Pt frustrated that meds were not accurate. Called Dr Philippe via cell and received verbal order for antidepressant medication that pt requested. VSS on RA. Pain managed with IV Dilaudid, Atarax, and ice packs. Regular diet. Up with SBA and GB. CMS intact except for baseline BUE numbness and tingling. Sling and splint in place LUE. Voiding adequately in BR. IV SL.

## 2021-04-07 VITALS
HEIGHT: 66 IN | BODY MASS INDEX: 19.77 KG/M2 | TEMPERATURE: 97.9 F | RESPIRATION RATE: 16 BRPM | HEART RATE: 74 BPM | DIASTOLIC BLOOD PRESSURE: 80 MMHG | SYSTOLIC BLOOD PRESSURE: 113 MMHG | OXYGEN SATURATION: 95 % | WEIGHT: 123 LBS

## 2021-04-07 PROCEDURE — 250N000013 HC RX MED GY IP 250 OP 250 PS 637: Performed by: ORTHOPAEDIC SURGERY

## 2021-04-07 PROCEDURE — 250N000011 HC RX IP 250 OP 636: Performed by: ORTHOPAEDIC SURGERY

## 2021-04-07 RX ADMIN — HYDROMORPHONE HYDROCHLORIDE 4 MG: 2 TABLET ORAL at 08:15

## 2021-04-07 RX ADMIN — OMEPRAZOLE 40 MG: 20 CAPSULE, DELAYED RELEASE ORAL at 08:15

## 2021-04-07 RX ADMIN — HYDROXYZINE HYDROCHLORIDE 10 MG: 10 TABLET, FILM COATED ORAL at 10:14

## 2021-04-07 RX ADMIN — HYDROMORPHONE HYDROCHLORIDE 4 MG: 2 TABLET ORAL at 14:05

## 2021-04-07 RX ADMIN — KETOROLAC TROMETHAMINE 15 MG: 15 INJECTION, SOLUTION INTRAMUSCULAR; INTRAVENOUS at 03:31

## 2021-04-07 RX ADMIN — LORAZEPAM 1 MG: 1 TABLET ORAL at 08:15

## 2021-04-07 RX ADMIN — LORAZEPAM 1 MG: 1 TABLET ORAL at 14:05

## 2021-04-07 RX ADMIN — MAGNESIUM 64 MG (MAGNESIUM CHLORIDE) TABLET,DELAYED RELEASE 535 MG: at 08:15

## 2021-04-07 RX ADMIN — IPRATROPIUM BROMIDE 3 SPRAY: 42 SPRAY NASAL at 09:06

## 2021-04-07 RX ADMIN — HYDROMORPHONE HYDROCHLORIDE 4 MG: 2 TABLET ORAL at 11:19

## 2021-04-07 RX ADMIN — SUMATRIPTAN SUCCINATE 50 MG: 50 TABLET ORAL at 10:14

## 2021-04-07 RX ADMIN — OXYCODONE HYDROCHLORIDE 10 MG: 10 TABLET, FILM COATED, EXTENDED RELEASE ORAL at 08:15

## 2021-04-07 RX ADMIN — LORATADINE 10 MG: 10 TABLET ORAL at 08:15

## 2021-04-07 RX ADMIN — HYDROMORPHONE HYDROCHLORIDE 4 MG: 2 TABLET ORAL at 03:27

## 2021-04-07 RX ADMIN — PAROXETINE HYDROCHLORIDE HEMIHYDRATE 25 MG: 25 TABLET, FILM COATED, EXTENDED RELEASE ORAL at 08:16

## 2021-04-07 RX ADMIN — HYDROXYZINE HYDROCHLORIDE 10 MG: 10 TABLET, FILM COATED ORAL at 03:24

## 2021-04-07 ASSESSMENT — ACTIVITIES OF DAILY LIVING (ADL)
ADLS_ACUITY_SCORE: 12
ADLS_ACUITY_SCORE: 12
ADLS_ACUITY_SCORE: 11

## 2021-04-07 NOTE — PROGRESS NOTES
Ortho Hand    POD#2 left wrist fusion   AVSS  Resting this AM    Pain management discussed with nursing  Slow wean from IV pain medication today if able  Will inject right wrist this afternoon    Fatou Philippe  [phone]

## 2021-04-07 NOTE — PLAN OF CARE
Summary:     Date/Time: 04/06/21 1900-0730    Service: Ortho  Behavior/Aggression tool color: Green  Diagnosis: Left wrist fusion  POD#: Day 1  Mental Status: Aox4  Activity/dangle: Independent in room  Diet: Regular diet  Pain: PRN; atarax x1, dilaudid x2, toradol x1, sumatriptan x1 for headache. Scheduled oxycontin   Bates/Voiding: Bathroom  Tele/Restraints/Iso: N/A  02/LDA: VSS on RA; IV SL  D/C Date: possible today 04/07/21  Other Info: Pt is Aox4. VSS on RA. Independent in room. Treated pain with various PRNs. Home meds/narcotics; locked up with security. Dr. Philippe stopped by in AM; pt sleeping. Will come back and inject R wrist this afternoon. Possible dinner time discharge. Wean IV pain meds.

## 2021-04-07 NOTE — DISCHARGE SUMMARY
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]  Arthritis of left wrist [M19.032]          Discharge Diagnosis:   same          Procedures:   Procedure(s): Left total wrist fusion       No procedures performed during this admission           Medications Prior to Admission:     Medications Prior to Admission   Medication Sig Dispense Refill Last Dose     carboxymethylcellulose (REFRESH PLUS) 0.5 % SOLN ophthalmic solution Place 1 drop into both eyes 3 times daily as needed for dry eyes   4/5/2021 at PRN     cyclobenzaprine (FLEXERIL) 10 MG tablet Take 10 mg by mouth daily as needed for muscle spasms    4/3/2021 at PRN     estradiol (ESTRACE) 0.5 MG tablet Take 0.5 mg by mouth every evening (in addition to 1 mg dose to = 1.5 mg daily)   4/4/2021 at 1800     estradiol (ESTRACE) 1 MG tablet Take 1 mg by mouth daily (with dinner) (in addition to 0.5 mg dose to = 1.5 mg dose)   4/4/2021 at 1800     Hydrocodone-Acetaminophen  MG TABS Take 2 tablets by mouth every 4 hours as needed   4/5/2021 at 0830     ipratropium (ATROVENT) 0.06 % spray Spray 3 sprays into both nostrils daily (Patient taking differently: Spray 3 sprays into both nostrils every morning ) 45 mL 3 4/5/2021 at 0930     linaclotide (LINZESS) 72 MCG capsule Take 72 mcg by mouth every morning (before breakfast)   4/4/2021 at am     loratadine (CLARITIN) 10 MG tablet Take 10 mg by mouth every morning    4/5/2021 at 0730     LORazepam (ATIVAN) 1 MG tablet Take 1-2 mg by mouth 3 times daily  30 tablet  4/5/2021 at 0730     Magnesium Citrate 125 MG CAPS Take 2 capsules by mouth every morning   4/3/2021 at AM     metoprolol succinate ER  (TOPROL-XL) 25 MG 24 hr tablet TAKE ONE TABLET BY MOUTH EVERY EVENING (Patient taking differently: Take 25 mg by mouth every evening TAKE ONE TABLET BY MOUTH EVERY EVENING) 90 tablet 3 4/4/2021 at 1800     naloxone (NARCAN) 4 MG/0.1ML nasal spray Spray 1 spray (4 mg) into one nostril alternating nostrils as needed for opioid reversal Every 2-3 minutes in alternating nostrils 2 each 0 never used at PRN     omeprazole (PRILOSEC) 40 MG DR capsule Take 40 mg by mouth every morning (before breakfast) 30 minutes prior to breakfast    4/5/2021 at 0730     PARoxetine (PAXIL-CR) 25 MG 24 hr tablet Take 25 mg by mouth 2 times daily        polyethylene glycol (MIRALAX) 17 GM/Dose powder Take 1 capful by mouth every evening    Past Week at PM     progesterone (PROMETRIUM) 200 MG capsule Take 200 mg by mouth At Bedtime    4/4/2021 at 2030     Riboflavin 400 MG TABS Take 400 mg by mouth every morning   4/5/2021 at 0730     senna (SENOKOT) 8.6 MG tablet Take 2 tablets by mouth daily as needed for constipation   4/3/2021 at PRN     SUMAtriptan (IMITREX) 50 MG tablet Take 1 tablet (50 mg) by mouth at onset of headache for migraine (May repeat x1 after 2hour if HA recurs) Limit use to less than 9 days/month. 18 tablet 5 4/4/2021 at 2000     trimethobenzamide (TIGAN) 300 MG capsule Take 300 mg by mouth 3 times daily as needed for nausea    4/3/2021 at PRN     vitamin C (ASCORBIC ACID) 1000 MG TABS Take 1,000 mg by mouth every evening    4/3/2021 at PM     Vitamin D3 (CHOLECALCIFEROL) 125 MCG (5000 UT) tablet Take 2 tablets by mouth every evening   4/3/2021 at PM     zolpidem ER (AMBIEN CR) 12.5 MG CR tablet Take 12.5 mg by mouth At Bedtime    4/4/2021 at 2300     [DISCONTINUED] NONFORMULARY Take 10 mg by mouth 2 times daily Compounded Hydrocodone Bitartrate Powder -- compounded by [facility] (last filled 9/2020)  (alternates throughout the day with hydrocodone-apap  mg)   4/4/2021             Discharge  Medications:     Current Discharge Medication List      START taking these medications    Details   HYDROmorphone (DILAUDID) 2 MG tablet Take 1-2 tablets (2-4 mg) by mouth every 6 hours as needed for pain  Qty: 40 tablet, Refills: 0    Associated Diagnoses: Arthritis of left wrist      oxyCODONE (OXYCONTIN) 10 MG 12 hr tablet Take 1 tablet (10 mg) by mouth every 12 hours maximum 2 tablet(s) per day  Qty: 14 tablet, Refills: 0    Associated Diagnoses: Arthritis of left wrist         CONTINUE these medications which have NOT CHANGED    Details   carboxymethylcellulose (REFRESH PLUS) 0.5 % SOLN ophthalmic solution Place 1 drop into both eyes 3 times daily as needed for dry eyes      cyclobenzaprine (FLEXERIL) 10 MG tablet Take 10 mg by mouth daily as needed for muscle spasms     Associated Diagnoses: Preop general physical exam      !! estradiol (ESTRACE) 0.5 MG tablet Take 0.5 mg by mouth every evening (in addition to 1 mg dose to = 1.5 mg daily)      !! estradiol (ESTRACE) 1 MG tablet Take 1 mg by mouth daily (with dinner) (in addition to 0.5 mg dose to = 1.5 mg dose)      Hydrocodone-Acetaminophen  MG TABS Take 2 tablets by mouth every 4 hours as needed      ipratropium (ATROVENT) 0.06 % spray Spray 3 sprays into both nostrils daily  Qty: 45 mL, Refills: 3    Associated Diagnoses: Chronic rhinitis, unspecified type      linaclotide (LINZESS) 72 MCG capsule Take 72 mcg by mouth every morning (before breakfast)      loratadine (CLARITIN) 10 MG tablet Take 10 mg by mouth every morning       LORazepam (ATIVAN) 1 MG tablet Take 1-2 mg by mouth 3 times daily   Qty: 30 tablet      Magnesium Citrate 125 MG CAPS Take 2 capsules by mouth every morning      metoprolol succinate ER (TOPROL-XL) 25 MG 24 hr tablet TAKE ONE TABLET BY MOUTH EVERY EVENING  Qty: 90 tablet, Refills: 3    Associated Diagnoses: Palpitations      naloxone (NARCAN) 4 MG/0.1ML nasal spray Spray 1 spray (4 mg) into one nostril alternating nostrils as  needed for opioid reversal Every 2-3 minutes in alternating nostrils  Qty: 2 each, Refills: 0    Associated Diagnoses: Chronic narcotic use      omeprazole (PRILOSEC) 40 MG DR capsule Take 40 mg by mouth every morning (before breakfast) 30 minutes prior to breakfast       PARoxetine (PAXIL-CR) 25 MG 24 hr tablet Take 25 mg by mouth 2 times daily      polyethylene glycol (MIRALAX) 17 GM/Dose powder Take 1 capful by mouth every evening       progesterone (PROMETRIUM) 200 MG capsule Take 200 mg by mouth At Bedtime       Riboflavin 400 MG TABS Take 400 mg by mouth every morning      senna (SENOKOT) 8.6 MG tablet Take 2 tablets by mouth daily as needed for constipation      SUMAtriptan (IMITREX) 50 MG tablet Take 1 tablet (50 mg) by mouth at onset of headache for migraine (May repeat x1 after 2hour if HA recurs) Limit use to less than 9 days/month.  Qty: 18 tablet, Refills: 5    Associated Diagnoses: Migraine with aura and without status migrainosus, not intractable      trimethobenzamide (TIGAN) 300 MG capsule Take 300 mg by mouth 3 times daily as needed for nausea       vitamin C (ASCORBIC ACID) 1000 MG TABS Take 1,000 mg by mouth every evening       Vitamin D3 (CHOLECALCIFEROL) 125 MCG (5000 UT) tablet Take 2 tablets by mouth every evening      zolpidem ER (AMBIEN CR) 12.5 MG CR tablet Take 12.5 mg by mouth At Bedtime        !! - Potential duplicate medications found. Please discuss with provider.      STOP taking these medications       NONFORMULARY Comments:   Reason for Stopping:                       Consultations:   No consultations were requested during this admission          Brief History of Illness:   Patient was admitted for pain control following wrist fusion surgery           Hospital Course:   The patient's hospital course was unremarkable.  She recovered as anticipated and experienced no post-operative complications.           Discharge Instructions and Follow-Up:   Discharge diet: Regular   Discharge  activity: Activity as tolerated.  No lifting left arm, no weight bearing left arm  Up as tolerated   Discharge follow-up: Follow up with me in 10 days   Wound care: No dressing change.  Keep covered and dry           Discharge Disposition:   Discharged to home      Attestation:  I have reviewed today's vital signs, notes, medications, labs and imaging.    Beatrice Philippe MD

## 2021-04-07 NOTE — PLAN OF CARE
Reviewed discharge instructions and medications with patient. Questions answered. Patient discharged to home with spouse, discharge instructions, medications ( oxycontin and dilaudid), and belongings at this time.    All items returned to patient, verified with patient.

## 2021-04-07 NOTE — PLAN OF CARE
Alert, calm and cooperative, anxious at times, rates pain at 7/10, Dilaudid/Atarax, scheduled, Lorazepam scheduled for anxiety, Sumatriptan x 1 for migraine, continent x 2, continue to monitor.

## 2021-04-08 ENCOUNTER — TELEPHONE (OUTPATIENT)
Dept: FAMILY MEDICINE | Facility: CLINIC | Age: 71
End: 2021-04-08

## 2021-04-08 NOTE — TELEPHONE ENCOUNTER
Chief Complaint: Arthritis Of Left Wrist, Arthritis Of Left Wrist, Instability Of Wrist Joint, Left, WED 07-APR-2021, 0 / 1    Pt ph: [phone]

## 2021-04-08 NOTE — TELEPHONE ENCOUNTER
"Next 5 appointments (look out 90 days)    Jun 30, 2021 11:00 AM  Return Visit with Ej Castanon MD  [facility] [place] ([facility] ) [address]  [address]  [place] [address]  [phone]        ED / Discharge Outreach Protocol    Patient Contact    Attempt # 1    Was call answered?  Yes.  \"May I please speak with <Laurel>\"  Is patient available?   Yes    ED/Discharge Protocol    \"Hi, my name is Beatrice Hernandez RN, a registered nurse, and I am calling on behalf of Dr. Lebron's office at [place].  I am calling to follow up and see how things are going for you after your recent visit.\"    \"I see that you were in the (ER/UC/IP) on 4/5/21 .    How are you doing now that you are home?\" doing pretty well - hard time managing pain     Is patient experiencing symptoms that may require a hospital visit?  No   Surgeon was told to contact pt's pain clinic - orthopedic office could not speak with pain clinic - was given large dose of dilaudid - new Rx was changed dosing from orthopedics     Discharge Instructions    \"Let's review your discharge instructions.  What is/are the follow-up recommendations?  Pt. Response: follow up with orthopedics    \"Were you instructed to make a follow-up appointment?\"  Pt. Response: No.       \"When you see the provider, I would recommend that you bring your discharge instructions with you.    Medications    \"How many new medications are you on since your hospitalization/ED visit?\"    0-1  \"How many of your current medicines changed (dose, timing, name, etc.) while you were in the hospital/ED visit?\"   0-1  \"Do you have questions about your medications?\"   No  \"Were you newly diagnosed with heart failure, COPD, diabetes or did you have a heart attack?\"   No  For patients on insulin: \"Did you start on insulin in the hospital or did you have your insulin dose changed?\"   No  Post Discharge Medication Reconciliation Status: " "discharge medications reconciled, continue medications without change.    Was MTM referral placed (*Make sure to put transitions as reason for referral)?   No    Call Summary    \"Do you have any questions or concerns about your condition or care plan at the moment?\"    No  Triage nurse advice given    Patient was in ER 2x in the past year (assess appropriateness of ER visits.)      \"If you have questions or things don't continue to improve, we encourage you contact us through the main clinic number,  ([phone]).  Even if the clinic is not open, triage nurses are available 24/7 to help you.     We would like you to know that our clinic has extended hours (provide information).  We also have urgent care (provide details on closest location and hours/contact info)\"      \"Thank you for your time and take care!\"      Beatrice MILLER RN    "

## 2021-04-12 ENCOUNTER — TRANSFERRED RECORDS (OUTPATIENT)
Dept: HEALTH INFORMATION MANAGEMENT | Facility: CLINIC | Age: 71
End: 2021-04-12

## 2021-04-29 ENCOUNTER — TELEPHONE (OUTPATIENT)
Dept: FAMILY MEDICINE | Facility: CLINIC | Age: 71
End: 2021-04-29

## 2021-04-29 NOTE — TELEPHONE ENCOUNTER
Please let her know that we certainly can see her here however, we do not do proctoscopy and that is something that really should be done.  Therefore, if she is in terrible pain I would go to the emergency room.  Otherwise she should continue to try to get a hold of Dr. Dang.    Georges Lebron M.D.

## 2021-04-29 NOTE — TELEPHONE ENCOUNTER
"TO PCP     Pt called c/o  much \"rectal distress\"     Pain and burning/no bleeding     Normally sees- Dr Brock Chavez [phone]    Last visit was over a year ago per pt, they didn't find anything     Becoming \"unbearable\" now     Pt states they did an external exam last time but burning is now on the inside     Is just using Vaseline now     Unsure if she should get preparation-H/she is unsure what the problem is     She tried calling colon and rectal office but states she couldn't get through, system kept telling her there were 2 people ahead of her then it said there were 3 ahead of her     Please advise   Should she schedule an appointment with you/or continue to attempt to reach colon and rectal office?     Can we leave a detailed message on this number? YES  Phone number patient can be reached at: Home number on file [phone] (home)    Beatrice Hernandez RN  [facility] T[facility]            "

## 2021-05-11 ENCOUNTER — TRANSFERRED RECORDS (OUTPATIENT)
Dept: HEALTH INFORMATION MANAGEMENT | Facility: CLINIC | Age: 71
End: 2021-05-11

## 2021-05-11 ENCOUNTER — OFFICE VISIT (OUTPATIENT)
Dept: UROLOGY | Facility: CLINIC | Age: 71
End: 2021-05-11
Payer: MEDICARE

## 2021-05-11 VITALS
HEIGHT: 66 IN | DIASTOLIC BLOOD PRESSURE: 80 MMHG | WEIGHT: 120 LBS | HEART RATE: 68 BPM | BODY MASS INDEX: 19.29 KG/M2 | SYSTOLIC BLOOD PRESSURE: 130 MMHG | OXYGEN SATURATION: 98 %

## 2021-05-11 DIAGNOSIS — R30.0 DYSURIA: ICD-10-CM

## 2021-05-11 DIAGNOSIS — N39.0 RECURRENT UTI: Primary | ICD-10-CM

## 2021-05-11 LAB
ALBUMIN UR-MCNC: NEGATIVE MG/DL
APPEARANCE UR: CLEAR
BILIRUB UR QL STRIP: NEGATIVE
COLOR UR AUTO: YELLOW
GLUCOSE UR STRIP-MCNC: NEGATIVE MG/DL
HGB UR QL STRIP: ABNORMAL
KETONES UR STRIP-MCNC: NEGATIVE MG/DL
LEUKOCYTE ESTERASE UR QL STRIP: NEGATIVE
NITRATE UR QL: NEGATIVE
PH UR STRIP: 6 PH (ref 5–7)
RBC #/AREA URNS AUTO: 1 /HPF (ref 0–2)
RESIDUAL VOLUME (RV) (EXTERNAL): 65
SOURCE: ABNORMAL
SP GR UR STRIP: 1.01 (ref 1–1.03)
UROBILINOGEN UR STRIP-ACNC: 0.2 EU/DL (ref 0.2–1)
WBC #/AREA URNS AUTO: 0 /HPF (ref 0–5)

## 2021-05-11 PROCEDURE — 51798 US URINE CAPACITY MEASURE: CPT | Performed by: PHYSICIAN ASSISTANT

## 2021-05-11 PROCEDURE — 99203 OFFICE O/P NEW LOW 30 MIN: CPT | Mod: 25 | Performed by: PHYSICIAN ASSISTANT

## 2021-05-11 PROCEDURE — 84999 UNLISTED CHEMISTRY PROCEDURE: CPT | Mod: 90 | Performed by: PHYSICIAN ASSISTANT

## 2021-05-11 PROCEDURE — 99000 SPECIMEN HANDLING OFFICE-LAB: CPT | Performed by: PHYSICIAN ASSISTANT

## 2021-05-11 PROCEDURE — 81003 URINALYSIS AUTO W/O SCOPE: CPT | Performed by: PHYSICIAN ASSISTANT

## 2021-05-11 RX ORDER — HYDROMORPHONE HYDROCHLORIDE 2 MG/1
2 TABLET ORAL EVERY 6 HOURS PRN
COMMUNITY
End: 2022-05-03

## 2021-05-11 RX ORDER — ESTRADIOL 0.5 MG/1
TABLET ORAL
COMMUNITY
Start: 2021-01-02 | End: 2021-05-11

## 2021-05-11 RX ORDER — ESTRADIOL 0.1 MG/G
CREAM VAGINAL
Qty: 42.5 G | Refills: 3 | Status: SHIPPED | OUTPATIENT
Start: 2021-05-11 | End: 2021-06-30

## 2021-05-11 ASSESSMENT — PAIN SCALES - GENERAL: PAINLEVEL: SEVERE PAIN (6)

## 2021-05-11 ASSESSMENT — MIFFLIN-ST. JEOR: SCORE: 1091.07

## 2021-05-11 NOTE — PATIENT INSTRUCTIONS
Estrogen cream three times a week near urethra (pea-sized amount): If >$50, let me know and we can get via a compound pharmacy.    1% hydrocortisone cream urethral suppositories can be done weekly.

## 2021-05-11 NOTE — NURSING NOTE
Chief Complaint   Patient presents with     UTI     Here for a UA, PVR     PVR scan was 65ml today    Iman Crawford

## 2021-05-11 NOTE — LETTER
5/11/2021       RE: Laurel Parra  [address]  [facility] [address]     Dear Colleague,    Thank you for referring your patient, Laurel Parra, to the [facility] [place] at [facility]. Please see a copy of my visit note below.    CC: dysuria    HPI:  Laurel Parra is a 70 year old female who presents in consultation from Dr. Lebron for evaluation of the above. Previous pt of ANA MARÍA Garcia (last seen 2015). Hx of chronic urethritis and occasional bacterial cystitis. Had been on 1% hydrocortisone urethral cream.     2 e coli UTIs over a 5 month period. Abx help for a short time. No gross hematuria.  Continues with dysuria after abx courses. Can have constipation at times.     Dr. Khanna's wife.     Past Medical History:   Diagnosis Date     Anxiety     Dr. Adelina Meehan     Chronic constipation      Chronic narcotic use     [facility]     Chronic pain     Dr. Orta as of 2015     Chronic urethritis     Dr. Little     Colonic polyp 11/2011    one polyp, fu 5 years, fu 10/17 and no lesions     Depression, major, in partial remission (H)     Dr. Meehan     Depressive disorder 1971     Diarrhea 2012    eval by [place], resolved with gluten free diet     DJD (degenerative joint disease)      Dyspepsia 2020    eval by [place], had colonoscopy, egd, ct abd and pelvis, mrcp.  [place] to be dyspepsia and constipation     H/O gastroesophageal reflux (GERD)      LBP (low back pain) 2013    Dr. Jae Tong in [place], then seeing [facility] Dr. Orta     Light headed 07/2020    nl est echo     Migraines 2009    neuro eval     Palpitations 2006    holter with pvc's and pac's, echo nl     Screening 2010    dexa nl at gyn       Past Surgical History:   Procedure Laterality Date     APPENDECTOMY  2016     ARTHRODESIS WRIST Left 4/5/2021    Procedure: LEFT TOTAL WRIST FUSION;  Surgeon: Beatrice Philippe,  MD;  Location:  OR     ARTHROPLASTY CARPOMETACARPAL (THUMB JOINT) Left 2017    Procedure: ARTHROPLASTY CARPOMETACARPAL (THUMB JOINT);  REVISION  LEFT THUMB CARPOMETACARPAL ARTHROPOASTY WITH 1.1 TIGHT ROPE;  Surgeon: Beatrice Philippe MD;  Location:  SD     ARTHROSCOPY WRIST Left 2019    Procedure: LEFT WRIST ARTHROSCOPY REMOVAL OF TIGHT ROPE ; LEFT EXTENSOR POLLICIS LONGUS /EXTENSOR CARPI RADIALIS BREVIS /EXTENSOR CARPI RADIALUS LONGUS SYNOVECTOMY ; EXTENSOR POLLICIS LONGUS TRANSPOSITION ; ANTERIOR INTEROSSEOUS NEURECTOMY ; POSTERIOR INTEROSSEOUS NEURECTOMY;  Surgeon: Beatrice Philippe MD;  Location:  OR     BIOPSY       CARPAL TUNNEL RELEASE RT/LT        SECTION       COLONOSCOPY       ESOPHAGOSCOPY, GASTROSCOPY, DUODENOSCOPY (EGD), COMBINED N/A 2020    Procedure: ESOPHAGOGASTRODUODENOSCOPY, WITH BIOPSY;  Surgeon: Georges Pop MD;  Location:  GI     EXCIS BARTHOLIN GLAND/CYST       HERNIORRHAPHY INGUINAL  70's    x5     INJECT STEROID (LOCATION) Right 2019    Procedure: RIGHT WRIST CORTISONE INJECTION;  Surgeon: Beatrice Philippe MD;  Location:  OR     OPERATIVE HYSTEROSCOPY WITH MORCELLATOR N/A 2018    Procedure: OPERATIVE HYSTEROSCOPY WITH MORCELLATOR (MARTIN [employer] NEPHEW);  OPERATIVE HYSTEROSCOPY WITH TRUECLEAR MORCELLATOR 5C SCOPE ;  Surgeon: Iris Fernandez MD;  Location: [facility]     ORTHOPEDIC SURGERY Bilateral     SHOULDER ARTHROSCOPY     ORTHOPEDIC SURGERY Right 2017    right foot for plantar fasciitis     thumb surgery   and ,        Social History     Socioeconomic History     Marital status:      Spouse name: Not on file     Number of children: 1     Years of education: Not on file     Highest education level: Not on file   Occupational History     Employer: SELF   Social Needs     Financial resource strain: Not on file     Food insecurity     Worry: Not on file     Inability: Not on file     Transportation needs     Medical: Not on file      Non-medical: Not on file   Tobacco Use     Smoking status: Former Smoker     Packs/day: 0.00     Years: 5.00     Pack years: 0.00     Quit date: 10/29/1979     Years since quittin.5     Smokeless tobacco: Never Used     Tobacco comment: infrequent use for about 5 years   Substance and Sexual Activity     Alcohol use: No     Alcohol/week: 0.0 standard drinks     Drug use: No     Sexual activity: Not Currently     Partners: Male     Birth control/protection: Post-menopausal   Lifestyle     Physical activity     Days per week: Not on file     Minutes per session: Not on file     Stress: Not on file   Relationships     Social connections     Talks on phone: Not on file     Gets together: Not on file     Attends [employer] service: Not on file     Active member of club or organization: Not on file     Attends meetings of clubs or organizations: Not on file     Relationship status: Not on file     Intimate partner violence     Fear of current or ex partner: Not on file     Emotionally abused: Not on file     Physically abused: Not on file     Forced sexual activity: Not on file   Other Topics Concern     Parent/sibling w/ CABG, MI or angioplasty before 65F 55M? No   Social History Narrative     Not on file       Family History   Problem Relation Age of Onset     Diabetes Father      Hypertension Father      Depression Father      Substance Abuse Father      Anesthesia Reaction Father         went into shock with surgery      Obesity Father      Anxiety Disorder Father      Diabetes Mother      Cerebrovascular Disease Mother         Cadasils disease     Depression Mother      Mental Illness Mother         borderline personality disorder, Chemical dependency     Substance Abuse Mother      Genetic Disorder Mother         Cadasils     Obesity Mother      Anxiety Disorder Mother      Cerebrovascular Disease Paternal Grandfather      Cerebrovascular Disease Brother         Cadasils disease     Depression Brother       "Anxiety Disorder Brother      Substance Abuse Brother      Genetic Disorder Brother         cadasils     Obesity Brother      Hypertension Brother      Cerebrovascular Disease Sister         Cadasils disease     Depression Sister      Anxiety Disorder Sister      Genetic Disorder Sister         cadasils     Obesity Sister      Cerebrovascular Disease Sister         Cadasils disease     Genetic Disorder Sister         cadasils     Breast Cancer Sister      Depression Sister      Anxiety Disorder Sister      Obesity Sister      Hypertension Sister        ROS:14 point ROS neg other than the symptoms noted above in the HPI.    Allergies   Allergen Reactions     Betamethasone Other (See Comments)     agitation     Chocolate Other (See Comments)     Triggers migraines     Compazine [Prochlorperazine] Other (See Comments)     \"crawl out of my skin\"     Penicillins Nausea and Vomiting and Hives     Pneumococcal Vaccine Other (See Comments)     Temporary paralization     Tizanidine Other (See Comments)     Severe agitation       Current Outpatient Medications   Medication     carboxymethylcellulose (REFRESH PLUS) 0.5 % SOLN ophthalmic solution     cyclobenzaprine (FLEXERIL) 10 MG tablet     estradiol (ESTRACE) 1 MG tablet     Hydrocodone-Acetaminophen  MG TABS     HYDROmorphone (DILAUDID) 2 MG tablet     ipratropium (ATROVENT) 0.06 % spray     linaclotide (LINZESS) 72 MCG capsule     linaclotide (LINZESS) 72 MCG capsule     loratadine (CLARITIN) 10 MG tablet     LORazepam (ATIVAN) 1 MG tablet     Magnesium Citrate 125 MG CAPS     metoprolol succinate ER (TOPROL-XL) 25 MG 24 hr tablet     naloxone (NARCAN) 4 MG/0.1ML nasal spray     omeprazole (PRILOSEC) 40 MG DR capsule     PARoxetine (PAXIL-CR) 25 MG 24 hr tablet     polyethylene glycol (MIRALAX) 17 GM/Dose powder     progesterone (PROMETRIUM) 200 MG capsule     Riboflavin 400 MG TABS     sulfamethoxazole-trimethoprim (BACTRIM DS) 800-160 MG tablet     " "trimethobenzamide (TIGAN) 300 MG capsule     vitamin C (ASCORBIC ACID) 1000 MG TABS     Vitamin D3 (CHOLECALCIFEROL) 125 MCG (5000 UT) tablet     zolpidem ER (AMBIEN CR) 12.5 MG CR tablet     No current facility-administered medications for this visit.          PEx:   /80   Pulse 68   Ht 1.676 m (5' 6\")   Wt 54.4 kg (120 lb)   SpO2 98%   BMI 19.37 kg/m      PSYCH: NAD  EYES: EOMI  MOUTH: MMM  NEURO: AAO x3    Urine: small blood, micro normal      A/P: Laurel Chayo Parra is a 70 year old female with dysuria 2/2 atrophy and urethritis, hx of UTIs  -Hydrocortisone 1% urethra supps, PRN wkly  -Estrogen cream three times a week near urethra (pea-sized amount): If >$50, she will let me know and we can get via a compound pharmacy.  -Med check 3 months, female urology referral if not improved.     Abimbola Chamberlain PA-C  [facility]    Prescription drug management  26 minutes spent on the date of the encounter doing chart review, review of test results, interpretation of tests, patient visit and documentation   6}    "

## 2021-05-19 ENCOUNTER — TRANSFERRED RECORDS (OUTPATIENT)
Dept: HEALTH INFORMATION MANAGEMENT | Facility: CLINIC | Age: 71
End: 2021-05-19

## 2021-06-02 ENCOUNTER — TRANSFERRED RECORDS (OUTPATIENT)
Dept: HEALTH INFORMATION MANAGEMENT | Facility: CLINIC | Age: 71
End: 2021-06-02

## 2021-06-14 ENCOUNTER — TRANSFERRED RECORDS (OUTPATIENT)
Dept: HEALTH INFORMATION MANAGEMENT | Facility: CLINIC | Age: 71
End: 2021-06-14

## 2021-06-30 ENCOUNTER — TRANSFERRED RECORDS (OUTPATIENT)
Dept: HEALTH INFORMATION MANAGEMENT | Facility: CLINIC | Age: 71
End: 2021-06-30

## 2021-06-30 ENCOUNTER — OFFICE VISIT (OUTPATIENT)
Dept: NEUROLOGY | Facility: CLINIC | Age: 71
End: 2021-06-30
Attending: PSYCHIATRY & NEUROLOGY
Payer: MEDICARE

## 2021-06-30 VITALS
BODY MASS INDEX: 19.29 KG/M2 | HEART RATE: 73 BPM | DIASTOLIC BLOOD PRESSURE: 88 MMHG | HEIGHT: 66 IN | OXYGEN SATURATION: 100 % | WEIGHT: 120 LBS | SYSTOLIC BLOOD PRESSURE: 121 MMHG

## 2021-06-30 DIAGNOSIS — G43.109 MIGRAINE WITH AURA AND WITHOUT STATUS MIGRAINOSUS, NOT INTRACTABLE: Primary | ICD-10-CM

## 2021-06-30 DIAGNOSIS — M54.81 BILATERAL OCCIPITAL NEURALGIA: ICD-10-CM

## 2021-06-30 DIAGNOSIS — G44.209 TENSION HEADACHE: ICD-10-CM

## 2021-06-30 PROCEDURE — G0463 HOSPITAL OUTPT CLINIC VISIT: HCPCS

## 2021-06-30 PROCEDURE — 99215 OFFICE O/P EST HI 40 MIN: CPT | Performed by: PSYCHIATRY & NEUROLOGY

## 2021-06-30 RX ORDER — ZINC GLUCONATE 50 MG
50 TABLET ORAL DAILY
COMMUNITY
End: 2022-01-04

## 2021-06-30 ASSESSMENT — MIFFLIN-ST. JEOR: SCORE: 1086.07

## 2021-06-30 NOTE — PATIENT INSTRUCTIONS
AFTER VISIT SUMMARY (AVS):    At today's visit we thoroughly discussed current symptoms, available treatment options, and the plan.    We discussed several treatment options including switching your metoprolol to propranolol, or trial of topiramate versus CGRP antagonists (Aimovig, Emgality, or Ajovy).  We decided to start with switching metoprolol to propranolol.  Please discuss the switch with your primary care doctor and get back to me.    We also discussed the importance of staying at 9 days/month of Imitrex to prevent rebound headaches.  You could use naproxen or ibuprofen if you need acute therapy more often.    Please keep the headache diary and bring it to the next follow-up visit or upload via My Chart.    Next follow-up appointment is in the next 3 months or earlier if needed.    Please do not hesitate to call me with any questions or concerns.    Thanks.

## 2021-06-30 NOTE — PROGRESS NOTES
ESTABLISHED PATIENT NEUROLOGY NOTE    DATE OF VISIT: 6/30/2021  CLINIC LOCATION: [facility]  MRN: [id number]  PATIENT NAME: Laurle Parra  YOB: 1950    PCP: Georges Lebron MD    REASON FOR VISIT:   Chief Complaint   Patient presents with     Headache     migraine f/u     SUBJECTIVE:                                                      HISTORY OF PRESENT ILLNESS: Patient is here to follow up regarding multifactorial headache disorder. The last visit was done virtually on 3/9/2021. At that time we added zolmitriptan for acute therapy. Please refer to my initial/other prior notes for further information.    Since the last visit, the patient reports worsening of her migraines. We reviewed her headache diary together.  In April she had 8 attacks, in May 5, and in June she had 9-day stretch when she had recurrence of migraine every day requiring her to take Imitrex every time and she had 2 additional headaches.  She is still recovering for her left wrist fusion and in the process of going off of the hormonal replacement.  Her Trintellix was switched to Paxil.  She continues to do dry needling and neck exercises that are helpful.    For headache prevention she is on 400 mg of riboflavin. For acute therapy she is on sumatriptan because zolmitriptan was not covered by her insurance.  It works well.  Also uses ibuprofen or naproxen. No significant side effects, though not always helpful. No interval development of new neurological symptoms.    On review of systems, patient endorses no other active complaints. Medications, allergies, family and social history were also reviewed. There are no changes reported by patient.  REVIEW OF SYSTEMS:                                                    10-system review was completed. Pertinent positives are included in HPI. The remainder of ROS is negative.  EXAM:                                                    Physical Exam:   Vitals:  "/88   Pulse 73   Ht 1.676 m (5' 6\")   Wt 54.4 kg (120 lb)   SpO2 100%   BMI 19.37 kg/m      General: pt is in NAD, cooperative.  Skin: normal turgor, moist mucous membranes, no lesions/rashes noticed.  HEENT: ATNC, white sclera, normal conjunctiva.  Respiratory: Symmetric lung excursion, no accessory respiratory muscle use.  Abdomen: Non distended.  Neurological: awake, cooperative, follows commands, no exam changes compared to the last visit.  ASSESSMENT AND PLAN:                                                    Assessment: 71-year-old female patient with multifactorial (migrainous, tension, and possibly bilateral occipital neuralgia components) headache disorder presents for follow-up.    We had a detailed discussion with the patient regarding her current symptoms, available treatment options, and the plan. In the past we reviewed additional preventive options in detail.  Today, we reviewed them again and decided to switch from metoprolol to propranolol if it is okay with her primary care provider, who is currently prescribing metoprolol.  We also discussed alternative treatment options of trying Topamax or one of the CGRP antagonists if the first option does not work.  The patient will reach out to me once she receives the response from her primary care provider.  We also discussed the importance of keeping her acute medication use within recommended parameters of 9 days or less per month for Imitrex and 15 days or less per month for other analgesics.    Diagnoses:    ICD-10-CM    1. Migraine with aura and without status migrainosus, not intractable  G43.109    2. Tension headache  G44.209    3. Bilateral occipital neuralgia  M54.81      Plan: At today's visit we thoroughly discussed current symptoms, available treatment options, and the plan.    We discussed several treatment options including switching her metoprolol to propranolol, or trial of topiramate versus CGRP antagonists (Aimovig, Emgality, " or [place]).  We decided to start with switching metoprolol to propranolol.  The patient will discuss the switch with her primary care doctor and get back to me.    We also discussed the importance of staying at 9 days/month of Imitrex to prevent rebound headaches.  We discussed that she could use naproxen or ibuprofen if she needs acute therapy more often.    I instructed the patient to keep the headache diary and bring it to the next follow-up visit or upload via My Chart.    Next follow-up appointment is in the next 3 months or earlier if needed.    Total Time: 40 minutes spent on the date of the encounter doing chart review, history and exam, documentation and further activities per the note.    Ej Castanon MD  [facility]  (Chart documentation was completed in part with Dragon voice-recognition software. Even though reviewed, some grammatical, spelling, and word errors may remain.)

## 2021-06-30 NOTE — Clinical Note
Laurel is having increased frequency of her migraines.  I am wondering if he could switch her metoprolol to propranolol to help with migraine prevention.  If you agree with this switch, could you tell me what the equivalent dose of propranolol would be or write her new prescription for propranolol?  Thank you so much, Ej Castanon MD.

## 2021-06-30 NOTE — LETTER
6/30/2021         RE: Laurel Parra  [address]  [facility] [address]        Dear Colleague,    Thank you for referring your patient, Laurel Parra, to the [facility] [place]. Please see a copy of my visit note below.    ESTABLISHED PATIENT NEUROLOGY NOTE    DATE OF VISIT: 6/30/2021  CLINIC LOCATION: [facility]  MRN: [id number]  PATIENT NAME: Laurel Parra  YOB: 1950    PCP: Georges Lebron MD    REASON FOR VISIT:   Chief Complaint   Patient presents with     Headache     migraine f/u     SUBJECTIVE:                                                      HISTORY OF PRESENT ILLNESS: Patient is here to follow up regarding multifactorial headache disorder. The last visit was done virtually on 3/9/2021. At that time we added zolmitriptan for acute therapy. Please refer to my initial/other prior notes for further information.    Since the last visit, the patient reports worsening of her migraines. We reviewed her headache diary together.  In April she had 8 attacks, in May 5, and in June she had 9-day stretch when she had recurrence of migraine every day requiring her to take Imitrex every time and she had 2 additional headaches.  She is still recovering for her left wrist fusion and in the process of going off of the hormonal replacement.  Her Trintellix was switched to Paxil.  She continues to do dry needling and neck exercises that are helpful.    For headache prevention she is on 400 mg of riboflavin. For acute therapy she is on sumatriptan because zolmitriptan was not covered by her insurance.  It works well.  Also uses ibuprofen or naproxen. No significant side effects, though not always helpful. No interval development of new neurological symptoms.    On review of systems, patient endorses no other active complaints. Medications, allergies, family and social history were also reviewed. There are no  "changes reported by patient.  REVIEW OF SYSTEMS:                                                    10-system review was completed. Pertinent positives are included in HPI. The remainder of ROS is negative.  EXAM:                                                    Physical Exam:   Vitals: /88   Pulse 73   Ht 1.676 m (5' 6\")   Wt 54.4 kg (120 lb)   SpO2 100%   BMI 19.37 kg/m      General: pt is in NAD, cooperative.  Skin: normal turgor, moist mucous membranes, no lesions/rashes noticed.  HEENT: ATNC, white sclera, normal conjunctiva.  Respiratory: Symmetric lung excursion, no accessory respiratory muscle use.  Abdomen: Non distended.  Neurological: awake, cooperative, follows commands, no exam changes compared to the last visit.  ASSESSMENT AND PLAN:                                                    Assessment: 71-year-old female patient with multifactorial (migrainous, tension, and possibly bilateral occipital neuralgia components) headache disorder presents for follow-up.    We had a detailed discussion with the patient regarding her current symptoms, available treatment options, and the plan. In the past we reviewed additional preventive options in detail.  Today, we reviewed them again and decided to switch from metoprolol to propranolol if it is okay with her primary care provider, who is currently prescribing metoprolol.  We also discussed alternative treatment options of trying Topamax or one of the CGRP antagonists if the first option does not work.  The patient will reach out to me once she receives the response from her primary care provider.  We also discussed the importance of keeping her acute medication use within recommended parameters of 9 days or less per month for Imitrex and 15 days or less per month for other analgesics.    Diagnoses:    ICD-10-CM    1. Migraine with aura and without status migrainosus, not intractable  G43.109    2. Tension headache  G44.209    3. Bilateral occipital " neuralgia  M54.81      Plan: At today's visit we thoroughly discussed current symptoms, available treatment options, and the plan.    We discussed several treatment options including switching her metoprolol to propranolol, or trial of topiramate versus CGRP antagonists (Aimovig, Emgality, or Ajovy).  We decided to start with switching metoprolol to propranolol.  The patient will discuss the switch with her primary care doctor and get back to me.    We also discussed the importance of staying at 9 days/month of Imitrex to prevent rebound headaches.  We discussed that she could use naproxen or ibuprofen if she needs acute therapy more often.    I instructed the patient to keep the headache diary and bring it to the next follow-up visit or upload via My Chart.    Next follow-up appointment is in the next 3 months or earlier if needed.    Total Time: 40 minutes spent on the date of the encounter doing chart review, history and exam, documentation and further activities per the note.    Ej Castanon MD  [facility]  (Chart documentation was completed in part with Dragon voice-recognition software. Even though reviewed, some grammatical, spelling, and word errors may remain.)      Again, thank you for allowing me to participate in the care of your patient.        Sincerely,        Ej Castanon MD

## 2021-07-15 ENCOUNTER — TRANSFERRED RECORDS (OUTPATIENT)
Dept: HEALTH INFORMATION MANAGEMENT | Facility: CLINIC | Age: 71
End: 2021-07-15

## 2021-07-19 ENCOUNTER — TRANSFERRED RECORDS (OUTPATIENT)
Dept: HEALTH INFORMATION MANAGEMENT | Facility: CLINIC | Age: 71
End: 2021-07-19

## 2021-08-03 ENCOUNTER — TRANSFERRED RECORDS (OUTPATIENT)
Dept: HEALTH INFORMATION MANAGEMENT | Facility: CLINIC | Age: 71
End: 2021-08-03

## 2021-08-04 ENCOUNTER — MYC MEDICAL ADVICE (OUTPATIENT)
Dept: FAMILY MEDICINE | Facility: CLINIC | Age: 71
End: 2021-08-04

## 2021-08-04 NOTE — TELEPHONE ENCOUNTER
PCP, please see patient's My Chart message.     Vickie LoweRN  [facility]

## 2021-08-05 ENCOUNTER — VIRTUAL VISIT (OUTPATIENT)
Dept: FAMILY MEDICINE | Facility: CLINIC | Age: 71
End: 2021-08-05
Payer: MEDICARE

## 2021-08-05 DIAGNOSIS — R19.7 DIARRHEA, UNSPECIFIED TYPE: Primary | ICD-10-CM

## 2021-08-05 PROCEDURE — 99442 PR PHYSICIAN TELEPHONE EVALUATION 11-20 MIN: CPT | Mod: 95 | Performed by: INTERNAL MEDICINE

## 2021-08-05 NOTE — TELEPHONE ENCOUNTER
Pt was called. Left a detailed voice message asking pt to schedule a telephone visit at 3:00 PM today. My chart message was also sent. Once pt has scheduled an appt, encounter can be closed.

## 2021-08-05 NOTE — PROGRESS NOTES
Laurel is a 71 year old who is being evaluated via a billable telephone visit.      What phone number would you like to be contacted at? [phone]  How would you like to obtain your AVS? Mail a copy    I spoke with patient, has had diarrhea for 10 days.  When first started was uncontrollable, could not get to bathroom.  Started taking imodium and that helped.  She spoke with her gi doctor and the linzess she had been on was stopped.  This did not really help much.  If the patient takes one imodium every 6 hours her diarrhea is controlled.  She also notes poor appetite, nothing tastes right.  Lots of stomach noises.  Stools are like mustard colored water, no b/b stools.  She has been off the linzess for 10 days.  She is not having abdomen pains, some nausea.  No fever.  No recent travel.  She has been on ab recently for uti, finished it 3 days ago, but had diarrhea prior to this.  The ab was bactrim.  She has had occasional diarrhea before but not like this.  This can be uncontrollable.  Her last colonoscopy was normal in September.  She recently stopped her hrt.      ASSESSMENT:  Diarrhea, doubt tumor, doubt upper, doubt meds    PLAN:  Stool tests  For now continue imodium  Call if worsens  Georges Lebron M.D.  13 minutes on the day of the encounter doing chart review, history and exam, documentation and further activities as noted above.

## 2021-08-10 ENCOUNTER — TRANSFERRED RECORDS (OUTPATIENT)
Dept: HEALTH INFORMATION MANAGEMENT | Facility: CLINIC | Age: 71
End: 2021-08-10

## 2021-08-18 ENCOUNTER — TELEPHONE (OUTPATIENT)
Dept: UROLOGY | Facility: CLINIC | Age: 71
End: 2021-08-18

## 2021-08-18 ENCOUNTER — VIRTUAL VISIT (OUTPATIENT)
Dept: UROLOGY | Facility: CLINIC | Age: 71
End: 2021-08-18
Payer: MEDICARE

## 2021-08-18 VITALS — BODY MASS INDEX: 19.29 KG/M2 | HEIGHT: 66 IN | WEIGHT: 120 LBS

## 2021-08-18 DIAGNOSIS — R30.0 DYSURIA: Primary | ICD-10-CM

## 2021-08-18 DIAGNOSIS — N39.0 RECURRENT UTI: ICD-10-CM

## 2021-08-18 PROCEDURE — 99213 OFFICE O/P EST LOW 20 MIN: CPT | Mod: 95 | Performed by: PHYSICIAN ASSISTANT

## 2021-08-18 RX ORDER — METHENAMINE HIPPURATE 1000 MG/1
1 TABLET ORAL 2 TIMES DAILY
Qty: 180 TABLET | Refills: 0 | Status: SHIPPED | OUTPATIENT
Start: 2021-08-18 | End: 2021-11-10

## 2021-08-18 ASSESSMENT — MIFFLIN-ST. JEOR: SCORE: 1086.07

## 2021-08-18 ASSESSMENT — PAIN SCALES - GENERAL: PAINLEVEL: SEVERE PAIN (6)

## 2021-08-18 NOTE — PROGRESS NOTES
*send link via e mail*    PT STATES THINGS ARE COMPLICATED  THINGS ARE NOT WORKING OUT   SHE WILL FILL YOU IN  Laurel is a 71 year old who is being evaluated via a billable video visit.      How would you like to obtain your AVS? MyChart  If the video visit is dropped, the invitation should be resent by: Send to e-mail at: [email or web address]  Will anyone else be joining your video visit? No      Video Start Time: 12:34 PM  Video-Visit Details    Type of service:  Video Visit    Video End Time:12:51 PM    Originating Location (pt. Location): Home    Distant Location (provider location):  [facility]     Platform used for Video Visit: NextPotential     CC: dysuria     HPI:  Laurel Parra is a pleasant 71 year old female who presents in follow-up of the above. Previous pt of ANA MARÍA Garcia (last seen 2015). Hx of chronic urethritis and occasional bacterial cystitis. Had been on 1% hydrocortisone urethral cream in the past.      Had two e coli UTIs over a 5 month period. Abx help for a short time. No gross hematuria.  Continues with intermittent dysuria after abx courses. Can have constipation at times. Did have 10 days of diarrhea after taking Linezz.      Tried the hydrocortisone supp x1 and had UTI symptoms (UC not obtained) and self-treated with Macrobid.     Had not been using the topical estrogen cream.      Dr. Khanna's wife. Former smoker.      Past Medical History        Past Medical History:   Diagnosis Date     Anxiety       Dr. Adelina Meehan     Chronic constipation       Chronic narcotic use       [facility]     Chronic pain       Dr. Orta as of 2015     Chronic urethritis       Dr. Little     Colonic polyp 11/2011     one polyp, fu 5 years, fu 10/17 and no lesions     Depression, major, in partial remission (H)       Dr. Meehan     Depressive disorder 1971     Diarrhea 2012     eval by [facility], resolved with gluten free diet     DJD (degenerative joint  disease)       Dyspepsia      eval by [facility], had colonoscopy, egd, ct abd and pelvis, mrcp.  [place] to be dyspepsia and constipation     H/O gastroesophageal reflux (GERD)       LBP (low back pain)      Dr. Jae Tong in [place], then seeing [facility] Dr. You Cannon headed 2020     nl est echo     Migraines      neuro eval     Palpitations      holter with pvc's and pac's, echo nl     Screening      dexa nl at gyn            Past Surgical History         Past Surgical History:   Procedure Laterality Date     APPENDECTOMY   2016     ARTHRODESIS WRIST Left 2021     Procedure: LEFT TOTAL WRIST FUSION;  Surgeon: Beatrice Philippe MD;  Location:  OR     ARTHROPLASTY CARPOMETACARPAL (THUMB JOINT) Left 2017     Procedure: ARTHROPLASTY CARPOMETACARPAL (THUMB JOINT);  REVISION  LEFT THUMB CARPOMETACARPAL ARTHROPOASTY WITH 1.1 TIGHT ROPE;  Surgeon: Beatrice Philippe MD;  Location:  SD     ARTHROSCOPY WRIST Left 2019     Procedure: LEFT WRIST ARTHROSCOPY REMOVAL OF TIGHT ROPE ; LEFT EXTENSOR POLLICIS LONGUS /EXTENSOR CARPI RADIALIS BREVIS /EXTENSOR CARPI RADIALUS LONGUS SYNOVECTOMY ; EXTENSOR POLLICIS LONGUS TRANSPOSITION ; ANTERIOR INTEROSSEOUS NEURECTOMY ; POSTERIOR INTEROSSEOUS NEURECTOMY;  Surgeon: Beatrice Philippe MD;  Location:  OR     BIOPSY        CARPAL TUNNEL RELEASE RT/LT         SECTION        COLONOSCOPY         ESOPHAGOSCOPY, GASTROSCOPY, DUODENOSCOPY (EGD), COMBINED N/A 2020     Procedure: ESOPHAGOGASTRODUODENOSCOPY, WITH BIOPSY;  Surgeon: Georges Pop MD;  Location:  GI     EXCIS BARTHOLIN GLAND/CYST   2005     HERNIORRHAPHY INGUINAL   70's     x5     INJECT STEROID (LOCATION) Right 2019     Procedure: RIGHT WRIST CORTISONE INJECTION;  Surgeon: Beatrice Philippe MD;  Location:  OR     OPERATIVE HYSTEROSCOPY WITH MORCELLATOR N/A 2018     Procedure: OPERATIVE HYSTEROSCOPY WITH MORCELLATOR ([employer]);  OPERATIVE  HYSTEROSCOPY WITH TRUECLEAR MORCELLATOR 5C SCOPE ;  Surgeon: Iris Fernandez MD;  Location: [facility]     ORTHOPEDIC SURGERY Bilateral       SHOULDER ARTHROSCOPY     ORTHOPEDIC SURGERY Right 2017     right foot for plantar fasciitis     thumb surgery    and ,             Social History   Social History            Socioeconomic History     Marital status:        Spouse name: Not on file     Number of children: 1     Years of education: Not on file     Highest education level: Not on file   Occupational History       Employer: SELF   Social Needs     Financial resource strain: Not on file     Food insecurity       Worry: Not on file       Inability: Not on file     Transportation needs       Medical: Not on file       Non-medical: Not on file   Tobacco Use     Smoking status: Former Smoker       Packs/day: 0.00       Years: 5.00       Pack years: 0.00       Quit date: 10/29/1979       Years since quittin.5     Smokeless tobacco: Never Used     Tobacco comment: infrequent use for about 5 years   Substance and Sexual Activity     Alcohol use: No       Alcohol/week: 0.0 standard drinks     Drug use: No     Sexual activity: Not Currently       Partners: Male       Birth control/protection: Post-menopausal   Lifestyle     Physical activity       Days per week: Not on file       Minutes per session: Not on file     Stress: Not on file   Relationships     Social connections       Talks on phone: Not on file       Gets together: Not on file       Attends Congregation service: Not on file       Active member of club or organization: Not on file       Attends meetings of clubs or organizations: Not on file       Relationship status: Not on file     Intimate partner violence       Fear of current or ex partner: Not on file       Emotionally abused: Not on file       Physically abused: Not on file       Forced sexual activity: Not on file   Other Topics Concern     Parent/sibling w/ CABG, MI or angioplasty  "before 65F 55M? No   Social History Narrative     Not on file            Family History         Family History   Problem Relation Age of Onset     Diabetes Father       Hypertension Father       Depression Father       Substance Abuse Father       Anesthesia Reaction Father           went into shock with surgery      Obesity Father       Anxiety Disorder Father       Diabetes Mother       Cerebrovascular Disease Mother           Cadasils disease     Depression Mother       Mental Illness Mother           borderline personality disorder, Chemical dependency     Substance Abuse Mother       Genetic Disorder Mother           Cadasils     Obesity Mother       Anxiety Disorder Mother       Cerebrovascular Disease Paternal Grandfather       Cerebrovascular Disease Brother           Cadasils disease     Depression Brother       Anxiety Disorder Brother       Substance Abuse Brother       Genetic Disorder Brother           cadasils     Obesity Brother       Hypertension Brother       Cerebrovascular Disease Sister           Cadasils disease     Depression Sister       Anxiety Disorder Sister       Genetic Disorder Sister           cadasils     Obesity Sister       Cerebrovascular Disease Sister           Cadasils disease     Genetic Disorder Sister           cadasils     Breast Cancer Sister       Depression Sister       Anxiety Disorder Sister       Obesity Sister       Hypertension Sister              ROS:14 point ROS neg other than the symptoms noted above in the HPI.           Allergies   Allergen Reactions     Betamethasone Other (See Comments)       agitation     Chocolate Other (See Comments)       Triggers migraines     Compazine [Prochlorperazine] Other (See Comments)       \"crawl out of my skin\"     Penicillins Nausea and Vomiting and Hives     Pneumococcal Vaccine Other (See Comments)       Temporary paralization     Tizanidine Other (See Comments)       Severe agitation             Current Outpatient Medications " "  Medication     carboxymethylcellulose (REFRESH PLUS) 0.5 % SOLN ophthalmic solution     cyclobenzaprine (FLEXERIL) 10 MG tablet     estradiol (ESTRACE) 1 MG tablet     Hydrocodone-Acetaminophen  MG TABS     HYDROmorphone (DILAUDID) 2 MG tablet     ipratropium (ATROVENT) 0.06 % spray     linaclotide (LINZESS) 72 MCG capsule     linaclotide (LINZESS) 72 MCG capsule     loratadine (CLARITIN) 10 MG tablet     LORazepam (ATIVAN) 1 MG tablet     Magnesium Citrate 125 MG CAPS     metoprolol succinate ER (TOPROL-XL) 25 MG 24 hr tablet     naloxone (NARCAN) 4 MG/0.1ML nasal spray     omeprazole (PRILOSEC) 40 MG DR capsule     PARoxetine (PAXIL-CR) 25 MG 24 hr tablet     polyethylene glycol (MIRALAX) 17 GM/Dose powder     progesterone (PROMETRIUM) 200 MG capsule     Riboflavin 400 MG TABS     sulfamethoxazole-trimethoprim (BACTRIM DS) 800-160 MG tablet     trimethobenzamide (TIGAN) 300 MG capsule     vitamin C (ASCORBIC ACID) 1000 MG TABS     Vitamin D3 (CHOLECALCIFEROL) 125 MCG (5000 UT) tablet     zolpidem ER (AMBIEN CR) 12.5 MG CR tablet      No current facility-administered medications for this visit.             PEx:   /80   Pulse 68   Ht 1.676 m (5' 6\")   Wt 54.4 kg (120 lb)   SpO2 98%   BMI 19.37 kg/m       PSYCH: NAD  EYES: EOMI  MOUTH: MMM  NEURO: AAO x3     Urine: small blood, micro normal last office visit        A/P: Laurel Parra is a 71 year old female with dysuria 2/2 atrophy and urethritis, hx of UTIs  -UA/UC if symptoms of UTI.   -Hydrocortisone 1% urethra supps, PRN wkly  -Estrogen cream three times a week near urethra (pea-sized amount): If >$50, she will let me know and we can get via a compound pharmacy. Reviewed again.   -Hiprex + Vit C x 90 days  -Med check 3 months, female urology at that time if not improved. May need cysto pending symptoms given former smoker.      Abimbola Chamberlain PA-C   [facility]     Prescription drug management  26 minutes spent on the date of the " encounter doing chart review, review of test results, interpretation of tests, patient visit and documentation   6}

## 2021-08-18 NOTE — LETTER
8/18/2021       RE: Laurel Parra  [address]  [facility] [address]     Dear Colleague,    Thank you for referring your patient, Laurel Parra, to the [facility] [place] at [facility]. Please see a copy of my visit note below.    *send link via e mail*    PT STATES THINGS ARE COMPLICATED  THINGS ARE NOT WORKING OUT   SHE WILL FILL YOU IN  Laurel is a 71 year old who is being evaluated via a billable video visit.      How would you like to obtain your AVS? MyChart  If the video visit is dropped, the invitation should be resent by: Send to e-mail at: [email or web address]  Will anyone else be joining your video visit? No      Video Start Time: 12:34 PM  Video-Visit Details    Type of service:  Video Visit    Video End Time:12:51 PM    Originating Location (pt. Location): Home    Distant Location (provider location):  [facility] [place]     Platform used for Video Visit: Romeo     CC: dysuria     HPI:  Laurel Parra is a pleasant 71 year old female who presents in follow-up of the above. Previous pt of ANA MARÍA Garcia (last seen 2015). Hx of chronic urethritis and occasional bacterial cystitis. Had been on 1% hydrocortisone urethral cream in the past.      Had two e coli UTIs over a 5 month period. Abx help for a short time. No gross hematuria.  Continues with intermittent dysuria after abx courses. Can have constipation at times. Did have 10 days of diarrhea after taking Linezz.      Tried the hydrocortisone supp x1 and had UTI symptoms (UC not obtained) and self-treated with Macrobid.     Had not been using the topical estrogen cream.      Dr. Khanna's wife. Former smoker.      Past Medical History        Past Medical History:   Diagnosis Date     Anxiety       Dr. Adelina Meehan     Chronic constipation       Chronic narcotic use       [facility]     Chronic pain         You as of      Chronic urethritis       Dr. Little     Colonic polyp 2011     one polyp, fu 5 years, fu 10/17 and no lesions     Depression, major, in partial remission (H)       Dr. Meehan     Depressive disorder 1971     Diarrhea      eval by [facility], resolved with gluten free diet     DJD (degenerative joint disease)       Dyspepsia      eval by [facility], had colonoscopy, egd, ct abd and pelvis, mrcp.  [place] to be dyspepsia and constipation     H/O gastroesophageal reflux (GERD)       LBP (low back pain)      Dr. Jae Tong in [place], then seeing [facility] Dr. You Cannon headed 2020     nl est echo     Migraines      neuro eval     Palpitations      holter with pvc's and pac's, echo nl     Screening      dexa nl at gyn            Past Surgical History         Past Surgical History:   Procedure Laterality Date     APPENDECTOMY   2016     ARTHRODESIS WRIST Left 2021     Procedure: LEFT TOTAL WRIST FUSION;  Surgeon: Beatrice Philippe MD;  Location:  OR     ARTHROPLASTY CARPOMETACARPAL (THUMB JOINT) Left 2017     Procedure: ARTHROPLASTY CARPOMETACARPAL (THUMB JOINT);  REVISION  LEFT THUMB CARPOMETACARPAL ARTHROPOASTY WITH 1.1 TIGHT ROPE;  Surgeon: Beatrice Philippe MD;  Location:  SD     ARTHROSCOPY WRIST Left 2019     Procedure: LEFT WRIST ARTHROSCOPY REMOVAL OF TIGHT ROPE ; LEFT EXTENSOR POLLICIS LONGUS /EXTENSOR CARPI RADIALIS BREVIS /EXTENSOR CARPI RADIALUS LONGUS SYNOVECTOMY ; EXTENSOR POLLICIS LONGUS TRANSPOSITION ; ANTERIOR INTEROSSEOUS NEURECTOMY ; POSTERIOR INTEROSSEOUS NEURECTOMY;  Surgeon: Beatrice Philippe MD;  Location:  OR     BIOPSY        CARPAL TUNNEL RELEASE RT/LT   2006      SECTION        COLONOSCOPY         ESOPHAGOSCOPY, GASTROSCOPY, DUODENOSCOPY (EGD), COMBINED N/A 2020     Procedure: ESOPHAGOGASTRODUODENOSCOPY, WITH BIOPSY;  Surgeon: Georges Pop MD;  Location:  GI     EXCIS BARTHOLIN GLAND/CYST         HERNIORRHAPHY INGUINAL   70's     x5     INJECT STEROID (LOCATION) Right 2019     Procedure: RIGHT WRIST CORTISONE INJECTION;  Surgeon: Beatrice Philippe MD;  Location:  OR     OPERATIVE HYSTEROSCOPY WITH MORCELLATOR N/A 2018     Procedure: OPERATIVE HYSTEROSCOPY WITH MORCELLATOR ([employer]IN [employer]PHE[employer]);  OPERATIVE HYSTEROSCOPY WITH TRUECLEAR MORCELLATOR 5C SCOPE ;  Surgeon: Iris Fernandez MD;  Location:  SD     ORTHOPEDIC SURGERY Bilateral       SHOULDER ARTHROSCOPY     ORTHOPEDIC SURGERY Right 2017     right foot for plantar fasciitis     thumb surgery    and ,             Social History   Social History            Socioeconomic History     Marital status:        Spouse name: Not on file     Number of children: 1     Years of education: Not on file     Highest education level: Not on file   Occupational History       Employer: SELF   Social Needs     Financial resource strain: Not on file     Food insecurity       Worry: Not on file       Inability: Not on file     Transportation needs       Medical: Not on file       Non-medical: Not on file   Tobacco Use     Smoking status: Former Smoker       Packs/day: 0.00       Years: 5.00       Pack years: 0.00       Quit date: 10/29/1979       Years since quittin.5     Smokeless tobacco: Never Used     Tobacco comment: infrequent use for about 5 years   Substance and Sexual Activity     Alcohol use: No       Alcohol/week: 0.0 standard drinks     Drug use: No     Sexual activity: Not Currently       Partners: Male       Birth control/protection: Post-menopausal   Lifestyle     Physical activity       Days per week: Not on file       Minutes per session: Not on file     Stress: Not on file   Relationships     Social connections       Talks on phone: Not on file       Gets together: Not on file       Attends Sikh service: Not on file       Active member of club or organization: Not on file       Attends meetings of clubs or  organizations: Not on file       Relationship status: Not on file     Intimate partner violence       Fear of current or ex partner: Not on file       Emotionally abused: Not on file       Physically abused: Not on file       Forced sexual activity: Not on file   Other Topics Concern     Parent/sibling w/ CABG, MI or angioplasty before 65F 55M? No   Social History Narrative     Not on file            Family History         Family History   Problem Relation Age of Onset     Diabetes Father       Hypertension Father       Depression Father       Substance Abuse Father       Anesthesia Reaction Father           went into shock with surgery      Obesity Father       Anxiety Disorder Father       Diabetes Mother       Cerebrovascular Disease Mother           Cadasils disease     Depression Mother       Mental Illness Mother           borderline personality disorder, Chemical dependency     Substance Abuse Mother       Genetic Disorder Mother           Cadasils     Obesity Mother       Anxiety Disorder Mother       Cerebrovascular Disease Paternal Grandfather       Cerebrovascular Disease Brother           Cadasils disease     Depression Brother       Anxiety Disorder Brother       Substance Abuse Brother       Genetic Disorder Brother           cadasils     Obesity Brother       Hypertension Brother       Cerebrovascular Disease Sister           Cadasils disease     Depression Sister       Anxiety Disorder Sister       Genetic Disorder Sister           cadasils     Obesity Sister       Cerebrovascular Disease Sister           Cadasils disease     Genetic Disorder Sister           cadasils     Breast Cancer Sister       Depression Sister       Anxiety Disorder Sister       Obesity Sister       Hypertension Sister              ROS:14 point ROS neg other than the symptoms noted above in the HPI.           Allergies   Allergen Reactions     Betamethasone Other (See Comments)       agitation     Chocolate Other (See Comments)  "      Triggers migraines     Compazine [Prochlorperazine] Other (See Comments)       \"crawl out of my skin\"     Penicillins Nausea and Vomiting and Hives     Pneumococcal Vaccine Other (See Comments)       Temporary paralization     Tizanidine Other (See Comments)       Severe agitation             Current Outpatient Medications   Medication     carboxymethylcellulose (REFRESH PLUS) 0.5 % SOLN ophthalmic solution     cyclobenzaprine (FLEXERIL) 10 MG tablet     estradiol (ESTRACE) 1 MG tablet     Hydrocodone-Acetaminophen  MG TABS     HYDROmorphone (DILAUDID) 2 MG tablet     ipratropium (ATROVENT) 0.06 % spray     linaclotide (LINZESS) 72 MCG capsule     linaclotide (LINZESS) 72 MCG capsule     loratadine (CLARITIN) 10 MG tablet     LORazepam (ATIVAN) 1 MG tablet     Magnesium Citrate 125 MG CAPS     metoprolol succinate ER (TOPROL-XL) 25 MG 24 hr tablet     naloxone (NARCAN) 4 MG/0.1ML nasal spray     omeprazole (PRILOSEC) 40 MG DR capsule     PARoxetine (PAXIL-CR) 25 MG 24 hr tablet     polyethylene glycol (MIRALAX) 17 GM/Dose powder     progesterone (PROMETRIUM) 200 MG capsule     Riboflavin 400 MG TABS     sulfamethoxazole-trimethoprim (BACTRIM DS) 800-160 MG tablet     trimethobenzamide (TIGAN) 300 MG capsule     vitamin C (ASCORBIC ACID) 1000 MG TABS     Vitamin D3 (CHOLECALCIFEROL) 125 MCG (5000 UT) tablet     zolpidem ER (AMBIEN CR) 12.5 MG CR tablet      No current facility-administered medications for this visit.             PEx:   /80   Pulse 68   Ht 1.676 m (5' 6\")   Wt 54.4 kg (120 lb)   SpO2 98%   BMI 19.37 kg/m       PSYCH: NAD  EYES: EOMI  MOUTH: MMM  NEURO: AAO x3     Urine: small blood, micro normal last office visit        A/P: Laurel Parra is a 71 year old female with dysuria 2/2 atrophy and urethritis, hx of UTIs  -UA/UC if symptoms of UTI.   -Hydrocortisone 1% urethra supps, PRN wkly  -Estrogen cream three times a week near urethra (pea-sized amount): If >$50, she " will let me know and we can get via a compound pharmacy. Reviewed again.   -Hiprex + Vit C x 90 days  -Med check 3 months, female urology at that time if not improved. May need cysto pending symptoms given former smoker.      Abimbola Chamberlain PA-C  [facility]     Prescription drug management  26 minutes spent on the date of the encounter doing chart review, review of test results, interpretation of tests, patient visit and documentation   6}

## 2021-08-18 NOTE — TELEPHONE ENCOUNTER
----- Message from Adelina Corrales sent at 8/18/2021  3:21 PM CDT -----  Return for dysruia, former smoker, hx of UTIs, Choco in 3 months if sx persist, hold cysto.    CECILLE

## 2021-08-18 NOTE — PATIENT INSTRUCTIONS
Methenamine and Vitamin C 1,000mg twice a day for 90 days as we await the efficacy of the estrogen cream.     Follow-up with Dr. Wilhelm (female specialized urologist) if not improving. She may want to look in your bladder that day with a cystoscope.     CYSTOSCOPY    What is a Cystoscopy?  This is a procedure done to check for problems inside the bladder.  Problems may include polyps (growths), tumors, inflammation (swelling and redness) and other concerns.    The doctor inserts a thin tube (called a cystoscope) into the bladder.  The tube is about the size of a pencil.  We will give you numbing medicine to reduce the pain or discomfort you may feel.    The tube allows the doctor to:  The doctor will be able to see inside the bladder by filling the bladder with water.  The water makes it easier to see any problems that may be present.

## 2021-08-19 ENCOUNTER — LAB (OUTPATIENT)
Dept: LAB | Facility: CLINIC | Age: 71
End: 2021-08-19
Payer: MEDICARE

## 2021-08-19 ENCOUNTER — TELEPHONE (OUTPATIENT)
Dept: UROLOGY | Facility: CLINIC | Age: 71
End: 2021-08-19

## 2021-08-19 DIAGNOSIS — R30.0 DYSURIA: ICD-10-CM

## 2021-08-19 DIAGNOSIS — R30.0 DYSURIA: Primary | ICD-10-CM

## 2021-08-19 LAB
ALBUMIN UR-MCNC: NEGATIVE MG/DL
APPEARANCE UR: CLEAR
BILIRUB UR QL STRIP: NEGATIVE
COLOR UR AUTO: YELLOW
GLUCOSE UR STRIP-MCNC: NEGATIVE MG/DL
HGB UR QL STRIP: ABNORMAL
KETONES UR STRIP-MCNC: ABNORMAL MG/DL
LEUKOCYTE ESTERASE UR QL STRIP: ABNORMAL
NITRATE UR QL: NEGATIVE
PH UR STRIP: 5.5 [PH] (ref 5–7)
SP GR UR STRIP: 1.01 (ref 1–1.03)
UROBILINOGEN UR STRIP-ACNC: 0.2 E.U./DL

## 2021-08-19 PROCEDURE — 87086 URINE CULTURE/COLONY COUNT: CPT

## 2021-08-19 PROCEDURE — 87186 SC STD MICRODIL/AGAR DIL: CPT

## 2021-08-19 PROCEDURE — 81003 URINALYSIS AUTO W/O SCOPE: CPT | Mod: QW

## 2021-08-19 RX ORDER — SULFAMETHOXAZOLE/TRIMETHOPRIM 800-160 MG
1 TABLET ORAL 2 TIMES DAILY
Qty: 14 TABLET | Refills: 0 | Status: SHIPPED | OUTPATIENT
Start: 2021-08-19 | End: 2021-08-26

## 2021-08-19 NOTE — TELEPHONE ENCOUNTER
[facility] Call Center    Phone Message    May a detailed message be left on voicemail: yes     Reason for Call: Symptoms or Concerns     If patient has red-flag symptoms, warm transfer to triage line    Current symptom or concern: pt is calling, statin she started using the vaginal cream as prescribed by Abimbola on 8/18/2021, however now she has UTI symptoms, burning, urgency, she was in tears as she was talking to me, pt stated she has chronic pain and this is the worst pain, please call adin thank you    Symptoms have been present for: this morning     Has patient previously been seen for this? No    By : n/a    Date: n/a    Are there any new or worsening symptoms? Yes: worsening       Action Taken: Message routed to:  [facility] uro    Travel Screening: Not Applicable

## 2021-08-19 NOTE — TELEPHONE ENCOUNTER
"\"Yes Bactrim DS BID x7 days.\" UA/UC before starting it.   Called patient and updated her with this information. She is leaving urine sample now and will go pick-up Rx.    Nati Shetty LPN    "

## 2021-08-19 NOTE — TELEPHONE ENCOUNTER
[facility] Call [facility]    Phone Message    May a detailed message be left on voicemail: yes     Reason for Call: Other: Pt would like a call back to discuss as she is very upset that she can't get antibiotics for a UTI and she needs to discuss     Action Taken: Message routed to:  [facility] Uro    Travel Screening: Not Applicable

## 2021-08-21 LAB — BACTERIA UR CULT: ABNORMAL

## 2021-08-23 ENCOUNTER — TRANSFERRED RECORDS (OUTPATIENT)
Dept: HEALTH INFORMATION MANAGEMENT | Facility: CLINIC | Age: 71
End: 2021-08-23

## 2021-08-23 NOTE — TELEPHONE ENCOUNTER
Spoke with patient last week and sent in ABT for seven days approved by PA. Instructed patient that we would call if any changes once culture came back.     Nati Shetty LPN

## 2021-08-30 ENCOUNTER — TRANSFERRED RECORDS (OUTPATIENT)
Dept: HEALTH INFORMATION MANAGEMENT | Facility: CLINIC | Age: 71
End: 2021-08-30

## 2021-09-13 ENCOUNTER — TRANSFERRED RECORDS (OUTPATIENT)
Dept: HEALTH INFORMATION MANAGEMENT | Facility: CLINIC | Age: 71
End: 2021-09-13

## 2021-09-15 ENCOUNTER — TRANSFERRED RECORDS (OUTPATIENT)
Dept: HEALTH INFORMATION MANAGEMENT | Facility: CLINIC | Age: 71
End: 2021-09-15

## 2021-09-19 ENCOUNTER — NURSE TRIAGE (OUTPATIENT)
Dept: NURSING | Facility: CLINIC | Age: 71
End: 2021-09-19

## 2021-09-19 ENCOUNTER — APPOINTMENT (OUTPATIENT)
Dept: LAB | Facility: CLINIC | Age: 71
End: 2021-09-19
Payer: MEDICARE

## 2021-09-19 NOTE — TELEPHONE ENCOUNTER
"Triage Call from Pt:    Pt stating: \"I have a UTI\"  Urgency, burning, No fever, no blood in urine    Pain: 9/10 when urinating  Takes pain medication on a regular basis without effectiveness in treating burning with urination.     Pt reports that she has a hx of UTI and sees a urologist. She would like a standing order for antibiotics. Pt deferred to her Urologist to discuss if it is advised to have a standing order for antibiotic.     Disposition: See HCP within 4 hours (Or PCP triage). Pt is going to the [facility]. Care advice given.    Yun Melendrez RN  [facility] Nurse Advisor 1:15 PM 9/19/2021    Reason for Disposition    [1] Discomfort (pain, burning or stinging) when passing urine AND [2] female    [1] SEVERE pain with urination  (e.g., excruciating) AND [2] not improved after 2 hours of pain medicine and Sitz bath    Additional Information    Negative: Shock suspected (e.g., cold/pale/clammy skin, too weak to stand, low BP, rapid pulse)    Negative: Sounds like a life-threatening emergency to the triager    Negative: Followed a genital area injury    Negative: Followed a genital area injury (penis, scrotum)    Negative: Vaginal discharge    Negative: Pus (white, yellow) or bloody discharge from end of penis    Negative: [1] Taking antibiotic for urinary tract infection (UTI) AND [2] female    Negative: [1] Taking antibiotic for urinary tract infection (UTI) AND [2] male    Negative: [1] Discomfort (pain, burning or stinging) when passing urine AND [2] pregnant    Negative: [1] Discomfort (pain, burning or stinging) when passing urine AND [2] postpartum (from 0 to 6 weeks after delivery)    Negative: Shock suspected (e.g., cold/pale/clammy skin, too weak to stand, low BP, rapid pulse)    Negative: Sounds like a life-threatening emergency to the triager    Negative: Followed a genital area injury    Negative: Taking antibiotic for urinary tract infection (UTI)    Negative: Pregnant    Negative: " Postpartum (from 0 to 6 weeks after delivery)    Negative: [1] Unable to urinate (or only a few drops) > 4 hours AND [2] bladder feels very full (e.g., palpable bladder or strong urge to urinate)    Negative: Vomiting    Negative: Patient sounds very sick or weak to the triager    Protocols used: URINARY SYMPTOMS-A-AH, URINATION PAIN - FEMALE-A-AH    COVID 19 Nurse Triage Plan/Patient Instructions    Please be aware that novel coronavirus (COVID-19) may be circulating in the community. If you develop symptoms such as fever, cough, or SOB or if you have concerns about the presence of another infection including coronavirus (COVID-19), please contact your health care provider or visit [email or web address].     Disposition/Instructions    In-Person Visit with provider recommended. Reference Visit Selection Guide.    Thank you for taking steps to prevent the spread of this virus.  o Limit your contact with others.  o Wear a simple mask to cover your cough.  o Wash your hands well and often.    Resources    [facility]: About COVID-19: [email or web address]    CDC: What to Do If You're Sick: www.cdc.gov/coronavirus/2019-ncov/about/steps-when-sick.html    CDC: Ending Home Isolation: www.cdc.gov/coronavirus/2019-ncov/hcp/disposition-in-home-patients.html     CDC: Caring for Someone: www.cdc.gov/coronavirus/2019-ncov/if-you-are-sick/care-for-someone.html     [facility]: Interim Guidance for Hospital Discharge to Home: www.health.[facility].[place]/diseases/coronavirus/hcp/hospdischarge.pdf    [facility] clinical trials (COVID-19 research studies): clinicalaffairs.[facility]/umn-clinical-trials     Below are the COVID-19 hotlines at the [place] Department of [facility] ([facility]). Interpreters are available.   o For health questions: Call 306-556-0796 or 1-364.126.9234 (7 a.m. to 7 p.m.)  o For questions about schools and childcare: Call 419-877-3370 or 1-217.934.3476 (7 a.m. to 7 p.m.)

## 2021-09-19 NOTE — TELEPHONE ENCOUNTER
Spoke with someone who was going to put an order in for a UTI.  The patient didn't understand that she would need to be seen at the urgent care for them to write the order for the urine analysis and then write an order for antibiotics based on that result. I told her otherwise, submit the sample and I can send a request to her clinic for tomorrow, for them to write the order to process the sample and then find out the results to write an order for antibiotics. She said she will wait in the urgent care to be seen today. I thanked her. I told her the on call MD would send her to the urgent care for evaluation. She is concerned she will be exposed to coronavirus. I told her they separate out those with active symptoms at the urgent cares.  Neha Rodas RN  [facility]    Reason for Disposition    Health Information question, no triage required and triager able to answer question    Additional Information    Negative: [1] Caller is not with the adult (patient) AND [2] reporting urgent symptoms    Negative: Lab result questions    Negative: Medication questions    Negative: Caller can't be reached by phone    Negative: Caller has already spoken to PCP or another triager    Negative: RN needs further essential information from caller in order to complete triage    Negative: Requesting regular office appointment    Negative: [1] Caller requesting NON-URGENT health information AND [2] PCP's office is the best resource    Protocols used: INFORMATION ONLY CALL - NO TRIAGE[facility]

## 2021-09-20 ENCOUNTER — NURSE TRIAGE (OUTPATIENT)
Dept: FAMILY MEDICINE | Facility: CLINIC | Age: 71
End: 2021-09-20
Payer: MEDICARE

## 2021-09-20 ENCOUNTER — VIRTUAL VISIT (OUTPATIENT)
Dept: FAMILY MEDICINE | Facility: CLINIC | Age: 71
End: 2021-09-20
Payer: MEDICARE

## 2021-09-20 VITALS — HEIGHT: 66 IN | BODY MASS INDEX: 20.09 KG/M2 | WEIGHT: 125 LBS

## 2021-09-20 DIAGNOSIS — R30.0 DYSURIA: Primary | ICD-10-CM

## 2021-09-20 LAB
ALBUMIN UR-MCNC: 30 MG/DL
APPEARANCE UR: ABNORMAL
BACTERIA #/AREA URNS HPF: ABNORMAL /HPF
BILIRUB UR QL STRIP: NEGATIVE
COLOR UR AUTO: YELLOW
GLUCOSE UR STRIP-MCNC: NEGATIVE MG/DL
HGB UR QL STRIP: ABNORMAL
KETONES UR STRIP-MCNC: ABNORMAL MG/DL
LEUKOCYTE ESTERASE UR QL STRIP: ABNORMAL
NITRATE UR QL: POSITIVE
PH UR STRIP: 6.5 [PH] (ref 5–7)
RBC #/AREA URNS AUTO: ABNORMAL /HPF
SP GR UR STRIP: 1.02 (ref 1–1.03)
SQUAMOUS #/AREA URNS AUTO: ABNORMAL /LPF
UROBILINOGEN UR STRIP-ACNC: 0.2 E.U./DL
WBC #/AREA URNS AUTO: >100 /HPF
WBC CLUMPS #/AREA URNS HPF: PRESENT /HPF

## 2021-09-20 PROCEDURE — 99442 PR PHYSICIAN TELEPHONE EVALUATION 11-20 MIN: CPT | Mod: 95 | Performed by: INTERNAL MEDICINE

## 2021-09-20 PROCEDURE — 81001 URINALYSIS AUTO W/SCOPE: CPT | Performed by: INTERNAL MEDICINE

## 2021-09-20 PROCEDURE — 87186 SC STD MICRODIL/AGAR DIL: CPT | Performed by: INTERNAL MEDICINE

## 2021-09-20 PROCEDURE — 87086 URINE CULTURE/COLONY COUNT: CPT | Performed by: INTERNAL MEDICINE

## 2021-09-20 RX ORDER — NITROFURANTOIN 25; 75 MG/1; MG/1
100 CAPSULE ORAL 2 TIMES DAILY
Qty: 14 CAPSULE | Refills: 1 | Status: SHIPPED | OUTPATIENT
Start: 2021-09-20 | End: 2021-12-03

## 2021-09-20 ASSESSMENT — MIFFLIN-ST. JEOR: SCORE: 1108.75

## 2021-09-20 NOTE — PROGRESS NOTES
Laurel is a 71 year old who is being evaluated via a billable telephone visit.      What phone number would you like to be contacted at? [phone]  How would you like to obtain your AVS? Mail a copy        Subjective   Laurel is a 71 year old who presents for the following health issues     Phone call for possible uti.  She has had several in the last year, has seen urol for this as well.  Not using vag estrogen as last time she took it she got uti.  She has been on hiprex via urol since Aug 18th.  She is seeing urol in Nov for this.    Her current symptoms are urgency, burning and pressure, not feeling great but no fever.  No n/v or diarrhea.  The symptoms started 2 days ago.  Her last uti was Aug 18th as noted and reviewed.  Her last ab was sulfa.    ASSESSMENT:  Uncomplicated uti, recurrent    PLAN:  macrobid for 7 days  Follow up with urol in Nov.    Georges Lebron M.D.  14 minutes on the day of the encounter doing chart review, history and exam, documentation and further activities as noted above.

## 2021-09-20 NOTE — TELEPHONE ENCOUNTER
Spoke with patient   Has a urologist but easier to do here   Has a phone visit already scheduled with PCP     Beatrice MILLER RN

## 2021-09-20 NOTE — TELEPHONE ENCOUNTER
Pt is requesting an order for a UA. Complains of urinary burning, frequency and bladder pressure since yesterday. Denies fever, abdominal/back pain, foul odor or hematuria. Has hx of frequently UTI's. Does not want to see urologist since it is easier for her to drop off a specimen at clinic. Notes she went to  yesterday but left because there was a patient with possible COVID-19 in the lobby. Pt has a sterile container at home. Asking if PCP would approve order.     Please advice on request for UA order. Ok to leave a message at [phone].      Reason for Disposition    Urinating more frequently than usual (i.e., frequency)    Additional Information    Negative: Shock suspected (e.g., cold/pale/clammy skin, too weak to stand, low BP, rapid pulse)    Negative: Sounds like a life-threatening emergency to the triager    Negative: Followed a genital area injury    Negative: Followed a genital area injury (penis, scrotum)    Negative: Vaginal discharge    Negative: Pus (white, yellow) or bloody discharge from end of penis    Negative: Discomfort (pain, burning or stinging) when passing urine and pregnant    Negative: Discomfort (pain, burning or stinging) when passing urine and female    Negative: Discomfort (pain, burning or stinging) when passing urine and male    Negative: Pain or itching in the vulvar area    Negative: Pain in scrotum is main symptom    Negative: Blood in the urine is main symptom    Negative: Symptoms arising from use of a urinary catheter (Bates or Coude)    Negative: Unable to urinate (or only a few drops) > 4 hours and bladder feels very full (e.g., palpable bladder or strong urge to urinate)    Negative: Decreased urination and drinking very little and dehydration suspected (e.g., dark urine, no urine > 12 hours, very dry mouth, very lightheaded)    Negative: Patient sounds very sick or weak to the triager    Negative: Fever > 100.4 F (38.0 C)    Negative: Side (flank) or lower back pain  present    Negative: Can't control passage of urine (i.e., urinary incontinence) and new onset (< 2 weeks) or worsening    Protocols used: URINARY SYMPTOMS-A-[place]

## 2021-09-21 ENCOUNTER — MYC MEDICAL ADVICE (OUTPATIENT)
Dept: FAMILY MEDICINE | Facility: CLINIC | Age: 71
End: 2021-09-21

## 2021-09-21 DIAGNOSIS — R30.0 DYSURIA: Primary | ICD-10-CM

## 2021-09-21 RX ORDER — SULFAMETHOXAZOLE/TRIMETHOPRIM 800-160 MG
1 TABLET ORAL 2 TIMES DAILY
Qty: 6 TABLET | Refills: 0 | Status: SHIPPED | OUTPATIENT
Start: 2021-09-21 | End: 2021-09-29

## 2021-09-22 LAB — BACTERIA UR CULT: ABNORMAL

## 2021-09-29 ENCOUNTER — OFFICE VISIT (OUTPATIENT)
Dept: NEUROLOGY | Facility: CLINIC | Age: 71
End: 2021-09-29
Attending: PSYCHIATRY & NEUROLOGY
Payer: MEDICARE

## 2021-09-29 VITALS
WEIGHT: 127.8 LBS | OXYGEN SATURATION: 96 % | BODY MASS INDEX: 20.54 KG/M2 | DIASTOLIC BLOOD PRESSURE: 80 MMHG | SYSTOLIC BLOOD PRESSURE: 95 MMHG | HEIGHT: 66 IN | HEART RATE: 67 BPM

## 2021-09-29 DIAGNOSIS — M54.81 BILATERAL OCCIPITAL NEURALGIA: ICD-10-CM

## 2021-09-29 DIAGNOSIS — G44.209 TENSION HEADACHE: ICD-10-CM

## 2021-09-29 DIAGNOSIS — G43.109 MIGRAINE WITH AURA AND WITHOUT STATUS MIGRAINOSUS, NOT INTRACTABLE: Primary | ICD-10-CM

## 2021-09-29 PROCEDURE — G0463 HOSPITAL OUTPT CLINIC VISIT: HCPCS

## 2021-09-29 PROCEDURE — 99213 OFFICE O/P EST LOW 20 MIN: CPT | Performed by: PSYCHIATRY & NEUROLOGY

## 2021-09-29 RX ORDER — SULFAMETHOXAZOLE/TRIMETHOPRIM 800-160 MG
1 TABLET ORAL 2 TIMES DAILY
Qty: 6 TABLET | Refills: 0 | Status: SHIPPED | OUTPATIENT
Start: 2021-09-29 | End: 2021-12-03

## 2021-09-29 RX ORDER — SUMATRIPTAN 50 MG/1
50 TABLET, FILM COATED ORAL
Qty: 18 TABLET | Refills: 3 | Status: SHIPPED | OUTPATIENT
Start: 2021-09-29 | End: 2022-01-04

## 2021-09-29 ASSESSMENT — MIFFLIN-ST. JEOR: SCORE: 1121.45

## 2021-09-29 NOTE — TELEPHONE ENCOUNTER
Doctor Bernardino pt is requesting a refill for Bactrim to have it on hand if she needs. Please advice on renewal.

## 2021-09-29 NOTE — TELEPHONE ENCOUNTER
Placed call.  Information given to patient from provider.  States understood and agreed with advise.  Milka Ortez RN

## 2021-09-29 NOTE — PATIENT INSTRUCTIONS
AFTER VISIT SUMMARY (AVS):    At today's visit we thoroughly discussed current symptoms, reviewed headache diary, available treatment options, and the plan.  I am pleased to hear that your headaches notably improved after switching metoprolol to propranolol.    We decided not to make any medication changes.    Please keep the headache diary and bring it to the next follow-up visit or upload via My Chart.    Next follow-up appointment is in the next 4 months (could be virtual/telephone) or earlier if needed.    Please do not hesitate to call me with any questions or concerns.    Thanks.

## 2021-09-29 NOTE — PROGRESS NOTES
ESTABLISHED PATIENT NEUROLOGY NOTE    DATE OF VISIT: 9/29/2021  CLINIC LOCATION: [facility]  MRN: [id number]  PATIENT NAME: Laurel Parra  YOB: 1950    PCP: Georges Lebron MD    REASON FOR VISIT:   Chief Complaint   Patient presents with     Headache     Reports improvement after switching to propranolol     SUBJECTIVE:                                                      HISTORY OF PRESENT ILLNESS: Patient is here to follow up regarding multifactorial headache disorder.  The last visit was in 6/30/2021.  At that time we decided to switch her metoprolol to propranolol after she discusses it with primary care provider.  Please refer to my initial/other prior notes for further information.    Since the last visit, the patient reports notable headache improvement after switching her medications.  She switched her metoprolol to 60 mg of extended release propranolol daily.  After that she only experiences 1-2 migraines per month, which made a huge difference.  She also on 400 mg of riboflavin daily.  For acute therapy she uses Imitrex, ibuprofen and naproxen with good results.  No noticeable side effects or interval development of new neurological symptoms.    On review of systems, patient endorses recent diagnosis of urinary tract infection this month and left hand pain related to arthritis and ligament issues, status post multiple surgeries (followed by hand surgeon).  She was referred to [facility] by her hand surgeon to discuss this issue, but was rejected, which is disappointing to her.  No additional active complaints medications, allergies, family and social history were also reviewed. There are no changes reported by patient.  REVIEW OF SYSTEMS:                                                    10-system review was completed. Pertinent positives are included in HPI. The remainder of ROS is negative.  EXAM:                                                   "  Physical Exam:   Vitals: BP 95/80   Pulse 67   Ht 1.676 m (5' 6\")   Wt 58 kg (127 lb 12.8 oz)   SpO2 96%   BMI 20.63 kg/m      General: pt is in NAD, cooperative.  Skin: normal turgor, moist mucous membranes, no lesions/rashes noticed.  HEENT: ATNC, white sclera, normal conjunctiva.  Respiratory: Symmetric lung excursion, no accessory respiratory muscle use.  Abdomen: Non distended.  Neurological: awake, cooperative, follows commands, no aphasia or dysarthria noted, cranial nerves II-XII: no ptosis, face is symmetric, equally moves all extremities, no dysmetria bilaterally, casual gait is normal.  ASSESSMENT AND PLAN:                                                    Assessment: 71-year-old female patient with multifactorial headache disorder (migrainous, tension, and possibly bilateral occipital neuralgia) presents for follow-up.  She reports notable improvement after switching her metoprolol to propranolol.  We discussed that her dose of propranolol could be further increased if necessary in the future.  Additional treatment options include Topamax or CGRP antagonists.  However, at the present time no changes are needed to either acute or preventive therapy.    Diagnoses:    ICD-10-CM    1. Migraine with aura and without status migrainosus, not intractable  G43.109    2. Tension headache  G44.209    3. Bilateral occipital neuralgia  M54.81      Plan: At today's visit we thoroughly discussed current symptoms, reviewed headache diary, available treatment options, and the plan.  I am pleased to hear that her headaches notably improved after switching metoprolol to propranolol.    We decided not to make any medication changes.  Imitrex prescription was refilled.    I instructed the patient to keep the headache diary and bring it to the next follow-up visit or upload via My Chart.    Next follow-up appointment is in the next 4 months (could be virtual/telephone) or earlier if needed.    Total Time: 21 minutes " spent on the date of the encounter doing chart review, history and exam, documentation and further activities per the note.    Ej Castanon MD  [facility]  (Chart documentation was completed in part with Dragon voice-recognition software. Even though reviewed, some grammatical, spelling, and word errors may remain.)

## 2021-09-29 NOTE — LETTER
9/29/2021         RE: Laurel Parra  [address]  [facility] [address]        Dear Colleague,    Thank you for referring your patient, Laurel Parra, to the [facility] [place]. Please see a copy of my visit note below.    ESTABLISHED PATIENT NEUROLOGY NOTE    DATE OF VISIT: 9/29/2021  CLINIC LOCATION: [facility]  MRN: [id number]  PATIENT NAME: Laurel Parra  YOB: 1950    PCP: Georges Lerbon MD    REASON FOR VISIT:   Chief Complaint   Patient presents with     Headache     Reports improvement after switching to propranolol     SUBJECTIVE:                                                      HISTORY OF PRESENT ILLNESS: Patient is here to follow up regarding multifactorial headache disorder.  The last visit was in 6/30/2021.  At that time we decided to switch her metoprolol to propranolol after she discusses it with primary care provider.  Please refer to my initial/other prior notes for further information.    Since the last visit, the patient reports notable headache improvement after switching her medications.  She switched her metoprolol to 60 mg of extended release propranolol daily.  After that she only experiences 1-2 migraines per month, which made a huge difference.  She also on 400 mg of riboflavin daily.  For acute therapy she uses Imitrex, ibuprofen and naproxen with good results.  No noticeable side effects or interval development of new neurological symptoms.    On review of systems, patient endorses recent diagnosis of urinary tract infection this month and left hand pain related to arthritis and ligament issues, status post multiple surgeries (followed by hand surgeon).  She was referred to [facility] by her hand surgeon to discuss this issue, but was rejected, which is disappointing to her.  No additional active complaints medications, allergies, family and social history were also  "reviewed. There are no changes reported by patient.  REVIEW OF SYSTEMS:                                                    10-system review was completed. Pertinent positives are included in HPI. The remainder of ROS is negative.  EXAM:                                                    Physical Exam:   Vitals: BP 95/80   Pulse 67   Ht 1.676 m (5' 6\")   Wt 58 kg (127 lb 12.8 oz)   SpO2 96%   BMI 20.63 kg/m      General: pt is in NAD, cooperative.  Skin: normal turgor, moist mucous membranes, no lesions/rashes noticed.  HEENT: ATNC, white sclera, normal conjunctiva.  Respiratory: Symmetric lung excursion, no accessory respiratory muscle use.  Abdomen: Non distended.  Neurological: awake, cooperative, follows commands, no aphasia or dysarthria noted, cranial nerves II-XII: no ptosis, face is symmetric, equally moves all extremities, no dysmetria bilaterally, casual gait is normal.  ASSESSMENT AND PLAN:                                                    Assessment: 71-year-old female patient with multifactorial headache disorder (migrainous, tension, and possibly bilateral occipital neuralgia) presents for follow-up.  She reports notable improvement after switching her metoprolol to propranolol.  We discussed that her dose of propranolol could be further increased if necessary in the future.  Additional treatment options include Topamax or CGRP antagonists.  However, at the present time no changes are needed to either acute or preventive therapy.    Diagnoses:    ICD-10-CM    1. Migraine with aura and without status migrainosus, not intractable  G43.109    2. Tension headache  G44.209    3. Bilateral occipital neuralgia  M54.81      Plan: At today's visit we thoroughly discussed current symptoms, reviewed headache diary, available treatment options, and the plan.  I am pleased to hear that her headaches notably improved after switching metoprolol to propranolol.    We decided not to make any medication changes.  " Imitrex prescription was refilled.    I instructed the patient to keep the headache diary and bring it to the next follow-up visit or upload via My Chart.    Next follow-up appointment is in the next 4 months (could be virtual/telephone) or earlier if needed.    Total Time: 21 minutes spent on the date of the encounter doing chart review, history and exam, documentation and further activities per the note.    Ej Castanon MD  [facility]  (Chart documentation was completed in part with Dragon voice-recognition software. Even though reviewed, some grammatical, spelling, and word errors may remain.)      Again, thank you for allowing me to participate in the care of your patient.        Sincerely,        Ej Castanon MD

## 2021-10-12 ENCOUNTER — TRANSFERRED RECORDS (OUTPATIENT)
Dept: HEALTH INFORMATION MANAGEMENT | Facility: CLINIC | Age: 71
End: 2021-10-12

## 2021-10-19 ENCOUNTER — LAB (OUTPATIENT)
Dept: LAB | Facility: CLINIC | Age: 71
End: 2021-10-19
Payer: MEDICARE

## 2021-10-19 DIAGNOSIS — R19.7 DIARRHEA, UNSPECIFIED TYPE: ICD-10-CM

## 2021-10-19 PROCEDURE — 87493 C DIFF AMPLIFIED PROBE: CPT | Mod: 59

## 2021-10-19 PROCEDURE — 87177 OVA AND PARASITES SMEARS: CPT

## 2021-10-19 PROCEDURE — 87209 SMEAR COMPLEX STAIN: CPT

## 2021-10-19 PROCEDURE — 87506 IADNA-DNA/RNA PROBE TQ 6-11: CPT

## 2021-10-20 LAB
C COLI+JEJUNI+LARI FUSA STL QL NAA+PROBE: NOT DETECTED
C DIFF TOX B STL QL: NEGATIVE
EC STX1 GENE STL QL NAA+PROBE: NOT DETECTED
EC STX2 GENE STL QL NAA+PROBE: NOT DETECTED
NOROV GI+II ORF1-ORF2 JNC STL QL NAA+PR: NOT DETECTED
O+P STL MICRO: NEGATIVE
RVA NSP5 STL QL NAA+PROBE: NOT DETECTED
SALMONELLA SP RPOD STL QL NAA+PROBE: NOT DETECTED
SHIGELLA SP+EIEC IPAH STL QL NAA+PROBE: NOT DETECTED
TRI STN SPEC: NORMAL
V CHOL+PARA RFBL+TRKH+TNAA STL QL NAA+PR: NOT DETECTED
Y ENTERO RECN STL QL NAA+PROBE: NOT DETECTED

## 2021-10-20 NOTE — RESULT ENCOUNTER NOTE
I am happy to report that the stool tests are all negative with no signs of bacteria or parasites.  If I am not mistaken you did have diarrhea several years ago which resolved on a gluten-free diet.  I am not sure if you have tried this yet but that would be the first step.  If you have already done this and your diarrhea persists please let me know.    Georges

## 2021-10-22 ENCOUNTER — TRANSFERRED RECORDS (OUTPATIENT)
Dept: HEALTH INFORMATION MANAGEMENT | Facility: CLINIC | Age: 71
End: 2021-10-22

## 2021-10-24 ENCOUNTER — HEALTH MAINTENANCE LETTER (OUTPATIENT)
Age: 71
End: 2021-10-24

## 2021-10-28 ENCOUNTER — TRANSFERRED RECORDS (OUTPATIENT)
Dept: HEALTH INFORMATION MANAGEMENT | Facility: CLINIC | Age: 71
End: 2021-10-28
Payer: MEDICARE

## 2021-11-02 NOTE — TELEPHONE ENCOUNTER
"I called and spoke with Laurel and also Tigre.  It sounds like he is being \"reckless\" , I.e he will have a friend come over and not distance 6 feet, will share food with them.  They feel it may be memory and cognitive issues at times and are worried about their safety with Covid.  It sounds as if it may be that he is stubborn but it sound like some possible memory issues as well.  She is not sure how much is memory vs just being him.    I discussed with them that this is a very hard situation but I would suggest memory eval.  I realize that he may not be willing to do this but nothing else I can think to do at this time x to communicate with him.  She understands    Call if I can help    Georges Lebron M.D.            " No

## 2021-11-10 ENCOUNTER — OFFICE VISIT (OUTPATIENT)
Dept: UROLOGY | Facility: CLINIC | Age: 71
End: 2021-11-10
Payer: MEDICARE

## 2021-11-10 VITALS
WEIGHT: 120 LBS | HEART RATE: 66 BPM | SYSTOLIC BLOOD PRESSURE: 100 MMHG | OXYGEN SATURATION: 97 % | BODY MASS INDEX: 19.29 KG/M2 | HEIGHT: 66 IN | DIASTOLIC BLOOD PRESSURE: 80 MMHG

## 2021-11-10 DIAGNOSIS — N39.0 RECURRENT UTI: ICD-10-CM

## 2021-11-10 DIAGNOSIS — R30.0 DYSURIA: Primary | ICD-10-CM

## 2021-11-10 LAB
ALBUMIN UR-MCNC: NEGATIVE MG/DL
APPEARANCE UR: CLEAR
BILIRUB UR QL STRIP: NEGATIVE
COLOR UR AUTO: YELLOW
GLUCOSE UR STRIP-MCNC: NEGATIVE MG/DL
HGB UR QL STRIP: ABNORMAL
KETONES UR STRIP-MCNC: NEGATIVE MG/DL
LEUKOCYTE ESTERASE UR QL STRIP: NEGATIVE
NITRATE UR QL: NEGATIVE
PH UR STRIP: 6 [PH] (ref 5–7)
RESIDUAL VOLUME (RV) (EXTERNAL): 34
SP GR UR STRIP: 1.02 (ref 1–1.03)
UROBILINOGEN UR STRIP-ACNC: 0.2 E.U./DL

## 2021-11-10 PROCEDURE — 81003 URINALYSIS AUTO W/O SCOPE: CPT | Mod: QW | Performed by: UROLOGY

## 2021-11-10 PROCEDURE — 51798 US URINE CAPACITY MEASURE: CPT | Performed by: UROLOGY

## 2021-11-10 PROCEDURE — 99213 OFFICE O/P EST LOW 20 MIN: CPT | Mod: 25 | Performed by: UROLOGY

## 2021-11-10 RX ORDER — METHENAMINE HIPPURATE 1000 MG/1
1 TABLET ORAL 2 TIMES DAILY
Qty: 180 TABLET | Refills: 1 | Status: SHIPPED | OUTPATIENT
Start: 2021-11-10 | End: 2022-04-12

## 2021-11-10 ASSESSMENT — MIFFLIN-ST. JEOR: SCORE: 1086.07

## 2021-11-10 ASSESSMENT — PAIN SCALES - GENERAL: PAINLEVEL: SEVERE PAIN (6)

## 2021-11-10 NOTE — PATIENT INSTRUCTIONS
Websites with free information:    [employer] patient website: www.voicesforpfd.org    Total Control Program: www.totalcontrolprogram.com    Supplements to prevent UTI to consider  -Probiotics  -Cranberry (for these products let them know a doctor is recommending them)   Ellura: www.myellura.Cloud Takeoff   Theracran HP by [employer] [facility] [address]  -d-mannose 2gm daily  -Vitamin C 500-1000mg twice a day    It was a pleasure meeting with you today.  Thank you for allowing me and my team the privilege of caring for you today.  YOU are the reason we are here, and I truly hope we provided you with the excellent service you deserve.  Please let us know if there is anything else we can do for you so that we can be sure you are leaving completely satisfied with your care experience.

## 2021-11-10 NOTE — PROGRESS NOTES
"November 10, 2021    Return visit    Patient returns today for follow up.    She has been diagnosed with lymphocytic colitis    She denies any other changes in her health since last visit.    /80 (BP Location: Left arm, Patient Position: Sitting, Cuff Size: Adult Regular)   Pulse 66   Ht 1.676 m (5' 6\")   Wt 54.4 kg (120 lb)   SpO2 97%   BMI 19.37 kg/m    GENERAL: healthy, alert and no distress  EYES: Eyes grossly normal to inspection, conjunctivae and sclerae normal  HENT: normal cephalic/atraumatic.  External ears, nose and mouth without ulcers or lesions.  RESP: no audible wheeze, cough, or visible cyanosis.  No visible retractions or increased work of breathing.  Able to speak fully in complete sentences.  NEURO: Cranial nerves grossly intact, mentation intact and speech normal  PSYCH: mentation appears normal, affect normal/bright, judgement and insight intact, normal speech and appearance well-groomed'    A/P: 71 year old F with history of Janet, dysuria and recent diagnosis of lymphocytic colitis    Doing well from the UTI standpoint and would like a refill of her methemine    She is otherwise dealing with her lymphocytic colitis and bowel issues so will plan on working with this now and return to see us in 3 months, sooner if needed    26 minutes were spent today in reviewing the EMR, direct patient care and coordination of care including discussion of prescription medication    Heide Wilhelm MD MPH  (she/her/hers)   of Urology  [facility]      CC  Patient Care Team:  Georges Lebron MD as PCP - General (Internal Medicine)  Thelma Meehan MD (Inactive) as Leydi Lu (Nurse Practitioner)  Tyler England MD as Assigned Heart and Vascular Provider  Ej Castanon MD as Assigned Neuroscience Provider  Abimbola Chamberlain PA-C as Physician Assistant (Urology)  Abimbola Chamberlain PA-C as Assigned OBGYN Provider  Georges Lebron " MD Elijah as Assigned PCP

## 2021-11-10 NOTE — NURSING NOTE
Chief Complaint   Patient presents with     history of UTIs     incomplete emptying   PVR was 34 ml.  Saima Crowder LPN

## 2021-11-10 NOTE — LETTER
"11/10/2021       RE: Laurel Parra  [address]  [facility] [address]     Dear Colleague,    Thank you for referring your patient, Laurel Parra, to the [facility] [place] at [facility]. Please see a copy of my visit note below.    November 10, 2021    Return visit    Patient returns today for follow up.    She has been diagnosed with lymphocytic colitis    She denies any other changes in her health since last visit.    /80 (BP Location: Left arm, Patient Position: Sitting, Cuff Size: Adult Regular)   Pulse 66   Ht 1.676 m (5' 6\")   Wt 54.4 kg (120 lb)   SpO2 97%   BMI 19.37 kg/m    GENERAL: healthy, alert and no distress  EYES: Eyes grossly normal to inspection, conjunctivae and sclerae normal  HENT: normal cephalic/atraumatic.  External ears, nose and mouth without ulcers or lesions.  RESP: no audible wheeze, cough, or visible cyanosis.  No visible retractions or increased work of breathing.  Able to speak fully in complete sentences.  NEURO: Cranial nerves grossly intact, mentation intact and speech normal  PSYCH: mentation appears normal, affect normal/bright, judgement and insight intact, normal speech and appearance well-groomed'    A/P: 71 year old F with history of Janet, dysuria and recent diagnosis of lymphocytic colitis    Doing well from the UTI standpoint and would like a refill of her methemine    She is otherwise dealing with her lymphocytic colitis and bowel issues so will plan on working with this now and return to see us in 3 months, sooner if needed    26 minutes were spent today in reviewing the EMR, direct patient care and coordination of care including discussion of prescription medication    Heide Wilhelm MD MPH  (she/her/hers)   of Urology  [facility]      CC  Patient Care Team:  Georges Lebron MD as PCP - General (Internal Medicine)  Zoran, " MD Thelma (Inactive) as Leydi Lu (Nurse Practitioner)  Tyler England MD as Assigned Heart and Vascular Provider  Ej Castanon MD as Assigned Neuroscience Provider  Abimbola Chamberlain PA-C as Physician Assistant (Urology)  Abimbola Chamberlain PA-C as Assigned OBGYN Provider  Georges Lebron MD as Assigned PCP

## 2021-11-11 ENCOUNTER — TRANSFERRED RECORDS (OUTPATIENT)
Dept: HEALTH INFORMATION MANAGEMENT | Facility: CLINIC | Age: 71
End: 2021-11-11
Payer: MEDICARE

## 2021-11-17 ENCOUNTER — TRANSFERRED RECORDS (OUTPATIENT)
Dept: HEALTH INFORMATION MANAGEMENT | Facility: CLINIC | Age: 71
End: 2021-11-17
Payer: MEDICARE

## 2021-11-18 ENCOUNTER — TRANSFERRED RECORDS (OUTPATIENT)
Dept: HEALTH INFORMATION MANAGEMENT | Facility: CLINIC | Age: 71
End: 2021-11-18
Payer: MEDICARE

## 2021-12-03 ENCOUNTER — OFFICE VISIT (OUTPATIENT)
Dept: FAMILY MEDICINE | Facility: CLINIC | Age: 71
End: 2021-12-03
Payer: MEDICARE

## 2021-12-03 VITALS
DIASTOLIC BLOOD PRESSURE: 88 MMHG | HEART RATE: 71 BPM | SYSTOLIC BLOOD PRESSURE: 131 MMHG | OXYGEN SATURATION: 98 % | BODY MASS INDEX: 20.61 KG/M2 | RESPIRATION RATE: 16 BRPM | HEIGHT: 68 IN | WEIGHT: 136 LBS | TEMPERATURE: 97 F

## 2021-12-03 DIAGNOSIS — H26.9 CATARACT, UNSPECIFIED CATARACT TYPE, UNSPECIFIED LATERALITY: ICD-10-CM

## 2021-12-03 DIAGNOSIS — K52.832 LYMPHOCYTIC COLITIS: ICD-10-CM

## 2021-12-03 DIAGNOSIS — G43.719 INTRACTABLE CHRONIC MIGRAINE WITHOUT AURA AND WITHOUT STATUS MIGRAINOSUS: ICD-10-CM

## 2021-12-03 DIAGNOSIS — F33.41 RECURRENT MAJOR DEPRESSIVE DISORDER, IN PARTIAL REMISSION (H): ICD-10-CM

## 2021-12-03 DIAGNOSIS — Z01.818 PRE-OP EXAMINATION: Primary | ICD-10-CM

## 2021-12-03 DIAGNOSIS — N95.1 MENOPAUSAL SYNDROME (HOT FLASHES): ICD-10-CM

## 2021-12-03 PROCEDURE — 99214 OFFICE O/P EST MOD 30 MIN: CPT | Performed by: INTERNAL MEDICINE

## 2021-12-03 ASSESSMENT — MIFFLIN-ST. JEOR: SCORE: 1190.39

## 2021-12-09 ENCOUNTER — TRANSFERRED RECORDS (OUTPATIENT)
Dept: HEALTH INFORMATION MANAGEMENT | Facility: CLINIC | Age: 71
End: 2021-12-09
Payer: MEDICARE

## 2021-12-23 ENCOUNTER — TRANSFERRED RECORDS (OUTPATIENT)
Dept: HEALTH INFORMATION MANAGEMENT | Facility: CLINIC | Age: 71
End: 2021-12-23
Payer: MEDICARE

## 2022-01-04 ENCOUNTER — VIRTUAL VISIT (OUTPATIENT)
Dept: NEUROLOGY | Facility: CLINIC | Age: 72
End: 2022-01-04
Attending: PSYCHIATRY & NEUROLOGY
Payer: MEDICARE

## 2022-01-04 DIAGNOSIS — M54.81 BILATERAL OCCIPITAL NEURALGIA: ICD-10-CM

## 2022-01-04 DIAGNOSIS — G43.109 MIGRAINE WITH AURA AND WITHOUT STATUS MIGRAINOSUS, NOT INTRACTABLE: Primary | ICD-10-CM

## 2022-01-04 DIAGNOSIS — G44.209 TENSION HEADACHE: ICD-10-CM

## 2022-01-04 PROCEDURE — 99441 PR PHYSICIAN TELEPHONE EVALUATION 5-10 MIN: CPT | Performed by: PSYCHIATRY & NEUROLOGY

## 2022-01-04 RX ORDER — SUMATRIPTAN 50 MG/1
50 TABLET, FILM COATED ORAL
Qty: 18 TABLET | Refills: 3 | Status: SHIPPED | OUTPATIENT
Start: 2022-01-04 | End: 2022-05-03

## 2022-01-04 NOTE — PATIENT INSTRUCTIONS
AFTER VISIT SUMMARY (AVS):    At today's visit we thoroughly discussed current symptoms, available treatment options, and the plan.  I am pleased to hear that your headaches remain well controlled on current therapy.  We decided not to make any medication changes.  Your Imitrex was refilled.    Please keep the headache diary and bring it to the next follow-up visit or upload via My Chart.    Next follow-up appointment is in the next 4 months or earlier if needed.    Please do not hesitate to call me with any questions or concerns.    Thanks.

## 2022-01-04 NOTE — PROGRESS NOTES
"TELEPHONE ENCOUNTER NOTE    DATE OF VISIT: 1/4/2022  CLINIC LOCATION: [facility]  MRN: [id number]  PATIENT NAME: Laurel Parra  YOB: 1950  PCP: Georges Lebron MD    Laurel Parra is a 71 year old female who is being evaluated via a billable telephone visit due to COVID-19 precautions.      The patient has been notified of following:     \"This telephone visit will be conducted via a call between you and your physician/provider. We have found that certain health care needs can be provided without the need for a physical exam.  This service lets us provide the care you need with a short phone conversation.  If a prescription is necessary we can send it directly to your pharmacy.  If lab work is needed we can place an order for that and you can then stop by our lab to have the test done at a later time.    If during the course of the call the physician/provider feels a telephone visit is not appropriate, you will not be charged for this service.\"     Laurel Parra complains of    Chief Complaint   Patient presents with     RECHECK     4 month headache return     I have reviewed and updated the patient's Past Medical History, Social History, Family History and Medication List.    ALLERGIES  Betamethasone, Chocolate, Compazine [prochlorperazine], Linzess [linaclotide], Penicillins, Pneumococcal vaccine, and Tizanidine    The consent for this service was obtained by:  Kapil Yadav MA    Additional provider notes:    REASON FOR VISIT:   Chief Complaint   Patient presents with     RECHECK     4 month headache return     SUBJECTIVE:                                                      HISTORY OF PRESENT ILLNESS: This telephone encounter is regarding multifactorial headache disorder.  She was last seen on 9/29/2021.  At that time no medication changes were made. Please refer to my initial/other prior notes for further information.    Since the " last visit, the patient reports only 2 migraine attacks associated with weather/temperature changes, but otherwise did not have any additional headaches.  For that reason she does not keep diary any longer.  For headache prevention, she is on extended release propranolol (60 mg/day) and 400 mg of riboflavin daily.  For acute therapy she uses Imitrex with good results.  No noticeable side effects.  No interval development of new neurological symptoms.    On review of systems, patient endorses no other active complaints.   ASSESSMENT AND PLAN:                                                    Assessment: 71 year old female patient, who due to COVID-19 precautions is evaluated via a telephone visit for multifactorial headache disorder.  We had a detailed discussion with the patient regarding her current symptoms, available treatment options, and the plan.  Previously we discussed additional treatment options for headache prevention that include Topamax and CGRP antagonists in addition to increasing her dose of propranolol or switching her riboflavin to Ravindra-relief.  We reviewed them again today.  However, given her very good control of headaches on current therapy, we decided not to make any medication changes.  Her Imitrex was refilled.    Diagnoses:    ICD-10-CM    1. Migraine with aura and without status migrainosus, not intractable  G43.109    2. Tension headache  G44.209    3. Bilateral occipital neuralgia  M54.81      Plan: At today's visit we thoroughly discussed current symptoms, available treatment options, and the plan.  I am pleased to hear that her headaches remain well controlled on current therapy.  We decided not to make any medication changes.  Imitrex was refilled.    I advised the patient to keep the headache diary and bring it to the next follow-up visit or upload via My Chart.    Next follow-up appointment is in the next 4 months or earlier if needed.    I have reviewed the note as documented above.   This accurately captures the substance of my conversation with the patient.    Phone call contact time: 6 minutes.  Total time 11 minutes.    Ej Castanon MD  [facility].  (Chart documentation was completed in part with Dragon voice-recognition software. Even though reviewed, some grammatical, spelling, and word errors may remain.)

## 2022-01-04 NOTE — LETTER
"    1/4/2022         RE: Laurel Parra  [address]  [facility] [address]        Dear Colleague,    Thank you for referring your patient, Laurel Parra, to the [facility] NEUROLOGY CLINIC [place]. Please see a copy of my visit note below.    TELEPHONE ENCOUNTER NOTE    DATE OF VISIT: 1/4/2022  CLINIC LOCATION: [facility]  MRN: [id number]  PATIENT NAME: Laurel Parra  YOB: 1950  PCP: Georges Lebron MD    Laurel Parra is a 71 year old female who is being evaluated via a billable telephone visit due to COVID-19 precautions.      The patient has been notified of following:     \"This telephone visit will be conducted via a call between you and your physician/provider. We have found that certain health care needs can be provided without the need for a physical exam.  This service lets us provide the care you need with a short phone conversation.  If a prescription is necessary we can send it directly to your pharmacy.  If lab work is needed we can place an order for that and you can then stop by our lab to have the test done at a later time.    If during the course of the call the physician/provider feels a telephone visit is not appropriate, you will not be charged for this service.\"     Laurel Parra complains of    Chief Complaint   Patient presents with     RECHECK     4 month headache return     I have reviewed and updated the patient's Past Medical History, Social History, Family History and Medication List.    ALLERGIES  Betamethasone, Chocolate, Compazine [prochlorperazine], Linzess [linaclotide], Penicillins, Pneumococcal vaccine, and Tizanidine    The consent for this service was obtained by:  Kapil Yadav MA    Additional provider notes:    REASON FOR VISIT:   Chief Complaint   Patient presents with     RECHECK     4 month headache return     SUBJECTIVE:                                  "                     HISTORY OF PRESENT ILLNESS: This telephone encounter is regarding multifactorial headache disorder.  She was last seen on 9/29/2021.  At that time no medication changes were made. Please refer to my initial/other prior notes for further information.    Since the last visit, the patient reports only 2 migraine attacks associated with weather/temperature changes, but otherwise did not have any additional headaches.  For that reason she does not keep diary any longer.  For headache prevention, she is on extended release propranolol (60 mg/day) and 400 mg of riboflavin daily.  For acute therapy she uses Imitrex with good results.  No noticeable side effects.  No interval development of new neurological symptoms.    On review of systems, patient endorses no other active complaints.   ASSESSMENT AND PLAN:                                                    Assessment: 71 year old female patient, who due to COVID-19 precautions is evaluated via a telephone visit for multifactorial headache disorder.  We had a detailed discussion with the patient regarding her current symptoms, available treatment options, and the plan.  Previously we discussed additional treatment options for headache prevention that include Topamax and CGRP antagonists in addition to increasing her dose of propranolol or switching her riboflavin to Ravindra-relief.  We reviewed them again today.  However, given her very good control of headaches on current therapy, we decided not to make any medication changes.  Her Imitrex was refilled.    Diagnoses:    ICD-10-CM    1. Migraine with aura and without status migrainosus, not intractable  G43.109    2. Tension headache  G44.209    3. Bilateral occipital neuralgia  M54.81      Plan: At today's visit we thoroughly discussed current symptoms, available treatment options, and the plan.  I am pleased to hear that her headaches remain well controlled on current therapy.  We decided not to make any  medication changes.  Imitrex was refilled.    I advised the patient to keep the headache diary and bring it to the next follow-up visit or upload via My Chart.    Next follow-up appointment is in the next 4 months or earlier if needed.    I have reviewed the note as documented above.  This accurately captures the substance of my conversation with the patient.    Phone call contact time: 6 minutes.  Total time 11 minutes.    Ej Castanon MD  [facility] Neurology.  (Chart documentation was completed in part with Dragon voice-recognition software. Even though reviewed, some grammatical, spelling, and word errors may remain.)        Again, thank you for allowing me to participate in the care of your patient.        Sincerely,        Ej Castanon MD

## 2022-01-05 ENCOUNTER — TELEPHONE (OUTPATIENT)
Dept: NEUROLOGY | Facility: CLINIC | Age: 72
End: 2022-01-05
Payer: MEDICARE

## 2022-01-05 ENCOUNTER — TRANSFERRED RECORDS (OUTPATIENT)
Dept: HEALTH INFORMATION MANAGEMENT | Facility: CLINIC | Age: 72
End: 2022-01-05
Payer: MEDICARE

## 2022-01-05 NOTE — TELEPHONE ENCOUNTER
Returned call from patient to schedule neurology appt with Dr. Castanon- patient didn't have time ,and stated will call back to schedule.

## 2022-02-08 ENCOUNTER — TRANSFERRED RECORDS (OUTPATIENT)
Dept: HEALTH INFORMATION MANAGEMENT | Facility: CLINIC | Age: 72
End: 2022-02-08
Payer: MEDICARE

## 2022-02-13 ENCOUNTER — HEALTH MAINTENANCE LETTER (OUTPATIENT)
Age: 72
End: 2022-02-13

## 2022-02-22 ENCOUNTER — VIRTUAL VISIT (OUTPATIENT)
Dept: UROLOGY | Facility: CLINIC | Age: 72
End: 2022-02-22
Payer: MEDICARE

## 2022-02-22 VITALS — WEIGHT: 125 LBS | BODY MASS INDEX: 20.09 KG/M2 | HEIGHT: 66 IN

## 2022-02-22 DIAGNOSIS — R30.0 DYSURIA: Primary | ICD-10-CM

## 2022-02-22 DIAGNOSIS — Z87.440 PERSONAL HISTORY OF URINARY TRACT INFECTION: ICD-10-CM

## 2022-02-22 PROCEDURE — 99214 OFFICE O/P EST MOD 30 MIN: CPT | Mod: 95 | Performed by: UROLOGY

## 2022-02-22 ASSESSMENT — PAIN SCALES - GENERAL: PAINLEVEL: MODERATE PAIN (5)

## 2022-02-22 NOTE — PROGRESS NOTES
"Laurel is a 71 year old who is being evaluated via a billable video visit.      How would you like to obtain your AVS? MyChart  If the video visit is dropped, the invitation should be resent by:my chart  Will anyone else be joining your video visit? No      Video Start Time: 1:42 PM    February 22, 2022    Laurel was seen today for recurrent utis.    Diagnoses and all orders for this visit:    Dysuria  -     Routine UA with microscopic - No culture; Future  -     Urine Culture Aerobic Bacterial; Future    Personal history of urinary tract infection  -     Routine UA with microscopic - No culture; Future  -     Urine Culture Aerobic Bacterial; Future     UA/UC orders placed for her to come in for a lab appointment to assess for UTI    She overall has been doing well.  Wishes to stop the methenamine and just continue the vitamin C    She will use prn petroleum jelly for some of her dysuria    RTC 3 months, sooner if needed    11 minutes were spent on the day of the encounter in reviewing the EMR, direct patient care including discussion of supplement and prescription medications, coordination of care including ordering UA/UC and documentation    Heide Wilhelm MD MPH  (she/her/hers)   of Urology  [facility]      Subjective    Some burning, not like UTI because burns afterwards too.  Had some premarin but not using.  Wonders if should continue methenamine.  Still dealing with her colitis.  She denies any changes in health since last visit    Ht 1.676 m (5' 6\")   Wt 56.7 kg (125 lb)   BMI 20.18 kg/m    GENERAL: healthy, alert and no distress  EYES: Eyes grossly normal to inspection, conjunctivae and sclerae normal  HENT: normal cephalic/atraumatic.  External ears, nose and mouth without ulcers or lesions.  RESP: no audible wheeze, cough, or visible cyanosis.  No visible retractions or increased work of breathing.  Able to speak fully in complete sentences.  NEURO: Cranial nerves " grossly intact, mentation intact and speech normal  PSYCH: mentation appears normal, affect normal/bright, judgement and insight intact, normal speech and appearance well-groomed    CC  Patient Care Team:  Georges Lebron MD as PCP - General (Internal Medicine)  Thelma Meehan MD (Inactive) as Leydi Lu (Nurse Practitioner)  Ej Castanon MD as Assigned Neuroscience Provider  Abimbola Chamberlain PA-C as Physician Assistant (Urology)  Georges Lebron MD as Assigned PCP  Abimbola Chamberlain PA-C as Assigned OBGYN Provider  Heide Wilhelm MD as Assigned Surgical Provider    Video-Visit Details    Type of service:  Video Visit    Video End Time:1:52 PM    Originating Location (pt. Location): Home    Distant Location (provider location):  [facility] UROLOGY CLINIC [place]     Platform used for Video Visit: [employer]

## 2022-02-22 NOTE — PATIENT INSTRUCTIONS
Stop the methenamine and just take the vitamin C    Websites with free information:    American [employer]ro[employer] Society patient website: www.voicesforpfd.org    Total Control Program: www.totalcontrolprogram.com    Supplements to prevent UTI to consider  -Probiotics  -Cranberry (for these products let them know a doctor is recommending them)   Ellura: www.myellura.AppAssure Software   Theracran HP by [employer] [facility] [address]  -d-mannose 2gm daily  -Vitamin C 500-1000mg twice a day    Use the petroleum jelly as needed    If symptoms not better please do a urine test    It was a pleasure meeting with you today.  Thank you for allowing me and my team the privilege of caring for you today.  YOU are the reason we are here, and I truly hope we provided you with the excellent service you deserve.  Please let us know if there is anything else we can do for you so that we can be sure you are leaving completely satisfied with your care experience.

## 2022-02-22 NOTE — LETTER
"2/22/2022       RE: Laurel Parra  [address]  [facility] [address]     Dear Colleague,    Thank you for referring your patient, Laurel Parra, to the [facility]OLOGY [facility] [place] [facility]. Please see a copy of my visit note below.    Laurel is a 71 year old who is being evaluated via a billable video visit.      How would you like to obtain your AVS? MyChart  If the video visit is dropped, the invitation should be resent by:my chart  Will anyone else be joining your video visit? No      Video Start Time: 1:42 PM    February 22, 2022    Laurel was seen today for recurrent utis.    Diagnoses and all orders for this visit:    Dysuria  -     Routine UA with microscopic - No culture; Future  -     Urine Culture Aerobic Bacterial; Future    Personal history of urinary tract infection  -     Routine UA with microscopic - No culture; Future  -     Urine Culture Aerobic Bacterial; Future     UA/UC orders placed for her to come in for a lab appointment to assess for UTI    She overall has been doing well.  Wishes to stop the methenamine and just continue the vitamin C    She will use prn petroleum jelly for some of her dysuria    RTC 3 months, sooner if needed    11 minutes were spent on the day of the encounter in reviewing the EMR, direct patient care including discussion of supplement and prescription medications, coordination of care including ordering UA/UC and documentation    Heide Wilhelm MD MPH  (she/her/hers)   of Urology  [facility]      Subjective    Some burning, not like UTI because burns afterwards too.  Had some premarin but not using.  Wonders if should continue methenamine.  Still dealing with her colitis.  She denies any changes in health since last visit    Ht 1.676 m (5' 6\")   Wt 56.7 kg (125 lb)   BMI 20.18 kg/m    GENERAL: healthy, alert and no distress  EYES: Eyes " grossly normal to inspection, conjunctivae and sclerae normal  HENT: normal cephalic/atraumatic.  External ears, nose and mouth without ulcers or lesions.  RESP: no audible wheeze, cough, or visible cyanosis.  No visible retractions or increased work of breathing.  Able to speak fully in complete sentences.  NEURO: Cranial nerves grossly intact, mentation intact and speech normal  PSYCH: mentation appears normal, affect normal/bright, judgement and insight intact, normal speech and appearance well-groomed    CC  Patient Care Team:  Georges Lebron MD as PCP - General (Internal Medicine)  Thelma Meehan MD (Inactive) as Leydi Lu (Nurse Practitioner)  Ej Castanon MD as Assigned Neuroscience Provider  Abimbola Chamberlain PA-C as Physician Assistant (Urology)  Georges Lebron MD as Assigned PCP  Abimbola Chamberlain PA-C as Assigned OBGYN Provider  Heide Wilhelm MD as Assigned Surgical Provider    Video-Visit Details    Type of service:  Video Visit    Video End Time:1:52 PM    Originating Location (pt. Location): Home    Distant Location (provider location):  [facility] UROLOGY CLINIC [place]     Platform used for Video Visit: [employer]

## 2022-03-08 ENCOUNTER — TRANSFERRED RECORDS (OUTPATIENT)
Dept: HEALTH INFORMATION MANAGEMENT | Facility: CLINIC | Age: 72
End: 2022-03-08
Payer: MEDICARE

## 2022-03-21 ENCOUNTER — HOSPITAL ENCOUNTER (OUTPATIENT)
Dept: MAMMOGRAPHY | Facility: CLINIC | Age: 72
Discharge: HOME | End: 2022-03-21
Attending: INTERNAL MEDICINE | Admitting: INTERNAL MEDICINE
Payer: MEDICARE

## 2022-03-21 DIAGNOSIS — Z12.31 BREAST CANCER SCREENING BY MAMMOGRAM: ICD-10-CM

## 2022-03-21 PROCEDURE — 77067 SCR MAMMO BI INCL CAD: CPT

## 2022-03-24 ENCOUNTER — TELEPHONE (OUTPATIENT)
Dept: NEUROLOGY | Facility: CLINIC | Age: 72
End: 2022-03-24
Payer: MEDICARE

## 2022-03-24 NOTE — TELEPHONE ENCOUNTER
Prior Authorization Retail Medication Request    Medication/Dose: Sumatriptan 50 mg tabs    Previously Tried and Failed:  Riboflavin naproxen , zolmitriptan, magnesium co-Q10. Other wanda conditions make the following not options:Propranolol, verapamil, gabapentin    Rationale:  Patient needs to have an effective rescue medication to help once migraine has started     Insurance Name:  [employer] [place]   Insurance ID: [id number]       Pharmacy Information (if different than what is on RX)  Name:   Phone:

## 2022-03-28 ENCOUNTER — HOSPITAL ENCOUNTER (OUTPATIENT)
Dept: MAMMOGRAPHY | Facility: CLINIC | Age: 72
Discharge: HOME | End: 2022-03-28
Attending: INTERNAL MEDICINE
Payer: MEDICARE

## 2022-03-28 DIAGNOSIS — R92.8 ABNORMAL MAMMOGRAM: ICD-10-CM

## 2022-03-28 PROCEDURE — 77061 BREAST TOMOSYNTHESIS UNI: CPT | Mod: LT

## 2022-03-28 PROCEDURE — 76642 ULTRASOUND BREAST LIMITED: CPT | Mod: LT

## 2022-03-28 NOTE — TELEPHONE ENCOUNTER
Central Prior Authorization Team   Phone: [phone]    PA Initiation    Medication: SUMAtriptan (IMITREX) 50 MG tablet   Insurance Company: [employer] - Phone [phone] Fax [phone]  Pharmacy Filling the Rx: [id number] [place] [id number] AT [facility] OF [address]  Filling Pharmacy Phone: [phone]  Filling Pharmacy Fax: [phone]  Start Date: 3/28/2022

## 2022-03-29 ENCOUNTER — LAB (OUTPATIENT)
Dept: LAB | Facility: CLINIC | Age: 72
End: 2022-03-29
Payer: MEDICARE

## 2022-03-29 ENCOUNTER — TELEPHONE (OUTPATIENT)
Dept: UROLOGY | Facility: CLINIC | Age: 72
End: 2022-03-29

## 2022-03-29 DIAGNOSIS — R30.0 DYSURIA: ICD-10-CM

## 2022-03-29 DIAGNOSIS — Z87.440 PERSONAL HISTORY OF URINARY TRACT INFECTION: ICD-10-CM

## 2022-03-29 LAB
ALBUMIN UR-MCNC: 100 MG/DL
APPEARANCE UR: CLEAR
BILIRUB UR QL STRIP: ABNORMAL
COLOR UR AUTO: YELLOW
GLUCOSE UR STRIP-MCNC: NEGATIVE MG/DL
HGB UR QL STRIP: ABNORMAL
KETONES UR STRIP-MCNC: 15 MG/DL
LEUKOCYTE ESTERASE UR QL STRIP: ABNORMAL
NITRATE UR QL: NEGATIVE
PH UR STRIP: 5 [PH] (ref 5–7)
SP GR UR STRIP: 1.02 (ref 1–1.03)
UROBILINOGEN UR STRIP-ACNC: 0.2 E.U./DL

## 2022-03-29 PROCEDURE — 81003 URINALYSIS AUTO W/O SCOPE: CPT

## 2022-03-29 PROCEDURE — 87086 URINE CULTURE/COLONY COUNT: CPT

## 2022-03-29 PROCEDURE — 87186 SC STD MICRODIL/AGAR DIL: CPT

## 2022-03-29 NOTE — TELEPHONE ENCOUNTER
Returned call to Laurel. She has a history of recurrent UTIs and is experiencing frequency and burning with urination. Orders already in chart per Dr. Wilhelm. Laurel states she will come to this clinic for UA/UC rather than her PCC. She'll call clinic for lab appt.  She has 2 meds on hand from Dr. Wilhelm and will start the Bactrim after she's submitted urine sample. She reports she has 3 days of this and by then culture results will be in. Pt expressed understanding.  BK Fernandez RN      [facility] Call Center    Phone Message    May a detailed message be left on voicemail: yes     Reason for Call: Other: Pt called in asking for lab orders to be sent to Dr Pereyra office in [place].  Pt states she has UTI.  Pt has 2 different medicatlion to take and is asking which one she should take, Nitrofurantoin-mono-mac 100mg caps (2 caps per day)  or  Sulsameth-trimethoprim 100/160mg (1 tab 2x per day).  Please give pt a call to discuss     Action Taken: Message routed to:  Other: URO    Travel Screening: Not Applicable

## 2022-03-31 ENCOUNTER — TELEPHONE (OUTPATIENT)
Dept: UROLOGY | Facility: CLINIC | Age: 72
End: 2022-03-31
Payer: MEDICARE

## 2022-03-31 DIAGNOSIS — N39.0 URINARY TRACT INFECTION: Primary | ICD-10-CM

## 2022-03-31 LAB — BACTERIA UR CULT: ABNORMAL

## 2022-03-31 RX ORDER — SULFAMETHOXAZOLE/TRIMETHOPRIM 800-160 MG
1 TABLET ORAL 2 TIMES DAILY
Qty: 4 TABLET | Refills: 0 | Status: SHIPPED | OUTPATIENT
Start: 2022-03-31 | End: 2022-04-12

## 2022-03-31 NOTE — TELEPHONE ENCOUNTER
[facility]    Phone Message    May a detailed message be left on voicemail: yes     Reason for Call: Requesting Results   Name/type of test: URine  Date of test: 3/29/22  Was test done at a location other than [facility] (Please fill in the location if not [facility])?: No    Patient states she was on antibiotics for 3 days and has not heard from clinic.      Action Taken: Message routed to:  [facility] Urology    Travel Screening: Not Applicable

## 2022-03-31 NOTE — TELEPHONE ENCOUNTER
Called patient she had 3 days of Bactrim at home  So additional 2 days sent in . She is wanting to have antibiotics at home in future . Instructed her to discuss this with DR Wilhelm at next visit. If she has UTI symptoms again call office right away for culture orders

## 2022-04-07 ENCOUNTER — TELEPHONE (OUTPATIENT)
Dept: FAMILY MEDICINE | Facility: CLINIC | Age: 72
End: 2022-04-07
Payer: MEDICARE

## 2022-04-07 NOTE — TELEPHONE ENCOUNTER
Prior Authorization Retail Medication Request    Medication/Dose: Propranolol ER 60 mg  ICD code (if different than what is on RX):  G43.719  Previously Tried and Failed:  Metoprolol  Rationale:  Patient switched from Metoprolol to Propranolol based on recommendation from neurologist to help with migraine headaches    Insurance Name:  [facility]  Insurance ID:  [id number]      Pharmacy Information (if different than what is on RX)  Name:  [id number]  Phone:  [phone]

## 2022-04-08 NOTE — TELEPHONE ENCOUNTER
Central Prior Authorization Team   Phone: [phone]      PA Initiation    Medication: Propranolol ER 60 mg--INITIATED  Insurance Company: [employer] - Phone [phone] Fax [phone]  Pharmacy Filling the Rx: [id number] [place] [id number] AT [facility] [address]  Filling Pharmacy Phone: [phone]  Filling Pharmacy Fax:    Start Date: 4/8/2022

## 2022-04-12 ENCOUNTER — VIRTUAL VISIT (OUTPATIENT)
Dept: UROLOGY | Facility: CLINIC | Age: 72
End: 2022-04-12
Payer: MEDICARE

## 2022-04-12 VITALS — HEIGHT: 66 IN | WEIGHT: 130 LBS | BODY MASS INDEX: 20.89 KG/M2

## 2022-04-12 DIAGNOSIS — N39.0 RECURRENT UTI: ICD-10-CM

## 2022-04-12 DIAGNOSIS — R30.0 DYSURIA: ICD-10-CM

## 2022-04-12 PROCEDURE — 99214 OFFICE O/P EST MOD 30 MIN: CPT | Mod: 95 | Performed by: UROLOGY

## 2022-04-12 RX ORDER — METHENAMINE HIPPURATE 1000 MG/1
1 TABLET ORAL 2 TIMES DAILY
Qty: 180 TABLET | Refills: 1 | Status: SHIPPED | OUTPATIENT
Start: 2022-04-12 | End: 2022-09-27

## 2022-04-12 ASSESSMENT — PAIN SCALES - GENERAL: PAINLEVEL: SEVERE PAIN (7)

## 2022-04-12 NOTE — PROGRESS NOTES
*PLEASE  MEET IN  MY CHART*      Laurel is a 71 year old who is being evaluated via a billable video visit.      How would you like to obtain your AVS? MyChart  If the video visit is dropped, the invitation should be resent by: Text to cell phone: [phone]  Will anyone else be joining your video visit? No      Video Start Time: 3:44 PM    Assessment & Plan     Dysuria  She had a recent UTI and so will resume the hipprex  - methenamine hippurate (HIPREX) 1 g tablet; Take 1 tablet (1 g) by mouth 2 times daily    Recurrent UTI  She has a long standing history of this.  I cannot tell that she has had any cystoscopy or pelvic exam in many years and recommend that we do this given the severity of the symptoms.  In the interim will restart the methenamine and monitor her symptoms  - methenamine hippurate (HIPREX) 1 g tablet; Take 1 tablet (1 g) by mouth 2 times daily             Return for in person for a cystoscopy.    10 minutes were spent today on the day of the encounter in reviewing the EMR including the recent urine culture, direct patient care including discussion of prescription medications, coordination of care and documentation    Heide Wilhelm MD MPH  (she/her/hers)   of Urology  [facility]      Subjective   Laurel is a 71 year old who presents for the following health issues     HPI     She saw me 2/2022 for a video visit.  She decided to stop the methenamine at that time and has since had a UTI so she scheduled the visit.      At this time her lymphocytic colitis is well controlled per her report.     She is asking to have antibiotics on hand to take because the symptoms will start suddenly and be so severe.  She states that her internist would let her collect urine at home and then start antibiotics.        Objective         Vitals:  No vitals were obtained today due to virtual visit.    Physical Exam   GENERAL: Healthy, alert and no  distress  EYES: Eyes grossly normal to inspection.  No discharge or erythema, or obvious scleral/conjunctival abnormalities.  RESP: No audible wheeze, cough, or visible cyanosis.  No visible retractions or increased work of breathing.    SKIN: Visible skin clear. No significant rash, abnormal pigmentation or lesions.  NEURO: Cranial nerves grossly intact.  Mentation and speech appropriate for age.  PSYCH: Mentation appears normal, affect normal/bright, judgement and insight intact, normal speech and appearance well-groomed.    UCx 3.29 with pan sensitive E coli     Video-Visit Details    Type of service:  Video Visit    Video End Time:3:53 PM    Originating Location (pt. Location): Home    Distant Location (provider location):  [facility] UROLOGY CLINIC [place]     Platform used for Video Visit: [employer]

## 2022-04-12 NOTE — LETTER
4/12/2022       RE: Laurel Parra  [address]  [facility] [address]     Dear Colleague,    Thank you for referring your patient, Laurel Parra, to the [facility]OLOGY CL[facility] [place] at [facility]. Please see a copy of my visit note below.                          *PLEASE  MEET IN  MY CHART*      Laurel is a 71 year old who is being evaluated via a billable video visit.      How would you like to obtain your AVS? MyChart  If the video visit is dropped, the invitation should be resent by: Text to cell phone: [phone]  Will anyone else be joining your video visit? No      Video Start Time: 3:44 PM    Assessment & Plan     Dysuria  She had a recent UTI and so will resume the hipprex  - methenamine hippurate (HIPREX) 1 g tablet; Take 1 tablet (1 g) by mouth 2 times daily    Recurrent UTI  She has a long standing history of this.  I cannot tell that she has had any cystoscopy or pelvic exam in many years and recommend that we do this given the severity of the symptoms.  In the interim will restart the methenamine and monitor her symptoms  - methenamine hippurate (HIPREX) 1 g tablet; Take 1 tablet (1 g) by mouth 2 times daily             Return for in person for a cystoscopy.    10 minutes were spent today on the day of the encounter in reviewing the EMR including the recent urine culture, direct patient care including discussion of prescription medications, coordination of care and documentation    Heide Wilhelm MD MPH  (she/her/hers)   of Urology  [facility]      Subjective   Laurel is a 71 year old who presents for the following health issues     HPI     She saw me 2/2022 for a video visit.  She decided to stop the methenamine at that time and has since had a UTI so she scheduled the visit.      At this time her lymphocytic colitis is well controlled per her report.     She is asking to  have antibiotics on hand to take because the symptoms will start suddenly and be so severe.  She states that her internist would let her collect urine at home and then start antibiotics.        Objective         Vitals:  No vitals were obtained today due to virtual visit.    Physical Exam   GENERAL: Healthy, alert and no distress  EYES: Eyes grossly normal to inspection.  No discharge or erythema, or obvious scleral/conjunctival abnormalities.  RESP: No audible wheeze, cough, or visible cyanosis.  No visible retractions or increased work of breathing.    SKIN: Visible skin clear. No significant rash, abnormal pigmentation or lesions.  NEURO: Cranial nerves grossly intact.  Mentation and speech appropriate for age.  PSYCH: Mentation appears normal, affect normal/bright, judgement and insight intact, normal speech and appearance well-groomed.    UCx 3.29 with pan sensitive E coli     Video-Visit Details    Type of service:  Video Visit    Video End Time:3:53 PM    Originating Location (pt. Location): Home    Distant Location (provider location):  [facility] UROLOGY CLINIC [place]     Platform used for Video Visit: [employer]

## 2022-04-12 NOTE — PATIENT INSTRUCTIONS
Websites with free information:    [employer] patient website: www.voicesforpfd.org    Total Control Program: www.totalcontrolprogram.com  Supplements to prevent UTI to consider  -Probiotics  -Cranberry (for these products let them know a doctor is recommending them)   Ellura: www.myellura.9DIAMOND   Theracran HP by [employer] [facility] [id number]  -d-mannose 2gm daily  -Vitamin C 500-1000mg twice a day    Please return for a cystoscopy (procedure to look in the bladder) and pelvic exam    It was a pleasure meeting with you today.  Thank you for allowing me and my team the privilege of caring for you today.  YOU are the reason we are here, and I truly hope we provided you with the excellent service you deserve.  Please let us know if there is anything else we can do for you so that we can be sure you are leaving completely satisfied with your care experience.    Cystoscopy    Cystoscopy is a procedure that lets your doctor look directly inside your urethra and bladder. It can be used to:  Help diagnose a problem with your urethra, bladder, or kidneys.  Take a sample (biopsy) of bladder or urethral tissue.  Treat certain problems (such as removing kidney stones).  Place a stent to bypass an obstruction.  Take special X-rays of the kidneys.  Based on the findings, your doctor may recommend other tests or treatments.  What is a cystoscope?  A cystoscope is a telescope-like instrument that contains lenses and fiberoptics (small glass wires that make bright light). The cystoscope may be straight and rigid, or flexible to bend around curves in the urethra. The doctor may look directly into the cystoscope, or project the image onto a monitor.  Getting ready  Ask your doctor if you should stop taking any medicines before the procedure.  Follow any other instructions your doctor gives you.  Tell your doctor before the exam if you:  Take any medicines, such as aspirin or blood thinners  Have allergies to any  medicines  Are pregnant   The procedure  Cystoscopy is done in the doctor s office, surgery center, or hospital. The doctor and a nurse are present during the procedure. It takes only a few minutes, longer if a biopsy, X-ray, or treatment needs to be done.  During the procedure:  You lie on an exam table on your back, knees bent and legs apart. You are covered with a drape.  Your urethra and the area around it are washed. Anesthetic jelly may be applied to numb the urethra.  The cystoscope is inserted. A sterile fluid is put into the bladder to expand it. You may feel pressure from this fluid.  When the procedure is done, the cystoscope is removed.  After the procedure   Once you re home:  Drink plenty of fluids.  You may have burning or light bleeding when you urinate--this is normal.  Medicines may be prescribed to ease any discomfort or prevent infection. Take these as directed.  Call your doctor if you have heavy bleeding or blood clots, burning that lasts more than a day, a fever over 100 F  (38  C), or trouble urinating.  Date Last Reviewed: 1/1/2017 2000-2017 [employer]. [address] [place] [address]. All rights reserved. This information is not intended as a substitute for professional medical care. Always follow your healthcare professional's instructions.

## 2022-04-12 NOTE — NURSING NOTE
"Chief Complaint   Patient presents with     UTI     Patient will discuss her UTI and what is her next plan       Height 1.676 m (5' 6\"), weight 59 kg (130 lb), not currently breastfeeding. Body mass index is 20.98 kg/m .    Patient Active Problem List   Diagnosis     Frequency of urination and polyuria     Anxiety     Colonic polyp     Chronic urethritis     Palpitations     Chronic narcotic use     Intractable chronic migraine without aura and without status migrainosus     Recurrent major depressive disorder, in partial remission (H)     Gastroesophageal reflux disease without esophagitis     Chronic low back pain, unspecified back pain laterality, with sciatica presence unspecified     Dyspepsia     Arthritis of left wrist     Lymphocytic colitis       Allergies   Allergen Reactions     Betamethasone Other (See Comments)     agitation     Chocolate Other (See Comments)     Triggers migraines     Compazine [Prochlorperazine] Other (See Comments)     \"crawl out of my skin\"     Linzess [Linaclotide]      Penicillins Nausea and Vomiting and Hives     Pneumococcal Vaccine Other (See Comments)     Temporary paralization     Tizanidine Other (See Comments)     Severe agitation       Current Outpatient Medications   Medication Sig Dispense Refill     carboxymethylcellulose (REFRESH PLUS) 0.5 % SOLN ophthalmic solution Place 1 drop into both eyes 3 times daily as needed for dry eyes       cyclobenzaprine (FLEXERIL) 10 MG tablet Take 10 mg by mouth daily as needed for muscle spasms        loratadine (CLARITIN) 10 MG tablet Take 10 mg by mouth every morning        LORazepam (ATIVAN) 1 MG tablet Take 1-2 mg by mouth 3 times daily 30 tablet      Magnesium Citrate 125 MG CAPS Take 2 capsules by mouth every morning       methenamine (HIPREX) 1 g tablet Take 1 tablet (1 g) by mouth 2 times daily 180 tablet 1     naloxone (NARCAN) 4 MG/0.1ML nasal spray Spray 1 spray (4 mg) into one nostril alternating nostrils as needed for " opioid reversal Every 2-3 minutes in alternating nostrils 2 each 0     omeprazole (PRILOSEC) 40 MG DR capsule Take 40 mg by mouth every morning (before breakfast) 30 minutes prior to breakfast       PARoxetine (PAXIL-CR) 25 MG 24 hr tablet Take 25 mg by mouth 2 times daily       polyethylene glycol (MIRALAX) 17 GM/Dose powder Take 1 capful by mouth every evening       propranolol ER (INDERAL LA) 60 MG 24 hr capsule Take 1 capsule (60 mg) by mouth daily 90 capsule 3     riboflavin 400 MG CAPS Take 400 mg by mouth daily       SUMAtriptan (IMITREX) 50 MG tablet Take 1 tablet (50 mg) by mouth at onset of headache for migraine (May repeat x1 after 2hour if HA recurs) Limit use to less than 9 days/month. 18 tablet 3     trimethobenzamide (TIGAN) 300 MG capsule Take 300 mg by mouth 3 times daily as needed for nausea        vitamin C (ASCORBIC ACID) 1000 MG TABS Take 1,000 mg by mouth every evening        Vitamin D3 (CHOLECALCIFEROL) 125 MCG (5000 UT) tablet Take 2 tablets by mouth every evening       zolpidem ER (AMBIEN CR) 12.5 MG CR tablet Take 12.5 mg by mouth At Bedtime        Hydrocodone-Acetaminophen  MG TABS Take 2 tablets by mouth every 4 hours as needed (Patient not taking: Reported on 2022)       HYDROmorphone (DILAUDID) 2 MG tablet Take 2 mg by mouth every 6 hours as needed for severe pain  (Patient not taking: Reported on 2022)       ipratropium (ATROVENT) 0.06 % spray Spray 3 sprays into both nostrils daily (Patient taking differently: Spray 3 sprays into both nostrils every morning ) 45 mL 3     sulfamethoxazole-trimethoprim (BACTRIM DS) 800-160 MG tablet Take 1 tablet by mouth 2 times daily (Patient not taking: Reported on 2022) 4 tablet 0       Social History     Tobacco Use     Smoking status: Former Smoker     Packs/day: 0.00     Years: 5.00     Pack years: 0.00     Quit date: 10/29/1979     Years since quittin.4     Smokeless tobacco: Never Used     Tobacco comment: infrequent  use for about 5 years   Substance Use Topics     Alcohol use: No     Alcohol/week: 0.0 standard drinks     Drug use: No       PADMA Patrick  4/12/2022  3:34 PM

## 2022-04-18 ENCOUNTER — MYC MEDICAL ADVICE (OUTPATIENT)
Dept: NEUROLOGY | Facility: CLINIC | Age: 72
End: 2022-04-18
Payer: MEDICARE

## 2022-04-19 NOTE — TELEPHONE ENCOUNTER
Central Prior Authorization Team   Phone: [phone]      Prior Authorization Approval    Authorization Effective Date: 1/1/2022  Authorization Expiration Date: 12/31/2022  Medication: Propranolol ER 60 mg--APPROVED  Approved Dose/Quantity:    Reference #:     Insurance Company: [employer] - Phone [phone] Fax [phone]  Expected CoPay:       CoPay Card Available:      Foundation Assistance Needed:    Which Pharmacy is filling the prescription (Not needed for infusion/clinic administered): [id number] [address] AT [facility] [address] [facility]  Pharmacy Notified: Yes  Patient Notified: Yes PHARMACY TO CONTACT WHEN FILLED

## 2022-04-20 ENCOUNTER — TELEPHONE (OUTPATIENT)
Dept: NEUROLOGY | Facility: CLINIC | Age: 72
End: 2022-04-20
Payer: MEDICARE

## 2022-04-20 NOTE — TELEPHONE ENCOUNTER
PA Initiation    Medication: PA for Propranolol ER 60 mg  Insurance Company: [employer] - Phone [phone] Fax [phone]  Pharmacy Filling the Rx: [id number] [place] [address] AT [facility] [place] [facility] [address]  Filling Pharmacy Phone: [phone]  Filling Pharmacy Fax:    Start Date: 4/20/2022    Central Prior Authorization Team   Phone: [phone]

## 2022-04-20 NOTE — TELEPHONE ENCOUNTER
Prior Authorization Retail Medication Request    Medication/Dose: Propranolol ER 60 mg  ICD code (if different than what is on RX): G43.109, G44.209  Previously Tried and Failed: Nortriptyline, co-Q10, magnesium, Metoprolol    Rationale: Patient has has the largest decrease in migraine occurrence with propranolol, so is able to function best when taking this medication     Insurance Name:    Insurance ID:        Pharmacy Information (if different than what is on RX)  Name:   Phone:

## 2022-04-21 NOTE — TELEPHONE ENCOUNTER
TIER EXCEPTION PRIOR AUTHORIZATION DENIED    Medication: PA for Propranolol ER 60 mg    Denial Date: 4/21/2022    Denial Rational: Please note medication is covered under plan (see telephone encounter from 04/07/22 as it has been approved)  but as this is a non-formulary drug it does not qualify for tier lowering.         Appeal Information:

## 2022-04-22 DIAGNOSIS — G43.719 INTRACTABLE CHRONIC MIGRAINE WITHOUT AURA AND WITHOUT STATUS MIGRAINOSUS: ICD-10-CM

## 2022-04-25 RX ORDER — PROPRANOLOL HCL 60 MG
CAPSULE, EXTENDED RELEASE 24HR ORAL
Qty: 90 CAPSULE | Refills: 1 | Status: SHIPPED | OUTPATIENT
Start: 2022-04-25 | End: 2022-09-07

## 2022-05-03 ENCOUNTER — OFFICE VISIT (OUTPATIENT)
Dept: NEUROLOGY | Facility: CLINIC | Age: 72
End: 2022-05-03
Payer: MEDICARE

## 2022-05-03 VITALS — DIASTOLIC BLOOD PRESSURE: 74 MMHG | HEART RATE: 67 BPM | OXYGEN SATURATION: 97 % | SYSTOLIC BLOOD PRESSURE: 106 MMHG

## 2022-05-03 DIAGNOSIS — G43.109 MIGRAINE WITH AURA AND WITHOUT STATUS MIGRAINOSUS, NOT INTRACTABLE: Primary | ICD-10-CM

## 2022-05-03 DIAGNOSIS — G44.209 TENSION HEADACHE: ICD-10-CM

## 2022-05-03 DIAGNOSIS — M54.81 BILATERAL OCCIPITAL NEURALGIA: ICD-10-CM

## 2022-05-03 PROCEDURE — 99214 OFFICE O/P EST MOD 30 MIN: CPT | Performed by: PSYCHIATRY & NEUROLOGY

## 2022-05-03 RX ORDER — SUMATRIPTAN 50 MG/1
50 TABLET, FILM COATED ORAL
Qty: 18 TABLET | Refills: 3 | Status: SHIPPED | OUTPATIENT
Start: 2022-05-03 | End: 2022-09-07

## 2022-05-03 RX ORDER — BUDESONIDE 3 MG/1
6 CAPSULE, COATED PELLETS ORAL EVERY MORNING
COMMUNITY
Start: 2022-04-14 | End: 2024-01-15

## 2022-05-03 NOTE — PATIENT INSTRUCTIONS
AFTER VISIT SUMMARY (AVS):    At today's visit we thoroughly discussed current symptoms, available treatment options, and the plan.  I am pleased to hear that your headaches remain well controlled at the present time.    We decided not to make any medication changes.  Looks like your primary care provider just refilled your propranolol prescription.  I sent in updated prescription for Imitrex today.    Please keep the headache diary and bring it to the next follow-up visit or upload via My Chart.     Next follow-up appointment is in the next 4 months or earlier if needed.    Please do not hesitate to call me with any questions or concerns.    Thanks.

## 2022-05-03 NOTE — PROGRESS NOTES
"Laurel Parra is a 71 year old female who presents for:  Chief Complaint   Patient presents with     Migraine     Patient reports migraines are stable         Initial Vitals:  /74 (BP Location: Left arm, Patient Position: Sitting, Cuff Size: Adult Regular)   Pulse 67   SpO2 97%  Estimated body mass index is 20.98 kg/m  as calculated from the following:    Height as of 4/12/22: 1.676 m (5' 6\").    Weight as of 4/12/22: 59 kg (130 lb).. There is no height or weight on file to calculate BSA. BP completed using cuff size: regular    Nursing Comments:     France Li  "

## 2022-05-03 NOTE — PROGRESS NOTES
ESTABLISHED PATIENT NEUROLOGY NOTE    DATE OF VISIT: 5/3/2022  CLINIC LOCATION: [facility]  MRN: [id number]  PATIENT NAME: Laurel Parra  YOB: 1950    REASON FOR VISIT:   Chief Complaint   Patient presents with     Migraine     Patient reports migraines are stable      SUBJECTIVE:                                                      HISTORY OF PRESENT ILLNESS: Patient is here to follow up regarding multifactorial headache disorder.  The last visit was on 1/4/2022.  No medication changes were made at that time.  Please refer to my initial/other prior notes for further information.    Since the last visit, the patient reports good control of her migraines on current therapy.  At the same time, she feels that her bioccipital pain worsened.  For headache prevention she is on 60 mg of extended release propranolol and 400 mg of riboflavin daily.  Lower cost prior authorization for propranolol was denied by insurance company, but the medication still appears to be covered at the higher co-pay price.  For acute therapy she uses Imitrex with good effect.  Denies any noticeable side effects or interval development of new focal neurological symptoms.  EXAM:                                                    Physical Exam:   Vitals: /74 (BP Location: Left arm, Patient Position: Sitting, Cuff Size: Adult Regular)   Pulse 67   SpO2 97%     General: pt is in NAD, cooperative.  Skin: normal turgor, moist mucous membranes, no lesions/rashes noticed.  HEENT: ATNC, white sclera, normal conjunctiva.  Respiratory: Symmetric lung excursion, no accessory respiratory muscle use.  Abdomen: Non distended.  Neurological: awake, cooperative, follows commands, no aphasia or dysarthria noted, cranial nerves II-XII: no ptosis, face is symmetric,equally moves all extremities, no dysmetria bilaterally, casual gait is normal.  There is tenderness to palpation of both occipital nerves  bilaterally.  ASSESSMENT AND PLAN:                                                    Assessment: 71 year old female patient presents for follow-up of multifactorial headaches, migraines, tension headaches, and bilateral occipital neuralgia.  The patient reports that her migraines appear to be well controlled, but bioccipital pain is flaring up further supported by tenderness to palpation over both occipital nerves bilaterally.  She contacted my clinic reporting PA denial of propranolol, but it appears to be still covered at the higher price.  I discussed this with the patient.  I do not believe that we need to make any medication changes for her migraine and tension headaches, but she might benefit from bilateral occipital nerve blocks with steroid.  I discussed this option as the patient, but she wanted to think about it.  She will contact me when she is ready to proceed.  She will also provide me with contact information for her preferred pain clinic that she currently uses for low back pain injections.    Diagnoses:    ICD-10-CM    1. Migraine with aura and without status migrainosus, not intractable  G43.109    2. Tension headache  G44.209    3. Bilateral occipital neuralgia  M54.81      Plan: At today's visit we thoroughly discussed current symptoms, available treatment options, and the plan.  I am pleased to hear that her headaches remain well controlled at the present time.    We decided not to make any medication changes.  It looks like her primary care provider just refilled propranolol prescription.  I sent in updated prescription for Imitrex today.    I advised the patient to keep the headache diary and bring it to the next follow-up visit or upload via My Chart.     She will reach out via My Chart when she is ready to proceed with occipital nerve blocks.    Next follow-up appointment is in the next 4 months or earlier if needed.    Total Time: 35 minutes spent on the date of the encounter doing chart  review, history and exam, documentation and further activities per the note.    Ej Castanon MD  [facility]  (Chart documentation was completed in part with Dragon voice-recognition software. Even though reviewed, some grammatical, spelling, and word errors may remain.)

## 2022-05-03 NOTE — LETTER
5/3/2022         RE: Laurel Parra  [address]  [facility] [address]        Dear Colleague,    Thank you for referring your patient, Laurel Parra, to the [facility] NEUROLOGY CLINICS [facility]. Please see a copy of my visit note below.    ESTABLISHED PATIENT NEUROLOGY NOTE    DATE OF VISIT: 5/3/2022  CLINIC LOCATION: [facility]  MRN: [id number]  PATIENT NAME: Laurel Parra  YOB: 1950    REASON FOR VISIT:   Chief Complaint   Patient presents with     Migraine     Patient reports migraines are stable      SUBJECTIVE:                                                      HISTORY OF PRESENT ILLNESS: Patient is here to follow up regarding multifactorial headache disorder.  The last visit was on 1/4/2022.  No medication changes were made at that time.  Please refer to my initial/other prior notes for further information.    Since the last visit, the patient reports good control of her migraines on current therapy.  At the same time, she feels that her bioccipital pain worsened.  For headache prevention she is on 60 mg of extended release propranolol and 400 mg of riboflavin daily.  Lower cost prior authorization for propranolol was denied by insurance company, but the medication still appears to be covered at the higher co-pay price.  For acute therapy she uses Imitrex with good effect.  Denies any noticeable side effects or interval development of new focal neurological symptoms.  EXAM:                                                    Physical Exam:   Vitals: /74 (BP Location: Left arm, Patient Position: Sitting, Cuff Size: Adult Regular)   Pulse 67   SpO2 97%     General: pt is in NAD, cooperative.  Skin: normal turgor, moist mucous membranes, no lesions/rashes noticed.  HEENT: ATNC, white sclera, normal conjunctiva.  Respiratory: Symmetric lung excursion, no accessory respiratory muscle use.  Abdomen: Non  distended.  Neurological: awake, cooperative, follows commands, no aphasia or dysarthria noted, cranial nerves II-XII: no ptosis, face is symmetric,equally moves all extremities, no dysmetria bilaterally, casual gait is normal.  There is tenderness to palpation of both occipital nerves bilaterally.  ASSESSMENT AND PLAN:                                                    Assessment: 71 year old female patient presents for follow-up of multifactorial headaches, migraines, tension headaches, and bilateral occipital neuralgia.  The patient reports that her migraines appear to be well controlled, but bioccipital pain is flaring up further supported by tenderness to palpation over both occipital nerves bilaterally.  She contacted my clinic reporting PA denial of propranolol, but it appears to be still covered at the higher price.  I discussed this with the patient.  I do not believe that we need to make any medication changes for her migraine and tension headaches, but she might benefit from bilateral occipital nerve blocks with steroid.  I discussed this option as the patient, but she wanted to think about it.  She will contact me when she is ready to proceed.  She will also provide me with contact information for her preferred pain clinic that she currently uses for low back pain injections.    Diagnoses:    ICD-10-CM    1. Migraine with aura and without status migrainosus, not intractable  G43.109    2. Tension headache  G44.209    3. Bilateral occipital neuralgia  M54.81      Plan: At today's visit we thoroughly discussed current symptoms, available treatment options, and the plan.  I am pleased to hear that her headaches remain well controlled at the present time.    We decided not to make any medication changes.  It looks like her primary care provider just refilled propranolol prescription.  I sent in updated prescription for Imitrex today.    I advised the patient to keep the headache diary and bring it to the  "next follow-up visit or upload via My Chart.     She will reach out via My Chart when she is ready to proceed with occipital nerve blocks.    Next follow-up appointment is in the next 4 months or earlier if needed.    Total Time: 35 minutes spent on the date of the encounter doing chart review, history and exam, documentation and further activities per the note.    Ej Castanon MD  [facility]  (Chart documentation was completed in part with Dragon voice-recognition software. Even though reviewed, some grammatical, spelling, and word errors may remain.)      Laurel Parra is a 71 year old female who presents for:  Chief Complaint   Patient presents with     Migraine     Patient reports migraines are stable         Initial Vitals:  /74 (BP Location: Left arm, Patient Position: Sitting, Cuff Size: Adult Regular)   Pulse 67   SpO2 97%  Estimated body mass index is 20.98 kg/m  as calculated from the following:    Height as of 4/12/22: 1.676 m (5' 6\").    Weight as of 4/12/22: 59 kg (130 lb).. There is no height or weight on file to calculate BSA. BP completed using cuff size: regular    Nursing Comments:     France Li      Again, thank you for allowing me to participate in the care of your patient.        Sincerely,        Ej Castanon MD    "

## 2022-05-25 ENCOUNTER — OFFICE VISIT (OUTPATIENT)
Dept: UROLOGY | Facility: CLINIC | Age: 72
End: 2022-05-25

## 2022-05-25 VITALS — HEIGHT: 65 IN | BODY MASS INDEX: 20.83 KG/M2 | WEIGHT: 125 LBS

## 2022-05-25 DIAGNOSIS — M79.18 MYALGIA OF PELVIC FLOOR: ICD-10-CM

## 2022-05-25 DIAGNOSIS — M62.89 PELVIC FLOOR DYSFUNCTION: ICD-10-CM

## 2022-05-25 DIAGNOSIS — N39.0 RECURRENT UTI: Primary | ICD-10-CM

## 2022-05-25 DIAGNOSIS — N39.8 VOIDING DYSFUNCTION: ICD-10-CM

## 2022-05-25 LAB
ALBUMIN UR-MCNC: NEGATIVE MG/DL
APPEARANCE UR: CLEAR
BILIRUB UR QL STRIP: NEGATIVE
COLOR UR AUTO: YELLOW
GLUCOSE UR STRIP-MCNC: NEGATIVE MG/DL
HGB UR QL STRIP: ABNORMAL
KETONES UR STRIP-MCNC: NEGATIVE MG/DL
LEUKOCYTE ESTERASE UR QL STRIP: NEGATIVE
NITRATE UR QL: NEGATIVE
PH UR STRIP: 7.5 [PH] (ref 5–7)
SP GR UR STRIP: 1.01 (ref 1–1.03)
UROBILINOGEN UR STRIP-ACNC: 0.2 E.U./DL

## 2022-05-25 PROCEDURE — 81003 URINALYSIS AUTO W/O SCOPE: CPT | Mod: QW | Performed by: UROLOGY

## 2022-05-25 PROCEDURE — 99212 OFFICE O/P EST SF 10 MIN: CPT | Mod: 25 | Performed by: UROLOGY

## 2022-05-25 PROCEDURE — 52000 CYSTOURETHROSCOPY: CPT | Performed by: UROLOGY

## 2022-05-25 ASSESSMENT — PAIN SCALES - GENERAL: PAINLEVEL: MODERATE PAIN (4)

## 2022-05-25 NOTE — LETTER
"5/25/2022       RE: Laurel Parra  [address]  [facility] [address]     Dear Colleague,    Thank you for referring your patient, Laurel Parra, to the [facility] UROLOGY CLINIC [place] [facility]. Please see a copy of my visit note below.    May 25, 2022    Return visit    Patient returns today for follow up for cystoscopy given her persistent UTIs and voiding symptoms. No UTIs since restarting methenamine at last visit.  She denies any changes in her health since last visit.    Ht 1.651 m (5' 5\")   Wt 56.7 kg (125 lb)   BMI 20.80 kg/m    She is comfortable, in no distress, non-labored breathing.  Abdomen is soft, non-tender, non-distended.  Normal external female genitalia.  Negative CST.  Pelvic exam is remarkable for myofascial tenderness of the pelvic floor.    Cystoscopy Note: After informed consent was obtained patient was prepped and draped in the standard fashion.  The flexible cystoscope was inserted into a normal appearing urethral meatus.  The urothelium was carefully examined and there were no tumors, masses, stones, foreign bodies, or other urothelial abnormalities noted.  Bilateral ureteral orifices were noted in the normal orthotopic position and both effluxed clear urine.  The cystoscope was retroflexed and the bladder neck was unremarkable.  The urethra was carefully examined upon removing the cystoscope and was unremarkable.  Patient tolerated the procedure without complications noted.      A/P: 71 year old F with Janet, voiding dysfunction, myofascial tenderness of the pelvic floor and pelvic floor dysfunction    Continue methenamine    We discussed how her pelvic floor symptoms are related to the physical exam findings and her pelvic floor myofascial dysfunction.  We discussed how the recommended treatment is dedicated pelvic floor therapy.  We discussed how the pelvic floor physical therapy works and " patient is agreeable.  Referral was placed.      10 minutes were spent today on the date of the encounter in reviewing the EMR, direct patient care, coordination of care and documentation in addition to the cystoscopy procedure    Heide Wilhelm MD MPH  (she/her/hers)   of Urology  [facility]  Patient Care Team:  Georges Lebron MD as PCP - General (Internal Medicine)  Thelma Meehan MD (Inactive) as Leydi Lu (Nurse Practitioner)  Ej Castanon MD as Assigned Neuroscience Provider  Abimbola Chamberlain PA-C as Physician Assistant (Urology)  Georges Lebron MD as Assigned PCP  Abimbola Chamberlain PA-C as Assigned OBGYN Provider  Heide Wilhelm MD as Assigned Surgical Provider

## 2022-05-25 NOTE — PATIENT INSTRUCTIONS
"Websites with free information:    [employer] patient website: www.voicesforpfd.org    Total Control Program: www.totalcontrolprogram.com    Supplements to prevent UTI to consider  -Probiotics  -Cranberry (for these products let them know a doctor is recommending them)   Ellura: www.myellura.[employer]ekYou   Theracran HP by [employer]alogix [facility] [address]  -d-mannose 2gm daily  -Vitamin C 500-1000mg twice a day    Continue the methenamine    Please see one of the dedicated pelvic floor physical therapist ([facility] [phone])    Please return to see me in 6 months, sooner if needed    It was a pleasure meeting with you today.  Thank you for allowing me and my team the privilege of caring for you today.  YOU are the reason we are here, and I truly hope we provided you with the excellent service you deserve.  Please let us know if there is anything else we can do for you so that we can be sure you are leaving completely satisfied with your care experience.    AFTER YOUR CYSTOSCOPY        You have just completed a cystoscopy, or \"cysto\", which allowed your physician to learn more about your bladder (or to remove a stent placed after surgery). We suggest that you continue to avoid caffeine, fruit juice, and alcohol for the next 24 hours, however, you are encouraged to return to your normal activities.         A few things that are considered normal after your cystoscopy:     * Small amount of bleeding (or spotting) that clears within the next 24 hours     * Slight burning sensation with urination     * Sensation to of needing to avoid more frequently     * The feeling of \"air\" in your urine     * Mild discomfort that is relieved with Tylenol        Please contact our office promptly if you:     * Develop a fever above 101 degrees     * Are unable to urinate     * Develop bright red blood that does not stop     * Severe pain or swelling         Please contact our office with any " concerns or questions @[phone]

## 2022-05-25 NOTE — PROGRESS NOTES
"May 25, 2022    Return visit    Patient returns today for follow up for cystoscopy given her persistent UTIs and voiding symptoms. No UTIs since restarting methenamine at last visit.  She denies any changes in her health since last visit.    Ht 1.651 m (5' 5\")   Wt 56.7 kg (125 lb)   BMI 20.80 kg/m    She is comfortable, in no distress, non-labored breathing.  Abdomen is soft, non-tender, non-distended.  Normal external female genitalia.  Negative CST.  Pelvic exam is remarkable for myofascial tenderness of the pelvic floor.    Cystoscopy Note: After informed consent was obtained patient was prepped and draped in the standard fashion.  The flexible cystoscope was inserted into a normal appearing urethral meatus.  The urothelium was carefully examined and there were no tumors, masses, stones, foreign bodies, or other urothelial abnormalities noted.  Bilateral ureteral orifices were noted in the normal orthotopic position and both effluxed clear urine.  The cystoscope was retroflexed and the bladder neck was unremarkable.  The urethra was carefully examined upon removing the cystoscope and was unremarkable.  Patient tolerated the procedure without complications noted.      A/P: 71 year old F with Janet, voiding dysfunction, myofascial tenderness of the pelvic floor and pelvic floor dysfunction    Continue methenamine    We discussed how her pelvic floor symptoms are related to the physical exam findings and her pelvic floor myofascial dysfunction.  We discussed how the recommended treatment is dedicated pelvic floor therapy.  We discussed how the pelvic floor physical therapy works and patient is agreeable.  Referral was placed.      10 minutes were spent today on the date of the encounter in reviewing the EMR, direct patient care, coordination of care and documentation in addition to the cystoscopy procedure    Heide Wilhelm MD MPH  (she/her/hers)   of Urology  [facility]" [facility]  Patient Care Team:  Georges Lebron MD as PCP - General (Internal Medicine)  Thelma Meehan MD (Inactive) as Leydi Lu (Nurse Practitioner)  Ej Castanon MD as Assigned Neuroscience Provider  Abimbola Chamberlain PA-C as Physician Assistant (Urology)  Georges Lebron MD as Assigned PCP  Abimbola Chamberlain PA-C as Assigned OBGYN Provider  Heide Wilhelm MD as Assigned Surgical Provider

## 2022-06-28 ENCOUNTER — TRANSFERRED RECORDS (OUTPATIENT)
Dept: HEALTH INFORMATION MANAGEMENT | Facility: CLINIC | Age: 72
End: 2022-06-28

## 2022-09-06 ENCOUNTER — TRANSFERRED RECORDS (OUTPATIENT)
Dept: HEALTH INFORMATION MANAGEMENT | Facility: CLINIC | Age: 72
End: 2022-09-06

## 2022-09-07 ENCOUNTER — TRANSFERRED RECORDS (OUTPATIENT)
Dept: HEALTH INFORMATION MANAGEMENT | Facility: CLINIC | Age: 72
End: 2022-09-07

## 2022-09-07 ENCOUNTER — VIRTUAL VISIT (OUTPATIENT)
Dept: NEUROLOGY | Facility: CLINIC | Age: 72
End: 2022-09-07
Payer: MEDICARE

## 2022-09-07 DIAGNOSIS — G43.109 MIGRAINE WITH AURA AND WITHOUT STATUS MIGRAINOSUS, NOT INTRACTABLE: Primary | ICD-10-CM

## 2022-09-07 DIAGNOSIS — M54.81 BILATERAL OCCIPITAL NEURALGIA: ICD-10-CM

## 2022-09-07 DIAGNOSIS — G44.209 TENSION HEADACHE: ICD-10-CM

## 2022-09-07 PROCEDURE — 99213 OFFICE O/P EST LOW 20 MIN: CPT | Performed by: PSYCHIATRY & NEUROLOGY

## 2022-09-07 RX ORDER — SUMATRIPTAN 50 MG/1
50 TABLET, FILM COATED ORAL
Qty: 18 TABLET | Refills: 5 | Status: SHIPPED | OUTPATIENT
Start: 2022-09-07 | End: 2023-03-08

## 2022-09-07 RX ORDER — PROPRANOLOL HCL 60 MG
60 CAPSULE, EXTENDED RELEASE 24HR ORAL DAILY
Qty: 90 CAPSULE | Refills: 1 | Status: SHIPPED | OUTPATIENT
Start: 2022-09-07 | End: 2023-03-08

## 2022-09-07 NOTE — PATIENT INSTRUCTIONS
AFTER VISIT SUMMARY (AVS):    At today's visit we thoroughly discussed current symptoms, available treatment options, and the plan.  I am pleased to hear that your migraine remains well controlled.    We decided not to make any medication changes.  Propranolol and Imitrex prescriptions were refilled.    Please keep the headache diary and bring it to the next follow-up visit or upload via My Chart.     Next follow-up appointment is in the next 6 months or earlier if needed.    Please do not hesitate to call me with any questions or concerns.    Thanks.

## 2022-09-07 NOTE — LETTER
"    9/7/2022         RE: Laurel Parra  [address]  [place] [address]        Dear Colleague,    Thank you for referring your patient, Laurel Parra, to the [facility] NEUROLOGY CLINICS [facility]. Please see a copy of my visit note below.    TELEPHONE ENCOUNTER NOTE    DATE OF VISIT: 9/7/2022  CLINIC LOCATION: [facility]  MRN: [id number]  PATIENT NAME: Laurel Parra  YOB: 1950  PCP: Georges Lebron MD    Laurel Parra is a 72 year old female who is being evaluated via a billable telephone visit.      The patient has been notified of following:     \"This telephone visit will be conducted via a call between you and your physician/provider. We have found that certain health care needs can be provided without the need for a physical exam.  This service lets us provide the care you need with a short phone conversation.  If a prescription is necessary we can send it directly to your pharmacy.  If lab work is needed we can place an order for that and you can then stop by our lab to have the test done at a later time.    If during the course of the call the physician/provider feels a telephone visit is not appropriate, you will not be charged for this service.\"     Laurel Parra complains of    Chief Complaint   Patient presents with     Headache     Stable     I have reviewed and updated the patient's Past Medical History, Social History, Family History and Medication List.    ALLERGIES  Betamethasone, Chocolate, Compazine [prochlorperazine], Linzess [linaclotide], Penicillins, Pneumococcal vaccine, and Tizanidine    The consent for this service was obtained by:  Isai Cole MA on 9/7/2022 at 2:56 PM      Additional provider notes:    REASON FOR VISIT:   Chief Complaint   Patient presents with     Headache     Stable     SUBJECTIVE:                                                      HISTORY OF PRESENT " ILLNESS: This telephone encounter is regarding multifactorial headache.  The last visit was on 5/3/2022.  No medication changes were made at that time.  Please refer to my initial/other prior notes for further information.    Since the last visit, the patient reports that her headaches are stable.  For headache prevention she is on extended release propranolol 60 mg daily and 400 mg of riboflavin daily.  For acute therapy she is using Imitrex.  She denies any significant side effects or interval development of new neurological symptoms. Was seen at [place] and diagnosed with fibromyalgia, chronic pain and fatigue syndrome.  Follows up with them virtually.  ASSESSMENT AND PLAN:                                                    Assessment: 72 year old female patient, who is evaluated via a telephone visit for migraines, tension headaches, and bilateral occipital neuralgia.  We had a detailed discussion with the patient regarding her current symptoms, available treatment options, and the plan. I do not think that we need to make any medication changes.  The rest of our discussion and plan are summarized below.    Diagnoses:    ICD-10-CM    1. Migraine with aura and without status migrainosus, not intractable  G43.109    2. Tension headache  G44.209    3. Bilateral occipital neuralgia  M54.81      Plan: At today's visit we thoroughly discussed current symptoms, available treatment options, and the plan.  I am pleased to hear that her migraine remains well controlled.    We decided not to make any medication changes.  Propranolol and Imitrex prescriptions were refilled.    I advised the patient to keep the headache diary and bring it to the next follow-up visit or upload via My Chart.     Next follow-up appointment is in the next 6 months or earlier if needed.    I have reviewed the note as documented above.  This accurately captures the substance of my conversation with the patient.    Phone call contact time: 11 minutes.   Total time 16 minutes.    Ej Castanon MD  [facility] Neurology.  (Chart documentation was completed in part with Dragon voice-recognition software. Even though reviewed, some grammatical, spelling, and word errors may remain.)          Again, thank you for allowing me to participate in the care of your patient.        Sincerely,        Ej Castanon MD

## 2022-09-07 NOTE — PROGRESS NOTES
"TELEPHONE ENCOUNTER NOTE    DATE OF VISIT: 9/7/2022  CLINIC LOCATION: [facility]  MRN: [id number]  PATIENT NAME: Laurel Parra  YOB: 1950  PCP: Georges Lebron MD    Laurel Parra is a 72 year old female who is being evaluated via a billable telephone visit.      The patient has been notified of following:     \"This telephone visit will be conducted via a call between you and your physician/provider. We have found that certain health care needs can be provided without the need for a physical exam.  This service lets us provide the care you need with a short phone conversation.  If a prescription is necessary we can send it directly to your pharmacy.  If lab work is needed we can place an order for that and you can then stop by our lab to have the test done at a later time.    If during the course of the call the physician/provider feels a telephone visit is not appropriate, you will not be charged for this service.\"     Laurel Parra complains of    Chief Complaint   Patient presents with     Headache     Stable     I have reviewed and updated the patient's Past Medical History, Social History, Family History and Medication List.    ALLERGIES  Betamethasone, Chocolate, Compazine [prochlorperazine], Linzess [linaclotide], Penicillins, Pneumococcal vaccine, and Tizanidine    The consent for this service was obtained by:  Isai Cole MA on 9/7/2022 at 2:56 PM      Additional provider notes:    REASON FOR VISIT:   Chief Complaint   Patient presents with     Headache     Stable     SUBJECTIVE:                                                      HISTORY OF PRESENT ILLNESS: This telephone encounter is regarding multifactorial headache.  The last visit was on 5/3/2022.  No medication changes were made at that time.  Please refer to my initial/other prior notes for further information.    Since the last visit, the patient reports that her headaches " are stable.  For headache prevention she is on extended release propranolol 60 mg daily and 400 mg of riboflavin daily.  For acute therapy she is using Imitrex.  She denies any significant side effects or interval development of new neurological symptoms. Was seen at [place] and diagnosed with fibromyalgia, chronic pain and fatigue syndrome.  Follows up with them virtually.  ASSESSMENT AND PLAN:                                                    Assessment: 72 year old female patient, who is evaluated via a telephone visit for migraines, tension headaches, and bilateral occipital neuralgia.  We had a detailed discussion with the patient regarding her current symptoms, available treatment options, and the plan. I do not think that we need to make any medication changes.  The rest of our discussion and plan are summarized below.    Diagnoses:    ICD-10-CM    1. Migraine with aura and without status migrainosus, not intractable  G43.109    2. Tension headache  G44.209    3. Bilateral occipital neuralgia  M54.81      Plan: At today's visit we thoroughly discussed current symptoms, available treatment options, and the plan.  I am pleased to hear that her migraine remains well controlled.    We decided not to make any medication changes.  Propranolol and Imitrex prescriptions were refilled.    I advised the patient to keep the headache diary and bring it to the next follow-up visit or upload via My Chart.     Next follow-up appointment is in the next 6 months or earlier if needed.    I have reviewed the note as documented above.  This accurately captures the substance of my conversation with the patient.    Phone call contact time: 11 minutes.  Total time 16 minutes.    Ej Castanon MD  [facility] Neurology.  (Chart documentation was completed in part with Dragon voice-recognition software. Even though reviewed, some grammatical, spelling, and word errors may remain.)

## 2022-09-22 DIAGNOSIS — N39.0 RECURRENT UTI: ICD-10-CM

## 2022-09-22 DIAGNOSIS — R30.0 DYSURIA: ICD-10-CM

## 2022-09-27 RX ORDER — METHENAMINE HIPPURATE 1000 MG/1
TABLET ORAL
Qty: 180 TABLET | Refills: 1 | Status: SHIPPED | OUTPATIENT
Start: 2022-09-27 | End: 2023-03-23

## 2022-10-04 ENCOUNTER — TRANSFERRED RECORDS (OUTPATIENT)
Dept: FAMILY MEDICINE | Facility: CLINIC | Age: 72
End: 2022-10-04

## 2022-10-10 ENCOUNTER — TRANSFERRED RECORDS (OUTPATIENT)
Dept: HEALTH INFORMATION MANAGEMENT | Facility: CLINIC | Age: 72
End: 2022-10-10

## 2022-12-01 ENCOUNTER — TRANSFERRED RECORDS (OUTPATIENT)
Dept: HEALTH INFORMATION MANAGEMENT | Facility: CLINIC | Age: 72
End: 2022-12-01

## 2022-12-05 ENCOUNTER — TRANSFERRED RECORDS (OUTPATIENT)
Dept: HEALTH INFORMATION MANAGEMENT | Facility: CLINIC | Age: 72
End: 2022-12-05

## 2022-12-13 ENCOUNTER — VIRTUAL VISIT (OUTPATIENT)
Dept: UROLOGY | Facility: CLINIC | Age: 72
End: 2022-12-13
Payer: MEDICARE

## 2022-12-13 DIAGNOSIS — R39.15 URINARY URGENCY: ICD-10-CM

## 2022-12-13 DIAGNOSIS — N39.8 VOIDING DYSFUNCTION: Primary | ICD-10-CM

## 2022-12-13 DIAGNOSIS — M62.89 PELVIC FLOOR DYSFUNCTION: ICD-10-CM

## 2022-12-13 DIAGNOSIS — M79.18 MYALGIA OF PELVIC FLOOR: ICD-10-CM

## 2022-12-13 PROCEDURE — 99213 OFFICE O/P EST LOW 20 MIN: CPT | Mod: 95 | Performed by: UROLOGY

## 2022-12-13 NOTE — PATIENT INSTRUCTIONS
Websites with free information:    American Urogynec[employer] Society patient website: www.voicesforpfd.org    Total Control Program: www.totalcontrolprogram.com    Supplements to prevent UTI to consider  -Probiotics  -Cranberry (for these products let them know a doctor is recommending them)   Ellura: www.myellura.[employer]   Theracran HP by [employer] [facility] [address]  -d-mannose 2gm daily  -Vitamin C 500-1000mg twice a day    Please see one of the dedicated pelvic floor physical therapist. If you have not heard from the scheduling office within 2 business days, please call [phone] for [place]    Please return to see me in 6 months, sooner if needed    It was a pleasure meeting with you today.  Thank you for allowing me and my team the privilege of caring for you today.  YOU are the reason we are here, and I truly hope we provided you with the excellent service you deserve.  Please let us know if there is anything else we can do for you so that we can be sure you are leaving completely satisfied with your care experience.

## 2022-12-13 NOTE — LETTER
12/13/2022       RE: Laurel Parra  [address]  [place] [facility] [address]     Dear Colleague,    Thank you for referring your patient, Laurel Parra, to the [facility] UROLOGY CL[facility]IC [place] at [facility]. Please see a copy of my visit note below.    December 13, 2022    Diagnoses and all orders for this visit:    Voiding dysfunction  -     Physical Therapy Referral; Future    Myalgia of pelvic floor  -     Physical Therapy Referral; Future    Pelvic floor dysfunction  -     Physical Therapy Referral; Future    Urinary urgency  -     Physical Therapy Referral; Future       Things stable at this time.  She requests to return to PT so referral placed    RTC 6 months, sooner if needed    9 minutes were spent today on the day of the encounter in reviewing the EMR, direct patient care, coordination of care and documentation    Heide Wilhelm MD MPH  (she/her/hers)   of Urology  [facility]      Subjective    Here today for follow up.   Diagnosed with fibromyalgia and chronic fatigue at [place] recently. Her lymphocytic colitis has been terrible.  She will need a shoulder replacement.  Will also be seeing PMR at [place] in January    Her urinary symptoms are okay overall, aside from sometime in the later part of the day she states she will have the urgency and sometime feel the need to go but unable to go much or to empty.  No UTIs, no hematuria.      She denies any changes in health since last visit    There were no vitals taken for this visit.  GENERAL: healthy, alert and no distress  EYES: Eyes grossly normal to inspection, conjunctivae and sclerae normal  HENT: normal cephalic/atraumatic.  External ears, nose and mouth without ulcers or lesions.  RESP: no audible wheeze, cough, or visible cyanosis.  No visible retractions or increased work of breathing.  Able to speak fully in complete sentences.  NEURO: Cranial  nerves grossly intact, mentation intact and speech normal  PSYCH: mentation appears normal, affect normal/bright, judgement and insight intact, normal speech and appearance well-groomed    CC  Patient Care Team:  Georges Lebron MD as PCP - General (Internal Medicine)  Thelma Meehan MD (Inactive) as Leydi Lu (Nurse Practitioner)  Ej Castanon MD as Assigned Neuroscience Provider  Abimbola Chamberlain PA-C as Physician Assistant (Urology)  Georges Lebron MD as Assigned PCP  Abimbola Chamberlain PA-C as Assigned OBGYN Provider  Choco, Heide Finnegan MD as Assigned Surgical Provider  SELF, ORALIA Barragan is a 72 year old who is being evaluated via a billable video visit.      How would you like to obtain your AVS? MyChart  If the video visit is dropped, the invitation should be resent by: Text to cell phone: [phone]  Will anyone else be joining your video visit? No      Video-Visit Details    Video Start Time: 2:28 PM    Type of service:  Video Visit    Video End Time:2:36 PM    Originating Location (pt. Location): Home        Distant Location (provider location):  On-site    Platform used for Video Visit: [employer]

## 2022-12-13 NOTE — PROGRESS NOTES
December 13, 2022    Diagnoses and all orders for this visit:    Voiding dysfunction  -     Physical Therapy Referral; Future    Myalgia of pelvic floor  -     Physical Therapy Referral; Future    Pelvic floor dysfunction  -     Physical Therapy Referral; Future    Urinary urgency  -     Physical Therapy Referral; Future       Things stable at this time.  She requests to return to PT so referral placed    RTC 6 months, sooner if needed    9 minutes were spent today on the day of the encounter in reviewing the EMR, direct patient care, coordination of care and documentation    Heide Wilhelm MD MPH  (she/her/hers)   of Urology  [facility]      Subjective    Here today for follow up.   Diagnosed with fibromyalgia and chronic fatigue at [place] recently. Her lymphocytic colitis has been terrible.  She will need a shoulder replacement.  Will also be seeing PMR at [place] in January    Her urinary symptoms are okay overall, aside from sometime in the later part of the day she states she will have the urgency and sometime feel the need to go but unable to go much or to empty.  No UTIs, no hematuria.      She denies any changes in health since last visit    There were no vitals taken for this visit.  GENERAL: healthy, alert and no distress  EYES: Eyes grossly normal to inspection, conjunctivae and sclerae normal  HENT: normal cephalic/atraumatic.  External ears, nose and mouth without ulcers or lesions.  RESP: no audible wheeze, cough, or visible cyanosis.  No visible retractions or increased work of breathing.  Able to speak fully in complete sentences.  NEURO: Cranial nerves grossly intact, mentation intact and speech normal  PSYCH: mentation appears normal, affect normal/bright, judgement and insight intact, normal speech and appearance well-groomed    CC  Patient Care Team:  Georges Lebron MD as PCP - General (Internal Medicine)  Thelma Meehan MD (Inactive) as Leydi Lu  (Nurse Practitioner)  Ej Castanon MD as Assigned Neuroscience Provider  Abimbola Chamberlain PA-C as Physician Assistant (Urology)  Georges Lebron MD as Assigned PCP  Abimbola Chamberlain PA-C as Assigned OBGYN Provider  Heide Wilhelm MD as Assigned Surgical Provider  SELF, REFERRED    Laurel is a 72 year old who is being evaluated via a billable video visit.      How would you like to obtain your AVS? MyChart  If the video visit is dropped, the invitation should be resent by: Text to cell phone: [phone]  Will anyone else be joining your video visit? No      Video-Visit Details    Video Start Time: 2:28 PM    Type of service:  Video Visit    Video End Time:2:36 PM    Originating Location (pt. Location): Home        Distant Location (provider location):  On-site    Platform used for Video Visit: [employer]

## 2022-12-19 ENCOUNTER — TRANSFERRED RECORDS (OUTPATIENT)
Dept: HEALTH INFORMATION MANAGEMENT | Facility: CLINIC | Age: 72
End: 2022-12-19

## 2023-01-03 ENCOUNTER — TRANSFERRED RECORDS (OUTPATIENT)
Dept: HEALTH INFORMATION MANAGEMENT | Facility: CLINIC | Age: 73
End: 2023-01-03

## 2023-02-02 ENCOUNTER — TRANSFERRED RECORDS (OUTPATIENT)
Dept: HEALTH INFORMATION MANAGEMENT | Facility: CLINIC | Age: 73
End: 2023-02-02

## 2023-02-15 ENCOUNTER — TRANSFERRED RECORDS (OUTPATIENT)
Dept: HEALTH INFORMATION MANAGEMENT | Facility: CLINIC | Age: 73
End: 2023-02-15

## 2023-02-17 ENCOUNTER — THERAPY VISIT (OUTPATIENT)
Dept: PHYSICAL THERAPY | Facility: CLINIC | Age: 73
End: 2023-02-17
Attending: UROLOGY
Payer: MEDICARE

## 2023-02-17 DIAGNOSIS — M99.05 SOMATIC DYSFUNCTION OF PELVIC REGION: ICD-10-CM

## 2023-02-17 DIAGNOSIS — M79.18 MYALGIA OF PELVIC FLOOR: ICD-10-CM

## 2023-02-17 DIAGNOSIS — N39.41 URGE INCONTINENCE OF URINE: ICD-10-CM

## 2023-02-17 DIAGNOSIS — N39.8 VOIDING DYSFUNCTION: ICD-10-CM

## 2023-02-17 DIAGNOSIS — M62.89 PELVIC FLOOR DYSFUNCTION: ICD-10-CM

## 2023-02-17 DIAGNOSIS — R39.15 URINARY URGENCY: ICD-10-CM

## 2023-02-17 PROCEDURE — 97535 SELF CARE MNGMENT TRAINING: CPT | Mod: GP

## 2023-02-17 PROCEDURE — 97110 THERAPEUTIC EXERCISES: CPT | Mod: GP

## 2023-02-17 PROCEDURE — 97161 PT EVAL LOW COMPLEX 20 MIN: CPT | Mod: GP

## 2023-02-17 NOTE — PROGRESS NOTES
"Physical Therapy Initial Evaluation  Subjective:  The history is provided by the patient. No  was used.   Patient Health History  Laurel Parra being seen for Pelvic stability.          Pain is reported as 5/10 on pain scale.  General health as reported by patient is fair.  Pertinent medical history includes: changes in bowel/bladder, depression, fibromyalgia, migraines/headaches, numbness/tingling, osteoarthritis, pain at night/rest and weakness.     Medical allergies: other. Other medical allergies details: Peicilin.   Surgeries include:  Orthopedic surgery and other. Other surgery history details: Appendectomy, emergency c section, plantar fascitis.    Current medications:  Anti-depressants, cardiac medication, muscle relaxants, pain medication, sleep medication and other. Other medications details: Microscopic lymphocytic colitis.    Current occupation is Retired.   Primary job tasks include:  Lifting/carrying, repetitive tasks and other.   Other job/home tasks details: Housework.                Therapist Generated HPI Evaluation  Problem details: Pt presents to PT w/ c/o urinary hesitancy, difficulty initiating urine stream (more in PM than AM) & urge incontinence that's been ongoing for ~1 year. Initially had frequent UTIs over a year ago - now on an Rx that has eliminated problem. PMHx sig for OA in lumbar spine, fibromyalgia, lymphocytic colitis, (3 on left, 2 on right) inguinal hernia repairs, & appendectomy. Urinary incontinence daily in small amt (wears liner - always can't feel like she's leaking). Can happen overnight too. Voids ~5x/day. During instances of urinary hesitancy, she \"stimulates\" vaginal area w/ toilet paper & it helps to initiate stream. BMs 4x/wk, takes Miralax 2-3x/wk w/ BSC 3-4. She also has pain & burning in the rectum. She's seen a colorectal surgeon 2x - they found nothing wrong. She's been manually expanding her rectum for relief (not assoc w/ BMs). " Trying to get back into pilates 2x/wk, but currently walks (30-45 min) dog daily for exercise. Goal for PFPT is to gain ability to initiate voiding when she feels like she needs to..                                    SUBJECTIVE:    Urination:  Do you leak on the way to the bathroom or with a strong urge to void? Yes   Do you leak with cough,sneeze, jumping, running?No   Any other activities that cause leaking? Yes, just after voiding  Do you have triggers that make you feel you can't wait to go to the bathroom? No   Type of pad and number used per day? 1, light liners  When you leak what is the amount? Small  How long can you delay the need to urinate? 5-10 minutes.   How many times do you get up to urinate at night? 1x  Can you stop the flow of urine when on the toilet? Yes  Is the volume of urine passed usually: medium. (8sec rule= 250ml with average bladder storing 400-600ml)  Do you feel empty when you are done? No  Do you strain to pass urine? Sometimes  Do you have a slow or hesitant urinary stream? Yes  Do you have difficulty initiating the urine stream? Yes, usually occurs later in the day  How many bladder infections have you had in last 12 months? 0  Fluid intake(one glass is 8oz or one cup) 3 x 24 oz water glasses/day, 1  Cup decaf caffinated glasses/day  0 alcohol glasses/day.    Bowel habits:  Frequency of bowel movements? 4 times a week  Consistancy of stool? soft formed, hard, [place] Stool Scale 3-4  Do you ignore the urge to defecate? No  Do you strain to pass stool? Sometimes    Pelvic Pain:  Do you have any pelvic pain with intercourse, exams, use of tampons? No  ?  Given birth? Yes Any complications? Prolonged labor w/ not pushing correctly, baby went into fetal distress & prompted a emergency   # of vaginal delieveries? 0  # of C-sections? 1  # of episiotomies? N/A  Are you sexually active?No  Have you ever been worried for your physical safety? No  Any abdominal or pelvic surgeries?  Yes, see above subjective paragraph  Are you having any regular exercise? Yes, walks dog daily (30-45 min)  Have you practiced the PF(kegel) exercises for 4 or more weeks? No    OBSERVATION  Lumbar Posture: Decr lumbar lordosis  Pelvic symmetry: Negative  Introitus: tight  Trendelenberg Sign:Negative    ROM  Single leg standing unilateral hip flexion PSIS:Negative  Passive Hip ROM: WNL bilaterally in FLEX, ER/IR (mild pain at IR end-range bilaterally)  HS flexibility: min limited bilaterally  Lumbar AROM: deferred d/t current incr flare up of fibromyalgia    MUSCLE PERFORMANCE  Baseline PF tone:hyper  PF Tone with cough: bulge  Valsalva: bulge  PF Response quality: sluggish  PF Power: Center: 2 Stronger on right/left: equal bilaterally.  Endurance: Maximum contraction in seconds: 5  Specificity/accessory muscles: initial mod glut paradox, able to isolate PF kegel w/ cueing  Prolapse: Cyctocele/Rectocele/Uterine grade: Not assessed    PALPATION: Pain: obturator internis, ischiocavernosus, bulbocavernosus and transverse perineal muscle      Assessment/Plan:    Patient is a 72 year old female with pelvic complaints.    Patient has the following significant findings with corresponding treatment plan.                Diagnosis 1:  Urge incontinence  Decreased ROM/flexibility - manual therapy, therapeutic exercise, therapeutic activity and home program  Decreased strength - therapeutic exercise, therapeutic activities and home program  Impaired muscle performance - biofeedback, neuro re-education and home program  Decreased function - therapeutic activities and home program    Therapy Evaluation Codes:   1) History comprised of:   Personal factors that impact the plan of care:      None.    Comorbidity factors that impact the plan of care are:      Depression, Fibromyalgia, Migraines/headaches, Numbness/tingling, Osteoarthritis and Weakness.     Medications impacting care: Anti-depressant, Cardiac, Muscle relaxant, Pain  and Sleep.  2) Examination of Body Systems comprised of:   Body structures and functions that impact the plan of care:      Pelvis.   Activity limitations that impact the plan of care are:      Bathing, Bending, Lifting, Squatting/kneeling, Standing, Walking, Urgency, Urge incontinence and Urinary incontinence.  3) Clinical presentation characteristics are:   Stable/Uncomplicated.  4) Decision-Making    Low complexity using standardized patient assessment instrument and/or measureable assessment of functional outcome.  Cumulative Therapy Evaluation is: Low complexity.    Previous and current functional limitations:  (See Goal Flow Sheet for this information)    Short term and Long term goals: (See Goal Flow Sheet for this information)     Communication ability:  Patient appears to be able to clearly communicate and understand verbal and written communication and follow directions correctly.  Treatment Explanation - The following has been discussed with the patient:   RX ordered/plan of care  Anticipated outcomes  Possible risks and side effects  This patient would benefit from PT intervention to resume normal activities.   Rehab potential is good.    Frequency:  1 X week for 4 weeks, then 2 x month for 2 months  Duration:  for 12 weeks  Discharge Plan:  Achieve all LTG.  Independent in home treatment program.  Reach maximal therapeutic benefit.    Please refer to the daily flowsheet for treatment today, total treatment time and time spent performing 1:1 timed codes.

## 2023-02-19 PROBLEM — M99.05 SOMATIC DYSFUNCTION OF PELVIC REGION: Status: ACTIVE | Noted: 2023-02-19

## 2023-02-19 PROBLEM — N39.41 URGE INCONTINENCE OF URINE: Status: ACTIVE | Noted: 2023-02-19

## 2023-02-19 NOTE — PROGRESS NOTES
[facility]    OUTPATIENT Physical Therapy ORTHOPEDIC EVALUATION  PLAN OF TREATMENT FOR OUTPATIENT REHABILITATION  (COMPLETE FOR INITIAL CLAIMS ONLY)  Patient's Last Name, First Name, M.I.  YOB: 1950  Laurel Prara    Provider s Name:  [facility]   Medical Record No.  [id number]   Start of Care Date:  02/17/23   Onset Date:   12/13/22 (MD referral date)   Treatment Diagnosis:  Voiding dysfunction; urinary urgency Medical Diagnosis:     Voiding dysfunction  Myalgia of pelvic floor  Pelvic floor dysfunction  Urinary urgency       Goals:     02/19/23 0700   Urinary Leakage   Previous Functional Level No problems   Current Functional Level Leaking with urge   Performance Level UI daily in sml amt   STG Target Performance Decrease urinary leakage episodes in a week to   Performance Level UI <5x/wk in sml amt   Rationale continence throughout the day;for healthy hygiene and to prevent skin breakdown   Due Date 03/19/23   LTG Target Performance Decrease urinary leakage episodes in a week to   Performance Level UI <2x/wk in sml amt   Rationale continence throughout the day;for healthy hygiene and to prevent skin breakdown   Due Date 05/14/23   Urination Dysfunction   Previous Functional Level No hesitation with voiding   Current Functional Level Hesitation with voiding   Performance Level Daily (more in PM than AM)   STG Target Performance Decrease hesitation with voiding   Performance Level <5x/wk   Rationale Work toward normal voiding patterns to focus on ADLS, work, or school   Due Date 03/19/23   LTG Target Performance Decrease hesitation with voiding   Performance Level 0x/wk   Rationale Work toward normal voiding patterns to focus on ADLS, work, or school   Due Date 05/14/23         Therapy Frequency:  1x/wk for 4 wks, then 2x/mo for 2 mos  Predicted  Duration of Therapy Intervention:  12 weeks    Mayelin Crook, PT                 I CERTIFY THE NEED FOR THESE SERVICES FURNISHED UNDER        THIS PLAN OF TREATMENT AND WHILE UNDER MY CARE     (Physician co-signature of this document indicates review and certification of the therapy plan).                     Certification Date From:  02/17/23   Certification Date To:  05/17/23    Referring Provider:  Heide Wilhelm    Initial Assessment        See Epic Evaluation SOC Date: 02/17/23

## 2023-02-20 ENCOUNTER — TELEPHONE (OUTPATIENT)
Dept: UROLOGY | Facility: CLINIC | Age: 73
End: 2023-02-20
Payer: MEDICARE

## 2023-02-20 NOTE — TELEPHONE ENCOUNTER
[facility] Call Center    Phone Message    May a detailed message be left on voicemail: yes     Reason for Call: Other: .    Pt calling stating she is having symptoms of a UTI and is requesting [facility] place an order for her to get a UA if possible. Please call pt     Action Taken: Message routed to:  Other: Uro    Travel Screening: Not Applicable

## 2023-02-21 ENCOUNTER — TELEPHONE (OUTPATIENT)
Dept: UROLOGY | Facility: CLINIC | Age: 73
End: 2023-02-21

## 2023-02-21 ENCOUNTER — LAB (OUTPATIENT)
Dept: LAB | Facility: CLINIC | Age: 73
End: 2023-02-21
Payer: MEDICARE

## 2023-02-21 DIAGNOSIS — N39.0 RECURRENT UTI: ICD-10-CM

## 2023-02-21 DIAGNOSIS — N39.0 RECURRENT UTI: Primary | ICD-10-CM

## 2023-02-21 LAB
ALBUMIN UR-MCNC: NEGATIVE MG/DL
APPEARANCE UR: CLEAR
BILIRUB UR QL STRIP: NEGATIVE
COLOR UR AUTO: YELLOW
GLUCOSE UR STRIP-MCNC: NEGATIVE MG/DL
HGB UR QL STRIP: ABNORMAL
KETONES UR STRIP-MCNC: NEGATIVE MG/DL
LEUKOCYTE ESTERASE UR QL STRIP: ABNORMAL
NITRATE UR QL: POSITIVE
PH UR STRIP: 5.5 [PH] (ref 5–7)
SP GR UR STRIP: 1.01 (ref 1–1.03)
UROBILINOGEN UR STRIP-ACNC: 0.2 E.U./DL

## 2023-02-21 PROCEDURE — 87186 SC STD MICRODIL/AGAR DIL: CPT

## 2023-02-21 PROCEDURE — 87086 URINE CULTURE/COLONY COUNT: CPT

## 2023-02-21 PROCEDURE — 81003 URINALYSIS AUTO W/O SCOPE: CPT | Mod: QW

## 2023-02-21 RX ORDER — SULFAMETHOXAZOLE/TRIMETHOPRIM 800-160 MG
1 TABLET ORAL 2 TIMES DAILY
Qty: 10 TABLET | Refills: 0 | Status: SHIPPED | OUTPATIENT
Start: 2023-02-21 | End: 2023-08-17

## 2023-02-21 NOTE — TELEPHONE ENCOUNTER
[facility] Call Center    Phone Message    May a detailed message be left on voicemail: yes     Reason for Call: Symptoms or Concerns     If patient has red-flag symptoms, warm transfer to triage line    Current symptom or concern: UTI pain    Symptoms have been present for:  3 day(s)    Are there any new or worsening symptoms? Yes: difficulty w/ drinking liquids due to fear of UTI pain. Reported that she got no sleep on the night of 2/20.     Pt called stating her UTI pain is unbearable and that she can't wait for the UC to come back before she starts any treatment. Please reach out to pt ASAP. Thanks    Action Taken: Other: Uro    Travel Screening: Not Applicable

## 2023-02-23 ENCOUNTER — TELEPHONE (OUTPATIENT)
Dept: PHYSICAL THERAPY | Facility: CLINIC | Age: 73
End: 2023-02-23
Payer: MEDICARE

## 2023-02-23 DIAGNOSIS — M99.05 SOMATIC DYSFUNCTION OF PELVIC REGION: Primary | ICD-10-CM

## 2023-02-23 DIAGNOSIS — N39.41 URGE INCONTINENCE OF URINE: ICD-10-CM

## 2023-02-23 LAB — BACTERIA UR CULT: ABNORMAL

## 2023-02-23 NOTE — TELEPHONE ENCOUNTER
Returned pt phone call from earlier AM on 02/23/2023. Pt reports she contracted a UTI after her initial pelvic floor evaluation on 02/17/2023 - she had a culture & was rx'd abx for treatment. Currently, her symptoms have not gone away & she's waiting to hear back from MD on another possible abx for treatment. I assured her that we have standard safety protocols for pelvic floor assessments avoiding any cross contamination (ie. switching gloves, clean linens, fresh lubricant, optional hypoallergenic/unscented sanitary wipes). I recommended that she currently try OTC Azo for UTI sx & drink plenty of water to help reduce UTI sx. Pt was in agreement & prefers for future PFPT appts to defer any external/internal direct pelvic floor treatment. Answered all pt questions & recommended she follow-up to keep me posted on updates.

## 2023-02-24 ENCOUNTER — TELEPHONE (OUTPATIENT)
Dept: UROLOGY | Facility: CLINIC | Age: 73
End: 2023-02-24

## 2023-02-24 NOTE — TELEPHONE ENCOUNTER
[facility] Call Center    Phone Message    May a detailed message be left on voicemail: yes     Reason for Call: Patient wondering how long she should be taking Bactrim. She thinks the pharmacy gave her too many pills. Please review and reach out. Thank you    Action Taken: Message routed to:  [facility] URO    Travel Screening: Not Applicable

## 2023-02-24 NOTE — TELEPHONE ENCOUNTER
Returned phone call to patient and informed that she should be taking pill twice daily for 5 days. Patient stated that the pharmacy gave her 20 pills, which was not what we prescribed upon review of chart. I explained that we sent in 10 pills total for 5 days. She is aware that she should only take the medication for 5 days total.    Nati Shetty LPN

## 2023-02-27 NOTE — PROGRESS NOTES
CC: dysuria    HPI:  Laurel Parra is a 70 year old female who presents in consultation from Dr. Lebron for evaluation of the above. Previous pt of ANA MAR[facility]A Little's (last seen 2015). Hx of chronic urethritis and occasional bacterial cystitis. Had been on 1% hydrocortisone urethral cream.     2 e coli UTIs over a 5 month period. Abx help for a short time. No gross hematuria.  Continues with dysuria after abx courses. Can have constipation at times.     Dr. Khanna's wife.     Past Medical History:   Diagnosis Date     Anxiety     Dr. Adelina Meehan     Chronic constipation      Chronic narcotic use     [facility]     Chronic pain     Dr. Orta as of 2015     Chronic urethritis     Dr. Little     Colonic polyp 11/2011    one polyp, fu 5 years, fu 10/17 and no lesions     Depression, major, in partial remission (H)     Dr. Meehan     Depressive disorder 1971     Diarrhea 2012    eval by [facility], resolved with gluten free diet     DJD (degenerative joint disease)      Dyspepsia 2020    eval by [facility], had colonoscopy, egd, ct abd and pelvis, mrcp.  [place] to be dyspepsia and constipation     H/O gastroesophageal reflux (GERD)      LBP (low back pain) 2013    Dr. Jae Tong in [place], then seeing [facility] Dr. Orta     Light headed 07/2020    nl est echo     Migraines 2009    neuro eval     Palpitations 2006    holter with pvc's and pac's, echo nl     Screening 2010    dexa nl at gyn       Past Surgical History:   Procedure Laterality Date     APPENDECTOMY  2016     ARTHRODESIS WRIST Left 4/5/2021    Procedure: LEFT TOTAL WRIST FUSION;  Surgeon: Beatrice Philippe MD;  Location:  OR     ARTHROPLASTY CARPOMETACARPAL (THUMB JOINT) Left 6/7/2017    Procedure: ARTHROPLASTY CARPOMETACARPAL (THUMB JOINT);  REVISION  LEFT THUMB CARPOMETACARPAL ARTHROPOASTY WITH 1.1 TIGHT ROPE;  Surgeon: Beatrice Philippe MD;  Location:  SD     ARTHROSCOPY WRIST Left 8/26/2019    Procedure: LEFT WRIST ARTHROSCOPY  REMOVAL OF TIGHT ROPE ; LEFT EXTENSOR POLLICIS LONGUS /EXTENSOR CARPI RADIALIS BREVIS /EXTENSOR CARPI RADIALUS LONGUS SYNOVECTOMY ; EXTENSOR POLLICIS LONGUS TRANSPOSITION ; ANTERIOR INTEROSSEOUS NEURECTOMY ; POSTERIOR INTEROSSEOUS NEURECTOMY;  Surgeon: Beatrice Philippe MD;  Location:  OR     BIOPSY       CARPAL TUNNEL RELEASE RT/LT        SECTION       COLONOSCOPY       ESOPHAGOSCOPY, GASTROSCOPY, DUODENOSCOPY (EGD), COMBINED N/A 2020    Procedure: ESOPHAGOGASTRODUODENOSCOPY, WITH BIOPSY;  Surgeon: Georges Pop MD;  Location:  GI     EXCIS BARTHOLIN GLAND/CYST       HERNIORRHAPHY INGUINAL  70's    x5     INJECT STEROID (LOCATION) Right 2019    Procedure: RIGHT WRIST CORTISONE INJECTION;  Surgeon: Beatrice Philippe MD;  Location:  OR     OPERATIVE HYSTEROSCOPY WITH MORCELLATOR N/A 2018    Procedure: OPERATIVE HYSTEROSCOPY WITH MORCELLATOR ([employer]);  OPERATIVE HYSTEROSCOPY WITH TRUECLEAR MORCELLATOR 5C SCOPE ;  Surgeon: Iris Fernandez MD;  Location:  SD     ORTHOPEDIC SURGERY Bilateral     SHOULDER ARTHROSCOPY     ORTHOPEDIC SURGERY Right 2017    right foot for plantar fasciitis     thumb surgery   and ,        Social History     Socioeconomic History     Marital status:      Spouse name: Not on file     Number of children: 1     Years of education: Not on file     Highest education level: Not on file   Occupational History     Employer: SELF   Social Needs     Financial resource strain: Not on file     Food insecurity     Worry: Not on file     Inability: Not on file     Transportation needs     Medical: Not on file     Non-medical: Not on file   Tobacco Use     Smoking status: Former Smoker     Packs/day: 0.00     Years: 5.00     Pack years: 0.00     Quit date: 10/29/1979     Years since quittin.5     Smokeless tobacco: Never Used     Tobacco comment: infrequent use for about 5 years   Substance and Sexual Activity     Alcohol use: No      Alcohol/week: 0.0 standard drinks     Drug use: No     Sexual activity: Not Currently     Partners: Male     Birth control/protection: Post-menopausal   Lifestyle     Physical activity     Days per week: Not on file     Minutes per session: Not on file     Stress: Not on file   Relationships     Social connections     Talks on phone: Not on file     Gets together: Not on file     Attends Restoration service: Not on file     Active member of club or organization: Not on file     Attends meetings of clubs or organizations: Not on file     Relationship status: Not on file     Intimate partner violence     Fear of current or ex partner: Not on file     Emotionally abused: Not on file     Physically abused: Not on file     Forced sexual activity: Not on file   Other Topics Concern     Parent/sibling w/ CABG, MI or angioplasty before 65F 55M? No   Social History Narrative     Not on file       Family History   Problem Relation Age of Onset     Diabetes Father      Hypertension Father      Depression Father      Substance Abuse Father      Anesthesia Reaction Father         went into shock with surgery      Obesity Father      Anxiety Disorder Father      Diabetes Mother      Cerebrovascular Disease Mother         Cadasils disease     Depression Mother      Mental Illness Mother         borderline personality disorder, Chemical dependency     Substance Abuse Mother      Genetic Disorder Mother         Cadasils     Obesity Mother      Anxiety Disorder Mother      Cerebrovascular Disease Paternal Grandfather      Cerebrovascular Disease Brother         Cadasils disease     Depression Brother      Anxiety Disorder Brother      Substance Abuse Brother      Genetic Disorder Brother         cadasils     Obesity Brother      Hypertension Brother      Cerebrovascular Disease Sister         Cadasils disease     Depression Sister      Anxiety Disorder Sister      Genetic Disorder Sister         cadasils     Obesity Sister       "Cerebrovascular Disease Sister         Cadasils disease     Genetic Disorder Sister         cadasils     Breast Cancer Sister      Depression Sister      Anxiety Disorder Sister      Obesity Sister      Hypertension Sister        ROS:14 point ROS neg other than the symptoms noted above in the HPI.    Allergies   Allergen Reactions     Betamethasone Other (See Comments)     agitation     Chocolate Other (See Comments)     Triggers migraines     Compazine [Prochlorperazine] Other (See Comments)     \"crawl out of my skin\"     Penicillins Nausea and Vomiting and Hives     Pneumococcal Vaccine Other (See Comments)     Temporary paralization     Tizanidine Other (See Comments)     Severe agitation       Current Outpatient Medications   Medication     carboxymethylcellulose (REFRESH PLUS) 0.5 % SOLN ophthalmic solution     cyclobenzaprine (FLEXERIL) 10 MG tablet     estradiol (ESTRACE) 1 MG tablet     Hydrocodone-Acetaminophen  MG TABS     HYDROmorphone (DILAUDID) 2 MG tablet     ipratropium (ATROVENT) 0.06 % spray     linaclotide (LINZESS) 72 MCG capsule     linaclotide (LINZESS) 72 MCG capsule     loratadine (CLARITIN) 10 MG tablet     LORazepam (ATIVAN) 1 MG tablet     Magnesium Citrate 125 MG CAPS     metoprolol succinate ER (TOPROL-XL) 25 MG 24 hr tablet     naloxone (NARCAN) 4 MG/0.1ML nasal spray     omeprazole (PRILOSEC) 40 MG DR capsule     PARoxetine (PAXIL-CR) 25 MG 24 hr tablet     polyethylene glycol (MIRALAX) 17 GM/Dose powder     progesterone (PROMETRIUM) 200 MG capsule     Riboflavin 400 MG TABS     sulfamethoxazole-trimethoprim (BACTRIM DS) 800-160 MG tablet     trimethobenzamide (TIGAN) 300 MG capsule     vitamin C (ASCORBIC ACID) 1000 MG TABS     Vitamin D3 (CHOLECALCIFEROL) 125 MCG (5000 UT) tablet     zolpidem ER (AMBIEN CR) 12.5 MG CR tablet     No current facility-administered medications for this visit.          PEx:   /80   Pulse 68   Ht 1.676 m (5' 6\")   Wt 54.4 kg (120 lb)   SpO2 " 98%   BMI 19.37 kg/m      PSYCH: NAD  EYES: EOMI  MOUTH: MMM  NEURO: AAO x3    Urine: small blood, micro normal      A/P: Laurel Parra is a 70 year old female with dysuria 2/2 atrophy and urethritis, hx of UTIs  -Hydrocortisone 1% urethra supps, PRN wkly  -Estrogen cream three times a week near urethra (pea-sized amount): If >$50, she will let me know and we can get via a compound pharmacy.  -Med check 3 months, female urology referral if not improved.     Abimbola Chamberlain PA-C  [facility]    Prescription drug management  26 minutes spent on the date of the encounter doing chart review, review of test results, interpretation of tests, patient visit and documentation   6}               negative...

## 2023-03-07 ENCOUNTER — TRANSFERRED RECORDS (OUTPATIENT)
Dept: HEALTH INFORMATION MANAGEMENT | Facility: CLINIC | Age: 73
End: 2023-03-07

## 2023-03-08 ENCOUNTER — VIRTUAL VISIT (OUTPATIENT)
Dept: NEUROLOGY | Facility: CLINIC | Age: 73
End: 2023-03-08
Payer: MEDICARE

## 2023-03-08 DIAGNOSIS — M54.81 BILATERAL OCCIPITAL NEURALGIA: ICD-10-CM

## 2023-03-08 DIAGNOSIS — G43.109 MIGRAINE WITH AURA AND WITHOUT STATUS MIGRAINOSUS, NOT INTRACTABLE: Primary | ICD-10-CM

## 2023-03-08 DIAGNOSIS — G44.209 TENSION HEADACHE: ICD-10-CM

## 2023-03-08 PROCEDURE — 99441 PR PHYSICIAN TELEPHONE EVALUATION 5-10 MIN: CPT | Mod: 95 | Performed by: PSYCHIATRY & NEUROLOGY

## 2023-03-08 RX ORDER — PROPRANOLOL HCL 60 MG
60 CAPSULE, EXTENDED RELEASE 24HR ORAL DAILY
Qty: 90 CAPSULE | Refills: 1 | Status: SHIPPED | OUTPATIENT
Start: 2023-03-08 | End: 2023-11-21

## 2023-03-08 RX ORDER — SUMATRIPTAN 50 MG/1
50 TABLET, FILM COATED ORAL
Qty: 18 TABLET | Refills: 5 | Status: SHIPPED | OUTPATIENT
Start: 2023-03-08 | End: 2023-08-17

## 2023-03-08 NOTE — PROGRESS NOTES
"TELEPHONE ENCOUNTER NOTE    DATE OF VISIT: 3/8/2023  CLINIC LOCATION: [facility]  MRN: [id number]  PATIENT NAME: Laurel Parra  YOB: 1950  PCP: Georges Lebron MD    Laurel Parra is a 72 year old female who is being evaluated via a billable telephone visit.      The patient has been notified of following:     \"This telephone visit will be conducted via a call between you and your physician/provider. We have found that certain health care needs can be provided without the need for a physical exam.  This service lets us provide the care you need with a short phone conversation.  If a prescription is necessary we can send it directly to your pharmacy.  If lab work is needed we can place an order for that and you can then stop by our lab to have the test done at a later time.    If during the course of the call the physician/provider feels a telephone visit is not appropriate, you will not be charged for this service.\"     Laurel Parra complains of    Chief Complaint   Patient presents with     Migraine     Patient reports they have improved a lot      I have reviewed and updated the patient's Past Medical History, Social History, Family History and Medication List.    ALLERGIES  Betamethasone, Chocolate, Compazine [prochlorperazine], Linzess [linaclotide], Penicillins, Pneumococcal vaccine, and Tizanidine    The consent for this service was obtained by:  France Li Visit Facilitator    Additional provider notes:    REASON FOR VISIT:   Chief Complaint   Patient presents with     Migraine     Patient reports they have improved a lot      SUBJECTIVE:                                                      HISTORY OF PRESENT ILLNESS: This telephone encounter is regarding multifactorial headaches.  The last visit was virtual on 9/7/2022.  At that time her symptoms were stable.  No medication changes were made.  Please refer to my initial/other prior " notes for further information.    Since the last visit, the patient reports notable improvement of her headaches after medication adjustment.  She is on extended release propranolol 60 mg daily and 400 mg of riboflavin daily for headache prevention.  For acute therapy she uses Imitrex.  No significant side effects or interval development of new focal neurological symptoms.  She reports that her occipital pain occasionally flares up and also associated with neck pain.  She is working with physical therapy and does regular massages.  ASSESSMENT AND PLAN:                                                    Assessment: 72 year old female patient, who is evaluated via a telephone visit for multifactorial headache disorder.  We had a detailed discussion with the patient regarding her current symptoms, available treatment options, and the plan.  She reports that overall her headaches are significantly improved on current therapy.  I do not think that we need to make any medication changes.  I encouraged her to continue riboflavin and refilled her Imitrex and propranolol prescriptions.      At the same time, she continues to struggle with occipital pain that is likely due to bilateral occipital neuralgia.  In the past, we discussed bilateral occipital nerve blocks with steroids that could be considered, but the patient wanted to try physical therapy first, which is reasonable.  I will examine her at the next visit to determine if we need to proceed with blocks.    Diagnoses:    ICD-10-CM    1. Migraine with aura and without status migrainosus, not intractable  G43.109       2. Tension headache  G44.209       3. Bilateral occipital neuralgia  M54.81         Plan: At today's visit we thoroughly discussed current symptoms, available treatment options, and the plan.  I am pleased to hear that patient's headaches are under better control on current therapy.    We decided not to make any medication changes.  Her propranolol and  Imitrex prescriptions were refilled.    I recommended the patient to keep the headache diary and bring it to the next follow-up visit or upload via My Chart.     Regarding her occipital/neck pain, we discussed option of occipital nerve block with steroid that could be ordered at the next visit if necessary.    Next follow-up appointment is in the next 4 months or earlier if needed.    I have reviewed the note as documented above.  This accurately captures the substance of my conversation with the patient.    Phone call contact time: 8 minutes.  Total time 15 minutes.  Originating Location (pt. Location): Home  Distant Location (provider location):  [facility] NEUROLOGY CLINICS - FIORELLA Castanon MD  [facility] Neurology.  (Chart documentation was completed in part with Dragon voice-recognition software. Even though reviewed, some grammatical, spelling, and word errors may remain.)

## 2023-03-08 NOTE — LETTER
"    3/8/2023         RE: Laurel Parra  [address]  [place] [address]        Dear Colleague,    Thank you for referring your patient, Laurel Parra, to the [facility] NEUROLOGY [facility]ICS [facility]. Please see a copy of my visit note below.    TELEPHONE ENCOUNTER NOTE    DATE OF VISIT: 3/8/2023  CLINIC LOCATION: [facility]  MRN: [id number]  PATIENT NAME: Laurel Parra  YOB: 1950  PCP: Georges Lebron MD    Laurel Parra is a 72 year old female who is being evaluated via a billable telephone visit.      The patient has been notified of following:     \"This telephone visit will be conducted via a call between you and your physician/provider. We have found that certain health care needs can be provided without the need for a physical exam.  This service lets us provide the care you need with a short phone conversation.  If a prescription is necessary we can send it directly to your pharmacy.  If lab work is needed we can place an order for that and you can then stop by our lab to have the test done at a later time.    If during the course of the call the physician/provider feels a telephone visit is not appropriate, you will not be charged for this service.\"     Laurel Parra complains of    Chief Complaint   Patient presents with     Migraine     Patient reports they have improved a lot      I have reviewed and updated the patient's Past Medical History, Social History, Family History and Medication List.    ALLERGIES  Betamethasone, Chocolate, Compazine [prochlorperazine], Linzess [linaclotide], Penicillins, Pneumococcal vaccine, and Tizanidine    The consent for this service was obtained by:  France Li Visit Facilitator    Additional provider notes:    REASON FOR VISIT:   Chief Complaint   Patient presents with     Migraine     Patient reports they have improved a lot      SUBJECTIVE:                    "                                   HISTORY OF PRESENT ILLNESS: This telephone encounter is regarding multifactorial headaches.  The last visit was virtual on 9/7/2022.  At that time her symptoms were stable.  No medication changes were made.  Please refer to my initial/other prior notes for further information.    Since the last visit, the patient reports notable improvement of her headaches after medication adjustment.  She is on extended release propranolol 60 mg daily and 400 mg of riboflavin daily for headache prevention.  For acute therapy she uses Imitrex.  No significant side effects or interval development of new focal neurological symptoms.  She reports that her occipital pain occasionally flares up and also associated with neck pain.  She is working with physical therapy and does regular massages.  ASSESSMENT AND PLAN:                                                    Assessment: 72 year old female patient, who is evaluated via a telephone visit for multifactorial headache disorder.  We had a detailed discussion with the patient regarding her current symptoms, available treatment options, and the plan.  She reports that overall her headaches are significantly improved on current therapy.  I do not think that we need to make any medication changes.  I encouraged her to continue riboflavin and refilled her Imitrex and propranolol prescriptions.      At the same time, she continues to struggle with occipital pain that is likely due to bilateral occipital neuralgia.  In the past, we discussed bilateral occipital nerve blocks with steroids that could be considered, but the patient wanted to try physical therapy first, which is reasonable.  I will examine her at the next visit to determine if we need to proceed with blocks.    Diagnoses:    ICD-10-CM    1. Migraine with aura and without status migrainosus, not intractable  G43.109       2. Tension headache  G44.209       3. Bilateral occipital neuralgia  M54.81          Plan: At today's visit we thoroughly discussed current symptoms, available treatment options, and the plan.  I am pleased to hear that patient's headaches are under better control on current therapy.    We decided not to make any medication changes.  Her propranolol and Imitrex prescriptions were refilled.    I recommended the patient to keep the headache diary and bring it to the next follow-up visit or upload via My Chart.     Regarding her occipital/neck pain, we discussed option of occipital nerve block with steroid that could be ordered at the next visit if necessary.    Next follow-up appointment is in the next 4 months or earlier if needed.    I have reviewed the note as documented above.  This accurately captures the substance of my conversation with the patient.    Phone call contact time: 8 minutes.  Total time 15 minutes.  Originating Location (pt. Location): Home  Distant Location (provider location):  [facility] NEUROLOGY CLINICS [facility]     Ej Castanon MD  [facility] Neurology.  (Chart documentation was completed in part with Dragon voice-recognition software. Even though reviewed, some grammatical, spelling, and word errors may remain.)          Again, thank you for allowing me to participate in the care of your patient.        Sincerely,        Ej Castanon MD

## 2023-03-08 NOTE — PATIENT INSTRUCTIONS
AFTER VISIT SUMMARY (AVS):    At today's visit we thoroughly discussed current symptoms, available treatment options, and the plan.  I am pleased to hear that your headaches are under better control on current therapy.    We decided not to make any medication changes.  Your propranolol and Imitrex prescriptions were refilled.    Please keep the headache diary and bring it to the next follow-up visit or upload via My Chart.     Regarding your occipital/neck pain, we discussed option of occipital nerve block with steroid that could be ordered at the next visit if needed.    Next follow-up appointment is in the next 4 months or earlier if needed.    Please do not hesitate to call me with any questions or concerns.    Thanks.

## 2023-03-09 ENCOUNTER — TELEPHONE (OUTPATIENT)
Dept: NEUROLOGY | Facility: CLINIC | Age: 73
End: 2023-03-09

## 2023-03-10 ENCOUNTER — TRANSFERRED RECORDS (OUTPATIENT)
Dept: HEALTH INFORMATION MANAGEMENT | Facility: CLINIC | Age: 73
End: 2023-03-10

## 2023-03-16 ENCOUNTER — TRANSFERRED RECORDS (OUTPATIENT)
Dept: HEALTH INFORMATION MANAGEMENT | Facility: CLINIC | Age: 73
End: 2023-03-16
Payer: MEDICARE

## 2023-03-21 DIAGNOSIS — R30.0 DYSURIA: ICD-10-CM

## 2023-03-21 DIAGNOSIS — N39.0 RECURRENT UTI: ICD-10-CM

## 2023-03-23 RX ORDER — METHENAMINE HIPPURATE 1000 MG/1
TABLET ORAL
Qty: 180 TABLET | Refills: 1 | Status: SHIPPED | OUTPATIENT
Start: 2023-03-23 | End: 2023-06-13

## 2023-03-26 ENCOUNTER — HEALTH MAINTENANCE LETTER (OUTPATIENT)
Age: 73
End: 2023-03-26

## 2023-03-27 ENCOUNTER — TRANSFERRED RECORDS (OUTPATIENT)
Dept: HEALTH INFORMATION MANAGEMENT | Facility: CLINIC | Age: 73
End: 2023-03-27

## 2023-04-03 ENCOUNTER — TRANSFERRED RECORDS (OUTPATIENT)
Dept: HEALTH INFORMATION MANAGEMENT | Facility: CLINIC | Age: 73
End: 2023-04-03
Payer: MEDICARE

## 2023-04-07 ENCOUNTER — APPOINTMENT (OUTPATIENT)
Dept: LAB | Facility: CLINIC | Age: 73
End: 2023-04-07
Payer: MEDICARE

## 2023-04-07 ENCOUNTER — VIRTUAL VISIT (OUTPATIENT)
Dept: FAMILY MEDICINE | Facility: CLINIC | Age: 73
End: 2023-04-07
Payer: MEDICARE

## 2023-04-07 DIAGNOSIS — J02.9 SORE THROAT: Primary | ICD-10-CM

## 2023-04-07 LAB
DEPRECATED S PYO AG THROAT QL EIA: NEGATIVE
GROUP A STREP BY PCR: NOT DETECTED
SARS-COV-2 RNA RESP QL NAA+PROBE: NEGATIVE

## 2023-04-07 PROCEDURE — U0003 INFECTIOUS AGENT DETECTION BY NUCLEIC ACID (DNA OR RNA); SEVERE ACUTE RESPIRATORY SYNDROME CORONAVIRUS 2 (SARS-COV-2) (CORONAVIRUS DISEASE [COVID-19]), AMPLIFIED PROBE TECHNIQUE, MAKING USE OF HIGH THROUGHPUT TECHNOLOGIES AS DESCRIBED BY CMS-2020-01-R: HCPCS | Performed by: FAMILY MEDICINE

## 2023-04-07 PROCEDURE — 87651 STREP A DNA AMP PROBE: CPT | Performed by: FAMILY MEDICINE

## 2023-04-07 PROCEDURE — U0005 INFEC AGEN DETEC AMPLI PROBE: HCPCS | Performed by: FAMILY MEDICINE

## 2023-04-07 PROCEDURE — 99441 PR PHYSICIAN TELEPHONE EVALUATION 5-10 MIN: CPT | Mod: CS | Performed by: FAMILY MEDICINE

## 2023-04-07 RX ORDER — ONDANSETRON 4 MG/1
4 TABLET, FILM COATED ORAL EVERY 8 HOURS PRN
COMMUNITY
Start: 2023-02-25

## 2023-04-07 NOTE — PROGRESS NOTES
Laurel is a 72 year old who is being evaluated via a billable telephone visit.      What phone number would you like to be contacted at? [phone]  How would you like to obtain your AVS? Rose  Distant Location (provider location):  On-site    Assessment & Plan     Sore throat  Patient with several day history of sore throat we will check for strep and COVID otherwise symptomatic care at home if all tests are negative and she still running fevers strongly recommended that she be seen tomorrow.  Sooner if she is worse.  - Symptomatic COVID-19 Virus (Coronavirus) by PCR  - Streptococcus A Rapid Screen w/Reflex to PCR - Clinic Collect                 Ciro Brown MD  [facility]   Laurel is a 72 year old, presenting for the following health issues:  Pharyngitis        4/7/2023     9:19 AM   Additional Questions   Roomed by Paola CUNNINGHAM     HA, fever, ST, swollen glands. Sx started Tuesday. Has not done COVID testing at home.   Would like to discuss testing for strep and COVID. No known exposures.     Fevers to 100.9.  No shortness of breath no cough no other cold symptoms.      Review of Systems   Constitutional, HEENT, cardiovascular, pulmonary, gi and gu systems are negative, except as otherwise noted.      Objective           Vitals:  No vitals were obtained today due to virtual visit.    Physical Exam   healthy, alert and no distress  PSYCH: Alert and oriented times 3; coherent speech, normal   rate and volume, able to articulate logical thoughts, able   to abstract reason, no tangential thoughts, no hallucinations   or delusions  Her affect is normal  RESP: No cough, no audible wheezing, able to talk in full sentences  Remainder of exam unable to be completed due to telephone visits                Phone call duration: 5 minutes

## 2023-04-07 NOTE — LETTER
April 8, 2023      Laurel Parra  [address] [place] [facility] [address]        Dear ,    We are writing to inform you of your test results.    Your test results fall within the expected range(s) or remain unchanged from previous results.  Please continue with current treatment plan.    Resulted Orders   Symptomatic COVID-19 Virus (Coronavirus) by PCR Nose   Result Value Ref Range    SARS CoV2 PCR Negative Negative      Comment:      NEGATIVE: SARS-CoV-2 (COVID-19) RNA not detected, presumed negative.    Narrative    Testing was performed using the [employer] SARS-CoV-2 Assay on the  [employer] Instrument System. Additional information about this  Emergency Use Authorization (EUA) assay can be found via the Lab  Guide. This test should be ordered for the detection of SARS-CoV-2 in  individuals who meet SARS-CoV-2 clinical and/or epidemiological  criteria. Test performance is unknown in asymptomatic patients. This  test is for in vitro diagnostic use under the FDA EUA for  laboratories certified under CLIA to perform high complexity testing.  This test has not been FDA cleared or approved. A negative result  does not rule out the presence of PCR inhibitors in the specimen or  target RNA in concentration below the limit of detection for the  assay. The possibility of a false negative should be considered if  the patient's recent exposure or clinical presentation suggests  COVID-19. This test was validated by the [facility]. This laboratory is certified under  the Clinical Laboratory Improvement Amendments of 1988 (CLIA-88) as  qualified to perform high complexity laboratory testing.   Streptococcus A Rapid Screen w/Reflex to PCR - Clinic Collect   Result Value Ref Range    Group A Strep antigen Negative Negative   Group A Streptococcus PCR Throat Swab   Result Value Ref Range    Group A strep by PCR Not Detected Not Detected    Narrative    The  Xpert Xpress Strep A test, performed on the [employer]  Instrument Systems, is a rapid, qualitative in vitro diagnostic test for the detection of Streptococcus pyogenes (Group A ß-hemolytic Streptococcus, Strep A) in throat swab specimens from patients with signs and symptoms of pharyngitis. The Xpert Xpress Strep A test can be used as an aid in the diagnosis of Group A Streptococcal pharyngitis. The assay is not intended to monitor treatment for Group A Streptococcus infections. The Xpert Xpress Strep A test utilizes an automated real-time polymerase chain reaction (PCR) to detect Streptococcus pyogenes DNA.       If you have any questions or concerns, please call the clinic at the number listed above.       Sincerely,      Ciro Brown MD

## 2023-05-10 ENCOUNTER — TRANSFERRED RECORDS (OUTPATIENT)
Dept: HEALTH INFORMATION MANAGEMENT | Facility: CLINIC | Age: 73
End: 2023-05-10

## 2023-05-11 ENCOUNTER — TRANSFERRED RECORDS (OUTPATIENT)
Dept: HEALTH INFORMATION MANAGEMENT | Facility: CLINIC | Age: 73
End: 2023-05-11
Payer: MEDICARE

## 2023-05-30 ENCOUNTER — TRANSFERRED RECORDS (OUTPATIENT)
Dept: HEALTH INFORMATION MANAGEMENT | Facility: CLINIC | Age: 73
End: 2023-05-30
Payer: MEDICARE

## 2023-06-13 ENCOUNTER — VIRTUAL VISIT (OUTPATIENT)
Dept: UROLOGY | Facility: CLINIC | Age: 73
End: 2023-06-13
Payer: MEDICARE

## 2023-06-13 VITALS — HEIGHT: 66 IN | BODY MASS INDEX: 20.09 KG/M2 | WEIGHT: 125 LBS

## 2023-06-13 DIAGNOSIS — N39.0 RECURRENT UTI: ICD-10-CM

## 2023-06-13 DIAGNOSIS — R30.0 DYSURIA: ICD-10-CM

## 2023-06-13 DIAGNOSIS — M62.89 PELVIC FLOOR DYSFUNCTION: ICD-10-CM

## 2023-06-13 DIAGNOSIS — Z87.440 PERSONAL HISTORY OF URINARY TRACT INFECTION: Primary | ICD-10-CM

## 2023-06-13 PROCEDURE — 99214 OFFICE O/P EST MOD 30 MIN: CPT | Mod: VID | Performed by: UROLOGY

## 2023-06-13 RX ORDER — METHENAMINE HIPPURATE 1000 MG/1
TABLET ORAL
Qty: 180 TABLET | Refills: 1 | Status: SHIPPED | OUTPATIENT
Start: 2023-06-13 | End: 2024-06-25

## 2023-06-13 ASSESSMENT — PAIN SCALES - GENERAL: PAINLEVEL: MILD PAIN (3)

## 2023-06-13 NOTE — PATIENT INSTRUCTIONS
Please do the blood work    Continue methenamine    Follow up with Etta in one year    Websites with free information:    American Urogynecologic Society patient website: www.voicesforpfd.org    Total Control Program: www.totalcontrolprogram.com    Supplements to prevent UTI to consider  -Probiotics  -Cranberry (for these products let them know a doctor is recommending them)   Ellura: www.myellura.Jamba!   Theracran HP by [employer] [facility] [id number]  -d-mannose 2gm daily  -Vitamin C 500-1000mg twice a day    It was a pleasure meeting with you today.  Thank you for allowing me and my team the privilege of caring for you today.  YOU are the reason we are here, and I truly hope we provided you with the excellent service you deserve.  Please let us know if there is anything else we can do for you so that we can be sure you are leaving completely satisfied with your care experience.

## 2023-06-13 NOTE — LETTER
6/13/2023       RE: Laurel Parra  [address]  [place] [address]     Dear Colleague,    Thank you for referring your patient, Laurel Parra, to the [facility]OLOGY CL[facility]IC [place] at [facility]. Please see a copy of my visit note below.    [facility]    Laurel is a 73 year old who is being evaluated via a billable video visit.      How would you like to obtain your AVS? [facility]  If the video visit is dropped, the invitation should be resent by: Text to cell phone: [phone]  Will anyone else be joining your video visit? No      Video-Visit Details    Type of service:  Video Visit   Video Start Time: 1:06 PM  Video End Time:1:16 PM    Originating Location (pt. Location): Home    Distant Location (provider location):  Off-site  Platform used for Video Visit: [facility]     June 13, 2023    Laurel was seen today for other.    Diagnoses and all orders for this visit:    Personal history of urinary tract infection  -     Comprehensive metabolic panel; Future    Dysuria  -     Comprehensive metabolic panel; Future  -     methenamine hippurate (HIPREX) 1 g tablet; TAKE 1 TABLET(1 GRAM) BY MOUTH TWICE DAILY    Recurrent UTI  -     methenamine hippurate (HIPREX) 1 g tablet; TAKE 1 TABLET(1 GRAM) BY MOUTH TWICE DAILY    Pelvic floor dysfunction  -     Comprehensive metabolic panel; Future       Continue methenamine as long as the CMP normal    15 minutes were spent today on the day of the encounter in reviewing the EMR including urine cultures, direct patient care including ordering CMP and prescription medications, coordination of care and documentation    Heide Wilhelm MD MPH  (she/her/hers)   of Urology  [facility]      Subjective    Last seen May 2022, had normal cystoscopy.  Sent to pelvic floor therapy and continued on methenamine.  One UTI on file in our system, 2/21/23- E  "coli and this was after going to her first PT session so she quit.      Currently having lots of issues with her osteoarthritis.  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  Working on pilates, meditation.  Notes some increased frequency later in the day but not bothersome enough for treatment right now    Ht 1.676 m (5' 6\")   Wt 56.7 kg (125 lb)   BMI 20.18 kg/m    GENERAL: healthy, alert and no distress  EYES: Eyes grossly normal to inspection, conjunctivae and sclerae normal  HENT: normal cephalic/atraumatic.  External ears, nose and mouth without ulcers or lesions.  RESP: no audible wheeze, cough, or visible cyanosis.  No visible retractions or increased work of breathing.  Able to speak fully in complete sentences.  NEURO: Cranial nerves grossly intact, mentation intact and speech normal  PSYCH: mentation appears normal, affect normal/bright, judgement and insight intact, normal speech and appearance well-groomed        CC  Patient Care Team:  Georges Lebron MD as PCP - General (Internal Medicine)  Thelma Meehan MD (Inactive) as Leydi Lu (Nurse Practitioner)  Ej Castanon MD as Assigned Neuroscience Provider  Abimbola Chamberlain PA-C as Physician Assistant (Urology)  Georges Lebron MD as Assigned PCP  Heide Wilhelm MD as Assigned Surgical Provider    "

## 2023-06-13 NOTE — PROGRESS NOTES
Pt will meet you in [employer]    Laurel is a 73 year old who is being evaluated via a billable video visit.      How would you like to obtain your AVS? [facility]  If the video visit is dropped, the invitation should be resent by: Text to cell phone: [phone]  Will anyone else be joining your video visit? No      Video-Visit Details    Type of service:  Video Visit   Video Start Time: 1:06 PM  Video End Time:1:16 PM    Originating Location (pt. Location): Home    Distant Location (provider location):  Off-site  Platform used for Video Visit: [employer]     June 13, 2023    Laurel was seen today for other.    Diagnoses and all orders for this visit:    Personal history of urinary tract infection  -     Comprehensive metabolic panel; Future    Dysuria  -     Comprehensive metabolic panel; Future  -     methenamine hippurate (HIPREX) 1 g tablet; TAKE 1 TABLET(1 GRAM) BY MOUTH TWICE DAILY    Recurrent UTI  -     methenamine hippurate (HIPREX) 1 g tablet; TAKE 1 TABLET(1 GRAM) BY MOUTH TWICE DAILY    Pelvic floor dysfunction  -     Comprehensive metabolic panel; Future       Continue methenamine as long as the CMP normal    15 minutes were spent today on the day of the encounter in reviewing the EMR including urine cultures, direct patient care including ordering CMP and prescription medications, coordination of care and documentation    Heide Wilhelm MD MPH  (she/her/hers)   of Urology  [facility]      Subjective    Last seen May 2022, had normal cystoscopy.  Sent to pelvic floor therapy and continued on methenamine.  One UTI on file in our system, 2/21/23- E coli and this was after going to her first PT session so she quit.      Currently having lots of issues with her osteoarthritis.  Was at [facility] and diagnosed with central sensitivity syndrome.  Working on pilates, meditation.  Notes some increased frequency later in the day but not bothersome enough for treatment right now    Ht 1.676  "m (5' 6\")   Wt 56.7 kg (125 lb)   BMI 20.18 kg/m    GENERAL: healthy, alert and no distress  EYES: Eyes grossly normal to inspection, conjunctivae and sclerae normal  HENT: normal cephalic/atraumatic.  External ears, nose and mouth without ulcers or lesions.  RESP: no audible wheeze, cough, or visible cyanosis.  No visible retractions or increased work of breathing.  Able to speak fully in complete sentences.  NEURO: Cranial nerves grossly intact, mentation intact and speech normal  PSYCH: mentation appears normal, affect normal/bright, judgement and insight intact, normal speech and appearance well-groomed        CC  Patient Care Team:  Georges Lebron MD as PCP - General (Internal Medicine)  Thelma Meehan MD (Inactive) as Leydi Lu (Nurse Practitioner)  Ej Castanon MD as Assigned Neuroscience Provider  Abimbola Chamberlain PA-C as Physician Assistant (Urology)  Georges Lebron MD as Assigned PCP  Heide Wilhelm MD as Assigned Surgical Provider            "

## 2023-06-20 ENCOUNTER — TRANSFERRED RECORDS (OUTPATIENT)
Dept: HEALTH INFORMATION MANAGEMENT | Facility: CLINIC | Age: 73
End: 2023-06-20
Payer: MEDICARE

## 2023-06-26 ENCOUNTER — TRANSFERRED RECORDS (OUTPATIENT)
Dept: HEALTH INFORMATION MANAGEMENT | Facility: CLINIC | Age: 73
End: 2023-06-26

## 2023-07-24 ENCOUNTER — E-VISIT (OUTPATIENT)
Dept: FAMILY MEDICINE | Facility: CLINIC | Age: 73
End: 2023-07-24
Payer: MEDICARE

## 2023-07-24 DIAGNOSIS — F50.9 EATING DISORDER, UNSPECIFIED TYPE: Primary | ICD-10-CM

## 2023-07-24 DIAGNOSIS — R63.5 WEIGHT GAIN: ICD-10-CM

## 2023-07-24 PROCEDURE — 99421 OL DIG E/M SVC 5-10 MIN: CPT | Performed by: INTERNAL MEDICINE

## 2023-07-25 NOTE — PATIENT INSTRUCTIONS
Thank you for choosing us for your care. Given your symptoms, I would like you to do a lab-only visit to determine what is causing them.  I have placed the orders.  Please schedule an appointment with the lab right here in [facility], or call [phone].  I will let you know when the results are back and next steps to take.

## 2023-07-27 ENCOUNTER — LAB (OUTPATIENT)
Dept: LAB | Facility: CLINIC | Age: 73
End: 2023-07-27
Payer: MEDICARE

## 2023-07-27 ENCOUNTER — TELEPHONE (OUTPATIENT)
Dept: FAMILY MEDICINE | Facility: CLINIC | Age: 73
End: 2023-07-27

## 2023-07-27 DIAGNOSIS — Z87.440 PERSONAL HISTORY OF URINARY TRACT INFECTION: ICD-10-CM

## 2023-07-27 DIAGNOSIS — M62.89 PELVIC FLOOR DYSFUNCTION: ICD-10-CM

## 2023-07-27 DIAGNOSIS — R63.5 WEIGHT GAIN: ICD-10-CM

## 2023-07-27 DIAGNOSIS — R30.0 DYSURIA: ICD-10-CM

## 2023-07-27 LAB
ALBUMIN SERPL BCG-MCNC: 4.3 G/DL (ref 3.5–5.2)
ALP SERPL-CCNC: 82 U/L (ref 35–104)
ALT SERPL W P-5'-P-CCNC: 18 U/L (ref 0–50)
ANION GAP SERPL CALCULATED.3IONS-SCNC: 12 MMOL/L (ref 7–15)
AST SERPL W P-5'-P-CCNC: 22 U/L (ref 0–45)
BILIRUB SERPL-MCNC: 0.3 MG/DL
BUN SERPL-MCNC: 15 MG/DL (ref 8–23)
CALCIUM SERPL-MCNC: 9 MG/DL (ref 8.8–10.2)
CHLORIDE SERPL-SCNC: 105 MMOL/L (ref 98–107)
CREAT SERPL-MCNC: 0.75 MG/DL (ref 0.51–0.95)
DEPRECATED HCO3 PLAS-SCNC: 24 MMOL/L (ref 22–29)
GFR SERPL CREATININE-BSD FRML MDRD: 85 ML/MIN/1.73M2
GLUCOSE SERPL-MCNC: 102 MG/DL (ref 70–99)
POTASSIUM SERPL-SCNC: 4.2 MMOL/L (ref 3.4–5.3)
PROT SERPL-MCNC: 6.5 G/DL (ref 6.4–8.3)
SODIUM SERPL-SCNC: 141 MMOL/L (ref 136–145)
TSH SERPL DL<=0.005 MIU/L-ACNC: 1.36 UIU/ML (ref 0.3–4.2)

## 2023-07-27 PROCEDURE — 80053 COMPREHEN METABOLIC PANEL: CPT

## 2023-07-27 PROCEDURE — 84443 ASSAY THYROID STIM HORMONE: CPT

## 2023-07-27 PROCEDURE — 36415 COLL VENOUS BLD VENIPUNCTURE: CPT

## 2023-07-27 NOTE — TELEPHONE ENCOUNTER
Reason for Call:  Appointment Request    Patient requesting this type of appt:  Preventive     Requested provider: Georges Lebron    Reason patient unable to be scheduled: Not within requested timeframe    When does patient want to be seen/preferred time: 3-7 days    Comments: Patient called in to schedule her annual physical - the first appointment that I had was in January 2024, checked to see about a follow up appointment and my first available would be on November 17 - patient states that she was told to get in soon after lab work - lab work is today 7/27/2023    Could we send this information to you in [employer] or would you prefer to receive a phone call?:   Patient would prefer a phone call   Okay to leave a detailed message?: Yes at Home number on file [phone] (home)    Call taken on 7/27/2023 at 10:27 AM by Tiff Redman   and Chronic Diease Management/Medication/Follow-Up    Requested provider: Georges Lebron    Reason patient unable to be scheduled: Not within requested timeframe    When does patient want to be seen/preferred time: 3-7 days    Comments: see above - patient prefers physical but open to follow up visit     Could we send this information to you in [employer] or would you prefer to receive a phone call?:   Patient would prefer a phone call   Okay to leave a detailed message?: Yes at Home number on file [phone] (home)    Call taken on 7/27/2023 at 10:27 AM by Tiff Redman

## 2023-07-28 NOTE — TELEPHONE ENCOUNTER
If there is an acute issue I can certainly work her in for that.  Otherwise if you can schedule a physical within the next 3 months that is great.    Thank you    Georges Lebron M.D.

## 2023-08-01 ENCOUNTER — TRANSFERRED RECORDS (OUTPATIENT)
Dept: HEALTH INFORMATION MANAGEMENT | Facility: CLINIC | Age: 73
End: 2023-08-01
Payer: MEDICARE

## 2023-08-04 ENCOUNTER — TRANSFERRED RECORDS (OUTPATIENT)
Dept: HEALTH INFORMATION MANAGEMENT | Facility: CLINIC | Age: 73
End: 2023-08-04
Payer: MEDICARE

## 2023-08-04 NOTE — PROGRESS NOTES
Chief Complaint   Patient presents with   • Gyn Exam     Last pap 21 neg       HISTORY OF PRESENT ILLNESS:  Patient is a 32 year old  alert female who presents today for annual exam. Headches and  cancor sores    CONTRACEPTION: Withdrawl    ALLERGIES:   Allergen Reactions   • Contrast Media ANAPHYLAXIS     States  Blood pressure went way down and was unconscious     Outpatient Medications Marked as Taking for the 23 encounter (Office Visit) with Deja Miller CNM   Medication Sig Dispense Refill   • Biotin 1 MG Cap      • Collagen-Vitamin C-Biotin (COLLAGEN 1500/C PO)      • phentermine (ADIPEX-P) 37.5 MG tablet Take 1 tablet by mouth daily (before breakfast). 30 tablet 1   • Multiple Vitamin (MULTIVITAMIN PO)      • 5-Hydroxytryptophan (5-HTP PO)      • LYSINE PO      • TYROSINE PO      • [facility] JOHNS WORT PO      • metFORMIN (GLUCOPHAGE) 500 MG tablet Take 1 tablet by mouth in the morning and 1 tablet in the evening. Take with meals. 60 tablet 11     Past Medical History:   Diagnosis Date   • Anemia    • Anxiety     in counseling, meds in high school   • Mastitis      Past Surgical History:   Procedure Laterality Date   • Oral surgery procedure      as a child   • Pap smear  2011     Social History     Socioeconomic History   • Marital status: Significant other     Spouse name: Not on file   • Number of children: 1   • Years of education: Not on file   • Highest education level: Not on file   Occupational History   • Occupation: cleaning   Tobacco Use   • Smoking status: Never     Passive exposure: Never   • Smokeless tobacco: Never   Vaping Use   • Vaping Use: Former   Substance and Sexual Activity   • Alcohol use: Yes     Comment: very rarely   • Drug use: No   • Sexual activity: Yes     Partners: Male     Birth control/protection: Coitus Interruptus   Other Topics Concern   • Not on file   Social History Narrative   • Not on file     Social Determinants of Health     Financial Resource  [facility]  [address] [place] [address], [address]  [facility] [address]  Phone: [phone]  Primary Provider: Georges Lebron        PREOPERATIVE EVALUATION:  Today's date: 12/3/2021    Laurel Parra is a 71 year old female who presents for a preoperative evaluation.    Surgical Information:  Surgery/Procedure: Cataract   Surgery Location: [facility]  Surgeon: ?  Surgery Date: 12/7/21  Time of Surgery: am  Where patient plans to recover: At home with family  Fax number for surgical facility: [phone]    Type of Anesthesia Anticipated: to be determined        Subjective     HPI related to upcoming procedure:     This is a pleasant 71-year-old female scheduled for cataract surgery whom I been asked to see and evaluate prior to the procedure.    Patient with multiple medical problems including chronic back pain for which she takes Vicodin, 12 a day.  She also takes Ativan, 3 a day.  She has been on this for quite some time.    The patient has issues with hot flashes.  She went off her hormones in the last 6 months and it started down.  Its been quite bothersome.    Her depression has been somewhat active do to her pain as well as her hot flashes.  She sees psychiatry regularly for that.    Her migraines have been stable.  She was diagnosed this year with lymphocytic colitis and just recently started Entocort for that.  It is somewhat improved but has only been a few days.    She otherwise is stable.  She can do 4 METS without difficulty.                Past Medical History:      Past Medical History:   Diagnosis Date     Anxiety     Dr. Adelina Meehan     Chronic constipation      Chronic narcotic use     [facility]     Chronic pain     Dr. Orta as of 2015     Chronic urethritis     Dr. Little     Colonic polyp 11/2011    one polyp, fu 5 years, fu 10/17 and no lesions, fu 9/20 and negative, fu 5 years     Depression, major, in partial remission (H)     Dr. Meehan      Depressive disorder 1971     Diarrhea     eval by [facility], resolved with gluten free diet     DJD (degenerative joint disease)      Dyspepsia     eval by [facility], had colonoscopy, egd, ct abd and pelvis, mrcp.  [place] to be dyspepsia and constipation     H/O gastroesophageal reflux (GERD)      LBP (low back pain)     Dr. Jae Tong in [place], then seeing [facility] Dr. You Cannon headed 2020    nl est echo     Lymphocytic colitis     [facility]     Migraines     neuro eval     Palpitations     holter with pvc's and pac's, echo nl     Screening     dexa nl at gyn             Past Surgical History:      Past Surgical History:   Procedure Laterality Date     APPENDECTOMY  2016     ARTHRODESIS WRIST Left 2021    Procedure: LEFT TOTAL WRIST FUSION;  Surgeon: Beatrice Philippe MD;  Location:  OR     ARTHROPLASTY CARPOMETACARPAL (THUMB JOINT) Left 2017    Procedure: ARTHROPLASTY CARPOMETACARPAL (THUMB JOINT);  REVISION  LEFT THUMB CARPOMETACARPAL ARTHROPOASTY WITH 1.1 TIGHT ROPE;  Surgeon: Beatrice Philippe MD;  Location:  SD     ARTHROSCOPY WRIST Left 2019    Procedure: LEFT WRIST ARTHROSCOPY REMOVAL OF TIGHT ROPE ; LEFT EXTENSOR POLLICIS LONGUS /EXTENSOR CARPI RADIALIS BREVIS /EXTENSOR CARPI RADIALUS LONGUS SYNOVECTOMY ; EXTENSOR POLLICIS LONGUS TRANSPOSITION ; ANTERIOR INTEROSSEOUS NEURECTOMY ; POSTERIOR INTEROSSEOUS NEURECTOMY;  Surgeon: Beatrice Philippe MD;  Location:  OR     BIOPSY       CARPAL TUNNEL RELEASE RT/LT        SECTION       COLONOSCOPY       ESOPHAGOSCOPY, GASTROSCOPY, DUODENOSCOPY (EGD), COMBINED N/A 2020    Procedure: ESOPHAGOGASTRODUODENOSCOPY, WITH BIOPSY;  Surgeon: Georges Pop MD;  Location:  GI     EXCIS BARTHOLIN GLAND/CYST  2005     HERNIORRHAPHY INGUINAL  70's    x5     INJECT STEROID (LOCATION) Right 2019    Procedure: RIGHT WRIST CORTISONE INJECTION;  Surgeon: Beatrice Philippe MD;  Location:  OR  Strain: Not on file   Food Insecurity: Not on file   Transportation Needs: Not on file   Physical Activity: Not on file   Stress: Not on file   Social Connections: Not on file   Intimate Partner Violence: Not At Risk (2021)    Intimate Partner Violence    • Social Determinants: Intimate Partner Violence Past Fear: No    • Social Determinants: Intimate Partner Violence Current Fear: No     Family History   Problem Relation Age of Onset   • Arrhythmia Mother         a-fib   • Thyroid Mother         hypothyroid   • Cancer Mother         cervical, HPV related   • Blood Disorder Father         blood clot, on blood thinners   • Cancer, Breast Maternal Grandmother    • Patient is unaware of any medical problems Son    • Patient is unaware of any medical problems Brother    • Patient is unaware of any medical problems Brother    • Patient is unaware of any medical problems Brother      OB History    Para Term  AB Living   3 2 2 0 1 2   SAB IAB Ectopic Molar Multiple Live Births   1 0 0 0 0 2   Obstetric Comments   13/irregular/8   Denies STDs   No Abnormal Paps       GYNECOLOGIC HISTORY:  Patient's last menstrual period was 2023 (exact date).  Menses are irregular.    REVIEW OF SYSTEMS:  Constitutional: Daily headache. 30 pounds weight changes. No fevers or chills. No fatigue.  HEENT: Denies vision changes or hearing changes. No sinus problems.  Breasts: Denies breast masses, pain or nipple discharge.  Respiratory: No cough or shortness of breath.  Cardiovascular: Denies chest pain, syncope or palpitations.  Gastrointestinal: Denies nausea, vomiting, diarrhea, or constipation.  Gynecological: Denies pelvic pain, vaginal discharge, itching, or odor.  Endocrine: Denies hot flashes, night sweats, heat or cold intolerance.  Hematologic: Denies easy bruising or bleeding disorders.  Allergies/Immunologic: Denies seasonal allergies or any history of immunologic disorders.  Musculoskeletal: Denies joint  "    OPERATIVE HYSTEROSCOPY WITH MORCELLATOR N/A 2018    Procedure: OPERATIVE HYSTEROSCOPY WITH MORCELLATOR (MARTIN & NEPHEW);  OPERATIVE HYSTEROSCOPY WITH TRUECLEAR MORCELLATOR 5C SCOPE ;  Surgeon: Iris Fernandez MD;  Location: [facility]     ORTHOPEDIC SURGERY Bilateral     SHOULDER ARTHROSCOPY     ORTHOPEDIC SURGERY Right 2017    right foot for plantar fasciitis     thumb surgery   and ,              Social History:     Social History     Tobacco Use     Smoking status: Former Smoker     Packs/day: 0.00     Years: 5.00     Pack years: 0.00     Quit date: 10/29/1979     Years since quittin.1     Smokeless tobacco: Never Used     Tobacco comment: infrequent use for about 5 years   Substance Use Topics     Alcohol use: No     Alcohol/week: 0.0 standard drinks             Family History:   Her father had blood clots and anesthesia problems but no other family members have         Allergies:     Allergies   Allergen Reactions     Betamethasone Other (See Comments)     agitation     Chocolate Other (See Comments)     Triggers migraines     Compazine [Prochlorperazine] Other (See Comments)     \"crawl out of my skin\"     Linzess [Linaclotide]      Penicillins Nausea and Vomiting and Hives     Pneumococcal Vaccine Other (See Comments)     Temporary paralization     Tizanidine Other (See Comments)     Severe agitation             Medications:     Current Outpatient Medications   Medication Sig Dispense Refill     carboxymethylcellulose (REFRESH PLUS) 0.5 % SOLN ophthalmic solution Place 1 drop into both eyes 3 times daily as needed for dry eyes       cyclobenzaprine (FLEXERIL) 10 MG tablet Take 10 mg by mouth daily as needed for muscle spasms        Hydrocodone-Acetaminophen  MG TABS Take 2 tablets by mouth every 4 hours as needed       HYDROmorphone (DILAUDID) 2 MG tablet Take 2 mg by mouth every 6 hours as needed for severe pain        ipratropium (ATROVENT) 0.06 % spray Spray 3 sprays into both " pain, swelling, or erythema.  Skin: Denies rashes, significant lesions or pruritus.   Psychiatric: Denies anxiety, depression, memory deficits, and appetite or sleep changes.  Stressors: Job    OBJECTIVE:  Visit Vitals  /80   Ht 5' 3\" (1.6 m)   Wt 77.5 kg (170 lb 14.4 oz)   LMP 06/25/2023 (Exact Date)   BMI 30.27 kg/m²       GENERAL APPEARANCE: The patient is a pleasant, normal-appearing female with normal affect and in no distress.  NECK: Supple. No cervical lymphadenopathy. No thyromegaly, no nodules palpitated, trachea midline.  LUNGS: Clear bilaterally with normal respiratory effort.  HEART: Regular rate and rhythm. No murmurs noted pulses are full and symmetrical.  BREASTS: Breast exam performed seated and supine. No masses, nontender, no nipple discharge or lymphadenopathy.  ABDOMEN: Soft, nontender, nondistended. No hepatosplenomegaly. No umbilical or inguinal hernias.  SKIN: Warm and dry to touch. No lesions or rashes noted.  PSYCHIATRIC/NEUROLOGIC: Appropriate mood and affect, normal recall, alert and oriented ×3.  EXTREMITIES: Warm and well perfused. No edema noted. Muscle strength and sensation are normal bilaterally 5/5 in both upper and lower extremities.  GENITOURINARY:  Vulva: Inspection of her external genitalia reveals normal mons pubis, labia minora and labia majora. Normal appearing clitoris, urethral meatus and [place]'s glands.  Bladder: No evidence of urethral or bladder tenderness.  Vagina: Speculum exam reveals pink and moist vaginal mucosa. Bartholin gland is normal to palpitation.  Cervix: Cervix is normal in appearance with no lesions. There is no cervical motion tenderness.  Uterus: Uterus is normal size, midline, mobile and nontender. No adnexal masses are palpated. Adnexa are nontender to palpitation.  Perineum: Perineum appears normal.  Anus: Normal with no apparent lesions.    ASSESSMENT:  1. Annual physical exam    2. Family planning education, guidance, and counseling    3.  Irregular menses    4. Tension-type headache, not intractable, unspecified chronicity pattern    5. Obesity (BMI 30-39.9)    6. Screening for cervical cancer    7. Screening examination for STD (sexually transmitted disease)        PLAN:  1. STI testing  2. Pap  3. TSH  4. Vitamin D  5. Much discussion about diet, exercise and supplements  6. Phentermine  7. F/U as needed and one year   nostrils daily (Patient taking differently: Spray 3 sprays into both nostrils every morning ) 45 mL 3     loratadine (CLARITIN) 10 MG tablet Take 10 mg by mouth every morning        LORazepam (ATIVAN) 1 MG tablet Take 1-2 mg by mouth 3 times daily  30 tablet      Magnesium Citrate 125 MG CAPS Take 2 capsules by mouth every morning       methenamine (HIPREX) 1 g tablet Take 1 tablet (1 g) by mouth 2 times daily 180 tablet 1     naloxone (NARCAN) 4 MG/0.1ML nasal spray Spray 1 spray (4 mg) into one nostril alternating nostrils as needed for opioid reversal Every 2-3 minutes in alternating nostrils 2 each 0     omeprazole (PRILOSEC) 40 MG DR capsule Take 40 mg by mouth every morning (before breakfast) 30 minutes prior to breakfast        PARoxetine (PAXIL-CR) 25 MG 24 hr tablet Take 25 mg by mouth 2 times daily       polyethylene glycol (MIRALAX) 17 GM/Dose powder Take 1 capful by mouth every evening        propranolol ER (INDERAL LA) 60 MG 24 hr capsule Take 1 capsule (60 mg) by mouth daily 90 capsule 3     riboflavin 400 MG CAPS Take 400 mg by mouth daily       SUMAtriptan (IMITREX) 50 MG tablet Take 1 tablet (50 mg) by mouth at onset of headache for migraine (May repeat x1 after 2hour if HA recurs) Limit use to less than 9 days/month. 18 tablet 3     trimethobenzamide (TIGAN) 300 MG capsule Take 300 mg by mouth 3 times daily as needed for nausea        vitamin C (ASCORBIC ACID) 1000 MG TABS Take 1,000 mg by mouth every evening        Vitamin D3 (CHOLECALCIFEROL) 125 MCG (5000 UT) tablet Take 2 tablets by mouth every evening       zolpidem ER (AMBIEN CR) 12.5 MG CR tablet Take 12.5 mg by mouth At Bedtime        hydrocortisone 1% cream 1g per syringe, place into urethra weekly PRN urethritis symptoms (Patient not taking: Reported on 9/29/2021) 10 g 4     zinc gluconate 50 MG tablet Take 50 mg by mouth daily (Patient not taking: Reported on 11/10/2021)                 Review of Systems:   No history of bleeding  "difficulty, anesthesia problems or blood clots.  The 10 point Review of Systems is negative other than noted in the HPI           Physical Exam:   Blood pressure 131/88, pulse 71, temperature 97  F (36.1  C), temperature source Tympanic, resp. rate 16, height 1.727 m (5' 8\"), weight 61.7 kg (136 lb), SpO2 98 %, not currently breastfeeding.    Constitutional: healthy appearing, alert and in no distress  Heent: Normocephalic. Head without obvious masses or lesions. PERRLDC, EOMI. Mouth exam within normal limits: tongue, mucous membranes, posterior pharynx all normal, no lesions or abnormalities seen.  Tm's and canals within normal limits bilaterally. Neck supple, no nuchal rigidity or masses. No supraclavicular, or cervical adenopathy. Thyroid symmetric, no masses.  Cardiovascular: Regular rate and rhythm, no murmer, rub or gallops.  JVP not elevated, no edema.  Carotids within normal limits bilaterally, no bruits.  Respiratory: Normal respiratory effort.  Lungs clear, normal flow, no wheezing or crackles.  Gastrointestinal: Normal active bowel sounds.   Soft, not tender, no masses, guarding or rebound.  No hepatosplenomegaly.   Musculoskeletal: extremities normal, no gross deformities noted.  Skin: no suspicious lesions or rashes   Neurologic: Mental status within normal limits.  Speech fluent.  No gross motor abnormalities and gait intact.  Psychiatric: mentation appears normal and affect normal.         Data:   none        Assessment:   1. Normal pre op for cataract surgery.  Given the low risk nature of this procedure she should do fine.  However, it should be noted that she takes the chronic narcotics and anesthesiology should be aware of this.  I would avoid further narcotics postop.  Please monitor the patient postop for any respiratory depression.  2.  Hot flashes.  I discussed with patient treatment options.  She is already on an SSRI.  She has been on Effexor in the past and this was not very effective.  " Given her narcotic use I would avoid gabapentin.  We discussed the use of hormone replacement as well as the risks including risks of blood clots, strokes, and heart disease.  At this point she would like to wait.  3.  Lymphocytic colitis, on medication.  4.  Migraines, improved.  5.  Depression, follow-up psych         Plan:   The patient is ok for the procedure  See #1 under assessment above regarding narcotics  The patient can take her usual medications on the day of the surgery with a small sip of water      Georges Lebron M.D.

## 2023-08-08 ENCOUNTER — VIRTUAL VISIT (OUTPATIENT)
Dept: FAMILY MEDICINE | Facility: CLINIC | Age: 73
End: 2023-08-08
Payer: MEDICARE

## 2023-08-08 DIAGNOSIS — R63.5 ABNORMAL WEIGHT GAIN: Primary | ICD-10-CM

## 2023-08-08 DIAGNOSIS — F50.00 ANOREXIA NERVOSA (H): ICD-10-CM

## 2023-08-08 DIAGNOSIS — F33.41 RECURRENT MAJOR DEPRESSIVE DISORDER, IN PARTIAL REMISSION (H): ICD-10-CM

## 2023-08-08 PROCEDURE — 99213 OFFICE O/P EST LOW 20 MIN: CPT | Mod: VID | Performed by: INTERNAL MEDICINE

## 2023-08-08 RX ORDER — BACLOFEN 5 MG/1
5 TABLET ORAL PRN
COMMUNITY
Start: 2023-08-07

## 2023-08-08 ASSESSMENT — PATIENT HEALTH QUESTIONNAIRE - PHQ9
SUM OF ALL RESPONSES TO PHQ QUESTIONS 1-9: 20
10. IF YOU CHECKED OFF ANY PROBLEMS, HOW DIFFICULT HAVE THESE PROBLEMS MADE IT FOR YOU TO DO YOUR WORK, TAKE CARE OF THINGS AT HOME, OR GET ALONG WITH OTHER PEOPLE: VERY DIFFICULT
SUM OF ALL RESPONSES TO PHQ QUESTIONS 1-9: 20

## 2023-08-08 NOTE — PROGRESS NOTES
Laurel is a 73 year old who is being evaluated via a billable video visit.      How would you like to obtain your AVS? MyChart  If the video visit is dropped, the invitation should be resent by: Text to cell phone: [phone]  Will anyone else be joining your video visit? No              Subjective   Laurel is a 73 year old, presenting for the following health issues:  Results and Weight Problem      History of Present Illness       Reason for visit:  Weight gain  Symptom onset:  More than a month  Symptoms include:  Increased weight  Symptom intensity:  Severe  Symptom progression:  Staying the same  Had these symptoms before:  No  What makes it worse:  Seeing myself in mirror/ buying larger clothes  What makes it better:  No    She eats 2-3 servings of fruits and vegetables daily.She consumes 0 sweetened beverage(s) daily.   She is taking medications regularly.             Weight 143 pounds July of this year.  Weight gain since 2020.  Weight May 25, 2022 of 125 pounds.  Weight 125 pounds Nov 2021    This is a 73-year-old with a history of eating disorder who I am seeing because of weight gain.  She does not feel like she is eating much and certainly not enough to gain weight.  She has anorexia and is in a program at the [facility] for this.  She is seeing their therapist.  She would like to make sure nothing else is causing it.  She is not having any significant edema.  No chest pain or shortness of breath.  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  No bloody or black stools.  Much of this is stress related.  She has had a prior upper and lower endoscopy in 2020 as noted.      Additionally she has significant depression and this has not been doing well.  She is feeling quite down and depressed and has had thoughts of  suicide or self-harm.  I discussed this with her as well and she will follow-up with her therapist today for this as well as her psychiatrist.  She does not think she would hurt herself and she does agree to go to the emergency room if she has increased suicidal thoughts.    Vitals:  No vitals were obtained today due to virtual visit.    Physical Exam   GENERAL: Healthy, alert and no distress  EYES: Eyes grossly normal to inspection.  No discharge or erythema, or obvious scleral/conjunctival abnormalities.  RESP: No audible wheeze, cough, or visible cyanosis.  No visible retractions or increased work of breathing.    SKIN: Visible skin clear. No significant rash, abnormal pigmentation or lesions.  NEURO: Cranial nerves grossly intact.  Mentation and speech appropriate for age.  PSYCH: Mentation appears normal, affect normal/bright, judgement and insight intact, normal speech and appearance well-groomed.    ASSESSMENT:  Weight gain, I do suspect that this is related to her eating disorder but certainly do not want to miss something and therefore I will do a CT of her abdomen pelvis to rule out any mass lesions.  I will also check a chest x-ray.  I do not think this is thyroid related.  Her recent labs are normal.  I do not think this is heart failure or anasarca.  Major depression, as above    Georges Lebron M.D.              Video-Visit Details    Type of service:  Video Visit   Video Start Time: 3:01 PM  Video End Time:3:22 PM    Originating Location (pt. Location): Home    Distant Location (provider location):  On-site  Platform used for Video Visit: Romeo

## 2023-08-16 ENCOUNTER — ANCILLARY PROCEDURE (OUTPATIENT)
Dept: GENERAL RADIOLOGY | Facility: CLINIC | Age: 73
End: 2023-08-16
Attending: INTERNAL MEDICINE
Payer: MEDICARE

## 2023-08-16 ENCOUNTER — ANCILLARY PROCEDURE (OUTPATIENT)
Dept: CT IMAGING | Facility: CLINIC | Age: 73
End: 2023-08-16
Attending: INTERNAL MEDICINE
Payer: MEDICARE

## 2023-08-16 DIAGNOSIS — R63.5 ABNORMAL WEIGHT GAIN: ICD-10-CM

## 2023-08-16 PROCEDURE — G1010 CDSM STANSON: HCPCS

## 2023-08-16 PROCEDURE — 71046 X-RAY EXAM CHEST 2 VIEWS: CPT

## 2023-08-17 ENCOUNTER — OFFICE VISIT (OUTPATIENT)
Dept: FAMILY MEDICINE | Facility: CLINIC | Age: 73
End: 2023-08-17
Payer: MEDICARE

## 2023-08-17 VITALS
RESPIRATION RATE: 16 BRPM | SYSTOLIC BLOOD PRESSURE: 118 MMHG | HEIGHT: 66 IN | OXYGEN SATURATION: 94 % | BODY MASS INDEX: 23.46 KG/M2 | HEART RATE: 69 BPM | TEMPERATURE: 97.7 F | WEIGHT: 146 LBS | DIASTOLIC BLOOD PRESSURE: 83 MMHG

## 2023-08-17 DIAGNOSIS — M54.50 CHRONIC LOW BACK PAIN, UNSPECIFIED BACK PAIN LATERALITY, UNSPECIFIED WHETHER SCIATICA PRESENT: ICD-10-CM

## 2023-08-17 DIAGNOSIS — Z23 NEED FOR VACCINE FOR TD (TETANUS-DIPHTHERIA): ICD-10-CM

## 2023-08-17 DIAGNOSIS — K63.5 POLYP OF COLON, UNSPECIFIED PART OF COLON, UNSPECIFIED TYPE: ICD-10-CM

## 2023-08-17 DIAGNOSIS — K52.832 LYMPHOCYTIC COLITIS: ICD-10-CM

## 2023-08-17 DIAGNOSIS — F11.90 CHRONIC NARCOTIC USE: ICD-10-CM

## 2023-08-17 DIAGNOSIS — G89.29 CHRONIC LOW BACK PAIN, UNSPECIFIED BACK PAIN LATERALITY, UNSPECIFIED WHETHER SCIATICA PRESENT: ICD-10-CM

## 2023-08-17 DIAGNOSIS — F50.00 ANOREXIA NERVOSA (H): ICD-10-CM

## 2023-08-17 DIAGNOSIS — Z23 NEED FOR VACCINATION: ICD-10-CM

## 2023-08-17 DIAGNOSIS — F33.41 RECURRENT MAJOR DEPRESSIVE DISORDER, IN PARTIAL REMISSION (H): ICD-10-CM

## 2023-08-17 DIAGNOSIS — R63.8 UNABLE TO LOSE WEIGHT: ICD-10-CM

## 2023-08-17 DIAGNOSIS — Z00.00 MEDICARE ANNUAL WELLNESS VISIT, SUBSEQUENT: Primary | ICD-10-CM

## 2023-08-17 PROBLEM — M19.032 ARTHRITIS OF LEFT WRIST: Status: RESOLVED | Noted: 2021-04-05 | Resolved: 2023-08-17

## 2023-08-17 PROCEDURE — 90714 TD VACC NO PRESV 7 YRS+ IM: CPT | Performed by: INTERNAL MEDICINE

## 2023-08-17 PROCEDURE — 90471 IMMUNIZATION ADMIN: CPT | Performed by: INTERNAL MEDICINE

## 2023-08-17 PROCEDURE — G0438 PPPS, INITIAL VISIT: HCPCS | Performed by: INTERNAL MEDICINE

## 2023-08-17 PROCEDURE — 99213 OFFICE O/P EST LOW 20 MIN: CPT | Mod: 25 | Performed by: INTERNAL MEDICINE

## 2023-08-17 ASSESSMENT — ENCOUNTER SYMPTOMS
FEVER: 0
CONSTIPATION: 1
HEMATOCHEZIA: 0
SORE THROAT: 0
HEADACHES: 1
CHILLS: 0
COUGH: 0
DYSURIA: 0
EYE PAIN: 0
SHORTNESS OF BREATH: 1
MYALGIAS: 1
FREQUENCY: 0
NAUSEA: 1
PARESTHESIAS: 0
WEAKNESS: 1
DIZZINESS: 1
ABDOMINAL PAIN: 0
PALPITATIONS: 1
BREAST MASS: 0
DIARRHEA: 0
NERVOUS/ANXIOUS: 1
HEMATURIA: 0
JOINT SWELLING: 0

## 2023-08-17 ASSESSMENT — PATIENT HEALTH QUESTIONNAIRE - PHQ9
10. IF YOU CHECKED OFF ANY PROBLEMS, HOW DIFFICULT HAVE THESE PROBLEMS MADE IT FOR YOU TO DO YOUR WORK, TAKE CARE OF THINGS AT HOME, OR GET ALONG WITH OTHER PEOPLE: VERY DIFFICULT
SUM OF ALL RESPONSES TO PHQ QUESTIONS 1-9: 20
SUM OF ALL RESPONSES TO PHQ QUESTIONS 1-9: 20

## 2023-08-17 ASSESSMENT — PAIN SCALES - GENERAL: PAINLEVEL: NO PAIN (0)

## 2023-08-17 NOTE — PROGRESS NOTES
SUBJECTIVE:   Laurel is a 73 year old who presents for Preventive Visit.    She is having ongoing issues with inability to lose weight and is quite concerned about it.  To evaluate this as noted we have done a chest x-ray, CT and her labs have been fine.  She has anorexia but is counting calories and they tend to be 800 a day.    She has depression.  She is up-to-date with psychiatry.  She at times she thinks about suicide but she tells me she would not do it.  She sees a therapist as well.    She has chronic pain as noted and uses Vicodin and Ativan through the [facility] pain clinic.  They monitor her closely.    She works out some.  She is up-to-date with gynecology.               Past Medical History:      Past Medical History:   Diagnosis Date    Anxiety     Dr. Adelina Meehan    Chronic constipation     Chronic narcotic use     [facility]    Chronic pain     Dr. Orta as of 2015    Chronic urethritis     Dr. Little    Colonic polyp 11/2011    one polyp, fu 5 years, fu 10/17 and no lesions, fu 9/20 and negative, fu 5 years    Depression, major, in partial remission (H)     Dr. Meehan    Diarrhea 2012    eval by [facility], resolved with gluten free diet    DJD (degenerative joint disease)     Dyspepsia 2020    eval by [facility], had colonoscopy, egd, ct abd and pelvis, mrcp.  Alfred to be dyspepsia and constipation    H/O gastroesophageal reflux (GERD)     LBP (low back pain) 2013    Dr. Jae Tong in [place], then seeing [facility] Dr. Orta    Light headed 07/2020    nl est echo    Lymphocytic colitis 2021    [facility]    Migraines 2009    neuro eval    Palpitations 2006    holter with pvc's and pac's, echo nl    Screening 2010    dexa nl at gyn             Past Surgical History:      Past Surgical History:   Procedure Laterality Date    APPENDECTOMY  2016    ARTHRODESIS WRIST Left 04/05/2021    Procedure: LEFT TOTAL WRIST FUSION;  Surgeon: Beatrice Philippe MD;  Location:  OR     ARTHROPLASTY CARPOMETACARPAL (THUMB JOINT) Left 2017    Procedure: ARTHROPLASTY CARPOMETACARPAL (THUMB JOINT);  REVISION  LEFT THUMB CARPOMETACARPAL ARTHROPOASTY WITH 1.1 TIGHT ROPE;  Surgeon: Beatrice Philippe MD;  Location:  SD    ARTHROSCOPY WRIST Left 2019    Procedure: LEFT WRIST ARTHROSCOPY REMOVAL OF TIGHT ROPE ; LEFT EXTENSOR POLLICIS LONGUS /EXTENSOR CARPI RADIALIS BREVIS /EXTENSOR CARPI RADIALUS LONGUS SYNOVECTOMY ; EXTENSOR POLLICIS LONGUS TRANSPOSITION ; ANTERIOR INTEROSSEOUS NEURECTOMY ; POSTERIOR INTEROSSEOUS NEURECTOMY;  Surgeon: Beatrice Philippe MD;  Location:  OR    BIOPSY      CARPAL TUNNEL RELEASE RT/LT      CATARACT EXTRACTION  2021     SECTION      COLONOSCOPY      ESOPHAGOSCOPY, GASTROSCOPY, DUODENOSCOPY (EGD), COMBINED N/A 2020    Procedure: ESOPHAGOGASTRODUODENOSCOPY, WITH BIOPSY;  Surgeon: Georges Pop MD;  Location:  GI    EXCIS BARTHOLIN GLAND/CYST      HERNIORRHAPHY INGUINAL  70's    x5    INJECT STEROID (LOCATION) Right 2019    Procedure: RIGHT WRIST CORTISONE INJECTION;  Surgeon: Beatrice Philippe MD;  Location:  OR    OPERATIVE HYSTEROSCOPY WITH MORCELLATOR N/A 2018    Procedure: OPERATIVE HYSTEROSCOPY WITH MORCELLATOR ([employer]IN [employer] NEPHEW);  OPERATIVE HYSTEROSCOPY WITH TRUECLEAR MORCELLATOR 5C SCOPE ;  Surgeon: Iris Fernandez MD;  Location: [facility]    ORTHOPEDIC SURGERY Bilateral     SHOULDER ARTHROSCOPY    ORTHOPEDIC SURGERY Right 2017    right foot for plantar fasciitis    thumb surgery   and ,              Social History:     Social History     Socioeconomic History    Marital status:      Spouse name: Not on file    Number of children: 1    Years of education: Not on file    Highest education level: Not on file   Occupational History     Employer: SELF   Tobacco Use    Smoking status: Former     Packs/day: 0.00     Years: 5.00     Pack years: 0.00     Types: Cigarettes     Quit date: 10/29/1979     Years  "since quittin.8    Smokeless tobacco: Never    Tobacco comments:     infrequent use for about 5 years   Substance and Sexual Activity    Alcohol use: No     Alcohol/week: 0.0 standard drinks of alcohol    Drug use: No    Sexual activity: Not Currently     Partners: Male     Birth control/protection: Post-menopausal   Other Topics Concern    Parent/sibling w/ CABG, MI or angioplasty before 65F 55M? No   Social History Narrative    Not on file     Social Determinants of Health     Financial Resource Strain: Not on file   Food Insecurity: Not on file   Transportation Needs: Not on file   Physical Activity: Not on file   Stress: Not on file   Social Connections: Not on file   Intimate Partner Violence: Not on file   Housing Stability: Not on file             Family History:   reviewed         Allergies:     Allergies   Allergen Reactions    Betamethasone Other (See Comments)     agitation    Chocolate Other (See Comments)     Triggers migraines    Compazine [Prochlorperazine] Other (See Comments)     \"crawl out of my skin\"    Linzess [Linaclotide]     Penicillins Nausea and Vomiting and Hives    Pneumococcal Vaccine Other (See Comments)     Temporary paralization    Tizanidine Other (See Comments)     Severe agitation             Medications:     Current Outpatient Medications   Medication Sig Dispense Refill    Baclofen (LIORESAL) 5 MG tablet as needed      budesonide (ENTOCORT EC) 3 MG EC capsule Take 3 mg by mouth every morning Take 2 capsule by mouth every am for 3 months      carboxymethylcellulose (REFRESH PLUS) 0.5 % SOLN ophthalmic solution Place 1 drop into both eyes 3 times daily as needed for dry eyes      cyclobenzaprine (FLEXERIL) 10 MG tablet Take 10 mg by mouth daily as needed for muscle spasms       Hydrocodone-Acetaminophen  MG TABS Take 2 tablets by mouth every 4 hours as needed      ipratropium (ATROVENT) 0.06 % spray Spray 3 sprays into both nostrils daily 45 mL 3    loratadine (CLARITIN) " "10 MG tablet Take 10 mg by mouth every morning       LORazepam (ATIVAN) 1 MG tablet Take 1-2 mg by mouth 3 times daily 30 tablet     methenamine hippurate (HIPREX) 1 g tablet TAKE 1 TABLET(1 GRAM) BY MOUTH TWICE DAILY 180 tablet 1    naloxone (NARCAN) 4 MG/0.1ML nasal spray Spray 1 spray (4 mg) into one nostril alternating nostrils as needed for opioid reversal Every 2-3 minutes in alternating nostrils 2 each 0    omeprazole (PRILOSEC) 40 MG DR capsule Take 40 mg by mouth every morning (before breakfast) 30 minutes prior to breakfast      ondansetron (ZOFRAN) 4 MG tablet TAKE 1 TABLET BY MOUTH THREE TIMES DAILY AS NEEDED FOR NAUSEA      PARoxetine (PAXIL-CR) 25 MG 24 hr tablet Take 25 mg by mouth 2 times daily      polyethylene glycol (MIRALAX) 17 GM/Dose powder Take 1 capful. by mouth as needed      propranolol ER (INDERAL LA) 60 MG 24 hr capsule Take 1 capsule (60 mg) by mouth daily 90 capsule 1    riboflavin 400 MG CAPS Take 400 mg by mouth daily      vitamin C (ASCORBIC ACID) 1000 MG TABS Take 1,000 mg by mouth every evening       Vitamin D3 (CHOLECALCIFEROL) 125 MCG (5000 UT) tablet Take 2 tablets by mouth every evening      zolpidem ER (AMBIEN CR) 12.5 MG CR tablet Take 12.5 mg by mouth At Bedtime                  Review of Systems:     The 10 point Review of Systems is negative other than noted in the HPI           Physical Exam:   Blood pressure 118/83, pulse 69, temperature 97.7  F (36.5  C), temperature source Temporal, resp. rate 16, height 1.676 m (5' 6\"), weight 66.2 kg (146 lb), SpO2 94 %, not currently breastfeeding.    Exam:  Constitutional: healthy appearing, alert and in no distress  Heent: Normocephalic. Head without obvious masses or lesions. PERRLDC, EOMI. Mouth exam within normal limits: tongue, mucous membranes, posterior pharynx all normal, no lesions or abnormalities seen.  Tm's and canals within normal limits bilaterally. Neck supple, no nuchal rigidity or masses. No supraclavicular, or " cervical adenopathy. Thyroid symmetric, no masses.  Cardiovascular: Regular rate and rhythm, no murmer, rub or gallops.  JVP not elevated, no edema.  Carotids within normal limits bilaterally, no bruits.  Respiratory: Normal respiratory effort.  Lungs clear, normal flow, no wheezing or crackles.  Gastrointestinal: Normal active bowel sounds.   Soft, not tender, no masses, guarding or rebound.  No hepatosplenomegaly.   Musculoskeletal: extremities normal, no gross deformities noted.  Skin: no suspicious lesions or rashes   Neurologic: Mental status within normal limits.  Speech fluent.  No gross motor abnormalities and gait intact.  Psychiatric: mentation appears normal and affect normal.         Data:   Labs reviewed with patient and cxr and ct reviewed        Assessment:   Normal complete physical exam  Inability to lose weight, no signs of mass, tumor, lymphadenopathy, or fluid retention.  Thyroid test also normal.  I will refer to endocrine to see if it could be related to any metabolic disorder versus anorexia  Anorexia, follow-up at the [facility]  Lymphocytic colitis, stable  Depression, suicidal ideation but she tells me she would not do it.  She will follow-up with psychiatry who recently changed her med dose as well as therapist.  She will contact me or go to the ER if she feels that she may do something.  Chronic pain syndrome, follow-up with the pain clinic  Colon polyp, up-to-date on follow-up  Chronic narcotic use  Lymphocytic colitis, has had follow-up with GI  Healthcare maintenance         Plan:   Td shot today  See endocrine  Exercise  Follow-up psychiatry and psychology  Up-to-date mammogram and colon exam      Georges Lebron M.D.                Are you in the first 12 months of your Medicare coverage?  No    Healthy Habits:     In general, how would you rate your overall health?  Poor    Frequency of exercise:  2-3 days/week    Duration of exercise:  Other    Do you usually eat at least 4  "servings of fruit and vegetables a day, include whole grains    & fiber and avoid regularly eating high fat or \"junk\" foods?  No    Taking medications regularly:  Yes    Medication side effects:  None    Ability to successfully perform activities of daily living:  Preparing meals requires assistance and housework requires assistance    Home Safety:  Lack of grab bars in the bathroom    Hearing Impairment:  No hearing concerns    In the past 6 months, have you been bothered by leaking of urine? Yes    In general, how would you rate your overall mental or emotional health?  Poor    Additional concerns today:  Yes        Have you ever done Advance Care Planning? (For example, a Health Directive, POLST, or a discussion with a medical provider or your loved ones about your wishes): Yes, advance care planning is on file.       Fall risk  Fallen 2 or more times in the past year?: Yes  Any fall with injury in the past year?: Yes    Cognitive Screening   1) Repeat 3 items (Leader, Season, Table)    2) Clock draw: NORMAL  3) 3 item recall: Recalls 3 objects  Results: 3 items recalled: COGNITIVE IMPAIRMENT LESS LIKELY    Mini-CogTM Copyright ARNOLD Martinez. Licensed by the author for use in [facility]; reprinted with permission (dheeraj@[facility]). All rights reserved.      Do you have sleep apnea, excessive snoring or daytime drowsiness? : no    Reviewed and updated as needed this visit by clinical staff                  Reviewed and updated as needed this visit by Provider                 Social History     Tobacco Use    Smoking status: Former     Packs/day: 0.00     Years: 5.00     Pack years: 0.00     Types: Cigarettes     Quit date: 10/29/1979     Years since quittin.8    Smokeless tobacco: Never    Tobacco comments:     infrequent use for about 5 years   Substance Use Topics    Alcohol use: No     Alcohol/week: 0.0 standard drinks of alcohol             2023    12:46 PM   Alcohol Use   Prescreen: >3 " drinks/day or >7 drinks/week? Not Applicable          No data to display              Do you have a current opioid prescription? No  Do you use any other controlled substances or medications that are not prescribed by a provider? None              Current providers sharing in care for this patient include:   Patient Care Team:  Georges Lebron MD as PCP - General (Internal Medicine)  Thelma Meehan MD (Inactive) as Leydi Lu (Nurse Practitioner)  Ej Castanon MD as Assigned Neuroscience Provider  Abimbola Chamberlain PA-C as Physician Assistant (Urology)  Heide Wilhelm MD as Assigned Surgical Provider  Georges Lebron MD as Assigned PCP    The following health maintenance items are reviewed in Epic and correct as of today:  Health Maintenance   Topic Date Due    URINE DRUG SCREEN  Never done    ANNUAL REVIEW OF HM ORDERS  Never done    ZOSTER IMMUNIZATION (2 of 3) 02/14/2013    MEDICARE ANNUAL WELLNESS VISIT  11/19/2020    COVID-19 Vaccine (6 - Moderna series) 02/08/2023    INFLUENZA VACCINE (1) 09/01/2023    DTAP/TDAP/TD IMMUNIZATION (2 - Td or Tdap) 10/03/2023    MAMMO SCREENING  03/28/2024    FALL RISK ASSESSMENT  08/17/2024    PHQ-9  08/17/2024    LIPID  11/19/2024    ADVANCE CARE PLANNING  11/19/2024    DEXA  12/09/2025    COLORECTAL CANCER SCREENING  09/03/2030    HEPATITIS C SCREENING  Completed    DEPRESSION ACTION PLAN  Completed    Pneumococcal Vaccine: 65+ Years  Completed    IPV IMMUNIZATION  Aged Out    MENINGITIS IMMUNIZATION  Aged Out               Review of Systems   Constitutional:  Negative for chills and fever.   HENT:  Negative for congestion, ear pain, hearing loss and sore throat.    Eyes:  Negative for pain and visual disturbance.   Respiratory:  Positive for shortness of breath. Negative for cough.    Cardiovascular:  Positive for palpitations and peripheral edema. Negative for chest pain.   Gastrointestinal:  Positive for constipation and  "nausea. Negative for abdominal pain, diarrhea and hematochezia.   Breasts:  Positive for tenderness. Negative for breast mass and discharge.   Genitourinary:  Negative for dysuria, frequency, genital sores, hematuria, pelvic pain, urgency, vaginal bleeding and vaginal discharge.   Musculoskeletal:  Positive for myalgias. Negative for joint swelling.   Skin:  Negative for rash.   Neurological:  Positive for dizziness, weakness and headaches. Negative for paresthesias.   Psychiatric/Behavioral:  Positive for mood changes. The patient is nervous/anxious.          OBJECTIVE:   There were no vitals taken for this visit. Estimated body mass index is 20.18 kg/m  as calculated from the following:    Height as of 6/13/23: 1.676 m (5' 6\").    Weight as of 6/13/23: 56.7 kg (125 lb).  Physical Exam          ASSESSMENT / PLAN:           Depression Screening Follow Up        8/17/2023    12:39 PM   PHQ   PHQ-9 Total Score 20   Q9: Thoughts of better off dead/self-harm past 2 weeks More than half the days   F/U: Thoughts of suicide or self-harm Yes   F/U: Self harm-plan Yes   F/U: Self-harm action No   F/U: Safety concerns No                 Follow Up    Follow Up Actions Taken      Discussed the following ways the patient can remain in a safe environment:      COUNSELING:  Reviewed preventive health counseling, as reflected in patient instructions       Regular exercise       Healthy diet/nutrition        She reports that she quit smoking about 43 years ago. She has never used smokeless tobacco.      Appropriate preventive services were discussed with this patient, including applicable screening as appropriate for cardiovascular disease, diabetes, osteopenia/osteoporosis, and glaucoma.  As appropriate for age/gender, discussed screening for colorectal cancer, prostate cancer, breast cancer, and cervical cancer. Checklist reviewing preventive services available has been given to the patient.    Reviewed patients plan of care and " provided an AVS. The Basic Care Plan (routine screening as documented in Health Maintenance) for Laurel meets the Care Plan requirement. This Care Plan has been established and reviewed with the Patient.          Georges Lebron MD  [facility]    Identified Health Risks:  I have reviewed Opioid Use Disorder and Substance Use Disorder risk factors and made any needed referrals.

## 2023-08-17 NOTE — PATIENT INSTRUCTIONS
I would recommend getting the new shingles shot called shingrix, but I would do it at your pharmacy as they can check with the insurance company to see if it is paid for.    Do the new covid shot with your flu shot

## 2023-08-31 ENCOUNTER — APPOINTMENT (OUTPATIENT)
Dept: GENERAL RADIOLOGY | Facility: CLINIC | Age: 73
DRG: 282 | End: 2023-08-31
Attending: EMERGENCY MEDICINE
Payer: MEDICARE

## 2023-08-31 ENCOUNTER — HOSPITAL ENCOUNTER (INPATIENT)
Facility: CLINIC | Age: 73
LOS: 2 days | Discharge: HOME | DRG: 282 | End: 2023-09-02
Attending: EMERGENCY MEDICINE | Admitting: INTERNAL MEDICINE
Payer: MEDICARE

## 2023-08-31 ENCOUNTER — TRANSFERRED RECORDS (OUTPATIENT)
Dept: HEALTH INFORMATION MANAGEMENT | Facility: CLINIC | Age: 73
End: 2023-08-31

## 2023-08-31 DIAGNOSIS — F11.90 CHRONIC NARCOTIC USE: ICD-10-CM

## 2023-08-31 DIAGNOSIS — I21.4 NSTEMI (NON-ST ELEVATED MYOCARDIAL INFARCTION) (H): Primary | ICD-10-CM

## 2023-08-31 LAB
ALBUMIN SERPL BCG-MCNC: 4.5 G/DL (ref 3.5–5.2)
ALP SERPL-CCNC: 97 U/L (ref 35–104)
ALT SERPL W P-5'-P-CCNC: 15 U/L (ref 0–50)
ANION GAP SERPL CALCULATED.3IONS-SCNC: 13 MMOL/L (ref 7–15)
AST SERPL W P-5'-P-CCNC: 20 U/L (ref 0–45)
ATRIAL RATE - MUSE: 55 BPM
ATRIAL RATE - MUSE: 64 BPM
BASOPHILS # BLD AUTO: 0.1 10E3/UL (ref 0–0.2)
BASOPHILS NFR BLD AUTO: 1 %
BILIRUB SERPL-MCNC: 0.4 MG/DL
BUN SERPL-MCNC: 12.7 MG/DL (ref 8–23)
CALCIUM SERPL-MCNC: 9.2 MG/DL (ref 8.8–10.2)
CHLORIDE SERPL-SCNC: 103 MMOL/L (ref 98–107)
CREAT SERPL-MCNC: 0.72 MG/DL (ref 0.51–0.95)
DEPRECATED HCO3 PLAS-SCNC: 26 MMOL/L (ref 22–29)
DIASTOLIC BLOOD PRESSURE - MUSE: NORMAL MMHG
DIASTOLIC BLOOD PRESSURE - MUSE: NORMAL MMHG
EOSINOPHIL # BLD AUTO: 0.1 10E3/UL (ref 0–0.7)
EOSINOPHIL NFR BLD AUTO: 1 %
ERYTHROCYTE [DISTWIDTH] IN BLOOD BY AUTOMATED COUNT: 12.9 % (ref 10–15)
ERYTHROCYTE [DISTWIDTH] IN BLOOD BY AUTOMATED COUNT: 13.1 % (ref 10–15)
GFR SERPL CREATININE-BSD FRML MDRD: 89 ML/MIN/1.73M2
GLUCOSE SERPL-MCNC: 132 MG/DL (ref 70–99)
HCT VFR BLD AUTO: 38.8 % (ref 35–47)
HCT VFR BLD AUTO: 41.3 % (ref 35–47)
HGB BLD-MCNC: 12.5 G/DL (ref 11.7–15.7)
HGB BLD-MCNC: 13.1 G/DL (ref 11.7–15.7)
HOLD SPECIMEN: NORMAL
HOLD SPECIMEN: NORMAL
IMM GRANULOCYTES # BLD: 0 10E3/UL
IMM GRANULOCYTES NFR BLD: 0 %
INTERPRETATION ECG - MUSE: NORMAL
INTERPRETATION ECG - MUSE: NORMAL
LIPASE SERPL-CCNC: 9 U/L (ref 13–60)
LYMPHOCYTES # BLD AUTO: 3.4 10E3/UL (ref 0.8–5.3)
LYMPHOCYTES NFR BLD AUTO: 34 %
MCH RBC QN AUTO: 30 PG (ref 26.5–33)
MCH RBC QN AUTO: 30.9 PG (ref 26.5–33)
MCHC RBC AUTO-ENTMCNC: 31.7 G/DL (ref 31.5–36.5)
MCHC RBC AUTO-ENTMCNC: 32.2 G/DL (ref 31.5–36.5)
MCV RBC AUTO: 95 FL (ref 78–100)
MCV RBC AUTO: 96 FL (ref 78–100)
MONOCYTES # BLD AUTO: 1 10E3/UL (ref 0–1.3)
MONOCYTES NFR BLD AUTO: 10 %
NEUTROPHILS # BLD AUTO: 5.4 10E3/UL (ref 1.6–8.3)
NEUTROPHILS NFR BLD AUTO: 54 %
NRBC # BLD AUTO: 0 10E3/UL
NRBC BLD AUTO-RTO: 0 /100
NT-PROBNP SERPL-MCNC: 226 PG/ML (ref 0–900)
NT-PROBNP SERPL-MCNC: 239 PG/ML (ref 0–900)
P AXIS - MUSE: 26 DEGREES
P AXIS - MUSE: 41 DEGREES
PLATELET # BLD AUTO: 304 10E3/UL (ref 150–450)
PLATELET # BLD AUTO: 386 10E3/UL (ref 150–450)
POTASSIUM SERPL-SCNC: 3.6 MMOL/L (ref 3.4–5.3)
PR INTERVAL - MUSE: 140 MS
PR INTERVAL - MUSE: 158 MS
PROT SERPL-MCNC: 6.9 G/DL (ref 6.4–8.3)
QRS DURATION - MUSE: 78 MS
QRS DURATION - MUSE: 78 MS
QT - MUSE: 462 MS
QT - MUSE: 482 MS
QTC - MUSE: 461 MS
QTC - MUSE: 476 MS
R AXIS - MUSE: -16 DEGREES
R AXIS - MUSE: 34 DEGREES
RBC # BLD AUTO: 4.05 10E6/UL (ref 3.8–5.2)
RBC # BLD AUTO: 4.36 10E6/UL (ref 3.8–5.2)
SODIUM SERPL-SCNC: 142 MMOL/L (ref 136–145)
SYSTOLIC BLOOD PRESSURE - MUSE: NORMAL MMHG
SYSTOLIC BLOOD PRESSURE - MUSE: NORMAL MMHG
T AXIS - MUSE: -36 DEGREES
T AXIS - MUSE: 64 DEGREES
TROPONIN T SERPL HS-MCNC: 176 NG/L
TROPONIN T SERPL HS-MCNC: 68 NG/L
TROPONIN T SERPL HS-MCNC: 7 NG/L
VENTRICULAR RATE- MUSE: 55 BPM
VENTRICULAR RATE- MUSE: 64 BPM
WBC # BLD AUTO: 10.1 10E3/UL (ref 4–11)
WBC # BLD AUTO: 12.1 10E3/UL (ref 4–11)

## 2023-08-31 PROCEDURE — 258N000003 HC RX IP 258 OP 636: Performed by: EMERGENCY MEDICINE

## 2023-08-31 PROCEDURE — 84484 ASSAY OF TROPONIN QUANT: CPT | Performed by: EMERGENCY MEDICINE

## 2023-08-31 PROCEDURE — 84484 ASSAY OF TROPONIN QUANT: CPT | Mod: 91 | Performed by: INTERNAL MEDICINE

## 2023-08-31 PROCEDURE — 83880 ASSAY OF NATRIURETIC PEPTIDE: CPT | Performed by: EMERGENCY MEDICINE

## 2023-08-31 PROCEDURE — 250N000011 HC RX IP 250 OP 636: Mod: JZ

## 2023-08-31 PROCEDURE — 99285 EMERGENCY DEPT VISIT HI MDM: CPT | Mod: 25

## 2023-08-31 PROCEDURE — 250N000011 HC RX IP 250 OP 636: Mod: JZ | Performed by: EMERGENCY MEDICINE

## 2023-08-31 PROCEDURE — 80053 COMPREHEN METABOLIC PANEL: CPT | Performed by: EMERGENCY MEDICINE

## 2023-08-31 PROCEDURE — 250N000013 HC RX MED GY IP 250 OP 250 PS 637: Performed by: INTERNAL MEDICINE

## 2023-08-31 PROCEDURE — 250N000013 HC RX MED GY IP 250 OP 250 PS 637: Performed by: EMERGENCY MEDICINE

## 2023-08-31 PROCEDURE — 83880 ASSAY OF NATRIURETIC PEPTIDE: CPT | Performed by: INTERNAL MEDICINE

## 2023-08-31 PROCEDURE — 96361 HYDRATE IV INFUSION ADD-ON: CPT

## 2023-08-31 PROCEDURE — 93005 ELECTROCARDIOGRAM TRACING: CPT | Mod: 76

## 2023-08-31 PROCEDURE — 36415 COLL VENOUS BLD VENIPUNCTURE: CPT | Performed by: EMERGENCY MEDICINE

## 2023-08-31 PROCEDURE — 36415 COLL VENOUS BLD VENIPUNCTURE: CPT | Performed by: INTERNAL MEDICINE

## 2023-08-31 PROCEDURE — 99222 1ST HOSP IP/OBS MODERATE 55: CPT | Mod: AI | Performed by: INTERNAL MEDICINE

## 2023-08-31 PROCEDURE — 71046 X-RAY EXAM CHEST 2 VIEWS: CPT

## 2023-08-31 PROCEDURE — 80061 LIPID PANEL: CPT | Performed by: INTERNAL MEDICINE

## 2023-08-31 PROCEDURE — 96360 HYDRATION IV INFUSION INIT: CPT

## 2023-08-31 PROCEDURE — 85014 HEMATOCRIT: CPT | Performed by: EMERGENCY MEDICINE

## 2023-08-31 PROCEDURE — 85014 HEMATOCRIT: CPT | Performed by: INTERNAL MEDICINE

## 2023-08-31 PROCEDURE — 210N000002 HC R&B HEART CARE

## 2023-08-31 PROCEDURE — 93005 ELECTROCARDIOGRAM TRACING: CPT

## 2023-08-31 PROCEDURE — 83690 ASSAY OF LIPASE: CPT | Performed by: EMERGENCY MEDICINE

## 2023-08-31 RX ORDER — MAGNESIUM HYDROXIDE/ALUMINUM HYDROXICE/SIMETHICONE 120; 1200; 1200 MG/30ML; MG/30ML; MG/30ML
30 SUSPENSION ORAL EVERY 4 HOURS PRN
Status: DISCONTINUED | OUTPATIENT
Start: 2023-08-31 | End: 2023-09-02 | Stop reason: HOSPADM

## 2023-08-31 RX ORDER — ASPIRIN 81 MG/1
324 TABLET, CHEWABLE ORAL ONCE
Status: DISCONTINUED | OUTPATIENT
Start: 2023-08-31 | End: 2023-08-31

## 2023-08-31 RX ORDER — NALOXONE HYDROCHLORIDE 0.4 MG/ML
0.2 INJECTION, SOLUTION INTRAMUSCULAR; INTRAVENOUS; SUBCUTANEOUS
Status: DISCONTINUED | OUTPATIENT
Start: 2023-08-31 | End: 2023-09-02 | Stop reason: HOSPADM

## 2023-08-31 RX ORDER — ZOLPIDEM TARTRATE 5 MG/1
5 TABLET ORAL
Status: DISCONTINUED | OUTPATIENT
Start: 2023-08-31 | End: 2023-09-01

## 2023-08-31 RX ORDER — ATORVASTATIN CALCIUM 20 MG/1
20 TABLET, FILM COATED ORAL EVERY EVENING
Status: DISCONTINUED | OUTPATIENT
Start: 2023-08-31 | End: 2023-09-01

## 2023-08-31 RX ORDER — ONDANSETRON 4 MG/1
4 TABLET, FILM COATED ORAL EVERY 8 HOURS PRN
Status: DISCONTINUED | OUTPATIENT
Start: 2023-08-31 | End: 2023-08-31

## 2023-08-31 RX ORDER — HYDROMORPHONE HCL IN WATER/PF 6 MG/30 ML
0.2 PATIENT CONTROLLED ANALGESIA SYRINGE INTRAVENOUS
Status: DISCONTINUED | OUTPATIENT
Start: 2023-08-31 | End: 2023-09-01

## 2023-08-31 RX ORDER — PROGESTERONE 100 MG/1
100 CAPSULE ORAL AT BEDTIME
Status: DISCONTINUED | OUTPATIENT
Start: 2023-08-31 | End: 2023-09-02 | Stop reason: HOSPADM

## 2023-08-31 RX ORDER — SUMATRIPTAN 50 MG/1
50 TABLET, FILM COATED ORAL
Status: ON HOLD | COMMUNITY
End: 2023-09-02

## 2023-08-31 RX ORDER — HEPARIN SODIUM 10000 [USP'U]/100ML
0-5000 INJECTION, SOLUTION INTRAVENOUS CONTINUOUS
Status: DISCONTINUED | OUTPATIENT
Start: 2023-08-31 | End: 2023-08-31

## 2023-08-31 RX ORDER — NITROGLYCERIN 0.4 MG/1
0.4 TABLET SUBLINGUAL EVERY 5 MIN PRN
Status: DISCONTINUED | OUTPATIENT
Start: 2023-08-31 | End: 2023-08-31

## 2023-08-31 RX ORDER — PROPRANOLOL HCL 60 MG
60 CAPSULE, EXTENDED RELEASE 24HR ORAL DAILY
Status: DISCONTINUED | OUTPATIENT
Start: 2023-09-01 | End: 2023-09-02 | Stop reason: HOSPADM

## 2023-08-31 RX ORDER — PAROXETINE 20 MG/1
20 TABLET, FILM COATED ORAL 2 TIMES DAILY
Status: DISCONTINUED | OUTPATIENT
Start: 2023-09-01 | End: 2023-09-02 | Stop reason: HOSPADM

## 2023-08-31 RX ORDER — ONDANSETRON 4 MG/1
4 TABLET, ORALLY DISINTEGRATING ORAL EVERY 6 HOURS PRN
Status: DISCONTINUED | OUTPATIENT
Start: 2023-08-31 | End: 2023-09-02 | Stop reason: HOSPADM

## 2023-08-31 RX ORDER — CYCLOBENZAPRINE HCL 5 MG
10 TABLET ORAL DAILY PRN
Status: DISCONTINUED | OUTPATIENT
Start: 2023-08-31 | End: 2023-09-02 | Stop reason: HOSPADM

## 2023-08-31 RX ORDER — HEPARIN SODIUM 10000 [USP'U]/100ML
0-5000 INJECTION, SOLUTION INTRAVENOUS CONTINUOUS
Status: DISCONTINUED | OUTPATIENT
Start: 2023-08-31 | End: 2023-09-01

## 2023-08-31 RX ORDER — HYDROCODONE BITARTRATE AND ACETAMINOPHEN 10; 325 MG/1; MG/1
2 TABLET ORAL EVERY 4 HOURS
Status: DISCONTINUED | OUTPATIENT
Start: 2023-09-01 | End: 2023-09-01

## 2023-08-31 RX ORDER — NALOXONE HYDROCHLORIDE 0.4 MG/ML
0.4 INJECTION, SOLUTION INTRAMUSCULAR; INTRAVENOUS; SUBCUTANEOUS
Status: DISCONTINUED | OUTPATIENT
Start: 2023-08-31 | End: 2023-09-02 | Stop reason: HOSPADM

## 2023-08-31 RX ORDER — ONDANSETRON 2 MG/ML
INJECTION INTRAMUSCULAR; INTRAVENOUS
Status: COMPLETED
Start: 2023-08-31 | End: 2023-08-31

## 2023-08-31 RX ORDER — ESTRADIOL 0.03 MG/D
1 FILM, EXTENDED RELEASE TRANSDERMAL
COMMUNITY

## 2023-08-31 RX ORDER — LIDOCAINE 40 MG/G
CREAM TOPICAL
Status: DISCONTINUED | OUTPATIENT
Start: 2023-08-31 | End: 2023-09-02 | Stop reason: HOSPADM

## 2023-08-31 RX ORDER — PROGESTERONE 100 MG/1
100 CAPSULE ORAL AT BEDTIME
COMMUNITY

## 2023-08-31 RX ORDER — ONDANSETRON 2 MG/ML
4 INJECTION INTRAMUSCULAR; INTRAVENOUS EVERY 6 HOURS PRN
Status: DISCONTINUED | OUTPATIENT
Start: 2023-08-31 | End: 2023-09-02 | Stop reason: HOSPADM

## 2023-08-31 RX ORDER — BUDESONIDE 3 MG/1
6 CAPSULE, COATED PELLETS ORAL EVERY MORNING
Status: DISCONTINUED | OUTPATIENT
Start: 2023-09-01 | End: 2023-09-02 | Stop reason: HOSPADM

## 2023-08-31 RX ORDER — ASPIRIN 81 MG/1
324 TABLET, CHEWABLE ORAL ONCE
Status: COMPLETED | OUTPATIENT
Start: 2023-08-31 | End: 2023-08-31

## 2023-08-31 RX ORDER — IPRATROPIUM BROMIDE 42 UG/1
3 SPRAY, METERED NASAL DAILY
Status: DISCONTINUED | OUTPATIENT
Start: 2023-09-01 | End: 2023-09-02 | Stop reason: HOSPADM

## 2023-08-31 RX ORDER — ACETAMINOPHEN 325 MG/1
650 TABLET ORAL EVERY 4 HOURS PRN
Status: DISCONTINUED | OUTPATIENT
Start: 2023-08-31 | End: 2023-09-02 | Stop reason: HOSPADM

## 2023-08-31 RX ORDER — CARBOXYMETHYLCELLULOSE SODIUM 5 MG/ML
1 SOLUTION/ DROPS OPHTHALMIC 3 TIMES DAILY PRN
Status: DISCONTINUED | OUTPATIENT
Start: 2023-08-31 | End: 2023-09-02 | Stop reason: HOSPADM

## 2023-08-31 RX ORDER — LORAZEPAM 1 MG/1
1 TABLET ORAL 4 TIMES DAILY
Status: DISCONTINUED | OUTPATIENT
Start: 2023-08-31 | End: 2023-09-02 | Stop reason: HOSPADM

## 2023-08-31 RX ORDER — ACETAMINOPHEN 650 MG/1
650 SUPPOSITORY RECTAL EVERY 4 HOURS PRN
Status: DISCONTINUED | OUTPATIENT
Start: 2023-08-31 | End: 2023-09-02 | Stop reason: HOSPADM

## 2023-08-31 RX ORDER — NITROGLYCERIN 0.4 MG/1
0.4 TABLET SUBLINGUAL EVERY 5 MIN PRN
Status: DISCONTINUED | OUTPATIENT
Start: 2023-08-31 | End: 2023-09-02 | Stop reason: HOSPADM

## 2023-08-31 RX ORDER — SODIUM CHLORIDE 9 MG/ML
INJECTION, SOLUTION INTRAVENOUS CONTINUOUS
Status: DISCONTINUED | OUTPATIENT
Start: 2023-08-31 | End: 2023-09-01

## 2023-08-31 RX ORDER — POLYETHYLENE GLYCOL 3350 17 G/17G
17 POWDER, FOR SOLUTION ORAL DAILY
Status: DISCONTINUED | OUTPATIENT
Start: 2023-09-01 | End: 2023-09-02 | Stop reason: HOSPADM

## 2023-08-31 RX ORDER — PANTOPRAZOLE SODIUM 40 MG/1
40 TABLET, DELAYED RELEASE ORAL
Status: DISCONTINUED | OUTPATIENT
Start: 2023-09-01 | End: 2023-09-02 | Stop reason: HOSPADM

## 2023-08-31 RX ADMIN — LORAZEPAM 1 MG: 1 TABLET ORAL at 23:35

## 2023-08-31 RX ADMIN — HEPARIN SODIUM 800 UNITS/HR: 10000 INJECTION, SOLUTION INTRAVENOUS at 22:05

## 2023-08-31 RX ADMIN — NITROGLYCERIN 0.4 MG: 0.4 TABLET SUBLINGUAL at 18:47

## 2023-08-31 RX ADMIN — ONDANSETRON 4 MG: 2 INJECTION INTRAMUSCULAR; INTRAVENOUS at 18:44

## 2023-08-31 RX ADMIN — ASPIRIN 81 MG 324 MG: 81 TABLET ORAL at 18:46

## 2023-08-31 RX ADMIN — SODIUM CHLORIDE: 9 INJECTION, SOLUTION INTRAVENOUS at 18:44

## 2023-08-31 ASSESSMENT — ACTIVITIES OF DAILY LIVING (ADL)
DRESSING/BATHING_DIFFICULTY: NO
CHANGE_IN_FUNCTIONAL_STATUS_SINCE_ONSET_OF_CURRENT_ILLNESS/INJURY: NO
DOING_ERRANDS_INDEPENDENTLY_DIFFICULTY: NO
WEAR_GLASSES_OR_BLIND: NO
ADLS_ACUITY_SCORE: 20
FALL_HISTORY_WITHIN_LAST_SIX_MONTHS: NO
WALKING_OR_CLIMBING_STAIRS_DIFFICULTY: NO
ADLS_ACUITY_SCORE: 37
TOILETING_ISSUES: NO
DIFFICULTY_EATING/SWALLOWING: NO
ADLS_ACUITY_SCORE: 37
CONCENTRATING,_REMEMBERING_OR_MAKING_DECISIONS_DIFFICULTY: NO

## 2023-08-31 NOTE — ED TRIAGE NOTES
Pt reports sudden onset of severe mid sternal chest pain that began 30 mins PTA. Pt is pale and diaphoretic, denies cardiac history. States pain radiates down bilateral arms. Pt also feels weak and nauseous.      Triage Assessment       Row Name 08/31/23 1122       Triage Assessment (Adult)    Airway WDL WDL       Respiratory WDL    Respiratory WDL WDL       Skin Circulation/Temperature WDL    Skin Circulation/Temperature WDL X;circulation    Skin Circulation pallor  diaphoretic       Cardiac WDL    Cardiac WDL X;chest pain       Karval Coma Scale    Best Eye Response 4-->(E4) spontaneous    Best Motor Response 6-->(M6) obeys commands    Best Verbal Response 5-->(V5) oriented    Karval Coma Scale Score 15

## 2023-08-31 NOTE — ED NOTES
Rapid Assessment Note    History:   Laurel Parra is a 73 year old female who presents with onset approximately 1 hour ago of crushing substernal chest pain that feels quite severe.  It does radiate a little bit into both arms and she feels short of breath and nauseated.  She also broke out into a sweat.  No cardiac history.  No family history, no other risk factors for coronary disease.  She does have a history of some reflux and takes omeprazole.  Did not seem to be worse with exertion.    Exam:   General:  Alert, interactive  Cardiovascular:  Well perfused  Lungs:  No respiratory distress, no accessory muscle use  Neuro:  Moving all 4 extremities  Skin:  Warm, clammy and mildly pale.  Psych:  Normal affect      Plan of Care: EKG showed some sinus bradycardia but otherwise was unremarkable.  Patient was given Zofran, 4 baby aspirin, labs are drawn and she will be brought back to the ER for further evaluation.  I evaluated the patient and developed an initial plan of care. I discussed this plan and explained that I, or one of my partners, would be returning to complete the evaluation.         Dagmar Hahn MD      8/31/2023  [employer] [employer]    Portions of this medical record were completed by a scribe. UPON MY REVIEW AND AUTHENTICATION BY ELECTRONIC SIGNATURE, this confirms (a) I performed the applicable clinical services, and (b) the record is accurate.        Dagmar Hahn MD  08/31/23 1426

## 2023-08-31 NOTE — ED PROVIDER NOTES
History     Chief Complaint:  Chest Pain       The history is provided by the patient.      Laurel Parra is a 73 year old female who presents with chest pain. Patient reports an onset of constant pain and pressure in her chest about one hour ago when she was taking off her t-shirt, though she notes her pain is better after nitroglycerin. She has never had this type of pain in the past. She also reports pain down both arms, mostly the right arm. Patient endorses shortness of breath, as well as nausea, which was better after Zofran. She adds she normally has tingling in her arms, which is not different today. She has noticed some swelling in her left foot and ankle, but does not have pain. Denies vomiting, vision changes, numbness or tingling in the legs, or abdominal pain. Also denies history of heart or lung issues, or use of blood thinners. Patient notes a history of GERD, for which she is on medication for, but states it does not feel similar. Also notes a history of osteoarthritis.    Independent Historian:   None - Patient Only    Review of External Notes:   NA     Medications:    Baclofen  Budesonide  Cyclobenzaprine  Hydrocodone acetaminophen  Loratadine  Methenamine hippurate  Omeprazole  Paroxetine  Polyethylene glycol  Propranolol  Riboflavin  Zolpidem   Estradiol  Progesterone  Magnesium oxide    Past Medical History:    Anxiety  Chronic constipation  Chronic narcotic use   Chronic pain  Chronic urethritis  Colonic polyp  Depression  DJD  Dyspepsia  GERD  Lymphocytic colitis  Central sensitivity syndrome  Osteoarthritis  Spondylosis   Diverticular disease of colon and large intestine  Hemorrhoids  Noninfectious gastroenteritis  Piriformis syndrome of both sides  Pruritus ani  Anorexia nervosa  Cervicalgia  Fibromyalgia  Lumbago  Diaphragmatic hernia  Hiatal hernia  Recurrent UTI  Dyspepsia     Past Surgical History:    Appendectomy  Left total wrist fusion  Arthroplasty carpometacarpal thumb  "joint L  L wrist arthroscopy  Biopsy   Carpal tunnel release R/L  Cataract extraction    Colonoscopy  EGD  Excision bartholin gland/cyst  Herniorrhaphy inguinal x 5  R wrist cortisone injection  Operative hysteroscopy with morcellator  Shoulder arthroscopy  R foot surgery for plantar fascitis   Thumb surgery x 3    Physical Exam   Patient Vitals for the past 24 hrs:   BP Temp Temp src Pulse Resp SpO2 Height Weight   237 (!) 143/93 97.8  F (36.6  C) Oral 62 20 98 % -- --   23 -- -- -- -- -- -- -- 67 kg (147 lb 12.8 oz)   23 113/82 -- -- 60 14 -- -- --   23 112/77 -- -- 66 12 -- -- --   23 112/85 -- -- 61 19 -- -- --   23 -- -- -- 60 16 95 % -- --   23 113/75 -- -- 57 17 96 % -- --   23 193 112/72 -- -- 60 12 95 % -- --   23 122/78 -- -- 60 11 95 % -- --   23 129/87 -- -- 58 10 94 % -- --   23 (!) 143/91 -- -- 66 18 96 % -- --   23 -- -- -- 59 10 96 % -- --   23 (!) 171/107 -- -- 54 15 95 % -- --   23 (!) 157/96 96.8  F (36  C) Temporal 57 18 94 % 1.676 m (5' 6\") 65.8 kg (145 lb)      Physical Exam  General: Resting on the bed.  Head: No obvious trauma to head.  Ears, Nose, Throat:  External ears normal.  Nose normal.    Eyes:  Conjunctivae clear.   Neck: Normal range of motion.  Neck supple.   CV: Regular rate and rhythm.  No murmurs.      Respiratory: Effort normal and breath sounds normal.  No wheezing or crackles.   Gastrointestinal: Soft.  No distension. There is no tenderness.  There is no rigidity, no rebound and no guarding.   Musculoskeletal: Normal range of motion.  Non tender extremities to palpations.    Neuro: Alert. Moving all extremities appropriately.  Normal speech.    Skin: Skin is warm and dry.  No rash noted.   Psych: Normal mood and affect. Behavior is normal.     Emergency Department Course   ECG  ECG taken at 0825, ECG read at   Sinus " bradycardia  Nonspecific ST abnormality   No prior for comparison  Rate 55 bpm. IL interval 140 ms. QRS duration 78 ms. QT/QTc 482/461 ms. P-R-T axes 41 -16 64.     Imaging:  Chest XR,  PA & LAT   Final Result   IMPRESSION: Stable chest with no acute cardiopulmonary findings. Small esophageal hiatal hernia. Slight bilateral apical pleural thickening. Mild thoracolumbar spinal curvature.      Echocardiogram Complete    (Results Pending)      Report per radiology    Laboratory:  Labs Ordered and Resulted from Time of ED Arrival to Time of ED Departure   COMPREHENSIVE METABOLIC PANEL - Abnormal       Result Value    Sodium 142      Potassium 3.6      Chloride 103      Carbon Dioxide (CO2) 26      Anion Gap 13      Urea Nitrogen 12.7      Creatinine 0.72      Calcium 9.2      Glucose 132 (*)     Alkaline Phosphatase 97      AST 20      ALT 15      Protein Total 6.9      Albumin 4.5      Bilirubin Total 0.4      GFR Estimate 89     LIPASE - Abnormal    Lipase 9 (*)    TROPONIN T, HIGH SENSITIVITY - Abnormal    Troponin T, High Sensitivity 68 (*)    TROPONIN T, HIGH SENSITIVITY - Normal    Troponin T, High Sensitivity 7     NT PROBNP INPATIENT - Normal    N terminal Pro BNP Inpatient 239     CBC WITH PLATELETS AND DIFFERENTIAL    WBC Count 10.1      RBC Count 4.36      Hemoglobin 13.1      Hematocrit 41.3      MCV 95      MCH 30.0      MCHC 31.7      RDW 12.9      Platelet Count 386      % Neutrophils 54      % Lymphocytes 34      % Monocytes 10      % Eosinophils 1      % Basophils 1      % Immature Granulocytes 0      NRBCs per 100 WBC 0      Absolute Neutrophils 5.4      Absolute Lymphocytes 3.4      Absolute Monocytes 1.0      Absolute Eosinophils 0.1      Absolute Basophils 0.1      Absolute Immature Granulocytes 0.0      Absolute NRBCs 0.0        Emergency Department Course & Assessments:  Interventions:  Medications   sodium chloride 0.9% infusion (0 mLs Intravenous Stopped 8/31/23 2016)   lidocaine 1 % 0.1-1 mL  (has no administration in time range)   lidocaine (LMX4) cream (has no administration in time range)   sodium chloride (PF) 0.9% PF flush 3 mL (has no administration in time range)   sodium chloride (PF) 0.9% PF flush 3 mL (has no administration in time range)   medication instruction (has no administration in time range)   nitroGLYcerin (NITROSTAT) sublingual tablet 0.4 mg (has no administration in time range)   alum & mag hydroxide-simethicone (MAALOX) suspension 30 mL (has no administration in time range)   acetaminophen (TYLENOL) tablet 650 mg (has no administration in time range)   acetaminophen (TYLENOL) Suppository 650 mg (has no administration in time range)   Patient is already receiving anticoagulation with heparin, enoxaparin (LOVENOX), warfarin (COUMADIN)  or other anticoagulant medication (has no administration in time range)   HYDROmorphone (DILAUDID) injection 0.2 mg (has no administration in time range)   ondansetron (ZOFRAN ODT) ODT tab 4 mg (has no administration in time range)     Or   ondansetron (ZOFRAN) injection 4 mg (has no administration in time range)   heparin infusion 25,000 units in D5W 250 mL ANTICOAGULANT (has no administration in time range)   atorvastatin (LIPITOR) tablet 20 mg (has no administration in time range)   naloxone (NARCAN) injection 0.2 mg (has no administration in time range)     Or   naloxone (NARCAN) injection 0.4 mg (has no administration in time range)     Or   naloxone (NARCAN) injection 0.2 mg (has no administration in time range)     Or   naloxone (NARCAN) injection 0.4 mg (has no administration in time range)   aspirin (ASA) chewable tablet 324 mg (324 mg Oral $Given 8/31/23 1846)   ondansetron (ZOFRAN) 2 MG/ML injection (4 mg  $Given 8/31/23 1844)   heparin loading dose for LOW INTENSITY TREATMENT * Give BEFORE starting heparin infusion (3,950 Units Intravenous $Given 8/31/23 2206)      Independent Interpretation (X-rays, CTs, rhythm strip):  Chest x-ray  negative for consolidation, plural effusion, and pneumothorax.    Assessments/Consultations/Discussion of Management or Tests:   ED Course as of 08/31/23 2253   Thu Aug 31, 2023   1852 Dr. Pizano's evaluation.   2030 Rechecked. Feeling much better. Feels comfortable going home. Discussed repeat troponin. Will do road test to see if chest pain returns. If not, then discharge home.   2121 Rechecked. Pain free but troponin came back elevated. Discussed admission.   2132 Discussed patient with the hospitalist, Dr. Harrison. He agrees to admit the patient to his service.     Social Determinants of Health affecting care:   None    Disposition:  The patient was admitted to the hospital under the care of Dr. Harrison.     Impression & Plan    CMS Diagnoses: None    Medical Decision Making:  Patient is resting comfortably on my initial evaluation.  She received full dose aspirin prior to my evaluation.  She received 1 dose of nitro, and states that her chest pain is slightly improved.  She is hemodynamically stable.  CBC, CMP, troponin, BNP, lipase are grossly unremarkable.  EKG shows no ischemic changes.  Chest x-ray is negative for consolidation, pleural effusion, pneumothorax.  On my subsequent evaluation, she reports she is chest pain-free.  She is motivated to go home.  It is explained to her that I would like to obtain a second troponin level, and if this is normal, she will be cleared to be discharged home.  Unfortunately, the repeat troponin is elevated at 68.  Repeat EKG is unchanged, and continues to show no ischemic changes.  She is started on a heparin infusion.  She is agreeable to being admitted.  I discussed the patient with the hospitalist, who agreed admit the patient to their service.  She remains in stable condition, and is transferred to the floor.    Critical Care time:  was 0 minutes for this patient excluding procedures.    Diagnosis:    ICD-10-CM    1. NSTEMI (non-ST elevated myocardial infarction) (H)   I21.4            Discharge Medications:  Current Discharge Medication List         Scribe Disclosure:  I, Savannah RIOSMercy Urias, am serving as a scribe at 7:05 PM on 8/31/2023 to document services personally performed by Ok Pizano MD based on my observations and the provider's statements to me.    Ok Pizano MD  8/31/2023   Ok Pizano MD Peery, Stephen, MD  08/31/23 2039

## 2023-09-01 ENCOUNTER — APPOINTMENT (OUTPATIENT)
Dept: CARDIOLOGY | Facility: CLINIC | Age: 73
DRG: 282 | End: 2023-09-01
Attending: INTERNAL MEDICINE
Payer: MEDICARE

## 2023-09-01 LAB
CHOLEST SERPL-MCNC: 197 MG/DL
CRP SERPL-MCNC: 3.51 MG/L
ERYTHROCYTE [SEDIMENTATION RATE] IN BLOOD BY WESTERGREN METHOD: 13 MM/HR (ref 0–30)
HDLC SERPL-MCNC: 54 MG/DL
LDLC SERPL CALC-MCNC: 111 MG/DL
LVEF ECHO: NORMAL
NONHDLC SERPL-MCNC: 143 MG/DL
POTASSIUM SERPL-SCNC: 4 MMOL/L (ref 3.4–5.3)
TRIGL SERPL-MCNC: 162 MG/DL
TROPONIN T SERPL HS-MCNC: 177 NG/L
UFH PPP CHRO-ACNC: 0.99 IU/ML

## 2023-09-01 PROCEDURE — 93454 CORONARY ARTERY ANGIO S&I: CPT | Mod: 26 | Performed by: INTERNAL MEDICINE

## 2023-09-01 PROCEDURE — C1887 CATHETER, GUIDING: HCPCS | Performed by: INTERNAL MEDICINE

## 2023-09-01 PROCEDURE — 99152 MOD SED SAME PHYS/QHP 5/>YRS: CPT | Performed by: INTERNAL MEDICINE

## 2023-09-01 PROCEDURE — 99232 SBSQ HOSP IP/OBS MODERATE 35: CPT | Performed by: INTERNAL MEDICINE

## 2023-09-01 PROCEDURE — 84484 ASSAY OF TROPONIN QUANT: CPT | Performed by: INTERNAL MEDICINE

## 2023-09-01 PROCEDURE — 93306 TTE W/DOPPLER COMPLETE: CPT | Mod: 26 | Performed by: INTERNAL MEDICINE

## 2023-09-01 PROCEDURE — 250N000011 HC RX IP 250 OP 636: Mod: JZ | Performed by: INTERNAL MEDICINE

## 2023-09-01 PROCEDURE — 99222 1ST HOSP IP/OBS MODERATE 55: CPT | Mod: 25 | Performed by: INTERNAL MEDICINE

## 2023-09-01 PROCEDURE — 999N000208 ECHOCARDIOGRAM COMPLETE

## 2023-09-01 PROCEDURE — 250N000011 HC RX IP 250 OP 636: Performed by: INTERNAL MEDICINE

## 2023-09-01 PROCEDURE — 255N000002 HC RX 255 OP 636: Performed by: INTERNAL MEDICINE

## 2023-09-01 PROCEDURE — 85652 RBC SED RATE AUTOMATED: CPT | Performed by: INTERNAL MEDICINE

## 2023-09-01 PROCEDURE — 258N000003 HC RX IP 258 OP 636: Performed by: STUDENT IN AN ORGANIZED HEALTH CARE EDUCATION/TRAINING PROGRAM

## 2023-09-01 PROCEDURE — 84132 ASSAY OF SERUM POTASSIUM: CPT | Performed by: INTERNAL MEDICINE

## 2023-09-01 PROCEDURE — 93454 CORONARY ARTERY ANGIO S&I: CPT | Performed by: INTERNAL MEDICINE

## 2023-09-01 PROCEDURE — 250N000013 HC RX MED GY IP 250 OP 250 PS 637: Performed by: INTERNAL MEDICINE

## 2023-09-01 PROCEDURE — 93005 ELECTROCARDIOGRAM TRACING: CPT

## 2023-09-01 PROCEDURE — 210N000002 HC R&B HEART CARE

## 2023-09-01 PROCEDURE — 250N000009 HC RX 250: Performed by: INTERNAL MEDICINE

## 2023-09-01 PROCEDURE — 36415 COLL VENOUS BLD VENIPUNCTURE: CPT | Performed by: INTERNAL MEDICINE

## 2023-09-01 PROCEDURE — 93010 ELECTROCARDIOGRAM REPORT: CPT | Mod: XU | Performed by: INTERNAL MEDICINE

## 2023-09-01 PROCEDURE — 272N000001 HC OR GENERAL SUPPLY STERILE: Performed by: INTERNAL MEDICINE

## 2023-09-01 PROCEDURE — C1894 INTRO/SHEATH, NON-LASER: HCPCS | Performed by: INTERNAL MEDICINE

## 2023-09-01 PROCEDURE — 86140 C-REACTIVE PROTEIN: CPT | Performed by: INTERNAL MEDICINE

## 2023-09-01 PROCEDURE — B2111ZZ FLUOROSCOPY OF MULTIPLE CORONARY ARTERIES USING LOW OSMOLAR CONTRAST: ICD-10-PCS | Performed by: INTERNAL MEDICINE

## 2023-09-01 PROCEDURE — 85520 HEPARIN ASSAY: CPT | Performed by: INTERNAL MEDICINE

## 2023-09-01 RX ORDER — ASPIRIN 325 MG
325 TABLET ORAL ONCE
Status: DISCONTINUED | OUTPATIENT
Start: 2023-09-01 | End: 2023-09-01

## 2023-09-01 RX ORDER — ACETAMINOPHEN 325 MG/1
650 TABLET ORAL EVERY 4 HOURS PRN
Status: DISCONTINUED | OUTPATIENT
Start: 2023-09-01 | End: 2023-09-01

## 2023-09-01 RX ORDER — POTASSIUM CHLORIDE 1500 MG/1
20 TABLET, EXTENDED RELEASE ORAL
Status: DISCONTINUED | OUTPATIENT
Start: 2023-09-01 | End: 2023-09-01 | Stop reason: HOSPADM

## 2023-09-01 RX ORDER — IBUPROFEN 600 MG/1
600 TABLET, FILM COATED ORAL ONCE
Status: DISCONTINUED | OUTPATIENT
Start: 2023-09-01 | End: 2023-09-01

## 2023-09-01 RX ORDER — HYDROMORPHONE HCL IN WATER/PF 6 MG/30 ML
0.2 PATIENT CONTROLLED ANALGESIA SYRINGE INTRAVENOUS EVERY 4 HOURS PRN
Status: DISCONTINUED | OUTPATIENT
Start: 2023-09-01 | End: 2023-09-01

## 2023-09-01 RX ORDER — CLOPIDOGREL BISULFATE 75 MG/1
75 TABLET ORAL DAILY
Status: DISCONTINUED | OUTPATIENT
Start: 2023-09-01 | End: 2023-09-02 | Stop reason: HOSPADM

## 2023-09-01 RX ORDER — VERAPAMIL HYDROCHLORIDE 2.5 MG/ML
INJECTION, SOLUTION INTRAVENOUS
Status: DISCONTINUED | OUTPATIENT
Start: 2023-09-01 | End: 2023-09-01 | Stop reason: HOSPADM

## 2023-09-01 RX ORDER — ASPIRIN 325 MG
325 TABLET ORAL ONCE
Status: COMPLETED | OUTPATIENT
Start: 2023-09-01 | End: 2023-09-01

## 2023-09-01 RX ORDER — ASPIRIN 81 MG/1
243 TABLET, CHEWABLE ORAL ONCE
Status: DISCONTINUED | OUTPATIENT
Start: 2023-09-01 | End: 2023-09-01

## 2023-09-01 RX ORDER — ASPIRIN 81 MG/1
243 TABLET, CHEWABLE ORAL ONCE
Status: COMPLETED | OUTPATIENT
Start: 2023-09-01 | End: 2023-09-01

## 2023-09-01 RX ORDER — ATROPINE SULFATE 0.1 MG/ML
0.5 INJECTION INTRAVENOUS
Status: ACTIVE | OUTPATIENT
Start: 2023-09-01 | End: 2023-09-01

## 2023-09-01 RX ORDER — HYDROCODONE BITARTRATE AND ACETAMINOPHEN 10; 325 MG/1; MG/1
1-2 TABLET ORAL EVERY 4 HOURS PRN
Status: DISCONTINUED | OUTPATIENT
Start: 2023-09-01 | End: 2023-09-02 | Stop reason: HOSPADM

## 2023-09-01 RX ORDER — OXYCODONE HYDROCHLORIDE 5 MG/1
5 TABLET ORAL EVERY 4 HOURS PRN
Status: DISCONTINUED | OUTPATIENT
Start: 2023-09-01 | End: 2023-09-01

## 2023-09-01 RX ORDER — HYDROMORPHONE HCL IN WATER/PF 6 MG/30 ML
0.2 PATIENT CONTROLLED ANALGESIA SYRINGE INTRAVENOUS EVERY 6 HOURS PRN
Status: DISCONTINUED | OUTPATIENT
Start: 2023-09-01 | End: 2023-09-01

## 2023-09-01 RX ORDER — LORAZEPAM 2 MG/ML
0.5 INJECTION INTRAMUSCULAR
Status: DISCONTINUED | OUTPATIENT
Start: 2023-09-01 | End: 2023-09-01

## 2023-09-01 RX ORDER — ATORVASTATIN CALCIUM 40 MG/1
40 TABLET, FILM COATED ORAL EVERY EVENING
Status: DISCONTINUED | OUTPATIENT
Start: 2023-09-01 | End: 2023-09-02 | Stop reason: HOSPADM

## 2023-09-01 RX ORDER — NITROGLYCERIN 80 MG/1
1 PATCH TRANSDERMAL DAILY
Status: DISCONTINUED | OUTPATIENT
Start: 2023-09-01 | End: 2023-09-01

## 2023-09-01 RX ORDER — SODIUM CHLORIDE 9 MG/ML
75 INJECTION, SOLUTION INTRAVENOUS CONTINUOUS
Status: ACTIVE | OUTPATIENT
Start: 2023-09-01 | End: 2023-09-01

## 2023-09-01 RX ORDER — LORAZEPAM 2 MG/ML
0.5 INJECTION INTRAMUSCULAR
Status: DISCONTINUED | OUTPATIENT
Start: 2023-09-01 | End: 2023-09-01 | Stop reason: HOSPADM

## 2023-09-01 RX ORDER — NALOXONE HYDROCHLORIDE 0.4 MG/ML
0.2 INJECTION, SOLUTION INTRAMUSCULAR; INTRAVENOUS; SUBCUTANEOUS
Status: DISCONTINUED | OUTPATIENT
Start: 2023-09-01 | End: 2023-09-01

## 2023-09-01 RX ORDER — HEPARIN SODIUM 1000 [USP'U]/ML
INJECTION, SOLUTION INTRAVENOUS; SUBCUTANEOUS
Status: DISCONTINUED | OUTPATIENT
Start: 2023-09-01 | End: 2023-09-01 | Stop reason: HOSPADM

## 2023-09-01 RX ORDER — NITROGLYCERIN 80 MG/1
1 PATCH TRANSDERMAL DAILY
Status: DISCONTINUED | OUTPATIENT
Start: 2023-09-01 | End: 2023-09-02 | Stop reason: HOSPADM

## 2023-09-01 RX ORDER — LORAZEPAM 0.5 MG/1
0.5 TABLET ORAL
Status: DISCONTINUED | OUTPATIENT
Start: 2023-09-01 | End: 2023-09-01

## 2023-09-01 RX ORDER — NALOXONE HYDROCHLORIDE 0.4 MG/ML
0.4 INJECTION, SOLUTION INTRAMUSCULAR; INTRAVENOUS; SUBCUTANEOUS
Status: DISCONTINUED | OUTPATIENT
Start: 2023-09-01 | End: 2023-09-01

## 2023-09-01 RX ORDER — FENTANYL CITRATE 50 UG/ML
INJECTION, SOLUTION INTRAMUSCULAR; INTRAVENOUS
Status: DISCONTINUED | OUTPATIENT
Start: 2023-09-01 | End: 2023-09-01 | Stop reason: HOSPADM

## 2023-09-01 RX ORDER — SODIUM CHLORIDE 9 MG/ML
INJECTION, SOLUTION INTRAVENOUS CONTINUOUS
Status: DISCONTINUED | OUTPATIENT
Start: 2023-09-01 | End: 2023-09-01

## 2023-09-01 RX ORDER — SODIUM CHLORIDE 9 MG/ML
INJECTION, SOLUTION INTRAVENOUS CONTINUOUS
Status: DISCONTINUED | OUTPATIENT
Start: 2023-09-01 | End: 2023-09-01 | Stop reason: HOSPADM

## 2023-09-01 RX ORDER — FENTANYL CITRATE 50 UG/ML
25 INJECTION, SOLUTION INTRAMUSCULAR; INTRAVENOUS
Status: DISCONTINUED | OUTPATIENT
Start: 2023-09-01 | End: 2023-09-02 | Stop reason: HOSPADM

## 2023-09-01 RX ORDER — NITROGLYCERIN 5 MG/ML
VIAL (ML) INTRAVENOUS
Status: DISCONTINUED | OUTPATIENT
Start: 2023-09-01 | End: 2023-09-01 | Stop reason: HOSPADM

## 2023-09-01 RX ORDER — IOPAMIDOL 755 MG/ML
INJECTION, SOLUTION INTRAVASCULAR
Status: DISCONTINUED | OUTPATIENT
Start: 2023-09-01 | End: 2023-09-01 | Stop reason: HOSPADM

## 2023-09-01 RX ORDER — LIDOCAINE 40 MG/G
CREAM TOPICAL
Status: DISCONTINUED | OUTPATIENT
Start: 2023-09-01 | End: 2023-09-01

## 2023-09-01 RX ORDER — OXYCODONE HYDROCHLORIDE 5 MG/1
10 TABLET ORAL EVERY 4 HOURS PRN
Status: DISCONTINUED | OUTPATIENT
Start: 2023-09-01 | End: 2023-09-01

## 2023-09-01 RX ORDER — POTASSIUM CHLORIDE 1500 MG/1
20 TABLET, EXTENDED RELEASE ORAL
Status: DISCONTINUED | OUTPATIENT
Start: 2023-09-01 | End: 2023-09-01

## 2023-09-01 RX ORDER — FLUMAZENIL 0.1 MG/ML
0.2 INJECTION, SOLUTION INTRAVENOUS
Status: ACTIVE | OUTPATIENT
Start: 2023-09-01 | End: 2023-09-01

## 2023-09-01 RX ORDER — LORAZEPAM 0.5 MG/1
0.5 TABLET ORAL
Status: DISCONTINUED | OUTPATIENT
Start: 2023-09-01 | End: 2023-09-01 | Stop reason: HOSPADM

## 2023-09-01 RX ADMIN — LORAZEPAM 1 MG: 1 TABLET ORAL at 18:08

## 2023-09-01 RX ADMIN — ZOLPIDEM TARTRATE 5 MG: 5 TABLET ORAL at 00:13

## 2023-09-01 RX ADMIN — PROPRANOLOL HYDROCHLORIDE 60 MG: 60 CAPSULE, EXTENDED RELEASE ORAL at 08:04

## 2023-09-01 RX ADMIN — HUMAN ALBUMIN MICROSPHERES AND PERFLUTREN 9 ML: 10; .22 INJECTION, SOLUTION INTRAVENOUS at 10:12

## 2023-09-01 RX ADMIN — HYDROMORPHONE HYDROCHLORIDE 0.2 MG: 0.2 INJECTION, SOLUTION INTRAMUSCULAR; INTRAVENOUS; SUBCUTANEOUS at 14:47

## 2023-09-01 RX ADMIN — HYDROCODONE BITARTRATE AND ACETAMINOPHEN 2 TABLET: 10; 325 TABLET ORAL at 12:10

## 2023-09-01 RX ADMIN — HYDROCODONE BITARTRATE AND ACETAMINOPHEN 2 TABLET: 10; 325 TABLET ORAL at 08:04

## 2023-09-01 RX ADMIN — PANTOPRAZOLE SODIUM 40 MG: 40 TABLET, DELAYED RELEASE ORAL at 16:06

## 2023-09-01 RX ADMIN — HYDROCODONE BITARTRATE AND ACETAMINOPHEN 2 TABLET: 10; 325 TABLET ORAL at 20:22

## 2023-09-01 RX ADMIN — HYDROCODONE BITARTRATE AND ACETAMINOPHEN 2 TABLET: 10; 325 TABLET ORAL at 00:13

## 2023-09-01 RX ADMIN — CLOPIDOGREL BISULFATE 75 MG: 75 TABLET ORAL at 16:06

## 2023-09-01 RX ADMIN — LORAZEPAM 1 MG: 1 TABLET ORAL at 12:10

## 2023-09-01 RX ADMIN — LORAZEPAM 1 MG: 1 TABLET ORAL at 08:04

## 2023-09-01 RX ADMIN — SODIUM CHLORIDE 75 ML/HR: 9 INJECTION, SOLUTION INTRAVENOUS at 12:15

## 2023-09-01 RX ADMIN — LORAZEPAM 1 MG: 1 TABLET ORAL at 22:16

## 2023-09-01 RX ADMIN — NITROGLYCERIN 1 PATCH: 0.4 PATCH TRANSDERMAL at 01:54

## 2023-09-01 RX ADMIN — BUDESONIDE 6 MG: 3 CAPSULE ORAL at 08:04

## 2023-09-01 RX ADMIN — PANTOPRAZOLE SODIUM 40 MG: 40 TABLET, DELAYED RELEASE ORAL at 08:04

## 2023-09-01 RX ADMIN — PAROXETINE HYDROCHLORIDE 20 MG: 20 TABLET, FILM COATED ORAL at 18:08

## 2023-09-01 RX ADMIN — HYDROCODONE BITARTRATE AND ACETAMINOPHEN 2 TABLET: 10; 325 TABLET ORAL at 16:05

## 2023-09-01 RX ADMIN — PAROXETINE HYDROCHLORIDE 20 MG: 20 TABLET, FILM COATED ORAL at 08:04

## 2023-09-01 RX ADMIN — ATORVASTATIN CALCIUM 40 MG: 40 TABLET, FILM COATED ORAL at 18:08

## 2023-09-01 RX ADMIN — HYDROCODONE BITARTRATE AND ACETAMINOPHEN 2 TABLET: 10; 325 TABLET ORAL at 04:13

## 2023-09-01 RX ADMIN — HYDROMORPHONE HYDROCHLORIDE 0.2 MG: 0.2 INJECTION, SOLUTION INTRAMUSCULAR; INTRAVENOUS; SUBCUTANEOUS at 10:29

## 2023-09-01 RX ADMIN — PROGESTERONE 100 MG: 100 CAPSULE ORAL at 18:07

## 2023-09-01 RX ADMIN — ASPIRIN 325 MG ORAL TABLET 325 MG: 325 PILL ORAL at 10:34

## 2023-09-01 ASSESSMENT — ACTIVITIES OF DAILY LIVING (ADL)
ADLS_ACUITY_SCORE: 22

## 2023-09-01 NOTE — H&P
[facility]    History and Physical - Hospitalist Service       Date of Admission:  8/31/2023    Assessment & Plan   Laurel Parra is a 73 year old female who presents with chest pain. Patient reports an onset of constant pain and pressure in her chest about one hour ago when she was taking off her t-shirt, though she notes her pain is better after nitroglycerin.  Her  drove her to [facility] where she was noted to have acute troponin elevation, chest pain that's responsive to NTG and has been started on iv heparin and admitted to heart center for further investigation    ## Chest pain  ## ACS  Patient presented within one hour of her chest pain that was associated with nausea and sweats.  She ruled in with her troponin, EKG showed NSR with NS ST changes initially but more pronounce diffuse TWI.  Her pain improved with NTG.  She  has a family hx of her father with CAD in his 70's  -- admit to inpatient  -- IV heparin, NTG patch  -- serial troponin, lipid profile  -- start lipitor  -- NPO after midnight  -- TTE  -- cardiology consultation  -- noted elevation of wbc, will order CRP/ESR    ## Chronic pain syndrome  ## Chronic narcotic use  -- she is followed at a pain clinic  -- resume her home pain medications (baclonfen, norco, ativan    ## Anorexia  -- she is followed at [facility]  -- she noted weight gain of 40 lbs that to her has been unexplained  -- saw her primary, no evidene of mass, tumor, lymphadenopathy or fluid retention and normal thyroid.  She is waiting to be seen by endocrinology    ## Depression / Anxiety  -- continue paxil  -- denies any suicidal thoughts  -- continue propranolol    ## Microscopic colitis  -- continue budesonide    ## GERD  -- continue PPI    Diet:  NPO  DVT Prophylaxis: Heparin IV  Bates Catheter: Not present  Lines: None     Cardiac Monitoring: None  Code Status: Full    Clinically Significant Risk Factors Present on Admission        Disposition Plan   -- anticipate discharge in 1-2 days    Georges Harrison MD  Hospitalist Service  [facility]  Securely message with [employer] (more info)  Text page via [employer] Paging/Directory     ______________________________________________________________________    Chief Complaint   Chest pain    History is obtained from the patient, electronic health record, and emergency department physician    History of Present Illness     Laurel Parra is a 73 year old female who presents with chest pain. Patient reports an onset of constant pain and pressure in her chest about one hour ago when she was taking off her t-shirt, though she notes her pain is better after nitroglycerin. She has never had this type of pain in the past. She also reports pain down both arms, mostly the right arm. Patient endorses shortness of breath, as well as nausea, which was better after Zofran. She adds she normally has tingling in her arms, which is not different today. She has noticed some swelling in her left foot and ankle, but does not have pain. Denies vomiting, vision changes, numbness or tingling in the legs, or abdominal pain. Also denies history of heart or lung issues, or use of blood thinners. Patient notes a history of GERD, for which she is on medication for, but states it does not feel similar. Also notes a history of osteoarthritis.     Chest pain 1 hr prior to admission,  pain better after NTG,   2nd trop 68, EKG shows no ischemic changes.     Past Medical History    Past Medical History:   Diagnosis Date    Anxiety     Dr. Adelina Meehan    Chronic constipation     Chronic narcotic use     [facility]    Chronic pain     Dr. rOta as of 2015    Chronic urethritis     Dr. Little    Colonic polyp 11/2011    one polyp, fu 5 years, fu 10/17 and no lesions, fu 9/20 and negative, fu 5 years    Depression, major, in partial remission (H)     Dr. Meehan    Diarrhea 2012     eval by [facility], resolved with gluten free diet    DJD (degenerative joint disease)     Dyspepsia     eval by [facility], had colonoscopy, egd, ct abd and pelvis, mrcp.  Salix to be dyspepsia and constipation    H/O gastroesophageal reflux (GERD)     LBP (low back pain)     Dr. Jae Tong in [place], then seeing [facility] Dr. You Cannon headed 2020    nl est echo    Lymphocytic colitis     [facility]    Migraines     neuro eval    Palpitations     holter with pvc's and pac's, echo nl    Screening     dexa nl at gyn       Past Surgical History   Past Surgical History:   Procedure Laterality Date    APPENDECTOMY  2016    ARTHRODESIS WRIST Left 2021    Procedure: LEFT TOTAL WRIST FUSION;  Surgeon: Beatrice Philippe MD;  Location:  OR    ARTHROPLASTY CARPOMETACARPAL (THUMB JOINT) Left 2017    Procedure: ARTHROPLASTY CARPOMETACARPAL (THUMB JOINT);  REVISION  LEFT THUMB CARPOMETACARPAL ARTHROPOASTY WITH 1.1 TIGHT ROPE;  Surgeon: Beatrice Philippe MD;  Location:  SD    ARTHROSCOPY WRIST Left 2019    Procedure: LEFT WRIST ARTHROSCOPY REMOVAL OF TIGHT ROPE ; LEFT EXTENSOR POLLICIS LONGUS /EXTENSOR CARPI RADIALIS BREVIS /EXTENSOR CARPI RADIALUS LONGUS SYNOVECTOMY ; EXTENSOR POLLICIS LONGUS TRANSPOSITION ; ANTERIOR INTEROSSEOUS NEURECTOMY ; POSTERIOR INTEROSSEOUS NEURECTOMY;  Surgeon: Beatrice Philippe MD;  Location:  OR    BIOPSY      CARPAL TUNNEL RELEASE RT/LT      CATARACT EXTRACTION  2021     SECTION      COLONOSCOPY      ESOPHAGOSCOPY, GASTROSCOPY, DUODENOSCOPY (EGD), COMBINED N/A 2020    Procedure: ESOPHAGOGASTRODUODENOSCOPY, WITH BIOPSY;  Surgeon: Geogres Pop MD;  Location:  GI    EXCIS BARTHOLIN GLAND/CYST  2005    HERNIORRHAPHY INGUINAL  70's    x5    INJECT STEROID (LOCATION) Right 2019    Procedure: RIGHT WRIST CORTISONE INJECTION;  Surgeon: Beatrice Philippe MD;  Location:  OR    OPERATIVE HYSTEROSCOPY WITH  MORCELLATOR N/A 02/09/2018    Procedure: OPERATIVE HYSTEROSCOPY WITH MORCELLATOR ([employer]);  OPERATIVE HYSTEROSCOPY WITH TRUECLEAR MORCELLATOR 5C SCOPE ;  Surgeon: Iris Fernandez MD;  Location: [facility]    ORTHOPEDIC SURGERY Bilateral     SHOULDER ARTHROSCOPY    ORTHOPEDIC SURGERY Right 11/2017    right foot for plantar fasciitis    thumb surgery  2000 and 2004, 2007       Prior to Admission Medications   Prior to Admission Medications   Prescriptions Last Dose Informant Patient Reported? Taking?   Baclofen (LIORESAL) 5 MG tablet   Yes No   Sig: as needed   Hydrocodone-Acetaminophen  MG TABS  Self Yes No   Sig: Take 2 tablets by mouth every 4 hours as needed   LORazepam (ATIVAN) 1 MG tablet  Self Yes No   Sig: Take 1-2 mg by mouth 3 times daily   PARoxetine (PAXIL-CR) 25 MG 24 hr tablet   Yes No   Sig: Take 25 mg by mouth 2 times daily   Vitamin D3 (CHOLECALCIFEROL) 125 MCG (5000 UT) tablet  Self Yes No   Sig: Take 2 tablets by mouth every evening   budesonide (ENTOCORT EC) 3 MG EC capsule   Yes No   Sig: Take 3 mg by mouth every morning Take 2 capsule by mouth every am for 3 months   carboxymethylcellulose (REFRESH PLUS) 0.5 % SOLN ophthalmic solution  Self Yes No   Sig: Place 1 drop into both eyes 3 times daily as needed for dry eyes   cyclobenzaprine (FLEXERIL) 10 MG tablet  Self Yes No   Sig: Take 10 mg by mouth daily as needed for muscle spasms    ipratropium (ATROVENT) 0.06 % spray   No No   Sig: Spray 3 sprays into both nostrils daily   loratadine (CLARITIN) 10 MG tablet  Self Yes No   Sig: Take 10 mg by mouth every morning    methenamine hippurate (HIPREX) 1 g tablet   No No   Sig: TAKE 1 TABLET(1 GRAM) BY MOUTH TWICE DAILY   naloxone (NARCAN) 4 MG/0.1ML nasal spray  Self No No   Sig: Spray 1 spray (4 mg) into one nostril alternating nostrils as needed for opioid reversal Every 2-3 minutes in alternating nostrils   omeprazole (PRILOSEC) 40 MG DR capsule  Self Yes No   Sig: Take 40 mg by mouth  every morning (before breakfast) 30 minutes prior to breakfast   ondansetron (ZOFRAN) 4 MG tablet   Yes No   Sig: TAKE 1 TABLET BY MOUTH THREE TIMES DAILY AS NEEDED FOR NAUSEA   polyethylene glycol (MIRALAX) 17 GM/Dose powder  Self Yes No   Sig: Take 1 capful. by mouth as needed   propranolol ER (INDERAL LA) 60 MG 24 hr capsule   No No   Sig: Take 1 capsule (60 mg) by mouth daily   riboflavin 400 MG CAPS   Yes No   Sig: Take 400 mg by mouth daily   vitamin C (ASCORBIC ACID) 1000 MG TABS  Self Yes No   Sig: Take 1,000 mg by mouth every evening    zolpidem ER (AMBIEN CR) 12.5 MG CR tablet  Self Yes No   Sig: Take 12.5 mg by mouth At Bedtime       Facility-Administered Medications: None          Physical Exam   Vital Signs: Temp: 96.8  F (36  C) Temp src: Temporal BP: 113/75 Pulse: 60   Resp: 16 SpO2: 95 % O2 Device: None (Room air)    Weight: 145 lbs 0 oz        Medical Decision Making       65 MINUTES SPENT BY ME on the date of service doing chart review, history, exam, documentation & further activities per the note.      Data     I have personally reviewed the following data over the past 24 hrs:    12.1 (H)  \   12.5   / 304     142 103 12.7 /  132 (H)   3.6 26 0.72 \     ALT: 15 AST: 20 AP: 97 TBILI: 0.4   ALB: 4.5 TOT PROTEIN: 6.9 LIPASE: 9 (L)     Trop: 177 (HH) BNP: 226       Imaging results reviewed over the past 24 hrs:   Recent Results (from the past 24 hour(s))   Chest XR,  PA & LAT    Narrative    EXAM: XR CHEST 2 VIEWS  LOCATION: [facility]  DATE: 8/31/2023    INDICATION: chest pain  COMPARISON: 08/16/2023      Impression    IMPRESSION: Stable chest with no acute cardiopulmonary findings. Small esophageal hiatal hernia. Slight bilateral apical pleural thickening. Mild thoracolumbar spinal curvature.

## 2023-09-01 NOTE — PLAN OF CARE
Laurel is alert, oriented, pleasant, and anxious appearing. VSS on room air. Right wrist angio site is soft, nontender. She has baseline neuropathy; no new numbness or tingling post-anigo. TR band removed without incident. She reports migraine headache continues; received Dilaudid and Norco with moderate relief. Reports frustration with Imitrex being unavailable. Discussed risk of vasospasm.     Started Plavix today. Tolerated ambulating in her room without discomfort, dyspnea. Anticipate discharge to home tomorrow.

## 2023-09-01 NOTE — PHARMACY-ADMISSION MEDICATION HISTORY
Pharmacist Admission Medication History    Admission medication history is complete. The information provided in this note is only as accurate as the sources available at the time of the update.    Medication reconciliation/reorder completed by provider prior to medication history? No    Information Source(s): Patient and CareEverywhere/SureScripts via in-person    Pertinent Information: none    Changes made to PTA medication list:  Added: estradiol, progesterone, sumatriptan  Deleted: None  Changed:   Norco 2 tabs q4h prn --> q4h  Ativan 1-2 tabs TID --> 1 tab QID  Omeprazole 40mg daily --> BID    Medication Affordability:  Not including over the counter (OTC) medications, was there a time in the past 3 months when you did not take your medications as prescribed because of cost?: No    Allergies reviewed with patient and updates made in EHR: no    Medication History Completed By: France Dimas RPH 8/31/2023 9:59 PM    Prior to Admission medications    Medication Sig Last Dose Taking? Auth Provider Long Term End Date   Baclofen (LIORESAL) 5 MG tablet Take 5 mg by mouth as needed for muscle spasms  at PRN Yes Reported, Patient     budesonide (ENTOCORT EC) 3 MG EC capsule Take 6 mg by mouth every morning 8/31/2023 at AM Yes Reported, Patient Yes    carboxymethylcellulose (REFRESH PLUS) 0.5 % SOLN ophthalmic solution Place 1 drop into both eyes 3 times daily as needed for dry eyes  at prn Yes Reported, Patient     cyclobenzaprine (FLEXERIL) 10 MG tablet Take 10 mg by mouth daily as needed for muscle spasms   at prn Yes Reported, Patient     estradiol (VIVELLE-DOT) 0.025 MG/24HR bi-weekly patch Place 1 patch onto the skin twice a week Past Week Yes Unknown, Entered By History     Hydrocodone-Acetaminophen  MG TABS Take 2 tablets by mouth every 4 hours 8/31/2023 at PM Yes Reported, Patient     ipratropium (ATROVENT) 0.06 % spray Spray 3 sprays into both nostrils daily 8/31/2023 at AM Yes Bere Fabian,  APRN CNP     loratadine (CLARITIN) 10 MG tablet Take 10 mg by mouth every morning  8/31/2023 at AM Yes Reported, Patient     LORazepam (ATIVAN) 1 MG tablet Take 1 mg by mouth 4 times daily 8/31/2023 at PM Yes Georges Lebron MD     methenamine hippurate (HIPREX) 1 g tablet TAKE 1 TABLET(1 GRAM) BY MOUTH TWICE DAILY 8/31/2023 at PM Yes Heide Wilhelm MD     naloxone (NARCAN) 4 MG/0.1ML nasal spray Spray 1 spray (4 mg) into one nostril alternating nostrils as needed for opioid reversal Every 2-3 minutes in alternating nostrils  at prn Yes Georges Lebron MD Yes    omeprazole (PRILOSEC) 40 MG DR capsule Take 40 mg by mouth 2 times daily (before meals) 8/31/2023 at AM Yes Reported, Patient     ondansetron (ZOFRAN) 4 MG tablet Take 4 mg by mouth every 8 hours as needed for nausea or vomiting  at prn Yes Reported, Patient     PARoxetine (PAXIL-CR) 25 MG 24 hr tablet Take 25 mg by mouth 2 times daily 8/31/2023 at PM Yes Unknown, Entered By History     polyethylene glycol (MIRALAX) 17 GM/Dose powder Take 1 capful. by mouth as needed for constipation  at prn Yes Reported, Patient     progesterone (PROMETRIUM) 100 MG capsule Take 100 mg by mouth At Bedtime 8/30/2023 at HS Yes Unknown, Entered By History     propranolol ER (INDERAL LA) 60 MG 24 hr capsule Take 1 capsule (60 mg) by mouth daily  Patient taking differently: Take 60 mg by mouth At Bedtime 8/30/2023 at HS Yes Ej Castanon MD Yes    riboflavin 400 MG CAPS Take 400 mg by mouth daily 8/31/2023 at AM Yes Reported, Patient     SUMAtriptan (IMITREX) 50 MG tablet Take 50 mg by mouth at onset of headache for migraine  at prn Yes Unknown, Entered By History No    vitamin C (ASCORBIC ACID) 1000 MG TABS Take 1,000 mg by mouth every evening  8/30/2023 at PM Yes Reported, Patient     Vitamin D3 (CHOLECALCIFEROL) 125 MCG (5000 UT) tablet Take 2 tablets by mouth every evening 8/30/2023 at PM Yes Reported, Patient     zolpidem ER (AMBIEN  CR) 12.5 MG CR tablet Take 12.5 mg by mouth At Bedtime  8/30/2023 at  Yes Reported, Patient

## 2023-09-01 NOTE — DISCHARGE INSTRUCTIONS
Cardiac Angiogram Discharge Instructions - Radial    After you go home:    Have an adult stay with you until tomorrow.  Drink extra fluids for 2 days.  You may resume your normal diet.  No smoking       For 24 hours - due to the sedation you received:  Relax and take it easy.  Do NOT make any important or legal decisions.  Do NOT drive or operate machines at home or at work.  Do NOT drink alcohol.    Care of Wrist Puncture Site:    For the first 24 hrs - check the puncture site every 1-2 hours while awake.  It is normal to have soreness at the puncture site and mild tingling in your hand for up to 3 days.  Remove the bandaid after 24 hours. If there is minor oozing, apply another bandaid and remove it after 12 hours.  You may shower tomorrow.  Do NOT take a bath, or use a hot tub or pool for at least 3 days. Do NOT scrub the site. Do not use lotion or powder near the puncture site.           Activity:        For 2 days:   do not use your hand or arm to support your weight (such as rising from a chair)   do not bend your wrist (such as lifting a garage door).  do not lift more than 5 pounds or exercise your arm (such as tennis, golf or bowling).  Do NOT do any heavy activity such as exercise, lifting, or straining.     Bleeding:    If you start bleeding from the site in your wrist, sit down and press firmly on/above the site for 10 minutes.   Once bleeding stops, keep arm still for 2 hours.   Call [facility] as soon as you can.       Call 911 right away if you have heavy bleeding or bleeding that does not stop.      Medicines:    If you are taking an antiplatelet medication such as Plavix, Brilinta or Effient, do not stop taking it until you talk to your cardiologist.      If you are on Metformin (Glucophage), do not restart it until you have blood tests (within 2 to 3 days after discharge).  After you have your blood drawn, you may restart the Metformin.   Take your medications, including blood thinners, unless your  provider tells you not to.    If you take Coumadin (Warfarin), have your INR checked by your provider in  3-5 days. Call your clinic to schedule this.  If you have stopped any medicines, check with your provider about when to restart them.    Follow Up Appointments:    Follow up with [facility] Nurse Practitioner at [facility] of patient preference in 7-10 days.    Call the clinic if:    You have a large or growing hard lump around the site.  The site is red, swollen, hot or tender.  Blood or fluid is draining from the site.  You have chills or a fever greater than 101 F (38 C).  Your arm feels numb, cool or changes color.  You have hives, a rash or unusual itching.  Any questions or concerns.          [facility] [place]:    [phone] [facility] (7 days a week)

## 2023-09-01 NOTE — PROGRESS NOTES
Pt going to cath lab, potassium not drawn, cath lab RN aware and will clarify with interventionalist.

## 2023-09-01 NOTE — PLAN OF CARE
"Goal Outcome Evaluation:       1900-0730    Pt admitted from ED @ 2237. A&O x 4. Pt denied CP, but reported intermittent dizziness which is baseline per pt. Heparin gtt @500, rehcheck 1109. Has chronic pain - scheduled Norco given. Requested PRN ativan and Ambien tonight. VSS, on RA. Up with SBA. Tele: SR/SB. Has been NPO @ 0000- plan for cards consult + ECHO AM. Continue with plan of care.     0200- Pt reported 3/10 medical chest pain with \"palpations\" MD aware. Orders to place Nitroglycerin patch - R shoulder. Pt reports improvement                    "

## 2023-09-01 NOTE — PROGRESS NOTES
RECEIVING UNIT ED HANDOFF REVIEW    ED Nurse Handoff Report was reviewed by: Emilia Jackson RN on August 31, 2023 at 10:19 PM

## 2023-09-01 NOTE — PROGRESS NOTES
Coronary angiogram showed mild nonobstructive coronary artery disease.  Echocardiogram showed normal RV and LV systolic function without any wall motion viability or any significant valvular abnormalities.  Patient also acknowledges under stress recently significantly however no echocardiac evidence of stress cardiomyopathy.    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  Discussed in detail with patient and her  Dom who joined us on phone.  At this time the etiology of her chest discomfort is not entirely clear although it could represent MINOCA.  Other differential include myocarditis coronary spasm.  She does not have enough blood pressure to add medication like long-acting nitrates amlodipine .  Already on propranolol.  Discussed option and rationale and potential side effects of adding a second antiplatelet agent assuming this was MINOCA.  Also discussed option and rational of doing cardiac MRI.  Unfortunately MRI could not be done today.  Recommend outpatient cardiac MRI.  After discussing pros and cons of dual antiplatelet therapy patient is agreeable to start Plavix.  Recommend continue aspirin, statin add Plavix 75 mg daily.  If any bleeding issues low threshold to discontinue Plavix.  Recommend stay overnight to make sure there is no recurrence of angina or any arrhythmias.  If does well overnight can likely discharge home tomorrow.  Recommend outpatient cardiac MRI and follow-up in cardiology clinic.

## 2023-09-01 NOTE — ED NOTES
"[facility]  ED Nurse Handoff Report    ED Chief complaint: Chest Pain      ED Diagnosis:   Final diagnoses:   None       Code Status: Confirm with hospitalist    Allergies:   Allergies   Allergen Reactions    Betamethasone Other (See Comments)     agitation    Chocolate Other (See Comments)     Triggers migraines    Compazine [Prochlorperazine] Other (See Comments)     \"crawl out of my skin\"    Linzess [Linaclotide]     Penicillins Nausea and Vomiting and Hives    Pneumococcal Vaccine Other (See Comments)     Temporary paralization    Tizanidine Other (See Comments)     Severe agitation       Patient Story: Pt from home where she reported sudden onset chest pain that radiated to bilateral arms just prior to arrival in ED. Pt was pale, weak and diaphoretic on arrival    Focused Assessment:  Pt received fluids, nitroglycerin SL and zofran with good relief of pain. Pt was able to ambulate with steady gait and stand by assist. Delta trop found to be elevated. Pt is alert and oriented, pleasant and cooperative.     Treatments and/or interventions provided: Labs, EKG, medications, fluids, monitoring  Labs Ordered and Resulted from Time of ED Arrival to Time of ED Departure   COMPREHENSIVE METABOLIC PANEL - Abnormal       Result Value    Sodium 142      Potassium 3.6      Chloride 103      Carbon Dioxide (CO2) 26      Anion Gap 13      Urea Nitrogen 12.7      Creatinine 0.72      Calcium 9.2      Glucose 132 (*)     Alkaline Phosphatase 97      AST 20      ALT 15      Protein Total 6.9      Albumin 4.5      Bilirubin Total 0.4      GFR Estimate 89     LIPASE - Abnormal    Lipase 9 (*)    TROPONIN T, HIGH SENSITIVITY - Abnormal    Troponin T, High Sensitivity 68 (*)    TROPONIN T, HIGH SENSITIVITY - Normal    Troponin T, High Sensitivity 7     NT PROBNP INPATIENT - Normal    N terminal Pro BNP Inpatient 239     CBC WITH PLATELETS AND DIFFERENTIAL    WBC Count 10.1      RBC Count 4.36      Hemoglobin 13.1   "    Hematocrit 41.3      MCV 95      MCH 30.0      MCHC 31.7      RDW 12.9      Platelet Count 386      % Neutrophils 54      % Lymphocytes 34      % Monocytes 10      % Eosinophils 1      % Basophils 1      % Immature Granulocytes 0      NRBCs per 100 WBC 0      Absolute Neutrophils 5.4      Absolute Lymphocytes 3.4      Absolute Monocytes 1.0      Absolute Eosinophils 0.1      Absolute Basophils 0.1      Absolute Immature Granulocytes 0.0      Absolute NRBCs 0.0     ROUTINE UA WITH MICROSCOPIC REFLEX TO CULTURE     Chest XR,  PA & LAT   Final Result   IMPRESSION: Stable chest with no acute cardiopulmonary findings. Small esophageal hiatal hernia. Slight bilateral apical pleural thickening. Mild thoracolumbar spinal curvature.          Patient's response to treatments and/or interventions: Tolerating interventions well    To be done/followed up on inpatient unit:  Continue plan of care    Does this patient have any cognitive concerns?:  none noted    Activity level - Baseline/Home:  Independent  Activity Level - Current:   Stand with Assist    Patient's Preferred language: English   Needed?: No    Isolation: None  Infection: Not Applicable  Patient tested for COVID 19 prior to admission: NO  Bariatric?: No    Vital Signs:   Vitals:    08/31/23 1914 08/31/23 1930 08/31/23 2000 08/31/23 2030   BP: 122/78 112/72 113/75    Pulse: 60 60 57 60   Resp: 11 12 17 16   Temp:       TempSrc:       SpO2: 95% 95% 96% 95%   Weight:       Height:           Cardiac Rhythm:Cardiac Rhythm: Sinus bradycardia    Was the PSS-3 completed:   Yes  What interventions are required if any?               Family Comments:  at bedside  OBS brochure/video discussed/provided to patient/family: No              Name of person given brochure if not patient: n/a              Relationship to patient: n/a    For the majority of the shift this patient's behavior was Green.   Behavioral interventions performed were Rounding.    ED NURSE  PHONE NUMBER: [phone]

## 2023-09-01 NOTE — PLAN OF CARE
Goal Outcome Evaluation:      Plan of Care Reviewed With: patient    Overall Patient Progress: improvingOverall Patient Progress: improving       VSS, tele SR, denies chest pain.  C/o headache, MD aware, IV Dilaudid given, cannot have her imitrex per Dr. Martin.  Angio with no intervention via right radial approach, CMS intact, no bruit/hematoma noted.  Up with SBA, plan is to monitor overnight and possible discharge tomorrow.

## 2023-09-01 NOTE — PROGRESS NOTES
[facility]    Hospitalist Progress Note    Assessment & Plan     Date of Admission:  8/31/2023        Assessment & Plan  Laurel Parra is a 73 year old female who presents with chest pain. Patient reports an onset of constant pain and pressure in her chest about one hour ago when she was taking off her t-shirt, though she notes her pain is better after nitroglycerin.  Her  drove her to [facility] where she was noted to have acute troponin elevation, chest pain that's responsive to NTG and has been started on iv heparin and admitted to heart center for further investigation     ## Chest pain  ## ACS  Patient presented within one hour of her chest pain that was associated with nausea and sweats.  She ruled in with her troponin, EKG showed NSR with NS ST changes initially but more pronounce diffuse TWI.  Her pain improved with NTG.  She  has a family hx of her father with CAD in his 70's  -cp substernal, radiate down both arms with diaphoresis. Resolved. Nl cardiac exam  - EKG am 9/1 with chronic TwI but lateral leads now back to nl. Had flat T waves on admission.    -troponin 7---> 68--> 176--> 177.         -- admit to inpatient  -- IV heparin, NTG patch  -- serial troponin, lipid profile  -- start lipitor  -- NPO after midnight  -- TTE  -- cardiology consultation  -- noted elevation of wbc,  ordered CRP/ESR on admission    Addendum; angiogram showed mild cad. Nl echo  Possible MINOCA, follow overnight. See cardiology note. Asa, plavix every day.     Reviewed PTA meds with pharmD. Pt is on ativan 2 tablets TID and norco 1-2 tabs every 4-6 hours prn max of 10 tabs per day per script. Med rec on admission is incorrect and pharmD today will place note in chart. Change norco to PtA dosing. Stop iv dilaudid.   Cont with current ativan dosing for now. Pt has appeared comfortable this afternoon. Had iv dilaudid earlier.        ## Chronic pain syndrome  ## Chronic narcotic use  Hx  migrain  -- she is followed at a pain clinic  -- resume her home pain medications (baclonfen, norco, ativan  -migrain this am. Imitrex contraindicated in acs.    UOB, fluids, restarted PTA meds. Hold on ibuprofen. Warm compress.      ## Anorexia  -- she is followed at [facility]  -- she noted weight gain of 40 lbs that to her has been unexplained  -- saw her primary, no evidene of mass, tumor, lymphadenopathy or fluid retention and normal thyroid.  She is waiting to be seen by endocrinology     ## Depression / Anxiety  -- continue paxil  -- denies any suicidal thoughts  -- continue propranolol     ## Microscopic colitis  -- continue budesonide     ## GERD  -- continue PPI     Diet:  NPO  DVT Prophylaxis: Heparin IV  Bates Catheter: Not present  Lines: None     Cardiac Monitoring: None  Code Status: Full     DVT Prophylaxis: on heparin gtt  Code Status: Full Code    Disposition: Expected discharge in 1-2 days per cardiology      Discussed care plan with pt, rn    Tom Mathis MD, MD  Text Page  (7am to 6pm)  Interval History   Has migraine, typical migraine forehead. Dehydration laying in bed can ppt. Imitrex helps  7/10 HA  Cp resolved. No new complaints. No neuro complaints.     -Data reviewed today: I reviewed all new labs and imaging results over the last 24 hours. I personally reviewed imaging and EKG and labs since admission    Physical Exam   Temp: 97.8  F (36.6  C) Temp src: Oral BP: 108/70 Pulse: 57   Resp: 19 SpO2: 96 % O2 Device: None (Room air)    Vitals:    08/31/23 1831 08/31/23 2223   Weight: 65.8 kg (145 lb) 67 kg (147 lb 12.8 oz)     Vital Signs with Ranges  Temp:  [96.8  F (36  C)-97.8  F (36.6  C)] 97.8  F (36.6  C)  Pulse:  [52-66] 57  Resp:  [10-20] 19  BP: (108-171)/() 108/70  SpO2:  [94 %-98 %] 96 %  I/O last 3 completed shifts:  In: 50 [P.O.:50]  Out: 800 [Urine:800]    Constitutional: Laying in bed, nad  Respiratory: CTAB  Cardiovascular: RRR no r/g/m  GI: soft, nt,  nd  Skin/Integumen: no rash or edema  Neuro: nl speech and orientation.   Nl strength BUE. CN 2-12 grossly intact  Psych; nl affect      Medications    heparin 500 Units/hr (09/01/23 0537)    - MEDICATION INSTRUCTIONS -      - MEDICATION INSTRUCTIONS -      sodium chloride Stopped (08/31/23 2016)      sodium chloride 0.9%  500 mL Intravenous Once    atorvastatin  20 mg Oral QPM    budesonide  6 mg Oral QAM    HYDROcodone-acetaminophen  2 tablet Oral Q4H    ibuprofen  600 mg Oral Once    ipratropium  3 spray Both Nostrils Daily    LORazepam  1 mg Oral 4x Daily    nitroGLYcerin  1 patch Transdermal Daily    pantoprazole  40 mg Oral BID AC    PARoxetine  20 mg Oral BID    polyethylene glycol  17 g Oral Daily    progesterone  100 mg Oral At Bedtime    propranolol ER  60 mg Oral Daily    sodium chloride (PF)  3 mL Intracatheter Q8H       Data   Recent Labs   Lab 08/31/23  2241 08/31/23  1838   WBC 12.1* 10.1   HGB 12.5 13.1   MCV 96 95    386   NA  --  142   POTASSIUM  --  3.6   CHLORIDE  --  103   CO2  --  26   BUN  --  12.7   CR  --  0.72   ANIONGAP  --  13   THOR  --  9.2   GLC  --  132*   ALBUMIN  --  4.5   PROTTOTAL  --  6.9   BILITOTAL  --  0.4   ALKPHOS  --  97   ALT  --  15   AST  --  20   LIPASE  --  9*       Imaging:   Recent Results (from the past 24 hour(s))   Chest XR,  PA & LAT    Narrative    EXAM: XR CHEST 2 VIEWS  LOCATION: [facility]  DATE: 8/31/2023    INDICATION: chest pain  COMPARISON: 08/16/2023      Impression    IMPRESSION: Stable chest with no acute cardiopulmonary findings. Small esophageal hiatal hernia. Slight bilateral apical pleural thickening. Mild thoracolumbar spinal curvature.

## 2023-09-01 NOTE — CONSULTS
[facility]    Cardiology Consultation     Date of Admission:  8/31/2023    Assessment & Plan   Laurel Parra is a 73 year old female who was admitted on 8/31/2023.      Non-ST elevation myocardial infarction.  Patient is asymptomatic.  Discussed at length with patient option of medical management versus medical management plus coronary angiogram with risk benefits of coronary angiogram with risks including but not limited risk of stroke, MI, death, need for PRBC transfusion, emergent bypass surgery.  Patient understands the rational of coronary angiogram, the risk and now and the alternative of medical therapy and wishes to proceed with it.  Recommend continuing aspirin, heparin, statin, recommend increasing Lipitor to 40 mg daily.  Given bradycardia and blood pressure on the low side not an optimal candidate for medication like beta-blocker or ACE inhibitor at this time.  Agree with echocardiogram which is pending.  Keep n.p.o.  Chronic pain syndrome being followed by pain clinic  3.   .      Recommendations  Continue aspirin, high.  Increase Lipitor to 40 mg daily  Agree with echocardiogram which is pending  Coronary angiogram with possible revascularization  Continue n.p.o.    Thank you for involving the care of Ms. Parra    Izaiah Martin MD, MD    Primary Care Physician   Georges Lebron    Reason for Consult   Reason for consult: I was asked by Dr Harrison to evaluate this patient for non-STEMI.    History of Present Illness   Laurel Parra is a 73 year old female who presents with chest discomfort.  This was moderate to severe intensity pressure-like along with the sweating.  EKG shows some subtle T wave inversion in lateral precordial leads, high sensitive troponin is elevated to 177.  She is chest pain-free.  She has chronic pain syndrome.  She does not use any tobacco.  No bleeding issues.  No anticipated surgery.  Chest x-ray without any mediastinal  widening.  Lipid panel shows LDL of 111.      Past Medical History   Past Medical History:   Diagnosis Date    Anxiety     Dr. Adelina Meehan    Chronic constipation     Chronic narcotic use     [facility]    Chronic pain     Dr. Orta as of 2015    Chronic urethritis     Dr. Little    Colonic polyp 11/2011    one polyp, fu 5 years, fu 10/17 and no lesions, fu 9/20 and negative, fu 5 years    Depression, major, in partial remission (H)     Dr. Meehan    Diarrhea 2012    eval by [facility], resolved with gluten free diet    DJD (degenerative joint disease)     Dyspepsia 2020    eval by [facility], had colonoscopy, egd, ct abd and pelvis, mrcp.  Freedom to be dyspepsia and constipation    H/O gastroesophageal reflux (GERD)     LBP (low back pain) 2013    Dr. Jae Tong in [place], then seeing [facility] Dr. Orta    Light headed 07/2020    nl est echo    Lymphocytic colitis 2021    [facility]    Migraines 2009    neuro eval    NSTEMI (non-ST elevated myocardial infarction) (H) 8/31/2023    Palpitations 2006    holter with pvc's and pac's, echo nl    Screening 2010    dexa nl at gyn         Past Surgical History   Past Surgical History:   Procedure Laterality Date    APPENDECTOMY  2016    ARTHRODESIS WRIST Left 04/05/2021    Procedure: LEFT TOTAL WRIST FUSION;  Surgeon: Beatrice Philippe MD;  Location:  OR    ARTHROPLASTY CARPOMETACARPAL (THUMB JOINT) Left 06/07/2017    Procedure: ARTHROPLASTY CARPOMETACARPAL (THUMB JOINT);  REVISION  LEFT THUMB CARPOMETACARPAL ARTHROPOASTY WITH 1.1 TIGHT ROPE;  Surgeon: Beatrice Philippe MD;  Location:  SD    ARTHROSCOPY WRIST Left 08/26/2019    Procedure: LEFT WRIST ARTHROSCOPY REMOVAL OF TIGHT ROPE ; LEFT EXTENSOR POLLICIS LONGUS /EXTENSOR CARPI RADIALIS BREVIS /EXTENSOR CARPI RADIALUS LONGUS SYNOVECTOMY ; EXTENSOR POLLICIS LONGUS TRANSPOSITION ; ANTERIOR INTEROSSEOUS NEURECTOMY ; POSTERIOR INTEROSSEOUS NEURECTOMY;  Surgeon: Beatrice Philippe MD;  Location:  OR     BIOPSY      CARPAL TUNNEL RELEASE RT/LT      CATARACT EXTRACTION  2021     SECTION      COLONOSCOPY      ESOPHAGOSCOPY, GASTROSCOPY, DUODENOSCOPY (EGD), COMBINED N/A 2020    Procedure: ESOPHAGOGASTRODUODENOSCOPY, WITH BIOPSY;  Surgeon: Georges Pop MD;  Location:  GI    EXCIS BARTHOLIN GLAND/CYST  2005    HERNIORRHAPHY INGUINAL  70's    x5    INJECT STEROID (LOCATION) Right 2019    Procedure: RIGHT WRIST CORTISONE INJECTION;  Surgeon: Beatrice Philippe MD;  Location:  OR    OPERATIVE HYSTEROSCOPY WITH MORCELLATOR N/A 2018    Procedure: OPERATIVE HYSTEROSCOPY WITH MORCELLATOR (MARTIN [employer] NEPHEW);  OPERATIVE HYSTEROSCOPY WITH TRUECLEAR MORCELLATOR 5C SCOPE ;  Surgeon: Iris Fernandez MD;  Location: [facility]    ORTHOPEDIC SURGERY Bilateral     SHOULDER ARTHROSCOPY    ORTHOPEDIC SURGERY Right 2017    right foot for plantar fasciitis    thumb surgery   and ,          Prior to Admission Medications   Prior to Admission Medications   Prescriptions Last Dose Informant Patient Reported? Taking?   Baclofen (LIORESAL) 5 MG tablet  at PRN Self Yes Yes   Sig: Take 5 mg by mouth as needed for muscle spasms   Hydrocodone-Acetaminophen  MG TABS 2023 at PM Self Yes Yes   Sig: Take 2 tablets by mouth every 4 hours   LORazepam (ATIVAN) 1 MG tablet 2023 at PM Self Yes Yes   Sig: Take 1 mg by mouth 4 times daily   PARoxetine (PAXIL-CR) 25 MG 24 hr tablet 2023 at PM Self Yes Yes   Sig: Take 25 mg by mouth 2 times daily   SUMAtriptan (IMITREX) 50 MG tablet  at prn Self Yes Yes   Sig: Take 50 mg by mouth at onset of headache for migraine   Vitamin D3 (CHOLECALCIFEROL) 125 MCG (5000 UT) tablet 2023 at PM Self Yes Yes   Sig: Take 2 tablets by mouth every evening   budesonide (ENTOCORT EC) 3 MG EC capsule 2023 at AM Self Yes Yes   Sig: Take 6 mg by mouth every morning   carboxymethylcellulose (REFRESH PLUS) 0.5 % SOLN ophthalmic solution  at prn Self Yes Yes    Sig: Place 1 drop into both eyes 3 times daily as needed for dry eyes   cyclobenzaprine (FLEXERIL) 10 MG tablet  at prn Self Yes Yes   Sig: Take 10 mg by mouth daily as needed for muscle spasms    estradiol (VIVELLE-DOT) 0.025 MG/24HR bi-weekly patch Past Week Self Yes Yes   Sig: Place 1 patch onto the skin twice a week   ipratropium (ATROVENT) 0.06 % spray 8/31/2023 at AM Self No Yes   Sig: Spray 3 sprays into both nostrils daily   loratadine (CLARITIN) 10 MG tablet 8/31/2023 at AM Self Yes Yes   Sig: Take 10 mg by mouth every morning    methenamine hippurate (HIPREX) 1 g tablet 8/31/2023 at PM Self No Yes   Sig: TAKE 1 TABLET(1 GRAM) BY MOUTH TWICE DAILY   naloxone (NARCAN) 4 MG/0.1ML nasal spray  at prn Self No Yes   Sig: Spray 1 spray (4 mg) into one nostril alternating nostrils as needed for opioid reversal Every 2-3 minutes in alternating nostrils   omeprazole (PRILOSEC) 40 MG DR capsule 8/31/2023 at AM Self Yes Yes   Sig: Take 40 mg by mouth 2 times daily (before meals)   ondansetron (ZOFRAN) 4 MG tablet  at prn Self Yes Yes   Sig: Take 4 mg by mouth every 8 hours as needed for nausea or vomiting   polyethylene glycol (MIRALAX) 17 GM/Dose powder  at prn Self Yes Yes   Sig: Take 1 capful. by mouth as needed for constipation   progesterone (PROMETRIUM) 100 MG capsule 8/30/2023 at HS Self Yes Yes   Sig: Take 100 mg by mouth At Bedtime   propranolol ER (INDERAL LA) 60 MG 24 hr capsule 8/30/2023 at HS Self No Yes   Sig: Take 1 capsule (60 mg) by mouth daily   Patient taking differently: Take 60 mg by mouth At Bedtime   riboflavin 400 MG CAPS 8/31/2023 at AM Self Yes Yes   Sig: Take 400 mg by mouth daily   vitamin C (ASCORBIC ACID) 1000 MG TABS 8/30/2023 at PM Self Yes Yes   Sig: Take 1,000 mg by mouth every evening    zolpidem ER (AMBIEN CR) 12.5 MG CR tablet 8/30/2023 at HS Self Yes Yes   Sig: Take 12.5 mg by mouth At Bedtime       Facility-Administered Medications: None     Current Facility-Administered  "Medications   Medication Dose Route Frequency    sodium chloride 0.9%  500 mL Intravenous Once    atorvastatin  20 mg Oral QPM    budesonide  6 mg Oral QAM    HYDROcodone-acetaminophen  2 tablet Oral Q4H    ipratropium  3 spray Both Nostrils Daily    LORazepam  1 mg Oral 4x Daily    nitroGLYcerin  1 patch Transdermal Daily    pantoprazole  40 mg Oral BID AC    PARoxetine  20 mg Oral BID    polyethylene glycol  17 g Oral Daily    progesterone  100 mg Oral At Bedtime    propranolol ER  60 mg Oral Daily    sodium chloride (PF)  3 mL Intracatheter Q8H     Current Facility-Administered Medications   Medication Last Rate    heparin 500 Units/hr (09/01/23 0537)    - MEDICATION INSTRUCTIONS -      - MEDICATION INSTRUCTIONS -      sodium chloride Stopped (08/31/23 2016)     Allergies   Allergies   Allergen Reactions    Betamethasone Other (See Comments)     agitation    Chocolate Other (See Comments)     Triggers migraines    Compazine [Prochlorperazine] Other (See Comments)     \"crawl out of my skin\"    Linzess [Linaclotide]     Penicillins Nausea and Vomiting and Hives    Pneumococcal Vaccine Other (See Comments)     Temporary paralization    Tizanidine Other (See Comments)     Severe agitation       Social History    reports that she quit smoking about 43 years ago. She has never used smokeless tobacco. She reports that she does not drink alcohol and does not use drugs.      Family History   I have reviewed this patient's family history and updated it with pertinent information if needed.  Family History   Problem Relation Age of Onset    Diabetes Father     Hypertension Father     Depression Father     Substance Abuse Father     Anesthesia Reaction Father         went into shock with surgery     Obesity Father     Anxiety Disorder Father     Diabetes Mother     Cerebrovascular Disease Mother         Cadasils disease    Depression Mother     Mental Illness Mother         borderline personality disorder, Chemical " "dependency    Substance Abuse Mother     Genetic Disorder Mother         Cadasils    Obesity Mother     Anxiety Disorder Mother     Cerebrovascular Disease Paternal Grandfather     Cerebrovascular Disease Brother         Cadasils disease    Depression Brother     Anxiety Disorder Brother     Substance Abuse Brother     Genetic Disorder Brother         cadasils    Obesity Brother     Hypertension Brother     Cerebrovascular Disease Sister         Cadasils disease    Depression Sister     Anxiety Disorder Sister     Genetic Disorder Sister         cadasils    Obesity Sister     Cerebrovascular Disease Sister         Cadasils disease    Genetic Disorder Sister         cadasils    Breast Cancer Sister     Depression Sister     Anxiety Disorder Sister     Obesity Sister     Hypertension Sister           Review of Systems   A comprehensive review of system was performed and is negative other than that noted in the HPI or here.     Physical Exam   Vital Signs with Ranges  Temp:  [96.8  F (36  C)-97.8  F (36.6  C)] 97.8  F (36.6  C)  Pulse:  [52-66] 57  Resp:  [10-20] 18  BP: (108-171)/() 108/70  SpO2:  [94 %-98 %] 96 %  Wt Readings from Last 4 Encounters:   08/31/23 67 kg (147 lb 12.8 oz)   08/17/23 66.2 kg (146 lb)   06/13/23 56.7 kg (125 lb)   05/25/22 56.7 kg (125 lb)     I/O last 3 completed shifts:  In: 50 [P.O.:50]  Out: 800 [Urine:800]      Vitals: /70 (BP Location: Left arm)   Pulse 57   Temp 97.8  F (36.6  C) (Oral)   Resp 18   Ht 1.676 m (5' 6\")   Wt 67 kg (147 lb 12.8 oz)   SpO2 96%   BMI 23.86 kg/m      Physical Exam:   General - Alert and oriented to time place and person in no acute distress  Eyes - No scleral icterus  HEENT - Neck supple, moist mucous membranes  Cardiovascular -S1-S2 normal no murmur rub or gallop  Extremities - There is no pitting pedal edema  Respiratory -clear to auscultation  Skin - No pallor or cyanosis  Gastrointestinal - Non tender and non distended without rebound " or guarding  Psych - Appropriate affect   Neurological - No gross motor neurological focal deficits    No lab results found in last 7 days.    Invalid input(s): TROPONINIES    Recent Labs   Lab 08/31/23 2241 08/31/23 1838   WBC 12.1* 10.1   HGB 12.5 13.1   MCV 96 95    386   NA  --  142   POTASSIUM  --  3.6   CHLORIDE  --  103   CO2  --  26   BUN  --  12.7   CR  --  0.72   GFRESTIMATED  --  89   ANIONGAP  --  13   THOR  --  9.2   GLC  --  132*   ALBUMIN  --  4.5   PROTTOTAL  --  6.9   BILITOTAL  --  0.4   ALKPHOS  --  97   ALT  --  15   AST  --  20   LIPASE  --  9*     Recent Labs   Lab Test 08/31/23 2037 11/19/19  0941 12/15/16  0827 10/23/15  0834   CHOL 197 244*   < > 249*   HDL 54 66   < > 73   * 114*   < > 142*   TRIG 162* 266*   < > 170*   CHOLHDLRATIO  --   --   --  3.4    < > = values in this interval not displayed.     Recent Labs   Lab 08/31/23 2241 08/31/23 1838   WBC 12.1* 10.1   HGB 12.5 13.1   HCT 38.8 41.3   MCV 96 95    386     No results for input(s): PH, PHV, PO2, PO2V, SAT, PCO2, PCO2V, HCO3, HCO3V in the last 168 hours.  Recent Labs   Lab 08/31/23 2037 08/31/23  1838   NTBNPI 226 239     No results for input(s): DD in the last 168 hours.  No results for input(s): SED, CRP in the last 168 hours.  Recent Labs   Lab 08/31/23 2241 08/31/23  1838    386     No results for input(s): TSH in the last 168 hours.  No results for input(s): COLOR, APPEARANCE, URINEGLC, URINEBILI, URINEKETONE, SG, UBLD, URINEPH, PROTEIN, UROBILINOGEN, NITRITE, LEUKEST, RBCU, WBCU in the last 168 hours.    Imaging:  Recent Results (from the past 48 hour(s))   Chest XR,  PA & LAT    Narrative    EXAM: XR CHEST 2 VIEWS  LOCATION: [facility]  DATE: 8/31/2023    INDICATION: chest pain  COMPARISON: 08/16/2023      Impression    IMPRESSION: Stable chest with no acute cardiopulmonary findings. Small esophageal hiatal hernia. Slight bilateral apical pleural thickening. Mild  thoracolumbar spinal curvature.       Echo:  No results found for this or any previous visit (from the past 4320 hour(s)).    Clinically Significant Risk Factors Present on Admission

## 2023-09-02 VITALS
SYSTOLIC BLOOD PRESSURE: 114 MMHG | HEIGHT: 66 IN | TEMPERATURE: 98.2 F | WEIGHT: 146.4 LBS | HEART RATE: 62 BPM | OXYGEN SATURATION: 92 % | RESPIRATION RATE: 16 BRPM | BODY MASS INDEX: 23.53 KG/M2 | DIASTOLIC BLOOD PRESSURE: 82 MMHG

## 2023-09-02 PROCEDURE — 250N000013 HC RX MED GY IP 250 OP 250 PS 637: Performed by: INTERNAL MEDICINE

## 2023-09-02 PROCEDURE — 99238 HOSP IP/OBS DSCHRG MGMT 30/<: CPT | Performed by: INTERNAL MEDICINE

## 2023-09-02 PROCEDURE — 99231 SBSQ HOSP IP/OBS SF/LOW 25: CPT | Performed by: INTERNAL MEDICINE

## 2023-09-02 RX ORDER — ATORVASTATIN CALCIUM 40 MG/1
40 TABLET, FILM COATED ORAL EVERY EVENING
Qty: 30 TABLET | Refills: 0 | Status: SHIPPED | OUTPATIENT
Start: 2023-09-02 | End: 2023-10-03

## 2023-09-02 RX ORDER — ZOLPIDEM TARTRATE 5 MG/1
5 TABLET ORAL
Status: DISCONTINUED | OUTPATIENT
Start: 2023-09-02 | End: 2023-09-02 | Stop reason: HOSPADM

## 2023-09-02 RX ORDER — HYDROCODONE BITARTRATE AND ACETAMINOPHEN 10; 300 MG/1; MG/1
1-2 TABLET ORAL EVERY 4 HOURS PRN
Refills: 0
Start: 2023-09-02

## 2023-09-02 RX ORDER — NITROGLYCERIN 0.4 MG/1
TABLET SUBLINGUAL
Qty: 50 TABLET | Refills: 0 | Status: SHIPPED | OUTPATIENT
Start: 2023-09-02

## 2023-09-02 RX ORDER — ASPIRIN 81 MG/1
81 TABLET ORAL DAILY
Status: DISCONTINUED | OUTPATIENT
Start: 2023-09-02 | End: 2023-09-02 | Stop reason: HOSPADM

## 2023-09-02 RX ORDER — ASPIRIN 81 MG/1
81 TABLET ORAL DAILY
Qty: 90 TABLET | Refills: 0 | Status: SHIPPED | OUTPATIENT
Start: 2023-09-02

## 2023-09-02 RX ORDER — CLOPIDOGREL BISULFATE 75 MG/1
75 TABLET ORAL DAILY
Qty: 30 TABLET | Refills: 0 | Status: SHIPPED | OUTPATIENT
Start: 2023-09-03 | End: 2023-10-03

## 2023-09-02 RX ADMIN — BUDESONIDE 6 MG: 3 CAPSULE ORAL at 09:21

## 2023-09-02 RX ADMIN — ASPIRIN 81 MG: 81 TABLET, COATED ORAL at 12:46

## 2023-09-02 RX ADMIN — PAROXETINE HYDROCHLORIDE 20 MG: 20 TABLET, FILM COATED ORAL at 09:21

## 2023-09-02 RX ADMIN — LORAZEPAM 1 MG: 1 TABLET ORAL at 09:21

## 2023-09-02 RX ADMIN — PANTOPRAZOLE SODIUM 40 MG: 40 TABLET, DELAYED RELEASE ORAL at 09:21

## 2023-09-02 RX ADMIN — HYDROCODONE BITARTRATE AND ACETAMINOPHEN 2 TABLET: 10; 325 TABLET ORAL at 00:14

## 2023-09-02 RX ADMIN — HYDROCODONE BITARTRATE AND ACETAMINOPHEN 2 TABLET: 10; 325 TABLET ORAL at 10:38

## 2023-09-02 RX ADMIN — LORAZEPAM 1 MG: 1 TABLET ORAL at 12:46

## 2023-09-02 RX ADMIN — CLOPIDOGREL BISULFATE 75 MG: 75 TABLET ORAL at 09:21

## 2023-09-02 RX ADMIN — PROPRANOLOL HYDROCHLORIDE 60 MG: 60 CAPSULE, EXTENDED RELEASE ORAL at 09:20

## 2023-09-02 RX ADMIN — HYDROCODONE BITARTRATE AND ACETAMINOPHEN 2 TABLET: 10; 325 TABLET ORAL at 05:57

## 2023-09-02 RX ADMIN — IPRATROPIUM BROMIDE 3 SPRAY: 42 SPRAY, METERED NASAL at 09:28

## 2023-09-02 RX ADMIN — ZOLPIDEM TARTRATE 5 MG: 5 TABLET ORAL at 01:13

## 2023-09-02 ASSESSMENT — ACTIVITIES OF DAILY LIVING (ADL)
ADLS_ACUITY_SCORE: 22

## 2023-09-02 NOTE — PLAN OF CARE
Pt here with NSTEMI. A&Ox4. Neuros and CMS intact. VSS on RA. Tele SB. Cardiac diet. Up with SBA. Ambien given for sleep. Norco given x2 for pain. Denies pain. Denies chest pain, NTG patch refused, states she gets a bad headache with the patch. Pt scoring green on the Aggression Stop Light Tool. Plan and discharge pending.

## 2023-09-02 NOTE — PROVIDER NOTIFICATION
MD Notification    Notified Person: MD    Notified Person Name:           MARIA ESTHER MARLOW MD     Notification Date/Time: 9/2 0051    Notification Interaction: AMCOM    Purpose of Notification: 250-CL   Pt. asking about Ambien to be added to med list. She states she cannot fall asleep without it given her chronic pain.   Thanks, Tabby RN *[phone]    Orders Received: Ambien 5 mg    Comments:

## 2023-09-02 NOTE — PLAN OF CARE
Laurel is discharging to home today with her spouse. VSS on room air. She denies chest pain, nausea, dyspnea. Tolerated mild activity. Right wrist site is soft, non-tender. Reviewed discharge instructions with patient. Follow up appointments scheduled.

## 2023-09-02 NOTE — DISCHARGE SUMMARY
[facility]    Discharge Summary  Hospitalist    Date of Admission:  8/31/2023  Date of Discharge:  9/2/2023  Discharging Provider: Tom Mathis MD, MD    Discharge Diagnoses   AMI with nonobstructive CAD (MINOCA). Nl Echo. Trivial CAD on coronary angiogram.     History of Present Illness   Laurel Parra is a 73 year old female who presents with chest pain. Patient reports an onset of constant pain and pressure in her chest about one hour ago when she was taking off her t-shirt, though she notes her pain is better after nitroglycerin. She has never had this type of pain in the past. She also reports pain down both arms, mostly the right arm. Patient endorses shortness of breath, as well as nausea, which was better after Zofran. She adds she normally has tingling in her arms, which is not different today. She has noticed some swelling in her left foot and ankle, but does not have pain. Denies vomiting, vision changes, numbness or tingling in the legs, or abdominal pain. Also denies history of heart or lung issues, or use of blood thinners. Patient notes a history of GERD, for which she is on medication for, but states it does not feel similar. Also notes a history of osteoarthritis.      Chest pain 1 hr prior to admission,  pain better after NTG,   2nd trop 68, EKG shows no ischemic changes.     Hospital Course   Laurel Parra was admitted on 8/31/2023.  The following problems were addressed during her hospitalization:    Principal Problem:    NSTEMI (non-ST elevated myocardial infarction) (H)    Date of Admission:  8/31/2023        Assessment & Plan  Laurel Parra is a 73 year old female who presents with chest pain. Patient reports an onset of constant pain and pressure in her chest about one hour ago when she was taking off her t-shirt, though she notes her pain is better after nitroglycerin.  Her  drove her to [facility] where she was noted to  have acute troponin elevation, chest pain that's responsive to NTG and has been started on iv heparin and admitted to heart center for further investigation     ## Chest pain  ## ACS  Patient presented within one hour of her chest pain that was associated with nausea and sweats.  She ruled in with her troponin, EKG showed NSR with NS ST changes initially but more pronounce diffuse TWI.  Her pain improved with NTG.  She  has a family hx of her father with CAD in his 70's  -cp substernal, radiate down both arms with diaphoresis. Resolved. Nl cardiac exam  - EKG am 9/1 with chronic TwI but lateral leads now back to nl. Had flat T waves on admission.    -troponin 7---> 68--> 176--> 177.   -seen by cardiology  - Lipitor, started  - s/p PCI 9/1: showed trivial non- obstructive CAD  -heparin and ntg patch stopped.   - initiated on asa lifelong and plavix for 30 days  - pt did well following pci. No cp or sob  - dx: MI with nonobstructive coronary arteries (MINOCA), trial cad on angiogram. Nl Echo.   -   -pt doing well and ready for discharge home. No cardiopulmonar sxs.   - R radial angiogram site looks fine  -nl crp  Plan at discharge-  Lifelong ASA  Plavix X 30 days  Cardiac MRI outpatient per cardiology  Follow up scheduled on 9/21/23 with Mayelin GUEVARA   Pcp follow up 1 week  - lipid panel 6 weeks with pcp     ## Chronic pain syndrome  ## Chronic narcotic use  Hx migrain  -- she is followed at a pain clinic  -- resume her home pain medications (baclonfen, norco, ativan  -migrain am of 9/1. Ultimately resolved. Nonfocal neuro exam  - Imitrex contraindicated with acute cardiac condition  UOB, fluids, restarted PTA meds. Warm compress.   -pt to follow up with pain clinic, pcp  - pt to clarify dosing of her narco which is supposed to be 1-2 tabs q4-6hours prn max of 10 tabs per day, and to clarify her ativan dosing     ## Anorexia  -- she is followed at [facility]  -- she noted weight gain of 40 lbs that to her  has been unexplained  -- saw her primary, no evidene of mass, tumor, lymphadenopathy or fluid retention and normal thyroid.  She is waiting to be seen by endocrinology  -pcp follow up     ## Depression / Anxiety  -- continue paxil  -- denies any suicidal thoughts  -- continue propranolol     ## Microscopic colitis  -- continue budesonide     ## GERD  -- continue PPI    Hx Migraine HA  - pt informed that imitrex contraindiated at this time given her acute cardiac issue as above. Stopped imitrex at discharge. Pt to follow up with pcp and primary neurologist regarding this issue     Diet: regular  DVT Prophylaxis: pt was on Heparin IV  Bates Catheter: Not present  Lines: None     Cardiac Monitoring: None  Code Status: Full     DVT Prophylaxis: on heparin gtt during stay  Code Status: Full Code     Disposition: discharge home.   No therapy needs    Tom Mathis MD, MD    Significant Results and Procedures   See hospital course    Pending Results   none  Unresulted Labs Ordered in the Past 30 Days of this Admission       No orders found from 8/1/2023 to 9/1/2023.            Code Status   Full Code       Primary Care Physician   Georges Lebron    Physical Exam   Temp: 98.2  F (36.8  C) Temp src: Oral BP: 114/82 Pulse: 62   Resp: 16 SpO2: 92 % O2 Device: None (Room air)    Vitals:    08/31/23 1831 08/31/23 2223 09/02/23 0601   Weight: 65.8 kg (145 lb) 67 kg (147 lb 12.8 oz) 66.4 kg (146 lb 6.4 oz)     Vital Signs with Ranges  Temp:  [98  F (36.7  C)-98.3  F (36.8  C)] 98.2  F (36.8  C)  Pulse:  [50-68] 62  Resp:  [16-18] 16  BP: (100-139)/() 114/82  SpO2:  [92 %-96 %] 92 %  I/O last 3 completed shifts:  In: 885 [P.O.:360; I.V.:525]  Out: -     Constitutional: in bed, nad  Respiratory: CTAB  Cardiovascular: RRR no r/g/m  Right radial artery pci site looks fine  GI: soft, nt, nd  Skin: no rash or edema  Musculoskeletal: no focal jt swelling or redness  Neurologic: nl speech and mentation, nl right    Neuropsychiatric: nl affect    Discharge Disposition   Discharged to home  Condition at discharge: Good    Consultations This Hospital Stay   PHARMACY IP CONSULT  CARDIOLOGY IP CONSULT  PHARMACY IP CONSULT  PHARMACY IP CONSULT  PHARMACY IP CONSULT  SMOKING CESSATION PROGRAM IP CONSULT  SMOKING CESSATION PROGRAM IP CONSULT    Time Spent on this Encounter   Tom CRUZ MD, personally saw the patient today and spent less than or equal to 30 minutes discharging this patient.    Discharge Orders      Follow-Up with Cardiology PADMINI      Medication Instructions - Anticoagulants    Do NOT stop your aspirin or platelet inhibitor unless directed by your Cardiologist.  These medications help to prevent platelets in your blood from sticking together and forming a clot.  Examples of these medications are:  Ticagrelor (Brilinta), Clopidigrel (Plavix), Prasugrel (Effient)     When to call - Contact the Heart Clinic    You may experience symptoms that require follow-up before your scheduled appointment. Contact the Heart Clinic if you develop: Fever over 100.4o Fahrenheit, that lasts more than one day; Redness, heat, or pus at the puncture site; Change in color or temperature in your hand or arm.     When to call - Reasons to Call 911    If your wrist puncture site starts bleeding after discharge, sit down and apply firm pressure with your thumb against the puncture site and fingers against the back of the wrist for 10 minutes. If the bleeding stops, continue to rest, keeping your wrist still for 2 hours. Notify your doctor as soon as possible.  IF BLEEDING DOES NOT STOP OR THERE IS A LARGER AMOUNT OF BLEEDING OR SPURTING CALL 9-1-1 immediately.DO NOT drive yourself to the hospital.     Precautions - Lifting    DO NOT lift more than 5 pounds with affected arm for 48 hours     Precautions - Household Activities    Avoid excessive bending or movement of your wrist for 72 hours.  Do not subject hand/arm to any forceful  movements for 24 hours, such as supporting weight when rising from a chair or bed.     Remove the band-aid on the puncture site after 24 hours and leave open to air. If minor oozing, you may apply a band-aid and remove after 12 hours.     Precautions - Active Sports Activities    DO NOT engage in vigorous exercise using your affected arm for 3 days after discharge.  This includes golf, tennis or swimming.     Precautions - Operating yard equipment or vehicles    Do not operate a chainsaw, lawnmower, motorcycle, or all-terrain vehicle for 48 hours after the procedure.     Precautions - Elective Dental Work    NO elective dental work for 6 weeks after receiving a stent.     Comfort and Pain Management - Bruising after Surgery    Expect mild tingling of hand and tenderness at the wrist puncture site for up to 3 days. You may take Tylenol or a pain medicine recommended by your doctor.     Activity - Cardiac Rehab    You are encouraged to enroll in an Outpatient Cardiac Rehab program after discharge from the hospital.  Our Cardiac Rehab staff may visit briefly with you while you're in the hospital.  If they miss you, someone will contact you after you are home.     Return to Driving    Driving is NOT permitted for 24 hours after surgery     Return to work    You may return to work after 72 hours if you are feeling well and your job does not involve heavy lifting.     Shower / Bathing    You may shower on the day after your procedure.  DO NOT soak of wrist with the puncture site in water for 3 days to prevent infection. DO NOT take a tub bath or wash dishes for 3 days after the procedure     Dressing Removal    Remove the band-aid on the puncture site after 24 hours and leave open to air. If minor oozing, you may apply a band-aid and remove after 12 hours     Patient care order    -Clarify the dosing of ativan and norco with primary care doctor  - for now given your heart condition, avoid imitrex for migraine headaches      Reason for your hospital stay    1. Acute heart attack with mild coronary artery disease on angiogram. Normal ultrasound of heart     Follow-up and recommended labs and tests     --Outpatient cardiac MRI  -Follow up scheduled on 9/21/23 with Mayelin Armendariz Physician assistant- [facility]  - follow up with primary care doctor this coming week.   - lipid panel in 6 weeks to follow up cholesterol level after starting lipitor     Activity    Your activity upon discharge: activity as tolerated     Discharge Instructions    - start aspirin- lifelong  - start plavix/Clopidigral and take for 30 days.   -start Lipitor 40mg daily,   - Lipid panel in 6 weeks - your primary care doctor to arrange on next visit  - avoid imitrex for now given your hear condition. Discuss with primary care doctor/prescribing doctor     Patient care order    OK to resume gentle massage therapy in 7 days     Full Code     Diet    Follow this diet upon discharge: Orders Placed This Encounter      Low Saturated Fat Na <2400 mg     MRI Cardiac w/contrast     Discharge Medications   Current Discharge Medication List        START taking these medications    Details   aspirin 81 MG EC tablet Take 1 tablet (81 mg) by mouth daily  Qty: 90 tablet, Refills: 0    Comments: Future refills by PCP Dr. Georges Lebron with phone number [phone].  Associated Diagnoses: NSTEMI (non-ST elevated myocardial infarction) (H)      atorvastatin (LIPITOR) 40 MG tablet Take 1 tablet (40 mg) by mouth every evening  Qty: 30 tablet, Refills: 0    Comments: Future refills by PCP Dr. Georges Lebron with phone number [phone].  Associated Diagnoses: NSTEMI (non-ST elevated myocardial infarction) (H)      clopidogrel (PLAVIX) 75 MG tablet Take 1 tablet (75 mg) by mouth daily  Qty: 30 tablet, Refills: 0    Comments: Future refills by PCP Dr. Georges Lebron with phone number [phone].  Associated Diagnoses: NSTEMI (non-ST elevated myocardial infarction) (H)       nitroGLYcerin (NITROSTAT) 0.4 MG sublingual tablet For chest pain place 1 tablet under the tongue every 5 minutes for 3 doses. If symptoms persist 5 minutes after 1st dose call 911.  Qty: 50 tablet, Refills: 0    Comments: Future refills by PCP Dr. Georges Lebron with phone number [phone].  Associated Diagnoses: NSTEMI (non-ST elevated myocardial infarction) (H)           CONTINUE these medications which have CHANGED    Details   HYDROcodone-Acetaminophen  MG TABS Take 1-2 tablets by mouth every 4 hours as needed (maximum of 10 tablets in 24 hour period) Prescription is written for 1-2 tabs every 4-6 hrs prn (max of 10 tabs daily).  Refills: 0    Associated Diagnoses: Chronic narcotic use           CONTINUE these medications which have NOT CHANGED    Details   Baclofen (LIORESAL) 5 MG tablet Take 5 mg by mouth as needed for muscle spasms      budesonide (ENTOCORT EC) 3 MG EC capsule Take 6 mg by mouth every morning      carboxymethylcellulose (REFRESH PLUS) 0.5 % SOLN ophthalmic solution Place 1 drop into both eyes 3 times daily as needed for dry eyes      cyclobenzaprine (FLEXERIL) 10 MG tablet Take 10 mg by mouth daily as needed for muscle spasms     Associated Diagnoses: Preop general physical exam      estradiol (VIVELLE-DOT) 0.025 MG/24HR bi-weekly patch Place 1 patch onto the skin twice a week      ipratropium (ATROVENT) 0.06 % spray Spray 3 sprays into both nostrils daily  Qty: 45 mL, Refills: 3    Associated Diagnoses: Chronic rhinitis, unspecified type      loratadine (CLARITIN) 10 MG tablet Take 10 mg by mouth every morning       LORazepam (ATIVAN) 1 MG tablet Take 1 mg by mouth 4 times daily Prescription is written for 2 tabs 3 times daily.  But pt reports taking 1 tab 4 times daily.  Qty: 30 tablet      methenamine hippurate (HIPREX) 1 g tablet TAKE 1 TABLET(1 GRAM) BY MOUTH TWICE DAILY  Qty: 180 tablet, Refills: 1    Associated Diagnoses: Dysuria; Recurrent UTI      naloxone (NARCAN) 4  "MG/0.1ML nasal spray Spray 1 spray (4 mg) into one nostril alternating nostrils as needed for opioid reversal Every 2-3 minutes in alternating nostrils  Qty: 2 each, Refills: 0    Associated Diagnoses: Chronic narcotic use      omeprazole (PRILOSEC) 40 MG DR capsule Take 40 mg by mouth 2 times daily (before meals)      ondansetron (ZOFRAN) 4 MG tablet Take 4 mg by mouth every 8 hours as needed for nausea or vomiting      PARoxetine (PAXIL-CR) 25 MG 24 hr tablet Take 25 mg by mouth 2 times daily      polyethylene glycol (MIRALAX) 17 GM/Dose powder Take 1 capful. by mouth as needed for constipation      progesterone (PROMETRIUM) 100 MG capsule Take 100 mg by mouth At Bedtime      propranolol ER (INDERAL LA) 60 MG 24 hr capsule Take 1 capsule (60 mg) by mouth daily  Qty: 90 capsule, Refills: 1    Associated Diagnoses: Tension headache; Migraine with aura and without status migrainosus, not intractable      riboflavin 400 MG CAPS Take 400 mg by mouth daily      vitamin C (ASCORBIC ACID) 1000 MG TABS Take 1,000 mg by mouth every evening       Vitamin D3 (CHOLECALCIFEROL) 125 MCG (5000 UT) tablet Take 2 tablets by mouth every evening      zolpidem ER (AMBIEN CR) 12.5 MG CR tablet Take 12.5 mg by mouth At Bedtime            STOP taking these medications       SUMAtriptan (IMITREX) 50 MG tablet Comments:   Reason for Stopping:             Allergies   Allergies   Allergen Reactions    Betamethasone Other (See Comments)     agitation    Chocolate Other (See Comments)     Triggers migraines    Compazine [Prochlorperazine] Other (See Comments)     \"crawl out of my skin\"    Linzess [Linaclotide]     Penicillins Nausea and Vomiting and Hives    Pneumococcal Vaccine Other (See Comments)     Temporary paralization    Tizanidine Other (See Comments)     Severe agitation     Data   Most Recent 3 CBC's:  Recent Labs   Lab Test 08/31/23  2241 08/31/23  1838 12/17/20  1909   WBC 12.1* 10.1 7.1   HGB 12.5 13.1 13.4   MCV 96 95 96   PLT " 304 386 326      Most Recent 3 BMP's:  Recent Labs   Lab Test 09/01/23  1147 08/31/23  1838 07/27/23  1314 04/06/21  1238 12/17/20  1909   NA  --  142 141  --  136   POTASSIUM 4.0 3.6 4.2  --  4.2   CHLORIDE  --  103 105  --  103   CO2  --  26 24  --  27   BUN  --  12.7 15.0  --  10   CR  --  0.72 0.75 0.61 0.59   ANIONGAP  --  13 12  --  6   THOR  --  9.2 9.0  --  8.8   GLC  --  132* 102*  --  85     Most Recent 2 LFT's:  Recent Labs   Lab Test 08/31/23  1838 07/27/23  1314   AST 20 22   ALT 15 18   ALKPHOS 97 82   BILITOTAL 0.4 0.3     Most Recent INR's and Anticoagulation Dosing History:  Anticoagulation Dose History           No data to display              Most Recent 3 Troponin's:No lab results found.  Most Recent Cholesterol Panel:  Recent Labs   Lab Test 08/31/23 2037   CHOL 197   *   HDL 54   TRIG 162*     Most Recent 6 Bacteria Isolates From Any Culture (See EPIC Reports for Culture Details):  Recent Labs   Lab Test 03/15/21  1720 01/31/21  1039 11/20/17  1436   CULT >100,000 colonies/mL  Escherichia coli  * 50,000 to 100,000 colonies/mL  Escherichia coli  * No beta hemolytic Streptococcus Group A isolated     Most Recent TSH, T4 and A1c Labs:  Recent Labs   Lab Test 07/27/23  1314 10/26/17  1431 12/15/16  0827   TSH 1.36   < >  --    A1C  --   --  5.3    < > = values in this interval not displayed.     Results for orders placed or performed during the hospital encounter of 08/31/23   Chest XR,  PA & LAT    Narrative    EXAM: XR CHEST 2 VIEWS  LOCATION: [facility]  DATE: 8/31/2023    INDICATION: chest pain  COMPARISON: 08/16/2023      Impression    IMPRESSION: Stable chest with no acute cardiopulmonary findings. Small esophageal hiatal hernia. Slight bilateral apical pleural thickening. Mild thoracolumbar spinal curvature.   Echocardiogram Complete     Value    LVEF  60-65%    Narrative    [id number]  [id number]  [id number]  [id number]^KEVIN^ALVA^[facility]  [facility]  Echocardiography Laboratory  [address] [place] [address]     Name: RACHEL CHRISTINA  MRN: [id number]  : 1950  Study Date: 2023 08:53 AM  Age: 73 yrs  Gender: Female  Patient Location: [facility]  Reason For Study: Chest Pain  Ordering Physician: ALVA BROWN  Referring Physician: Alva Lebron  Performed By: Ji Head     BSA: 1.8 m2  Height: 66 in  Weight: 147 lb  HR: 56  BP: 108/70 mmHg  ______________________________________________________________________________  Procedure  Complete Portable Echo Adult. Optison (NDC #5226-5458) given intravenously.  ______________________________________________________________________________  Interpretation Summary     1. Left ventricular systolic function is normal. The visual ejection fraction  is 60-65%.  2. No regional wall motion abnormalities noted.  3. The right ventricle is normal in structure, function and size.  4. No evidence for significant valvular pathology.  ______________________________________________________________________________  Left Ventricle  The left ventricle is normal in size. There is normal left ventricular wall  thickness. Left ventricular systolic function is normal. The visual ejection  fraction is 60-65%. No regional wall motion abnormalities noted.     Right Ventricle  The right ventricle is normal in structure, function and size.     Atria  Normal left atrial size. Right atrial size is normal. There is no color  Doppler evidence of an atrial shunt.     Mitral Valve  The mitral valve is normal in structure and function. There is trace mitral  regurgitation.     Tricuspid Valve  The tricuspid valve is normal in structure and function.     Aortic Valve  The aortic valve is normal in structure and function. There is trace aortic  regurgitation.     Pulmonic Valve  The pulmonic valve is not well seen, but is grossly normal.     Vessels  Normal size aorta. IVC diameter <2.1 cm collapsing >50%  with sniff suggests a  normal RA pressure of 3 mmHg.     Pericardium  There is no pericardial effusion.     Rhythm  Sinus rhythm was noted.  ______________________________________________________________________________  MMode/2D Measurements & Calculations  IVSd: 1.1 cm     LVIDd: 4.3 cm  LVIDs: 2.4 cm  LVPWd: 0.73 cm  FS: 43.7 %  LV mass(C)d: 123.2 grams  LV mass(C)dI: 70.2 grams/m2  Ao root diam: 3.6 cm  LA dimension: 3.2 cm  asc Aorta Diam: 3.3 cm  LA/Ao: 0.91  LVOT diam: 2.1 cm  LVOT area: 3.5 cm2  Ao root diam Index (cm/m2): 2.0  asc Aorta Diam Index (cm/m2): 1.9  RWT: 0.34  TAPSE: 3.3 cm     Doppler Measurements & Calculations  MV E max maurice: 62.6 cm/sec  MV A max maurice: 64.3 cm/sec  MV E/A: 0.97  MV dec slope: 160.1 cm/sec2  MV dec time: 0.39 sec  E/E' av.2  Lateral E/e': 6.6     Medial E/e': 5.7  RV S Maurice: 13.3 cm/sec     ______________________________________________________________________________  Report approved by: Yehuda Hoskins 2023 11:21 AM         Cardiac Catheterization    Narrative    Trivial non-obstructive CAD

## 2023-09-02 NOTE — PROGRESS NOTES
19:00-23:00    Patient alert and oriented x4. Vital signs stable,room air. Pain controlled with Prn [place].On Standby assist.Due medications given.

## 2023-09-02 NOTE — PROGRESS NOTES
" [facility]    Cardiology Progress Note    Date of Service (when I saw the patient): 09/02/2023            Assessment and Plan:   Assessment & Plan  Laurel Parra is a 73 year old female who was admitted on 8/31/2023.        MI with nonobstructive coronary arteries (MINOCA):  Trivial CAD on coronary angiogram.  Normal LV function, no regional wall motion abnormalities on echocardiogram.  Increased personal stressors recently. Currently chest pain free.    Chronic pain syndrome being followed by pain clinic  3.   .        PLAN/RECOMMENDATIONS:  -Continue ASA/plavix for medical management of MI for at least 30 days, ASA indefinitely  -Continue statin  -Outpatient cardiac MRI  -Follow up scheduled on 9/21/23 with Mayelin PalmaOkay to discharge from a cardiac standpoint.  Cardiology will sign off.    Heide Lu MD [facility]        No new issues overnight.  Denies chest pain, shortness of breath         Medications:      sodium chloride 0.9%  500 mL Intravenous Once    atorvastatin  40 mg Oral QPM    budesonide  6 mg Oral QAM    clopidogrel  75 mg Oral Daily    ipratropium  3 spray Both Nostrils Daily    LORazepam  1 mg Oral 4x Daily    nitroGLYcerin  1 patch Transdermal Daily    pantoprazole  40 mg Oral BID AC    PARoxetine  20 mg Oral BID    polyethylene glycol  17 g Oral Daily    progesterone  100 mg Oral At Bedtime    propranolol ER  60 mg Oral Daily    sodium chloride (PF)  3 mL Intracatheter Q8H              Physical Exam:   Blood pressure 114/82, pulse 62, temperature 98.2  F (36.8  C), temperature source Oral, resp. rate 16, height 1.676 m (5' 6\"), weight 66.4 kg (146 lb 6.4 oz), SpO2 92 %, not currently breastfeeding.  Vitals:    08/31/23 1831 08/31/23 2223 09/02/23 0601   Weight: 65.8 kg (145 lb) 67 kg (147 lb 12.8 oz) 66.4 kg (146 lb 6.4 oz)       Intake/Output Summary (Last 24 hours) at 9/2/2023 1057  Last data filed at 9/2/2023 " 0700  Gross per 24 hour   Intake 885 ml   Output 600 ml   Net 285 ml           Vital Sign Ranges  Temperature Temp  Av.1  F (36.7  C)  Min: 98  F (36.7  C)  Max: 98.3  F (36.8  C)   Blood pressure Systolic (24hrs), Av , Min:96 , Max:139        Diastolic (24hrs), Av, Min:61, Max:104      Pulse Pulse  Av.7  Min: 50  Max: 68   Respirations Resp  Av.6  Min: 16  Max: 18   Pulse oximetry SpO2  Av.2 %  Min: 92 %  Max: 96 %         EXAM:    Constitutional:    in no apparent distress    Skin:    normal    Head:    Normocephalic, atramatic    Eyes:    pupils equal, round and reactive to light, extra ocular muscles intact, sclera clear, conjunctiva normal    Neck:    JVP    Lungs:    CTAB   Cardiovascular:    S1, S2 RRR no m/r/g, no JVD   Abdomen:    normal bowel sounds    Extremities and Back:    no cyanosis or clubbing and No edema    Neurological:    No gross or focal neurologic abnormalities               Data:     Recent Labs   Lab Test 23  2241 23  1838   WBC 12.1* 10.1   HGB 12.5 13.1   MCV 96 95    386      Recent Labs   Lab Test 23  1147 23  1838 23  1314   NA  --  142 141   POTASSIUM 4.0 3.6 4.2   CHLORIDE  --  103 105   BUN  --  12.7 15.0   CR  --  0.72 0.75     Recent Labs   Lab 23  1838   *     Recent Labs   Lab Test 23  1838 23  1314   ALT 15 18   AST 20 22     No results found for: TROPI      Recent Labs   Lab Test 10/26/18  1002   MAG 1.9       Lab Results   Component Value Date    CHOL 197 2023    CHOL 244 2019     Lab Results   Component Value Date    HDL 54 2023    HDL 66 2019     Lab Results   Component Value Date     2023     2019     Lab Results   Component Value Date    TRIG 162 2023    TRIG 266 2019     Lab Results   Component Value Date    CHOLHDLRATIO 3.4 10/23/2015          TSH   Date Value Ref Range Status   2023 1.36 0.30 - 4.20 uIU/mL Final    12/17/2020 1.00 0.40 - 4.00 mU/L Final         Heide Lu MD, FACC  Cardiology

## 2023-09-05 ENCOUNTER — TELEPHONE (OUTPATIENT)
Dept: CARDIOLOGY | Facility: CLINIC | Age: 73
End: 2023-09-05
Payer: MEDICARE

## 2023-09-05 LAB
ATRIAL RATE - MUSE: 61 BPM
DIASTOLIC BLOOD PRESSURE - MUSE: NORMAL MMHG
INTERPRETATION ECG - MUSE: NORMAL
P AXIS - MUSE: 43 DEGREES
PR INTERVAL - MUSE: 150 MS
QRS DURATION - MUSE: 78 MS
QT - MUSE: 460 MS
QTC - MUSE: 463 MS
R AXIS - MUSE: -28 DEGREES
SYSTOLIC BLOOD PRESSURE - MUSE: NORMAL MMHG
T AXIS - MUSE: -29 DEGREES
VENTRICULAR RATE- MUSE: 61 BPM

## 2023-09-05 NOTE — TELEPHONE ENCOUNTER
Patient was admitted to [facility] on 8/31/23 with chest pain.    PMH: GERD, colitis, depression, anxiety, anorexia-follows at [facility], chronic pain syndrome with chronic narcotic use-follows at pain clinic, OA, migraines.    9/1/23: Echo showed EF of 60-65%. No regional wall motion abnormalities noted. The right ventricle is normal in structure, function and size. No evidence for significant valvular pathology.    9/1/23: Coronary angiogram via RRA showed trivial non-obstructive CAD-MINOCA.    Pt was started on ASA, Lipitor, Plavix and NTG at time of discharge.    Called patient to discuss any post hospital d/c questions she may have, review medication changes, and confirm f/u appts. Patient denied any questions regarding new medications or changes with their PTA medications.    Patient denied any SOB, chest pain or light headedness.     RRA cardiac cath site is without bleeding, swelling, redness or signs of infection.     RN confirmed with patient that she is scheduled for a cMRI on 9/21/23 at 1045, and an OV on 10/12/23 at 1220 with MISHEL Meg Warren at our [place] Office. Dr. Martni Team RN phone number provided.    Patient advised to call clinic with any cardiac related questions or concerns prior to this mishel't. Patient verbalized understanding and agreed with plan. ANA MARÍA Haney RN.

## 2023-09-07 ENCOUNTER — PATIENT OUTREACH (OUTPATIENT)
Dept: FAMILY MEDICINE | Facility: CLINIC | Age: 73
End: 2023-09-07
Payer: MEDICARE

## 2023-09-07 ENCOUNTER — TELEPHONE (OUTPATIENT)
Dept: NEUROLOGY | Facility: CLINIC | Age: 73
End: 2023-09-07
Payer: MEDICARE

## 2023-09-07 DIAGNOSIS — Z79.899 MEDICATION MANAGEMENT: Primary | ICD-10-CM

## 2023-09-07 NOTE — TELEPHONE ENCOUNTER
What type of discharge? Inpatient  Risk of Hospital admission or ED visit: 13%  Is a TCM episode required? Yes  When should the patient follow up with PCP? within 30 days of discharge.    Radha Beverly RN  [facility]

## 2023-09-07 NOTE — TELEPHONE ENCOUNTER
"TO PCP    Please approve MTM referral r/t 2 or more additional meds from ED visit.    Appointments in Next Year      Sep 15, 2023 11:30 AM  (Arrive by 11:15 AM)  ED/Hospital Follow Up with Georges Lebron MD  [facility] ([facility] ) [phone]     Radha Beverly RN on 9/7/2023 at 3:37 PM            ED/Discharge Protocol    \"Hi, my name is Radha Beverly RN, a registered nurse, and I am calling on behalf of Dr. Lebron's office at [place].  I am calling to follow up and see how things are going for you after your recent visit.\"    \"I see that you were in the (ER/UC/IP) on 08/31/23.    How are you doing now that you are home?\" I'm doing fine    Is patient experiencing symptoms that may require a hospital visit?  No    Discharge Instructions    \"Let's review your discharge instructions.  What is/are the follow-up recommendations?  Pt. Response: Seeing Therapy for it. MRI and cardiology, PCP    \"Were you instructed to make a follow-up appointment?\"  Pt. Response: Yes.  Has appointment been made?   Yes      \"When you see the provider, I would recommend that you bring your discharge instructions with you.    Medications    \"How many new medications are you on since your hospitalization/ED visit?\"    2 or more - [facility] MTM referral needed  \"How many of your current medicines changed (dose, timing, name, etc.) while you were in the hospital/ED visit?\"   2 or more - [facility] MTM referral needed  \"Do you have questions about your medications?\"   No  \"Were you newly diagnosed with heart failure, COPD, diabetes or did you have a heart attack?\"   No  For patients on insulin: \"Did you start on insulin in the hospital or did you have your insulin dose changed?\"   No  Post Discharge Medication Reconciliation Status: discharge medications reconciled, continue medications without change.    Was MTM referral placed (*Make sure to put transitions as reason for referral)?   Yes - \"The " "Medication Therapy  will call you within the next few days to schedule an appointment with an [facility] pharmacist to discuss your medicines and make sure they are working as well as they possibly can for you. This visit can be done in an [facility] clinic or on the phone and is at no charge to you.\"    Call Summary    \"Do you have any questions or concerns about your condition or care plan at the moment?\"    No  Triage nurse advice given: Keep upcoming appointment with PCP    Patient was in ER 1 in the past year (assess appropriateness of ER visits.)      \"If you have questions or things don't continue to improve, we encourage you contact us through the main clinic number,  [phone].  Even if the clinic is not open, triage nurses are available 24/7 to help you.     We would like you to know that our clinic has extended hours (provide information).  We also have urgent care (provide details on closest location and hours/contact info)\"      \"Thank you for your time and take care!\"    Radha Beverly RN on 9/7/2023 at 3:36 PM       "

## 2023-09-07 NOTE — TELEPHONE ENCOUNTER
Patient requests a call back regarding her medication for migraines. Patient advised she had a heart attack one week ago and was told to discontinue this medication. Patient would like to know if there is an alternative medication she can take. Please call her back at [phone]. Thank you~

## 2023-09-08 NOTE — TELEPHONE ENCOUNTER
Called patient regarding phone number for MTM and advise patient to call to schedule. She verbalized understanding and will call to schedule appointment.     Radha Beverly RN on 9/8/2023 at 2:53 PM

## 2023-09-08 NOTE — TELEPHONE ENCOUNTER
Pt discharged 9/2/23.      Pt was taking sumatriptan for acute headaches.  Last OV 3/8/23. No follow up scheduled.     Will route to provider to see if he can order alternative.   Stephanie MARY RN, BSN

## 2023-09-08 NOTE — TELEPHONE ENCOUNTER
Patient called stating she wanted to speak with someone about medication options post cardiac episode.  Writer was able to schedule patient for 9-15-23, however patient states she feels a migraine is coming, as she has a slight headache.

## 2023-09-11 ENCOUNTER — TELEPHONE (OUTPATIENT)
Dept: FAMILY MEDICINE | Facility: CLINIC | Age: 73
End: 2023-09-11
Payer: MEDICARE

## 2023-09-11 NOTE — TELEPHONE ENCOUNTER
MTM referral from: [facility] visit (referral by provider)    MTM referral outreach attempt #2 on September 11, 2023 at 2:15 PM      Outcome: Patient not reachable after several attempts, will route to [facility] Pharmacist/Provider as an FYI.  [facility] scheduling number is [phone].  Thank you for the referral.    Use vbc for the carrier/Plan on the flowsheet    Wolf Alatorre [facility]

## 2023-09-11 NOTE — TELEPHONE ENCOUNTER
Provider does not recommend anything besides tylenol until appt 9/15/23.     Called pt and relayed message.     Pt verbalized understanding and acceptance of the plan. Pt had no further questions at this time.  Advised can call back to clinic at any time with concerns.     Stephanie MARY RN, BSN

## 2023-09-12 NOTE — TELEPHONE ENCOUNTER
Sent patient myChart msg.  Mandi Peng, PharmD, [facility]  Medication Therapy Management Provider  Pager: [phone]

## 2023-09-14 NOTE — PROGRESS NOTES
ESTABLISHED PATIENT NEUROLOGY NOTE    DATE OF VISIT: 9/15/2023  CLINIC LOCATION: [facility]  MRN: [id number]  PATIENT NAME: Laurel Parra  YOB: 1950    REASON FOR VISIT: No chief complaint on file.    SUBJECTIVE:                                                      HISTORY OF PRESENT ILLNESS: Patient is here to follow up regarding multifactorial headaches. Please refer to my initial/other prior notes for further information.    Since the last visit, the patient reports that she suffered myocardial infarction at the end of August and was advised not to take triptans.  On 9/7/2023 she reached out to my clinic to discuss alternative treatment options for her migraine acute therapy, and I recommended to set up this visit to review options.  She is on 60 mg of propranolol and 400 mg of riboflavin for headache prevention.  For acute therapy she uses Tylenol.  Did not take Imitrex since her heart attack.  Her bilateral occipital pain ***.  She denies new neurological symptoms.  EXAM:                                                    Physical Exam:   Vitals: There were no vitals taken for this visit.    General: pt is in NAD, cooperative.  Skin: normal turgor, moist mucous membranes, no lesions/rashes noticed.  HEENT: ATNC, white sclera, normal conjunctiva.  Respiratory: Symmetric lung excursion, no accessory respiratory muscle use.  Abdomen: Non distended.  Neurological: awake, cooperative, follows commands, no aphasia or dysarthria noted, cranial nerves II-XII: no ptosis, face is symmetric, equally moves all extremities, no dysmetria bilaterally, casual gait is normal.  There is *** to palpation over occipital nerves ***.  ASSESSMENT AND PLAN:                                                    Assessment: 73 year old female patient presents for follow-up of multifactorial headache disorder.  She needs a new acute medication following myocardial infarction (cannot use triptans  anymore).    We had a detailed discussion with the patient regarding our options which include Zavzpret, Ubrelvy, Nurtec, and Reyvow.  Regarding her preventative therapy, no medication changes are needed at the present time.  In the past we discussed additional treatment options including Ravindra-relief, topiramate and CGRP antagonists.    Regarding her bilateral occipital neuralgia, ***.    Diagnoses:    ICD-10-CM    1. Migraine with aura and without status migrainosus, not intractable  G43.109       2. Tension headache  G44.209       3. Bilateral occipital neuralgia  M54.81         Plan:  There are no Patient Instructions on file for this visit.    Total Time: *** minutes spent on the date of the encounter doing chart review, history and exam, documentation and further activities per the note.    Ej Castanon MD  [facility]  (Chart documentation was completed in part with Dragon voice-recognition software. Even though reviewed, some grammatical, spelling, and word errors may remain.)

## 2023-09-15 ENCOUNTER — OFFICE VISIT (OUTPATIENT)
Dept: NEUROLOGY | Facility: CLINIC | Age: 73
End: 2023-09-15
Payer: MEDICARE

## 2023-09-15 ENCOUNTER — OFFICE VISIT (OUTPATIENT)
Dept: FAMILY MEDICINE | Facility: CLINIC | Age: 73
End: 2023-09-15
Payer: MEDICARE

## 2023-09-15 VITALS
TEMPERATURE: 97.7 F | WEIGHT: 145 LBS | BODY MASS INDEX: 23.3 KG/M2 | HEART RATE: 65 BPM | OXYGEN SATURATION: 94 % | SYSTOLIC BLOOD PRESSURE: 123 MMHG | HEIGHT: 66 IN | DIASTOLIC BLOOD PRESSURE: 84 MMHG | RESPIRATION RATE: 16 BRPM

## 2023-09-15 VITALS — HEART RATE: 65 BPM | DIASTOLIC BLOOD PRESSURE: 84 MMHG | OXYGEN SATURATION: 95 % | SYSTOLIC BLOOD PRESSURE: 123 MMHG

## 2023-09-15 DIAGNOSIS — R63.5 WEIGHT GAIN: ICD-10-CM

## 2023-09-15 DIAGNOSIS — G43.109 MIGRAINE WITH AURA AND WITHOUT STATUS MIGRAINOSUS, NOT INTRACTABLE: Primary | ICD-10-CM

## 2023-09-15 DIAGNOSIS — I25.10 ASCVD (ARTERIOSCLEROTIC CARDIOVASCULAR DISEASE): Primary | ICD-10-CM

## 2023-09-15 DIAGNOSIS — M54.81 BILATERAL OCCIPITAL NEURALGIA: ICD-10-CM

## 2023-09-15 DIAGNOSIS — R61 SWEATING INCREASE: ICD-10-CM

## 2023-09-15 DIAGNOSIS — G44.209 TENSION HEADACHE: ICD-10-CM

## 2023-09-15 LAB — CORTIS SERPL-MCNC: 2.4 UG/DL

## 2023-09-15 PROCEDURE — 36415 COLL VENOUS BLD VENIPUNCTURE: CPT | Performed by: INTERNAL MEDICINE

## 2023-09-15 PROCEDURE — 82533 TOTAL CORTISOL: CPT | Performed by: INTERNAL MEDICINE

## 2023-09-15 PROCEDURE — 99214 OFFICE O/P EST MOD 30 MIN: CPT | Performed by: INTERNAL MEDICINE

## 2023-09-15 PROCEDURE — 99214 OFFICE O/P EST MOD 30 MIN: CPT | Performed by: PSYCHIATRY & NEUROLOGY

## 2023-09-15 ASSESSMENT — PAIN SCALES - GENERAL: PAINLEVEL: NO PAIN (0)

## 2023-09-15 NOTE — PROGRESS NOTES
ESTABLISHED PATIENT NEUROLOGY NOTE    DATE OF VISIT: 9/15/2023  CLINIC LOCATION: [facility]  MRN: [id number]  PATIENT NAME: Laurel Parra  YOB: 1950    REASON FOR VISIT:   Chief Complaint   Patient presents with    Follow Up     Discuss meds after hospitalization      SUBJECTIVE:                                                      HISTORY OF PRESENT ILLNESS: Patient is here to follow up regarding multifactorial headaches. Please refer to my initial/other prior notes for further information.    Since the last visit, the patient reports that she suffered myocardial infarction at the end of August and was advised not to take triptans.  On 9/7/2023 she reached out to my clinic to discuss alternative treatment options for her migraine acute therapy, and I recommended to set up this visit to review options.  She is on 60 mg of propranolol and 400 mg of riboflavin for headache prevention.  Averages 1-2 per month. For acute therapy she uses Tylenol.  Did not take Imitrex since her heart attack.  Her bilateral occipital pain on and off, but tolerable at this time.  She denies new neurological symptoms.  EXAM:                                                    Physical Exam:   Vitals: /84 (BP Location: Left arm, Patient Position: Sitting, Cuff Size: Adult Regular)   Pulse 65   SpO2 95%     General: pt is in NAD, cooperative.  Skin: normal turgor, moist mucous membranes, no lesions/rashes noticed.  HEENT: ATNC, white sclera, normal conjunctiva.  Respiratory: Symmetric lung excursion, no accessory respiratory muscle use.  Abdomen: Non distended.  Neurological: awake, cooperative, follows commands, no aphasia or dysarthria noted, cranial nerves II-XII: no ptosis, face is symmetric, equally moves all extremities, no dysmetria bilaterally, gait was not checked.  ASSESSMENT AND PLAN:                                                    Assessment: 73 year old female patient  presents for follow-up of multifactorial headache disorder.  She needs a new acute medication following myocardial infarction (cannot use triptans anymore).    We had a detailed discussion with the patient regarding our options which include Zavzpret, Ubrelvy, Nurtec, and Reyvow.  We decided to try Nurtec first.  She will reach out if it is not effective or not covered, and she needs to try different medication.    Regarding her preventative therapy, no medication changes are needed at the present time.  In the past we discussed additional treatment options including Ravindra-relief, topiramate and CGRP antagonists.    Regarding her bilateral occipital neuralgia, symptoms are mild at the present time, and the patient is not interested in any additional interventions.  We reviewed that several treatment options are available if symptoms worsen in the future.    Diagnoses:    ICD-10-CM    1. Migraine with aura and without status migrainosus, not intractable  G43.109       2. Tension headache  G44.209       3. Bilateral occipital neuralgia  M54.81         Plan: At today's visit we thoroughly discussed current symptoms, available treatment options, and the plan.    We decided to try Nurtec for acute therapy of migraines.    I advised the patient to keep the headache diary and bring it to the next follow-up visit or upload via My Chart.     Next follow-up appointment is in the next 4 months or earlier if needed.    Total Time: 25 minutes spent on the date of the encounter doing chart review, history and exam, documentation and further activities per the note.    Ej Castanon MD  [facility]  (Chart documentation was completed in part with Dragon voice-recognition software. Even though reviewed, some grammatical, spelling, and word errors may remain.)

## 2023-09-15 NOTE — PROGRESS NOTES
This is a hospital follow-up for this very pleasant 73-year-old.  The patient was admitted from August 31 to September 2 at [facility].  I reviewed the notes.  She presented with chest pain.  This came on approximately 1 hour prior to admission.  The pain went down both arms.  Some shortness of breath as well as nausea.  She was evaluated in the ER where an EKG showed no ischemic changes but a second troponin was elevated to 68.  The patient was found to have a non-ST elevation MI.  The patient's pain improved with nitroglycerin and she was started on IV heparin.  A subsequent EKG did show T wave inversion with chest pain.  She was seen by cardiology and Lipitor was started.  On September 1 she underwent a coronary angiogram which showed trivial non obstructive cad.  She is to be on aspirin lifelong and Plavix for 30 days.  She was discharged to home.      In reviewing her labs

## 2023-09-15 NOTE — PATIENT INSTRUCTIONS
AFTER VISIT SUMMARY (AVS):    At today's visit we thoroughly discussed current symptoms, available treatment options, and the plan.    We decided to try Nurtec for acute therapy of your migraines.    Please keep the headache diary and bring it to the next follow-up visit or upload via My Chart.     Next follow-up appointment is in the next 4 months or earlier if needed.    Please do not hesitate to call me with any questions or concerns.    Thanks.

## 2023-09-15 NOTE — LETTER
9/15/2023         RE: Laurel Parra  [address]  [facility] [address]        Dear Colleague,    Thank you for referring your patient, Laurel Parra, to the [facility] NEUROLOG[facility]. Please see a copy of my visit note below.    ESTABLISHED PATIENT NEUROLOGY NOTE    DATE OF VISIT: 9/15/2023  CLINIC LOCATION: [facility]  MRN: [id number]  PATIENT NAME: Laurel Parra  YOB: 1950    REASON FOR VISIT:   Chief Complaint   Patient presents with     Follow Up     Discuss meds after hospitalization      SUBJECTIVE:                                                      HISTORY OF PRESENT ILLNESS: Patient is here to follow up regarding multifactorial headaches. Please refer to my initial/other prior notes for further information.    Since the last visit, the patient reports that she suffered myocardial infarction at the end of August and was advised not to take triptans.  On 9/7/2023 she reached out to my clinic to discuss alternative treatment options for her migraine acute therapy, and I recommended to set up this visit to review options.  She is on 60 mg of propranolol and 400 mg of riboflavin for headache prevention.  Averages 1-2 per month. For acute therapy she uses Tylenol.  Did not take Imitrex since her heart attack.  Her bilateral occipital pain on and off, but tolerable at this time.  She denies new neurological symptoms.  EXAM:                                                    Physical Exam:   Vitals: /84 (BP Location: Left arm, Patient Position: Sitting, Cuff Size: Adult Regular)   Pulse 65   SpO2 95%     General: pt is in NAD, cooperative.  Skin: normal turgor, moist mucous membranes, no lesions/rashes noticed.  HEENT: ATNC, white sclera, normal conjunctiva.  Respiratory: Symmetric lung excursion, no accessory respiratory muscle use.  Abdomen: Non distended.  Neurological: awake, cooperative, follows  commands, no aphasia or dysarthria noted, cranial nerves II-XII: no ptosis, face is symmetric, equally moves all extremities, no dysmetria bilaterally, gait was not checked.  ASSESSMENT AND PLAN:                                                    Assessment: 73 year old female patient presents for follow-up of multifactorial headache disorder.  She needs a new acute medication following myocardial infarction (cannot use triptans anymore).    We had a detailed discussion with the patient regarding our options which include Zavzpret, Ubrelvy, Nurtec, and Reyvow.  We decided to try Nurtec first.  She will reach out if it is not effective or not covered, and she needs to try different medication.    Regarding her preventative therapy, no medication changes are needed at the present time.  In the past we discussed additional treatment options including Ravindra-relief, topiramate and CGRP antagonists.    Regarding her bilateral occipital neuralgia, symptoms are mild at the present time, and the patient is not interested in any additional interventions.  We reviewed that several treatment options are available if symptoms worsen in the future.    Diagnoses:    ICD-10-CM    1. Migraine with aura and without status migrainosus, not intractable  G43.109       2. Tension headache  G44.209       3. Bilateral occipital neuralgia  M54.81         Plan: At today's visit we thoroughly discussed current symptoms, available treatment options, and the plan.    We decided to try Nurtec for acute therapy of migraines.    I advised the patient to keep the headache diary and bring it to the next follow-up visit or upload via My Chart.     Next follow-up appointment is in the next 4 months or earlier if needed.    Total Time: 25 minutes spent on the date of the encounter doing chart review, history and exam, documentation and further activities per the note.    Ej Castanon MD  [facility]  (Chart documentation was  completed in part with Dragon voice-recognition software. Even though reviewed, some grammatical, spelling, and word errors may remain.)      Again, thank you for allowing me to participate in the care of your patient.        Sincerely,        Ej Castanon MD

## 2023-09-15 NOTE — PROGRESS NOTES
Jerry Barragan is a 73 year old, presenting for the following health issues:  No chief complaint on file.      This is a hospital follow-up for this very pleasant 73-year-old.  The patient was admitted from August 31 to September 2 at [facility].  I reviewed the notes.  She presented with chest pain.  This came on approximately 1 hour prior to admission.  The pain went down both arms.  Some shortness of breath as well as nausea.  She was evaluated in the ER where an EKG showed no ischemic changes but a second troponin was elevated to 68 and a 3rd was higher.  The patient was found to have a non-ST elevation MI.  The patient's pain improved with nitroglycerin and she was started on IV heparin.  A subsequent EKG did show T wave inversion with chest pain.  She was seen by cardiology and Lipitor was started.  On September 1 she underwent a coronary angiogram which showed trivial non obstructive cad.  She is to be on aspirin lifelong and Plavix for 30 days.  She was discharged to home.      At this point in time she is no longer having the chest pain.  She is not having shortness of breath.  No abdominal pain or nausea.    For well over a year she has had excessive sweating.  She has also had her inability to lose weight which I evaluated previously by labs as well as CT scan without any obvious finding.  She is going to see endocrine at the end of the month for this.        Hospital Follow-up Visit:    Hospital/Nursing Home/IP Rehab Facility: [facility]  Date of Admission: 08/31/2023  Date of Discharge: 09/02/2023  Reason(s) for Admission:     AMI with nonobstructive CAD (MINOCA). Nl Echo. Trivial CAD on coronary angiogram.        Was your hospitalization related to COVID-19? No   Problems taking medications regularly:  None  Medication changes since discharge: None  Problems adhering to non-medication therapy:  None    Summary of hospitalization:  [facility] discharge summary reviewed  Diagnostic Tests/Treatments reviewed.  Follow up needed: mri with cardiology  Other Healthcare Providers Involved in Patient s Care:         Specialist appointment - cards  Update since discharge: stable.         Plan of care communicated with patient         ASSESSMENT:  Nstemi but no significant coronary artery disease, I do wonder about vasospasm.  Her EF is normal and there are no wall motion abnormalities.  She will follow-up per cardiology and get her MRI.  I have asked her to check with cardiology to see if she needs the Lipitor.  She is on aspirin and Plavix.  Inability to lose weight of unclear cause  Excessive sweating    PLAN:  Above re cards  Follow up endo and see what they say about 2 and 3  Cortisol level now    Georges Lebron M.D.  I reconciled her medications and no changes were made.

## 2023-09-17 ENCOUNTER — TELEPHONE (OUTPATIENT)
Dept: FAMILY MEDICINE | Facility: CLINIC | Age: 73
End: 2023-09-17
Payer: MEDICARE

## 2023-09-17 DIAGNOSIS — R63.5 WEIGHT GAIN: Primary | ICD-10-CM

## 2023-09-17 DIAGNOSIS — R61 SWEATING INCREASE: ICD-10-CM

## 2023-09-17 NOTE — TELEPHONE ENCOUNTER
Please call patient, her cortisol level seems low but need to confirm this by doing cosyntropin stim test, please arrange this, this week.    Georges Lebron M.D.

## 2023-09-18 NOTE — TELEPHONE ENCOUNTER
Patient returning call, patient reports that they have not been seen before by Endocrinology, but is establishing with an endocrinology provider at [facility] on 09/27/2023.    Writer huddled with lead RN who coordinated with lab to determine that medication will be available in clinic next week.    Writer called patient to advise that appointments should be made for next week. Writer made appts:  9/25/2023 8:00 AM CS EMILY NURSE [facility]   9/25/2023 8:00 AM CS LAB [facility] CS     Callback if needed: [phone] ok to leave a detailed vm    Isabel Brush RN  -[facility]

## 2023-09-18 NOTE — TELEPHONE ENCOUNTER
Writer attempted to call patient to inquire if patient sees endocrinologist who could assist with completing test (per chart review appears patient may have endocrinologist outside of )    Patient Contact    Attempt # 1    Was call answered?  No.  Left message on voicemail with information to call triage back.    Guy Thomson RN  [facility]

## 2023-09-19 ENCOUNTER — TELEPHONE (OUTPATIENT)
Dept: NEUROLOGY | Facility: CLINIC | Age: 73
End: 2023-09-19
Payer: MEDICARE

## 2023-09-19 NOTE — TELEPHONE ENCOUNTER
Central Prior Authorization Team   Phone: [phone]    PA Initiation    Medication: NURTEC 75 MG PO TBDP  Insurance Company: [employer] - Phone [phone] Fax [phone]  Pharmacy Filling the Rx: [facility] #[id number] [place] [id number] AT [facility] [address]  Filling Pharmacy Phone: [phone]  Filling Pharmacy Fax:    Start Date: 9/19/2023

## 2023-09-19 NOTE — TELEPHONE ENCOUNTER
Prior Authorization Approval    Medication: NURTEC 75 MG PO TBDP  Authorization Effective Date: 1/1/2023  Authorization Expiration Date: 12/31/2023  Insurance Company: [employer] - Phone [phone] Fax [phone]  Which Pharmacy is filling the prescription: [facility] #[id number] [address] AT [facility] [address]  Pharmacy Notified: Yes  Patient Notified: Yes (pharmacy will notify patient when ready)

## 2023-09-19 NOTE — TELEPHONE ENCOUNTER
Prior Authorization Retail Medication Request    Medication/Dose: rimegepant (NURTEC) 75 MG ODT tablet  ICD code (if different than what is on RX):    Previously Tried and Failed:    Rationale:  Pt cannot take sumatriptan due to recent heart attack.  Triptans are contraindicated    Insurance Name:  Medicare  Insurance ID:  [id number]      Pharmacy Information (if different than what is on RX)  Name:    [facility] #[id number] [place] [id number] AT [facility] OF [address]     Phone:  [phone]

## 2023-09-21 ENCOUNTER — HOSPITAL ENCOUNTER (OUTPATIENT)
Dept: CARDIOLOGY | Facility: CLINIC | Age: 73
Discharge: HOME | End: 2023-09-21
Attending: PHYSICIAN ASSISTANT | Admitting: PHYSICIAN ASSISTANT
Payer: MEDICARE

## 2023-09-21 DIAGNOSIS — I21.4 NSTEMI (NON-ST ELEVATED MYOCARDIAL INFARCTION) (H): ICD-10-CM

## 2023-09-21 PROCEDURE — G1010 CDSM STANSON: HCPCS

## 2023-09-21 PROCEDURE — 255N000002 HC RX 255 OP 636: Performed by: PHYSICIAN ASSISTANT

## 2023-09-21 PROCEDURE — G1010 CDSM STANSON: HCPCS | Performed by: INTERNAL MEDICINE

## 2023-09-21 PROCEDURE — A9585 GADOBUTROL INJECTION: HCPCS | Performed by: PHYSICIAN ASSISTANT

## 2023-09-21 PROCEDURE — 75561 CARDIAC MRI FOR MORPH W/DYE: CPT | Mod: 26 | Performed by: INTERNAL MEDICINE

## 2023-09-21 RX ORDER — ALBUTEROL SULFATE 90 UG/1
2 AEROSOL, METERED RESPIRATORY (INHALATION) EVERY 5 MIN PRN
Status: DISCONTINUED | OUTPATIENT
Start: 2023-09-21 | End: 2023-09-22 | Stop reason: HOSPADM

## 2023-09-21 RX ORDER — REGADENOSON 0.08 MG/ML
0.4 INJECTION, SOLUTION INTRAVENOUS ONCE
Status: DISCONTINUED | OUTPATIENT
Start: 2023-09-21 | End: 2023-09-22 | Stop reason: HOSPADM

## 2023-09-21 RX ORDER — DIAZEPAM 5 MG
5 TABLET ORAL EVERY 30 MIN PRN
Status: DISCONTINUED | OUTPATIENT
Start: 2023-09-21 | End: 2023-09-22 | Stop reason: HOSPADM

## 2023-09-21 RX ORDER — ACYCLOVIR 200 MG/1
0-1 CAPSULE ORAL
Status: DISCONTINUED | OUTPATIENT
Start: 2023-09-21 | End: 2023-09-22 | Stop reason: HOSPADM

## 2023-09-21 RX ORDER — DIPHENHYDRAMINE HCL 25 MG
25 CAPSULE ORAL
Status: DISCONTINUED | OUTPATIENT
Start: 2023-09-21 | End: 2023-09-22 | Stop reason: HOSPADM

## 2023-09-21 RX ORDER — METHYLPREDNISOLONE SODIUM SUCCINATE 125 MG/2ML
125 INJECTION, POWDER, LYOPHILIZED, FOR SOLUTION INTRAMUSCULAR; INTRAVENOUS
Status: DISCONTINUED | OUTPATIENT
Start: 2023-09-21 | End: 2023-09-22 | Stop reason: HOSPADM

## 2023-09-21 RX ORDER — AMINOPHYLLINE 25 MG/ML
100 INJECTION, SOLUTION INTRAVENOUS ONCE
Status: DISCONTINUED | OUTPATIENT
Start: 2023-09-21 | End: 2023-09-22 | Stop reason: HOSPADM

## 2023-09-21 RX ORDER — DIPHENHYDRAMINE HYDROCHLORIDE 50 MG/ML
25-50 INJECTION INTRAMUSCULAR; INTRAVENOUS
Status: DISCONTINUED | OUTPATIENT
Start: 2023-09-21 | End: 2023-09-22 | Stop reason: HOSPADM

## 2023-09-21 RX ORDER — ONDANSETRON 2 MG/ML
4 INJECTION INTRAMUSCULAR; INTRAVENOUS
Status: DISCONTINUED | OUTPATIENT
Start: 2023-09-21 | End: 2023-09-22 | Stop reason: HOSPADM

## 2023-09-21 RX ORDER — GADOBUTROL 604.72 MG/ML
9 INJECTION INTRAVENOUS ONCE
Status: COMPLETED | OUTPATIENT
Start: 2023-09-21 | End: 2023-09-21

## 2023-09-21 RX ORDER — CAFFEINE CITRATE 20 MG/ML
60 SOLUTION INTRAVENOUS
Status: DISCONTINUED | OUTPATIENT
Start: 2023-09-21 | End: 2023-09-22 | Stop reason: HOSPADM

## 2023-09-21 RX ADMIN — GADOBUTROL 9 ML: 604.72 INJECTION INTRAVENOUS at 13:19

## 2023-09-22 NOTE — PROGRESS NOTES
MRI Cardiac Cardiomyopathy protocol  Contrast administered per weight based protocol.  Per Dr Martin

## 2023-09-25 ENCOUNTER — MYC MEDICAL ADVICE (OUTPATIENT)
Dept: FAMILY MEDICINE | Facility: CLINIC | Age: 73
End: 2023-09-25

## 2023-09-25 ENCOUNTER — TELEPHONE (OUTPATIENT)
Dept: FAMILY MEDICINE | Facility: CLINIC | Age: 73
End: 2023-09-25

## 2023-09-25 NOTE — TELEPHONE ENCOUNTER
Patient scheduled today on RN schedule for cosyntropin stim test at 8am.    Huddled with another RN, MA and nurse manager. Contacted  endocrinology clinic to see if they are able to accommodate patient and they report that they do not complete this test in clinic.     Writer called and notified patient of this and apologized for the inconvenience to patient. Patient is scheduled to establish care with [facility] endocrinology on Wednesday 9/27/23.    Advised that triage would follow up with team / PCP to determine next steps in facilitating cosyntropin stim test.    Guy Thomson RN  [facility]

## 2023-09-25 NOTE — TELEPHONE ENCOUNTER
Dr. Lebron,    ACTH stim test can no longer be done in clinic.  Needs to be done at infusion center.    Please review pended plan in the admit/therapy tab.  Please make sure dx is appropriate, and dosing of cosyntropin is appropriate.  Currently pended for 250mcg.    When signed, please route back to triage to follow up with infusion/pt.    Triage should check with infusion first to make sure this all looks good, then notify pt of plan that pt will need to have this done in infusion center.

## 2023-09-28 ENCOUNTER — TRANSFERRED RECORDS (OUTPATIENT)
Dept: HEALTH INFORMATION MANAGEMENT | Facility: CLINIC | Age: 73
End: 2023-09-28
Payer: MEDICARE

## 2023-10-03 DIAGNOSIS — I21.4 NSTEMI (NON-ST ELEVATED MYOCARDIAL INFARCTION) (H): ICD-10-CM

## 2023-10-03 RX ORDER — ATORVASTATIN CALCIUM 40 MG/1
40 TABLET, FILM COATED ORAL EVERY EVENING
Qty: 30 TABLET | Refills: 0 | Status: SHIPPED | OUTPATIENT
Start: 2023-10-03 | End: 2023-10-31

## 2023-10-03 RX ORDER — CLOPIDOGREL BISULFATE 75 MG/1
75 TABLET ORAL DAILY
Qty: 30 TABLET | Refills: 0 | Status: SHIPPED | OUTPATIENT
Start: 2023-10-03 | End: 2023-10-31

## 2023-10-03 NOTE — TELEPHONE ENCOUNTER
Medication Question or Refill        What medication are you calling about (include dose and sig)?: atorvastatin (LIPITOR) 40 MG tablet   &  clopidogrel (PLAVIX) 75 MG tablet     Preferred Pharmacy:   [facility] #[id number] - [facility] [address] AT [facility] [address]  [facility] [address]  Phone: [phone] Fax: [phone]      Controlled Substance Agreement on file:   CSA -- Patient Level:    CSA: None found at the patient level.       Who prescribed the medication?: Hospital staff    Do you need a refill? Yes, Pt is out    When did you use the medication last? 10.02.23    Patient offered an appointment? No, had hospital F/U 09.15.23    Do you have any questions or concerns?  No    Could we send this information to you in [facility] or would you prefer to receive a phone call?:   Patient would prefer a phone call   Okay to leave a detailed message?: Yes at Cell number on file:    Telephone Information:   Mobile [phone]

## 2023-10-12 ENCOUNTER — OFFICE VISIT (OUTPATIENT)
Dept: CARDIOLOGY | Facility: CLINIC | Age: 73
End: 2023-10-12
Payer: MEDICARE

## 2023-10-12 VITALS
WEIGHT: 149 LBS | SYSTOLIC BLOOD PRESSURE: 110 MMHG | HEIGHT: 66 IN | OXYGEN SATURATION: 94 % | HEART RATE: 76 BPM | DIASTOLIC BLOOD PRESSURE: 77 MMHG | BODY MASS INDEX: 23.95 KG/M2

## 2023-10-12 DIAGNOSIS — E78.2 MIXED HYPERLIPIDEMIA: ICD-10-CM

## 2023-10-12 DIAGNOSIS — E78.5 HYPERLIPIDEMIA LDL GOAL <70: ICD-10-CM

## 2023-10-12 DIAGNOSIS — I21.4 NSTEMI (NON-ST ELEVATED MYOCARDIAL INFARCTION) (H): Primary | ICD-10-CM

## 2023-10-12 DIAGNOSIS — I25.10 ASCVD (ARTERIOSCLEROTIC CARDIOVASCULAR DISEASE): ICD-10-CM

## 2023-10-12 PROCEDURE — 99214 OFFICE O/P EST MOD 30 MIN: CPT | Performed by: NURSE PRACTITIONER

## 2023-10-12 NOTE — PROGRESS NOTES
~Cardiology Clinic Visit~    Primary Cardiologist: Dr. England  Reason for visit: MRI and angiogram follow up    History of Present Illness    Laurel Parra is a very pleasant 73 year old female with a past medical history notable for chronic anxiety, osteoarthritis, chronic pain syndrome, migraine headaches, exertional lightheadedness and palpitations.  She previously followed with Dr. Carballo as far back as 2007, and most recently in 2020 saw Dr. Gil in consultation at her request for concerns of exertional LH and hypotension.    Prior to that encounter, she was seen in cardiology clinic in 2012 for evaluation of palpitations and atypical chest discomfort.  Stress echo showed a normal EF without objective signs of ischemia.  Holter monitor revealed only rare PVCs and atrial contractions.  At that time, her metoprolol was discontinued.      She saw Dr. Gil, she was concerned about exercise associated lightheadedness without true syncope.  Resting blood pressure is usually in the low 100 systolic range.  She feels an occasional hollowness in her chest without definite chest pressure.  She is intolerant to higher doses of Pamelor because of lightheadedness and falls, where she is previously reported 1 episode of falling without true syncope.  Repeat stress echo in 7/20/2020 was negative for objective evidence of ischemia, hypotension or sustained arrhythmias.  No further recommendations made at that time.    She then presented to the ER on 8/31/2023 with chest pain.  She described this as an acute onset of constant pain and pressure in her chest that improved with nitroglycerin.  Cardiology was consulted.  Echocardiogram on 9/1/2023 showed stable EF 60-55% with no regional WMA, no valvular pathologies.  For definitive ischemic evaluation, patient was agreeable to proceed with coronary angiogram during that hospitalization.    Lipid profile at this encounter showed , HDL 54, LDL  111, .    Coronary angiogram 9/1/2023 showed trivial, nonobstructive CAD.  No intervention performed.  Patient did acknowledge to being under a great deal of stress recently, however there is no echo evidence of stress cardiomyopathy.  Overall presentation representative of LOREE.    CMR 9/21/2023 showed normal LV and RV systolic functions with LVEF 66%, LGE showed small size mid inferior wall subendocardial delayed enhancement consistent with a small size infarct.  There is no effusion or intracardiac thrombus.    In follow up today, she is doing well from a cardiac standpoint.  She continues to have a great deal of anxiety about her many ongoing health concerns.  We reviewed her recent cardiology testing and discussed the pathophysiology of LOREE.  __________________________________________________________________         Assessment and Impression:     LOREE    -Trivial CAD on coronary angiogram.    -Normal LV function, no regional wall motion abnormalities on echocardiogram.    -Currently chest pain free.    -CMR with small mid inferior infarct; LV/RV function normal.  -On ASA, Plavix.    Chronic pain syndrome, being followed by pain clinic  Hyperlipidemia, .         Recommendations and Plan:     Stop Clopiodgrel.  Continue ASA 81 mg lifelong.  Continue statin.  Recheck FLP and ALT; will adjust statin therapy based on results.  Follow up in 1-year with Dr. England.  __________________________________________________________________    Thank you for the opportunity to participate in this pleasant patient's care.    We would be happy to see this patient sooner for any concerns in the meantime.    FAVIO Landrum, CNP   Nurse Practitioner  [facility]    Today's clinic visit entailed:  Review of the result(s) of each unique test - hospital records, cath, MRI, labs, ekg  Ordering of each unique test  Prescription drug management    The level of medical decision making  during this visit was of moderate complexity.    Orders this Visit:  Orders Placed This Encounter   Procedures    Lipid panel reflex to direct LDL Fasting    ALT    Follow-Up with Cardiology     No orders of the defined types were placed in this encounter.    There are no discontinued medications.  Encounter Diagnoses   Name Primary?    NSTEMI (non-ST elevated myocardial infarction) (H) Yes    ASCVD (arteriosclerotic cardiovascular disease)     Hyperlipidemia LDL goal <70     Mixed hyperlipidemia      CURRENT MEDICATIONS:  Current Outpatient Medications   Medication Sig Dispense Refill    aspirin 81 MG EC tablet Take 1 tablet (81 mg) by mouth daily 90 tablet 0    atorvastatin (LIPITOR) 40 MG tablet Take 1 tablet (40 mg) by mouth every evening 30 tablet 0    Baclofen (LIORESAL) 5 MG tablet Take 5 mg by mouth as needed for muscle spasms      budesonide (ENTOCORT EC) 3 MG EC capsule Take 6 mg by mouth every morning      carboxymethylcellulose (REFRESH PLUS) 0.5 % SOLN ophthalmic solution Place 1 drop into both eyes 3 times daily as needed for dry eyes      clopidogrel (PLAVIX) 75 MG tablet Take 1 tablet (75 mg) by mouth daily 30 tablet 0    estradiol (VIVELLE-DOT) 0.025 MG/24HR bi-weekly patch Place 1 patch onto the skin twice a week      HYDROcodone-Acetaminophen  MG TABS Take 1-2 tablets by mouth every 4 hours as needed (maximum of 10 tablets in 24 hour period) Prescription is written for 1-2 tabs every 4-6 hrs prn (max of 10 tabs daily).  0    ipratropium (ATROVENT) 0.06 % spray Spray 3 sprays into both nostrils daily 45 mL 3    loratadine (CLARITIN) 10 MG tablet Take 10 mg by mouth every morning       LORazepam (ATIVAN) 1 MG tablet Take 1 mg by mouth 4 times daily Prescription is written for 2 tabs 3 times daily.  But pt reports taking 1 tab 4 times daily. 30 tablet     methenamine hippurate (HIPREX) 1 g tablet TAKE 1 TABLET(1 GRAM) BY MOUTH TWICE DAILY 180 tablet 1    naloxone (NARCAN) 4 MG/0.1ML nasal spray  "Spray 1 spray (4 mg) into one nostril alternating nostrils as needed for opioid reversal Every 2-3 minutes in alternating nostrils 2 each 0    omeprazole (PRILOSEC) 40 MG DR capsule Take 40 mg by mouth 2 times daily (before meals)      ondansetron (ZOFRAN) 4 MG tablet Take 4 mg by mouth every 8 hours as needed for nausea or vomiting      PARoxetine (PAXIL-CR) 25 MG 24 hr tablet Take 25 mg by mouth 2 times daily      polyethylene glycol (MIRALAX) 17 GM/Dose powder Take 1 capful. by mouth as needed for constipation      progesterone (PROMETRIUM) 100 MG capsule Take 100 mg by mouth At Bedtime      propranolol ER (INDERAL LA) 60 MG 24 hr capsule Take 1 capsule (60 mg) by mouth daily (Patient taking differently: Take 60 mg by mouth at bedtime) 90 capsule 1    riboflavin 400 MG CAPS Take 400 mg by mouth daily      rimegepant (NURTEC) 75 MG ODT tablet Place 1 tablet (75 mg) under the tongue daily as needed for migraine Maximum of 1 tablet every 24 hours. 8 tablet 3    vitamin C (ASCORBIC ACID) 1000 MG TABS Take 1,000 mg by mouth every evening       Vitamin D3 (CHOLECALCIFEROL) 125 MCG (5000 UT) tablet Take 2 tablets by mouth every evening      zolpidem ER (AMBIEN CR) 12.5 MG CR tablet Take 12.5 mg by mouth At Bedtime       cyclobenzaprine (FLEXERIL) 10 MG tablet Take 10 mg by mouth daily as needed for muscle spasms  (Patient not taking: Reported on 10/12/2023)      nitroGLYcerin (NITROSTAT) 0.4 MG sublingual tablet For chest pain place 1 tablet under the tongue every 5 minutes for 3 doses. If symptoms persist 5 minutes after 1st dose call 911. (Patient not taking: Reported on 10/12/2023) 50 tablet 0     ALLERGIES     Allergies   Allergen Reactions    Betamethasone Other (See Comments)     agitation    Chocolate Other (See Comments)     Triggers migraines    Compazine [Prochlorperazine] Other (See Comments)     \"crawl out of my skin\"    Linzess [Linaclotide]     Penicillins Nausea and Vomiting and Hives    Pneumococcal " "Vaccine Other (See Comments)     Temporary paralization    Tizanidine Other (See Comments)     Severe agitation     PAST MEDICAL, SURGICAL, FAMILY, SOCIAL HISTORY:  History was reviewed and updated as needed, see medical record.    Review of Systems:  A 10-point Review Of Systems is otherwise normal except for that which is noted in the HPI and interval summary.    Physical Exam:    Vitals: /77   Pulse 76   Ht 1.676 m (5' 6\")   Wt 67.6 kg (149 lb)   SpO2 94%   BMI 24.05 kg/m    Constitutional: Appears stated age, well nourished, NAD.  Neck: Supple. Carotid pulses full and equal.   Respiratory: Non-labored. Lungs CTAB.  Cardiovascular: RRR, normal S1 and S2. No M/G/R.  No edema.  Musculoskeletal/Extremities: Symmetrical movement to all extremities.  Neurologic: No gross focal deficits. Alert, awake, and oriented to all spheres.  Psychiatric: Affect appropriate. Mentation normal.    Recent Lab Results:  LIPID RESULTS:  Lab Results   Component Value Date    CHOL 197 08/31/2023    CHOL 244 (H) 11/19/2019    HDL 54 08/31/2023    HDL 66 11/19/2019     (H) 08/31/2023     (H) 11/19/2019    TRIG 162 (H) 08/31/2023    TRIG 266 (H) 11/19/2019    CHOLHDLRATIO 3.4 10/23/2015     LIVER ENZYME RESULTS:  Lab Results   Component Value Date    AST 20 08/31/2023    AST 18 12/17/2020    ALT 15 08/31/2023    ALT 18 12/17/2020     CBC RESULTS:  Lab Results   Component Value Date    WBC 12.1 (H) 08/31/2023    WBC 7.1 12/17/2020    RBC 4.05 08/31/2023    RBC 4.24 12/17/2020    HGB 12.5 08/31/2023    HGB 13.4 12/17/2020    HCT 38.8 08/31/2023    HCT 40.6 12/17/2020    MCV 96 08/31/2023    MCV 96 12/17/2020    MCH 30.9 08/31/2023    MCH 31.6 12/17/2020    MCHC 32.2 08/31/2023    MCHC 33.0 12/17/2020    RDW 13.1 08/31/2023    RDW 12.3 12/17/2020     08/31/2023     12/17/2020     BMP RESULTS:  Lab Results   Component Value Date     08/31/2023     12/17/2020    POTASSIUM 4.0 09/01/2023    " "POTASSIUM 4.2 12/17/2020    CHLORIDE 103 08/31/2023    CHLORIDE 103 12/17/2020    CO2 26 08/31/2023    CO2 27 12/17/2020    ANIONGAP 13 08/31/2023    ANIONGAP 6 12/17/2020     (H) 08/31/2023    GLC 85 12/17/2020    BUN 12.7 08/31/2023    BUN 10 12/17/2020    CR 0.72 08/31/2023    CR 0.61 04/06/2021    GFRESTIMATED 89 08/31/2023    GFRESTIMATED >90 04/06/2021    GFRESTBLACK >90 04/06/2021    THOR 9.2 08/31/2023    THOR 8.8 12/17/2020      A1C RESULTS:  Lab Results   Component Value Date    A1C 5.3 12/15/2016     INR RESULTS:  No results found for: \"INR\"                  "

## 2023-10-12 NOTE — PATIENT INSTRUCTIONS
Thank you for your visit with the [facility] today.    Today's Summary     Ok to stop Clopidogrel (Plavix).  Continue Aspirin 81 mg lifelong.  Continue statin (cholesterol medication).  Recheck your cholesterol level and liver in about 1-month.  Fasting please.  Follow up in 1-year with Dr. England.    If you have questions or concerns, please do not hesitate to call my nursing support team at [phone].    Scheduling phone number: [phone]  [facility] Clinic Number: [phone]    It was a pleasure seeing you today.     FAVIO Landrum, CNP  Nurse Practitioner  [facility]  October 12, 2023  ________________________________________________________

## 2023-10-12 NOTE — LETTER
10/12/2023    Georges Lebron MD  [address]  [place] [address]    RE: Laurel Parra       Dear Colleague,     I had the pleasure of seeing Laurel Parra in the [facility].              ~Cardiology Clinic Visit~    Primary Cardiologist: Dr. England  Reason for visit: MRI and angiogram follow up    History of Present Illness    Laurel Parra is a very pleasant 73 year old female with a past medical history notable for chronic anxiety, osteoarthritis, chronic pain syndrome, migraine headaches, exertional lightheadedness and palpitations.  She previously followed with Dr. Carballo as far back as 2007, and most recently in 2020 saw Dr. Gil in consultation at her request for concerns of exertional LH and hypotension.    Prior to that encounter, she was seen in cardiology clinic in 2012 for evaluation of palpitations and atypical chest discomfort.  Stress echo showed a normal EF without objective signs of ischemia.  Holter monitor revealed only rare PVCs and atrial contractions.  At that time, her metoprolol was discontinued.      She saw Dr. Gil, she was concerned about exercise associated lightheadedness without true syncope.  Resting blood pressure is usually in the low 100 systolic range.  She feels an occasional hollowness in her chest without definite chest pressure.  She is intolerant to higher doses of Pamelor because of lightheadedness and falls, where she is previously reported 1 episode of falling without true syncope.  Repeat stress echo in 7/20/2020 was negative for objective evidence of ischemia, hypotension or sustained arrhythmias.  No further recommendations made at that time.    She then presented to the ER on 8/31/2023 with chest pain.  She described this as an acute onset of constant pain and pressure in her chest that improved with nitroglycerin.  Cardiology was consulted.  Echocardiogram on 9/1/2023 showed stable EF 60-55% with  no regional WMA, no valvular pathologies.  For definitive ischemic evaluation, patient was agreeable to proceed with coronary angiogram during that hospitalization.    Lipid profile at this encounter showed , HDL 54, , .    Coronary angiogram 9/1/2023 showed trivial, nonobstructive CAD.  No intervention performed.  Patient did acknowledge to being under a great deal of stress recently, however there is no echo evidence of stress cardiomyopathy.  Overall presentation representative of LOREE.    CMR 9/21/2023 showed normal LV and RV systolic functions with LVEF 66%, LGE showed small size mid inferior wall subendocardial delayed enhancement consistent with a small size infarct.  There is no effusion or intracardiac thrombus.    In follow up today, she is doing well from a cardiac standpoint.  She continues to have a great deal of anxiety about her many ongoing health concerns.  We reviewed her recent cardiology testing and discussed the pathophysiology of LOREE.  __________________________________________________________________         Assessment and Impression:     LOREE    -Trivial CAD on coronary angiogram.    -Normal LV function, no regional wall motion abnormalities on echocardiogram.    -Currently chest pain free.    -CMR with small mid inferior infarct; LV/RV function normal.  -On ASA, Plavix.    Chronic pain syndrome, being followed by pain clinic  Hyperlipidemia, .         Recommendations and Plan:     Stop Clopiodgrel.  Continue ASA 81 mg lifelong.  Continue statin.  Recheck FLP and ALT; will adjust statin therapy based on results.  Follow up in 1-year with Dr. England.  __________________________________________________________________    Thank you for the opportunity to participate in this pleasant patient's care.    We would be happy to see this patient sooner for any concerns in the meantime.    Meg Warren, APRN, CNP   Nurse Practitioner  [facility]  Care    Today's clinic visit entailed:  Review of the result(s) of each unique test - hospital records, cath, MRI, labs, ekg  Ordering of each unique test  Prescription drug management    The level of medical decision making during this visit was of moderate complexity.    Orders this Visit:  Orders Placed This Encounter   Procedures    Lipid panel reflex to direct LDL Fasting    ALT    Follow-Up with Cardiology     No orders of the defined types were placed in this encounter.    There are no discontinued medications.  Encounter Diagnoses   Name Primary?    NSTEMI (non-ST elevated myocardial infarction) (H) Yes    ASCVD (arteriosclerotic cardiovascular disease)     Hyperlipidemia LDL goal <70     Mixed hyperlipidemia      CURRENT MEDICATIONS:  Current Outpatient Medications   Medication Sig Dispense Refill    aspirin 81 MG EC tablet Take 1 tablet (81 mg) by mouth daily 90 tablet 0    atorvastatin (LIPITOR) 40 MG tablet Take 1 tablet (40 mg) by mouth every evening 30 tablet 0    Baclofen (LIORESAL) 5 MG tablet Take 5 mg by mouth as needed for muscle spasms      budesonide (ENTOCORT EC) 3 MG EC capsule Take 6 mg by mouth every morning      carboxymethylcellulose (REFRESH PLUS) 0.5 % SOLN ophthalmic solution Place 1 drop into both eyes 3 times daily as needed for dry eyes      clopidogrel (PLAVIX) 75 MG tablet Take 1 tablet (75 mg) by mouth daily 30 tablet 0    estradiol (VIVELLE-DOT) 0.025 MG/24HR bi-weekly patch Place 1 patch onto the skin twice a week      HYDROcodone-Acetaminophen  MG TABS Take 1-2 tablets by mouth every 4 hours as needed (maximum of 10 tablets in 24 hour period) Prescription is written for 1-2 tabs every 4-6 hrs prn (max of 10 tabs daily).  0    ipratropium (ATROVENT) 0.06 % spray Spray 3 sprays into both nostrils daily 45 mL 3    loratadine (CLARITIN) 10 MG tablet Take 10 mg by mouth every morning       LORazepam (ATIVAN) 1 MG tablet Take 1 mg by mouth 4 times daily Prescription is written  for 2 tabs 3 times daily.  But pt reports taking 1 tab 4 times daily. 30 tablet     methenamine hippurate (HIPREX) 1 g tablet TAKE 1 TABLET(1 GRAM) BY MOUTH TWICE DAILY 180 tablet 1    naloxone (NARCAN) 4 MG/0.1ML nasal spray Spray 1 spray (4 mg) into one nostril alternating nostrils as needed for opioid reversal Every 2-3 minutes in alternating nostrils 2 each 0    omeprazole (PRILOSEC) 40 MG DR capsule Take 40 mg by mouth 2 times daily (before meals)      ondansetron (ZOFRAN) 4 MG tablet Take 4 mg by mouth every 8 hours as needed for nausea or vomiting      PARoxetine (PAXIL-CR) 25 MG 24 hr tablet Take 25 mg by mouth 2 times daily      polyethylene glycol (MIRALAX) 17 GM/Dose powder Take 1 capful. by mouth as needed for constipation      progesterone (PROMETRIUM) 100 MG capsule Take 100 mg by mouth At Bedtime      propranolol ER (INDERAL LA) 60 MG 24 hr capsule Take 1 capsule (60 mg) by mouth daily (Patient taking differently: Take 60 mg by mouth at bedtime) 90 capsule 1    riboflavin 400 MG CAPS Take 400 mg by mouth daily      rimegepant (NURTEC) 75 MG ODT tablet Place 1 tablet (75 mg) under the tongue daily as needed for migraine Maximum of 1 tablet every 24 hours. 8 tablet 3    vitamin C (ASCORBIC ACID) 1000 MG TABS Take 1,000 mg by mouth every evening       Vitamin D3 (CHOLECALCIFEROL) 125 MCG (5000 UT) tablet Take 2 tablets by mouth every evening      zolpidem ER (AMBIEN CR) 12.5 MG CR tablet Take 12.5 mg by mouth At Bedtime       cyclobenzaprine (FLEXERIL) 10 MG tablet Take 10 mg by mouth daily as needed for muscle spasms  (Patient not taking: Reported on 10/12/2023)      nitroGLYcerin (NITROSTAT) 0.4 MG sublingual tablet For chest pain place 1 tablet under the tongue every 5 minutes for 3 doses. If symptoms persist 5 minutes after 1st dose call 911. (Patient not taking: Reported on 10/12/2023) 50 tablet 0     ALLERGIES     Allergies   Allergen Reactions    Betamethasone Other (See Comments)     agitation     "Chocolate Other (See Comments)     Triggers migraines    Compazine [Prochlorperazine] Other (See Comments)     \"crawl out of my skin\"    Linzess [Linaclotide]     Penicillins Nausea and Vomiting and Hives    Pneumococcal Vaccine Other (See Comments)     Temporary paralization    Tizanidine Other (See Comments)     Severe agitation     PAST MEDICAL, SURGICAL, FAMILY, SOCIAL HISTORY:  History was reviewed and updated as needed, see medical record.    Review of Systems:  A 10-point Review Of Systems is otherwise normal except for that which is noted in the HPI and interval summary.    Physical Exam:    Vitals: /77   Pulse 76   Ht 1.676 m (5' 6\")   Wt 67.6 kg (149 lb)   SpO2 94%   BMI 24.05 kg/m    Constitutional: Appears stated age, well nourished, NAD.  Neck: Supple. Carotid pulses full and equal.   Respiratory: Non-labored. Lungs CTAB.  Cardiovascular: RRR, normal S1 and S2. No M/G/R.  No edema.  Musculoskeletal/Extremities: Symmetrical movement to all extremities.  Neurologic: No gross focal deficits. Alert, awake, and oriented to all spheres.  Psychiatric: Affect appropriate. Mentation normal.    Recent Lab Results:  LIPID RESULTS:  Lab Results   Component Value Date    CHOL 197 08/31/2023    CHOL 244 (H) 11/19/2019    HDL 54 08/31/2023    HDL 66 11/19/2019     (H) 08/31/2023     (H) 11/19/2019    TRIG 162 (H) 08/31/2023    TRIG 266 (H) 11/19/2019    CHOLHDLRATIO 3.4 10/23/2015     LIVER ENZYME RESULTS:  Lab Results   Component Value Date    AST 20 08/31/2023    AST 18 12/17/2020    ALT 15 08/31/2023    ALT 18 12/17/2020     CBC RESULTS:  Lab Results   Component Value Date    WBC 12.1 (H) 08/31/2023    WBC 7.1 12/17/2020    RBC 4.05 08/31/2023    RBC 4.24 12/17/2020    HGB 12.5 08/31/2023    HGB 13.4 12/17/2020    HCT 38.8 08/31/2023    HCT 40.6 12/17/2020    MCV 96 08/31/2023    MCV 96 12/17/2020    MCH 30.9 08/31/2023    MCH 31.6 12/17/2020    MCHC 32.2 08/31/2023    MCHC 33.0 12/17/2020 " "   RDW 13.1 08/31/2023    RDW 12.3 12/17/2020     08/31/2023     12/17/2020     BMP RESULTS:  Lab Results   Component Value Date     08/31/2023     12/17/2020    POTASSIUM 4.0 09/01/2023    POTASSIUM 4.2 12/17/2020    CHLORIDE 103 08/31/2023    CHLORIDE 103 12/17/2020    CO2 26 08/31/2023    CO2 27 12/17/2020    ANIONGAP 13 08/31/2023    ANIONGAP 6 12/17/2020     (H) 08/31/2023    GLC 85 12/17/2020    BUN 12.7 08/31/2023    BUN 10 12/17/2020    CR 0.72 08/31/2023    CR 0.61 04/06/2021    GFRESTIMATED 89 08/31/2023    GFRESTIMATED >90 04/06/2021    GFRESTBLACK >90 04/06/2021    THOR 9.2 08/31/2023    THOR 8.8 12/17/2020      A1C RESULTS:  Lab Results   Component Value Date    A1C 5.3 12/15/2016     INR RESULTS:  No results found for: \"INR\"      Thank you for allowing me to participate in the care of your patient.      Sincerely,     FAVIO Landrum [facility]  cc:   Mayelin Armendariz PA-C  [address]  [place] [id number]      "

## 2023-10-30 ENCOUNTER — TRANSFERRED RECORDS (OUTPATIENT)
Dept: HEALTH INFORMATION MANAGEMENT | Facility: CLINIC | Age: 73
End: 2023-10-30
Payer: MEDICARE

## 2023-10-31 DIAGNOSIS — I21.4 NSTEMI (NON-ST ELEVATED MYOCARDIAL INFARCTION) (H): ICD-10-CM

## 2023-10-31 RX ORDER — CLOPIDOGREL BISULFATE 75 MG/1
75 TABLET ORAL DAILY
Qty: 90 TABLET | Refills: 0 | Status: SHIPPED | OUTPATIENT
Start: 2023-10-31 | End: 2023-11-09

## 2023-10-31 RX ORDER — ATORVASTATIN CALCIUM 40 MG/1
40 TABLET, FILM COATED ORAL EVERY EVENING
Qty: 90 TABLET | Refills: 2 | Status: SHIPPED | OUTPATIENT
Start: 2023-10-31

## 2023-10-31 NOTE — TELEPHONE ENCOUNTER
Prescription approved per [facility] Refill Protocol.  Anne Villegas, RN  [facility] Triage Nurse

## 2023-11-08 ENCOUNTER — TELEPHONE (OUTPATIENT)
Dept: CARDIOLOGY | Facility: CLINIC | Age: 73
End: 2023-11-08
Payer: MEDICARE

## 2023-11-08 NOTE — TELEPHONE ENCOUNTER
[facility] Call Center    Phone Message    May a detailed message be left on voicemail: yes     Reason for Call: Other: Pt would like  a call back to discuss if she can have a root cancal after her heart attack , Please reach out to discuss     Action Taken: Other: Cardio    Travel Screening: Not Applicable

## 2023-11-09 NOTE — TELEPHONE ENCOUNTER
Pt did not have a recent stent placed and is not taking Plavix daily. Will remove from med list.   Pt should be okay to go forward with a root canal and informed pt about that.   Informed pt that dental provider will just need to know what medications she takes daily.   Pt gave verbal understanding.

## 2023-11-20 DIAGNOSIS — G44.209 TENSION HEADACHE: ICD-10-CM

## 2023-11-20 DIAGNOSIS — G43.109 MIGRAINE WITH AURA AND WITHOUT STATUS MIGRAINOSUS, NOT INTRACTABLE: ICD-10-CM

## 2023-11-21 ENCOUNTER — TRANSFERRED RECORDS (OUTPATIENT)
Dept: HEALTH INFORMATION MANAGEMENT | Facility: CLINIC | Age: 73
End: 2023-11-21
Payer: MEDICARE

## 2023-11-21 RX ORDER — PROPRANOLOL HCL 60 MG
60 CAPSULE, EXTENDED RELEASE 24HR ORAL DAILY
Qty: 90 CAPSULE | Refills: 0 | Status: SHIPPED | OUTPATIENT
Start: 2023-11-21 | End: 2024-01-15

## 2023-11-21 NOTE — TELEPHONE ENCOUNTER
Prescription approved per [facility] Refill Protocol.  Stephanie MARY RN, BSN  [facility]

## 2023-12-06 ENCOUNTER — TRANSFERRED RECORDS (OUTPATIENT)
Dept: HEALTH INFORMATION MANAGEMENT | Facility: CLINIC | Age: 73
End: 2023-12-06
Payer: MEDICARE

## 2023-12-07 ENCOUNTER — LAB (OUTPATIENT)
Dept: LAB | Facility: CLINIC | Age: 73
End: 2023-12-07
Payer: MEDICARE

## 2023-12-07 DIAGNOSIS — E78.2 MIXED HYPERLIPIDEMIA: ICD-10-CM

## 2023-12-07 LAB
ALT SERPL W P-5'-P-CCNC: 14 U/L (ref 0–50)
CHOLEST SERPL-MCNC: 140 MG/DL
FASTING STATUS PATIENT QL REPORTED: YES
HDLC SERPL-MCNC: 55 MG/DL
LDLC SERPL CALC-MCNC: 65 MG/DL
NONHDLC SERPL-MCNC: 85 MG/DL
TRIGL SERPL-MCNC: 101 MG/DL

## 2023-12-07 PROCEDURE — 80061 LIPID PANEL: CPT

## 2023-12-07 PROCEDURE — 84460 ALANINE AMINO (ALT) (SGPT): CPT

## 2023-12-07 PROCEDURE — 36415 COLL VENOUS BLD VENIPUNCTURE: CPT

## 2023-12-19 ENCOUNTER — TRANSFERRED RECORDS (OUTPATIENT)
Dept: HEALTH INFORMATION MANAGEMENT | Facility: CLINIC | Age: 73
End: 2023-12-19
Payer: MEDICARE

## 2023-12-20 ENCOUNTER — TRANSFERRED RECORDS (OUTPATIENT)
Dept: HEALTH INFORMATION MANAGEMENT | Facility: CLINIC | Age: 73
End: 2023-12-20
Payer: MEDICARE

## 2024-01-11 ENCOUNTER — TRANSFERRED RECORDS (OUTPATIENT)
Dept: HEALTH INFORMATION MANAGEMENT | Facility: CLINIC | Age: 74
End: 2024-01-11
Payer: MEDICARE

## 2024-01-12 ENCOUNTER — TELEPHONE (OUTPATIENT)
Dept: NEUROLOGY | Facility: CLINIC | Age: 74
End: 2024-01-12
Payer: MEDICARE

## 2024-01-12 NOTE — TELEPHONE ENCOUNTER
Patient was called and left an appointment reminder message for Neurology visit with dr. Castanon on 1-15-24

## 2024-01-15 ENCOUNTER — VIRTUAL VISIT (OUTPATIENT)
Dept: NEUROLOGY | Facility: CLINIC | Age: 74
End: 2024-01-15
Payer: MEDICARE

## 2024-01-15 ENCOUNTER — TELEPHONE (OUTPATIENT)
Dept: NEUROLOGY | Facility: CLINIC | Age: 74
End: 2024-01-15

## 2024-01-15 ENCOUNTER — HOSPITAL ENCOUNTER (OUTPATIENT)
Dept: MAMMOGRAPHY | Facility: CLINIC | Age: 74
Discharge: HOME | End: 2024-01-15
Attending: NURSE PRACTITIONER
Payer: MEDICARE

## 2024-01-15 DIAGNOSIS — G44.209 TENSION HEADACHE: ICD-10-CM

## 2024-01-15 DIAGNOSIS — N64.4 BREAST PAIN: ICD-10-CM

## 2024-01-15 DIAGNOSIS — G43.109 MIGRAINE WITH AURA AND WITHOUT STATUS MIGRAINOSUS, NOT INTRACTABLE: Primary | ICD-10-CM

## 2024-01-15 DIAGNOSIS — M54.81 BILATERAL OCCIPITAL NEURALGIA: ICD-10-CM

## 2024-01-15 PROCEDURE — 99214 OFFICE O/P EST MOD 30 MIN: CPT | Mod: 95 | Performed by: PSYCHIATRY & NEUROLOGY

## 2024-01-15 PROCEDURE — 76642 ULTRASOUND BREAST LIMITED: CPT | Mod: 50

## 2024-01-15 PROCEDURE — 77062 BREAST TOMOSYNTHESIS BI: CPT

## 2024-01-15 RX ORDER — PROPRANOLOL HCL 60 MG
60 CAPSULE, EXTENDED RELEASE 24HR ORAL DAILY
Qty: 90 CAPSULE | Refills: 1 | Status: SHIPPED | OUTPATIENT
Start: 2024-01-15 | End: 2024-08-08

## 2024-01-15 ASSESSMENT — PATIENT HEALTH QUESTIONNAIRE - PHQ9: SUM OF ALL RESPONSES TO PHQ QUESTIONS 1-9: 3

## 2024-01-15 NOTE — TELEPHONE ENCOUNTER
[facility] Call Center    Phone Message    May a detailed message be left on voicemail: yes     Reason for Call:  Patient called to request to switch to a video visit with  for today, per pt she originally scheduled her appt as a video but this was somehow changed. Please review and assist if pt can switch to video, pt asking for a call back ASAP    Action Taken: Other: cs neurology    Travel Screening: Not Applicable

## 2024-01-15 NOTE — PROGRESS NOTES
".ESTABLISHED NEUROLOGY TELEMEDICINE VISIT NOTE.    DATE OF VISIT: 1/15/2024  MRN: [id number]  PATIENT NAME: Laurel Parra  YOB: 1950    Laurel Parra is a 73 year old female who is being evaluated via a billable video visit.      The patient has been notified of following:     \"This video visit will be conducted via a call between you and your physician/provider. We have found that certain health care needs can be provided without the need for an in-person physical exam.  This service lets us provide the care you need with a video conversation.  If a prescription is necessary we can send it directly to your pharmacy.  If lab work is needed we can place an order for that and you can then stop by our lab to have the test done at a later time.    Video visits are billed at different rates depending on your insurance coverage.  Please reach out to your insurance provider with any questions.    If during the course of the call the physician/provider feels a video visit is not appropriate, you will not be charged for this service.\"    Patient has given verbal consent for Video visit? Yes    Will anyone else be joining your video visit? No  {If patient encounters technical issues they should call [phone].    I have reviewed and updated the patient's Past Medical History, Social History, Family History and Medication List.    France Li Clinic Assistant     Additional provider notes:    This is a telemedicine visit that was initiated by the patient and performed with the originating site at patient's home and the distant location, as specified below.  Verbal consent to participate in video visit was obtained.  I discussed with the patient the nature of our telemedicine visits, that:  - I would evaluate the patient and recommend diagnostics and treatments based on my assessment  - Our sessions are not being recorded and that personal health information is protected  - Our team " would provide follow-up care in person if/when the patient needs it.    REASON FOR VISIT:   Chief Complaint   Patient presents with    Follow Up     Migraines- doing well only had 1-2 migraines since last visit      HISTORY OF PRESENT ILLNESS:                                                    Ms. Laurel Parra is 73 year old female patient, who was evaluated today by telemedicine visit for multifactorial headaches.  The last visit was on 9/15/2023. Please refer to my initial/other prior notes for further information.    Since the last visit, the patient reports that her headaches are well-controlled.  She only had 1 or possibly 2 migraines.  She is on 60 mg of propranolol and 400 mg of riboflavin daily for headache prevention.  She feels that Nurtec is working well, though it is expensive.  Bilateral occipital pain did not recur.  No interval development of new focal neurological symptoms.  EXAM:                                                    General: pt is in NAD, cooperative.  Skin: no lesions/rashes noticed.  HEENT: ATNC.  Neurological:  awake, cooperative, follows commands, no aphasia or dysarthria noted, cranial nerves II-XII: no ptosis, face is symmetric, equally moves upper extremities.  ASSESSMENT AND PLAN:      ASSESSMENT: Laurel Parra is a 73 year old female patient, who is evaluated via a telemedicine follow-up visit for migraine, tension headache and bilateral occipital neuralgia.      She reports that her symptoms are well-controlled.  In the past we discussed that for headache prevention her additional treatment options include Ravindra-relief, topiramate, and CGRP antagonists.  The dose of propranolol could be increased.  However, at the present time no medication changes are needed, based on reported good control of her headaches.  I refilled her propranolol and Nurtec prescriptions.    Regarding her bilateral occipital neuralgia, in the past we reviewed additional treatment  options, but no changes in the management are necessary at this time.  Will continue to monitor.    DIAGNOSES:    ICD-10-CM    1. Migraine with aura and without status migrainosus, not intractable  G43.109       2. Tension headache  G44.209       3. Bilateral occipital neuralgia  M54.81         PLAN: At today's visit we thoroughly discussed current symptoms, available treatment options, and the plan.  I am pleased to hear that patient's symptoms are well-controlled at the present time.    We decided not to make any medication changes.  Propranolol and Nurtec prescriptions were refilled.    I advised the patient to keep the headache diary and bring it to the next follow-up visit or upload via My Chart.     Next follow-up appointment is in the next 6 months or earlier if needed.    Video-Visit Details    Type of service:  Video Visit    Video Start Time: 11:27 AM.    Video End Time (time video stopped): 11:33 AM.    Originating Location (pt. Location): Home    Distant Location (provider location):  [facility]     Mode of Communication:  Video Conference via [place]    Total Time: 21 minutes.  6 minutes were spent in video call and the rest for chart review in preparation for this visit and documentation.    Ej Castanon MD.    [facility]    (Chart documentation was completed in part with Dragon voice-recognition software. Even though reviewed, some grammatical, spelling, and word errors may remain.)

## 2024-01-15 NOTE — PROGRESS NOTES
ESTABLISHED PATIENT NEUROLOGY NOTE    DATE OF VISIT: 1/15/2024  CLINIC LOCATION: [facility]  MRN: [id number]  PATIENT NAME: Laurel Parra  YOB: 1950    REASON FOR VISIT: No chief complaint on file.    SUBJECTIVE:                                                      HISTORY OF PRESENT ILLNESS: Patient is here to follow up regarding   EXAM:                                                    Physical Exam:   Vitals: There were no vitals taken for this visit.    General: pt is in NAD, cooperative.  Skin: normal turgor, moist mucous membranes, no lesions/rashes noticed.  HEENT: ATNC, white sclera, normal conjunctiva.  Respiratory: Symmetric lung excursion, no accessory respiratory muscle use.  Abdomen: Non distended.  Neurological: awake, cooperative, follows commands, no exam changes compared to the previous visits.  ASSESSMENT AND PLAN:                                                    Assessment: 73 year old female patient presents for follow-up of     Diagnoses:    ICD-10-CM    1. Migraine with aura and without status migrainosus, not intractable  G43.109       2. Tension headache  G44.209       3. Bilateral occipital neuralgia  M54.81         Plan:  There are no Patient Instructions on file for this visit.    Total Time: *** minutes spent on the date of the encounter doing chart review, history and exam, documentation and further activities per the note.    Ej Castanon MD  [facility]  (Chart documentation was completed in part with Dragon voice-recognition software. Even though reviewed, some grammatical, spelling, and word errors may remain.)

## 2024-01-15 NOTE — PATIENT INSTRUCTIONS
AFTER VISIT SUMMARY (AVS):    At today's visit we thoroughly discussed current symptoms, available treatment options, and the plan.  I am pleased to hear that your symptoms are well-controlled at the present time.    We decided not to make any medication changes.  Propranolol and Nurtec prescriptions were refilled.    Please keep the headache diary and bring it to the next follow-up visit or upload via My Chart.     Next follow-up appointment is in the next 6 months or earlier if needed.    Please do not hesitate to call me with any questions or concerns.    Thanks.

## 2024-01-15 NOTE — LETTER
"    1/15/2024         RE: Laurel Parra  [address]  [facility] [address]        Dear Colleague,    Thank you for referring your patient, Laurel Parra, to the [facility]. Please see a copy of my visit note below.    .ESTABLISHED NEUROLOGY TELEMEDICINE VISIT NOTE.    DATE OF VISIT: 1/15/2024  MRN: [id number]  PATIENT NAME: Laurel Parra  YOB: 1950    aLurel Parra is a 73 year old female who is being evaluated via a billable video visit.      The patient has been notified of following:     \"This video visit will be conducted via a call between you and your physician/provider. We have found that certain health care needs can be provided without the need for an in-person physical exam.  This service lets us provide the care you need with a video conversation.  If a prescription is necessary we can send it directly to your pharmacy.  If lab work is needed we can place an order for that and you can then stop by our lab to have the test done at a later time.    Video visits are billed at different rates depending on your insurance coverage.  Please reach out to your insurance provider with any questions.    If during the course of the call the physician/provider feels a video visit is not appropriate, you will not be charged for this service.\"    Patient has given verbal consent for Video visit? Yes    Will anyone else be joining your video visit? No  {If patient encounters technical issues they should call [phone].    I have reviewed and updated the patient's Past Medical History, Social History, Family History and Medication List.    France Li Clinic Assistant     Additional provider notes:    This is a telemedicine visit that was initiated by the patient and performed with the originating site at patient's home and the distant location, as specified below.  Verbal consent to participate in video visit " was obtained.  I discussed with the patient the nature of our telemedicine visits, that:  - I would evaluate the patient and recommend diagnostics and treatments based on my assessment  - Our sessions are not being recorded and that personal health information is protected  - Our team would provide follow-up care in person if/when the patient needs it.    REASON FOR VISIT:   Chief Complaint   Patient presents with     Follow Up     Migraines- doing well only had 1-2 migraines since last visit      HISTORY OF PRESENT ILLNESS:                                                    Ms. Laurel Parra is 73 year old female patient, who was evaluated today by telemedicine visit for multifactorial headaches.  The last visit was on 9/15/2023. Please refer to my initial/other prior notes for further information.    Since the last visit, the patient reports that her headaches are well-controlled.  She only had 1 or possibly 2 migraines.  She is on 60 mg of propranolol and 400 mg of riboflavin daily for headache prevention.  She feels that Nurtec is working well, though it is expensive.  Bilateral occipital pain did not recur.  No interval development of new focal neurological symptoms.  EXAM:                                                    General: pt is in NAD, cooperative.  Skin: no lesions/rashes noticed.  HEENT: ATNC.  Neurological:  awake, cooperative, follows commands, no aphasia or dysarthria noted, cranial nerves II-XII: no ptosis, face is symmetric, equally moves upper extremities.  ASSESSMENT AND PLAN:      ASSESSMENT: Laurel Parra is a 73 year old female patient, who is evaluated via a telemedicine follow-up visit for migraine, tension headache and bilateral occipital neuralgia.      She reports that her symptoms are well-controlled.  In the past we discussed that for headache prevention her additional treatment options include Ravindra-relief, topiramate, and CGRP antagonists.  The dose of  propranolol could be increased.  However, at the present time no medication changes are needed, based on reported good control of her headaches.  I refilled her propranolol and Nurtec prescriptions.    Regarding her bilateral occipital neuralgia, in the past we reviewed additional treatment options, but no changes in the management are necessary at this time.  Will continue to monitor.    DIAGNOSES:    ICD-10-CM    1. Migraine with aura and without status migrainosus, not intractable  G43.109       2. Tension headache  G44.209       3. Bilateral occipital neuralgia  M54.81         PLAN: At today's visit we thoroughly discussed current symptoms, available treatment options, and the plan.  I am pleased to hear that patient's symptoms are well-controlled at the present time.    We decided not to make any medication changes.  Propranolol and Nurtec prescriptions were refilled.    I advised the patient to keep the headache diary and bring it to the next follow-up visit or upload via My Chart.     Next follow-up appointment is in the next 6 months or earlier if needed.    Video-Visit Details    Type of service:  Video Visit    Video Start Time: 11:27 AM.    Video End Time (time video stopped): 11:33 AM.    Originating Location (pt. Location): Home    Distant Location (provider location):  [facility] NEUROLOG[facility]     Mode of Communication:  Video Conference via [employer]    Total Time: 21 minutes.  6 minutes were spent in video call and the rest for chart review in preparation for this visit and documentation.    Ej Castanon MD.    [facility] Neurology    (Chart documentation was completed in part with Dragon voice-recognition software. Even though reviewed, some grammatical, spelling, and word errors may remain.)      Again, thank you for allowing me to participate in the care of your patient.        Sincerely,        Ej Castanon MD

## 2024-01-24 ENCOUNTER — TRANSFERRED RECORDS (OUTPATIENT)
Dept: HEALTH INFORMATION MANAGEMENT | Facility: CLINIC | Age: 74
End: 2024-01-24
Payer: MEDICARE

## 2024-02-12 NOTE — RESULT ENCOUNTER NOTE
As you can see the CT scan looks fine.  There is nothing structurally that I can see to cause your facial discomfort.  I am not sure what the cause of this is but I am reassured by this.  I think the next step would be to see an oral surgeon at some point and see what they say.  Dr. Fortino Lemus or Dr. Aramis Siddiqi are very good oral surgeons and I would suggest seeing one of them.    Let me know if you have any questions.    Georges Lebron M.D.   Has only been two days, likely can be Viral. Recommend testing for COVID. Can use OTC antihistamines and or nasal sprays to help. If persists, can schedule an acute. Thank you!

## 2024-02-27 ENCOUNTER — TRANSFERRED RECORDS (OUTPATIENT)
Dept: HEALTH INFORMATION MANAGEMENT | Facility: CLINIC | Age: 74
End: 2024-02-27
Payer: MEDICARE

## 2024-03-26 ENCOUNTER — TRANSFERRED RECORDS (OUTPATIENT)
Dept: HEALTH INFORMATION MANAGEMENT | Facility: CLINIC | Age: 74
End: 2024-03-26
Payer: MEDICARE

## 2024-03-27 ENCOUNTER — TRANSFERRED RECORDS (OUTPATIENT)
Dept: HEALTH INFORMATION MANAGEMENT | Facility: CLINIC | Age: 74
End: 2024-03-27
Payer: MEDICARE

## 2024-04-02 ENCOUNTER — TRANSFERRED RECORDS (OUTPATIENT)
Dept: HEALTH INFORMATION MANAGEMENT | Facility: CLINIC | Age: 74
End: 2024-04-02
Payer: MEDICARE

## 2024-04-09 ENCOUNTER — TRANSFERRED RECORDS (OUTPATIENT)
Dept: HEALTH INFORMATION MANAGEMENT | Facility: CLINIC | Age: 74
End: 2024-04-09
Payer: MEDICARE

## 2024-04-16 ENCOUNTER — TRANSFERRED RECORDS (OUTPATIENT)
Dept: HEALTH INFORMATION MANAGEMENT | Facility: CLINIC | Age: 74
End: 2024-04-16
Payer: MEDICARE

## 2024-04-29 ENCOUNTER — TRANSFERRED RECORDS (OUTPATIENT)
Dept: HEALTH INFORMATION MANAGEMENT | Facility: CLINIC | Age: 74
End: 2024-04-29
Payer: MEDICARE

## 2024-05-21 ENCOUNTER — TRANSFERRED RECORDS (OUTPATIENT)
Dept: HEALTH INFORMATION MANAGEMENT | Facility: CLINIC | Age: 74
End: 2024-05-21
Payer: MEDICARE

## 2024-05-23 ENCOUNTER — TRANSFERRED RECORDS (OUTPATIENT)
Dept: HEALTH INFORMATION MANAGEMENT | Facility: CLINIC | Age: 74
End: 2024-05-23
Payer: MEDICARE

## 2024-06-03 ENCOUNTER — TRANSFERRED RECORDS (OUTPATIENT)
Dept: HEALTH INFORMATION MANAGEMENT | Facility: CLINIC | Age: 74
End: 2024-06-03
Payer: MEDICARE

## 2024-06-10 ENCOUNTER — TRANSFERRED RECORDS (OUTPATIENT)
Dept: HEALTH INFORMATION MANAGEMENT | Facility: CLINIC | Age: 74
End: 2024-06-10

## 2024-06-13 ENCOUNTER — TRANSFERRED RECORDS (OUTPATIENT)
Dept: HEALTH INFORMATION MANAGEMENT | Facility: CLINIC | Age: 74
End: 2024-06-13
Payer: MEDICARE

## 2024-06-20 ENCOUNTER — TRANSFERRED RECORDS (OUTPATIENT)
Dept: HEALTH INFORMATION MANAGEMENT | Facility: CLINIC | Age: 74
End: 2024-06-20
Payer: MEDICARE

## 2024-06-24 ENCOUNTER — TELEPHONE (OUTPATIENT)
Dept: NEUROLOGY | Facility: CLINIC | Age: 74
End: 2024-06-24
Payer: MEDICARE

## 2024-06-24 NOTE — PROGRESS NOTES
ESTABLISHED PATIENT NEUROLOGY NOTE    DATE OF VISIT: 6/25/2024  CLINIC LOCATION: [facility]  MRN: [id number]  PATIENT NAME: Laurel Parra  YOB: 1950    REASON FOR VISIT: No chief complaint on file.    SUBJECTIVE:                                                      HISTORY OF PRESENT ILLNESS: Patient is here to follow up regarding multifactorial headaches.  The last visit was virtual on 1/15/2024.  Please refer to my initial/other prior notes for further information.    Since the last visit, the patient reports ***.  She is on 60 mg of propranolol and 400 mg of riboflavin daily for headache prevention.  She uses Nurtec for acute therapy, which works well.  EXAM:                                                    Physical Exam:   Vitals: There were no vitals taken for this visit.    General: pt is in NAD, cooperative.  Skin: normal turgor, moist mucous membranes, no lesions/rashes noticed.  HEENT: ATNC, white sclera, normal conjunctiva.  Respiratory: Symmetric lung excursion, no accessory respiratory muscle use.  Abdomen: Non distended.  Neurological: awake, cooperative, follows commands, no exam changes compared to previous visits.  ASSESSMENT AND PLAN:                                                    Assessment: 74 year old female patient presents for follow-up of migraine, tension headache and bilateral occipital neuralgia.    Regarding migraine/tension headache, she reports that symptoms are ***.  Additional preventive options that were previously discussed include Ravindra-relief, CGRP antagonists, and topiramate.  The dose of propranolol could be also increased.    Bilateral occipital neuralgia is currently well-controlled.  Will continue to monitor.    Diagnoses:    ICD-10-CM    1. Migraine with aura and without status migrainosus, not intractable  G43.109       2. Tension headache  G44.209       3. Bilateral occipital neuralgia  M54.81         Plan:  There are no  Patient Instructions on file for this visit.    Total Time: *** minutes spent on the date of the encounter doing chart review, history and exam, documentation and further activities per the note.    Ej Castanon MD  [facility] Neurology  (Chart documentation was completed in part with Dragon voice-recognition software. Even though reviewed, some grammatical, spelling, and word errors may remain.)

## 2024-06-24 NOTE — TELEPHONE ENCOUNTER
[facility] Call Center    Phone Message    May a detailed message be left on voicemail: yes     Reason for Call: Other: Patient called stating she has workers in her house and cannot leave her home tomorrow 6/25/2024, she wonders if she can be seen virtually with Dr. Castanon. Writer unable to change to video appointment due to not having access to template, protocols state video preferred. Please review.     Action Taken: Message routed to:  Other: Neurology    Travel Screening: Not Applicable     Date of Service:

## 2024-06-25 ENCOUNTER — VIRTUAL VISIT (OUTPATIENT)
Dept: NEUROLOGY | Facility: CLINIC | Age: 74
End: 2024-06-25
Payer: MEDICARE

## 2024-06-25 ENCOUNTER — TELEPHONE (OUTPATIENT)
Dept: NEUROLOGY | Facility: CLINIC | Age: 74
End: 2024-06-25

## 2024-06-25 DIAGNOSIS — G43.109 MIGRAINE WITH AURA AND WITHOUT STATUS MIGRAINOSUS, NOT INTRACTABLE: Primary | ICD-10-CM

## 2024-06-25 DIAGNOSIS — M54.81 BILATERAL OCCIPITAL NEURALGIA: ICD-10-CM

## 2024-06-25 DIAGNOSIS — G44.209 TENSION HEADACHE: ICD-10-CM

## 2024-06-25 PROCEDURE — 99214 OFFICE O/P EST MOD 30 MIN: CPT | Mod: 95 | Performed by: PSYCHIATRY & NEUROLOGY

## 2024-06-25 NOTE — PATIENT INSTRUCTIONS
AFTER VISIT SUMMARY (AVS):    At today's visit we thoroughly discussed current symptoms, available treatment options, and the plan.    We decided to continue Nurtec, but also discussed options of Ubrelvy and Reyvow that you could check with your insurance to see if co-pay is more affordable.  For now, I refilled your Nurtec prescription.  No other medication changes.    Please keep the headache diary and bring it to the next follow-up visit or upload via My Chart.     Next follow-up appointment is in the next 6 months or earlier if needed.    Please do not hesitate to call me with any questions or concerns.    Thanks.

## 2024-06-25 NOTE — TELEPHONE ENCOUNTER
Prior Authorization Specialty Medication Request    Medication/Dose: rimegepant (NURTEC) 75 MG ODT tablet  Diagnosis and ICD code (if different than what is on RX):    New/renewal/insurance change PA/secondary ins. PA:  Previously Tried and Failed:      Important Lab Values:   Rationale:     Insurance   Primary:   Insurance ID:      Secondary (if applicable):  Insurance ID:      Pharmacy Information (if different than what is on RX)  Name:    Phone:    Fax:      Primary Visit Coverage    Payer Plan Sponsor Code Group Number Group Name   MEDICARE MEDICARE [id number]       Primary Visit Coverage Subscriber    ID Name SSN Address   [id number] RACHEL CHRISTINA [id number] [place] [address]     Secondary Visit Coverage    Payer Plan Sponsor Code Group Number Group Name   [facility] [id number]      Secondary Visit Coverage Subscriber    ID Name N Address   [id number] RACHEL CHRISTINA [id number] [place] [address]

## 2024-06-25 NOTE — PROGRESS NOTES
"ESTABLISHED NEUROLOGY TELEMEDICINE VISIT NOTE.    DATE OF VISIT: 6/25/2024  MRN: [id number]  PATIENT NAME: Laurel Parra  YOB: 1950    Laurel Parra is a 74 year old female who is being evaluated via a billable video visit.      The patient has been notified of following:     \"This video visit will be conducted via a call between you and your physician/provider. We have found that certain health care needs can be provided without the need for an in-person physical exam.  This service lets us provide the care you need with a video conversation.  If a prescription is necessary we can send it directly to your pharmacy.  If lab work is needed we can place an order for that and you can then stop by our lab to have the test done at a later time.    Video visits are billed at different rates depending on your insurance coverage.  Please reach out to your insurance provider with any questions.    If during the course of the call the physician/provider feels a video visit is not appropriate, you will not be charged for this service.\"    Patient has given verbal consent for Video visit? Yes    Will anyone else be joining your video visit? No  {If patient encounters technical issues they should call [phone].    I have reviewed and updated the patient's Past Medical History, Social History, Family History and Medication List.    Jabier Boyle (MA signature)    Additional provider notes:    This is a telemedicine visit that was initiated by the patient and performed with the originating site at patient's home and the distant location, as specified below.  Verbal consent to participate in video visit was obtained.  I discussed with the patient the nature of our telemedicine visits, that:  - I would evaluate the patient and recommend diagnostics and treatments based on my assessment  - Our sessions are not being recorded and that personal health information is protected  - Our team would " provide follow-up care in person if/when the patient needs it.    REASON FOR VISIT:   Chief Complaint   Patient presents with    Follow Up     Migraine, three migraines since last visit, states medications are expensive, looking for alternative     HISTORY OF PRESENT ILLNESS:                                                    Ms. Laurel Parra is 74 year old female patient, who was evaluated today by telemedicine visit for multifactorial headaches.  The last visit was virtual on 1/15/2024.  Please refer to my initial/other prior notes for further information.    Since the last visit, the patient reports that her migraine are well controlled - had only 3 migraines and several milder headaches.   She uses Nurtec for acute therapy, which works well, but is expensive (wonders about alternatives).  At times she has hard time distinguishing whether or not her headache is migraine, which in turn delays the time when she takes Nurtec.  Occasionally, she might need second pill on the following day.  She is on 60 mg of propranolol and 400 mg of riboflavin daily for headache prevention. Denies interval development of new focal neurological symptoms, but reports that she needs bilateral knee replacement.  EXAM:                                                    General: pt is in NAD, cooperative.  Skin: no lesions/rashes noticed.  HEENT: ATNC.  Neurological:  awake, cooperative, follows commands, face is symmetric, tongue is midline, equally moves all extremities.  ASSESSMENT AND PLAN:      ASSESSMENT: Laurel Parra is a 74 year old female patient, who is evaluated via a telemedicine follow-up visit for migraine, tension headache and bilateral occipital neuralgia.    Regarding migraine/tension headache, she reports that symptoms are well controlled.  For that reason, I do not think that we need to make any changes in headache prevention treatment.  Additional preventive options that were previously  discussed include Ravindra-relief, CGRP antagonists, and topiramate.  They could be considered in the future if needed.  The dose of propranolol could be also increased.    Regarding her acute therapy, we discussed that given her prior history of myocardial infarction, she should not take triptans, which limits our choices to Nurtec, Ubrelvy, and Reyvow.  Zavzpret could also be considered, but it is even more expensive.  We decided to continue her Nurtec for now, but she will check with her insurance company if Ubrelvy or Reyvow are covered better.    Bilateral occipital neuralgia is currently well-controlled.  Will continue to monitor.    DIAGNOSES:    ICD-10-CM    1. Migraine with aura and without status migrainosus, not intractable  G43.109       2. Tension headache  G44.209       3. Bilateral occipital neuralgia  M54.81         PLAN: At today's visit we thoroughly discussed current symptoms, available treatment options, and the plan.    We decided to continue Nurtec, but also discussed options of Ubrelvy and Reyvow that she will check with her insurance to see if co-pay is more affordable.  For now, I refilled Nurtec prescription.  No other medication changes.    I advised the patient to keep the headache diary and bring it to the next follow-up visit or upload via My Chart.     Next follow-up appointment is in the next 6 months or earlier if needed.      Video-Visit Details    Type of service:  Video Visit    Video Start Time: 12:20 PM.    Video End Time (time video stopped): 1:33 PM.    Originating Location (pt. Location): Home    Distant Location (provider location):  [facility] NEUROLOGY [facility]     Mode of Communication:  Video Conference via [employer]    Total Time: 28 minutes.  13 minutes were spent in video call and the rest for chart review in preparation for this visit and documentation.    Ej Castanon MD    [facility] Neu[facility]    (Chart documentation was completed in  part with Dragon voice-recognition software. Even though reviewed, some grammatical, spelling, and word errors may remain.)

## 2024-06-25 NOTE — LETTER
To Whom It Concerns:     I am writing on behalf of my patient, to document the medical necessity of Rimegepant Sulfate 75 MG TBDP for the treatment of acute Migraine. This letter provides information about the patient's medical history and diagnosis and a statement summarizing my treatment rationale.     Summary of Patient History and Diagnosis:  Due to patients prior history of myocardial infarction, she should not take triptans, including Naratriptan.       Attestation:  I attest that a comprehensive review of all the patient's current medications has been completed.  Rebound headache risk mitigation strategies have been tried and failed by addressing over or under utilization of Triptans,ergots and combination analgesics.  I attest that medication overuse has been ruled out as cause of headaches    If this patient is unable to receive this medication, Laurel Parra are likely to begin having multiple ER visits, increased need for clinic visits along with associated cost and their quality of life will further deteriorate.      Treatment Rationale  Nurtec has been FDA approved for treatment of acute migraine.     Clinical Studies:   In a study of people who took either Nurtec ODT (669 people) or placebo (682 people) to treat their migraines, more people taking Nurtec ODT experienced pain freedom and freedom from their most bothersome symptom (selected from light sensitivity, sound sensitivity, or nausea) at 2 hours vs placebo. These benefits were sustained through 48 hours for some people.    For the acute indication, Nurtec ODT was evaluated in a multicenter, doubleblind, randomized, placebo controlled study of 1351 patients (Nurtec ODT 75 mg, n=669; placebo, n=682), with coprimary endpoints at 2 h for Nurtec ODT vs placebo: pain freedom (21% vs 11%, P<.001) and freedom from most bothersome symptoms (MBS; predefined as photophobia, phonophobia, or nausea; 35% vs 27%; P=.001).1    Summary  In  summary, Nurtec is medically necessary for this patient to effectively stop migraines while preventing further cardiology issues. Please call my office at [phone] if I can provide you with any additional information to approve my request. I look forward to receiving your timely response and approval of this request.       Please send your written decision to me at this address:  [facility] NEUROLOG[facility] [address], [address] [id number] [phone]  Dept: [phone]       Thank You,     Ej Castanon MD

## 2024-06-25 NOTE — LETTER
"6/25/2024      Laurel Parra  [address]  [facility] [address]      Dear Colleague,    Thank you for referring your patient, Laurel Parra, to the [facility] NEUROLOGY [facility]INICS [facility]. Please see a copy of my visit note below.    ESTABLISHED NEUROLOGY TELEMEDICINE VISIT NOTE.    DATE OF VISIT: 6/25/2024  MRN: [id number]  PATIENT NAME: Laurel Parra  YOB: 1950    Laurel Parra is a 74 year old female who is being evaluated via a billable video visit.      The patient has been notified of following:     \"This video visit will be conducted via a call between you and your physician/provider. We have found that certain health care needs can be provided without the need for an in-person physical exam.  This service lets us provide the care you need with a video conversation.  If a prescription is necessary we can send it directly to your pharmacy.  If lab work is needed we can place an order for that and you can then stop by our lab to have the test done at a later time.    Video visits are billed at different rates depending on your insurance coverage.  Please reach out to your insurance provider with any questions.    If during the course of the call the physician/provider feels a video visit is not appropriate, you will not be charged for this service.\"    Patient has given verbal consent for Video visit? Yes    Will anyone else be joining your video visit? No  {If patient encounters technical issues they should call [phone].    I have reviewed and updated the patient's Past Medical History, Social History, Family History and Medication List.    Jabier Boyle (MA signature)    Additional provider notes:    This is a telemedicine visit that was initiated by the patient and performed with the originating site at patient's home and the distant location, as specified below.  Verbal consent to participate in video visit was obtained.  I " discussed with the patient the nature of our telemedicine visits, that:  - I would evaluate the patient and recommend diagnostics and treatments based on my assessment  - Our sessions are not being recorded and that personal health information is protected  - Our team would provide follow-up care in person if/when the patient needs it.    REASON FOR VISIT:   Chief Complaint   Patient presents with     Follow Up     Migraine, three migraines since last visit, states medications are expensive, looking for alternative     HISTORY OF PRESENT ILLNESS:                                                    Ms. Laurel Parra is 74 year old female patient, who was evaluated today by telemedicine visit for multifactorial headaches.  The last visit was virtual on 1/15/2024.  Please refer to my initial/other prior notes for further information.    Since the last visit, the patient reports that her migraine are well controlled - had only 3 migraines and several milder headaches.   She uses Nurtec for acute therapy, which works well, but is expensive (wonders about alternatives).  At times she has hard time distinguishing whether or not her headache is migraine, which in turn delays the time when she takes Nurtec.  Occasionally, she might need second pill on the following day.  She is on 60 mg of propranolol and 400 mg of riboflavin daily for headache prevention. Denies interval development of new focal neurological symptoms, but reports that she needs bilateral knee replacement.  EXAM:                                                    General: pt is in NAD, cooperative.  Skin: no lesions/rashes noticed.  HEENT: ATNC.  Neurological:  awake, cooperative, follows commands, face is symmetric, tongue is midline, equally moves all extremities.  ASSESSMENT AND PLAN:      ASSESSMENT: Laurel Parra is a 74 year old female patient, who is evaluated via a telemedicine follow-up visit for migraine, tension headache and  bilateral occipital neuralgia.    Regarding migraine/tension headache, she reports that symptoms are well controlled.  For that reason, I do not think that we need to make any changes in headache prevention treatment.  Additional preventive options that were previously discussed include Ravindra-relief, CGRP antagonists, and topiramate.  They could be considered in the future if needed.  The dose of propranolol could be also increased.    Regarding her acute therapy, we discussed that given her prior history of myocardial infarction, she should not take triptans, which limits our choices to Nurtec, Ubrelvy, and Reyvow.  Zavzpret could also be considered, but it is even more expensive.  We decided to continue her Nurtec for now, but she will check with her insurance company if Ubrelvy or Reyvow are covered better.    Bilateral occipital neuralgia is currently well-controlled.  Will continue to monitor.    DIAGNOSES:    ICD-10-CM    1. Migraine with aura and without status migrainosus, not intractable  G43.109       2. Tension headache  G44.209       3. Bilateral occipital neuralgia  M54.81         PLAN: At today's visit we thoroughly discussed current symptoms, available treatment options, and the plan.    We decided to continue Nurtec, but also discussed options of Ubrelvy and Reyvow that she will check with her insurance to see if co-pay is more affordable.  For now, I refilled Nurtec prescription.  No other medication changes.    I advised the patient to keep the headache diary and bring it to the next follow-up visit or upload via My Chart.     Next follow-up appointment is in the next 6 months or earlier if needed.      Video-Visit Details    Type of service:  Video Visit    Video Start Time: 12:20 PM.    Video End Time (time video stopped): 1:33 PM.    Originating Location (pt. Location): Home    Distant Location (provider location):  [facility] NEUROLOGY [facility]     Mode of Communication:  Video  Conference via [facility]    Total Time: 28 minutes.  13 minutes were spent in video call and the rest for chart review in preparation for this visit and documentation.    Ej Castanon MD    [facility] Neurology    (Chart documentation was completed in part with Dragon voice-recognition software. Even though reviewed, some grammatical, spelling, and word errors may remain.)      Again, thank you for allowing me to participate in the care of your patient.        Sincerely,        Ej Castanon MD

## 2024-06-27 NOTE — TELEPHONE ENCOUNTER
Prior Authorization Approval    Authorization Effective Date: 3/28/2022  Authorization Expiration Date: 12/31/2022  Medication: SUMAtriptan (IMITREX) 50 MG tablet-PA APPROVED    Approved Dose/Quantity:  QUANTITY 18 TABLETS PER 30 DAYS   Reference #:     Insurance Company: [employer] - Phone [phone] Fax [phone]  Expected CoPay:       CoPay Card Available:      Foundation Assistance Needed:    Which Pharmacy is filling the prescription (Not needed for infusion/clinic administered): [id number] - [facility] [address] AT [facility] [address] [facility]  Pharmacy Notified: Yes- **Instructed pharmacy to notify patient when script is ready to /ship.**  Patient Notified: Yes         JACOBY without on-site Attending supervision

## 2024-06-28 NOTE — TELEPHONE ENCOUNTER
Central Prior Authorization Team  Phone: [phone]    PA Initiation    Medication: NURTEC 75 MG PO TBDP  Insurance Company: [employer] - Phone [phone] Fax [phone]  Pharmacy Filling the Rx: [id number] [place] [id number] AT [facility] [address]  Filling Pharmacy Phone: [phone]  Filling Pharmacy Fax:    Start Date: 6/28/2024

## 2024-07-01 ENCOUNTER — TRANSFERRED RECORDS (OUTPATIENT)
Dept: HEALTH INFORMATION MANAGEMENT | Facility: CLINIC | Age: 74
End: 2024-07-01
Payer: MEDICARE

## 2024-07-01 NOTE — TELEPHONE ENCOUNTER
Central Prior Authorization Team  Phone: [phone]    PRIOR AUTHORIZATION DENIED    Medication: NURTEC 75 MG PO TBDP  Insurance Company: [employer] - Phone [phone] Fax [phone]  Denial Date: 6/28/2024  Denial Reason(s):         Appeal Information:         Patient Notified: no

## 2024-07-03 NOTE — TELEPHONE ENCOUNTER
Central Prior Authorization Team  Phone: [phone]    Medication Appeal Initiation    Medication: NURTEC 75 MG PO TBDP  Appeal Start Date:  7/3/2024  Insurance Company: [employer] Phone: [phone]   Insurance Fax: [phone]  Comments:   FAXED LMN TO INSURANCE

## 2024-07-09 NOTE — TELEPHONE ENCOUNTER
Central Prior Authorization Team  Phone: [phone]    MEDICATION APPEAL DENIED    Medication: NURTEC 75 MG PO TBDP  Insurance Company: [employer]  Denial Date: 7/8/2024  Denial Reason(s): Original reasons upheld- pt has not tried ubrelvy  Second Level Appeal Information:       Patient Notified: NO   Second level appeals will be managed by the clinic staff and provider. Please contact the [employer] Prior Authorization Team if additional information about the denial is needed.

## 2024-07-09 NOTE — TELEPHONE ENCOUNTER
Per Dr Castanon reached out to patient she would like to try Ubrevly willl have Dr Castanon send out RX

## 2024-07-11 NOTE — TELEPHONE ENCOUNTER
Central Prior Authorization Team   Phone: [phone]      PA Initiation    Medication: propranolol ER (INDERAL LA) 60 MG 24 hr capsule  Insurance Company: [employer] - Phone [phone] Fax [phone]  Pharmacy Filling the Rx: [id number] [address] AT [facility] OF [address]  Filling Pharmacy Phone: [phone]  Filling Pharmacy Fax:    Start Date: 3/14/2023          
Prior Authorization Approval    Authorization Effective Date: 3/14/2023  Authorization Expiration Date: 12/31/2023  Medication: propranolol ER (INDERAL LA) 60 MG 24 hr capsule  Approved Dose/Quantity:   Reference #:     Insurance Company: [employer] - Phone [phone] Fax [phone]  Expected CoPay:       CoPay Card Available:      Foundation Assistance Needed:    Which Pharmacy is filling the prescription (Not needed for infusion/clinic administered): [id number] [place] [address] AT [facility] OF [facility]  Pharmacy Notified: Yes  Patient Notified: No        
Prior Authorization Specialty Medication Request    Medication/Dose: propranolol ER (INDERAL LA) 60 MG 24 hr capsule  ICD code (if different than what is on RX):    Previously Tried and Failed:      Important Lab Values:   Rationale:     Insurance Name:   Insurance ID:   Insurance Phone Number:     Pharmacy Information (if different than what is on RX)  Name:    Phone:              
[phone]

## 2024-07-12 NOTE — TELEPHONE ENCOUNTER
Called patient to advise on provider message:    Please advise that prescription was sent to the requested pharmacy.  Thanks,  Ej Castanon MD.     Provided clinic phone number for patient to contact if any further questions or concerns.      Susan Jean-Baptiste MA

## 2024-07-15 ENCOUNTER — TRANSFERRED RECORDS (OUTPATIENT)
Dept: HEALTH INFORMATION MANAGEMENT | Facility: CLINIC | Age: 74
End: 2024-07-15
Payer: MEDICARE

## 2024-07-16 ENCOUNTER — TRANSFERRED RECORDS (OUTPATIENT)
Dept: HEALTH INFORMATION MANAGEMENT | Facility: CLINIC | Age: 74
End: 2024-07-16
Payer: MEDICARE

## 2024-07-25 ENCOUNTER — TRANSFERRED RECORDS (OUTPATIENT)
Dept: HEALTH INFORMATION MANAGEMENT | Facility: CLINIC | Age: 74
End: 2024-07-25
Payer: MEDICARE

## 2024-07-25 LAB — TSH SERPL-ACNC: 1.41 UIU/ML (ref 0.45–4.5)

## 2024-08-06 DIAGNOSIS — G44.209 TENSION HEADACHE: ICD-10-CM

## 2024-08-06 DIAGNOSIS — G43.109 MIGRAINE WITH AURA AND WITHOUT STATUS MIGRAINOSUS, NOT INTRACTABLE: ICD-10-CM

## 2024-08-08 RX ORDER — PROPRANOLOL HCL 60 MG
60 CAPSULE, EXTENDED RELEASE 24HR ORAL DAILY
Qty: 90 CAPSULE | Refills: 1 | Status: SHIPPED | OUTPATIENT
Start: 2024-08-08

## 2024-08-08 NOTE — TELEPHONE ENCOUNTER
Per 6/25/24 OV note:   She is on 60 mg of propranolol and 400 mg of riboflavin daily for headache prevention     Pt has follow up 12/27/24.      Prescription approved per [facility] Refill Protocol.    Stephanie MARY RN, BSN  [facility] Neurology

## 2024-08-19 ENCOUNTER — TRANSFERRED RECORDS (OUTPATIENT)
Dept: HEALTH INFORMATION MANAGEMENT | Facility: CLINIC | Age: 74
End: 2024-08-19
Payer: MEDICARE

## 2024-09-09 ENCOUNTER — TELEPHONE (OUTPATIENT)
Dept: FAMILY MEDICINE | Facility: CLINIC | Age: 74
End: 2024-09-09

## 2024-09-09 NOTE — TELEPHONE ENCOUNTER
Reason for Call:  Appointment Request    Patient requesting this type of appt:  Preventive     Requested provider: Georges Lebron    Reason patient unable to be scheduled: Not within requested timeframe    When does patient want to be seen/preferred time:  Next soonest available due to rescheduling    Comments: Provider was out for 9/9/24 and would like a call back from the care team directly to reschedule    Could we send this information to you in [facility] or would you prefer to receive a phone call?:   Patient would prefer a phone call   Okay to leave a detailed message?: Yes at Cell number on file:    Telephone Information:   Mobile [phone]       Call taken on 9/9/2024 at 9:28 AM by Fernando Shetty

## 2024-09-13 DIAGNOSIS — R19.7 ACUTE DIARRHEA: Primary | ICD-10-CM

## 2024-09-16 ENCOUNTER — LAB (OUTPATIENT)
Dept: LAB | Facility: CLINIC | Age: 74
End: 2024-09-16
Payer: MEDICARE

## 2024-09-16 DIAGNOSIS — R19.7 ACUTE DIARRHEA: ICD-10-CM

## 2024-09-16 PROCEDURE — 87493 C DIFF AMPLIFIED PROBE: CPT

## 2024-09-17 ENCOUNTER — APPOINTMENT (OUTPATIENT)
Dept: LAB | Facility: CLINIC | Age: 74
End: 2024-09-17
Payer: MEDICARE

## 2024-09-17 LAB — C DIFF TOX B STL QL: NEGATIVE

## 2024-09-19 ENCOUNTER — TRANSFERRED RECORDS (OUTPATIENT)
Dept: HEALTH INFORMATION MANAGEMENT | Facility: CLINIC | Age: 74
End: 2024-09-19
Payer: MEDICARE

## 2024-09-20 ENCOUNTER — TRANSFERRED RECORDS (OUTPATIENT)
Dept: HEALTH INFORMATION MANAGEMENT | Facility: CLINIC | Age: 74
End: 2024-09-20
Payer: MEDICARE

## 2024-09-24 ENCOUNTER — TRANSFERRED RECORDS (OUTPATIENT)
Dept: HEALTH INFORMATION MANAGEMENT | Facility: CLINIC | Age: 74
End: 2024-09-24
Payer: MEDICARE

## 2024-09-25 ENCOUNTER — TRANSFERRED RECORDS (OUTPATIENT)
Dept: HEALTH INFORMATION MANAGEMENT | Facility: CLINIC | Age: 74
End: 2024-09-25
Payer: MEDICARE

## 2024-10-03 ENCOUNTER — TRANSFERRED RECORDS (OUTPATIENT)
Dept: HEALTH INFORMATION MANAGEMENT | Facility: CLINIC | Age: 74
End: 2024-10-03
Payer: MEDICARE

## 2024-10-14 SDOH — HEALTH STABILITY: PHYSICAL HEALTH: ON AVERAGE, HOW MANY DAYS PER WEEK DO YOU ENGAGE IN MODERATE TO STRENUOUS EXERCISE (LIKE A BRISK WALK)?: 0 DAYS

## 2024-10-14 ASSESSMENT — SOCIAL DETERMINANTS OF HEALTH (SDOH): HOW OFTEN DO YOU GET TOGETHER WITH FRIENDS OR RELATIVES?: TWICE A WEEK

## 2024-10-16 ENCOUNTER — OFFICE VISIT (OUTPATIENT)
Dept: FAMILY MEDICINE | Facility: CLINIC | Age: 74
End: 2024-10-16
Payer: MEDICARE

## 2024-10-16 VITALS
HEIGHT: 63 IN | BODY MASS INDEX: 23.21 KG/M2 | TEMPERATURE: 98.6 F | WEIGHT: 131 LBS | OXYGEN SATURATION: 96 % | RESPIRATION RATE: 16 BRPM | HEART RATE: 66 BPM | SYSTOLIC BLOOD PRESSURE: 134 MMHG | DIASTOLIC BLOOD PRESSURE: 84 MMHG

## 2024-10-16 DIAGNOSIS — Z23 HIGH PRIORITY FOR 2019-NCOV VACCINE: ICD-10-CM

## 2024-10-16 DIAGNOSIS — F41.9 ANXIETY: ICD-10-CM

## 2024-10-16 DIAGNOSIS — I25.10 ASCVD (ARTERIOSCLEROTIC CARDIOVASCULAR DISEASE): ICD-10-CM

## 2024-10-16 DIAGNOSIS — N39.0 URINARY TRACT INFECTION WITHOUT HEMATURIA, SITE UNSPECIFIED: ICD-10-CM

## 2024-10-16 DIAGNOSIS — R61 NIGHT SWEATS: ICD-10-CM

## 2024-10-16 DIAGNOSIS — F50.00 ANOREXIA NERVOSA (H): ICD-10-CM

## 2024-10-16 DIAGNOSIS — G89.29 CENTRAL SENSITIZATION TO PAIN: ICD-10-CM

## 2024-10-16 DIAGNOSIS — K63.5 POLYP OF COLON, UNSPECIFIED PART OF COLON, UNSPECIFIED TYPE: ICD-10-CM

## 2024-10-16 DIAGNOSIS — Z78.0 ASYMPTOMATIC MENOPAUSAL STATE: ICD-10-CM

## 2024-10-16 DIAGNOSIS — K52.832 LYMPHOCYTIC COLITIS: ICD-10-CM

## 2024-10-16 DIAGNOSIS — K21.9 GASTROESOPHAGEAL REFLUX DISEASE WITHOUT ESOPHAGITIS: ICD-10-CM

## 2024-10-16 DIAGNOSIS — F33.41 RECURRENT MAJOR DEPRESSIVE DISORDER, IN PARTIAL REMISSION (H): ICD-10-CM

## 2024-10-16 DIAGNOSIS — Z00.00 ENCOUNTER FOR MEDICARE ANNUAL WELLNESS EXAM: Primary | ICD-10-CM

## 2024-10-16 DIAGNOSIS — E27.40 ADRENAL INSUFFICIENCY (H): ICD-10-CM

## 2024-10-16 DIAGNOSIS — Z23 NEED FOR PROPHYLACTIC VACCINATION AND INOCULATION AGAINST INFLUENZA: ICD-10-CM

## 2024-10-16 PROBLEM — M79.18 MYALGIA OF PELVIC FLOOR: Status: RESOLVED | Noted: 2022-05-25 | Resolved: 2024-10-16

## 2024-10-16 PROBLEM — I21.4 NSTEMI (NON-ST ELEVATED MYOCARDIAL INFARCTION) (H): Status: RESOLVED | Noted: 2023-08-31 | Resolved: 2024-10-16

## 2024-10-16 PROCEDURE — 90662 IIV NO PRSV INCREASED AG IM: CPT | Performed by: INTERNAL MEDICINE

## 2024-10-16 PROCEDURE — 90480 ADMN SARSCOV2 VAC 1/ONLY CMP: CPT | Performed by: INTERNAL MEDICINE

## 2024-10-16 PROCEDURE — 91320 SARSCV2 VAC 30MCG TRS-SUC IM: CPT | Performed by: INTERNAL MEDICINE

## 2024-10-16 PROCEDURE — G0008 ADMIN INFLUENZA VIRUS VAC: HCPCS | Performed by: INTERNAL MEDICINE

## 2024-10-16 PROCEDURE — 99214 OFFICE O/P EST MOD 30 MIN: CPT | Mod: 25 | Performed by: INTERNAL MEDICINE

## 2024-10-16 PROCEDURE — G0439 PPPS, SUBSEQ VISIT: HCPCS | Performed by: INTERNAL MEDICINE

## 2024-10-16 RX ORDER — NITROFURANTOIN 25; 75 MG/1; MG/1
100 CAPSULE ORAL 2 TIMES DAILY
Qty: 10 CAPSULE | Refills: 0 | Status: SHIPPED | OUTPATIENT
Start: 2024-10-16

## 2024-10-16 RX ORDER — LORAZEPAM 1 MG/1
1 TABLET ORAL 4 TIMES DAILY
Qty: 180 TABLET | Refills: 0 | Status: SHIPPED | OUTPATIENT
Start: 2024-10-16 | End: 2024-10-16

## 2024-10-16 RX ORDER — ZOLPIDEM TARTRATE 12.5 MG/1
12.5 TABLET, FILM COATED, EXTENDED RELEASE ORAL AT BEDTIME
Qty: 30 TABLET | Refills: 0 | Status: SHIPPED | OUTPATIENT
Start: 2024-10-16

## 2024-10-16 RX ORDER — GABAPENTIN 300 MG/1
300 CAPSULE ORAL AT BEDTIME
Status: SHIPPED
Start: 2024-10-16

## 2024-10-16 RX ORDER — LORAZEPAM 1 MG/1
1 TABLET ORAL 4 TIMES DAILY
Qty: 180 TABLET | Refills: 0 | Status: SHIPPED | OUTPATIENT
Start: 2024-10-16

## 2024-10-16 RX ORDER — BUDESONIDE 3 MG/1
9 CAPSULE, COATED PELLETS ORAL EVERY MORNING
Status: SHIPPED
Start: 2024-10-16

## 2024-10-16 RX ORDER — PAROXETINE HYDROCHLORIDE HEMIHYDRATE 25 MG/1
25 TABLET, FILM COATED, EXTENDED RELEASE ORAL 2 TIMES DAILY
Qty: 180 TABLET | Refills: 1 | Status: SHIPPED | OUTPATIENT
Start: 2024-10-16

## 2024-10-16 RX ORDER — NITROFURANTOIN 25; 75 MG/1; MG/1
100 CAPSULE ORAL 2 TIMES DAILY
Qty: 14 CAPSULE | Refills: 0 | Status: SHIPPED | OUTPATIENT
Start: 2024-10-16 | End: 2024-10-16

## 2024-10-16 ASSESSMENT — PATIENT HEALTH QUESTIONNAIRE - PHQ9
SUM OF ALL RESPONSES TO PHQ QUESTIONS 1-9: 10
10. IF YOU CHECKED OFF ANY PROBLEMS, HOW DIFFICULT HAVE THESE PROBLEMS MADE IT FOR YOU TO DO YOUR WORK, TAKE CARE OF THINGS AT HOME, OR GET ALONG WITH OTHER PEOPLE: SOMEWHAT DIFFICULT
SUM OF ALL RESPONSES TO PHQ QUESTIONS 1-9: 10

## 2024-10-16 ASSESSMENT — PAIN SCALES - GENERAL: PAINLEVEL: SEVERE PAIN (7)

## 2024-10-16 NOTE — TELEPHONE ENCOUNTER
TO PCP:     Pharmacy called     Requesting a new/clarified Rx for Lorazepam     Sig has confusing/conflicting directions     Pharmacy asking that PCP send a clarified updated Rx      Disp Refills Start End MOHIT   LORazepam (ATIVAN) 1 MG tablet 180 tablet 0 10/16/2024 -- No   Sig - Route: Take 1 tablet (1 mg) by mouth 4 times daily. Prescription is written for 2 tabs 3 times daily. But pt reports taking 1 tab 4 times daily. - Oral   Sent to pharmacy as: LORazepam 1 MG Oral Tablet (ATIVAN)   Class: E-Prescribe   Order: [id number]   E-Prescribing Status: Receipt confirmed by pharmacy (10/16/2024  1:54 PM CDT)     Beatrice MILLER Triage RN  [facility] Medicine Clinic

## 2024-10-16 NOTE — PROGRESS NOTES
Preventive Care Visit  [facility] FIORELLA Lebron MD, Internal Medicine  Oct 16, 2024          Jerry Barragan is a 74 year old, presenting for the following:    This is a complicated patient with multiple medical issues here for her annual wellness visit.    She wants me to start prescribing her psych meds.  Her psychiatrist apparently is not practicing anymore.  Overall this has been stable.  She takes a lot of medications and he discussed this.  She also goes to the pain clinic and is on Vicodin.  I discussed with her the potential risks including higher risk of falls and confusion.  She will try cutting down on the lorazepam as a start and will need to see me every 4 months.  I did check the PDMP.    I reviewed multiple clinic notes.  Unfortunately her lymphocytic colitis has been acting up and she sees [facility], Dr. Martinez.  She is now on budesonide as well as Imodium and is having more constipation.    She has a history of UTIs but not in a long time but would like to have Macrobid available and I think this is reasonable.    She sees Dr. Navas of endocrine, she has medication induced adrenal insufficiency and he is monitoring that.  She has anorexia nervosa.  She otherwise is doing well.  Some night sweats.  She has central sensitization syndrome.  She is not working out regularly.  She is up-to-date with gynecology.               Past Medical History:      Past Medical History:   Diagnosis Date    Adrenal insufficiency (H)     Dr. Navas, secondary to meds    Anxiety     Dr. Adelina Meehan    ASCVD (arteriosclerotic cardiovascular disease) 08/2023    cp and pos trop, angio with trivial cad, echo nl    Central sensitization to pain     Chronic constipation     Chronic headache     sees neuro Dr Danielson, 3 types of headaches    Chronic narcotic use     [facility]    Chronic pain     Dr. Orta as of 2015    Chronic urethritis     Dr. Little    Colonic polyp 11/2011     one polyp, fu 5 years, fu 10/17 and no lesions, fu  and negative, fu 5 years    Depression, major, in partial remission (H)     Dr. Meehan    Depressive disorder 1971    Diarrhea     eval by [facility], resolved with gluten free diet    DJD (degenerative joint disease)     Dyspepsia     eval by [facility], had colonoscopy, egd, ct abd and pelvis, mrcp.  [place] to be dyspepsia and constipation    H/O gastroesophageal reflux (GERD)     LBP (low back pain)     Dr. Jae Tong in [place], then seeing [facility] Dr. You Cannon headed 2020    nl est echo    Lymphocytic colitis     [facility] Floyd Martinez; seen on flex sig  [facility], added budesonide    Migraines     neuro eval    NSTEMI (non-ST elevated myocardial infarction) (H) 2023    echo nl and angio trivial cad    Palpitations     holter with pvc's and pac's, echo nl    Screening     dexa nl at gyn             Past Surgical History:      Past Surgical History:   Procedure Laterality Date    APPENDECTOMY  2016    ARTHRODESIS WRIST Left 2021    Procedure: LEFT TOTAL WRIST FUSION;  Surgeon: Beatrice Philippe MD;  Location:  OR    ARTHROPLASTY CARPOMETACARPAL (THUMB JOINT) Left 2017    Procedure: ARTHROPLASTY CARPOMETACARPAL (THUMB JOINT);  REVISION  LEFT THUMB CARPOMETACARPAL ARTHROPOASTY WITH 1.1 TIGHT ROPE;  Surgeon: Beatrice Philippe MD;  Location:  SD    ARTHROSCOPY WRIST Left 2019    Procedure: LEFT WRIST ARTHROSCOPY REMOVAL OF TIGHT ROPE ; LEFT EXTENSOR POLLICIS LONGUS /EXTENSOR CARPI RADIALIS BREVIS /EXTENSOR CARPI RADIALUS LONGUS SYNOVECTOMY ; EXTENSOR POLLICIS LONGUS TRANSPOSITION ; ANTERIOR INTEROSSEOUS NEURECTOMY ; POSTERIOR INTEROSSEOUS NEURECTOMY;  Surgeon: Beatrice Philippe MD;  Location:  OR    BIOPSY      CARPAL TUNNEL RELEASE RT/LT      CATARACT EXTRACTION  2021     SECTION      COLONOSCOPY      CV CORONARY ANGIOGRAM N/A 2023    Procedure: Coronary Angiogram;   Surgeon: Moody Miranda MD;  Location:  HEART CARDIAC CATH LAB    ESOPHAGOSCOPY, GASTROSCOPY, DUODENOSCOPY (EGD), COMBINED N/A 2020    Procedure: ESOPHAGOGASTRODUODENOSCOPY, WITH BIOPSY;  Surgeon: Georges Pop MD;  Location:  GI    EXCIS BARTHOLIN GLAND/CYST  2005    EYE SURGERY      HERNIORRHAPHY INGUINAL  70's    x5    INJECT STEROID (LOCATION) Right 2019    Procedure: RIGHT WRIST CORTISONE INJECTION;  Surgeon: Beatrice Philippe MD;  Location:  OR    OPERATIVE HYSTEROSCOPY WITH MORCELLATOR N/A 2018    Procedure: OPERATIVE HYSTEROSCOPY WITH MORCELLATOR (MARTIN & NEPHEW);  OPERATIVE HYSTEROSCOPY WITH TRUECLEAR MORCELLATOR 5C SCOPE ;  Surgeon: Iris Fernandez MD;  Location:  SD    ORTHOPEDIC SURGERY Bilateral     SHOULDER ARTHROSCOPY    ORTHOPEDIC SURGERY Right 2017    right foot for plantar fasciitis    thumb surgery   and ,              Social History:     Social History     Socioeconomic History    Marital status:      Spouse name: Not on file    Number of children: 1    Years of education: Not on file    Highest education level: Not on file   Occupational History     Employer: SELF   Tobacco Use    Smoking status: Former     Current packs/day: 0.00     Types: Cigarettes     Quit date: 1974     Years since quittin.3    Smokeless tobacco: Never    Tobacco comments:     infrequent use for about 5 years   Substance and Sexual Activity    Alcohol use: No    Drug use: No    Sexual activity: Not Currently     Partners: Male     Birth control/protection: Post-menopausal   Other Topics Concern    Parent/sibling w/ CABG, MI or angioplasty before 65F 55M? Yes     Comment: father   Social History Narrative    Not on file     Social Determinants of Health     Financial Resource Strain: Low Risk  (10/14/2024)    Financial Resource Strain     Within the past 12 months, have you or your family members you live with been unable to get utilities (heat,  electricity) when it was really needed?: No   Food Insecurity: Low Risk  (10/14/2024)    Food Insecurity     Within the past 12 months, did you worry that your food would run out before you got money to buy more?: No     Within the past 12 months, did the food you bought just not last and you didn t have money to get more?: No   Transportation Needs: Low Risk  (10/14/2024)    Transportation Needs     Within the past 12 months, has lack of transportation kept you from medical appointments, getting your medicines, non-medical meetings or appointments, work, or from getting things that you need?: No   Physical Activity: Inactive (10/14/2024)    Exercise Vital Sign     Days of Exercise per Week: 0 days     Minutes of Exercise per Session: 0 min   Stress: No Stress Concern Present (10/14/2024)    [employer] [place] [employer] - Occupational Stress Questionnaire     Feeling of Stress : Only a little   Social Connections: Unknown (10/14/2024)    Social Connection and Isolation Panel [NHANES]     Frequency of Communication with Friends and Family: Not on file     Frequency of Social Gatherings with Friends and Family: Twice a week     Attends Gnosticism Services: Not on file     Active Member of Clubs or Organizations: Not on file     Attends Club or Organization Meetings: Not on file     Marital Status: Not on file   Interpersonal Safety: Low Risk  (10/16/2024)    Interpersonal Safety     Do you feel physically and emotionally safe where you currently live?: Yes     Within the past 12 months, have you been hit, slapped, kicked or otherwise physically hurt by someone?: No     Within the past 12 months, have you been humiliated or emotionally abused in other ways by your partner or ex-partner?: No   Housing Stability: Low Risk  (10/14/2024)    Housing Stability     Do you have housing? : Yes     Are you worried about losing your housing?: No             Family History:   reviewed         Allergies:     Allergies  "  Allergen Reactions    Betamethasone Other (See Comments)     agitation    Chocolate Other (See Comments)     Triggers migraines    Compazine [Prochlorperazine] Other (See Comments)     \"crawl out of my skin\"    Linzess [Linaclotide]     Penicillins Nausea and Vomiting and Hives    Pneumococcal Vaccine Other (See Comments)     Temporary paralization    Tizanidine Other (See Comments)     Severe agitation             Medications:     Current Outpatient Medications   Medication Sig Dispense Refill    aspirin 81 MG EC tablet Take 1 tablet (81 mg) by mouth daily 90 tablet 0    Baclofen (LIORESAL) 5 MG tablet Take 5 mg by mouth as needed for muscle spasms      budesonide (ENTOCORT EC) 3 MG EC capsule Take 3 capsules (9 mg) by mouth every morning.      carboxymethylcellulose (REFRESH PLUS) 0.5 % SOLN ophthalmic solution Place 1 drop into both eyes 3 times daily as needed for dry eyes      cyclobenzaprine (FLEXERIL) 10 MG tablet Take 10 mg by mouth daily as needed for muscle spasms.      gabapentin (NEURONTIN) 300 MG capsule Take 1 capsule (300 mg) by mouth at bedtime.      HYDROcodone-Acetaminophen  MG TABS Take 1-2 tablets by mouth every 4 hours as needed (maximum of 10 tablets in 24 hour period) Prescription is written for 1-2 tabs every 4-6 hrs prn (max of 10 tabs daily).  0    ipratropium (ATROVENT) 0.06 % spray Spray 3 sprays into both nostrils daily 45 mL 3    loratadine (CLARITIN) 10 MG tablet Take 10 mg by mouth every morning       LORazepam (ATIVAN) 1 MG tablet Take 1 tablet (1 mg) by mouth 4 times daily. Prescription is written for 2 tabs 3 times daily. But pt reports taking 1 tab 4 times daily. 180 tablet 0    naloxone (NARCAN) 4 MG/0.1ML nasal spray Spray 1 spray (4 mg) into one nostril alternating nostrils as needed for opioid reversal Every 2-3 minutes in alternating nostrils 2 each 0    nitroFURantoin macrocrystal-monohydrate (MACROBID) 100 MG capsule Take 1 capsule (100 mg) by mouth 2 times daily. " "10 capsule 0    omeprazole (PRILOSEC) 40 MG DR capsule Take 40 mg by mouth 2 times daily (before meals)      ondansetron (ZOFRAN) 4 MG tablet Take 4 mg by mouth every 8 hours as needed for nausea or vomiting      PARoxetine (PAXIL-CR) 25 MG 24 hr tablet Take 1 tablet (25 mg) by mouth 2 times daily. 180 tablet 1    polyethylene glycol (MIRALAX) 17 GM/Dose powder Take 1 capful. by mouth as needed for constipation      propranolol ER (INDERAL LA) 60 MG 24 hr capsule TAKE 1 CAPSULE(60 MG) BY MOUTH DAILY 90 capsule 1    riboflavin 400 MG CAPS Take 400 mg by mouth daily      ubrogepant (UBRELVY) 50 MG tablet Take 1 tablet (50 mg) by mouth at onset of headache (may repeat in 2 hours, total daily dose is 100 mg/day, but limit use to less than 10 days per month) 9 tablet 3    vitamin C (ASCORBIC ACID) 1000 MG TABS Take 1,000 mg by mouth every evening       Vitamin D3 (CHOLECALCIFEROL) 125 MCG (5000 UT) tablet Take 2 tablets by mouth every evening      zolpidem ER (AMBIEN CR) 12.5 MG CR tablet Take 1 tablet (12.5 mg) by mouth at bedtime. 30 tablet 0    nitroGLYcerin (NITROSTAT) 0.4 MG sublingual tablet For chest pain place 1 tablet under the tongue every 5 minutes for 3 doses. If symptoms persist 5 minutes after 1st dose call 911. 50 tablet 0               Review of Systems:     The 10 point Review of Systems is negative other than noted in the HPI           Physical Exam:   Blood pressure 134/84, pulse 66, temperature 98.6  F (37  C), resp. rate 16, height 1.6 m (5' 3\"), weight 59.4 kg (131 lb), SpO2 96%, not currently breastfeeding.    Exam:  Constitutional: healthy appearing, alert and in no distress  Heent: Normocephalic. Head without obvious masses or lesions. PERRLDC, EOMI. Mouth exam within normal limits: tongue, mucous membranes, posterior pharynx all normal, no lesions or abnormalities seen.  Tm's and canals within normal limits bilaterally. Neck supple, no nuchal rigidity or masses. No supraclavicular, or cervical " adenopathy. Thyroid symmetric, no masses.  Cardiovascular: Regular rate and rhythm, no murmer, rub or gallops.  JVP not elevated, no edema.  Carotids within normal limits bilaterally, no bruits.  Respiratory: Normal respiratory effort.  Lungs clear, normal flow, no wheezing or crackles.  Gastrointestinal: Normal active bowel sounds.   Soft, not tender, no masses, guarding or rebound.  No hepatosplenomegaly.   Musculoskeletal: extremities normal, no gross deformities noted.  Skin: no suspicious lesions or rashes   Neurologic: Mental status within normal limits.  Speech fluent.  No gross motor abnormalities and gait intact.  Psychiatric: mentation appears normal and affect normal.         Data:   Labs sent        Assessment:   Normal complete physical exam  Recurrent UTI, Macrobid prescribed  Depression and anxiety, overall stable, I agreed to refill medications.  She will need to see me every 4 months.  Chronic pain syndrome, on Vicodin with close follow-up with the pain clinic  Adrenal insufficiency, follow-up endocrine  Lymphocytic colitis, stable, follow-up GI  Coronary artery disease but minimal findings on angiogram, monitor, no need for statin therapy per cardiology  Acid reflux, controlled  Colon polyp, up-to-date on follow-up  Healthcare maintenance         Plan:   Flu and COVID shot here  Shingles shot at pharmacy  Bone density test  Refilled medications  Exercise and diet  Letter with labs  Office visit in 4 months  Try cutting down lorazepam      Georges Lebron M.D.            Physical          Health Care Directive  Patient has a Health Care Directive on file      HPI            10/14/2024   General Health   How would you rate your overall physical health? (!) POOR   Feel stress (tense, anxious, or unable to sleep) Only a little      (!) STRESS CONCERN      10/14/2024   Nutrition   Diet: Regular (no restrictions)            10/14/2024   Exercise   Days per week of moderate/strenous exercise 0 days      (!)  EXERCISE CONCERN      10/14/2024   Social Factors   Frequency of gathering with friends or relatives Twice a week   Worry food won't last until get money to buy more No   Food not last or not have enough money for food? No   Do you have housing? (Housing is defined as stable permanent housing and does not include staying ouside in a car, in a tent, in an abandoned building, in an overnight shelter, or couch-surfing.) Yes   Are you worried about losing your housing? No   Lack of transportation? No   Unable to get utilities (heat,electricity)? No            10/16/2024   Fall Risk   Gait Speed Test (Document in seconds) 3.87   Gait Speed Test Interpretation Less than or equal to 5.00 seconds - PASS             10/14/2024   Activities of Daily Living- Home Safety   Needs help with the following daily activites Preparing meals    Housework   Safety concerns in the home No grab bars in the bathroom       Multiple values from one day are sorted in reverse-chronological order         10/14/2024   Dental   Dentist two times every year? Yes            10/14/2024   Hearing Screening   Hearing concerns? None of the above            10/14/2024   Driving Risk Screening   Patient/family members have concerns about driving No            10/14/2024   General Alertness/Fatigue Screening   Have you been more tired than usual lately? (!) YES            10/14/2024   Urinary Incontinence Screening   Bothered by leaking urine in past 6 months No            9/8/2024   TB Screening   Were you born outside of the [place]? No                    10/14/2024   Substance Use   Alcohol more than 3/day or more than 7/wk No   Do you have a current opioid prescription? (!) YES   How severe/bad is pain from 1 to 10? 7/10   Do you use any other substances recreationally? (!) CANNABIS PRODUCTS          Social History     Tobacco Use    Smoking status: Former     Current packs/day: 0.00     Types: Cigarettes     Quit date: 6/20/1974     Years since quitting:  50.3    Smokeless tobacco: Never    Tobacco comments:     infrequent use for about 5 years   Substance Use Topics    Alcohol use: No    Drug use: No           1/15/2024   LAST FHS-7 RESULTS   1st degree relative breast or ovarian cancer Yes   Any relative bilateral breast cancer No   Any male have breast cancer No   Any ONE woman have BOTH breast AND ovarian cancer No   Any woman with breast cancer before 50yrs Yes   2 or more relatives with breast AND/OR ovarian cancer Yes   2 or more relatives with breast AND/OR bowel cancer Yes               ASCVD Risk   The ASCVD Risk score (Marek DK, et al., 2019) failed to calculate for the following reasons:    The patient has a prior MI or stroke diagnosis            Reviewed and updated as needed this visit by Provider       Med Hx                Current providers sharing in care for this patient include:  Patient Care Team:  Georges Lebron MD as PCP - General (Internal Medicine)  Thelma Meehan MD (Inactive) as Leydi Lu (Nurse Practitioner)  Ej Castanon MD as Assigned Neuroscience Provider  Abimbola Chamberlain PA-C as Physician Assistant (Urology)  Heide Wilhelm MD as Assigned Surgical Provider  Georges Lebron MD as Assigned PCP  Meg Warren APRN CNP as Assigned Heart and Vascular Provider    The following health maintenance items are reviewed in Epic and correct as of today:  Health Maintenance   Topic Date Due    ANNUAL REVIEW OF HM ORDERS  Never done    ZOSTER IMMUNIZATION (2 of 3) 02/14/2013    INFLUENZA VACCINE (1) 09/01/2024    COVID-19 Vaccine (7 - 2024-25 season) 09/01/2024    LIPID  12/07/2024    MEDICARE ANNUAL WELLNESS VISIT  10/16/2025    FALL RISK ASSESSMENT  10/16/2025    PHQ-9  10/16/2025    DEXA  12/09/2025    MAMMO SCREENING  01/15/2026    GLUCOSE  08/31/2026    ADVANCE CARE PLANNING  08/17/2028    COLORECTAL CANCER SCREENING  09/03/2030    DTAP/TDAP/TD IMMUNIZATION (3 - Td or  "Tdap) 08/17/2033    HEPATITIS C SCREENING  Completed    DEPRESSION ACTION PLAN  Completed    Pneumococcal Vaccine: 65+ Years  Completed    RSV VACCINE  Completed    HPV IMMUNIZATION  Aged Out    MENINGITIS IMMUNIZATION  Aged Out    RSV MONOCLONAL ANTIBODY  Aged Out            Objective    Exam  BP (!) 147/93   Pulse 66   Temp 98.6  F (37  C)   Resp 16   Ht 1.6 m (5' 3\")   Wt 59.4 kg (131 lb)   SpO2 96%   BMI 23.21 kg/m     Estimated body mass index is 23.21 kg/m  as calculated from the following:    Height as of this encounter: 1.6 m (5' 3\").    Weight as of this encounter: 59.4 kg (131 lb).    Physical Exam           10/16/2024   Mini Cog   Clock Draw Score 2 Normal   3 Item Recall 3 objects recalled   Mini Cog Total Score 5                 Signed Electronically by: Georges Lebron MD    Answers submitted by the patient for this visit:  Patient Health Questionnaire (Submitted on 10/16/2024)  If you checked off any problems, how difficult have these problems made it for you to do your work, take care of things at home, or get along with other people?: Somewhat difficult  PHQ9 TOTAL SCORE: 10    "

## 2024-10-17 ENCOUNTER — PATIENT OUTREACH (OUTPATIENT)
Dept: CARE COORDINATION | Facility: CLINIC | Age: 74
End: 2024-10-17
Payer: MEDICARE

## 2024-10-23 ENCOUNTER — TRANSFERRED RECORDS (OUTPATIENT)
Dept: HEALTH INFORMATION MANAGEMENT | Facility: CLINIC | Age: 74
End: 2024-10-23
Payer: MEDICARE

## 2024-11-18 ENCOUNTER — TRANSFERRED RECORDS (OUTPATIENT)
Dept: HEALTH INFORMATION MANAGEMENT | Facility: CLINIC | Age: 74
End: 2024-11-18
Payer: MEDICARE

## 2024-11-24 ENCOUNTER — MYC REFILL (OUTPATIENT)
Dept: FAMILY MEDICINE | Facility: CLINIC | Age: 74
End: 2024-11-24
Payer: MEDICARE

## 2024-11-24 DIAGNOSIS — N39.0 URINARY TRACT INFECTION WITHOUT HEMATURIA, SITE UNSPECIFIED: ICD-10-CM

## 2024-11-25 RX ORDER — NITROFURANTOIN 25; 75 MG/1; MG/1
100 CAPSULE ORAL 2 TIMES DAILY
Qty: 10 CAPSULE | Refills: 0 | Status: SHIPPED | OUTPATIENT
Start: 2024-11-25

## 2024-11-25 NOTE — TELEPHONE ENCOUNTER
"    Patient was just in to see you and he allows you to keep antibiotics on hand. Often has stool from depends and into her urinary tract. Patient is currently having burning and frequency. No fever. Patient states it's the \"same old, same old\". Has had colitis from beginning of September. States she has has this standing order for antibiotics for a number of years.     CARLOS Cantu  [facility] Triage               "

## 2024-12-12 ENCOUNTER — TRANSFERRED RECORDS (OUTPATIENT)
Dept: HEALTH INFORMATION MANAGEMENT | Facility: CLINIC | Age: 74
End: 2024-12-12
Payer: MEDICARE

## 2024-12-17 ENCOUNTER — TRANSFERRED RECORDS (OUTPATIENT)
Dept: HEALTH INFORMATION MANAGEMENT | Facility: CLINIC | Age: 74
End: 2024-12-17
Payer: MEDICARE

## 2024-12-22 ENCOUNTER — HOSPITAL ENCOUNTER (INPATIENT)
Facility: CLINIC | Age: 74
End: 2024-12-22
Attending: EMERGENCY MEDICINE | Admitting: INTERNAL MEDICINE
Payer: MEDICARE

## 2024-12-22 ENCOUNTER — APPOINTMENT (OUTPATIENT)
Dept: CT IMAGING | Facility: CLINIC | Age: 74
End: 2024-12-22
Attending: EMERGENCY MEDICINE
Payer: MEDICARE

## 2024-12-22 DIAGNOSIS — K52.9 COLITIS: ICD-10-CM

## 2024-12-22 DIAGNOSIS — G89.29 CENTRAL SENSITIZATION TO PAIN: Primary | ICD-10-CM

## 2024-12-22 DIAGNOSIS — I42.9 SECONDARY CARDIOMYOPATHY (H): ICD-10-CM

## 2024-12-22 DIAGNOSIS — E83.42 HYPOMAGNESEMIA: ICD-10-CM

## 2024-12-22 DIAGNOSIS — R53.1 GENERALIZED WEAKNESS: ICD-10-CM

## 2024-12-22 DIAGNOSIS — K52.832 LYMPHOCYTIC COLITIS: ICD-10-CM

## 2024-12-22 DIAGNOSIS — R50.9 FEVER, UNSPECIFIED FEVER CAUSE: ICD-10-CM

## 2024-12-22 DIAGNOSIS — I25.10 ASCVD (ARTERIOSCLEROTIC CARDIOVASCULAR DISEASE): ICD-10-CM

## 2024-12-22 DIAGNOSIS — I21.3 ST ELEVATION MI (STEMI) (H): ICD-10-CM

## 2024-12-22 DIAGNOSIS — R10.13 DYSPEPSIA: ICD-10-CM

## 2024-12-22 DIAGNOSIS — I25.42 SPONTANEOUS DISSECTION OF CORONARY ARTERY: ICD-10-CM

## 2024-12-22 DIAGNOSIS — R19.7 DIARRHEA, UNSPECIFIED TYPE: ICD-10-CM

## 2024-12-22 DIAGNOSIS — E87.6 HYPOKALEMIA: ICD-10-CM

## 2024-12-22 DIAGNOSIS — E27.40 ADRENAL INSUFFICIENCY (H): ICD-10-CM

## 2024-12-22 LAB
ADV 40+41 DNA STL QL NAA+NON-PROBE: NEGATIVE
ALBUMIN SERPL BCG-MCNC: 3.4 G/DL (ref 3.5–5.2)
ALBUMIN UR-MCNC: 10 MG/DL
ALP SERPL-CCNC: 81 U/L (ref 40–150)
ALT SERPL W P-5'-P-CCNC: 9 U/L (ref 0–50)
ANION GAP SERPL CALCULATED.3IONS-SCNC: 13 MMOL/L (ref 7–15)
APPEARANCE UR: CLEAR
AST SERPL W P-5'-P-CCNC: 17 U/L (ref 0–45)
ASTRO TYP 1-8 RNA STL QL NAA+NON-PROBE: NEGATIVE
BASE EXCESS BLDV CALC-SCNC: 7 MMOL/L (ref -3–3)
BASOPHILS # BLD AUTO: 0.1 10E3/UL (ref 0–0.2)
BASOPHILS NFR BLD AUTO: 0 %
BILIRUB SERPL-MCNC: 0.7 MG/DL
BILIRUB UR QL STRIP: NEGATIVE
BUN SERPL-MCNC: 8.5 MG/DL (ref 8–23)
C CAYETANENSIS DNA STL QL NAA+NON-PROBE: NEGATIVE
C DIFF GDH STL QL IA: POSITIVE
C DIFF TOX A+B STL QL IA: POSITIVE
C DIFF TOX B STL QL: POSITIVE
C PNEUM DNA SPEC QL NAA+PROBE: NOT DETECTED
CALCIUM SERPL-MCNC: 8.5 MG/DL (ref 8.8–10.4)
CAMPYLOBACTER DNA SPEC NAA+PROBE: NEGATIVE
CHLORIDE SERPL-SCNC: 97 MMOL/L (ref 98–107)
COLOR UR AUTO: YELLOW
CORTIS SERPL-MCNC: 60.5 UG/DL
CREAT SERPL-MCNC: 0.62 MG/DL (ref 0.51–0.95)
CRYPTOSP DNA STL QL NAA+NON-PROBE: NEGATIVE
E COLI O157 DNA STL QL NAA+NON-PROBE: NORMAL
E HISTOLYT DNA STL QL NAA+NON-PROBE: NEGATIVE
EAEC ASTA GENE ISLT QL NAA+PROBE: NEGATIVE
EC STX1+STX2 GENES STL QL NAA+NON-PROBE: NEGATIVE
EGFRCR SERPLBLD CKD-EPI 2021: >90 ML/MIN/1.73M2
EOSINOPHIL # BLD AUTO: 0 10E3/UL (ref 0–0.7)
EOSINOPHIL NFR BLD AUTO: 0 %
EPEC EAE GENE STL QL NAA+NON-PROBE: NEGATIVE
ERYTHROCYTE [DISTWIDTH] IN BLOOD BY AUTOMATED COUNT: 12.7 % (ref 10–15)
ETEC LTA+ST1A+ST1B TOX ST NAA+NON-PROBE: NEGATIVE
FLUAV H1 2009 PAND RNA SPEC QL NAA+PROBE: NOT DETECTED
FLUAV H1 RNA SPEC QL NAA+PROBE: NOT DETECTED
FLUAV H3 RNA SPEC QL NAA+PROBE: NOT DETECTED
FLUAV RNA SPEC QL NAA+PROBE: NEGATIVE
FLUAV RNA SPEC QL NAA+PROBE: NOT DETECTED
FLUBV RNA RESP QL NAA+PROBE: NEGATIVE
FLUBV RNA SPEC QL NAA+PROBE: NOT DETECTED
G LAMBLIA DNA STL QL NAA+NON-PROBE: NEGATIVE
GLUCOSE SERPL-MCNC: 111 MG/DL (ref 70–99)
GLUCOSE UR STRIP-MCNC: NEGATIVE MG/DL
HADV DNA SPEC QL NAA+PROBE: NOT DETECTED
HCO3 BLDV-SCNC: 30 MMOL/L (ref 21–28)
HCO3 SERPL-SCNC: 28 MMOL/L (ref 22–29)
HCOV PNL SPEC NAA+PROBE: NOT DETECTED
HCT VFR BLD AUTO: 37.8 % (ref 35–47)
HGB BLD-MCNC: 12.9 G/DL (ref 11.7–15.7)
HGB UR QL STRIP: ABNORMAL
HMPV RNA SPEC QL NAA+PROBE: NOT DETECTED
HOLD SPECIMEN: NORMAL
HPIV1 RNA SPEC QL NAA+PROBE: NOT DETECTED
HPIV2 RNA SPEC QL NAA+PROBE: NOT DETECTED
HPIV3 RNA SPEC QL NAA+PROBE: NOT DETECTED
HPIV4 RNA SPEC QL NAA+PROBE: NOT DETECTED
IMM GRANULOCYTES # BLD: 0.1 10E3/UL
IMM GRANULOCYTES NFR BLD: 1 %
KETONES UR STRIP-MCNC: ABNORMAL MG/DL
LACTATE BLD-SCNC: 1.9 MMOL/L
LEUKOCYTE ESTERASE UR QL STRIP: NEGATIVE
LYMPHOCYTES # BLD AUTO: 1.1 10E3/UL (ref 0.8–5.3)
LYMPHOCYTES NFR BLD AUTO: 7 %
M PNEUMO DNA SPEC QL NAA+PROBE: NOT DETECTED
MAGNESIUM SERPL-MCNC: 1.2 MG/DL (ref 1.7–2.3)
MAGNESIUM SERPL-MCNC: 1.3 MG/DL (ref 1.7–2.3)
MAGNESIUM SERPL-MCNC: 1.9 MG/DL (ref 1.7–2.3)
MCH RBC QN AUTO: 31.3 PG (ref 26.5–33)
MCHC RBC AUTO-ENTMCNC: 34.1 G/DL (ref 31.5–36.5)
MCV RBC AUTO: 92 FL (ref 78–100)
MONOCYTES # BLD AUTO: 1.6 10E3/UL (ref 0–1.3)
MONOCYTES NFR BLD AUTO: 10 %
MUCOUS THREADS #/AREA URNS LPF: PRESENT /LPF
NEUTROPHILS # BLD AUTO: 12.9 10E3/UL (ref 1.6–8.3)
NEUTROPHILS NFR BLD AUTO: 82 %
NITRATE UR QL: NEGATIVE
NOROVIRUS GI+II RNA STL QL NAA+NON-PROBE: NEGATIVE
NRBC # BLD AUTO: 0 10E3/UL
NRBC BLD AUTO-RTO: 0 /100
P SHIGELLOIDES DNA STL QL NAA+NON-PROBE: NEGATIVE
PCO2 BLDV: 35 MM HG (ref 40–50)
PH BLDV: 7.54 [PH] (ref 7.32–7.43)
PH UR STRIP: 6 [PH] (ref 5–7)
PLAT MORPH BLD: NORMAL
PLATELET # BLD AUTO: 267 10E3/UL (ref 150–450)
PO2 BLDV: 39 MM HG (ref 25–47)
POTASSIUM SERPL-SCNC: 2.6 MMOL/L (ref 3.4–5.3)
POTASSIUM SERPL-SCNC: 2.9 MMOL/L (ref 3.4–5.3)
POTASSIUM SERPL-SCNC: 3.1 MMOL/L (ref 3.4–5.3)
POTASSIUM SERPL-SCNC: 3.4 MMOL/L (ref 3.4–5.3)
PROT SERPL-MCNC: 5.7 G/DL (ref 6.4–8.3)
RBC # BLD AUTO: 4.12 10E6/UL (ref 3.8–5.2)
RBC MORPH BLD: NORMAL
RBC URINE: <1 /HPF
RSV RNA SPEC NAA+PROBE: NEGATIVE
RSV RNA SPEC QL NAA+PROBE: NOT DETECTED
RSV RNA SPEC QL NAA+PROBE: NOT DETECTED
RV+EV RNA SPEC QL NAA+PROBE: NOT DETECTED
RVA RNA STL QL NAA+NON-PROBE: NEGATIVE
SALMONELLA SP RPOD STL QL NAA+PROBE: NEGATIVE
SAO2 % BLDV: 80 % (ref 70–75)
SAPO I+II+IV+V RNA STL QL NAA+NON-PROBE: NEGATIVE
SARS-COV-2 RNA RESP QL NAA+PROBE: NEGATIVE
SHIGELLA SP+EIEC IPAH ST NAA+NON-PROBE: NEGATIVE
SODIUM SERPL-SCNC: 138 MMOL/L (ref 135–145)
SP GR UR STRIP: 1.03 (ref 1–1.03)
SQUAMOUS EPITHELIAL: <1 /HPF
UROBILINOGEN UR STRIP-MCNC: NORMAL MG/DL
V CHOLERAE DNA SPEC QL NAA+PROBE: NEGATIVE
VIBRIO DNA SPEC NAA+PROBE: NEGATIVE
WBC # BLD AUTO: 15.7 10E3/UL (ref 4–11)
WBC URINE: 2 /HPF
Y ENTEROCOL DNA STL QL NAA+PROBE: NEGATIVE

## 2024-12-22 PROCEDURE — 250N000011 HC RX IP 250 OP 636: Performed by: INTERNAL MEDICINE

## 2024-12-22 PROCEDURE — 87637 SARSCOV2&INF A&B&RSV AMP PRB: CPT | Performed by: EMERGENCY MEDICINE

## 2024-12-22 PROCEDURE — 36415 COLL VENOUS BLD VENIPUNCTURE: CPT | Performed by: INTERNAL MEDICINE

## 2024-12-22 PROCEDURE — 87040 BLOOD CULTURE FOR BACTERIA: CPT | Performed by: EMERGENCY MEDICINE

## 2024-12-22 PROCEDURE — 250N000011 HC RX IP 250 OP 636: Performed by: EMERGENCY MEDICINE

## 2024-12-22 PROCEDURE — 82803 BLOOD GASES ANY COMBINATION: CPT

## 2024-12-22 PROCEDURE — 250N000013 HC RX MED GY IP 250 OP 250 PS 637: Performed by: INTERNAL MEDICINE

## 2024-12-22 PROCEDURE — 258N000003 HC RX IP 258 OP 636: Performed by: EMERGENCY MEDICINE

## 2024-12-22 PROCEDURE — 81001 URINALYSIS AUTO W/SCOPE: CPT | Performed by: EMERGENCY MEDICINE

## 2024-12-22 PROCEDURE — 96375 TX/PRO/DX INJ NEW DRUG ADDON: CPT | Mod: 59

## 2024-12-22 PROCEDURE — 120N000001 HC R&B MED SURG/OB

## 2024-12-22 PROCEDURE — 258N000003 HC RX IP 258 OP 636: Performed by: INTERNAL MEDICINE

## 2024-12-22 PROCEDURE — 84132 ASSAY OF SERUM POTASSIUM: CPT | Performed by: INTERNAL MEDICINE

## 2024-12-22 PROCEDURE — 250N000013 HC RX MED GY IP 250 OP 250 PS 637: Performed by: EMERGENCY MEDICINE

## 2024-12-22 PROCEDURE — 87507 IADNA-DNA/RNA PROBE TQ 12-25: CPT | Performed by: EMERGENCY MEDICINE

## 2024-12-22 PROCEDURE — 83735 ASSAY OF MAGNESIUM: CPT | Performed by: INTERNAL MEDICINE

## 2024-12-22 PROCEDURE — 250N000009 HC RX 250: Performed by: EMERGENCY MEDICINE

## 2024-12-22 PROCEDURE — 99291 CRITICAL CARE FIRST HOUR: CPT | Mod: 25

## 2024-12-22 PROCEDURE — 99223 1ST HOSP IP/OBS HIGH 75: CPT | Mod: AI | Performed by: INTERNAL MEDICINE

## 2024-12-22 PROCEDURE — 87324 CLOSTRIDIUM AG IA: CPT | Performed by: EMERGENCY MEDICINE

## 2024-12-22 PROCEDURE — 85025 COMPLETE CBC W/AUTO DIFF WBC: CPT | Performed by: EMERGENCY MEDICINE

## 2024-12-22 PROCEDURE — 80053 COMPREHEN METABOLIC PANEL: CPT | Performed by: EMERGENCY MEDICINE

## 2024-12-22 PROCEDURE — 87486 CHLMYD PNEUM DNA AMP PROBE: CPT | Performed by: INTERNAL MEDICINE

## 2024-12-22 PROCEDURE — 74177 CT ABD & PELVIS W/CONTRAST: CPT | Mod: MA

## 2024-12-22 PROCEDURE — 83735 ASSAY OF MAGNESIUM: CPT | Performed by: EMERGENCY MEDICINE

## 2024-12-22 PROCEDURE — 83605 ASSAY OF LACTIC ACID: CPT

## 2024-12-22 PROCEDURE — 96365 THER/PROPH/DIAG IV INF INIT: CPT | Mod: 59

## 2024-12-22 PROCEDURE — 87493 C DIFF AMPLIFIED PROBE: CPT | Performed by: EMERGENCY MEDICINE

## 2024-12-22 PROCEDURE — 93005 ELECTROCARDIOGRAM TRACING: CPT

## 2024-12-22 PROCEDURE — 36415 COLL VENOUS BLD VENIPUNCTURE: CPT | Performed by: EMERGENCY MEDICINE

## 2024-12-22 PROCEDURE — 82310 ASSAY OF CALCIUM: CPT | Performed by: EMERGENCY MEDICINE

## 2024-12-22 PROCEDURE — 82533 TOTAL CORTISOL: CPT | Performed by: NURSE PRACTITIONER

## 2024-12-22 PROCEDURE — 99207 PR NO BILLABLE SERVICE THIS VISIT: CPT | Performed by: INTERNAL MEDICINE

## 2024-12-22 PROCEDURE — 250N000011 HC RX IP 250 OP 636: Mod: JZ | Performed by: EMERGENCY MEDICINE

## 2024-12-22 RX ORDER — HYDRALAZINE HYDROCHLORIDE 10 MG/1
10 TABLET, FILM COATED ORAL EVERY 4 HOURS PRN
Status: ACTIVE | OUTPATIENT
Start: 2024-12-22

## 2024-12-22 RX ORDER — CARBOXYMETHYLCELLULOSE SODIUM 5 MG/ML
1 SOLUTION/ DROPS OPHTHALMIC 3 TIMES DAILY PRN
Status: ACTIVE | OUTPATIENT
Start: 2024-12-22

## 2024-12-22 RX ORDER — HYDRALAZINE HYDROCHLORIDE 20 MG/ML
10 INJECTION INTRAMUSCULAR; INTRAVENOUS EVERY 4 HOURS PRN
Status: ACTIVE | OUTPATIENT
Start: 2024-12-22

## 2024-12-22 RX ORDER — MAGNESIUM SULFATE HEPTAHYDRATE 40 MG/ML
2 INJECTION, SOLUTION INTRAVENOUS ONCE
Status: COMPLETED | OUTPATIENT
Start: 2024-12-22 | End: 2024-12-22

## 2024-12-22 RX ORDER — HYDROCORTISONE SODIUM SUCCINATE 100 MG/2ML
50 INJECTION INTRAMUSCULAR; INTRAVENOUS EVERY 6 HOURS
Status: DISCONTINUED | OUTPATIENT
Start: 2024-12-22 | End: 2024-12-22

## 2024-12-22 RX ORDER — VANCOMYCIN HYDROCHLORIDE 125 MG/1
125 CAPSULE ORAL 4 TIMES DAILY
Status: DISCONTINUED | OUTPATIENT
Start: 2024-12-22 | End: 2024-12-25

## 2024-12-22 RX ORDER — ACETAMINOPHEN 650 MG/1
650 SUPPOSITORY RECTAL EVERY 4 HOURS PRN
Status: ACTIVE | OUTPATIENT
Start: 2024-12-22

## 2024-12-22 RX ORDER — POTASSIUM CHLORIDE 7.45 MG/ML
10 INJECTION INTRAVENOUS ONCE
Status: COMPLETED | OUTPATIENT
Start: 2024-12-22 | End: 2024-12-22

## 2024-12-22 RX ORDER — HYDROCODONE BITARTRATE AND ACETAMINOPHEN 10; 300 MG/1; MG/1
1 TABLET ORAL EVERY 4 HOURS PRN
Status: DISCONTINUED | OUTPATIENT
Start: 2024-12-22 | End: 2024-12-22

## 2024-12-22 RX ORDER — ZOLPIDEM TARTRATE 5 MG/1
5 TABLET ORAL
Status: DISPENSED | OUTPATIENT
Start: 2024-12-22

## 2024-12-22 RX ORDER — ONDANSETRON 4 MG/1
4 TABLET, ORALLY DISINTEGRATING ORAL EVERY 6 HOURS PRN
Status: DISPENSED | OUTPATIENT
Start: 2024-12-22

## 2024-12-22 RX ORDER — POTASSIUM CHLORIDE 20MEQ/15ML
40 LIQUID (ML) ORAL ONCE
Status: COMPLETED | OUTPATIENT
Start: 2024-12-22 | End: 2024-12-22

## 2024-12-22 RX ORDER — BACLOFEN 5 MG/1
5 TABLET ORAL DAILY PRN
Status: ACTIVE | OUTPATIENT
Start: 2024-12-22

## 2024-12-22 RX ORDER — ACETAMINOPHEN 325 MG/1
650 TABLET ORAL EVERY 4 HOURS PRN
Status: ACTIVE | OUTPATIENT
Start: 2024-12-22

## 2024-12-22 RX ORDER — CEFEPIME HYDROCHLORIDE 2 G/1
2 INJECTION, POWDER, FOR SOLUTION INTRAVENOUS ONCE
Status: COMPLETED | OUTPATIENT
Start: 2024-12-22 | End: 2024-12-22

## 2024-12-22 RX ORDER — METRONIDAZOLE 500 MG/100ML
500 INJECTION, SOLUTION INTRAVENOUS ONCE
Status: COMPLETED | OUTPATIENT
Start: 2024-12-22 | End: 2024-12-22

## 2024-12-22 RX ORDER — GABAPENTIN 300 MG/1
300 CAPSULE ORAL AT BEDTIME
Status: DISPENSED | OUTPATIENT
Start: 2024-12-22

## 2024-12-22 RX ORDER — IOPAMIDOL 755 MG/ML
65 INJECTION, SOLUTION INTRAVASCULAR ONCE
Status: COMPLETED | OUTPATIENT
Start: 2024-12-22 | End: 2024-12-22

## 2024-12-22 RX ORDER — CYCLOBENZAPRINE HCL 10 MG
10 TABLET ORAL DAILY PRN
Status: ACTIVE | OUTPATIENT
Start: 2024-12-22

## 2024-12-22 RX ORDER — HYDROMORPHONE HYDROCHLORIDE 1 MG/ML
0.5 INJECTION, SOLUTION INTRAMUSCULAR; INTRAVENOUS; SUBCUTANEOUS EVERY 30 MIN PRN
Status: DISCONTINUED | OUTPATIENT
Start: 2024-12-22 | End: 2024-12-22

## 2024-12-22 RX ORDER — NITROGLYCERIN 0.4 MG/1
0.4 TABLET SUBLINGUAL EVERY 5 MIN PRN
Status: ACTIVE | OUTPATIENT
Start: 2024-12-22

## 2024-12-22 RX ORDER — MORPHINE SULFATE 4 MG/ML
4 INJECTION, SOLUTION INTRAMUSCULAR; INTRAVENOUS
Status: DISCONTINUED | OUTPATIENT
Start: 2024-12-22 | End: 2024-12-22

## 2024-12-22 RX ORDER — HYDROCODONE BITARTRATE AND ACETAMINOPHEN 10; 325 MG/1; MG/1
2 TABLET ORAL EVERY 4 HOURS PRN
Status: DISPENSED | OUTPATIENT
Start: 2024-12-22

## 2024-12-22 RX ORDER — MAGNESIUM SULFATE HEPTAHYDRATE 40 MG/ML
2 INJECTION, SOLUTION INTRAVENOUS ONCE
Status: DISCONTINUED | OUTPATIENT
Start: 2024-12-22 | End: 2024-12-22

## 2024-12-22 RX ORDER — BUDESONIDE 3 MG/1
9 CAPSULE, COATED PELLETS ORAL EVERY MORNING
Status: DISCONTINUED | OUTPATIENT
Start: 2024-12-22 | End: 2024-12-24

## 2024-12-22 RX ORDER — IPRATROPIUM BROMIDE 42 UG/1
3 SPRAY, METERED NASAL DAILY
Status: DISPENSED | OUTPATIENT
Start: 2024-12-23

## 2024-12-22 RX ORDER — ASCORBIC ACID 500 MG
1000 TABLET ORAL EVERY EVENING
Status: ACTIVE | OUTPATIENT
Start: 2024-12-22

## 2024-12-22 RX ORDER — POTASSIUM CHLORIDE 1500 MG/1
40 TABLET, EXTENDED RELEASE ORAL ONCE
Status: COMPLETED | OUTPATIENT
Start: 2024-12-22 | End: 2024-12-22

## 2024-12-22 RX ORDER — ASPIRIN 81 MG/1
81 TABLET ORAL DAILY
Status: DISPENSED | OUTPATIENT
Start: 2024-12-22

## 2024-12-22 RX ORDER — SODIUM CHLORIDE, SODIUM LACTATE, POTASSIUM CHLORIDE, CALCIUM CHLORIDE 600; 310; 30; 20 MG/100ML; MG/100ML; MG/100ML; MG/100ML
INJECTION, SOLUTION INTRAVENOUS CONTINUOUS
Status: DISCONTINUED | OUTPATIENT
Start: 2024-12-22 | End: 2024-12-23

## 2024-12-22 RX ORDER — PAROXETINE 10 MG/1
10 TABLET, FILM COATED ORAL EVERY EVENING
Status: DISCONTINUED | OUTPATIENT
Start: 2024-12-22 | End: 2024-12-22

## 2024-12-22 RX ORDER — LORAZEPAM 1 MG/1
1 TABLET ORAL 4 TIMES DAILY
Status: DISPENSED | OUTPATIENT
Start: 2024-12-22

## 2024-12-22 RX ORDER — IBUPROFEN 600 MG/1
600 TABLET, FILM COATED ORAL ONCE
Status: COMPLETED | OUTPATIENT
Start: 2024-12-22 | End: 2024-12-22

## 2024-12-22 RX ORDER — HYDROCORTISONE SODIUM SUCCINATE 100 MG/2ML
50 INJECTION INTRAMUSCULAR; INTRAVENOUS EVERY 12 HOURS
Status: DISCONTINUED | OUTPATIENT
Start: 2024-12-23 | End: 2024-12-22

## 2024-12-22 RX ORDER — LORATADINE 10 MG/1
10 TABLET ORAL EVERY MORNING
Status: DISPENSED | OUTPATIENT
Start: 2024-12-22

## 2024-12-22 RX ORDER — ONDANSETRON 2 MG/ML
4 INJECTION INTRAMUSCULAR; INTRAVENOUS ONCE
Status: COMPLETED | OUTPATIENT
Start: 2024-12-22 | End: 2024-12-22

## 2024-12-22 RX ORDER — HYDROCORTISONE SODIUM SUCCINATE 100 MG/2ML
100 INJECTION INTRAMUSCULAR; INTRAVENOUS ONCE
Status: COMPLETED | OUTPATIENT
Start: 2024-12-22 | End: 2024-12-22

## 2024-12-22 RX ORDER — ONDANSETRON 4 MG/1
4 TABLET, FILM COATED ORAL EVERY 8 HOURS PRN
Status: DISCONTINUED | OUTPATIENT
Start: 2024-12-22 | End: 2024-12-22

## 2024-12-22 RX ORDER — POTASSIUM CHLORIDE 20MEQ/15ML
20 LIQUID (ML) ORAL ONCE
Status: COMPLETED | OUTPATIENT
Start: 2024-12-22 | End: 2024-12-22

## 2024-12-22 RX ORDER — PROPRANOLOL HYDROCHLORIDE 60 MG/1
60 CAPSULE, EXTENDED RELEASE ORAL DAILY
Status: DISPENSED | OUTPATIENT
Start: 2024-12-22

## 2024-12-22 RX ORDER — B-COMPLEX WITH VITAMIN C
400 TABLET ORAL DAILY
Status: DISCONTINUED | OUTPATIENT
Start: 2024-12-22 | End: 2024-12-24

## 2024-12-22 RX ORDER — ONDANSETRON 2 MG/ML
4 INJECTION INTRAMUSCULAR; INTRAVENOUS EVERY 6 HOURS PRN
Status: ACTIVE | OUTPATIENT
Start: 2024-12-22

## 2024-12-22 RX ORDER — LIDOCAINE 40 MG/G
CREAM TOPICAL
Status: ACTIVE | OUTPATIENT
Start: 2024-12-22

## 2024-12-22 RX ORDER — HYDROCODONE BITARTRATE AND ACETAMINOPHEN 10; 325 MG/1; MG/1
1 TABLET ORAL EVERY 4 HOURS PRN
Status: DISCONTINUED | OUTPATIENT
Start: 2024-12-22 | End: 2024-12-22

## 2024-12-22 RX ORDER — PANTOPRAZOLE SODIUM 40 MG/1
40 TABLET, DELAYED RELEASE ORAL
Status: DISPENSED | OUTPATIENT
Start: 2024-12-22

## 2024-12-22 RX ORDER — POLYETHYLENE GLYCOL 3350 17 G/17G
17 POWDER, FOR SOLUTION ORAL DAILY PRN
Status: ACTIVE | OUTPATIENT
Start: 2024-12-22

## 2024-12-22 RX ADMIN — HYDROCORTISONE SODIUM SUCCINATE 50 MG: 100 INJECTION, POWDER, FOR SOLUTION INTRAMUSCULAR; INTRAVENOUS at 11:41

## 2024-12-22 RX ADMIN — LORAZEPAM 1 MG: 1 TABLET ORAL at 11:39

## 2024-12-22 RX ADMIN — POTASSIUM CHLORIDE 10 MEQ: 7.46 INJECTION, SOLUTION INTRAVENOUS at 05:34

## 2024-12-22 RX ADMIN — LORAZEPAM 1 MG: 1 TABLET ORAL at 21:54

## 2024-12-22 RX ADMIN — VANCOMYCIN HYDROCHLORIDE 125 MG: 125 CAPSULE ORAL at 18:13

## 2024-12-22 RX ADMIN — POTASSIUM CHLORIDE 40 MEQ: 1500 TABLET, EXTENDED RELEASE ORAL at 11:39

## 2024-12-22 RX ADMIN — SODIUM CHLORIDE, POTASSIUM CHLORIDE, SODIUM LACTATE AND CALCIUM CHLORIDE 1782 ML: 600; 310; 30; 20 INJECTION, SOLUTION INTRAVENOUS at 04:17

## 2024-12-22 RX ADMIN — GABAPENTIN 300 MG: 300 CAPSULE ORAL at 20:03

## 2024-12-22 RX ADMIN — HYDROCODONE BITARTRATE AND ACETAMINOPHEN 1 TABLET: 10; 325 TABLET ORAL at 16:46

## 2024-12-22 RX ADMIN — HYDROCORTISONE SODIUM SUCCINATE 50 MG: 100 INJECTION, POWDER, FOR SOLUTION INTRAMUSCULAR; INTRAVENOUS at 16:10

## 2024-12-22 RX ADMIN — LORATADINE 10 MG: 10 TABLET ORAL at 11:39

## 2024-12-22 RX ADMIN — ZOLPIDEM TARTRATE 5 MG: 5 TABLET, COATED ORAL at 21:54

## 2024-12-22 RX ADMIN — VANCOMYCIN HYDROCHLORIDE 125 MG: 125 CAPSULE ORAL at 16:09

## 2024-12-22 RX ADMIN — VANCOMYCIN HYDROCHLORIDE 125 MG: 125 CAPSULE ORAL at 21:54

## 2024-12-22 RX ADMIN — ASPIRIN 81 MG: 81 TABLET, COATED ORAL at 11:39

## 2024-12-22 RX ADMIN — METRONIDAZOLE 500 MG: 500 INJECTION, SOLUTION INTRAVENOUS at 06:42

## 2024-12-22 RX ADMIN — ONDANSETRON 4 MG: 2 INJECTION, SOLUTION INTRAMUSCULAR; INTRAVENOUS at 02:55

## 2024-12-22 RX ADMIN — IBUPROFEN 600 MG: 600 TABLET ORAL at 02:55

## 2024-12-22 RX ADMIN — POTASSIUM CHLORIDE 40 MEQ: 1500 TABLET, EXTENDED RELEASE ORAL at 06:21

## 2024-12-22 RX ADMIN — HYDROCODONE BITARTRATE AND ACETAMINOPHEN 1 TABLET: 10; 325 TABLET ORAL at 16:43

## 2024-12-22 RX ADMIN — PANTOPRAZOLE SODIUM 40 MG: 40 TABLET, DELAYED RELEASE ORAL at 11:39

## 2024-12-22 RX ADMIN — CEFEPIME 2 G: 2 INJECTION, POWDER, FOR SOLUTION INTRAVENOUS at 04:11

## 2024-12-22 RX ADMIN — SODIUM CHLORIDE, POTASSIUM CHLORIDE, SODIUM LACTATE AND CALCIUM CHLORIDE: 600; 310; 30; 20 INJECTION, SOLUTION INTRAVENOUS at 08:22

## 2024-12-22 RX ADMIN — PAROXETINE HYDROCHLORIDE 25 MG: 20 TABLET, FILM COATED ORAL at 20:03

## 2024-12-22 RX ADMIN — IOPAMIDOL 65 ML: 755 INJECTION, SOLUTION INTRAVENOUS at 04:45

## 2024-12-22 RX ADMIN — SODIUM CHLORIDE 60 ML: 9 INJECTION, SOLUTION INTRAVENOUS at 04:45

## 2024-12-22 RX ADMIN — PANTOPRAZOLE SODIUM 40 MG: 40 TABLET, DELAYED RELEASE ORAL at 16:10

## 2024-12-22 RX ADMIN — MAGNESIUM SULFATE HEPTAHYDRATE 2 G: 40 INJECTION, SOLUTION INTRAVENOUS at 12:41

## 2024-12-22 RX ADMIN — HYDROCODONE BITARTRATE AND ACETAMINOPHEN 2 TABLET: 10; 325 TABLET ORAL at 21:54

## 2024-12-22 RX ADMIN — LORAZEPAM 1 MG: 1 TABLET ORAL at 18:13

## 2024-12-22 RX ADMIN — HYDROMORPHONE HYDROCHLORIDE 0.5 MG: 1 INJECTION, SOLUTION INTRAMUSCULAR; INTRAVENOUS; SUBCUTANEOUS at 05:42

## 2024-12-22 RX ADMIN — SODIUM CHLORIDE, POTASSIUM CHLORIDE, SODIUM LACTATE AND CALCIUM CHLORIDE: 600; 310; 30; 20 INJECTION, SOLUTION INTRAVENOUS at 18:06

## 2024-12-22 RX ADMIN — POTASSIUM CHLORIDE 40 MEQ: 20 SOLUTION ORAL at 18:15

## 2024-12-22 RX ADMIN — HYDROCORTISONE SODIUM SUCCINATE 100 MG: 100 INJECTION, POWDER, FOR SOLUTION INTRAMUSCULAR; INTRAVENOUS at 04:09

## 2024-12-22 ASSESSMENT — ACTIVITIES OF DAILY LIVING (ADL)
ADLS_ACUITY_SCORE: 55
ADLS_ACUITY_SCORE: 55
ADLS_ACUITY_SCORE: 60
ADLS_ACUITY_SCORE: 56
ADLS_ACUITY_SCORE: 55
ADLS_ACUITY_SCORE: 55
ADLS_ACUITY_SCORE: 60
ADLS_ACUITY_SCORE: 56
ADLS_ACUITY_SCORE: 60
ADLS_ACUITY_SCORE: 55
ADLS_ACUITY_SCORE: 55
ADLS_ACUITY_SCORE: 56
ADLS_ACUITY_SCORE: 55

## 2024-12-22 ASSESSMENT — COLUMBIA-SUICIDE SEVERITY RATING SCALE - C-SSRS
6. HAVE YOU EVER DONE ANYTHING, STARTED TO DO ANYTHING, OR PREPARED TO DO ANYTHING TO END YOUR LIFE?: NO
2. HAVE YOU ACTUALLY HAD ANY THOUGHTS OF KILLING YOURSELF IN THE PAST MONTH?: NO
1. IN THE PAST MONTH, HAVE YOU WISHED YOU WERE DEAD OR WISHED YOU COULD GO TO SLEEP AND NOT WAKE UP?: NO

## 2024-12-22 NOTE — ED NOTES
"[facility]  ED Nurse Handoff Report    ED Chief complaint: Diarrhea      ED Diagnosis:   Final diagnoses:   Generalized weakness   Hypomagnesemia   Hypokalemia   Diarrhea, unspecified type   Fever, unspecified fever cause   Colitis   Adrenal insufficiency (H)   Lymphocytic colitis       Code Status: to be discussed with provider    Allergies:   Allergies   Allergen Reactions    Betamethasone Other (See Comments)     agitation    Chocolate Other (See Comments)     Triggers migraines    Compazine [Prochlorperazine] Other (See Comments)     \"crawl out of my skin\"    Linzess [Linaclotide]     Penicillins Nausea and Vomiting and Hives    Pneumococcal Vaccine Other (See Comments)     Temporary paralization    Tizanidine Other (See Comments)     Severe agitation       Patient Story: Patient reports nausea and diarrhea all day. Reports headache and generalized aches with fevers.   Focused Assessment:  A&Ox4,  previously at bedside. Restless in bed. Generalized weakness, up stand by to commode. Diarrhea and voiding. O2 saturations above 90% on room air. Sinus rhythm 70-80's. EKG completed. Dilaudid for generalized pain.     Treatments and/or interventions provided: Dilaudid for pain, potassium replaced. IV antibiotics per orders.   Patient's response to treatments and/or interventions: Tolerating medications and cares.    To be done/followed up on inpatient unit:  Continue to follow plan of cares.    Does this patient have any cognitive concerns?:  No.    Activity level - Baseline/Home:  Independent  Activity Level - Current:   Stand with Assist    Patient's Preferred language: English   Needed?: No    Isolation: Enteric until stool results.  Infection: Pending labs.  Patient tested for COVID 19 prior to admission: YES  Bariatric?: No    Vital Signs:   Vitals:    12/22/24 0243 12/22/24 0500 12/22/24 0530   BP: 108/65 99/63 91/60   Pulse: 87 82 78   Resp: 12 24 24   Temp: (!) 101.9  F (38.8 "  C) 98.4  F (36.9  C)    TempSrc: Oral Oral    SpO2: 92% 94% 97%       Cardiac Rhythm:     Was the PSS-3 completed:   Yes  What interventions are required if any?  N/a   Family Comments: Family previously at bedside. Aware of patient's status.  OBS brochure/video discussed/provided to patient/family: N/A              Name of person given brochure if not patient: n/a              Relationship to patient: n/a    For the majority of the shift this patient's behavior was Green.   Behavioral interventions performed were n/a.    ED NURSE PHONE NUMBER: [phone]

## 2024-12-22 NOTE — H&P
[facility]    History and Physical - Hospitalist Service       Date of Admission:  12/22/2024    Assessment & Plan   Laurel Parra is a 74 year-old female with past medical history significant for lymphocytic colitis with chronic diarrhea, anorexia, adrenal insufficiency who presents with weakness, fever, abdominal pain. Admitted on 12/22/2024.     Diffuse colitis  Fever  Hx of lymphocytic colitis   *Presents with weakness, fever, abdominal pain  *WBC 15.7, T101.9F  *CT abd/pelvis with diffuse thickening of the colon from the cecum to the distal rectum representing long segment colitis  *Recent colonoscopy by [facility] 12/17, pt reports colitis noted with biopsies taken, no results available  *Reports antibiotic use ~1 month ago for UTI. Denies sick contacts, recent travel   *UA negative for infection, COVID19/Influenza/RSV PCR negative  - [facility] consulted  - C difficile PCR, enteric panel pending  - Hold additional antibiotics pending above to r/o C difficile  - Hold prior to admission budesonide pending infectious rule out   - IVF with LR     Adrenal insufficiency   *Outside records reviewed, hx of secondary adrenal insufficiency secondary to budesonide (suspected systemic absorption despite typical localized effect  *Followed by Dr. Navas of [facility]  *ACTH and cortisol low 7/2024  *Unclear if presenting symptoms of fever, weakness, abdominal pain secondary to colitis above versus component of adrenal inefficiency  *Received hydrocortisone 100 mg IV x1 in ED   - Hydrocortisone 50 mg IV q6H for 24 hours, followed by 50 mg q12H, taper as appropriate  - Obtain any more recent outpatient records from [facility]   - Consider discussion with outpatient endocrinologist for recommendations     Hypokalemia  Hypomagnesemia   Secondary to diarrhea and poor intake.  - Monitor and replace per protocol   - Cardiac monitoring     Chronic pain  syndrome  - Continue prior to admission gabapentin, hydrocodone-APAP, cyclobenzaprine, baclofen when dose confirmed by pharmacy     GERD  - Continue prior to admission PPI when dose confirmed by pharmacy     Anxiety  - Continue prior to admission lorazepam, paroxetine when dose confirmed by pharmacy     Migraine headaches  - Hold prior to admission propranolol given soft BP       Diet: Regular diet   DVT Prophylaxis: Enoxaparin (Lovenox) SQ  Bates Catheter: Not present  Code Status: Full code     Disposition Plan   Admit to inpatient. Anticipate greater than or equal to 2 midnights prior to discharge.      Medically Ready for Discharge: Anticipated in 2-4 Days       Entered: Xiang Mariano MD 12/22/2024, 5:40 AM     The patient's care was discussed with the ED provider, patient      Medical Decision Making       79 MINUTES SPENT BY ME on the date of service doing chart review, history, exam, documentation & further activities per the note.      Clinically Significant Risk Factors Present on Admission        # Hypokalemia: Lowest K = 2.6 mmol/L in last 2 days, will replace as needed   # Hypochloremia: Lowest Cl = 97 mmol/L in last 2 days, will monitor as appropriate  # Hypocalcemia: Lowest Ca = 8.5 mg/dL in last 2 days, will monitor and replace as appropriate   # Hypomagnesemia: Lowest Mg = 1.3 mg/dL in last 2 days, will replace as needed   # Hypoalbuminemia: Lowest albumin = 3.4 g/dL at 12/22/2024  2:54 AM, will monitor as appropriate   # Drug Induced Platelet Defect: home medication list includes an antiplatelet medication                             Xiang Mariano MD  [facility]    ______________________________________________________________________    Chief Complaint   Weakness    History of Present Illness   Laurel Parra is a 74 year old female who presents with the above chief complaint.    History is obtained from the patient, discussion with ED provider and review  of medical record. The patient reports the evening of 12/21 she developed profound weakness. She reports fever to 102F during the day. She has chronic diarrhea with variable pattern, up to 10 stools a day.  Reports 4 stools on 12/21 with yellow color, no blood noted.  She reports poor intake on 12/21.  She denies any urinary symptoms.  He reports she had a colonoscopy on 12/17 with colitis noted and biopsies taken.    In the Emergency Department, the patient was seen by Dr. Logan, with whom I discussed the patient's presenting symptoms and emergency department course.  Initial vital signs were a temperature of 101.9F, HR 87, /65, RR 12, SpO2 92% on room air. Pertinent work-up as noted in A&P. The patient received IV and PO potassium, 1.7L LR, IV ondansetron, 100 mg IV hydrocortisone, IV cefepime, IV metronidazole and Hospitalists were contacted for admission to the hospital.     Past Medical History    I have reviewed this patient's medical history and updated it with pertinent information if needed.   Past Medical History:   Diagnosis Date    Adrenal insufficiency (H)     Dr. Navas, secondary to meds    Anxiety     Dr. Adelina Meehan    ASCVD (arteriosclerotic cardiovascular disease) 08/2023    cp and pos trop, angio with trivial cad, echo nl    Central sensitization to pain     Chronic constipation     Chronic headache     sees neuro Dr Danielson, 3 types of headaches    Chronic narcotic use     [facility]    Chronic pain     Dr. Orta as of 2015    Chronic urethritis     Dr. Little    Colonic polyp 11/2011    one polyp, fu 5 years, fu 10/17 and no lesions, fu 9/20 and negative, fu 5 years    Depression, major, in partial remission (H)     Dr. Meehan    Depressive disorder 1971    Diarrhea 2012    eval by [facility], resolved with gluten free diet    DJD (degenerative joint disease)     Dyspepsia 2020    eval by [facility], had colonoscopy, egd, ct abd and pelvis, mrcp.  [place]remlin to be dyspepsia and  constipation    H/O gastroesophageal reflux (GERD)     LBP (low back pain)     Dr. Jae Tong in [place], then seeing [facility] Dr. You Cannon headed 2020    nl est echo    Lymphocytic colitis     [facility]; seen on flex sig  [facility], added budesonide    Migraines     neuro eval    NSTEMI (non-ST elevated myocardial infarction) (H) 2023    echo nl and angio trivial cad    Palpitations     holter with pvc's and pac's, echo nl    Screening     dexa nl at gyn       Past Surgical History   I have reviewed this patient's surgical history and updated it with pertinent information if needed.  Past Surgical History:   Procedure Laterality Date    APPENDECTOMY  2016    ARTHRODESIS WRIST Left 2021    Procedure: LEFT TOTAL WRIST FUSION;  Surgeon: Betarice Philippe MD;  Location:  OR    ARTHROPLASTY CARPOMETACARPAL (THUMB JOINT) Left 2017    Procedure: ARTHROPLASTY CARPOMETACARPAL (THUMB JOINT);  REVISION  LEFT THUMB CARPOMETACARPAL ARTHROPOASTY WITH 1.1 TIGHT ROPE;  Surgeon: Beatrice Philippe MD;  Location:  SD    ARTHROSCOPY WRIST Left 2019    Procedure: LEFT WRIST ARTHROSCOPY REMOVAL OF TIGHT ROPE ; LEFT EXTENSOR POLLICIS LONGUS /EXTENSOR CARPI RADIALIS BREVIS /EXTENSOR CARPI RADIALUS LONGUS SYNOVECTOMY ; EXTENSOR POLLICIS LONGUS TRANSPOSITION ; ANTERIOR INTEROSSEOUS NEURECTOMY ; POSTERIOR INTEROSSEOUS NEURECTOMY;  Surgeon: Beatrice Philippe MD;  Location:  OR    BIOPSY      CARPAL TUNNEL RELEASE RT/LT      CATARACT EXTRACTION  2021     SECTION      COLONOSCOPY      CV CORONARY ANGIOGRAM N/A 2023    Procedure: Coronary Angiogram;  Surgeon: Moody Miranda MD;  Location:  HEART CARDIAC CATH LAB    ESOPHAGOSCOPY, GASTROSCOPY, DUODENOSCOPY (EGD), COMBINED N/A 2020    Procedure: ESOPHAGOGASTRODUODENOSCOPY, WITH BIOPSY;  Surgeon: Georges Pop MD;  Location:  GI    EXCIS BARTHOLIN GLAND/CYST  2005    EYE  SURGERY      HERNIORRHAPHY INGUINAL  70's    x5    INJECT STEROID (LOCATION) Right 2019    Procedure: RIGHT WRIST CORTISONE INJECTION;  Surgeon: Beatrice Philippe MD;  Location:  OR    OPERATIVE HYSTEROSCOPY WITH MORCELLATOR N/A 2018    Procedure: OPERATIVE HYSTEROSCOPY WITH MORCELLATOR ([employer]PHEW);  OPERATIVE HYSTEROSCOPY WITH TRUECLEAR MORCELLATOR 5C SCOPE ;  Surgeon: Iris Fernandez MD;  Location: [facility]    ORTHOPEDIC SURGERY Bilateral     SHOULDER ARTHROSCOPY    ORTHOPEDIC SURGERY Right 2017    right foot for plantar fasciitis    thumb surgery   and ,          Social History   I have reviewed this patient's social history and updated it with pertinent information if needed.  Social History     Tobacco Use    Smoking status: Former     Current packs/day: 0.00     Types: Cigarettes     Quit date: 1974     Years since quittin.5    Smokeless tobacco: Never    Tobacco comments:     infrequent use for about 5 years   Substance Use Topics    Alcohol use: No    Drug use: No        Family History   I have reviewed this patient's family history and updated it with pertinent information if needed.   Family History   Problem Relation Age of Onset    Diabetes Father     Hypertension Father     Depression Father     Substance Abuse Father     Anesthesia Reaction Father         went into shock with surgery     Obesity Father     Anxiety Disorder Father     Coronary Artery Disease Father     Hyperlipidemia Father     Diabetes Mother     Cerebrovascular Disease Mother         Cadasils disease    Depression Mother     Mental Illness Mother         borderline personality disorder, Chemical dependency    Substance Abuse Mother     Genetic Disorder Mother         Cadasils    Obesity Mother     Anxiety Disorder Mother     Cerebrovascular Disease Paternal Grandfather     Cerebrovascular Disease Brother         Cadasils disease    Depression Brother     Anxiety Disorder Brother     Substance  Abuse Brother     Genetic Disorder Brother         cadasils    Obesity Brother     Hypertension Brother     Hyperlipidemia Brother     Cerebrovascular Disease Sister         Cadasils disease    Depression Sister     Anxiety Disorder Sister     Genetic Disorder Sister         cadasils    Obesity Sister     Cerebrovascular Disease Sister         Cadasils disease    Genetic Disorder Sister         cadasils    Breast Cancer Sister     Depression Sister     Anxiety Disorder Sister     Obesity Sister     Hypertension Sister     Hyperlipidemia Sister     Depression Son     Anxiety Disorder Son         Prior to Admission Medications  - Pending pharmacy reconciliation   Prior to Admission Medications   Prescriptions Last Dose Informant Patient Reported? Taking?   Baclofen (LIORESAL) 5 MG tablet  Self Yes No   Sig: Take 5 mg by mouth as needed for muscle spasms   HYDROcodone-Acetaminophen  MG TABS   No No   Sig: Take 1-2 tablets by mouth every 4 hours as needed (maximum of 10 tablets in 24 hour period) Prescription is written for 1-2 tabs every 4-6 hrs prn (max of 10 tabs daily).   LORazepam (ATIVAN) 1 MG tablet   No No   Sig: Take 1 tablet (1 mg) by mouth 4 times daily.   PARoxetine (PAXIL-CR) 25 MG 24 hr tablet   No No   Sig: Take 1 tablet (25 mg) by mouth 2 times daily.   Vitamin D3 (CHOLECALCIFEROL) 125 MCG (5000 UT) tablet  Self Yes No   Sig: Take 2 tablets by mouth every evening   aspirin 81 MG EC tablet   No No   Sig: Take 1 tablet (81 mg) by mouth daily   budesonide (ENTOCORT EC) 3 MG EC capsule   No No   Sig: Take 3 capsules (9 mg) by mouth every morning.   carboxymethylcellulose (REFRESH PLUS) 0.5 % SOLN ophthalmic solution  Self Yes No   Sig: Place 1 drop into both eyes 3 times daily as needed for dry eyes   cyclobenzaprine (FLEXERIL) 10 MG tablet  Self Yes No   Sig: Take 10 mg by mouth daily as needed for muscle spasms.   gabapentin (NEURONTIN) 300 MG capsule   No No   Sig: Take 1 capsule (300 mg) by mouth  at bedtime.   ipratropium (ATROVENT) 0.06 % spray  Self No No   Sig: Spray 3 sprays into both nostrils daily   loratadine (CLARITIN) 10 MG tablet  Self Yes No   Sig: Take 10 mg by mouth every morning    naloxone (NARCAN) 4 MG/0.1ML nasal spray  Self No No   Sig: Spray 1 spray (4 mg) into one nostril alternating nostrils as needed for opioid reversal Every 2-3 minutes in alternating nostrils   nitroFURantoin macrocrystal-monohydrate (MACROBID) 100 MG capsule   No No   Sig: TAKE 1 CAPSULE(100 MG) BY MOUTH TWICE DAILY   nitroFURantoin macrocrystal-monohydrate (MACROBID) 100 MG capsule   No No   Sig: Take 1 capsule (100 mg) by mouth 2 times daily.   nitroGLYcerin (NITROSTAT) 0.4 MG sublingual tablet   No No   Sig: For chest pain place 1 tablet under the tongue every 5 minutes for 3 doses. If symptoms persist 5 minutes after 1st dose call 911.   omeprazole (PRILOSEC) 40 MG DR capsule  Self Yes No   Sig: Take 40 mg by mouth 2 times daily (before meals)   ondansetron (ZOFRAN) 4 MG tablet  Self Yes No   Sig: Take 4 mg by mouth every 8 hours as needed for nausea or vomiting   polyethylene glycol (MIRALAX) 17 GM/Dose powder  Self Yes No   Sig: Take 1 capful. by mouth as needed for constipation   propranolol ER (INDERAL LA) 60 MG 24 hr capsule   No No   Sig: TAKE 1 CAPSULE(60 MG) BY MOUTH DAILY   riboflavin 400 MG CAPS  Self Yes No   Sig: Take 400 mg by mouth daily   ubrogepant (UBRELVY) 50 MG tablet   No No   Sig: Take 1 tablet (50 mg) by mouth at onset of headache (may repeat in 2 hours, total daily dose is 100 mg/day, but limit use to less than 10 days per month)   vitamin C (ASCORBIC ACID) 1000 MG TABS  Self Yes No   Sig: Take 1,000 mg by mouth every evening    zolpidem ER (AMBIEN CR) 12.5 MG CR tablet   No No   Sig: Take 1 tablet (12.5 mg) by mouth at bedtime.      Facility-Administered Medications: None     Allergies   Allergies   Allergen Reactions    Betamethasone Other (See Comments)     agitation    Chocolate Other  "(See Comments)     Triggers migraines    Compazine [Prochlorperazine] Other (See Comments)     \"crawl out of my skin\"    Linzess [Linaclotide]     Penicillins Nausea and Vomiting and Hives    Pneumococcal Vaccine Other (See Comments)     Temporary paralization    Tizanidine Other (See Comments)     Severe agitation       Physical Exam   Vital Signs: Temp: 98.4  F (36.9  C) Temp src: Oral BP: 99/63 Pulse: 82   Resp: 24 SpO2: 94 % O2 Device: None (Room air)    Weight: 0 lbs 0 oz    Constitutional: Well-appearing, NAD  Respiratory: Clear to auscultation bilaterally, good air movement, normal effort   Cardiovascular: RRR, no m/r/g. No peripheral edema.    GI: Soft, mild tenderness to palpation in lower abdomen,  non-distended. No rebound tenderness or guarding.   Skin: Warm, dry   Neurologic: Alert. Responding to questions appropriately. Following commands.    Psychiatric: Normal affect, appropriate      Data   Data reviewed today: I reviewed all medications, new labs and imaging results over the last 24 hours. I personally reviewed the EKG tracing showing sinus rhythm with diffuse biphasic T waves .    Recent Labs   Lab 12/22/24  0254   WBC 15.7*   HGB 12.9   MCV 92         POTASSIUM 2.6*   CHLORIDE 97*   CO2 28   BUN 8.5   CR 0.62   ANIONGAP 13   THOR 8.5*   *   ALBUMIN 3.4*   PROTTOTAL 5.7*   BILITOTAL 0.7   ALKPHOS 81   ALT 9   AST 17       Recent Results (from the past 24 hours)   CT Abdomen Pelvis w Contrast    Narrative    EXAM: CT ABDOMEN AND PELVIS WITH CONTRAST  LOCATION: [facility]  DATE: 12/22/2024    INDICATION: Fever, chronic diarrhea, abdominal pain, colonoscopy with biopsy five days ago.  COMPARISON: CT abdomen and pelvis without IV contrast 08/16/2023.  TECHNIQUE: CT scan of the abdomen and pelvis was performed following injection of IV contrast. Multiplanar reformats were obtained. Dose reduction techniques were used.  CONTRAST: 65 mL Isovue " 370.    FINDINGS:   LOWER CHEST: Minor strand of linear atelectasis left lower lobe. No pleural effusion on either side. Normal cardiac size. No pericardial effusion. Moderate hiatal hernia. No significant change.    HEPATOBILIARY: Small amount of focal fat in the usual location in the left hepatic lobe anteroinferiorly. No discrete hepatic lesion, calcified gallstones or biliary dilatation. Patent hepatic and portal veins.    PANCREAS: Normal.    SPLEEN: Normal.    ADRENAL GLANDS: Normal.    KIDNEYS/BLADDER: No urinary tract calculi. Both kidneys are well perfused without hydronephrosis or hydroureter. Normal urinary bladder.    BOWEL: No free gas or free fluid. Diffuse thickening of the colon from the cecum to the distal rectum representing long segment colitis (infectious or inflammatory). No pneumatosis, mechanical obstruction, perforation, abscess or fistula formation.    LYMPH NODES: No suspicious abdominopelvic adenopathy.    VASCULATURE: Normal caliber mildly atherosclerotic distal abdominal aorta measuring 1.8 x 1.9 cm (image 90, series 3). Normal caliber IVC.    PELVIC ORGANS: Anteverted postmenopausal uterus. Rectosigmoid diffuse thickening representing long segment colitis. Slight atherosclerotic changes. No suspicious adenopathy or free fluid.    MUSCULOSKELETAL: Degenerative changes involving the spine and joints of the pelvis. Left convex lumbar curve.      Impression    IMPRESSION:   1.  No free gas or free fluid. Diffuse thickening of the colon from the cecum to the distal rectum representing long segment colitis (infectious or inflammatory), without secondary complications.    2.  Moderate hiatal hernia.    3.  Degenerative changes involving the spine and joints of the pelvis. Left convex lumbar curve.

## 2024-12-22 NOTE — CONSULTS
"    GASTROENTEROLOGY CONSULTATION      Laurel Parra  [address] [facility] [id number]  74 year old female     Admission Date/Time: 12/22/2024  Primary Care Provider: Georges Lebron  Referring / Attending Physician:  Montserrat     We were asked to see the patient in consultation by Dr. Mariano for evaluation of weakness, fever, abdominal pain.        HPI:  Laurel Parra is a 74 year old with a history of anxiety on Paxil, CAD, collagenous colitis, chronic pain. She was admitted to [facility] 12/22 with diarrhea, weakness, and fever.     Patient has followed Dr. Floyd Martinez at [facility] for collagenous colitis for many years. She has been on imodium, cholestyramine, and budesonide. Due to adrenal insufficiency, Budesonide has been limited. However, since September, she has been on Budesonide in varying doses. She can't remember how much she's been taking recently. She had a flex sig in September 2024 that confirmed lymphocytic colitis.    Due to weight loss, a CT scan was done showing a right sided colon mass. She underwent a colonoscopy 12/17 that showed edematous colon, no mass. Bx confirmed collagenous colitis.     She hasn't felt well since colonoscopy- increasing diarrhea, abdominal cramping, profoundly weak- unable to get out of bed. Friday December 20, she developed a fever to 102 and was directed to ED.     In ED: CT scan showed diffuse colon thickening. Moderate HH. Degenerative spine changes. Labs: K 2.6, Mg 1.3, WBC 15.7, hgb 12.9, MCV 92, Covid, influenza and RSV were negative. Cdiff and stool culture pending. Temp 101.6    She has had 30# unintentional weight loss over the last year. She attributes this to chronic nausea and post prandial diarrhea. She has control of heartburn on prilosec 40 mg BID.  She's been on Paxil for \"years\" and is aware this is related to colitis, but she's had good control of anxiety and depression on this (after trying many prior to starting Paxil). "     Since admission, she continues to have abdominal cramping followed by nonbloody diarrhea, nausea without vomiting.     She denies shortness of breath and chest pain. No recent abx or new medications. No close contacts that are ill.          PAST MEDICAL HISTORY:  Patient Active Problem List    Diagnosis Date Noted    Hypokalemia 12/22/2024     Priority: Medium    Hypomagnesemia 12/22/2024     Priority: Medium    Colitis 12/22/2024     Priority: Medium    Generalized weakness 12/22/2024     Priority: Medium    Fever, unspecified fever cause 12/22/2024     Priority: Medium    Diarrhea, unspecified type 12/22/2024     Priority: Medium    Adrenal insufficiency (H)      Priority: Medium     Dr. Navas      Central sensitization to pain      Priority: Medium    Anorexia nervosa (H) 08/08/2023     Priority: Medium    ASCVD (arteriosclerotic cardiovascular disease) 08/2023     Priority: Medium     cp and pos trop, angio with trivial cad      Somatic dysfunction of pelvic region 02/19/2023     Priority: Medium    Urge incontinence of urine 02/19/2023     Priority: Medium    Urinary urgency 12/13/2022     Priority: Medium    Pelvic floor dysfunction 05/25/2022     Priority: Medium    Voiding dysfunction 05/25/2022     Priority: Medium    Recurrent UTI 05/25/2022     Priority: Medium    Lymphocytic colitis 2021     Priority: Medium     [facility] Floyd Juan      Dyspepsia 2020     Priority: Medium     eval by [facility], had colonoscopy, egd, ct abd and pelvis, mrcp.  Hardin to be dyspepsia and constipation      Recurrent major depressive disorder, in partial remission (H) 12/15/2016     Priority: Medium    Gastroesophageal reflux disease without esophagitis 12/15/2016     Priority: Medium    Chronic low back pain, unspecified back pain laterality, with sciatica presence unspecified 12/15/2016     Priority: Medium    Intractable chronic migraine without aura and without status migrainosus 12/06/2016     Priority: Medium    Chronic  narcotic use      Priority: Medium     [facility]      Palpitations      Priority: Medium     holter with pvc's and pac's, echo nl      Chronic urethritis      Priority: Medium     Dr. Little      Colonic polyp      Priority: Medium     one polyp, fu 5 years      Anxiety      Priority: Medium    Frequency of urination and polyuria 12/28/2011     Priority: Medium          ROS: A comprehensive ten point review of systems was negative aside from those in mentioned in the HPI.       MEDICATIONS:   Prior to Admission medications    Medication Sig Start Date End Date Taking? Authorizing Provider   aspirin 81 MG EC tablet Take 1 tablet (81 mg) by mouth daily 9/2/23  Yes Tom Mathis MD   Baclofen (LIORESAL) 5 MG tablet Take 5 mg by mouth as needed for muscle spasms 8/7/23  Yes Reported, Patient   budesonide (ENTOCORT EC) 3 MG EC capsule Take 3 capsules (9 mg) by mouth every morning.  Patient taking differently: Take 3 mg by mouth every morning. 10/16/24  Yes Georges Lebron MD   carboxymethylcellulose (REFRESH PLUS) 0.5 % SOLN ophthalmic solution Place 1 drop into both eyes 3 times daily as needed for dry eyes   Yes Reported, Patient   cyclobenzaprine (FLEXERIL) 10 MG tablet Take 10 mg by mouth daily as needed for muscle spasms.   Yes Reported, Patient   gabapentin (NEURONTIN) 300 MG capsule Take 1 capsule (300 mg) by mouth at bedtime. 10/16/24  Yes Georges Lebron MD   HYDROcodone-Acetaminophen  MG TABS Take 1-2 tablets by mouth every 4 hours as needed (maximum of 10 tablets in 24 hour period) Prescription is written for 1-2 tabs every 4-6 hrs prn (max of 10 tabs daily). 9/2/23  Yes Tom Mathis MD   ipratropium (ATROVENT) 0.06 % spray Spray 3 sprays into both nostrils daily 1/30/18  Yes Bere Fabian APRN CNP   loratadine (CLARITIN) 10 MG tablet Take 10 mg by mouth every morning    Yes Reported, Patient   LORazepam (ATIVAN) 1 MG tablet Take 1 tablet (1 mg) by mouth 4  times daily. 10/16/24  Yes Georges Lebron MD   naloxone (NARCAN) 4 MG/0.1ML nasal spray Spray 1 spray (4 mg) into one nostril alternating nostrils as needed for opioid reversal. every 2-3 minutes until assistance arrives 12/22/24  Yes Ken Alatorre,    naloxone (NARCAN) 4 MG/0.1ML nasal spray Spray 1 spray (4 mg) into one nostril alternating nostrils as needed for opioid reversal Every 2-3 minutes in alternating nostrils 9/22/20  Yes Georges Lebron MD   nitroGLYcerin (NITROSTAT) 0.4 MG sublingual tablet For chest pain place 1 tablet under the tongue every 5 minutes for 3 doses. If symptoms persist 5 minutes after 1st dose call 911. 9/2/23  Yes Tom Mathis MD   omeprazole (PRILOSEC) 40 MG DR capsule Take 40 mg by mouth 2 times daily (before meals)   Yes Reported, Patient   ondansetron (ZOFRAN) 4 MG tablet Take 4 mg by mouth every 8 hours as needed for nausea or vomiting 2/25/23  Yes Reported, Patient   PARoxetine (PAXIL-CR) 25 MG 24 hr tablet Take 1 tablet (25 mg) by mouth 2 times daily. 10/16/24  Yes Georges Lebron MD   polyethylene glycol (MIRALAX) 17 GM/Dose powder Take 1 capful. by mouth as needed for constipation   Yes Reported, Patient   propranolol ER (INDERAL LA) 60 MG 24 hr capsule TAKE 1 CAPSULE(60 MG) BY MOUTH DAILY 8/8/24  Yes Ej Castanon MD   riboflavin 400 MG CAPS Take 400 mg by mouth daily   Yes Reported, Patient   ubrogepant (UBRELVY) 50 MG tablet Take 1 tablet (50 mg) by mouth at onset of headache (may repeat in 2 hours, total daily dose is 100 mg/day, but limit use to less than 10 days per month) 7/9/24  Yes Ej Castanon MD   vitamin C (ASCORBIC ACID) 1000 MG TABS Take 1,000 mg by mouth every evening    Yes Reported, Patient   Vitamin D3 (CHOLECALCIFEROL) 125 MCG (5000 UT) tablet Take 2 tablets by mouth every evening   Yes Reported, Patient   zolpidem ER (AMBIEN CR) 12.5 MG CR tablet Take 1 tablet (12.5 mg) by mouth at  "bedtime. 10/16/24  Yes Georges Lebron MD        ALLERGIES:   Allergies   Allergen Reactions    Betamethasone Other (See Comments)     agitation    Chocolate Other (See Comments)     Triggers migraines    Compazine [Prochlorperazine] Other (See Comments)     \"crawl out of my skin\"    Linzess [Linaclotide]     Penicillins Nausea and Vomiting and Hives    Pneumococcal Vaccine Other (See Comments)     Temporary paralization    Tizanidine Other (See Comments)     Severe agitation        SOCIAL HISTORY:  Social History     Tobacco Use    Smoking status: Former     Current packs/day: 0.00     Types: Cigarettes     Quit date: 1974     Years since quittin.5    Smokeless tobacco: Never    Tobacco comments:     infrequent use for about 5 years   Substance Use Topics    Alcohol use: No    Drug use: No        FAMILY HISTORY:  Family History   Problem Relation Age of Onset    Diabetes Father     Hypertension Father     Depression Father     Substance Abuse Father     Anesthesia Reaction Father         went into shock with surgery     Obesity Father     Anxiety Disorder Father     Coronary Artery Disease Father     Hyperlipidemia Father     Diabetes Mother     Cerebrovascular Disease Mother         Cadasils disease    Depression Mother     Mental Illness Mother         borderline personality disorder, Chemical dependency    Substance Abuse Mother     Genetic Disorder Mother         Cadasils    Obesity Mother     Anxiety Disorder Mother     Cerebrovascular Disease Paternal Grandfather     Cerebrovascular Disease Brother         Cadasils disease    Depression Brother     Anxiety Disorder Brother     Substance Abuse Brother     Genetic Disorder Brother         cadasils    Obesity Brother     Hypertension Brother     Hyperlipidemia Brother     Cerebrovascular Disease Sister         Cadasils disease    Depression Sister     Anxiety Disorder Sister     Genetic Disorder Sister         cadasils    Obesity Sister     " Cerebrovascular Disease Sister         Cadasils disease    Genetic Disorder Sister         cadasils    Breast Cancer Sister     Depression Sister     Anxiety Disorder Sister     Obesity Sister     Hypertension Sister     Hyperlipidemia Sister     Depression Son     Anxiety Disorder Son         PHYSICAL EXAM:     /69 (BP Location: Left arm)   Pulse 79   Temp 98.1  F (36.7  C) (Oral)   Resp 16   Wt 57.8 kg (127 lb 8 oz)   SpO2 95%   BMI 22.59 kg/m       PHYSICAL EXAM:  GENERAL:  patient resting in bed. Looks unwell but  not acutely.   SKIN: Color: pale  HEENT: Eyes: no icterics  Mouth: normal  RESPIRATORY: Respirations: comfortable  Ascultation: clear throughout  CARDIOVASCULAR: Ascultation: RRR  Edema: none  GASTROINTESTINAL: Appearance: nondistended, slightly obese  Auscultation: hyperactive  Palpation: soft, diffusely tender with some guarding  NEURO: Mental status: oriented  PSYCH: Affect: pleasant.              ADDITIONAL COMMENTS:   I reviewed the patient's new clinical lab test results.     Recent Labs   Lab 12/22/24 0254   WBC 15.7*   RBC 4.12   HGB 12.9   HCT 37.8   MCV 92   MCH 31.3   MCHC 34.1   RDW 12.7        Recent Labs   Lab Test 12/22/24  0819 12/22/24  0254 09/01/23  1147 08/31/23  1838 07/27/23  1314   POTASSIUM 2.9* 2.6* 4.0 3.6 4.2   CHLORIDE  --  97*  --  103 105   CO2  --  28  --  26 24   BUN  --  8.5  --  12.7 15.0   ANIONGAP  --  13  --  13 12     Recent Labs   Lab Test 12/22/24  0525 12/22/24  0254 12/07/23  1020 08/31/23  1838 07/27/23  1314 07/27/23  1314 02/21/23  1008 05/25/22  1428   ALBUMIN  --  3.4*  --  4.5  --  4.3  --   --    BILITOTAL  --  0.7  --  0.4  --  0.3  --   --    ALT  --  9 14 15   < > 18  --   --    AST  --  17  --  20  --  22  --   --    PROTEIN 10*  --   --   --   --   --  Negative Negative   LIPASE  --   --   --  9*  --   --   --   --     < > = values in this interval not displayed.      IMAGING / ENDOSCOPY  Recent Results (from the past 24 hours)    CT Abdomen Pelvis w Contrast    Narrative    EXAM: CT ABDOMEN AND PELVIS WITH CONTRAST  LOCATION: [facility]  DATE: 12/22/2024    INDICATION: Fever, chronic diarrhea, abdominal pain, colonoscopy with biopsy five days ago.  COMPARISON: CT abdomen and pelvis without IV contrast 08/16/2023.  TECHNIQUE: CT scan of the abdomen and pelvis was performed following injection of IV contrast. Multiplanar reformats were obtained. Dose reduction techniques were used.  CONTRAST: 65 mL Isovue 370.    FINDINGS:   LOWER CHEST: Minor strand of linear atelectasis left lower lobe. No pleural effusion on either side. Normal cardiac size. No pericardial effusion. Moderate hiatal hernia. No significant change.    HEPATOBILIARY: Small amount of focal fat in the usual location in the left hepatic lobe anteroinferiorly. No discrete hepatic lesion, calcified gallstones or biliary dilatation. Patent hepatic and portal veins.    PANCREAS: Normal.    SPLEEN: Normal.    ADRENAL GLANDS: Normal.    KIDNEYS/BLADDER: No urinary tract calculi. Both kidneys are well perfused without hydronephrosis or hydroureter. Normal urinary bladder.    BOWEL: No free gas or free fluid. Diffuse thickening of the colon from the cecum to the distal rectum representing long segment colitis (infectious or inflammatory). No pneumatosis, mechanical obstruction, perforation, abscess or fistula formation.    LYMPH NODES: No suspicious abdominopelvic adenopathy.    VASCULATURE: Normal caliber mildly atherosclerotic distal abdominal aorta measuring 1.8 x 1.9 cm (image 90, series 3). Normal caliber IVC.    PELVIC ORGANS: Anteverted postmenopausal uterus. Rectosigmoid diffuse thickening representing long segment colitis. Slight atherosclerotic changes. No suspicious adenopathy or free fluid.    MUSCULOSKELETAL: Degenerative changes involving the spine and joints of the pelvis. Left convex lumbar curve.      Impression    IMPRESSION:   1.  No free gas  or free fluid. Diffuse thickening of the colon from the cecum to the distal rectum representing long segment colitis (infectious or inflammatory), without secondary complications.    2.  Moderate hiatal hernia.    3.  Degenerative changes involving the spine and joints of the pelvis. Left convex lumbar curve.           CONSULTATION ASSESSMENT AND PLAN:    Laurel Parra is a 74 year old with a history of GERD, CAD, NSTEMI, microscopic colitis, who presents with several days of weakness, worsening nonbloody diarrhea with cramping and fevers. Patient has been on Budesonide for known microscopic colitis. She  underwent a colonoscopy 12/17 which showed diffuse inflammation without ulceration, bx confirmed collagenous colitis. The  colonoscopy was done after an abdominal CT scan (done for unexplained weight loss) suggested a right colon cancer.     Worsening diarrhea, cramping and fevers seem most consistent with an infectious diarrhea. Stool studies pending. No recent abx to suggest cdiff. Her profound weakness is likely related to marked electrolyte disturbance, which is being corrected slowly. Will also check Cortisol levels as she has had adren insufficiency in past.     Underlying microscopic colitis (interesting that 9/2024 biopsy show lymphocytic colitis, and 12/2024 biopsy show collagenous  colitis, I cannot explain this) is unlikely to be driving the current presentation. We discussed her to reconsider stopping Paxil as this is implicated in microscopic colitis.     Rec:    -follow pending stool studies   -correct electrolyte disturbances   -hold antimotility agents   -consider changing paxil to another treatment   -cortisol  level    I discussed the patient plan with Dr. Velazquez, GI staff physician. Thank you for asking us to participate in the care of this patient.    Approximately 49 min of total time was spent providing patient care, including patient evaluation, reviewing documentation/ test  results, and .     Fernanda Jacques CNP  [facility]  Office: [phone]

## 2024-12-22 NOTE — PROGRESS NOTES
RECEIVING UNIT ED HANDOFF REVIEW    ED Nurse Handoff Report was reviewed by: Beatrice Calvo RN on December 22, 2024 at 6:42 AM

## 2024-12-22 NOTE — PROVIDER NOTIFICATION
MD Notification    Notified Person: MD    Notified Person Name: Dr. Castanedaryder Alatorre    Notification Date/Time: 10:19 AM 12/22/24     Notification Interaction: [employer] Messaging     Purpose of Notification: SBAR Situation: [address] Laurel Parra Background: Generalized weakness, N/V, diarrhea Assessment: Pharm just completed med rec for pt. Pt is very anxious about not getting meds particularly Lorazepam, Paxil, and Hydrocodone. Pt sates that if she doesn't take it at the same time everyday, she will get really sick. Could you please look into meds and order. Thank you.    Orders Received: Meds ordered    Comments:

## 2024-12-22 NOTE — ED PROVIDER NOTES
Emergency Department Note      History of Present Illness     Chief Complaint   Diarrhea      HPI   Laurel Parra is a 74 year old female with a history of GERD, NSTEMI, and heart disease who presents to the ED for the evaluation of diarrhea. Laurel reports fever, diarrhea, headache, abdominal pain, and generalized weakness. The diarrhea began in September and has been chronic due to history of lymphocytic colitis, according to her . She says her fever and abdominal pain started yesterday. She has history of migraine. Her  states she had a colonoscopy with biopsy 5 days ago. She is on budesonide but not chronically on steroids. He mentions history of anorexia. She denies hematochezia, cough, urinary symptoms, or rash. Denies recent sick exposures or travel. Her  denies history of C. diff.    Independent Historian    as detailed above.    Review of External Notes   Reviewed past medical history including office visit to Dr. Martinez on September 2020 fifth 2024.    Past Medical History     Medical History and Problem List   Adrenal insufficiency  Anorectal disorder  Anorexia nervosa  Arteriosclerotic cardiovascular disease  Central sensitization to pain  Chronic constipation  Chronic narcotic use  Chronic pain  Chronic urethritis  Degenerative disc cervical & lumbar  Depression major in partial remission  Fibromyalgia  ALEXA  Gastroenteritis  GERD  Hernia diaphragm  Iatrogenic cushing disease  Lymphocytic colitis  Migraine  NSTEMI  Osteoarthritis  Pelvic floor dysfunction  Spondylosis w/o myelopathy or radiculopathy cervical    Medications   aspirin  baclofen  budesonide  cyclobenzaprine  gabapentin  hydrocodone-acetaminophen  ipratropium  loratadine  naloxone  nitrofurantoin macrocrystal-monohydrate  nitroglycerin  omeprazole  ondansetron  paroxetine  polyethylene glycol  propranolol  ubrogepant  zolpidem    Surgical History   Appendectomy  Carpal tunnel release B/L  Cataract  extraction  Cortisone injection wrist R  Cyst excision bartholin gland    Herniorrhaphy inguinal  Plantar fascitis repair R    Physical Exam     Patient Vitals for the past 24 hrs:   BP Temp Temp src Pulse Resp SpO2 Weight   24 0721 108/69 98.1  F (36.7  C) Oral 79 16 95 % 57.8 kg (127 lb 8 oz)   24 0530 91/60 -- -- 78 24 97 % --   24 0500 99/63 98.4  F (36.9  C) Oral 82 24 94 % --   24 0243 108/65 (!) 101.9  F (38.8  C) Oral 87 12 92 % --     Physical Exam  General: Thin, warm to touch   eyes: PERRL, conjunctivae pink no scleral icterus or conjunctival injection  ENT:  Moist mucus membranes, posterior oropharynx clear without erythema or exudates  Respiratory:  Lungs clear to auscultation bilaterally, no crackles/rubs/wheezes.  Good air movement  CV: Normal rate and rhythm, no murmurs/rubs/gallops  GI:  Abdomen soft and non-distended.  Normoactive BS.  Diffuse  tenderness, no guarding or rebound  Skin: Warm, dry.  No rashes or petechiae  Musculoskeletal: No peripheral edema or calf tenderness  Neuro: Alert and oriented to person/place/time.  Globally weak.  No focal deficits.  Psychiatric: Normal affect      Diagnostics     Lab Results   Labs Ordered and Resulted from Time of ED Arrival to Time of ED Departure   COMPREHENSIVE METABOLIC PANEL - Abnormal       Result Value    Sodium 138      Potassium 2.6 (*)     Carbon Dioxide (CO2) 28      Anion Gap 13      Urea Nitrogen 8.5      Creatinine 0.62      GFR Estimate >90      Calcium 8.5 (*)     Chloride 97 (*)     Glucose 111 (*)     Alkaline Phosphatase 81      AST 17      ALT 9      Protein Total 5.7 (*)     Albumin 3.4 (*)     Bilirubin Total 0.7     CBC WITH PLATELETS AND DIFFERENTIAL - Abnormal    WBC Count 15.7 (*)     RBC Count 4.12      Hemoglobin 12.9      Hematocrit 37.8      MCV 92      MCH 31.3      MCHC 34.1      RDW 12.7      Platelet Count 267      % Neutrophils 82      % Lymphocytes 7      % Monocytes 10      %  Eosinophils 0      % Basophils 0      % Immature Granulocytes 1      NRBCs per 100 WBC 0      Absolute Neutrophils 12.9 (*)     Absolute Lymphocytes 1.1      Absolute Monocytes 1.6 (*)     Absolute Eosinophils 0.0      Absolute Basophils 0.1      Absolute Immature Granulocytes 0.1      Absolute NRBCs 0.0     ISTAT GASES LACTATE VENOUS POCT - Abnormal    Lactic Acid POCT 1.9      Bicarbonate Venous POCT 30 (*)     O2 Sat, Venous POCT 80 (*)     pCO2 Venous POCT 35 (*)     pH Venous POCT 7.54 (*)     pO2 Venous POCT 39      Base Excess/Deficit (+/-) POCT 7.0 (*)    MAGNESIUM - Abnormal    Magnesium 1.3 (*)    ROUTINE UA WITH MICROSCOPIC REFLEX TO CULTURE - Abnormal    Color Urine Yellow      Appearance Urine Clear      Glucose Urine Negative      Bilirubin Urine Negative      Ketones Urine Trace (*)     Specific Gravity Urine 1.027      Blood Urine Trace (*)     pH Urine 6.0      Protein Albumin Urine 10 (*)     Urobilinogen Urine Normal      Nitrite Urine Negative      Leukocyte Esterase Urine Negative      Mucus Urine Present (*)     RBC Urine <1      WBC Urine 2      Squamous Epithelials Urine <1     INFLUENZA A/B, RSV AND SARS-COV2 PCR - Normal    Influenza A PCR Negative      Influenza B PCR Negative      RSV PCR Negative      SARS CoV2 PCR Negative     RBC AND PLATELET MORPHOLOGY    RBC Morphology Confirmed RBC Indices      Platelet Assessment        Value: Automated Count Confirmed. Platelet morphology is normal.   BLOOD CULTURE   BLOOD CULTURE   ENTERIC BACTERIA AND VIRUS PANEL BY PCR   C. DIFFICILE TOXIN B PCR WITH REFLEX TO C. DIFFICILE ANTIGEN AND TOXINS A/B EIA       Imaging   CT Abdomen Pelvis w Contrast   Final Result   IMPRESSION:    1.  No free gas or free fluid. Diffuse thickening of the colon from the cecum to the distal rectum representing long segment colitis (infectious or inflammatory), without secondary complications.      2.  Moderate hiatal hernia.      3.  Degenerative changes involving the  "spine and joints of the pelvis. Left convex lumbar curve.             EKG   ECG taken at 0404. ECG read at 0417  Normal sinus rhythm  RSR\" or QR pattern in V1 suggests right ventricular conduction delay  Possible anterior infarct, age undetermined  ST & T wave abnormality, consider inferolateral ischemia  Abnormal ECG   Rate 80 bpm. AR interval 154 ms. QRS duration 90 ms. QT/QTc 402/463 ms. P-R-T axes 57 -20 188.    Independent Interpretation   None    ED Course      Medications Administered   Medications   magnesium sulfate 2 g in 50 mL sterile water intermittent infusion (has no administration in time range)   lidocaine 1 % 0.1-1 mL (has no administration in time range)   lidocaine (LMX4) cream (has no administration in time range)   sodium chloride (PF) 0.9% PF flush 3 mL (has no administration in time range)   sodium chloride (PF) 0.9% PF flush 3 mL (has no administration in time range)   hydrALAZINE (APRESOLINE) tablet 10 mg (has no administration in time range)     Or   hydrALAZINE (APRESOLINE) injection 10 mg (has no administration in time range)   acetaminophen (TYLENOL) tablet 650 mg (has no administration in time range)     Or   acetaminophen (TYLENOL) Suppository 650 mg (has no administration in time range)   melatonin tablet 1 mg (has no administration in time range)   ondansetron (ZOFRAN ODT) ODT tab 4 mg (has no administration in time range)     Or   ondansetron (ZOFRAN) injection 4 mg (has no administration in time range)   benzocaine-menthol (CHLORASEPTIC) 6-10 MG lozenge 1 lozenge (has no administration in time range)   hydrocortisone sodium succinate PF (solu-CORTEF) injection 50 mg (has no administration in time range)     Followed by   hydrocortisone sodium succinate PF (solu-CORTEF) injection 50 mg (has no administration in time range)   lactated ringers infusion (has no administration in time range)   ibuprofen (ADVIL/MOTRIN) tablet 600 mg (600 mg Oral $Given 12/22/24 0255)   ondansetron " (ZOFRAN) injection 4 mg (4 mg Intravenous $Given 12/22/24 0255)   hydrocortisone sodium succinate PF (solu-CORTEF) injection 100 mg (100 mg Intravenous $Given 12/22/24 0409)   ceFEPIme (MAXIPIME) 2 g vial to attach to  mL bag for ADULTS or NS 50 mL bag for PEDS (0 g Intravenous Stopped 12/22/24 0527)   metroNIDAZOLE (FLAGYL) infusion 500 mg (500 mg Intravenous $New Bag 12/22/24 0642)   lactated ringers BOLUS 1,782 mL (1,782 mLs Intravenous $New Bag 12/22/24 0417)   potassium chloride 10 mEq in 100 mL sterile water infusion (10 mEq Intravenous $New Bag 12/22/24 0534)   iopamidol (ISOVUE-370) solution 65 mL (65 mLs Intravenous $Given 12/22/24 0445)   Saline (60 mLs Intravenous $Given 12/22/24 0445)   potassium chloride riaz ER (KLOR-CON M20) CR tablet 40 mEq (40 mEq Oral $Given 12/22/24 0621)       Procedures   Procedures     Discussion of Management   Admitting hospitalist, Dr. Mariano    ED Course   ED Course as of 12/22/24 0747   Sun Dec 22, 2024   7815 I evaluated the patient and obtained history.    8674 I spoke to Dr. Mariano of the hospitalist service regarding admission.       Additional Documentation  None    Medical Decision Making / Diagnosis     CMS Diagnoses:     Sepsis ED evaluation       Time zero:  0400  on 12/22/24 as this was the time when patient was evaluated and found to have fever and exam concerning for bacterial infection.      Lactic Acid Results:  Recent Labs   Lab Test 12/22/24  0258   LACT 1.9       3 Hour Bundle 6 Hour Bundle (Reassessment)   Blood Cultures before IV Antibiotics: Yes  Antibiotics given: see below  Prehospital fluid volume (mL):                     Total fluids given (ED +Pre-hospital):  The patient responded to a lesser volume of IV fluids. The initial volume ordered was 1000 mL.    Repeat Lactic Acid Level: Ordered by reflex for 2 hours after initial lactic acid collection.  Vasopressors: MAP>65 after initial IVF bolus, will continue to monitor fluid status and  vital signs.  Repeat perfusion exam: I attest to having performed a repeat sepsis exam and assessment of perfusion at  0430 .   BMI Readings from Last 1 Encounters:   12/22/24 22.59 kg/m        Anti-infectives (From admission through now)      Start     Dose/Rate Route Frequency Ordered Stop    12/22/24 0400  ceFEPIme (MAXIPIME) 2 g vial to attach to  mL bag for ADULTS or NS 50 mL bag for PEDS         2 g  over 30 Minutes Intravenous ONCE 12/22/24 0355 12/22/24 0527    12/22/24 0400  metroNIDAZOLE (FLAGYL) infusion 500 mg        Note to Pharmacy: Metronidazole IV may be dosed more frequently than Q12h for bacteremia, CNS infection and Clostridium difficile.    500 mg  over 60 Minutes Intravenous ONCE 12/22/24 0355 12/22/24 0742                [facility]       None    [facility]   Laurel Parra is a 74 year old female who has a history of lymphocytic colitis and recent colonoscopy who comes with chronic diarrhea, fever, generalized weakness.  She also has abdominal tenderness.  Laboratory studies reveal hypokalemia and hypomagnesemia.  These were repleted.  She was treated with IV fluids.  Stool studies are pending.  Blood cultures were obtained and are pending.  Lactic acid was normal and she does not meet criteria for severe sepsis or septic shock at this time but she will be monitored closely for this.  She was treated with broad-spectrum antibiotics for this concern.  CT scan was indicated and obtained and showed long segment colitis.  Unclear if this is an infectious or inflammatory but antibiotics were given previously.  Given her history of adrenal insufficiency, stress dose steroids with hydrocortisone were also provided.  Patient was treated for pain.  She will be admitted to the hospital for ongoing parenteral antibiotics, GI consultation and monitoring.  Dr. Mariano of the hospital service graciously agreed to admit the patient.    Disposition   The patient was admitted to the hospital.     Diagnosis      ICD-10-CM    1. Generalized weakness  R53.1       2. Hypomagnesemia  E83.42       3. Hypokalemia  E87.6       4. Diarrhea, unspecified type  R19.7       5. Fever, unspecified fever cause  R50.9       6. Colitis  K52.9       7. Adrenal insufficiency (H)  E27.40       8. Lymphocytic colitis  K52.832                Scribe Disclosure:  I, Sussy Gee, am serving as a scribe at 4:03 AM on 12/22/2024 to document services personally performed by Leydi Logan MD based on my observations and the provider's statements to me.        Leydi Logan MD  12/22/24 2368

## 2024-12-22 NOTE — ED TRIAGE NOTES
Nausea and diarrhea all day today. Headache, generalized aches tonight with a fever of 102 at home. Took norco last at 2100.     Triage Assessment (Adult)       Row Name 12/22/24 0242          Triage Assessment    Airway WDL WDL        Respiratory WDL    Respiratory WDL WDL        Skin Circulation/Temperature WDL    Skin Circulation/Temperature WDL X;temperature     Skin Temperature warm        Cardiac WDL    Cardiac WDL WDL        Peripheral/Neurovascular WDL    Peripheral Neurovascular WDL WDL        Cognitive/Neuro/Behavioral WDL    Cognitive/Neuro/Behavioral WDL WDL

## 2024-12-22 NOTE — Clinical Note
Hemodynamic equipment used: 5 lead ECG, WaveseerK With 3 Leads, Machine BP Cuff and pulse oximeter probe.

## 2024-12-22 NOTE — PROGRESS NOTES
[facility]    Medicine Progress Note - Hospitalist Service    Date of Admission:  12/22/2024    Assessment & Plan   Laurel Parra is a 74 year-old female with past medical history significant for lymphocytic colitis with chronic diarrhea, anorexia, adrenal insufficiency who was admitted to [facility] on 12/23/2024 for weakness, fever, abdominal pain secondary to nonsevere C. Difficile.     Generalized weakness  Sepsis  Non-severe C-Diff  Chronic diarrhea in setting of lymphocytic colitis  Risk factors: Omeprazole and recent abx (within 1 month)  - CT AP W 12/2012/24: Diffuse colonic thickening from cecum to distal rectum  - Enteric panel negative  - C Diff PCR & EIA Toxin positive  Plan  - Follow Bcx  - Start Vancomycin 125mg QID PO x10 days (12/22/2024 -12/31/2024)  - LR 125cc/hr IV  - [facility] following  - PT/OT consulted    Secondary adrenal insufficiency   - Followed by Dr. Navas of [facility]  Plan  - Discontinue stress dose steroids and restart home budesonide given no suspicions of adrenal crisis    Hypokalemia  Hypomagnesemia   Secondary to diarrhea and poor intake.  - S/p K 50mEq & 2g Mg  Plan  - Monitor with replacement protocol    Chronic pain syndrome - Continue prior to admission gabapentin, hydrocodone-APAP, cyclobenzaprine, baclofen when dose confirmed by pharmacy   GERD - Continue prior to admission PPI when dose confirmed by pharmacy   Anxiety - Continue prior to admission lorazepam, paroxetine when dose confirmed by pharmacy   Migraine headaches - Propranolol & riboflavin           Diet: Combination Diet Regular Diet Adult    DVT Prophylaxis: Enoxaparin (Lovenox) SQ  Bates Catheter: Not present  Lines: None     Cardiac Monitoring: ACTIVE order. Indication: Electrolyte Imbalance (24 hours)- Magnesium <1.3 mg/ml; Potassium < =2.8 or > 5.5 mg/ml  Code Status: Full Code      Clinically Significant Risk Factors Present on Admission        #  Hypokalemia: Lowest K = 2.6 mmol/L in last 2 days, will replace as needed   # Hypochloremia: Lowest Cl = 97 mmol/L in last 2 days, will monitor as appropriate  # Hypocalcemia: Lowest Ca = 8.5 mg/dL in last 2 days, will monitor and replace as appropriate   # Hypomagnesemia: Lowest Mg = 1.3 mg/dL in last 2 days, will replace as needed   # Hypoalbuminemia: Lowest albumin = 3.4 g/dL at 12/22/2024  2:54 AM, will monitor as appropriate   # Drug Induced Platelet Defect: home medication list includes an antiplatelet medication                          Disposition Plan     Medically Ready for Discharge: Anticipated in 2-4 Days             Ken Alatorre DO  Hospitalist Service  [facility]  Securely message with [employer] (more info)  Text page via [employer] Paging/Directory   ______________________________________________________________________    Interval History   Admitted overnight.    On evaluation this morning, she is resting in the bed and accompanied by .  Reports her diarrhea has been ongoing every 15 minutes and is watery in nature.  No current fevers, chills since hospital admission.  Very mild abdominal pain that is not bothersome.      Physical Exam   Vital Signs: Temp: 98.1  F (36.7  C) Temp src: Oral BP: 108/69 Pulse: 79   Resp: 16 SpO2: 95 % O2 Device: None (Room air)    Weight: 127 lbs 8 oz    General Appearance: Elderly and weak  Respiratory: No tripoding or accessory muscle usage  Cardiovascular: Regular rate and rhythm.  No edema in BLE  GI: Soft, mild tenderness diffusely with deep palpation.  No guarding, rigidity or rebound tenderness  Skin: No rashes or lesions  Other: Alert and oriented x 4, conversing coherently    Medical Decision Making       60 MINUTES SPENT BY ME on the date of service doing chart review, history, exam, documentation & further activities per the note.      Data   ------------------------- PAST 24 HR DATA REVIEWED  -----------------------------------------------

## 2024-12-22 NOTE — PHARMACY-ADMISSION MEDICATION HISTORY
Pharmacy Intern Admission Medication History    Admission medication history is complete. The information provided in this note is only as accurate as the sources available at the time of the update.    Information Source(s): Patient and CareEverywhere/SureScripts via in-person    Pertinent Information: Patient reports taking 1 capsule of budesonide 3 mg every AM, instead of the 3 capsules every AM. She reports that she may want to start taking 2 capsules every AM.    Changes made to PTA medication list:  Added: None  Deleted: Nitrofurantoin  Changed: budesonide 3 mg: Take 3 capsules po every morning --> Take 1 capsule po every morning    Allergies reviewed with patient and updates made in EHR: no    Medication History Completed By: Jade Zelaya 12/22/2024 9:26 AM    PTA Med List   Medication Sig Last Dose/Taking    aspirin 81 MG EC tablet Take 1 tablet (81 mg) by mouth daily 12/21/2024 Morning    Baclofen (LIORESAL) 5 MG tablet Take 5 mg by mouth as needed for muscle spasms Unknown    budesonide (ENTOCORT EC) 3 MG EC capsule Take 3 capsules (9 mg) by mouth every morning. (Patient taking differently: Take 3 mg by mouth every morning.) 12/21/2024 Morning    carboxymethylcellulose (REFRESH PLUS) 0.5 % SOLN ophthalmic solution Place 1 drop into both eyes 3 times daily as needed for dry eyes Past Week    cyclobenzaprine (FLEXERIL) 10 MG tablet Take 10 mg by mouth daily as needed for muscle spasms. Unknown    gabapentin (NEURONTIN) 300 MG capsule Take 1 capsule (300 mg) by mouth at bedtime. 12/21/2024 Bedtime    HYDROcodone-Acetaminophen  MG TABS Take 1-2 tablets by mouth every 4 hours as needed (maximum of 10 tablets in 24 hour period) Prescription is written for 1-2 tabs every 4-6 hrs prn (max of 10 tabs daily). 12/21/2024 Evening    ipratropium (ATROVENT) 0.06 % spray Spray 3 sprays into both nostrils daily Unknown    loratadine (CLARITIN) 10 MG tablet Take 10 mg by mouth every morning  12/21/2024 Morning     LORazepam (ATIVAN) 1 MG tablet Take 1 tablet (1 mg) by mouth 4 times daily. 12/21/2024 Evening    naloxone (NARCAN) 4 MG/0.1ML nasal spray Spray 1 spray (4 mg) into one nostril alternating nostrils as needed for opioid reversal Every 2-3 minutes in alternating nostrils Unknown    nitroGLYcerin (NITROSTAT) 0.4 MG sublingual tablet For chest pain place 1 tablet under the tongue every 5 minutes for 3 doses. If symptoms persist 5 minutes after 1st dose call 911. Taking    omeprazole (PRILOSEC) 40 MG DR capsule Take 40 mg by mouth 2 times daily (before meals) 12/21/2024 Evening    ondansetron (ZOFRAN) 4 MG tablet Take 4 mg by mouth every 8 hours as needed for nausea or vomiting Unknown    PARoxetine (PAXIL-CR) 25 MG 24 hr tablet Take 1 tablet (25 mg) by mouth 2 times daily. 12/21/2024 Evening    polyethylene glycol (MIRALAX) 17 GM/Dose powder Take 1 capful. by mouth as needed for constipation Unknown    propranolol ER (INDERAL LA) 60 MG 24 hr capsule TAKE 1 CAPSULE(60 MG) BY MOUTH DAILY 12/21/2024 Bedtime    riboflavin 400 MG CAPS Take 400 mg by mouth daily Unknown    ubrogepant (UBRELVY) 50 MG tablet Take 1 tablet (50 mg) by mouth at onset of headache (may repeat in 2 hours, total daily dose is 100 mg/day, but limit use to less than 10 days per month) Unknown    vitamin C (ASCORBIC ACID) 1000 MG TABS Take 1,000 mg by mouth every evening  12/21/2024 Evening    Vitamin D3 (CHOLECALCIFEROL) 125 MCG (5000 UT) tablet Take 2 tablets by mouth every evening 12/21/2024 Evening    zolpidem ER (AMBIEN CR) 12.5 MG CR tablet Take 1 tablet (12.5 mg) by mouth at bedtime. 12/21/2024 Bedtime

## 2024-12-23 ENCOUNTER — APPOINTMENT (OUTPATIENT)
Dept: PHYSICAL THERAPY | Facility: CLINIC | Age: 74
End: 2024-12-23
Attending: INTERNAL MEDICINE
Payer: MEDICARE

## 2024-12-23 LAB
ANION GAP SERPL CALCULATED.3IONS-SCNC: 7 MMOL/L (ref 7–15)
BUN SERPL-MCNC: 4.8 MG/DL (ref 8–23)
CALCIUM SERPL-MCNC: 8 MG/DL (ref 8.8–10.4)
CHLORIDE SERPL-SCNC: 103 MMOL/L (ref 98–107)
CREAT SERPL-MCNC: 0.54 MG/DL (ref 0.51–0.95)
EGFRCR SERPLBLD CKD-EPI 2021: >90 ML/MIN/1.73M2
ERYTHROCYTE [DISTWIDTH] IN BLOOD BY AUTOMATED COUNT: 12.7 % (ref 10–15)
GLUCOSE SERPL-MCNC: 105 MG/DL (ref 70–99)
HCO3 SERPL-SCNC: 29 MMOL/L (ref 22–29)
HCT VFR BLD AUTO: 30.4 % (ref 35–47)
HGB BLD-MCNC: 9.9 G/DL (ref 11.7–15.7)
MAGNESIUM SERPL-MCNC: 1.6 MG/DL (ref 1.7–2.3)
MCH RBC QN AUTO: 30.5 PG (ref 26.5–33)
MCHC RBC AUTO-ENTMCNC: 32.6 G/DL (ref 31.5–36.5)
MCV RBC AUTO: 94 FL (ref 78–100)
PHOSPHATE SERPL-MCNC: 1.7 MG/DL (ref 2.5–4.5)
PHOSPHATE SERPL-MCNC: 2.1 MG/DL (ref 2.5–4.5)
PLATELET # BLD AUTO: 234 10E3/UL (ref 150–450)
POTASSIUM SERPL-SCNC: 2.6 MMOL/L (ref 3.4–5.3)
POTASSIUM SERPL-SCNC: 3.6 MMOL/L (ref 3.4–5.3)
RBC # BLD AUTO: 3.25 10E6/UL (ref 3.8–5.2)
SODIUM SERPL-SCNC: 139 MMOL/L (ref 135–145)
WBC # BLD AUTO: 9.4 10E3/UL (ref 4–11)

## 2024-12-23 PROCEDURE — 97161 PT EVAL LOW COMPLEX 20 MIN: CPT | Mod: GP

## 2024-12-23 PROCEDURE — 85018 HEMOGLOBIN: CPT | Performed by: INTERNAL MEDICINE

## 2024-12-23 PROCEDURE — 80048 BASIC METABOLIC PNL TOTAL CA: CPT | Performed by: INTERNAL MEDICINE

## 2024-12-23 PROCEDURE — 36415 COLL VENOUS BLD VENIPUNCTURE: CPT | Performed by: STUDENT IN AN ORGANIZED HEALTH CARE EDUCATION/TRAINING PROGRAM

## 2024-12-23 PROCEDURE — 84100 ASSAY OF PHOSPHORUS: CPT | Performed by: STUDENT IN AN ORGANIZED HEALTH CARE EDUCATION/TRAINING PROGRAM

## 2024-12-23 PROCEDURE — 250N000013 HC RX MED GY IP 250 OP 250 PS 637: Performed by: INTERNAL MEDICINE

## 2024-12-23 PROCEDURE — 82435 ASSAY OF BLOOD CHLORIDE: CPT | Performed by: INTERNAL MEDICINE

## 2024-12-23 PROCEDURE — 36415 COLL VENOUS BLD VENIPUNCTURE: CPT | Performed by: INTERNAL MEDICINE

## 2024-12-23 PROCEDURE — 120N000001 HC R&B MED SURG/OB

## 2024-12-23 PROCEDURE — 83735 ASSAY OF MAGNESIUM: CPT | Performed by: INTERNAL MEDICINE

## 2024-12-23 PROCEDURE — 99232 SBSQ HOSP IP/OBS MODERATE 35: CPT | Performed by: STUDENT IN AN ORGANIZED HEALTH CARE EDUCATION/TRAINING PROGRAM

## 2024-12-23 PROCEDURE — 250N000013 HC RX MED GY IP 250 OP 250 PS 637: Performed by: STUDENT IN AN ORGANIZED HEALTH CARE EDUCATION/TRAINING PROGRAM

## 2024-12-23 PROCEDURE — 250N000011 HC RX IP 250 OP 636: Performed by: INTERNAL MEDICINE

## 2024-12-23 PROCEDURE — 258N000003 HC RX IP 258 OP 636: Performed by: INTERNAL MEDICINE

## 2024-12-23 PROCEDURE — 84132 ASSAY OF SERUM POTASSIUM: CPT | Performed by: INTERNAL MEDICINE

## 2024-12-23 RX ORDER — POTASSIUM CHLORIDE 1.5 G/1.58G
20 POWDER, FOR SOLUTION ORAL ONCE
Status: COMPLETED | OUTPATIENT
Start: 2024-12-23 | End: 2024-12-23

## 2024-12-23 RX ORDER — DEXTROSE MONOHYDRATE, SODIUM CHLORIDE, SODIUM LACTATE, POTASSIUM CHLORIDE, CALCIUM CHLORIDE 5; 600; 310; 179; 20 G/100ML; MG/100ML; MG/100ML; MG/100ML; MG/100ML
INJECTION, SOLUTION INTRAVENOUS CONTINUOUS
Status: DISCONTINUED | OUTPATIENT
Start: 2024-12-23 | End: 2024-12-23

## 2024-12-23 RX ORDER — POTASSIUM CHLORIDE 1500 MG/1
20 TABLET, EXTENDED RELEASE ORAL ONCE
Status: COMPLETED | OUTPATIENT
Start: 2024-12-23 | End: 2024-12-23

## 2024-12-23 RX ORDER — POTASSIUM CHLORIDE 1500 MG/1
40 TABLET, EXTENDED RELEASE ORAL ONCE
Status: COMPLETED | OUTPATIENT
Start: 2024-12-23 | End: 2024-12-23

## 2024-12-23 RX ADMIN — POTASSIUM CHLORIDE: 2 INJECTION, SOLUTION, CONCENTRATE INTRAVENOUS at 14:39

## 2024-12-23 RX ADMIN — PROPRANOLOL HYDROCHLORIDE 60 MG: 60 CAPSULE, EXTENDED RELEASE ORAL at 07:49

## 2024-12-23 RX ADMIN — HYDROCODONE BITARTRATE AND ACETAMINOPHEN 2 TABLET: 10; 325 TABLET ORAL at 18:58

## 2024-12-23 RX ADMIN — LORAZEPAM 1 MG: 1 TABLET ORAL at 06:36

## 2024-12-23 RX ADMIN — POTASSIUM CHLORIDE 20 MEQ: 1.5 POWDER, FOR SOLUTION ORAL at 04:26

## 2024-12-23 RX ADMIN — VANCOMYCIN HYDROCHLORIDE 125 MG: 125 CAPSULE ORAL at 22:10

## 2024-12-23 RX ADMIN — LORAZEPAM 1 MG: 1 TABLET ORAL at 17:11

## 2024-12-23 RX ADMIN — POTASSIUM & SODIUM PHOSPHATES POWDER PACK 280-160-250 MG 1 PACKET: 280-160-250 PACK at 20:26

## 2024-12-23 RX ADMIN — POTASSIUM & SODIUM PHOSPHATES POWDER PACK 280-160-250 MG 1 PACKET: 280-160-250 PACK at 17:11

## 2024-12-23 RX ADMIN — BUDESONIDE 9 MG: 3 CAPSULE ORAL at 07:49

## 2024-12-23 RX ADMIN — HYDROCODONE BITARTRATE AND ACETAMINOPHEN 2 TABLET: 10; 325 TABLET ORAL at 14:39

## 2024-12-23 RX ADMIN — POTASSIUM CHLORIDE 20 MEQ: 1500 TABLET, EXTENDED RELEASE ORAL at 12:37

## 2024-12-23 RX ADMIN — SODIUM CHLORIDE, POTASSIUM CHLORIDE, SODIUM LACTATE AND CALCIUM CHLORIDE: 600; 310; 30; 20 INJECTION, SOLUTION INTRAVENOUS at 02:19

## 2024-12-23 RX ADMIN — IPRATROPIUM BROMIDE 3 SPRAY: 42 SPRAY NASAL at 07:49

## 2024-12-23 RX ADMIN — HYDROCODONE BITARTRATE AND ACETAMINOPHEN 2 TABLET: 10; 325 TABLET ORAL at 10:27

## 2024-12-23 RX ADMIN — PAROXETINE HYDROCHLORIDE 25 MG: 20 TABLET, FILM COATED ORAL at 20:35

## 2024-12-23 RX ADMIN — POTASSIUM & SODIUM PHOSPHATES POWDER PACK 280-160-250 MG 1 PACKET: 280-160-250 PACK at 12:37

## 2024-12-23 RX ADMIN — HYDROCODONE BITARTRATE AND ACETAMINOPHEN 2 TABLET: 10; 325 TABLET ORAL at 06:04

## 2024-12-23 RX ADMIN — VANCOMYCIN HYDROCHLORIDE 125 MG: 125 CAPSULE ORAL at 17:11

## 2024-12-23 RX ADMIN — LORAZEPAM 1 MG: 1 TABLET ORAL at 22:10

## 2024-12-23 RX ADMIN — POTASSIUM CHLORIDE 40 MEQ: 1500 TABLET, EXTENDED RELEASE ORAL at 10:56

## 2024-12-23 RX ADMIN — VANCOMYCIN HYDROCHLORIDE 125 MG: 125 CAPSULE ORAL at 07:49

## 2024-12-23 RX ADMIN — PAROXETINE HYDROCHLORIDE 25 MG: 20 TABLET, FILM COATED ORAL at 06:35

## 2024-12-23 RX ADMIN — PANTOPRAZOLE SODIUM 40 MG: 40 TABLET, DELAYED RELEASE ORAL at 17:11

## 2024-12-23 RX ADMIN — LORAZEPAM 1 MG: 1 TABLET ORAL at 12:37

## 2024-12-23 RX ADMIN — PANTOPRAZOLE SODIUM 40 MG: 40 TABLET, DELAYED RELEASE ORAL at 06:35

## 2024-12-23 RX ADMIN — ZOLPIDEM TARTRATE 5 MG: 5 TABLET, COATED ORAL at 22:10

## 2024-12-23 RX ADMIN — ASPIRIN 81 MG: 81 TABLET, COATED ORAL at 07:49

## 2024-12-23 RX ADMIN — ASPIRIN 81 MG: 81 TABLET, COATED ORAL at 12:54

## 2024-12-23 RX ADMIN — VANCOMYCIN HYDROCHLORIDE 125 MG: 125 CAPSULE ORAL at 12:37

## 2024-12-23 RX ADMIN — GABAPENTIN 300 MG: 300 CAPSULE ORAL at 20:27

## 2024-12-23 RX ADMIN — LORATADINE 10 MG: 10 TABLET ORAL at 07:49

## 2024-12-23 ASSESSMENT — ACTIVITIES OF DAILY LIVING (ADL)
ADLS_ACUITY_SCORE: 34
ADLS_ACUITY_SCORE: 34
ADLS_ACUITY_SCORE: 60
ADLS_ACUITY_SCORE: 34
ADLS_ACUITY_SCORE: 34
ADLS_ACUITY_SCORE: 60
ADLS_ACUITY_SCORE: 60
ADLS_ACUITY_SCORE: 34
ADLS_ACUITY_SCORE: 60
ADLS_ACUITY_SCORE: 34
ADLS_ACUITY_SCORE: 60
ADLS_ACUITY_SCORE: 34
ADLS_ACUITY_SCORE: 60
ADLS_ACUITY_SCORE: 34
ADLS_ACUITY_SCORE: 60

## 2024-12-23 NOTE — PROGRESS NOTES
GASTROENTEROLOGY PROGRESS NOTE     SUBJECTIVE:  Had incontinent stool overnight. Reports having liquid brown stool every 10 min since 5am. Slight increase in lower abdominal pain today. Diarrhea worsens when she tries to eat. No fevers.      OBJECTIVE:  /70 (BP Location: Left arm)   Pulse 76   Temp 98.6  F (37  C) (Oral)   Resp 16   Wt 57.8 kg (127 lb 8 oz)   SpO2 92%   BMI 22.59 kg/m    Temp (24hrs), Av.4  F (36.9  C), Min:98  F (36.7  C), Max:98.8  F (37.1  C)    Patient Vitals for the past 72 hrs:   Weight   24 0721 57.8 kg (127 lb 8 oz)       Intake/Output Summary (Last 24 hours) at 2024 0844  Last data filed at 2024 0219  Gross per 24 hour   Intake 1011 ml   Output --   Net 1011 ml        PHYSICAL EXAM  Gen: alert, oriented, NAD  Abd: soft, nondistended, +moderate diffuse lower abdominal tenderness, no rebound/guarding, +BS.      Additional Comments:  ROS, FH, SH: See initial GI consult for details.     I have reviewed the patient's new clinical lab results:     Recent Labs   Lab Test 24  0254 23  2241 23  1838   WBC 15.7* 12.1* 10.1   HGB 12.9 12.5 13.1   MCV 92 96 95    304 386     Recent Labs   Lab Test 24  2051 24  1634 24  0819 24  0254 23  1147 23  1838 23  1314   POTASSIUM 3.4 3.1* 2.9* 2.6*   < > 3.6 4.2   CHLORIDE  --   --   --  97*  --  103 105   CO2  --   --   --  28  --   24   BUN  --   --   --  8.5  --  12.7 15.0   ANIONGAP  --   --   --  13  --  13 12    < > = values in this interval not displayed.     Recent Labs   Lab Test 24  0525 24  0254 23  1020 23  1838 23  1314 23  1314 23  1008 22  1428   ALBUMIN  --  3.4*  --  4.5  --  4.3  --   --    BILITOTAL  --  0.7  --  0.4  --  0.3  --   --    ALT  --  9 14 15   < > 18  --   --    AST  --  17  --  20  --  22  --   --    PROTEIN 10*  --   --   --   --   --  Negative Negative   LIPASE  --   --   --  9*   --   --   --   --     < > = values in this interval not displayed.        Assessment:  74 year old with a history of anxiety on Paxil, CAD, collagenous colitis, chronic pain. She was admitted to [facility] 12/22 with diarrhea, weakness, and fever. CT abd/pelvis with diffuse colitis.     1. Diarrhea.  2. Weight loss.   Fever, pain, and weight loss are all less common with collagenous colitis flares. With presenting symptoms and CT with diffuse colitis, infectious etiology suspected. C diff toxin and PCR returned positive today. Enteric comprehensive stool panel negative. Blood cultures negative to date. Labs this morning pending. WBC on 12/22 15.7. Fevers resolved.     Last colonoscopy 12/17 to evaluate right colon mass showed no colonic mass, edematous colon. Biopsies consistent with collagenous colitis. Has been on budesonide - ordered here but did not get any yesterday. Received 9mg this am.     Plan:  --Vancomycin 125mg QID x 14 days.   --GRACIE.   --Monitor stool output/consistency, worsening abdominal pain.   --Await labs this am.   --May consider stopping budesonide in light of C diff infection.     Will discuss with Dr. Mart.    Time spent: 25 minutes, greater than 50% of the visit was spent in counseling/coordination of care.     Carmen Ruiz, PAC  [facility] ([facility])

## 2024-12-23 NOTE — PLAN OF CARE
Orientation: A & O x 4.  Activity: Ind  Diet/BS Checks: Regular. Tele:  NSR  IV Access/Drains: L PIV infusing LR at 125 ml/hr.  Chronic Pain Management: Norco PRN q 4 hrs.  Abnormal VS/Results: VSS on RA.  Bowel/Bladder: continent of b/b. 12 loose stools per pt. Skin/Wounds: WNL. Consults: Pt/Ot, GI.  D/C Disposition: pending  Other Info: C-diff enteric precautions maintained.  - K and Mag protocol; Magnesium recheck in for 12/24/24 a.m . K+ replaced; recheck in for 1747.  Phosphorous replaced; recheck in for 0001.  CTM

## 2024-12-23 NOTE — PLAN OF CARE
Goal Outcome Evaluation:    Orientation: a/ox4, calm  Aggression Stop Light: green  Activity: Ind  Diet/BS Checks: Regular  Tele:  NSR  IV Access/Drains: L PIV infusing LR at 125 ml/hr  Pain Management: reported 5/10 pain, did not want intervention  Abnormal VS/Results: VSS on RA  Bowel/Bladder: continent of b/b. Unknown number of loose stools per pt  Skin/Wounds: WNL  Consults: Pt/Ot, GI  D/C Disposition: pending  Other Info:   - Cdiff percautions   - K and Mag protocol, recheck in AM

## 2024-12-23 NOTE — PLAN OF CARE
OT: Order received, chart reviewed and discussed with care team. Per PT, patient is IND within the room and with ADL's. OT not indicated due to having no acute care OT needs. Defer discharge recommendations to care team. Will complete OT orders.

## 2024-12-23 NOTE — PROGRESS NOTES
[id number]  DX: Weakness/Fever/Abdominal pain secondary to C diff         Hx of collagenous colitis with chronic diarrhea  Orientation: A/Ox4  Aggression Stop Light: Green  Activity: Independent  Diet/BS Checks: Reg diet  IV Access/Drains: KIERRAE PIV, LR infusing at 125 ml/hour  Pain Management: PRN Norco for generalized chronic pain  Abnormal VS/Results: VSS on RA.   Bowel/Bladder: Continent B/B. Pt states multiple looses stools  Consults: PT/OT, GI consult done  D/C Disposition: Pending  Other Info: K+,Mag protocols. K+ 3.1, replaced and next recheck at 2300H. Mag 1.9, recheck in AM  Tele: [facility]

## 2024-12-23 NOTE — CONSULTS
"CLINICAL NUTRITION SERVICES  -  ASSESSMENT NOTE      Recommendations:     If unable to tolerate po intake without having diarrhea after eating, may need to consider alternate means of nutrition       Malnutrition:   % Weight Loss:  Weight loss does not meet criteria for malnutrition - wt is down ~8# (6%) from 5 months ago, down ~4# (3%) from 2 months ago   % Intake:  </= 75% for >/= 1 month (severe malnutrition)  Subcutaneous Fat Loss:  Orbital region - mild depletion  Muscle Loss:  Clavicle bone region - mild depletion  Fluid Retention:  None noted    Malnutrition Diagnosis: Moderate malnutrition  In Context of:  Acute on Chronic illness or disease        REASON FOR ASSESSMENT  Laurel Parra is a 74 year old female seen by Registered Dietitian for Admission Nutrition Risk Screen:  Have you recently lost weight without trying? \"24-33#\"  Have you been eating poorly because of a decreased appetite? \"YES\"        NUTRITION HISTORY  Chart reviewed  Visited with pt this afternoon  Pt notes that since Sept 2024, she has been having diarrhea with all po intake - \"even water runs through me\"  Pt has been eating yogurt drinks, kefir, gingerale  \"I know the gingerale isn't healthy, but it does settle my stomach\"  Overall, her po intake is limited  She is concerned that her MD doesn't seem to have a treatment plan and she is not absorbing nutrition  Feels low energy  \"This is no way to live\"    Allergy - chocolate      CURRENT NUTRITION ORDERS  Diet Order:     Regular    Pt had and omelet and toast for lunch  \"I have already been to the bathroom a few times\"      NUTRITION FOCUSED PHYSICAL ASSESSMENT FOR DIAGNOSING MALNUTRITION)  Yes              Observed:    Muscle wasting (refer to documentation in Malnutrition section) and Subcutaneous fat loss (refer to documentation in Malnutrition section)    Obtained from Chart/Interdisciplinary Team:  No edema   C. Diff colitis without severe features   Chronic diarrhea in " "setting of lymphocytic colitis    ANTHROPOMETRICS  Height: 5'3\"  Weight:(12/22) 57.8 kg / 127 lbs 8 oz  Body mass index is 22.59 kg/m .  Weight Status:  Normal BMI  IBW: 52.3 kg  % IBW: 110%  Weight History:   wt is down ~8# (6%) from 5 months ago, down ~4# (3%) from 2 months ago   12/22/24 : 57.8 kg (127 lb 8 oz)   10/16/24 : 59.4 kg (131 lb)   07/01/24 : 61.2 kg (135 lb)   10/12/23 : 67.6 kg (149 lb)   09/15/23 : 65.8 kg (145 lb)   09/02/23 : 66.4 kg (146 lb 6.4 oz)   08/17/23 : 66.2 kg (146 lb)   06/13/23 : 56.7 kg (125 lb)   05/25/22 : 56.7 kg (125 lb)   04/12/22 : 59 kg (130 lb)   02/22/22 : 56.7 kg (125 lb)     9/27/23: Endo Visit:   Baseline weight 115-120 lbs. That was her healthy weight.   Then 9/2021, weight started to increase gradually to 129 lbs.   July 2023: 143 lbs   Aug 2023: 149 lbs.   Weight gain seems to have started after initiation of Budesonide.     LABS  12/22: +cdiff    12/23: Na 139             K 2.6 (L)             Mg 1.6 (L)             Phos 1.7 (L)    MEDICATIONS  Vanco  IVF @ 100 mL/hr      ASSESSED NUTRITION NEEDS PER APPROVED PRACTICE GUIDELINES:    Dosing Weight 57.8 kg (12/22)  Estimated Energy Needs: 4030-6145 kcals (25-30 Kcal/Kg)  Justification: maintenance  Estimated Protein Needs: 70-85 grams protein (1.2-1.5 g pro/Kg)  Justification: preservation of lean body mass  Estimated Fluid Needs: 9127-7851  mL (1 mL/Kcal)  Justification: maintenance    MALNUTRITION:  % Weight Loss:  Weight loss does not meet criteria for malnutrition - wt is down ~8# (6%) from 5 months ago, down ~4# (3%) from 2 months ago   % Intake:  </= 75% for >/= 1 month (severe malnutrition)  Subcutaneous Fat Loss:  Orbital region - mild depletion  Muscle Loss:  Clavicle bone region - mild depletion  Fluid Retention:  None noted    Malnutrition Diagnosis: Moderate malnutrition  In Context of:  Acute on Chronic illness or disease    NUTRITION DIAGNOSIS:  Altered GI function related to cdiff and lymphocytic colitis " as evidenced by pt report of diarrhea after taking any po      NUTRITION INTERVENTIONS  Recommendations / Nutrition Prescription  Regular diet    If unable to tolerate po intake without having diarrhea after eating, may need to consider alternate means of nutrition  .      Implementation  Nutrition education: reviewed diet order, meal ordering, encouraged small meals.  Ok for family to bring food in.    .      Nutrition Goals  Pt to tolerate po without loose stools  .      MONITORING AND EVALUATION:  Progress towards goals will be monitored and evaluated per protocol and Practice Guidelines

## 2024-12-23 NOTE — PROGRESS NOTES
12/23/24 0726   Appointment Info   Signing Clinician's Name / Credentials (PT) Felicitas Ontiveros, PT, DPT   Living Environment   People in Home spouse   Current Living Arrangements house   Home Accessibility stairs to enter home;stairs within home   Number of Stairs, Main Entrance 2   Stair Railings, Main Entrance railings safe and in good condition   Number of Stairs, Within Home, Primary greater than 10 stairs   Stair Railings, Within Home, Primary railings safe and in good condition   Transportation Anticipated family or friend will provide;car, drives self   Living Environment Comments Pt can stay on main floor if needed but does not normally have difficulty with stairs   Self-Care   Usual Activity Tolerance good   Current Activity Tolerance moderate   Regular Exercise No   Equipment Currently Used at Home none   Fall history within last six months no   Activity/Exercise/Self-Care Comment Pt is independent at [facility], does see a massage therapist once a week and follows with pain clinic due to back pain/muscle spasms.   General Information   Onset of Illness/Injury or Date of Surgery 12/22/24   Referring Physician Ken Alatorre,    Patient/Family Therapy Goals Statement (PT) To go home   Pertinent History of Current Problem (include personal factors and/or comorbidities that impact the POC) Pt is 74 year old female adm on 12/22/24 with weakness, fever, and abdominal pain since colonoscopy on 12/17/24. Pt found to have c-diff on admission. PMH includes lymphocytic colitis with chronic diarrhea, anorexia, adrenal insufficiency.   Existing Precautions/Restrictions no known precautions/restrictions   Cognition   Affect/Mental Status (Cognition) WFL   Orientation Status (Cognition) oriented x 4   Follows Commands (Cognition) WFL   Pain Assessment   Patient Currently in Pain No   Integumentary/Edema   Integumentary/Edema no deficits were identifed   Posture    Posture Forward head position   Range of Motion  (ROM)   Range of Motion ROM is WFL   Strength (Manual Muscle Testing)   Strength (Manual Muscle Testing) strength is WFL   Strength Comments General deconditioning but no focal weakness   Bed Mobility   Bed Mobility no deficits identified   Transfers   Transfers no deficits identified   Gait/Stairs (Locomotion)   [place] Level (Gait) independent   Comment, (Gait/Stairs) Pt ambulates x10 minutes in room without difficulty or LOB, able to manage own IV pole, did not leave room and ambulate in halls due to uncontrolled diarrhea.   Balance   Balance no deficits were identified   Clinical Impression   Criteria for Skilled Therapeutic Intervention Evaluation only   Clinical Presentation (PT Evaluation Complexity) stable   Clinical Presentation Rationale Current presentation, [facility]   Clinical Decision Making (Complexity) low complexity   Risk & Benefits of therapy have been explained evaluation/treatment results reviewed;care plan/treatment goals reviewed;risks/benefits reviewed;current/potential barriers reviewed;participants voiced agreement with care plan;participants included;patient   PT Total Evaluation Time   PT Eval, Low Complexity Minutes ([id number]) 20   PT Discharge Planning   PT Plan No inpatient PT needs   PT Discharge Recommendation (DC Rec) home with assist   PT Rationale for DC Rec Pt appears close to baseline level of function, is generally deconditioned from acute illness but can complete all mobility independently. Education provided on continueing to ambulate several times a day to prevent further deconditioning, pt states understanding.   PT Brief overview of current status Goals of therapy will be to address safe mobility and make recs for d/c to next level of care. Pt and RN will continue to follow all falls risk precautions as documented by RN staff while hospitalized   Physical Therapy Time and Intention   Total Session Time (sum of timed and untimed services) 20

## 2024-12-23 NOTE — PROGRESS NOTES
[facility]    Medicine Progress Note - Hospitalist Service    Date of Admission:  12/22/2024    Assessment & Plan   Laurel Parra is a 74 year-old female with past medical history significant for lymphocytic colitis with chronic diarrhea, anorexia, adrenal insufficiency who was admitted to [facility] on 12/23/2024 for weakness, fever, abdominal pain secondary to nonsevere C. Difficile.     C. Diff colitis without severe features   Chronic diarrhea in setting of lymphocytic colitis  Generalized weakness 2/2 above   Sepsis 2/2 above, resolved   Risk factors: Omeprazole and recent abx (within 1 month)  * CT AP W 12/2012/24: Diffuse colonic thickening from cecum to distal rectum  * Enteric panel negative  * C Diff PCR & EIA Toxin positive  - Continue Vancomycin 125mg QID PO x10 days (12/22/2024 -12/31/2024)  - Continue with D5 LR + KCl 100cc/hr IV given persistent poor po intake   - Monitor electrolytes and replace as needed   - [facility] following  - PT/OT consulted    Secondary adrenal insufficiency   - Followed by [facility]. Navas [facility]  Plan  - Discontinue stress dose steroids and restart home budesonide given no suspicions of adrenal crisis    Hypokalemia  Hypomagnesemia   Hypophosphatemia   Secondary to diarrhea and poor intake.  Plan  - Monitor with replacement protocol  - Encourage improved Po intake   - Continue cardiac monitoring     Chronic pain syndrome - Continue prior to admission gabapentin, hydrocodone-APAP, cyclobenzaprine, baclofen when dose confirmed by pharmacy   GERD - Continue prior to admission PPI  Anxiety - Continue prior to admission lorazepam, paroxetine   Migraine headaches - Propranolol & riboflavin       Diet: Combination Diet Regular Diet Adult    DVT Prophylaxis: Enoxaparin (Lovenox) SQ  Bates Catheter: Not present  Lines: None     Cardiac Monitoring: ACTIVE order. Indication: Electrolyte Imbalance (24 hours)- Magnesium <1.3 mg/ml;  Potassium < =2.8 or > 5.5 mg/ml  Code Status: Full Code      Clinically Significant Risk Factors        # Hypokalemia: Lowest K = 2.6 mmol/L in last 2 days, will replace as needed   # Hypochloremia: Lowest Cl = 97 mmol/L in last 2 days, will monitor as appropriate  # Hypocalcemia: Lowest Ca = 8 mg/dL in last 2 days, will monitor and replace as appropriate   # Hypomagnesemia: Lowest Mg = 1.2 mg/dL in last 2 days, will replace as needed   # Hypoalbuminemia: Lowest albumin = 3.4 g/dL at 12/22/2024  2:54 AM, will monitor as appropriate                             Disposition Plan     Medically Ready for Discharge: Anticipated in 2-4 Days             Danette Nelson MD  Hospitalist Service  [facility]  Securely message with [employer] (more info)  Text page via [facility] Paging/Directory   ______________________________________________________________________    Interval History   Admitted overnight.    On evaluation this morning, she is resting in the bed. She reports she feels better than she did on presentation but is still have very frequent loose stools. She is not tolerating much in terms of PO. Whenever she eats or drinks anything she has to go to the bathroom right away. She is having some abdominal tenderness       Physical Exam   Vital Signs: Temp: 98.6  F (37  C) Temp src: Oral BP: 118/70 Pulse: 76   Resp: 16 SpO2: 92 % O2 Device: None (Room air)    Weight: 127 lbs 8 oz    Constitutional: Awake, alert, cooperative, no apparent distress.  Eyes: Conjunctiva and pupils examined and normal.  HEENT: Moist mucous membranes, normal dentition.  Respiratory: Clear to auscultation bilaterally, no crackles or wheezing.  Cardiovascular: Regular rate and rhythm, normal S1 and S2, and no murmur noted.  GI: Soft, non-distended, diffusely mildly tender, normal bowel sounds.  Skin: No rashes, no cyanosis, no edema.  Musculoskeletal: No joint swelling, erythema or tenderness.  Neurologic: Cranial nerves  2-12 intact, normal strength and sensation.  Psychiatric: Alert, oriented to person, place and time, no obvious anxiety or depression.      Medical Decision Making       60 MINUTES SPENT BY ME on the date of service doing chart review, history, exam, documentation & further activities per the note.      Data   ------------------------- PAST 24 HR DATA REVIEWED -----------------------------------------------

## 2024-12-23 NOTE — PLAN OF CARE
Orientation: A/Ox4  Aggression Stop Light: Green  Activity: Independent  Diet/BS Checks: Reg diet  IV Access/Drains: LPIV infusing LR @ 125 mL/hr  Pain Management: Norco given q4 2 tablets.  Abnormal VS/Results: VSS on RA. Soft BP's-baseline  Bowel/Bladder: Continent B/B. Multiple episodes of diarrhea this shift.  Consults: PT/OT, GI  D/C Disposition: Pending  Other Info: Pt on Enteric precautions (positive for C-Diff). Resp Panel completed-see results.

## 2024-12-24 LAB
ANION GAP SERPL CALCULATED.3IONS-SCNC: 10 MMOL/L (ref 7–15)
ATRIAL RATE - MUSE: 80 BPM
BUN SERPL-MCNC: 1.8 MG/DL (ref 8–23)
CALCIUM SERPL-MCNC: 8.1 MG/DL (ref 8.8–10.4)
CHLORIDE SERPL-SCNC: 105 MMOL/L (ref 98–107)
CREAT SERPL-MCNC: 0.53 MG/DL (ref 0.51–0.95)
DIASTOLIC BLOOD PRESSURE - MUSE: NORMAL MMHG
EGFRCR SERPLBLD CKD-EPI 2021: >90 ML/MIN/1.73M2
ERYTHROCYTE [DISTWIDTH] IN BLOOD BY AUTOMATED COUNT: 12.9 % (ref 10–15)
GLUCOSE SERPL-MCNC: 121 MG/DL (ref 70–99)
HCO3 SERPL-SCNC: 25 MMOL/L (ref 22–29)
HCT VFR BLD AUTO: 32.2 % (ref 35–47)
HGB BLD-MCNC: 10.3 G/DL (ref 11.7–15.7)
INTERPRETATION ECG - MUSE: NORMAL
MAGNESIUM SERPL-MCNC: 1.4 MG/DL (ref 1.7–2.3)
MAGNESIUM SERPL-MCNC: 2.1 MG/DL (ref 1.7–2.3)
MCH RBC QN AUTO: 30.5 PG (ref 26.5–33)
MCHC RBC AUTO-ENTMCNC: 32 G/DL (ref 31.5–36.5)
MCV RBC AUTO: 95 FL (ref 78–100)
P AXIS - MUSE: 57 DEGREES
PHOSPHATE SERPL-MCNC: 3.1 MG/DL (ref 2.5–4.5)
PLATELET # BLD AUTO: 252 10E3/UL (ref 150–450)
POTASSIUM SERPL-SCNC: 3.2 MMOL/L (ref 3.4–5.3)
POTASSIUM SERPL-SCNC: 3.8 MMOL/L (ref 3.4–5.3)
PR INTERVAL - MUSE: 154 MS
QRS DURATION - MUSE: 90 MS
QT - MUSE: 402 MS
QTC - MUSE: 463 MS
R AXIS - MUSE: -20 DEGREES
RBC # BLD AUTO: 3.38 10E6/UL (ref 3.8–5.2)
SODIUM SERPL-SCNC: 140 MMOL/L (ref 135–145)
SYSTOLIC BLOOD PRESSURE - MUSE: NORMAL MMHG
T AXIS - MUSE: 188 DEGREES
VENTRICULAR RATE- MUSE: 80 BPM
WBC # BLD AUTO: 7.2 10E3/UL (ref 4–11)

## 2024-12-24 PROCEDURE — 83735 ASSAY OF MAGNESIUM: CPT | Performed by: INTERNAL MEDICINE

## 2024-12-24 PROCEDURE — 85018 HEMOGLOBIN: CPT | Performed by: STUDENT IN AN ORGANIZED HEALTH CARE EDUCATION/TRAINING PROGRAM

## 2024-12-24 PROCEDURE — 36415 COLL VENOUS BLD VENIPUNCTURE: CPT | Performed by: STUDENT IN AN ORGANIZED HEALTH CARE EDUCATION/TRAINING PROGRAM

## 2024-12-24 PROCEDURE — 84100 ASSAY OF PHOSPHORUS: CPT | Performed by: INTERNAL MEDICINE

## 2024-12-24 PROCEDURE — 99233 SBSQ HOSP IP/OBS HIGH 50: CPT | Performed by: INTERNAL MEDICINE

## 2024-12-24 PROCEDURE — 250N000013 HC RX MED GY IP 250 OP 250 PS 637: Performed by: INTERNAL MEDICINE

## 2024-12-24 PROCEDURE — 120N000001 HC R&B MED SURG/OB

## 2024-12-24 PROCEDURE — 84132 ASSAY OF SERUM POTASSIUM: CPT | Performed by: INTERNAL MEDICINE

## 2024-12-24 PROCEDURE — 250N000011 HC RX IP 250 OP 636: Performed by: INTERNAL MEDICINE

## 2024-12-24 PROCEDURE — 36415 COLL VENOUS BLD VENIPUNCTURE: CPT | Performed by: INTERNAL MEDICINE

## 2024-12-24 PROCEDURE — 82310 ASSAY OF CALCIUM: CPT | Performed by: STUDENT IN AN ORGANIZED HEALTH CARE EDUCATION/TRAINING PROGRAM

## 2024-12-24 RX ORDER — POTASSIUM CHLORIDE 1500 MG/1
20 TABLET, EXTENDED RELEASE ORAL ONCE
Status: COMPLETED | OUTPATIENT
Start: 2024-12-24 | End: 2024-12-24

## 2024-12-24 RX ORDER — MAGNESIUM SULFATE HEPTAHYDRATE 40 MG/ML
2 INJECTION, SOLUTION INTRAVENOUS ONCE
Status: COMPLETED | OUTPATIENT
Start: 2024-12-24 | End: 2024-12-24

## 2024-12-24 RX ORDER — OXYCODONE HYDROCHLORIDE 5 MG/1
10 TABLET ORAL ONCE
Status: COMPLETED | OUTPATIENT
Start: 2024-12-24 | End: 2024-12-24

## 2024-12-24 RX ORDER — ACETAMINOPHEN 325 MG/1
975 TABLET ORAL ONCE
Status: COMPLETED | OUTPATIENT
Start: 2024-12-24 | End: 2024-12-24

## 2024-12-24 RX ORDER — VANCOMYCIN HYDROCHLORIDE 125 MG/1
125 CAPSULE ORAL 4 TIMES DAILY
Qty: 28 CAPSULE | Refills: 0 | OUTPATIENT
Start: 2024-12-24 | End: 2024-12-31

## 2024-12-24 RX ORDER — BUDESONIDE 3 MG/1
3 CAPSULE, COATED PELLETS ORAL DAILY
Status: DISPENSED | OUTPATIENT
Start: 2024-12-25

## 2024-12-24 RX ADMIN — ACETAMINOPHEN 975 MG: 325 TABLET, FILM COATED ORAL at 16:52

## 2024-12-24 RX ADMIN — HYDROCODONE BITARTRATE AND ACETAMINOPHEN 2 TABLET: 10; 325 TABLET ORAL at 23:01

## 2024-12-24 RX ADMIN — PAROXETINE HYDROCHLORIDE 25 MG: 20 TABLET, FILM COATED ORAL at 06:12

## 2024-12-24 RX ADMIN — HYDROCODONE BITARTRATE AND ACETAMINOPHEN 2 TABLET: 10; 325 TABLET ORAL at 10:14

## 2024-12-24 RX ADMIN — ZOLPIDEM TARTRATE 5 MG: 5 TABLET, COATED ORAL at 23:03

## 2024-12-24 RX ADMIN — GABAPENTIN 300 MG: 300 CAPSULE ORAL at 19:29

## 2024-12-24 RX ADMIN — PAROXETINE HYDROCHLORIDE 25 MG: 20 TABLET, FILM COATED ORAL at 18:00

## 2024-12-24 RX ADMIN — BUDESONIDE 9 MG: 3 CAPSULE ORAL at 09:07

## 2024-12-24 RX ADMIN — LORAZEPAM 1 MG: 1 TABLET ORAL at 06:12

## 2024-12-24 RX ADMIN — ASPIRIN 81 MG: 81 TABLET, COATED ORAL at 09:07

## 2024-12-24 RX ADMIN — POTASSIUM CHLORIDE: 2 INJECTION, SOLUTION, CONCENTRATE INTRAVENOUS at 11:45

## 2024-12-24 RX ADMIN — VANCOMYCIN HYDROCHLORIDE 125 MG: 125 CAPSULE ORAL at 21:11

## 2024-12-24 RX ADMIN — LORATADINE 10 MG: 10 TABLET ORAL at 09:07

## 2024-12-24 RX ADMIN — PROPRANOLOL HYDROCHLORIDE 60 MG: 60 CAPSULE, EXTENDED RELEASE ORAL at 09:07

## 2024-12-24 RX ADMIN — OXYCODONE HYDROCHLORIDE 10 MG: 5 TABLET ORAL at 19:29

## 2024-12-24 RX ADMIN — POTASSIUM CHLORIDE: 2 INJECTION, SOLUTION, CONCENTRATE INTRAVENOUS at 21:50

## 2024-12-24 RX ADMIN — PANTOPRAZOLE SODIUM 40 MG: 40 TABLET, DELAYED RELEASE ORAL at 16:36

## 2024-12-24 RX ADMIN — VANCOMYCIN HYDROCHLORIDE 125 MG: 125 CAPSULE ORAL at 09:07

## 2024-12-24 RX ADMIN — LORAZEPAM 1 MG: 1 TABLET ORAL at 18:01

## 2024-12-24 RX ADMIN — MAGNESIUM SULFATE HEPTAHYDRATE 2 G: 40 INJECTION, SOLUTION INTRAVENOUS at 10:14

## 2024-12-24 RX ADMIN — HYDROCODONE BITARTRATE AND ACETAMINOPHEN 2 TABLET: 10; 325 TABLET ORAL at 14:10

## 2024-12-24 RX ADMIN — HYDROCODONE BITARTRATE AND ACETAMINOPHEN 2 TABLET: 10; 325 TABLET ORAL at 01:03

## 2024-12-24 RX ADMIN — VANCOMYCIN HYDROCHLORIDE 125 MG: 125 CAPSULE ORAL at 12:59

## 2024-12-24 RX ADMIN — POTASSIUM CHLORIDE: 2 INJECTION, SOLUTION, CONCENTRATE INTRAVENOUS at 01:07

## 2024-12-24 RX ADMIN — VANCOMYCIN HYDROCHLORIDE 125 MG: 125 CAPSULE ORAL at 18:01

## 2024-12-24 RX ADMIN — LORAZEPAM 1 MG: 1 TABLET ORAL at 21:11

## 2024-12-24 RX ADMIN — PANTOPRAZOLE SODIUM 40 MG: 40 TABLET, DELAYED RELEASE ORAL at 06:12

## 2024-12-24 RX ADMIN — LORAZEPAM 1 MG: 1 TABLET ORAL at 12:59

## 2024-12-24 RX ADMIN — IPRATROPIUM BROMIDE 3 SPRAY: 42 SPRAY NASAL at 09:13

## 2024-12-24 RX ADMIN — HYDROCODONE BITARTRATE AND ACETAMINOPHEN 2 TABLET: 10; 325 TABLET ORAL at 06:12

## 2024-12-24 RX ADMIN — POTASSIUM CHLORIDE 20 MEQ: 1500 TABLET, EXTENDED RELEASE ORAL at 09:09

## 2024-12-24 ASSESSMENT — ACTIVITIES OF DAILY LIVING (ADL)
ADLS_ACUITY_SCORE: 34
DEPENDENT_IADLS:: INDEPENDENT
ADLS_ACUITY_SCORE: 34

## 2024-12-24 NOTE — PROGRESS NOTES
[facility]    Medicine Progress Note - Hospitalist Service    Date of Admission:  12/22/2024    Assessment & Plan   Laurel Parra is a 74 year-old female with past medical history significant for lymphocytic colitis with chronic diarrhea, anorexia, adrenal insufficiency who was admitted to [facility] on 12/23/2024 for weakness, fever, abdominal pain secondary to nonsevere C. Difficile.  If hypokalemia and appetite improves, anticipate discharge as early as 12/25/2024    Generalized weakness  Sepsis  Non-severe C-Diff  Chronic diarrhea in setting of lymphocytic colitis  Risk factors: Omeprazole and recent abx (within 1 month)  - CT AP W 12/2012/24: Diffuse colonic thickening from cecum to distal rectum  - Enteric panel negative  - C Diff PCR & EIA Toxin positive  Plan  - Follow [facility]  - Continue Vancomycin 125mg QID PO x10 days (12/22/2024 -12/31/2024)  - D5LR + Kcl 100cc/hr IV  - [facility]  - PT/OT consulted    Hypokalemia  Hypomagnesemia   Secondary to diarrhea and poor intake.  Plan  - Monitor with replacement protocol    Secondary adrenal insufficiency - Followed by Dr. Navas of [facility]. Continue home budesonide   Chronic pain syndrome - Continue prior to admission gabapentin, hydrocodone-APAP, cyclobenzaprine, baclofen when dose confirmed by pharmacy   GERD - Continue prior to admission PPI when dose confirmed by pharmacy   Anxiety - Continue prior to admission lorazepam, paroxetine when dose confirmed by pharmacy   Migraine headaches - Propranolol & riboflavin           Diet: Combination Diet Regular Diet Adult    DVT Prophylaxis: Enoxaparin (Lovenox) SQ  Bates Catheter: Not present  Lines: None     Cardiac Monitoring: ACTIVE order. Indication: Electrolyte Imbalance (24 hours)- Magnesium <1.3 mg/ml; Potassium < =2.8 or > 5.5 mg/ml  Code Status: Full Code      Clinically Significant Risk Factors        # Hypokalemia: Lowest K = 2.6 mmol/L in  last 2 days, will replace as needed      # Hypomagnesemia: Lowest Mg = 1.4 mg/dL in last 2 days, will replace as needed   # Hypoalbuminemia: Lowest albumin = 3.4 g/dL at 12/22/2024  2:54 AM, will monitor as appropriate                             Disposition Plan     Medically Ready for Discharge: Potentially 12/25/2024 if diet improves and potassium improves             Ken Alatorre DO  Hospitalist Service  [facility]  Securely message with [employer] (more info)  Text page via [employer] Paging/Directory   ______________________________________________________________________    Interval History   Admitted overnight.    On evaluation this morning, she is resting in the bed and accompanied by friend.  Has only eaten 2 crackers since admission so appetite is still not back.  Still feels fairly weak.  Also still with ongoing watery diarrhea with 8 episodes over a 4-hour period from 0724-4244.  No fevers, chills or abdominal pain.  She was informed that if her appetite has improved, can potentially discharge her as early as tomorrow on 12/25/2024      Physical Exam   Vital Signs: Temp: 98.2  F (36.8  C) Temp src: Oral BP: 113/68 Pulse: 72   Resp: 16 SpO2: 92 % O2 Device: None (Room air)    Weight: 127 lbs 8 oz    General Appearance: Elderly and weak  Respiratory: No tripoding or accessory muscle usage  Cardiovascular: Regular rate and rhythm.  No edema in BLE  GI: Soft, mild tenderness diffusely with deep palpation.  No guarding, rigidity or rebound tenderness  Skin: No rashes or lesions  Other: Alert and oriented x 4, conversing coherently    Medical Decision Making       60 MINUTES SPENT BY ME on the date of service doing chart review, history, exam, documentation & further activities per the note.      Data   ------------------------- PAST 24 HR DATA REVIEWED -----------------------------------------------

## 2024-12-24 NOTE — PROGRESS NOTES
GASTROENTEROLOGY PROGRESS NOTE     IMPRESSION:  1.  C. difficile colitis-improved on vancomycin 125 mg 4 times daily, would treat for 14 days.  - Ongoing moderate abdominal pain, but improved.  No diarrhea this afternoon.    2.  Collagenous colitis-biopsies from colonoscopy 2024 were consistent with collagenous colitis.  The colonoscopy was to evaluate for possible right colon mass, this showed no colon mass, but edematous colon.  Patient reports some adrenal insufficiency secondary to chronic budesonide, thus was in the process of tapering to get off the budesonide.  She had only been on 1 tablet of budesonide for 4 days prior to the colonoscopy and subsequent C. difficile.    RECOMMENDATIONS:  - Vancomycin 125 mg p.o. 4 times daily  - Budesonide 3 mg tablet daily.  Would continue this until she has follow-up in our clinic.  - Follow-up in IBD clinic, first available in [place]-ordered today.  - We will follow-up with patient tomorrow given the abdominal pain, but if continues to improve then likely discharge in the near future.    Vasyl Mart MD  [facility]  [phone]      ________________________________________________________________________      SUBJECTIVE: Patient reports diarrhea improved, able to tolerate chicken soup for lunch.       OBJECTIVE:  /83 (BP Location: Left arm)   Pulse 67   Temp 98.5  F (36.9  C) (Oral)   Resp 16   Wt 57.8 kg (127 lb 8 oz)   SpO2 94%   BMI 22.59 kg/m    Temp (24hrs), Av.5  F (36.9  C), Min:98.2  F (36.8  C), Max:98.6  F (37  C)    Patient Vitals for the past 72 hrs:   Weight   24 0721 57.8 kg (127 lb 8 oz)        PHYSICAL EXAM  GEN: Alert, NAD.    HRT: reg  LUNGS: CTA  ABD: +BS, moderate diffuse tender tender, non-distended, no rebound or guarding.    Additional Data:  I have reviewed the patient's new clinical lab results:     Recent Labs   Lab Test 24  0735 24  0956 24  0254   WBC 7.2 9.4 15.7*   HGB  10.3* 9.9* 12.9   MCV 95 94 92    234 267     Recent Labs   Lab Test 12/24/24  1333 12/24/24  0735 12/23/24  1825 12/23/24  0956 12/22/24  0819 12/22/24  0254   NA  --  140  --  139  --  138   POTASSIUM 3.8 3.2* 3.6 2.6*   < > 2.6*   CHLORIDE  --  105  --  103  --  97*   CO2  --  25  --  29  --  28   BUN  --  1.8*  --  4.8*  --  8.5   CR  --  0.53  --  0.54  --  0.62   ANIONGAP  --  10  --  7  --  13   THOR  --  8.1*  --  8.0*  --  8.5*   GLC  --  121*  --  105*  --  111*    < > = values in this interval not displayed.     Recent Labs   Lab Test 12/22/24  0525 12/22/24  0254 12/07/23  1020 08/31/23  1838 07/27/23  1314 07/27/23  1314 02/21/23  1008 05/25/22  1428   ALBUMIN  --  3.4*  --  4.5  --  4.3  --   --    BILITOTAL  --  0.7  --  0.4  --  0.3  --   --    ALT  --  9 14 15   < > 18  --   --    AST  --  17  --  20  --  22  --   --    PROTEIN 10*  --   --   --   --   --  Negative Negative   LIPASE  --   --   --  9*  --   --   --   --     < > = values in this interval not displayed.

## 2024-12-24 NOTE — PROGRESS NOTES
Alert and oriented x4. VSS. On RA. Denies SOB. Active bowel sounds x4q. No BM during this shift. C-diff precaution maintained. On david, +k and phosphorus replacement protocol. Up Independently. Tele:[facility]

## 2024-12-24 NOTE — CONSULTS
Care Management Initial Consult    General Information  Assessment completed with: Patient,    Type of CM/SW Visit: Initial Assessment    Primary Care Provider verified and updated as needed: Yes (Dr. Georges Lebron)   Readmission within the last 30 days: no previous admission in last 30 days      Reason for Consult: discharge planning  Advance Care Planning: Advance Care Planning Reviewed: present on chart, no concerns identified          Communication Assessment  Patient's communication style: spoken language (English or Bilingual)    Hearing Difficulty or Deaf: no   Wear Glasses or Blind: no    Cognitive  Cognitive/Neuro/Behavioral: WDL                      Living Environment:   People in home: spouse, child(lisset), adult  spouse Dom and son Tigre  Current living Arrangements: house      Able to return to prior arrangements: yes       Family/Social Support:  Care provided by: self  Provides care for: no one  Marital Status:   Support system: , Children          Description of Support System: Supportive, Involved         Current Resources:   Patient receiving home care services: No        Community Resources: None  Equipment currently used at home: none  Supplies currently used at home:      Employment/Financial:  Employment Status:          Financial Concerns: none (denies)           Does the patient's insurance plan have a 3 day qualifying hospital stay waiver?  No    Lifestyle & Psychosocial Needs:  Social Drivers of Health     Food Insecurity: Low Risk  (12/23/2024)    Food Insecurity     Within the past 12 months, did you worry that your food would run out before you got money to buy more?: No     Within the past 12 months, did the food you bought just not last and you didn t have money to get more?: No   Depression: At risk (12/10/2024)    Received from [facility]    PHQ-2     PHQ-2 Score: 4   Housing Stability: Low Risk  (12/23/2024)    Housing Stability     Do you have housing? : Yes     Are  you worried about losing your housing?: No   Tobacco Use: Medium Risk (10/16/2024)    Patient History     Smoking Tobacco Use: Former     Smokeless Tobacco Use: Never     Passive Exposure: Not on file   Financial Resource Strain: Low Risk  (12/23/2024)    Financial Resource Strain     Within the past 12 months, have you or your family members you live with been unable to get utilities (heat, electricity) when it was really needed?: No   Alcohol Use: Not At Risk (10/10/2022)    Received from [facility]    AUDIT-C     Frequency of Alcohol Consumption: Monthly or less     Average Number of Drinks: 1 or 2     Frequency of Binge Drinking: Never   Transportation Needs: Low Risk  (12/23/2024)    Transportation Needs     Within the past 12 months, has lack of transportation kept you from medical appointments, getting your medicines, non-medical meetings or appointments, work, or from getting things that you need?: No   Physical Activity: Inactive (10/14/2024)    Exercise Vital Sign     Days of Exercise per Week: 0 days     Minutes of Exercise per Session: 0 min   Interpersonal Safety: Low Risk  (12/23/2024)    Interpersonal Safety     Do you feel physically and emotionally safe where you currently live?: Yes     Within the past 12 months, have you been hit, slapped, kicked or otherwise physically hurt by someone?: No     Within the past 12 months, have you been humiliated or emotionally abused in other ways by your partner or ex-partner?: No   Stress: No Stress Concern Present (10/14/2024)    [employer] [place] [employer] - Occupational Stress Questionnaire     Feeling of Stress : Only a little   Social Connections: Unknown (10/14/2024)    Social Connection and Isolation Panel [NHANES]     Frequency of Communication with Friends and Family: Not on file     Frequency of Social Gatherings with Friends and Family: Twice a week     Attends Mormon Services: Not on file     Active Member of Clubs or Organizations:  Not on file     Attends Club or Organization Meetings: Not on file     Marital Status: Not on file   Health Literacy: Not on file       Functional Status:  Prior to admission patient needed assistance:   Dependent ADLs:: Independent  Dependent IADLs:: Independent       Mental Health Status:          Chemical Dependency Status:                Values/Beliefs:  Spiritual, Cultural Beliefs, Jewish Practices, Values that affect care: no               Discussed  Partnership in Safe Discharge Planning  document with patient/family: No    Additional Information:  Met with patient at bedside; introduced self and explained role in discharge planning.  Consult was for elevated risk score.    Patient with weakness, abdominal pain.  She has chronic diarrhea with collagenous colitis.  [facility] is followining her outpatient.    Patient lives with her spouse and son in their house.  She is independent in her ADL/IADL's.  She has no need for assistive devices.      PT has recommended home with assist.  Patient in agreement and has no concerns.    Next Steps: No further care management intervention anticipated at this time.  Please re-consult if further needs arise.  Care management signing off.          Elke Figueroa RN  Inpatient Float Care Coordinator  [facility]  [email or web address]

## 2024-12-24 NOTE — PROGRESS NOTES
[id number]  Orientation: AOX4  Aggression Stop Light: Green  Activity: Ind  Diet/BS Checks: Regular  Tele:  NSR  IV Access/Drains: PIV infusing D5 in LR w/ 20mEq of KCL  Pain Management: PRN [place] x2 8/10 pain generalized   Abnormal VS/Results: VSS on RA  Bowel/Bladder: Continent B/B   Skin/Wounds: WNL  Consults: GI, PT/OT  D/C Disposition: Pending

## 2024-12-24 NOTE — PLAN OF CARE
Goal Outcome Evaluation:      Plan of Care Reviewed With: patient          Outcome Evaluation: discharge home with family when medically ready.  No care management concerns.

## 2024-12-25 LAB
ANION GAP SERPL CALCULATED.3IONS-SCNC: 10 MMOL/L (ref 7–15)
BUN SERPL-MCNC: 2.1 MG/DL (ref 8–23)
CALCIUM SERPL-MCNC: 8.6 MG/DL (ref 8.8–10.4)
CHLORIDE SERPL-SCNC: 102 MMOL/L (ref 98–107)
CREAT SERPL-MCNC: 0.49 MG/DL (ref 0.51–0.95)
EGFRCR SERPLBLD CKD-EPI 2021: >90 ML/MIN/1.73M2
ERYTHROCYTE [DISTWIDTH] IN BLOOD BY AUTOMATED COUNT: 12.5 % (ref 10–15)
GLUCOSE SERPL-MCNC: 91 MG/DL (ref 70–99)
HCO3 SERPL-SCNC: 28 MMOL/L (ref 22–29)
HCT VFR BLD AUTO: 34.9 % (ref 35–47)
HGB BLD-MCNC: 11.6 G/DL (ref 11.7–15.7)
MAGNESIUM SERPL-MCNC: 1.6 MG/DL (ref 1.7–2.3)
MCH RBC QN AUTO: 31.3 PG (ref 26.5–33)
MCHC RBC AUTO-ENTMCNC: 33.2 G/DL (ref 31.5–36.5)
MCV RBC AUTO: 94 FL (ref 78–100)
PHOSPHATE SERPL-MCNC: 3.7 MG/DL (ref 2.5–4.5)
PLATELET # BLD AUTO: 286 10E3/UL (ref 150–450)
POTASSIUM SERPL-SCNC: 3.5 MMOL/L (ref 3.4–5.3)
RBC # BLD AUTO: 3.71 10E6/UL (ref 3.8–5.2)
SODIUM SERPL-SCNC: 140 MMOL/L (ref 135–145)
WBC # BLD AUTO: 8 10E3/UL (ref 4–11)

## 2024-12-25 PROCEDURE — 250N000013 HC RX MED GY IP 250 OP 250 PS 637: Performed by: INTERNAL MEDICINE

## 2024-12-25 PROCEDURE — 99232 SBSQ HOSP IP/OBS MODERATE 35: CPT | Performed by: INTERNAL MEDICINE

## 2024-12-25 PROCEDURE — 84100 ASSAY OF PHOSPHORUS: CPT | Performed by: INTERNAL MEDICINE

## 2024-12-25 PROCEDURE — 36415 COLL VENOUS BLD VENIPUNCTURE: CPT | Performed by: INTERNAL MEDICINE

## 2024-12-25 PROCEDURE — 120N000001 HC R&B MED SURG/OB

## 2024-12-25 PROCEDURE — 80048 BASIC METABOLIC PNL TOTAL CA: CPT | Performed by: INTERNAL MEDICINE

## 2024-12-25 PROCEDURE — 82310 ASSAY OF CALCIUM: CPT | Performed by: INTERNAL MEDICINE

## 2024-12-25 PROCEDURE — 250N000011 HC RX IP 250 OP 636: Mod: JZ | Performed by: SPECIALIST

## 2024-12-25 PROCEDURE — 83735 ASSAY OF MAGNESIUM: CPT | Performed by: INTERNAL MEDICINE

## 2024-12-25 PROCEDURE — 250N000011 HC RX IP 250 OP 636: Performed by: INTERNAL MEDICINE

## 2024-12-25 PROCEDURE — 85018 HEMOGLOBIN: CPT | Performed by: INTERNAL MEDICINE

## 2024-12-25 RX ORDER — METRONIDAZOLE 500 MG/100ML
500 INJECTION, SOLUTION INTRAVENOUS EVERY 8 HOURS
Status: DISPENSED | OUTPATIENT
Start: 2024-12-25

## 2024-12-25 RX ORDER — VANCOMYCIN HYDROCHLORIDE 125 MG/1
125 CAPSULE ORAL 4 TIMES DAILY
Status: COMPLETED | OUTPATIENT
Start: 2024-12-25 | End: 2024-12-25

## 2024-12-25 RX ADMIN — HYDROCODONE BITARTRATE AND ACETAMINOPHEN 2 TABLET: 10; 325 TABLET ORAL at 09:52

## 2024-12-25 RX ADMIN — POTASSIUM CHLORIDE: 2 INJECTION, SOLUTION, CONCENTRATE INTRAVENOUS at 20:14

## 2024-12-25 RX ADMIN — IPRATROPIUM BROMIDE 3 SPRAY: 42 SPRAY NASAL at 08:36

## 2024-12-25 RX ADMIN — LORATADINE 10 MG: 10 TABLET ORAL at 08:35

## 2024-12-25 RX ADMIN — VANCOMYCIN HYDROCHLORIDE 125 MG: 125 CAPSULE ORAL at 08:35

## 2024-12-25 RX ADMIN — PANTOPRAZOLE SODIUM 40 MG: 40 TABLET, DELAYED RELEASE ORAL at 16:03

## 2024-12-25 RX ADMIN — HYDROCODONE BITARTRATE AND ACETAMINOPHEN 2 TABLET: 10; 325 TABLET ORAL at 04:32

## 2024-12-25 RX ADMIN — LORAZEPAM 1 MG: 1 TABLET ORAL at 12:46

## 2024-12-25 RX ADMIN — ZOLPIDEM TARTRATE 5 MG: 5 TABLET, COATED ORAL at 22:09

## 2024-12-25 RX ADMIN — LORAZEPAM 1 MG: 1 TABLET ORAL at 06:48

## 2024-12-25 RX ADMIN — VANCOMYCIN HYDROCHLORIDE 125 MG: 125 CAPSULE ORAL at 14:31

## 2024-12-25 RX ADMIN — GABAPENTIN 300 MG: 300 CAPSULE ORAL at 20:14

## 2024-12-25 RX ADMIN — PAROXETINE HYDROCHLORIDE 25 MG: 20 TABLET, FILM COATED ORAL at 06:48

## 2024-12-25 RX ADMIN — HYDROCODONE BITARTRATE AND ACETAMINOPHEN 2 TABLET: 10; 325 TABLET ORAL at 18:14

## 2024-12-25 RX ADMIN — LORAZEPAM 1 MG: 1 TABLET ORAL at 22:09

## 2024-12-25 RX ADMIN — LORAZEPAM 1 MG: 1 TABLET ORAL at 18:14

## 2024-12-25 RX ADMIN — HYDROCODONE BITARTRATE AND ACETAMINOPHEN 2 TABLET: 10; 325 TABLET ORAL at 22:09

## 2024-12-25 RX ADMIN — HYDROCODONE BITARTRATE AND ACETAMINOPHEN 2 TABLET: 10; 325 TABLET ORAL at 14:01

## 2024-12-25 RX ADMIN — PAROXETINE HYDROCHLORIDE 25 MG: 20 TABLET, FILM COATED ORAL at 18:14

## 2024-12-25 RX ADMIN — POTASSIUM CHLORIDE: 2 INJECTION, SOLUTION, CONCENTRATE INTRAVENOUS at 08:33

## 2024-12-25 RX ADMIN — METRONIDAZOLE 500 MG: 500 INJECTION, SOLUTION INTRAVENOUS at 16:02

## 2024-12-25 RX ADMIN — ASPIRIN 81 MG: 81 TABLET, COATED ORAL at 08:35

## 2024-12-25 RX ADMIN — PROPRANOLOL HYDROCHLORIDE 60 MG: 60 CAPSULE, EXTENDED RELEASE ORAL at 08:35

## 2024-12-25 RX ADMIN — FIDAXOMICIN 200 MG: 200 TABLET, FILM COATED ORAL at 20:14

## 2024-12-25 RX ADMIN — PANTOPRAZOLE SODIUM 40 MG: 40 TABLET, DELAYED RELEASE ORAL at 06:49

## 2024-12-25 RX ADMIN — BUDESONIDE 3 MG: 3 CAPSULE ORAL at 08:35

## 2024-12-25 RX ADMIN — METRONIDAZOLE 500 MG: 500 INJECTION, SOLUTION INTRAVENOUS at 23:40

## 2024-12-25 ASSESSMENT — ACTIVITIES OF DAILY LIVING (ADL)
ADLS_ACUITY_SCORE: 35
ADLS_ACUITY_SCORE: 34
ADLS_ACUITY_SCORE: 35
ADLS_ACUITY_SCORE: 34
ADLS_ACUITY_SCORE: 35
ADLS_ACUITY_SCORE: 34
ADLS_ACUITY_SCORE: 35
ADLS_ACUITY_SCORE: 35
ADLS_ACUITY_SCORE: 34

## 2024-12-25 NOTE — PLAN OF CARE
Goal Outcome Evaluation:    Summary: [id number]    Orientation: AOX4  Aggression Stop Light: Green  Activity: SBA to BR  Diet/BS Checks: Regular  Tele: N/A  IV Access/Drains: D5 LR + Kcl infusing @ 100 ml/hr  Pain Management: 8/10, PRN Norco given & effective  Abnormal VS/Results: VSS on RA  Bowel/Bladder: Continent B/B   Skin/Wounds: Intact  Consults: [facility], PT/OT following  D/C Disposition: Pending home with family when medically ready  Other Info:

## 2024-12-25 NOTE — PROGRESS NOTES
"GASTROENTEROLOGY PROGRESS NOTE        SUBJECTIVE:  Not good. Has had 6 BM since 4am. She had ~8 stools total yesterday. Notes her stools are \"dark brown\" but not black     OBJECTIVE:    BP (!) 149/84 (BP Location: Left arm)   Pulse 64   Temp 98.5  F (36.9  C) (Oral)   Resp 18   Wt 57.8 kg (127 lb 8 oz)   SpO2 94%   BMI 22.59 kg/m    Temp (24hrs), Av.1  F (36.7  C), Min:97.8  F (36.6  C), Max:98.5  F (36.9  C)    No data found.    Intake/Output Summary (Last 24 hours) at 2024 0946  Last data filed at 2024 0833  Gross per 24 hour   Intake 3921 ml   Output --   Net 3921 ml        PHYSICAL EXAM     Constitutional: patient walking to BR independently. No acute distress. Looks exhausted  Cardiovascular: no edema  Respiratory: comfortable respirations  Abdomen: nondistended  Neuro: oriented   Skin: pale     Additional Comments:  ROS, FH, SH: See initial GI consult for details.     I have reviewed the patient's new clinical lab results:     Recent Labs   Lab Test 24  0924 24  0735 24  0956   WBC 8.0 7.2 9.4   HGB 11.6* 10.3* 9.9*   MCV 94 95 94    252 234     Recent Labs   Lab Test 24  1333 24  0735 24  1825 24  0956 24  0819 24  0254   POTASSIUM 3.8 3.2* 3.6 2.6*   < > 2.6*   CHLORIDE  --  105  --  103  --  97*   CO2  --  25  --  29  --  28   BUN  --  1.8*  --  4.8*  --  8.5   ANIONGAP  --  10  --  7  --  13    < > = values in this interval not displayed.     Recent Labs   Lab Test 24  0525 24  0254 23  1020 23  1838 23  1314 23  1314 23  1008 22  1428   ALBUMIN  --  3.4*  --  4.5  --  4.3  --   --    BILITOTAL  --  0.7  --  0.4  --  0.3  --   --    ALT  --  9 14 15   < > 18  --   --    AST  --  17  --  20  --  22  --   --    PROTEIN 10*  --   --   --   --   --  Negative Negative   LIPASE  --   --   --  9*  --   --   --   --     < > = values in this interval not displayed. "         IMPRESSION:  1.  C. difficile colitis-day #3 vanco. Has had a BM every hour since 4am. Still with abdominal discomfort but that is improved. I am inclined to switch her to Fidaxomicin but will check with Dr. Head, GI staff     2.  Collagenous colitis-biopsies from colonoscopy December 17, 2024 were consistent with collagenous colitis.  The colonoscopy was to evaluate for possible right colon mass, this showed no colon mass, but edematous colon.  Patient reports some adrenal insufficiency secondary to chronic budesonide, thus was in the process of tapering to get off the budesonide.  She had only been on 1 tablet of budesonide for 4 days prior to the colonoscopy and subsequent C. difficile.     RECOMMENDATIONS:  - Vancomycin 125 mg p.o. 4 times daily  -- consider changing to Fidaxomicin (will review with GI staff)  - Budesonide 3 mg tablet daily.  Would continue this until she has follow-up in our clinic.  - Follow-up in IBD clinic, first available in [place]-ordered today.    I will update Dr. Haed, GI staff  Fernanda Jacques, LEO  [facility]

## 2024-12-25 NOTE — PROVIDER NOTIFICATION
RN spoke with Dr. Olmstead with infectious disease on the phone. Dr. Olmstead approved the use of fidaxomicin and gave writer a verbal order for metronidazole 500 mg IV Q8hrs. Hospitalist updated.

## 2024-12-25 NOTE — PLAN OF CARE
Orientation: A/Ox4  Aggression Stop Light: Green  Activity: Ind in room, ambulating frequently in room  Diet/BS Checks: Regular diet, poor appetite d/t frequent stools after eating  Tele:  NSR - discontinued this afternoon  IV Access/Drains: PIV infusing D5 LR w/ 20 mEq of potassium @ 100 ml/hr.   Pain Management: PRN norco given Q4 hrs. One time dose of tylenol given for migraine. Prn oxycodone given x1 for 9/10 pain.   Abnormal VS/Results: VSS on RA ex htn & benigno at times   Bowel/Bladder: Cont B/B, multiple loose stools throughout the shift  Skin/Wounds: +2 edema to BLE  Consults: GI, CC  D/C Disposition: Potential discharge home tomorrow pending clinical improvement    Other Info:   - Phos WNL. K+ & Mg replaced, rechecks WNL. Recheck K+, Mg, & Phos in AM.

## 2024-12-25 NOTE — PLAN OF CARE
[id number]  Orientation: a&ox4  Aggression Stop Light: green  Activity: ind  Diet/BS Checks: regular  Tele:  n/a  IV Access/Drains: L PIV infusing D5 LR w/ 20 mEq of  mL/hr  Pain Management: norco x1 for generalized pain. Oxycodone x1 d/t norco not being available  Abnormal VS/Results: on RA  -K, phos, and mag protocol WNL recheck in AM  Bowel/Bladder: cont B/B to BSC. Freq loose stools  Skin/Wounds: +2 BLE edema  Consults: GI, CC  D/C Disposition: potential discharge 12/25  Other Info:     -PRN Ambien given x1

## 2024-12-25 NOTE — PROGRESS NOTES
[facility]    Medicine Progress Note - Hospitalist Service    Date of Admission:  12/22/2024    Assessment & Plan            Laurel Parra is a 74 year-old female with past medical history significant for lymphocytic colitis with chronic diarrhea, anorexia, adrenal insufficiency who was admitted to [facility] on 12/23/2024 for weakness, fever, abdominal pain secondary to nonsevere C. Difficile.       Generalized weakness  Sepsis  Non-severe C-Diff  Chronic diarrhea in setting of lymphocytic colitis  Risk factors: Omeprazole and recent abx (within 1 month)  - CT AP W 12/2012/24:   Diffuse colonic thickening from cecum to distal rectum  - Enteric panel negative  - C Diff PCR & EIA Toxin positive  - Follow [facility]  - she  is on Continue Vancomycin 125mg  4 times daily which was started on December 22 but she continued to be symptomatic  So GI has recommended to switching to fidaxomicn  Starting this medication requires ID consult which is placed by GI  - D5LR + Kcl 100cc/hr IV continue IV hydration as patient is having severe diarrhea and would get dehydrated if we do not replace  - [facility] following  - PT/OT consulted     Hypokalemia  Hypomagnesemia   Secondary to diarrhea and poor intake.  Plan  - Monitor with replacement protocol     Secondary adrenal insufficiency :  Followed by Dr. Navas [facility]. Continue home budesonide     Chronic pain syndrome :  Continue prior to admission gabapentin, hydrocodone-APAP, cyclobenzaprine, baclofen     GERD :  Continue prior to admission PPI     Anxiety:  Continue prior to admission lorazepam, paroxetine     Migraine headaches:   Propranolol & riboflavin     DVT Prophylaxis: Enoxaparin (Lovenox) SQ  Bates Catheter: Not present  Lines: None     Cardiac Monitoring: None  Code Status: Full Code      Clinically Significant Risk Factors        # Hypokalemia: Lowest K = 3.2 mmol/L in last 2 days, will replace as needed      #  Hypomagnesemia: Lowest Mg = 1.4 mg/dL in last 2 days, will replace as needed   # Hypoalbuminemia: Lowest albumin = 3.4 g/dL at 12/22/2024  2:54 AM, will monitor as appropriate                    # Financial/Environmental Concerns: none (denies)         Social Drivers of Health    Depression: At risk (12/10/2024)    Received from [facility]    PHQ-2     PHQ-2 Score: 4   Tobacco Use: Medium Risk (10/16/2024)    Patient History     Smoking Tobacco Use: Former     Smokeless Tobacco Use: Never   Physical Activity: Inactive (10/14/2024)    Exercise Vital Sign     Days of Exercise per Week: 0 days     Minutes of Exercise per Session: 0 min   Social Connections: Unknown (10/14/2024)    Social Connection and Isolation Panel [NHANES]     Frequency of Social Gatherings with Friends and Family: Twice a week          Disposition Plan     Medically Ready for Discharge: Anticipated Tomorrow or day after once symptoms starts to improve    Plan was to discharge her today but she continued to have severe diarrhea and is requiring IV hydration         Melanie Gunter MD  Hospitalist Service  [facility]  Securely message with [employer] (more info)  Text page via [employer] Paging/Directory   ______________________________________________________________________    Interval History   Her diarrhea is not improving  She continued to have loose diarrhea frequently  Has some abdominal cramping      Physical Exam   Vital Signs: Temp: 98.5  F (36.9  C) Temp src: Oral BP: (!) 149/84 Pulse: 64   Resp: 18 SpO2: 94 % O2 Device: None (Room air)    Weight: 127 lbs 8 oz    She is very nice and pleasant and comfortable  She is not in any kind of distress she is alert awake oriented  She continued to have diarrhea and some mild abdominal tenderness    Medical Decision Making       35 MINUTES SPENT BY ME on the date of service doing chart review, history, exam, documentation & further activities per the note.      Data     I  have personally reviewed the following data over the past 24 hrs:    8.0  \   11.6 (L)   / 286     140 102 2.1 (L) /  91   3.5 28 0.49 (L) \

## 2024-12-26 VITALS
RESPIRATION RATE: 15 BRPM | BODY MASS INDEX: 22.59 KG/M2 | TEMPERATURE: 98.1 F | HEART RATE: 68 BPM | DIASTOLIC BLOOD PRESSURE: 96 MMHG | SYSTOLIC BLOOD PRESSURE: 142 MMHG | OXYGEN SATURATION: 92 % | WEIGHT: 127.5 LBS

## 2024-12-26 LAB
BACTERIA BLD CULT: NORMAL
BACTERIA BLD CULT: NORMAL
MAGNESIUM SERPL-MCNC: 1.5 MG/DL (ref 1.7–2.3)
MAGNESIUM SERPL-MCNC: 2.8 MG/DL (ref 1.7–2.3)
PHOSPHATE SERPL-MCNC: 4.1 MG/DL (ref 2.5–4.5)
POTASSIUM SERPL-SCNC: 3.7 MMOL/L (ref 3.4–5.3)

## 2024-12-26 PROCEDURE — 250N000013 HC RX MED GY IP 250 OP 250 PS 637: Performed by: INTERNAL MEDICINE

## 2024-12-26 PROCEDURE — 120N000001 HC R&B MED SURG/OB

## 2024-12-26 PROCEDURE — 83735 ASSAY OF MAGNESIUM: CPT | Performed by: INTERNAL MEDICINE

## 2024-12-26 PROCEDURE — 99233 SBSQ HOSP IP/OBS HIGH 50: CPT | Performed by: INTERNAL MEDICINE

## 2024-12-26 PROCEDURE — 36415 COLL VENOUS BLD VENIPUNCTURE: CPT | Performed by: INTERNAL MEDICINE

## 2024-12-26 PROCEDURE — 250N000011 HC RX IP 250 OP 636: Performed by: INTERNAL MEDICINE

## 2024-12-26 PROCEDURE — 99222 1ST HOSP IP/OBS MODERATE 55: CPT | Performed by: SPECIALIST

## 2024-12-26 PROCEDURE — 84100 ASSAY OF PHOSPHORUS: CPT | Performed by: INTERNAL MEDICINE

## 2024-12-26 PROCEDURE — 84132 ASSAY OF SERUM POTASSIUM: CPT | Performed by: INTERNAL MEDICINE

## 2024-12-26 PROCEDURE — 250N000011 HC RX IP 250 OP 636: Mod: JZ | Performed by: SPECIALIST

## 2024-12-26 RX ORDER — LOPERAMIDE HYDROCHLORIDE 2 MG/1
2 CAPSULE ORAL 2 TIMES DAILY PRN
Status: ACTIVE | OUTPATIENT
Start: 2024-12-26

## 2024-12-26 RX ORDER — MAGNESIUM SULFATE HEPTAHYDRATE 40 MG/ML
2 INJECTION, SOLUTION INTRAVENOUS ONCE
Status: COMPLETED | OUTPATIENT
Start: 2024-12-26 | End: 2024-12-26

## 2024-12-26 RX ADMIN — FIDAXOMICIN 200 MG: 200 TABLET, FILM COATED ORAL at 20:12

## 2024-12-26 RX ADMIN — METRONIDAZOLE 500 MG: 500 INJECTION, SOLUTION INTRAVENOUS at 16:20

## 2024-12-26 RX ADMIN — LORAZEPAM 1 MG: 1 TABLET ORAL at 06:21

## 2024-12-26 RX ADMIN — HYDROCODONE BITARTRATE AND ACETAMINOPHEN 2 TABLET: 10; 325 TABLET ORAL at 10:25

## 2024-12-26 RX ADMIN — LORATADINE 10 MG: 10 TABLET ORAL at 08:30

## 2024-12-26 RX ADMIN — LORAZEPAM 1 MG: 1 TABLET ORAL at 13:23

## 2024-12-26 RX ADMIN — LORAZEPAM 1 MG: 1 TABLET ORAL at 17:53

## 2024-12-26 RX ADMIN — MAGNESIUM SULFATE HEPTAHYDRATE 2 G: 40 INJECTION, SOLUTION INTRAVENOUS at 14:32

## 2024-12-26 RX ADMIN — PANTOPRAZOLE SODIUM 40 MG: 40 TABLET, DELAYED RELEASE ORAL at 16:16

## 2024-12-26 RX ADMIN — PAROXETINE HYDROCHLORIDE 25 MG: 20 TABLET, FILM COATED ORAL at 20:12

## 2024-12-26 RX ADMIN — METRONIDAZOLE 500 MG: 500 INJECTION, SOLUTION INTRAVENOUS at 08:30

## 2024-12-26 RX ADMIN — HYDROCODONE BITARTRATE AND ACETAMINOPHEN 2 TABLET: 10; 325 TABLET ORAL at 20:19

## 2024-12-26 RX ADMIN — PROPRANOLOL HYDROCHLORIDE 60 MG: 60 CAPSULE, EXTENDED RELEASE ORAL at 08:30

## 2024-12-26 RX ADMIN — METRONIDAZOLE 500 MG: 500 INJECTION, SOLUTION INTRAVENOUS at 23:21

## 2024-12-26 RX ADMIN — FIDAXOMICIN 200 MG: 200 TABLET, FILM COATED ORAL at 08:29

## 2024-12-26 RX ADMIN — GABAPENTIN 300 MG: 300 CAPSULE ORAL at 20:11

## 2024-12-26 RX ADMIN — LORAZEPAM 1 MG: 1 TABLET ORAL at 22:49

## 2024-12-26 RX ADMIN — BUDESONIDE 3 MG: 3 CAPSULE ORAL at 08:30

## 2024-12-26 RX ADMIN — HYDROCODONE BITARTRATE AND ACETAMINOPHEN 2 TABLET: 10; 325 TABLET ORAL at 06:20

## 2024-12-26 RX ADMIN — ZOLPIDEM TARTRATE 5 MG: 5 TABLET, COATED ORAL at 22:49

## 2024-12-26 RX ADMIN — PANTOPRAZOLE SODIUM 40 MG: 40 TABLET, DELAYED RELEASE ORAL at 06:22

## 2024-12-26 RX ADMIN — ONDANSETRON 4 MG: 4 TABLET, ORALLY DISINTEGRATING ORAL at 14:18

## 2024-12-26 RX ADMIN — HYDROCODONE BITARTRATE AND ACETAMINOPHEN 2 TABLET: 10; 325 TABLET ORAL at 14:18

## 2024-12-26 RX ADMIN — ASPIRIN 81 MG: 81 TABLET, COATED ORAL at 08:29

## 2024-12-26 RX ADMIN — PAROXETINE HYDROCHLORIDE 25 MG: 20 TABLET, FILM COATED ORAL at 06:21

## 2024-12-26 RX ADMIN — IPRATROPIUM BROMIDE 3 SPRAY: 42 SPRAY NASAL at 08:30

## 2024-12-26 ASSESSMENT — ACTIVITIES OF DAILY LIVING (ADL)
ADLS_ACUITY_SCORE: 38
ADLS_ACUITY_SCORE: 34
ADLS_ACUITY_SCORE: 38

## 2024-12-26 NOTE — PLAN OF CARE
Orientation: A/Ox4  Aggression Stop Light: Green  Activity: Ind  Diet/BS Checks: Regular diet, fair appetite.   Tele:  NA  IV Access/Drains: PIV infusing D5 + LR + Kcl 20 mEq w/ int abx  Pain Management: PRN norco given Q4. Reports of constant rectal pain, no breakdown noted.   Abnormal VS/Results: VSS on RA ex HTn  Bowel/Bladder: Cont B/B. Frequent loose BM.  Skin/Wounds: +2 BLE edema, compression stockings in place.   Consults: GI, ID, CC  D/C Disposition: pending improvement    Other Info:   - switching from PO vanco to PO fidaxomicin tonight  - started on IV flagyl Q8 hrs  - K+, Mg, & Phos WNL - recheck in AM

## 2024-12-26 NOTE — PLAN OF CARE
Goal Outcome Evaluation:    Orientation: A/Ox4, pleasant  Aggression Stop Light: Green  Activity: Ind  Diet/BS Checks: Regular diet  Tele:  NA  IV Access/Drains: PIV infusing D5 + LR + Kcl 20 mEq w/ int abx  Pain Management: PRN norco given Q4. Reports of constant rectal pain  Abnormal VS/Results: VSS on RA ex HTN  Bowel/Bladder: Cont B/B. Frequent loose BM.  Skin/Wounds: +2 BLE edema, compression stockings in place, did not want removed for nocs.   Consults: GI, ID, CC  D/C Disposition: pending improvement    Other Info:   - IV flagyl Q8 hrs  - K+, Mg, & Phos protocol, recheck AM

## 2024-12-26 NOTE — PROGRESS NOTES
GASTROENTEROLOGY PROGRESS NOTE     IMPRESSION:    #1 C. Diff colitis, with CT evidence of diffuse colon inflammation () suggestive of severe infection  #2 Collagenous colitis  #3 Concern for adrenal insufficiency    - transitioned to PO fidaxomicin ; agree with full 10 d course with fidaxomicin. Po vanc has been stopped.    - IV flagyl per ID. Appreciate ID recs.   - continue budesonide, 1 cap (3 mg) daily. Do not suggest escalation of this. Once we are able to assess response from above C. Diff therapy, addition of cholestyramine can be considered as further measure if needed.   - adat  - imodium 2 mg as needed if > 3 loose stools. This can also help slow down diarrhea related to collagenous colitis.     GI team will follow. Pt had multiple ? That were reviewed/answered.        Elaina Ivey MD  [facility]  [phone]      ________________________________________________________________________      SUBJECTIVE:  Continues to report diarrhea/watery stools any time she eats. Denies abd pain.       OBJECTIVE:  /87 (BP Location: Left arm)   Pulse 65   Temp 98.4  F (36.9  C) (Oral)   Resp 18   Wt 57.8 kg (127 lb 8 oz)   SpO2 94%   BMI 22.59 kg/m    Temp (24hrs), Av.4  F (36.9  C), Min:98.2  F (36.8  C), Max:98.5  F (36.9  C)    No data found.     PHYSICAL EXAM  GEN: No acute distress.    Psych: AO x3.    Additional Data:  I have reviewed the patient's new clinical lab results:     Recent Labs   Lab Test 24  0924 24  0735 24  0956   WBC 8.0 7.2 9.4   HGB 11.6* 10.3* 9.9*   MCV 94 95 94    252 234     Recent Labs   Lab Test 24  0902 24  0924 24  1333 24  0735 24  1825 24  0956   NA  --  140  --  140  --  139   POTASSIUM 3.7 3.5 3.8 3.2*   < > 2.6*   CHLORIDE  --  102  --  105  --  103   CO2  --  28  --  25  --  29   BUN  --  2.1*  --  1.8*  --  4.8*   CR  --  0.49*  --  0.53  --  0.54   ANIONGAP  --  10  --  10  --  7    THOR  --  8.6*  --  8.1*  --  8.0*   GLC  --  91  --  121*  --  105*    < > = values in this interval not displayed.     Recent Labs   Lab Test 12/22/24  0525 12/22/24  0254 12/07/23  1020 08/31/23  1838 07/27/23  1314 07/27/23  1314 02/21/23  1008 05/25/22  1428   ALBUMIN  --  3.4*  --  4.5  --  4.3  --   --    BILITOTAL  --  0.7  --  0.4  --  0.3  --   --    ALT  --  9 14 15   < > 18  --   --    AST  --  17  --  20  --  22  --   --    PROTEIN 10*  --   --   --   --   --  Negative Negative   LIPASE  --   --   --  9*  --   --   --   --     < > = values in this interval not displayed.       Total time: 38 minutes,  at least 50% time spent in coordination of care, counseling, and discussions with pt/family/team members.

## 2024-12-26 NOTE — CONSULTS
[facility]    Infectious Disease Consultation     Date of Admission:  12/22/2024  Date of Consult (When I saw the patient): 12/26/24    Assessment:  74YF with known history of collagenous colitis diagnosed many years ago, worsening since September with 10-12 stools per day,  anorexia and adrenal insufficiency secondary to chronic budesonide use,  recent colonoscopy on 12/17 showing edematous colon with biopsy consistent with collagenous colitis, who has been hospitalized with abdominal pain, fever and weakness and has been found to additionally have superimposed clostridium difficile colitis.    -Clostridium difficile colitis. She has remained on appropriate therapy for c.diff, fever and leukocytosis have resolved though she continues to have diarrhea close to her baseline which may be due to underlying collagenous colitis  -Collagenous colitis  -Severe hypokalemia has improved  -Hypomagnesemia  -Adrenal insufficiency  -Chronic medical conditions - chronic pain syndrome, GERD, anxiety, migraine headaches    Recommendations:  Patient has remained on appropriate therapy with vancomycin since admission (12/220 which was transitioned to oral Fidaxomicin yesterday 12/26. Today is day 5 of appropriate therapy. Plan to treat with Fidaxomicin for 10 days   Will continue Metronidazole for another 1-2 days  She also remains on Budesonide for collagenous colitis.  Challenging situation since her underlying problems is collagenous colitis with chronic diarrhea , currently symptoms appear close to her baseline and it is unclear how much of a role c.diff is playing. She remains on appropriate therapy however and markers of infection including WBC and fever pattern have improved.  Supportive care      Anastasia Olmstead MD    Reason for Consult   Reason for consult: I was asked to evaluate this patient for treatment recommendations for clostridium difficice colitis.    Primary Care Physician   Georges  [facility]    Chief Complaint   Abdominal pain and diarrhea      History is obtained from the patient and medical records    History of Present Illness   Laurel Parra is a 74 year old female with known history of collagenous colitis, chronic diarrhea, anorexia and adrenal insufficiency, recent colonoscopy on 12/17 which showed edematous colon with biopsy consistent with collagenous colitis, who has been hospitalized with abdominal pain, fever and weakness and has been found to additionally have clostridium difficile colitis.    Patient has collagenous colitis for many years which as  been worse wince September with chronic diarrhea up to 10-12  stools per day. She underwent colonoscopy on 12/17, and the entire colon showed granular and edematous appearance. Multiple biopsies were collected and were consistent with her diagnosis of collagenous colitis    She was admitted due to profound weakness and ongoing abdominal pain/diarrhea. She had a fever of 101.9 degrees on admission and had leukocytosis of 15.7 K. Stool enteric panel and viral respiratory PCR panel was negative but she did test positive for Clostridium difficile colitis.    Ct abdomen showed Diffuse thickening of the colon from the cecum to the distal rectum representing long segment colitis . Patient was maintained on oral vancomycin for a few days and transitioned to fidaxomicin yesterday and ID has been asked to assist with further antibiotic management.    Fever and leukosytosis have resolved though she continues to have abdominal cramps and diarrhea and remains frustrated at her quality of life. She has been home bound since September    Past Medical History   I have reviewed this patient's medical history and updated it with pertinent information if needed.   Past Medical History:   Diagnosis Date    Adrenal insufficiency (H)     Dr. Navas, secondary to meds    Anxiety     Dr. Adelina Meehan    ASCVD (arteriosclerotic cardiovascular disease)  08/2023    cp and pos trop, angio with trivial cad, echo nl    Central sensitization to pain     Chronic constipation     Chronic headache     sees neuro Dr Danielson, 3 types of headaches    Chronic narcotic use     [facility]    Chronic pain     Dr. Orta as of 2015    Chronic urethritis     Dr. Little    Colonic polyp 11/2011    one polyp, fu 5 years, fu 10/17 and no lesions, fu 9/20 and negative, fu 5 years    Depression, major, in partial remission (H)     Dr. Meehan    Depressive disorder 1971    Diarrhea 2012    eval by [facility], resolved with gluten free diet    DJD (degenerative joint disease)     Dyspepsia 2020    eval by [facility], had colonoscopy, egd, ct abd and pelvis, mrcp.  [place] to be dyspepsia and constipation    H/O gastroesophageal reflux (GERD)     LBP (low back pain) 2013    Dr. Jae Tong in [place], then seeing [facility] Dr. Orta    Light headed 07/2020    nl est echo    Lymphocytic colitis 2021    [facility]; seen on flex sig 9/24 [facility], added budesonide    Migraines 2009    neuro eval    NSTEMI (non-ST elevated myocardial infarction) (H) 08/31/2023    echo nl and angio trivial cad    Palpitations 2006    holter with pvc's and pac's, echo nl    Screening 2010    dexa nl at gyn       Past Surgical History   I have reviewed this patient's surgical history and updated it with pertinent information if needed.  Past Surgical History:   Procedure Laterality Date    APPENDECTOMY  2016    ARTHRODESIS WRIST Left 04/05/2021    Procedure: LEFT TOTAL WRIST FUSION;  Surgeon: Beatrice Philippe MD;  Location:  OR    ARTHROPLASTY CARPOMETACARPAL (THUMB JOINT) Left 06/07/2017    Procedure: ARTHROPLASTY CARPOMETACARPAL (THUMB JOINT);  REVISION  LEFT THUMB CARPOMETACARPAL ARTHROPOASTY WITH 1.1 TIGHT ROPE;  Surgeon: Beatrice Philippe MD;  Location:  SD    ARTHROSCOPY WRIST Left 08/26/2019    Procedure: LEFT WRIST ARTHROSCOPY REMOVAL OF TIGHT ROPE ; LEFT EXTENSOR POLLICIS LONGUS  /EXTENSOR CARPI RADIALIS BREVIS /EXTENSOR CARPI RADIALUS LONGUS SYNOVECTOMY ; EXTENSOR POLLICIS LONGUS TRANSPOSITION ; ANTERIOR INTEROSSEOUS NEURECTOMY ; POSTERIOR INTEROSSEOUS NEURECTOMY;  Surgeon: Beatrice Philippe MD;  Location:  OR    BIOPSY      CARPAL TUNNEL RELEASE RT/LT      CATARACT EXTRACTION  2021     SECTION      COLONOSCOPY      CV CORONARY ANGIOGRAM N/A 2023    Procedure: Coronary Angiogram;  Surgeon: Moody Miranda MD;  Location:  HEART CARDIAC CATH LAB    ESOPHAGOSCOPY, GASTROSCOPY, DUODENOSCOPY (EGD), COMBINED N/A 2020    Procedure: ESOPHAGOGASTRODUODENOSCOPY, WITH BIOPSY;  Surgeon: Georges Pop MD;  Location:  GI    EXCIS BARTHOLIN GLAND/CYST      EYE SURGERY      HERNIORRHAPHY INGUINAL  70's    x5    INJECT STEROID (LOCATION) Right 2019    Procedure: RIGHT WRIST CORTISONE INJECTION;  Surgeon: Beatrice Philippe MD;  Location:  OR    OPERATIVE HYSTEROSCOPY WITH MORCELLATOR N/A 2018    Procedure: OPERATIVE HYSTEROSCOPY WITH MORCELLATOR (MARTIN [employer] NEPHEW);  OPERATIVE HYSTEROSCOPY WITH TRUECLEAR MORCELLATOR 5C SCOPE ;  Surgeon: Iris Fernandez MD;  Location: [facility]    ORTHOPEDIC SURGERY Bilateral     SHOULDER ARTHROSCOPY    ORTHOPEDIC SURGERY Right 2017    right foot for plantar fasciitis    thumb surgery   and ,        Prior to Admission Medications   Prior to Admission Medications   Prescriptions Last Dose Informant Patient Reported? Taking?   Baclofen (LIORESAL) 5 MG tablet Unknown Self Yes Yes   Sig: Take 5 mg by mouth as needed for muscle spasms   HYDROcodone-Acetaminophen  MG TABS 2024 Evening  No Yes   Sig: Take 1-2 tablets by mouth every 4 hours as needed (maximum of 10 tablets in 24 hour period) Prescription is written for 1-2 tabs every 4-6 hrs prn (max of 10 tabs daily).   LORazepam (ATIVAN) 1 MG tablet 2024 Evening  No Yes   Sig: Take 1 tablet (1 mg) by mouth 4 times daily.   PARoxetine  (PAXIL-CR) 25 MG 24 hr tablet 12/21/2024 Evening  No Yes   Sig: Take 1 tablet (25 mg) by mouth 2 times daily.   Vitamin D3 (CHOLECALCIFEROL) 125 MCG (5000 UT) tablet 12/21/2024 Evening Self Yes Yes   Sig: Take 2 tablets by mouth every evening   aspirin 81 MG EC tablet 12/21/2024 Morning  No Yes   Sig: Take 1 tablet (81 mg) by mouth daily   budesonide (ENTOCORT EC) 3 MG EC capsule 12/21/2024 Morning  No Yes   Sig: Take 3 capsules (9 mg) by mouth every morning.   Patient taking differently: Take 3 mg by mouth every morning.   carboxymethylcellulose (REFRESH PLUS) 0.5 % SOLN ophthalmic solution Past Week Self Yes Yes   Sig: Place 1 drop into both eyes 3 times daily as needed for dry eyes   cyclobenzaprine (FLEXERIL) 10 MG tablet Unknown Self Yes Yes   Sig: Take 10 mg by mouth daily as needed for muscle spasms.   gabapentin (NEURONTIN) 300 MG capsule 12/21/2024 Bedtime  No Yes   Sig: Take 1 capsule (300 mg) by mouth at bedtime.   ipratropium (ATROVENT) 0.06 % spray Unknown Self No Yes   Sig: Spray 3 sprays into both nostrils daily   loratadine (CLARITIN) 10 MG tablet 12/21/2024 Morning Self Yes Yes   Sig: Take 10 mg by mouth every morning    naloxone (NARCAN) 4 MG/0.1ML nasal spray Unknown Self No Yes   Sig: Spray 1 spray (4 mg) into one nostril alternating nostrils as needed for opioid reversal Every 2-3 minutes in alternating nostrils   nitroGLYcerin (NITROSTAT) 0.4 MG sublingual tablet   No Yes   Sig: For chest pain place 1 tablet under the tongue every 5 minutes for 3 doses. If symptoms persist 5 minutes after 1st dose call 911.   omeprazole (PRILOSEC) 40 MG DR capsule 12/21/2024 Evening Self Yes Yes   Sig: Take 40 mg by mouth 2 times daily (before meals)   ondansetron (ZOFRAN) 4 MG tablet Unknown Self Yes Yes   Sig: Take 4 mg by mouth every 8 hours as needed for nausea or vomiting   polyethylene glycol (MIRALAX) 17 GM/Dose powder Unknown Self Yes Yes   Sig: Take 1 capful. by mouth as needed for constipation  "  propranolol ER (INDERAL LA) 60 MG 24 hr capsule 12/21/2024 Bedtime  No Yes   Sig: TAKE 1 CAPSULE(60 MG) BY MOUTH DAILY   riboflavin 400 MG CAPS Unknown Self Yes Yes   Sig: Take 400 mg by mouth daily   ubrogepant (UBRELVY) 50 MG tablet Unknown  No Yes   Sig: Take 1 tablet (50 mg) by mouth at onset of headache (may repeat in 2 hours, total daily dose is 100 mg/day, but limit use to less than 10 days per month)   vitamin C (ASCORBIC ACID) 1000 MG TABS 12/21/2024 Evening Self Yes Yes   Sig: Take 1,000 mg by mouth every evening    zolpidem ER (AMBIEN CR) 12.5 MG CR tablet 12/21/2024 Bedtime  No Yes   Sig: Take 1 tablet (12.5 mg) by mouth at bedtime.      Facility-Administered Medications: None     Allergies   Allergies   Allergen Reactions    Betamethasone Other (See Comments)     agitation    Chocolate Other (See Comments)     Triggers migraines    Compazine [Prochlorperazine] Other (See Comments)     \"crawl out of my skin\"    Linzess [Linaclotide]     Penicillins Nausea and Vomiting and Hives    Pneumococcal Vaccine Other (See Comments)     Temporary paralization    Tizanidine Other (See Comments)     Severe agitation       Immunization History   Immunization History   Administered Date(s) Administered    COVID-19 12+ (MODERNA) 10/26/2023    COVID-19 12+ (Pfizer) 10/16/2024    COVID-19 Bivalent 12+ (Pfizer) 10/08/2022    COVID-19 Monovalent 18+ (Moderna) 02/27/2021, 03/27/2021, 11/18/2021, 05/06/2022    Influenza (High Dose) Trivalent,PF (Fluzone) 10/26/2017, 10/26/2018, 11/19/2019, 11/18/2021, 10/16/2024    Influenza (IIV3) PF 10/07/2010, 11/20/2012, 10/03/2013    Influenza Vaccine 65+ (FLUAD) 10/08/2022, 10/26/2023    Influenza Vaccine >6 months,quad, PF 10/29/2015, 12/15/2016    Pneumo Conj 13-V (2010&after) 10/26/2017    Pneumococcal 23 valent 10/26/2018    RSV Vaccine (Arexvy) 10/26/2023    TD,PF 7+ (Tenivac) 06/29/2004, 08/17/2023    TDAP Vaccine (Adacel) 10/03/2013    Zoster vaccine, live 12/20/2012 "       Social History   I have reviewed this patient's social history and updated it with pertinent information if needed. Laurel Parra  reports that she quit smoking about 50 years ago. Her smoking use included cigarettes. She has never used smokeless tobacco. She reports that she does not drink alcohol and does not use drugs.    Family History   I have reviewed this patient's family history and updated it with pertinent information if needed.   Family History   Problem Relation Age of Onset    Diabetes Father     Hypertension Father     Depression Father     Substance Abuse Father     Anesthesia Reaction Father         went into shock with surgery     Obesity Father     Anxiety Disorder Father     Coronary Artery Disease Father     Hyperlipidemia Father     Diabetes Mother     Cerebrovascular Disease Mother         Cadasils disease    Depression Mother     Mental Illness Mother         borderline personality disorder, Chemical dependency    Substance Abuse Mother     Genetic Disorder Mother         Cadasils    Obesity Mother     Anxiety Disorder Mother     Cerebrovascular Disease Paternal Grandfather     Cerebrovascular Disease Brother         Cadasils disease    Depression Brother     Anxiety Disorder Brother     Substance Abuse Brother     Genetic Disorder Brother         cadasils    Obesity Brother     Hypertension Brother     Hyperlipidemia Brother     Cerebrovascular Disease Sister         Cadasils disease    Depression Sister     Anxiety Disorder Sister     Genetic Disorder Sister         cadasils    Obesity Sister     Cerebrovascular Disease Sister         Cadasils disease    Genetic Disorder Sister         cadasils    Breast Cancer Sister     Depression Sister     Anxiety Disorder Sister     Obesity Sister     Hypertension Sister     Hyperlipidemia Sister     Depression Son     Anxiety Disorder Son        Review of Systems   The 10 point Review of Systems is as per HPI    Physical Exam   Temp:  98.4  F (36.9  C) Temp src: Oral BP: 133/87 Pulse: 65   Resp: 18 SpO2: 94 % O2 Device: None (Room air)    Vital Signs with Ranges  Temp:  [98.2  F (36.8  C)-98.5  F (36.9  C)] 98.4  F (36.9  C)  Pulse:  [56-70] 65  Resp:  [18] 18  BP: (133-153)/(87-97) 133/87  SpO2:  [93 %-97 %] 94 %  127 lbs 8 oz  Body mass index is 22.59 kg/m .    GENERAL APPEARANCE:  awake  EYES: Eyes grossly normal to inspection  NECK: no adenopathy  RESP: lungs clear   CV: regular rates and rhythm  LYMPHATICS: normal ant/post cervical and supraclavicular nodes  ABDOMEN: soft, nontender  MS: extremities normal  SKIN: no suspicious lesions or rashes        Data   All laboratory data reviewed  Component      Latest Ref [facility] 12/25/2024  9:24 AM   Sodium      135 - 145 mmol/L 140    Potassium      3.4 - 5.3 mmol/L 3.5    Chloride      98 - 107 mmol/L 102    Carbon Dioxide (CO2)      22 - 29 mmol/L 28    Anion Gap      7 - 15 mmol/L 10    Urea Nitrogen      8.0 - 23.0 mg/dL 2.1 (L)    Creatinine      0.51 - 0.95 mg/dL 0.49 (L)    GFR Estimate      >60 mL/min/1.73m2 >90    Calcium      8.8 - 10.4 mg/dL 8.6 (L)    Glucose      70 - 99 mg/dL 91    WBC      4.0 - 11.0 10e3/uL 8.0    RBC Count      3.80 - 5.20 10e6/uL 3.71 (L)    Hemoglobin      11.7 - 15.7 g/dL 11.6 (L)    Hematocrit      35.0 - 47.0 % 34.9 (L)    MCV      78 - 100 fL 94    MCH      26.5 - 33.0 pg 31.3    MCHC      31.5 - 36.5 g/dL 33.2    RDW      10.0 - 15.0 % 12.5    Platelet Count      150 - 450 10e3/uL 286       Component      Latest Ref [facility] 12/22/2024  5:25 AM   Campylobacter species      Negative  Negative    SALMONELLA SPECIES      Negative  Negative    Vibrio species      Negative  Negative    Vibrio cholerae      Negative  Negative    YERSINIA ENTEROCOLITICA      Negative  Negative    Enteropathogenic E. coli (EPEC)      Negative, NA  Negative    Shiga-like toxin-producing E. coli (STEC)      Negative  Negative    Shigella/Enteroinvasive E. coli (EIEC)      Negative  Negative     Cryptosporidium species      Negative  Negative    Giardia lamblia      Negative  Negative    Norovirus Gl/Gll      Negative  Negative    ROTAVIRUS A      Negative  Negative    Plesiomonas shigelloides      Negative  Negative    Enteroaggregative E. coli (EAEC)      Negative  Negative    Enterotoxigenic E. coli (ETEC)      Negative  Negative    E. coli O157      Negative, NA  NA    Cyclospora cayetanensis      Negative  Negative    Entamoeba histolytica      Negative  Negative    Adenovirus F40/41      Negative  Negative    Astrovirus      Negative  Negative    Sapovirus      Negative  Negative        Component      Latest Ref Rn 12/22/2024  12:00 PM   Adenovirus      Not Detected  Not Detected    Coronavirus      Not Detected  Not Detected    Human Metapneumovirus      Not Detected  Not Detected    Human Rhin/Enterovirus      Not Detected  Not Detected    Influenza A      Not Detected  Not Detected    Influenza A, H1      Not Detected  Not Detected    Influenza A 2009 H1N1      Not Detected  Not Detected    Influenza A, H3      Not Detected  Not Detected    Influenza B      Not Detected  Not Detected    Parainfluenza Virus 1      Not Detected  Not Detected    Parainfluenza Virus 2      Not Detected  Not Detected    Parainfluenza Virus 3      Not Detected  Not Detected    Parainfluenza Virus 4      Not Detected  Not Detected    Respiratory Syncytial Virus A      Not Detected  Not Detected    Respiratory Syncytial Virus B      Not Detected  Not Detected    Chlamydia pneumoniae      Not Detected  Not Detected    Mycoplasma pneumoniae      Not Detected  Not Detected        Component      Latest Ref Rng 12/22/2024  5:25 AM   C. difficile GDH Antigen      Negative  Positive !    C. Difficile Toxin      Negative  Positive !    C Difficile Toxin B by PCR      Negative  Positive !       Imaging  EXAM: CT ABDOMEN AND PELVIS WITH CONTRAST  LOCATION: [facility]  DATE: 12/22/2024     INDICATION:  Fever, chronic diarrhea, abdominal pain, colonoscopy with biopsy five days ago.  COMPARISON: CT abdomen and pelvis without IV contrast 08/16/2023.  TECHNIQUE: CT scan of the abdomen and pelvis was performed following injection of IV contrast. Multiplanar reformats were obtained. Dose reduction techniques were used.  CONTRAST: 65 mL Isovue 370.     FINDINGS:   LOWER CHEST: Minor strand of linear atelectasis left lower lobe. No pleural effusion on either side. Normal cardiac size. No pericardial effusion. Moderate hiatal hernia. No significant change.     HEPATOBILIARY: Small amount of focal fat in the usual location in the left hepatic lobe anteroinferiorly. No discrete hepatic lesion, calcified gallstones or biliary dilatation. Patent hepatic and portal veins.     PANCREAS: Normal.     SPLEEN: Normal.     ADRENAL GLANDS: Normal.     KIDNEYS/BLADDER: No urinary tract calculi. Both kidneys are well perfused without hydronephrosis or hydroureter. Normal urinary bladder.     BOWEL: No free gas or free fluid. Diffuse thickening of the colon from the cecum to the distal rectum representing long segment colitis (infectious or inflammatory). No pneumatosis, mechanical obstruction, perforation, abscess or fistula formation.     LYMPH NODES: No suspicious abdominopelvic adenopathy.     VASCULATURE: Normal caliber mildly atherosclerotic distal abdominal aorta measuring 1.8 x 1.9 cm (image 90, series 3). Normal caliber IVC.     PELVIC ORGANS: Anteverted postmenopausal uterus. Rectosigmoid diffuse thickening representing long segment colitis. Slight atherosclerotic changes. No suspicious adenopathy or free fluid.     MUSCULOSKELETAL: Degenerative changes involving the spine and joints of the pelvis. Left convex lumbar curve.                                                                      IMPRESSION:   1.  No free gas or free fluid. Diffuse thickening of the colon from the cecum to the distal rectum representing long segment  colitis (infectious or inflammatory), without secondary complications.     2.  Moderate hiatal hernia.     3.  Degenerative changes involving the spine and joints of the pelvis. Left convex lumbar curve.

## 2024-12-26 NOTE — PROGRESS NOTES
[facility]    Medicine Progress Note - Hospitalist Service    Date of Admission:  12/22/2024    Assessment & Plan   Laurel Parra is a 74 year-old female with past medical history significant for lymphocytic colitis with chronic diarrhea, anorexia, adrenal insufficiency who was admitted to [facility] on 12/23/2024 for weakness, fever, abdominal pain secondary to nonsevere C. Difficile    Generalized weakness  Sepsis  Non-severe C-Diff  Chronic diarrhea in setting of lymphocytic colitis  Risk factors: Omeprazole and recent abx (within 1 month)  - CT AP W 12/2012/24: Diffuse colonic thickening from cecum to distal rectum  - Enteric panel negative  - C Diff PCR & EIA Toxin positive  Plan  - Follow [facility]  - Per ID conversation (Dr Olmstead) with hospitalist (Dr Gunter) on 12/25/2024, will continue fidaxomicin 200 mg twice daily + metronidazole 500mg q8h IV  Continue Vancomycin 125mg QID PO x10 days (12/22/2024 -12/31/2024)  - Given increased appetite, will discontinue D5LR + Kcl today  - [facility] following  - PT/OT: Home with assist    Hypokalemia  Hypomagnesemia   Secondary to diarrhea and poor intake.  Plan  - Monitor with replacement protocol    Secondary adrenal insufficiency - Followed by Dr. Navas of [facility]. Continue home budesonide   Chronic pain syndrome - Continue prior to admission gabapentin, hydrocodone-APAP, cyclobenzaprine, baclofen when dose confirmed by pharmacy   GERD - Continue prior to admission PPI when dose confirmed by pharmacy   Anxiety - Continue prior to admission lorazepam, paroxetine when dose confirmed by pharmacy   Migraine headaches - Propranolol & riboflavin           Diet: Combination Diet Regular Diet Adult    DVT Prophylaxis: Enoxaparin (Lovenox) SQ  Bates Catheter: Not present  Lines: None     Cardiac Monitoring: None  Code Status: Full Code      Clinically Significant Risk Factors             # Hypomagnesemia: Lowest Mg = 1.6  mg/dL in last 2 days, will replace as needed   # Hypoalbuminemia: Lowest albumin = 3.4 g/dL at 12/22/2024  2:54 AM, will monitor as appropriate                    # Financial/Environmental Concerns: none (denies)           Disposition Plan     Medically Ready for Discharge: Anticipate tomorrow pending improvement of diarrhea and PO tolerance             Ken Alatorre DO  Hospitalist Service  [facility]  Securely message with [employer] (more info)  Text page via [employer] Paging/Directory   ______________________________________________________________________    Interval History   No overnight events.    On evaluation this morning, she is resting in the bed.  Still with ongoing diarrhea with 8 episodes in the last 24 hours, but notes the frequency has improved drastically since initial admission.  Very mild abdominal cramping in the bilateral lower quadrants.  No fevers, chills, headache, dizziness.  Able to tolerate more of her dinner last night and branch today, but admits her appetite is still not back to her normal self      Physical Exam   Vital Signs: Temp: 98.4  F (36.9  C) Temp src: Oral BP: 133/87 Pulse: 65   Resp: 18 SpO2: 94 % O2 Device: None (Room air)    Weight: 127 lbs 8 oz    General Appearance: Elderly and weak  Respiratory: No tripoding or accessory muscle usage  Cardiovascular: Regular rate and rhythm.  No edema in BLE  GI: Soft, mild tenderness diffusely with deep palpation.  No guarding, rigidity or rebound tenderness  Skin: No rashes or lesions  Other: Alert and oriented x 4, conversing coherently    Medical Decision Making       60 MINUTES SPENT BY ME on the date of service doing chart review, history, exam, documentation & further activities per the note.      Data   ------------------------- PAST 24 HR DATA REVIEWED -----------------------------------------------

## 2024-12-26 NOTE — PLAN OF CARE
Goal Outcome Evaluation:      Plan of Care Reviewed With: patient    Overall Patient Progress: no changeOverall Patient Progress: no change    12/25/2024; [id number]   Orientation: A/Ox4  Aggression Stop Light: Green  Activity: Ind  Diet/BS Checks: Regular diet  Tele:  NA  IV Access/Drains: PIV infusing D5 + LR + Kcl 20 mEq w/ int abx  Pain Management: PRN norco given Q4. Reports of constant rectal pain, no breakdown noted.   Abnormal VS/Results: VSS on RA ex HTN at times   Bowel/Bladder: Cont B/B. Frequent loose BM.  Skin/Wounds: +2 BLE edema, compression stockings in place.   Consults: GI, ID, CC  D/C Disposition: pending improvement     Other Info:   - switching from PO vanco to PO fidaxomicin tonight  - started on IV flagyl Q8 hrs  - K+, Mg, & Phos WNL - recheck in AM  - PRN Ambien given at bedtime   Paroxetine not given since pt has already received two doses at 0615 and 1840

## 2024-12-27 LAB
BACTERIA BLD CULT: NO GROWTH
BACTERIA BLD CULT: NO GROWTH
MAGNESIUM SERPL-MCNC: 1.8 MG/DL (ref 1.7–2.3)
PHOSPHATE SERPL-MCNC: 3.8 MG/DL (ref 2.5–4.5)
POTASSIUM SERPL-SCNC: 3.4 MMOL/L (ref 3.4–5.3)
POTASSIUM SERPL-SCNC: 3.7 MMOL/L (ref 3.4–5.3)

## 2024-12-27 PROCEDURE — 99232 SBSQ HOSP IP/OBS MODERATE 35: CPT | Performed by: SPECIALIST

## 2024-12-27 PROCEDURE — 250N000013 HC RX MED GY IP 250 OP 250 PS 637: Performed by: HOSPITALIST

## 2024-12-27 PROCEDURE — 250N000013 HC RX MED GY IP 250 OP 250 PS 637: Performed by: INTERNAL MEDICINE

## 2024-12-27 PROCEDURE — 99233 SBSQ HOSP IP/OBS HIGH 50: CPT | Performed by: HOSPITALIST

## 2024-12-27 PROCEDURE — 36415 COLL VENOUS BLD VENIPUNCTURE: CPT | Performed by: INTERNAL MEDICINE

## 2024-12-27 PROCEDURE — 36415 COLL VENOUS BLD VENIPUNCTURE: CPT | Performed by: HOSPITALIST

## 2024-12-27 PROCEDURE — 84132 ASSAY OF SERUM POTASSIUM: CPT | Performed by: HOSPITALIST

## 2024-12-27 PROCEDURE — 83735 ASSAY OF MAGNESIUM: CPT | Performed by: INTERNAL MEDICINE

## 2024-12-27 PROCEDURE — 250N000011 HC RX IP 250 OP 636: Mod: JZ | Performed by: SPECIALIST

## 2024-12-27 PROCEDURE — 250N000011 HC RX IP 250 OP 636: Performed by: INTERNAL MEDICINE

## 2024-12-27 PROCEDURE — 84100 ASSAY OF PHOSPHORUS: CPT | Performed by: INTERNAL MEDICINE

## 2024-12-27 PROCEDURE — 120N000001 HC R&B MED SURG/OB

## 2024-12-27 PROCEDURE — 84132 ASSAY OF SERUM POTASSIUM: CPT | Performed by: INTERNAL MEDICINE

## 2024-12-27 RX ORDER — POTASSIUM CHLORIDE 1500 MG/1
20 TABLET, EXTENDED RELEASE ORAL ONCE
Status: COMPLETED | OUTPATIENT
Start: 2024-12-27 | End: 2024-12-27

## 2024-12-27 RX ADMIN — PAROXETINE HYDROCHLORIDE 25 MG: 20 TABLET, FILM COATED ORAL at 06:19

## 2024-12-27 RX ADMIN — LORATADINE 10 MG: 10 TABLET ORAL at 08:33

## 2024-12-27 RX ADMIN — HYDROCODONE BITARTRATE AND ACETAMINOPHEN 2 TABLET: 10; 325 TABLET ORAL at 13:49

## 2024-12-27 RX ADMIN — HYDROCODONE BITARTRATE AND ACETAMINOPHEN 2 TABLET: 10; 325 TABLET ORAL at 17:45

## 2024-12-27 RX ADMIN — PROPRANOLOL HYDROCHLORIDE 60 MG: 60 CAPSULE, EXTENDED RELEASE ORAL at 08:33

## 2024-12-27 RX ADMIN — PANTOPRAZOLE SODIUM 40 MG: 40 TABLET, DELAYED RELEASE ORAL at 17:45

## 2024-12-27 RX ADMIN — METRONIDAZOLE 500 MG: 500 INJECTION, SOLUTION INTRAVENOUS at 06:53

## 2024-12-27 RX ADMIN — HYDROCODONE BITARTRATE AND ACETAMINOPHEN 2 TABLET: 10; 325 TABLET ORAL at 09:47

## 2024-12-27 RX ADMIN — HYDROCODONE BITARTRATE AND ACETAMINOPHEN 2 TABLET: 10; 325 TABLET ORAL at 04:42

## 2024-12-27 RX ADMIN — IPRATROPIUM BROMIDE 3 SPRAY: 42 SPRAY NASAL at 08:36

## 2024-12-27 RX ADMIN — GABAPENTIN 300 MG: 300 CAPSULE ORAL at 20:08

## 2024-12-27 RX ADMIN — POTASSIUM CHLORIDE 20 MEQ: 1500 TABLET, EXTENDED RELEASE ORAL at 11:13

## 2024-12-27 RX ADMIN — LORAZEPAM 1 MG: 1 TABLET ORAL at 06:19

## 2024-12-27 RX ADMIN — LORAZEPAM 1 MG: 1 TABLET ORAL at 12:53

## 2024-12-27 RX ADMIN — ASPIRIN 81 MG: 81 TABLET, COATED ORAL at 08:33

## 2024-12-27 RX ADMIN — METRONIDAZOLE 500 MG: 500 INJECTION, SOLUTION INTRAVENOUS at 15:42

## 2024-12-27 RX ADMIN — BUDESONIDE 3 MG: 3 CAPSULE ORAL at 08:33

## 2024-12-27 RX ADMIN — HYDROCODONE BITARTRATE AND ACETAMINOPHEN 2 TABLET: 10; 325 TABLET ORAL at 21:37

## 2024-12-27 RX ADMIN — FIDAXOMICIN 200 MG: 200 TABLET, FILM COATED ORAL at 20:09

## 2024-12-27 RX ADMIN — ZOLPIDEM TARTRATE 5 MG: 5 TABLET, COATED ORAL at 21:40

## 2024-12-27 RX ADMIN — FIDAXOMICIN 200 MG: 200 TABLET, FILM COATED ORAL at 08:33

## 2024-12-27 RX ADMIN — LORAZEPAM 1 MG: 1 TABLET ORAL at 17:45

## 2024-12-27 RX ADMIN — ONDANSETRON 4 MG: 4 TABLET, ORALLY DISINTEGRATING ORAL at 17:49

## 2024-12-27 RX ADMIN — PANTOPRAZOLE SODIUM 40 MG: 40 TABLET, DELAYED RELEASE ORAL at 06:19

## 2024-12-27 RX ADMIN — PAROXETINE HYDROCHLORIDE 25 MG: 20 TABLET, FILM COATED ORAL at 20:09

## 2024-12-27 RX ADMIN — METRONIDAZOLE 500 MG: 500 INJECTION, SOLUTION INTRAVENOUS at 22:56

## 2024-12-27 RX ADMIN — ONDANSETRON 4 MG: 2 INJECTION, SOLUTION INTRAMUSCULAR; INTRAVENOUS at 12:53

## 2024-12-27 RX ADMIN — LORAZEPAM 1 MG: 1 TABLET ORAL at 21:37

## 2024-12-27 ASSESSMENT — ACTIVITIES OF DAILY LIVING (ADL)
ADLS_ACUITY_SCORE: 38

## 2024-12-27 NOTE — PROGRESS NOTES
[facility]    Medicine Progress Note - Hospitalist Service    Date of Admission:  12/22/2024    Assessment & Plan   Laurel Parra is a 74 year-old female with past medical history significant for lymphocytic colitis with chronic diarrhea, anorexia, adrenal insufficiency who was admitted to [facility] on 12/23/2024 for weakness, fever, abdominal pain secondary to nonsevere C. Difficile    Generalized weakness  Sepsis  Non-severe C-Diff  Chronic diarrhea in setting of lymphocytic colitis  Risk factors: Omeprazole and recent abx (within 1 month)  - CT AP W 12/2012/24: Diffuse colonic thickening from cecum to distal rectum  - Enteric panel negative  - C Diff PCR & EIA Toxin positive  Plan  - Follow [facility]  - Per ID conversation (Dr Olmstead) with hospitalist (Dr Gunter) on 12/25/2024, will continue fidaxomicin 200 mg twice daily + metronidazole 500mg q8h IV  Continue Vancomycin 125mg QID PO x10 days (12/22/2024 -12/31/2024)  -ID recommends repeating C. difficile testing in the next few days to determine how much of a role C. difficile is playing in her persistent diarrhea  - [facility] following: Input appreciated  - PT/OT: Home with assist    Hypokalemia  Hypomagnesemia   Secondary to diarrhea and poor intake.  Plan  - Monitor with replacement protocol    Secondary adrenal insufficiency - Followed by Dr. Navas of [facility]. Continue home budesonide   Chronic pain syndrome - Continue prior to admission gabapentin, hydrocodone-APAP, cyclobenzaprine, baclofen when dose confirmed by pharmacy   GERD - Continue prior to admission PPI when dose confirmed by pharmacy   Anxiety - Continue prior to admission lorazepam, paroxetine when dose confirmed by pharmacy   Migraine headaches - Propranolol & riboflavin           Diet: Combination Diet Regular Diet Adult    DVT Prophylaxis: Enoxaparin (Lovenox) SQ  Bates Catheter: Not present  Lines: None     Cardiac Monitoring: None  Code  Status: Full Code      Clinically Significant Risk Factors             # Hypomagnesemia: Lowest Mg = 1.5 mg/dL in last 2 days, will replace as needed   # Hypoalbuminemia: Lowest albumin = 3.4 g/dL at 12/22/2024  2:54 AM, will monitor as appropriate                    # Financial/Environmental Concerns: none (denies)           Disposition Plan     Medically Ready for Discharge: Anticipate tomorrow pending improvement of diarrhea and PO tolerance             Toyin Napier MD  Hospitalist Service  [facility]  Securely message with [employer] (more info)  Text page via [employer] Paging/Directory   ______________________________________________________________________    Interval History   No overnight events.    Patient notes headache today although she usually gets migraines.  She states this is a little different but we will try her migraine medication to see if that helps.  She also complains of increased abdominal pain but not in distress at this time.  We note that we will continue to monitor if this gets worse so asked to escalate care.  Discussed with nursing, they will page if patient's abdominal pain worsens over the course of the day      Physical Exam   Vital Signs: Temp: 98.7  F (37.1  C) Temp src: Oral BP: 114/73 Pulse: 78   Resp: 16 SpO2: 97 % O2 Device: None (Room air)    Weight: 127 lbs 8 oz    General Appearance: Elderly and weak  Respiratory: No tripoding or accessory muscle usage  Cardiovascular: Regular rate and rhythm.  No edema in BLE  GI: Soft, mild tenderness diffusely with deep palpation.  No guarding, rigidity or rebound tenderness  Skin: No rashes or lesions  Other: Alert and oriented x 4, conversing coherently    Medical Decision Making       60 MINUTES SPENT BY ME on the date of service doing chart review, history, exam, documentation & further activities per the note.      Data   ------------------------- PAST 24 HR DATA REVIEWED  -----------------------------------------------

## 2024-12-27 NOTE — PLAN OF CARE
Goal Outcome Evaluation:  Orientation: AnOx4.   Aggression Stop Light: Green   Activity: Ind   Diet/BS Checks: Combination Reg diet.   Tele:  N/A  IV Access/Drains: ALEXANDRE FITZPATRICK. Completed course of IV flagyl this shift.   Pain Management: C/O headache, PRN Norco q4h pt likes to get every 4 hours. Denies nausea this shift.  Abnormal VS/Results: VSS on RA.   Bowel/Bladder: Continent B/B. Pt reports loose stools.   Skin/Wounds: WDL   Consults: GI, ID, CC.   D/C Disposition: Pending   Other Info:   - Enteric precautions for C-diff   - K+, Mg, Phos protocols AM rechecks

## 2024-12-27 NOTE — PROGRESS NOTES
CLINICAL NUTRITION SERVICES - REASSESSMENT NOTE    RECOMMENDATIONS FOR MD/PROVIDER TO ORDER:   - Consider scheduling antimetics to better control nausea.      Recommendations Ordered by Registered Dietitian (RD):   - Ordered a couple ensures for pt to trial. Put in order to allow pt to order ensure as needed in the future.   - Oral intake encouragement appreciated.      MALNUTRITION  % Weight Loss:  Weight loss does not meet criteria for malnutrition - wt is down ~8# (6%) from 5 months ago, down ~4# (3%) from 2 months ago   % Intake:  </= 75% for >/= 1 month (severe malnutrition)  Subcutaneous Fat Loss:  Orbital region - mild depletion  Muscle Loss:  Clavicle bone region - mild depletion  Fluid Retention:  None noted     Malnutrition Diagnosis: Moderate malnutrition  In Context of:  Acute on Chronic illness or disease     EVALUATION OF PROGRESS TOWARD GOALS   Diet:  Regular    Intake/Tolerance:    - Per flowsheets: 50%, 75%, and 100% intakes are documented  - Per [employer] [facility]: pt is consistently ordering 2-3 meals/meal trays per day   - Per pt report: She is still struggling with poor appetite and intermittent nausea. All intakes have caused her to have nausea and diarrhea.   - RD d/w pt sticking with smaller more frequent meals, as well as choosing softer, blander, and more starchy foods to thicken the stool. Advised high sugar, high fat and spiced foods can exacerbate diarrhea. Also encouraged pt to ask for antimetics/zofran to be scheduled as this can better control nausea. Pt was agreeable. RD offered oral nutrition supplements to help patient increase intake, pt would like to trial ensure and then order as needed. Answered all questions at this time.     ASSESSED NUTRITION NEEDS:  Dosing Weight 57.8 kg (12/22)  Estimated Energy Needs: 2412-5872 kcals (25-30 Kcal/Kg)  Justification: maintenance  Estimated Protein Needs: 70-85 grams protein (1.2-1.5 g pro/Kg)  Justification: preservation of lean body  "mass  Estimated Fluid Needs: 3752-7627  mL (1 mL/Kcal)  Justification: maintenance    Weight: only one wt recorded during admit  Vitals:    12/22/24 0721   Weight: 57.8 kg (127 lb 8 oz)        Medications: reviewed; potassium chloride    Labs: reviewed    Skin: 1-2+ edema in legs and feet    I/Os: 5-9 daily BM noted    Previous Goals:   Pt to tolerate po without loose stools  Evaluation: Improving    Previous Nutrition Diagnosis:   Altered GI function related to cdiff and lymphocytic colitis as evidenced by pt report of diarrhea after taking any po  Evaluation: Improving      CURRENT NUTRITION DIAGNOSIS  Altered GI function related to cdiff and lymphocytic colitis as evidenced by pt report of n/v and diarrhea after taking any po      INTERVENTIONS  Recommendations / Nutrition Prescription  See above.     Implementation  Nutrition education: encouraged small meals, meal ordering, and reviewed stool thickening foods.  Ok for family to bring food in.      Goals  Consume % of nutritionally adequate meals/supplements TID       MONITORING AND EVALUATION:  Progress towards goals will be monitored and evaluated per protocol and Practice Guidelines      Savana Sears, MS, RD, LD  Clinical Dietitian II  [facility] Message Group: \"Dietitian [[place]]\"  Office Phone: [phone]  Pagers: 3rd floor/ICU: [phone]  All other floors: [phone]  Weekend/holiday: [phone]    "

## 2024-12-27 NOTE — PROGRESS NOTES
[facility]    Infectious Disease Progress Note    Date of Service (when I saw the patient): 12/27/2024       Assessment:  74YF with known history of collagenous colitis diagnosed many years ago, worsening since September with 10-12 stools per day,  anorexia and adrenal insufficiency secondary to chronic budesonide use,  recent colonoscopy on 12/17 showing edematous colon with biopsy consistent with collagenous colitis, who has been hospitalized with abdominal pain, fever and weakness and has been found to additionally have superimposed clostridium difficile colitis.     -Clostridium difficile colitis. She has remained on appropriate therapy for c.diff, fever and leukocytosis have resolved though she continues to have diarrhea close to her baseline which may be due to underlying collagenous colitis  -Collagenous colitis  -Severe hypokalemia has improved  -Hypomagnesemia  -Adrenal insufficiency  -Chronic medical conditions - chronic pain syndrome, GERD, anxiety, migraine headaches     Recommendations:  Continue Fidaxomicin and complete a 10 day treatment course  Discontinue Metronidazole after doses today  May be helpful to repeat C.diff testing in the next few days to see if negative. Though not usually recommended, may be helpful in this case since she has concomitant collagenous colitis as well and it is unclear how much of a role C.diff is playing. Abdominal cramps, fever and leukocytosis have resolved and her diarrhea is now close to her baseline since September which was 10-12 stools per day  Continue Budesonide        Anastasia Olmstead MD    Interval History   Remains afebrile and without leukocytosis  Feels not much change  since yesterday, had 9 stools yesterday and has had 4 stools since this morning. No more cramps , no bloody stools    Physical Exam   Temp: 98.1  F (36.7  C) Temp src: Oral BP: (!) 142/96 Pulse: 68   Resp: 15 SpO2: 92 % O2 Device: None (Room air)    Vitals:    12/22/24  0721   Weight: 57.8 kg (127 lb 8 oz)     Vital Signs with Ranges  Temp:  [98  F (36.7  C)-98.1  F (36.7  C)] 98.1  F (36.7  C)  Pulse:  [68-71] 68  Resp:  [15-18] 15  BP: (135-142)/(96) 142/96  SpO2:  [92 %-96 %] 92 %    Constitutional: Awake, alert, cooperative, no apparent distress  Lungs: Clear to auscultation bilaterally, no crackles or wheezing  Cardiovascular: Regular rate and rhythm, normal S1 and S2, and no murmur noted  Abdomen: Normal bowel sounds, soft, non-distended, non-tender  Skin: No rashes, no cyanosis, no edema  Other:    Medications   Current Facility-Administered Medications   Medication Dose Route Frequency Provider Last Rate Last Admin     Current Facility-Administered Medications   Medication Dose Route Frequency Provider Last Rate Last Admin    aspirin EC tablet 81 mg  81 mg Oral Daily Jae Alatorreathone, DO   81 mg at 12/26/24 0829    budesonide (ENTOCORT EC) EC capsule 3 mg  3 mg Oral Daily Vasyl Mart MD   3 mg at 12/26/24 0830    fidaxomicin (DIFICID) tablet 200 mg  200 mg Oral BID Jae Alatorreathone, DO   200 mg at 12/26/24 2012    gabapentin (NEURONTIN) capsule 300 mg  300 mg Oral At Bedtime aJe Alatorreathone, DO   300 mg at 12/26/24 2011    ipratropium (ATROVENT) 0.06 % spray 3 spray  3 spray Both Nostrils Daily Alatorre, Jaeathone, DO   3 spray at 12/26/24 0830    loratadine (CLARITIN) tablet 10 mg  10 mg Oral QAM Landry, Johnathone, DO   10 mg at 12/26/24 0830    LORazepam (ATIVAN) tablet 1 mg  1 mg Oral 4x Daily Jae Alatorreathone, DO   1 mg at 12/27/24 0619    metroNIDAZOLE (FLAGYL) infusion 500 mg  500 mg Intravenous Q8H Anastasia Olmstead MD   500 mg at 12/27/24 0653    pantoprazole (PROTONIX) EC tablet 40 mg  40 mg Oral BID Xiang De La O MD   40 mg at 12/27/24 0619    PARoxetine (PAXIL) half-tab 25 mg  25 mg Oral BID Alatorre, Johnathone, DO   25 mg at 12/27/24 0619    propranolol ER (INDERAL LA) 24 hr capsule 60 mg  60 mg Oral Daily Tonya Alatorreone, DO   60 mg at 12/26/24 0830     riboflavin CAPS 400 mg  400 mg Oral Daily Ugo Alonso RP   400 mg at 12/26/24 0839    sodium chloride (PF) 0.9% PF flush 3 mL  3 mL Intracatheter Q8H Xiang Mariano MD   3 mL at 12/27/24 0653    [Held by provider] vitamin C (ASCORBIC ACID) tablet 1,000 mg  1,000 mg Oral QPM Ken Alatorre DO        [Held by provider] Vitamin D3 (CHOLECALCIFEROL) tablet 250 mcg  250 mcg Oral QPM Ken Alatorre DO           Data   All microbiology laboratory data reviewed.  Recent Labs   Lab Test 12/25/24  0924 12/24/24  0735 12/23/24  0956   WBC 8.0 7.2 9.4   HGB 11.6* 10.3* 9.9*   HCT 34.9* 32.2* 30.4*   MCV 94 95 94    252 234     Recent Labs   Lab Test 12/25/24  0924 12/24/24  0735 12/23/24  0956   CR 0.49* 0.53 0.54     Recent Labs   Lab Test 09/01/23  1147   SED 13

## 2024-12-27 NOTE — PLAN OF CARE
Orientation: A/Ox4  Aggression Stop Light: Green  Activity: Independent  Diet/BS Checks: Reg diet-appetite increasing.  Tele:  N/A  IV Access/Drains: [id number]  Pain Management: Norco q4hr, Ubrelvy given x1 (migraine). Zofran given x1 for Nausea-effective.  Abnormal VS/Results: VSS on RA.  Bowel/Bladder: Continent B/B.   Skin/Wounds: Intact  Consults: GI, ID, CC  D/C Disposition: TBD  Other Info: Enteric precautions maintained. Pt on K+, Mag, and Phos protocols. Mag replaced this shift-AM rechecks for all. Pt showered today.

## 2024-12-27 NOTE — PROGRESS NOTES
[facility] Progress Note     Interval History:  Reports 5 stools so far today. Had 9 yesterday. Has noted decrease in stool frequency over the last few days. She had increased cramping and pain this morning. Once she had a large stool, the pain resolved. She reports chronic nausea and is wondering if she can take zofran before meals. Stools remain non-bloody.     Physical Exam:    /73 (BP Location: Left arm)   Pulse 78   Temp 98.7  F (37.1  C) (Oral)   Resp 16   Wt 57.8 kg (127 lb 8 oz)   SpO2 97%   BMI 22.59 kg/m      Constitutional: No acute distress  Cardiovascular: RRR, normal S1/S2  Respiratory: Effort normal, CTA bilaterally  Abdomen: Soft, hypoactive BS, nondistended, mild tenderness in bilateral lower quadrants without rebound     Laboratory Data  Recent Labs   Lab Test 12/25/24  0924 12/24/24  0735 12/23/24  0956   WBC 8.0 7.2 9.4   HGB 11.6* 10.3* 9.9*   MCV 94 95 94    252 234     Recent Labs   Lab Test 12/27/24  0917 12/26/24  0902 12/25/24  0924 12/24/24  1333 12/24/24  0735 12/23/24  1825 12/23/24  0956   NA  --   --  140  --  140  --  139   POTASSIUM 3.4 3.7 3.5   < > 3.2*   < > 2.6*   CHLORIDE  --   --  102  --  105  --  103   CO2  --   --  28  --  25  --  29   BUN  --   --  2.1*  --  1.8*  --  4.8*   CR  --   --  0.49*  --  0.53  --  0.54   ANIONGAP  --   --  10  --  10  --  7   THOR  --   --  8.6*  --  8.1*  --  8.0*    < > = values in this interval not displayed.     Recent Labs   Lab Test 12/22/24  0525 12/22/24  0254 12/07/23  1020 08/31/23  1838 07/27/23  1314 07/27/23  1314 02/21/23  1008 05/25/22  1428   ALBUMIN  --  3.4*  --  4.5  --  4.3  --   --    BILITOTAL  --  0.7  --  0.4  --  0.3  --   --    ALT  --  9 14 15   < > 18  --   --    AST  --  17  --  20  --  22  --   --    ALKPHOS  --  81  --  97  --  82  --   --    PROTEIN 10*  --   --   --   --   --  Negative Negative   LIPASE  --   --   --  9*  --   --   --   --     < > = values in this interval not displayed.        Imaging  CT Abdomen Pelvis w Contrast     Narrative     EXAM: CT ABDOMEN AND PELVIS WITH CONTRAST  LOCATION: [facility]  DATE: 12/22/2024     INDICATION: Fever, chronic diarrhea, abdominal pain, colonoscopy with biopsy five days ago.  COMPARISON: CT abdomen and pelvis without IV contrast 08/16/2023.  TECHNIQUE: CT scan of the abdomen and pelvis was performed following injection of IV contrast. Multiplanar reformats were obtained. Dose reduction techniques were used.  CONTRAST: 65 mL Isovue 370.     FINDINGS:   LOWER CHEST: Minor strand of linear atelectasis left lower lobe. No pleural effusion on either side. Normal cardiac size. No pericardial effusion. Moderate hiatal hernia. No significant change.     HEPATOBILIARY: Small amount of focal fat in the usual location in the left hepatic lobe anteroinferiorly. No discrete hepatic lesion, calcified gallstones or biliary dilatation. Patent hepatic and portal veins.     PANCREAS: Normal.     SPLEEN: Normal.     ADRENAL GLANDS: Normal.     KIDNEYS/BLADDER: No urinary tract calculi. Both kidneys are well perfused without hydronephrosis or hydroureter. Normal urinary bladder.     BOWEL: No free gas or free fluid. Diffuse thickening of the colon from the cecum to the distal rectum representing long segment colitis (infectious or inflammatory). No pneumatosis, mechanical obstruction, perforation, abscess or fistula formation.     LYMPH NODES: No suspicious abdominopelvic adenopathy.     VASCULATURE: Normal caliber mildly atherosclerotic distal abdominal aorta measuring 1.8 x 1.9 cm (image 90, series 3). Normal caliber IVC.     PELVIC ORGANS: Anteverted postmenopausal uterus. Rectosigmoid diffuse thickening representing long segment colitis. Slight atherosclerotic changes. No suspicious adenopathy or free fluid.     MUSCULOSKELETAL: Degenerative changes involving the spine and joints of the pelvis. Left convex lumbar curve.        Impression      IMPRESSION:   1.  No free gas or free fluid. Diffuse thickening of the colon from the cecum to the distal rectum representing long segment colitis (infectious or inflammatory), without secondary complications.     2.  Moderate hiatal hernia.     3.  Degenerative changes involving the spine and joints of the pelvis. Left convex lumbar curve.       Endoscopic Workup  Colonoscopy 12/17/24 entire colon demonstrated granular and mildly edematous consistent with microscopic colitis, hx confirmed collagenous colitis. The colonoscopy was done after an abdominal CT scan (done for unexplained weight loss) suggested a right colon cancer.     Assessment & Plan:  74 year old with hx of collagenous colitis and concern for adrenal insufficiency, admitted with c diff colitis after presenting with diarrhea, weakness, and fever. CT evidence of diffuse colon inflammation 12/22 suggestive of severe infection. Enteric pathogens were negative. Transitioned from Vancomycin to Fidaxomicin 12/25. Pain/frequency of stools is starting to improve.     Plan  - Continue Fidaxomicin twice daily for 10 days.  IV flagyl per ID. Appreciate ID recs.   - Continue budesonide, 1 cap (3 mg) daily. Do not suggest escalation of this. Once we are able to assess response from above C. Diff therapy, addition of cholestyramine can be considered as further measure if needed.   - OK to use ondansetron prn or scheduled before meals. Will order.   - ADAT  - OK to use Imodium 2 mg as needed if > 3 loose stools. She has not used thus far. This can also help slow down diarrhea related to collagenous colitis.     Discussed with Dr. Uche Brooks, [facility]  Cell: [phone]   Office: [phone]

## 2024-12-28 LAB
MAGNESIUM SERPL-MCNC: 1.8 MG/DL (ref 1.7–2.3)
PHOSPHATE SERPL-MCNC: 3.4 MG/DL (ref 2.5–4.5)
POTASSIUM SERPL-SCNC: 3.7 MMOL/L (ref 3.4–5.3)

## 2024-12-28 PROCEDURE — 250N000011 HC RX IP 250 OP 636: Performed by: INTERNAL MEDICINE

## 2024-12-28 PROCEDURE — 36415 COLL VENOUS BLD VENIPUNCTURE: CPT | Performed by: HOSPITALIST

## 2024-12-28 PROCEDURE — 99232 SBSQ HOSP IP/OBS MODERATE 35: CPT | Performed by: HOSPITALIST

## 2024-12-28 PROCEDURE — 250N000013 HC RX MED GY IP 250 OP 250 PS 637: Performed by: NURSE PRACTITIONER

## 2024-12-28 PROCEDURE — 83735 ASSAY OF MAGNESIUM: CPT | Performed by: HOSPITALIST

## 2024-12-28 PROCEDURE — 250N000013 HC RX MED GY IP 250 OP 250 PS 637: Performed by: INTERNAL MEDICINE

## 2024-12-28 PROCEDURE — 120N000001 HC R&B MED SURG/OB

## 2024-12-28 PROCEDURE — 84100 ASSAY OF PHOSPHORUS: CPT | Performed by: HOSPITALIST

## 2024-12-28 PROCEDURE — 84132 ASSAY OF SERUM POTASSIUM: CPT | Performed by: HOSPITALIST

## 2024-12-28 RX ORDER — COLESTIPOL HYDROCHLORIDE 1 G/1
1 TABLET ORAL DAILY
Status: DISCONTINUED | OUTPATIENT
Start: 2024-12-28 | End: 2024-12-31 | Stop reason: HOSPADM

## 2024-12-28 RX ADMIN — LORAZEPAM 1 MG: 1 TABLET ORAL at 18:23

## 2024-12-28 RX ADMIN — HYDROCODONE BITARTRATE AND ACETAMINOPHEN 2 TABLET: 10; 325 TABLET ORAL at 13:56

## 2024-12-28 RX ADMIN — LORAZEPAM 1 MG: 1 TABLET ORAL at 12:21

## 2024-12-28 RX ADMIN — IPRATROPIUM BROMIDE 3 SPRAY: 42 SPRAY NASAL at 09:15

## 2024-12-28 RX ADMIN — HYDROCODONE BITARTRATE AND ACETAMINOPHEN 2 TABLET: 10; 325 TABLET ORAL at 09:22

## 2024-12-28 RX ADMIN — ASPIRIN 81 MG: 81 TABLET, COATED ORAL at 09:13

## 2024-12-28 RX ADMIN — GABAPENTIN 300 MG: 300 CAPSULE ORAL at 21:38

## 2024-12-28 RX ADMIN — BUDESONIDE 3 MG: 3 CAPSULE ORAL at 09:13

## 2024-12-28 RX ADMIN — PANTOPRAZOLE SODIUM 40 MG: 40 TABLET, DELAYED RELEASE ORAL at 18:23

## 2024-12-28 RX ADMIN — LORAZEPAM 1 MG: 1 TABLET ORAL at 21:38

## 2024-12-28 RX ADMIN — ZOLPIDEM TARTRATE 5 MG: 5 TABLET, COATED ORAL at 23:20

## 2024-12-28 RX ADMIN — ONDANSETRON 4 MG: 4 TABLET, ORALLY DISINTEGRATING ORAL at 12:28

## 2024-12-28 RX ADMIN — FIDAXOMICIN 200 MG: 200 TABLET, FILM COATED ORAL at 09:13

## 2024-12-28 RX ADMIN — FIDAXOMICIN 200 MG: 200 TABLET, FILM COATED ORAL at 21:38

## 2024-12-28 RX ADMIN — LORAZEPAM 1 MG: 1 TABLET ORAL at 06:22

## 2024-12-28 RX ADMIN — HYDROCODONE BITARTRATE AND ACETAMINOPHEN 2 TABLET: 10; 325 TABLET ORAL at 04:56

## 2024-12-28 RX ADMIN — LORATADINE 10 MG: 10 TABLET ORAL at 09:13

## 2024-12-28 RX ADMIN — HYDROCODONE BITARTRATE AND ACETAMINOPHEN 2 TABLET: 10; 325 TABLET ORAL at 22:29

## 2024-12-28 RX ADMIN — PANTOPRAZOLE SODIUM 40 MG: 40 TABLET, DELAYED RELEASE ORAL at 06:22

## 2024-12-28 RX ADMIN — MONTELUKAST SODIUM 1 G: 4 TABLET, CHEWABLE ORAL at 15:39

## 2024-12-28 RX ADMIN — PAROXETINE HYDROCHLORIDE 25 MG: 20 TABLET, FILM COATED ORAL at 21:37

## 2024-12-28 RX ADMIN — HYDROCODONE BITARTRATE AND ACETAMINOPHEN 2 TABLET: 10; 325 TABLET ORAL at 18:23

## 2024-12-28 RX ADMIN — PAROXETINE HYDROCHLORIDE 25 MG: 20 TABLET, FILM COATED ORAL at 06:22

## 2024-12-28 RX ADMIN — PROPRANOLOL HYDROCHLORIDE 60 MG: 60 CAPSULE, EXTENDED RELEASE ORAL at 09:13

## 2024-12-28 RX ADMIN — ONDANSETRON 4 MG: 4 TABLET, ORALLY DISINTEGRATING ORAL at 18:23

## 2024-12-28 ASSESSMENT — ACTIVITIES OF DAILY LIVING (ADL)
ADLS_ACUITY_SCORE: 43
ADLS_ACUITY_SCORE: 40
ADLS_ACUITY_SCORE: 43
ADLS_ACUITY_SCORE: 38
ADLS_ACUITY_SCORE: 43
ADLS_ACUITY_SCORE: 40
ADLS_ACUITY_SCORE: 40
ADLS_ACUITY_SCORE: 43
ADLS_ACUITY_SCORE: 43
ADLS_ACUITY_SCORE: 40
ADLS_ACUITY_SCORE: 43
ADLS_ACUITY_SCORE: 43
ADLS_ACUITY_SCORE: 40
ADLS_ACUITY_SCORE: 43
ADLS_ACUITY_SCORE: 40

## 2024-12-28 NOTE — PROGRESS NOTES
GASTROENTEROLOGY PROGRESS NOTE        SUBJECTIVE:  Reports bowel movements after eating. She had three stools this morning, stools looks formed and weird. No blood in stools. No nausea today.     She is worried about transmitting infection to her Son, who lives in the same house he has Ulcerative colitis.      OBJECTIVE:  General Appearance:  Not in distress   /77 (BP Location: Left arm, Cuff Size: Adult Small)   Pulse 63   Temp 97.5  F (36.4  C) (Oral)   Resp 18   Wt 57.8 kg (127 lb 8 oz)   SpO2 92%   BMI 22.59 kg/m    Temp (24hrs), Av.1  F (36.7  C), Min:97.5  F (36.4  C), Max:98.7  F (37.1  C)    No data found.    Intake/Output Summary (Last 24 hours) at 2024 0757  Last data filed at 2024 1800  Gross per 24 hour   Intake 720 ml   Output --   Net 720 ml        PHYSICAL EXAM  General: alert, oriented, NAD  SKIN: no suspicious lesions, rashes, jaundice, or spider angiomas   EYES: No scleral icterus   RESPIRATORY: Non labored breathing  GASTROINTESTINAL: Abdomen soft and no tenderness on palpation.   NEURO:Grossly WNL.    Additional Comments:  ROS, FH, SH: See initial GI consult for details.          Recent Labs   Lab Test 24  0924 24  0735 24  0956   WBC 8.0 7.2 9.4   HGB 11.6* 10.3* 9.9*   MCV 94 95 94    252 234     Recent Labs   Lab Test 24  1456 24  0917 24  0902 24  0924 24  1333 24  0735 24  1825 24  0956   POTASSIUM 3.7 3.4 3.7 3.5   < > 3.2*   < > 2.6*   CHLORIDE  --   --   --  102  --  105  --  103   CO2  --   --   --    --    --     BUN  --   --   --  2.1*  --  1.8*  --  4.8*   ANIONGAP  --   --   --  10  --  10  --  7    < > = values in this interval not displayed.     Recent Labs   Lab Test 24  0525 24  0254 23  1020 23  1838 23  1314 23  1314 23  1008 22  1428   ALBUMIN  --  3.4*  --  4.5  --  4.3  --   --    BILITOTAL  --  0.7  --  0.4  --  0.3  --    --    ALT  --  9 14 15   < > 18  --   --    AST  --  17  --  20  --  22  --   --    PROTEIN 10*  --   --   --   --   --  Negative Negative   LIPASE  --   --   --  9*  --   --   --   --     < > = values in this interval not displayed.        Imaging and procedures:    Colonoscopy 12/17/24 for abnormal CT and weight loss:  Entire colon demonstrated granular and mildly edematous consistent with microscopic colitis, hx confirmed collagenous colitis.     CT 12/22  IMPRESSION:   1.  No free gas or free fluid. Diffuse thickening of the colon from the cecum to the distal rectum representing long segment colitis (infectious or inflammatory), without secondary complications.   2.  Moderate hiatal hernia.   3.  Degenerative changes involving the spine and joints of the pelvis. Left convex lumbar curve.    I have reviewed the patient's new clinical lab results    Problem list pertaining to GI:  C diff  collagenous colitis     Assessment: 74 year old with hx of collagenous colitis and concern for adrenal insufficiency, admitted with c diff colitis after presenting with diarrhea, weakness, and fever. CT evidence of diffuse colon inflammation 12/22 suggestive of severe infection. Enteric pathogens were negative. Transitioned from Vancomycin to Fidaxomicin 12/25. Pain/frequency of stools is starting to improve.     Plan:  - Fidaxomicin twice daily for 10 days.  IV flagyl stopped on 12/27  - Continue oral vanco after completing fidaxomicin per ID recommendation    -Budesonide, 1 cap (3 mg) daily. Do not suggest escalation of this, she would like to come off of this once the diarrhea improves.  - She is starting to have formed stools, We could consider adding 1 gram of colestipol once daily in the afternoon.   - Diet as tolerated  - Antiemetics if needed  - She might be able to go home tomorrow     I will discuss with Dr. Styles    Time spent: 20 minutes, greater than 50% of the visit was spent in counseling/coordination of care.      Mirta Edmonds CNP  [facility] ([facility])  Mobile # [phone]

## 2024-12-28 NOTE — PLAN OF CARE
Goal Outcome Evaluation:  12/27/24 1789-1345  Orientation: A/Ox4 - very pleasant  Aggression Stop Light: Green   Activity: Ind   Diet/BS Checks: Regular diet  Tele:  N/A  IV Access/Drains: R PIV SL   Pain Management: C/O generalized body ache PRN Norco given   Abnormal VS/Results: VSS on RA.   Bowel/Bladder: Continent B/B. Pt reported no bm's overnight  Skin/Wounds: WDL   Consults: GI, ID, CC.   D/C Disposition: Pending clinical improvement    Other Info:   Enteric precautions for C-diff   Recheck K+, Mg, & Phos in AM.   Denies nausea  Flagyl discontinued on fidaxomicin

## 2024-12-28 NOTE — PROGRESS NOTES
[facility]  Infectious Disease Progress Note    Date of Service (when I saw the patient): 12/28/2024       Assessment:  74YF with known history of collagenous colitis diagnosed many years ago, worsening since September with 10-12 stools per day,  anorexia and adrenal insufficiency secondary to chronic budesonide use,  recent colonoscopy on 12/17 showing edematous colon with biopsy consistent with collagenous colitis, who has been hospitalized with abdominal pain, fever and weakness and has been found to additionally have superimposed clostridium difficile colitis.     -Clostridium difficile colitis. She has remained on appropriate therapy for c.diff, fever and leukocytosis have resolved though she continues to have diarrhea close to her baseline which may be due to underlying collagenous colitis  She is on fidaxomicin (concurrent metronidazole was stopped 12/27/24)  -Collagenous colitis  -Severe hypokalemia has improved  -Hypomagnesemia  -Adrenal insufficiency  -Chronic medical conditions - chronic pain syndrome, GERD, anxiety, migraine headaches     Recommendations:  Continue Fidaxomicin and complete a 10 day treatment course-   A prolonged course of some therapy (ie vancomycin following the fidaxomicin)  may be helpful to keep C diff from returning while therapy for the collagenous coliits is assessed. I would recommend oral vancomycin at 250 mg po bid for the coming month or more. With the very high rate of relapse for c diff colitis, especially in the context of immune suppression for other conditions, its impossible to know if recurrent loose stools are from the collagenous colitis or recurrent C diff if off C diff therapy.  Continue Budesonide    Jordan Alcantar MD    Interval History   Feeling better   No fever or chills  Diarrhea is slowly improving she reports  Occasional abd pain    Physical Exam   Temp: 97.5  F (36.4  C) Temp src: Oral BP: 119/77 Pulse: 63   Resp: 18 SpO2: 92 %  O2 Device: None (Room air)    Vitals:    12/22/24 0721   Weight: 57.8 kg (127 lb 8 oz)     Vital Signs with Ranges  Temp:  [97.5  F (36.4  C)-98.7  F (37.1  C)] 97.5  F (36.4  C)  Pulse:  [63-78] 63  Resp:  [14-18] 18  BP: (114-126)/(73-77) 119/77  SpO2:  [92 %-97 %] 92 %    Constitutional: Awake, alert, cooperative, no apparent distress  Lungs: Clear to auscultation bilaterally, no crackles or wheezing  Cardiovascular: Regular rate and rhythm, normal S1 and S2, and no murmur noted  Abdomen: Normal bowel sounds, soft, non-distended, non-tender  Skin: No rashes, no cyanosis, no edema  Other:    Medications   Current Facility-Administered Medications   Medication Dose Route Frequency Provider Last Rate Last Admin     Current Facility-Administered Medications   Medication Dose Route Frequency Provider Last Rate Last Admin    aspirin EC tablet 81 mg  81 mg Oral Daily Jae Alatorreathone, DO   81 mg at 12/27/24 0833    budesonide (ENTOCORT EC) EC capsule 3 mg  3 mg Oral Daily Vasyl Mart MD   3 mg at 12/27/24 0833    fidaxomicin (DIFICID) tablet 200 mg  200 mg Oral BID Landry, Jaeathone, DO   200 mg at 12/27/24 2009    gabapentin (NEURONTIN) capsule 300 mg  300 mg Oral At Bedtime Jae Alatorreathone, DO   300 mg at 12/27/24 2008    ipratropium (ATROVENT) 0.06 % spray 3 spray  3 spray Both Nostrils Daily Alatorre, Jaeathone, DO   3 spray at 12/27/24 0836    loratadine (CLARITIN) tablet 10 mg  10 mg Oral QAM Landry Johnathone, DO   10 mg at 12/27/24 0833    LORazepam (ATIVAN) tablet 1 mg  1 mg Oral 4x Daily Alatorre, Johnathone, DO   1 mg at 12/28/24 0622    pantoprazole (PROTONIX) EC tablet 40 mg  40 mg Oral BID Xiang De La O MD   40 mg at 12/28/24 0622    PARoxetine (PAXIL) half-tab 25 mg  25 mg Oral BID Landry, Jaeathone, DO   25 mg at 12/28/24 0622    propranolol ER (INDERAL LA) 24 hr capsule 60 mg  60 mg Oral Daily Ken Alatorre, DO   60 mg at 12/27/24 0833    riboflavin CAPS 400 mg  400 mg Oral Daily Alonso,  Ugo BARRETT RPH   400 mg at 12/27/24 0834    sodium chloride (PF) 0.9% PF flush 3 mL  3 mL Intracatheter Q8H Xiang Mariano MD   3 mL at 12/27/24 1542    [Held by provider] vitamin C (ASCORBIC ACID) tablet 1,000 mg  1,000 mg Oral QPM Ken Alatorre DO        [Held by provider] Vitamin D3 (CHOLECALCIFEROL) tablet 250 mcg  250 mcg Oral QPM Ken Alatorre DO           Data   All microbiology laboratory data reviewed.  Recent Labs   Lab Test 12/25/24  0924 12/24/24  0735 12/23/24  0956   WBC 8.0 7.2 9.4   HGB 11.6* 10.3* 9.9*   HCT 34.9* 32.2* 30.4*   MCV 94 95 94    252 234     Recent Labs   Lab Test 12/25/24  0924 12/24/24  0735 12/23/24  0956   CR 0.49* 0.53 0.54     Recent Labs   Lab Test 09/01/23  1147   SED 13

## 2024-12-28 NOTE — PROGRESS NOTES
[facility]    Medicine Progress Note - Hospitalist Service    Date of Admission:  12/22/2024    Assessment & Plan   Laurel Parra is a 74 year-old female with past medical history significant for lymphocytic colitis with chronic diarrhea, anorexia, adrenal insufficiency who was admitted to [facility] on 12/23/2024 for weakness, fever, abdominal pain secondary to nonsevere C. Difficile    Generalized weakness  Sepsis  Non-severe C-Diff  Chronic diarrhea in setting of lymphocytic colitis  Risk factors: Omeprazole and recent abx (within 1 month)  CT AP W 12/2012/24: Diffuse colonic thickening from cecum to distal rectum  Enteric panel negative  C Diff PCR & EIA Toxin positive  BCX NTD.  Improving diarrhea.  - Continue fidaxomicin 200 mg twice dailyx 10 days (flagyl stopped on 12/27).  - Oral Vanc 250 mg BID for at least a month after above treatment for prevention.  - Continue budesonide.  - Colestipol started today by GI.  - ID/GI assistance appreciated.    Hypokalemia  Hypomagnesemia   Secondary to diarrhea and poor intake.  - Monitor with replacement protocol    Secondary adrenal insufficiency - Followed by Dr. Navas of [facility]. Continue home budesonide   Chronic pain syndrome - Continue prior to admission gabapentin, hydrocodone-APAP, cyclobenzaprine, baclofen when dose confirmed by pharmacy   GERD - Continue prior to admission PPI when dose confirmed by pharmacy   Anxiety - Continue prior to admission lorazepam, paroxetine when dose confirmed by pharmacy   Migraine headaches - Propranolol & riboflavin           Diet: Combination Diet Regular Diet Adult  Snacks/Supplements Adult: Ensure Enlive; With Meals    DVT Prophylaxis: Enoxaparin (Lovenox) SQ  Bates Catheter: Not present  Lines: None     Cardiac Monitoring: None  Code Status: Full Code            Disposition Plan     Medically Ready for Discharge: Anticipate tomorrow pending improvement of  diarrhea and PO tolerance  Still nauseated.             Gunner Rizzo MD  Hospitalist Service  [facility]  Securely message with [employer] (more info)  Text page via [employer] Paging/Directory   ______________________________________________________________________    Interval History   Had 8 BMs yesterday. None so far since last night. No abd pain. Mild nausea.  No cp/sob.  Oriented x3.      Physical Exam   Vital Signs: Temp: 97.5  F (36.4  C) Temp src: Oral BP: 119/77 Pulse: 63   Resp: 18 SpO2: 92 % O2 Device: None (Room air)    Weight: 127 lbs 8 oz    General Appearance: Elderly and weak  Respiratory: No tripoding or accessory muscle usage  Cardiovascular: Regular rate and rhythm.  No edema in BLE  GI: Soft, mild tenderness diffusely with deep palpation.  No guarding, rigidity or rebound tenderness  Skin: No rashes or lesions  Other: Alert and oriented x 4, conversing coherently    Data   ------------------------- PAST 24 HR DATA REVIEWED -----------------------------------------------

## 2024-12-28 NOTE — PROGRESS NOTES
Summary: admitted on 12/23/2024 for weakness, fever, abdominal pain secondary to nonsevere C. Difficile.       Orientation: A/Ox4 ex enteric Iso for C-diff    Vitals/Tele: VSS on RA    IV Access/drains: R PIV SL     Diet: Regular    Mobility: Ind    GI/: Continent of B/B, no BM this shift    Wound/Skin: WNL    Consults: GI/ID/CC    Discharge Plan: TBD    See Flow sheets for assessment

## 2024-12-28 NOTE — PLAN OF CARE
Orientation: A/Ox4 - very pleasant  Aggression Stop Light: Green   Activity: Ind   Diet/BS Checks: Regular diet  Tele:  N/A  IV Access/Drains: R PIV SL w/ int flagyl; last dose this evening.   Pain Management: C/O headache; improved after prn norco. PRN Norco given Q4hrs. C/o int nausea throughout shift; zofran given x2 this shift.   Abnormal VS/Results: VSS on RA.   Bowel/Bladder: Continent B/B. Pt w/ continued loose stools. PRN imodium offered; pt refused.   Skin/Wounds: WDL   Consults: GI, ID, CC.   D/C Disposition: Pending clinical improvement    Other Info:   - Enteric precautions for C-diff   - K+ replaced x1. Recheck K+, Mg, & Phos in AM.

## 2024-12-29 ENCOUNTER — APPOINTMENT (OUTPATIENT)
Dept: GENERAL RADIOLOGY | Facility: CLINIC | Age: 74
End: 2024-12-29
Payer: MEDICARE

## 2024-12-29 LAB
ANION GAP SERPL CALCULATED.3IONS-SCNC: 12 MMOL/L (ref 7–15)
ANION GAP SERPL CALCULATED.3IONS-SCNC: 13 MMOL/L (ref 7–15)
BASE EXCESS BLDV CALC-SCNC: 2 MMOL/L (ref -3–3)
BUN SERPL-MCNC: 8.4 MG/DL (ref 8–23)
BUN SERPL-MCNC: 8.5 MG/DL (ref 8–23)
CALCIUM SERPL-MCNC: 9 MG/DL (ref 8.8–10.4)
CALCIUM SERPL-MCNC: 9.4 MG/DL (ref 8.8–10.4)
CHLORIDE SERPL-SCNC: 102 MMOL/L (ref 98–107)
CHLORIDE SERPL-SCNC: 103 MMOL/L (ref 98–107)
CPB POCT: NO
CREAT SERPL-MCNC: 0.55 MG/DL (ref 0.51–0.95)
CREAT SERPL-MCNC: 0.64 MG/DL (ref 0.51–0.95)
EGFRCR SERPLBLD CKD-EPI 2021: >90 ML/MIN/1.73M2
EGFRCR SERPLBLD CKD-EPI 2021: >90 ML/MIN/1.73M2
ERYTHROCYTE [DISTWIDTH] IN BLOOD BY AUTOMATED COUNT: 12.5 % (ref 10–15)
ERYTHROCYTE [DISTWIDTH] IN BLOOD BY AUTOMATED COUNT: 12.7 % (ref 10–15)
GLUCOSE BLDC GLUCOMTR-MCNC: 127 MG/DL (ref 70–99)
GLUCOSE SERPL-MCNC: 123 MG/DL (ref 70–99)
GLUCOSE SERPL-MCNC: 130 MG/DL (ref 70–99)
HCO3 BLDV-SCNC: 27 MMOL/L (ref 21–28)
HCO3 SERPL-SCNC: 24 MMOL/L (ref 22–29)
HCO3 SERPL-SCNC: 26 MMOL/L (ref 22–29)
HCT VFR BLD AUTO: 36.9 % (ref 35–47)
HCT VFR BLD AUTO: 37 % (ref 35–47)
HCT VFR BLD CALC: 37 % (ref 35–47)
HGB BLD-MCNC: 12.3 G/DL (ref 11.7–15.7)
HGB BLD-MCNC: 12.5 G/DL (ref 11.7–15.7)
HGB BLD-MCNC: 12.6 G/DL (ref 11.7–15.7)
HOLD SPECIMEN: NORMAL
HOLD SPECIMEN: NORMAL
MAGNESIUM SERPL-MCNC: 1.7 MG/DL (ref 1.7–2.3)
MAGNESIUM SERPL-MCNC: 1.7 MG/DL (ref 1.7–2.3)
MCH RBC QN AUTO: 30.7 PG (ref 26.5–33)
MCH RBC QN AUTO: 31.3 PG (ref 26.5–33)
MCHC RBC AUTO-ENTMCNC: 33.3 G/DL (ref 31.5–36.5)
MCHC RBC AUTO-ENTMCNC: 33.8 G/DL (ref 31.5–36.5)
MCV RBC AUTO: 92 FL (ref 78–100)
MCV RBC AUTO: 93 FL (ref 78–100)
NT-PROBNP SERPL-MCNC: 445 PG/ML (ref 0–900)
PCO2 BLDV: 46 MM HG (ref 40–50)
PH BLDV: 7.38 [PH] (ref 7.32–7.43)
PHOSPHATE SERPL-MCNC: 3 MG/DL (ref 2.5–4.5)
PHOSPHATE SERPL-MCNC: 3 MG/DL (ref 2.5–4.5)
PLATELET # BLD AUTO: 424 10E3/UL (ref 150–450)
PLATELET # BLD AUTO: 480 10E3/UL (ref 150–450)
PO2 BLDV: 59 MM HG (ref 25–47)
POTASSIUM BLD-SCNC: 3.5 MMOL/L (ref 3.4–5.3)
POTASSIUM SERPL-SCNC: 3.9 MMOL/L (ref 3.4–5.3)
POTASSIUM SERPL-SCNC: 4 MMOL/L (ref 3.4–5.3)
RBC # BLD AUTO: 4 10E6/UL (ref 3.8–5.2)
RBC # BLD AUTO: 4.01 10E6/UL (ref 3.8–5.2)
SAO2 % BLDV: 89 % (ref 70–75)
SODIUM BLD-SCNC: 140 MMOL/L (ref 135–145)
SODIUM SERPL-SCNC: 139 MMOL/L (ref 135–145)
SODIUM SERPL-SCNC: 141 MMOL/L (ref 135–145)
TROPONIN T SERPL HS-MCNC: 16 NG/L
WBC # BLD AUTO: 10.6 10E3/UL (ref 4–11)
WBC # BLD AUTO: 12.2 10E3/UL (ref 4–11)

## 2024-12-29 PROCEDURE — 84100 ASSAY OF PHOSPHORUS: CPT

## 2024-12-29 PROCEDURE — 210N000002 HC R&B HEART CARE

## 2024-12-29 PROCEDURE — C1769 GUIDE WIRE: HCPCS | Performed by: INTERNAL MEDICINE

## 2024-12-29 PROCEDURE — C1894 INTRO/SHEATH, NON-LASER: HCPCS | Performed by: INTERNAL MEDICINE

## 2024-12-29 PROCEDURE — 85014 HEMATOCRIT: CPT

## 2024-12-29 PROCEDURE — C1887 CATHETER, GUIDING: HCPCS | Performed by: INTERNAL MEDICINE

## 2024-12-29 PROCEDURE — 250N000013 HC RX MED GY IP 250 OP 250 PS 637: Performed by: INTERNAL MEDICINE

## 2024-12-29 PROCEDURE — 84484 ASSAY OF TROPONIN QUANT: CPT

## 2024-12-29 PROCEDURE — 36415 COLL VENOUS BLD VENIPUNCTURE: CPT | Performed by: HOSPITALIST

## 2024-12-29 PROCEDURE — 93005 ELECTROCARDIOGRAM TRACING: CPT

## 2024-12-29 PROCEDURE — 250N000011 HC RX IP 250 OP 636: Mod: JZ | Performed by: HOSPITALIST

## 2024-12-29 PROCEDURE — 93458 L HRT ARTERY/VENTRICLE ANGIO: CPT | Performed by: INTERNAL MEDICINE

## 2024-12-29 PROCEDURE — 272N000001 HC OR GENERAL SUPPLY STERILE: Performed by: INTERNAL MEDICINE

## 2024-12-29 PROCEDURE — 250N000011 HC RX IP 250 OP 636: Performed by: INTERNAL MEDICINE

## 2024-12-29 PROCEDURE — 85041 AUTOMATED RBC COUNT: CPT

## 2024-12-29 PROCEDURE — 250N000011 HC RX IP 250 OP 636: Mod: JZ

## 2024-12-29 PROCEDURE — 85014 HEMATOCRIT: CPT | Performed by: HOSPITALIST

## 2024-12-29 PROCEDURE — 4A023N7 MEASUREMENT OF CARDIAC SAMPLING AND PRESSURE, LEFT HEART, PERCUTANEOUS APPROACH: ICD-10-PCS | Performed by: INTERNAL MEDICINE

## 2024-12-29 PROCEDURE — 83880 ASSAY OF NATRIURETIC PEPTIDE: CPT

## 2024-12-29 PROCEDURE — 99153 MOD SED SAME PHYS/QHP EA: CPT | Performed by: INTERNAL MEDICINE

## 2024-12-29 PROCEDURE — 99232 SBSQ HOSP IP/OBS MODERATE 35: CPT | Performed by: HOSPITALIST

## 2024-12-29 PROCEDURE — B2111ZZ FLUOROSCOPY OF MULTIPLE CORONARY ARTERIES USING LOW OSMOLAR CONTRAST: ICD-10-PCS | Performed by: INTERNAL MEDICINE

## 2024-12-29 PROCEDURE — 71045 X-RAY EXAM CHEST 1 VIEW: CPT

## 2024-12-29 PROCEDURE — 250N000013 HC RX MED GY IP 250 OP 250 PS 637: Performed by: NURSE PRACTITIONER

## 2024-12-29 PROCEDURE — 99152 MOD SED SAME PHYS/QHP 5/>YRS: CPT | Performed by: INTERNAL MEDICINE

## 2024-12-29 PROCEDURE — B2151ZZ FLUOROSCOPY OF LEFT HEART USING LOW OSMOLAR CONTRAST: ICD-10-PCS | Performed by: INTERNAL MEDICINE

## 2024-12-29 PROCEDURE — 250N000009 HC RX 250: Performed by: INTERNAL MEDICINE

## 2024-12-29 PROCEDURE — 83735 ASSAY OF MAGNESIUM: CPT

## 2024-12-29 PROCEDURE — 83735 ASSAY OF MAGNESIUM: CPT | Performed by: HOSPITALIST

## 2024-12-29 PROCEDURE — 84100 ASSAY OF PHOSPHORUS: CPT | Performed by: HOSPITALIST

## 2024-12-29 PROCEDURE — 99291 CRITICAL CARE FIRST HOUR: CPT

## 2024-12-29 PROCEDURE — 82310 ASSAY OF CALCIUM: CPT | Performed by: HOSPITALIST

## 2024-12-29 PROCEDURE — 82803 BLOOD GASES ANY COMBINATION: CPT

## 2024-12-29 PROCEDURE — 80048 BASIC METABOLIC PNL TOTAL CA: CPT | Performed by: HOSPITALIST

## 2024-12-29 PROCEDURE — 80048 BASIC METABOLIC PNL TOTAL CA: CPT

## 2024-12-29 PROCEDURE — 99152 MOD SED SAME PHYS/QHP 5/>YRS: CPT | Mod: GC | Performed by: INTERNAL MEDICINE

## 2024-12-29 PROCEDURE — 999N000111 HC STATISTIC OT IP EVAL DEFER

## 2024-12-29 PROCEDURE — 93458 L HRT ARTERY/VENTRICLE ANGIO: CPT | Mod: 26 | Performed by: INTERNAL MEDICINE

## 2024-12-29 PROCEDURE — 250N000013 HC RX MED GY IP 250 OP 250 PS 637

## 2024-12-29 PROCEDURE — 84295 ASSAY OF SERUM SODIUM: CPT

## 2024-12-29 RX ORDER — MORPHINE SULFATE 2 MG/ML
2 INJECTION, SOLUTION INTRAMUSCULAR; INTRAVENOUS
Status: DISCONTINUED | OUTPATIENT
Start: 2024-12-29 | End: 2024-12-30 | Stop reason: ALTCHOICE

## 2024-12-29 RX ORDER — MORPHINE SULFATE 2 MG/ML
2 INJECTION, SOLUTION INTRAMUSCULAR; INTRAVENOUS ONCE
Status: COMPLETED | OUTPATIENT
Start: 2024-12-29 | End: 2024-12-29

## 2024-12-29 RX ORDER — NITROGLYCERIN 5 MG/ML
VIAL (ML) INTRAVENOUS
Status: DISCONTINUED | OUTPATIENT
Start: 2024-12-29 | End: 2024-12-29 | Stop reason: HOSPADM

## 2024-12-29 RX ORDER — NALOXONE HYDROCHLORIDE 0.4 MG/ML
0.4 INJECTION, SOLUTION INTRAMUSCULAR; INTRAVENOUS; SUBCUTANEOUS
Status: ACTIVE | OUTPATIENT
Start: 2024-12-29 | End: 2024-12-30

## 2024-12-29 RX ORDER — ATORVASTATIN CALCIUM 40 MG/1
40 TABLET, FILM COATED ORAL EVERY EVENING
Status: DISCONTINUED | OUTPATIENT
Start: 2024-12-29 | End: 2024-12-31 | Stop reason: HOSPADM

## 2024-12-29 RX ORDER — NALOXONE HYDROCHLORIDE 0.4 MG/ML
0.2 INJECTION, SOLUTION INTRAMUSCULAR; INTRAVENOUS; SUBCUTANEOUS
Status: ACTIVE | OUTPATIENT
Start: 2024-12-29 | End: 2024-12-30

## 2024-12-29 RX ORDER — COLESTIPOL HYDROCHLORIDE 1 G/1
1 TABLET ORAL DAILY
Qty: 30 TABLET | Refills: 0 | Status: CANCELLED | OUTPATIENT
Start: 2024-12-30

## 2024-12-29 RX ORDER — MORPHINE SULFATE 2 MG/ML
INJECTION, SOLUTION INTRAMUSCULAR; INTRAVENOUS
Status: COMPLETED
Start: 2024-12-29 | End: 2024-12-29

## 2024-12-29 RX ORDER — VERAPAMIL HYDROCHLORIDE 2.5 MG/ML
INJECTION, SOLUTION INTRAVENOUS
Status: DISCONTINUED | OUTPATIENT
Start: 2024-12-29 | End: 2024-12-29 | Stop reason: HOSPADM

## 2024-12-29 RX ORDER — OXYCODONE HYDROCHLORIDE 5 MG/1
5 TABLET ORAL EVERY 4 HOURS PRN
Status: DISCONTINUED | OUTPATIENT
Start: 2024-12-29 | End: 2024-12-30

## 2024-12-29 RX ORDER — SODIUM CHLORIDE 9 MG/ML
75 INJECTION, SOLUTION INTRAVENOUS CONTINUOUS
Status: ACTIVE | OUTPATIENT
Start: 2024-12-29 | End: 2024-12-30

## 2024-12-29 RX ORDER — BUDESONIDE 3 MG/1
3 CAPSULE, COATED PELLETS ORAL DAILY
Qty: 30 CAPSULE | Refills: 0 | Status: CANCELLED | OUTPATIENT
Start: 2024-12-30 | End: 2025-01-29

## 2024-12-29 RX ORDER — METOPROLOL TARTRATE 1 MG/ML
INJECTION, SOLUTION INTRAVENOUS
Status: DISCONTINUED
Start: 2024-12-29 | End: 2024-12-29 | Stop reason: HOSPADM

## 2024-12-29 RX ORDER — FENTANYL CITRATE 50 UG/ML
INJECTION, SOLUTION INTRAMUSCULAR; INTRAVENOUS
Status: DISCONTINUED | OUTPATIENT
Start: 2024-12-29 | End: 2024-12-29 | Stop reason: HOSPADM

## 2024-12-29 RX ORDER — MORPHINE SULFATE 2 MG/ML
2 INJECTION, SOLUTION INTRAMUSCULAR; INTRAVENOUS ONCE
Status: COMPLETED | OUTPATIENT
Start: 2024-12-29 | End: 2024-12-30

## 2024-12-29 RX ORDER — ACETAMINOPHEN 325 MG/1
650 TABLET ORAL EVERY 4 HOURS PRN
Status: DISCONTINUED | OUTPATIENT
Start: 2024-12-29 | End: 2024-12-29

## 2024-12-29 RX ORDER — OXYCODONE HYDROCHLORIDE 5 MG/1
10 TABLET ORAL EVERY 4 HOURS PRN
Status: DISCONTINUED | OUTPATIENT
Start: 2024-12-29 | End: 2024-12-30

## 2024-12-29 RX ORDER — ATROPINE SULFATE 0.1 MG/ML
0.5 INJECTION INTRAVENOUS
Status: ACTIVE | OUTPATIENT
Start: 2024-12-29 | End: 2024-12-30

## 2024-12-29 RX ORDER — FLUMAZENIL 0.1 MG/ML
0.2 INJECTION, SOLUTION INTRAVENOUS
Status: ACTIVE | OUTPATIENT
Start: 2024-12-29 | End: 2024-12-30

## 2024-12-29 RX ADMIN — PAROXETINE HYDROCHLORIDE 25 MG: 20 TABLET, FILM COATED ORAL at 06:06

## 2024-12-29 RX ADMIN — MORPHINE SULFATE 2 MG: 2 INJECTION, SOLUTION INTRAMUSCULAR; INTRAVENOUS at 20:18

## 2024-12-29 RX ADMIN — AMIODARONE HYDROCHLORIDE 150 MG: 1.5 INJECTION, SOLUTION INTRAVENOUS at 21:32

## 2024-12-29 RX ADMIN — NITROGLYCERIN 0.4 MG: 0.4 TABLET SUBLINGUAL at 20:13

## 2024-12-29 RX ADMIN — LORAZEPAM 1 MG: 1 TABLET ORAL at 06:06

## 2024-12-29 RX ADMIN — HYDROCODONE BITARTRATE AND ACETAMINOPHEN 2 TABLET: 10; 325 TABLET ORAL at 06:06

## 2024-12-29 RX ADMIN — NITROGLYCERIN 0.4 MG: 0.4 TABLET SUBLINGUAL at 20:28

## 2024-12-29 RX ADMIN — ONDANSETRON 4 MG: 2 INJECTION, SOLUTION INTRAMUSCULAR; INTRAVENOUS at 21:32

## 2024-12-29 RX ADMIN — AMIODARONE HYDROCHLORIDE 1 MG/MIN: 1.8 INJECTION, SOLUTION INTRAVENOUS at 21:42

## 2024-12-29 RX ADMIN — PANTOPRAZOLE SODIUM 40 MG: 40 TABLET, DELAYED RELEASE ORAL at 06:06

## 2024-12-29 RX ADMIN — HYDROCODONE BITARTRATE AND ACETAMINOPHEN 2 TABLET: 10; 325 TABLET ORAL at 18:20

## 2024-12-29 RX ADMIN — ASPIRIN 81 MG: 81 TABLET, COATED ORAL at 08:31

## 2024-12-29 RX ADMIN — HYDROCODONE BITARTRATE AND ACETAMINOPHEN 2 TABLET: 10; 325 TABLET ORAL at 14:41

## 2024-12-29 RX ADMIN — HYDROCODONE BITARTRATE AND ACETAMINOPHEN 2 TABLET: 10; 325 TABLET ORAL at 10:16

## 2024-12-29 RX ADMIN — LORAZEPAM 1 MG: 1 TABLET ORAL at 18:18

## 2024-12-29 RX ADMIN — LORATADINE 10 MG: 10 TABLET ORAL at 08:31

## 2024-12-29 RX ADMIN — MORPHINE SULFATE 2 MG: 2 INJECTION, SOLUTION INTRAMUSCULAR; INTRAVENOUS at 21:37

## 2024-12-29 RX ADMIN — MONTELUKAST SODIUM 1 G: 4 TABLET, CHEWABLE ORAL at 08:31

## 2024-12-29 RX ADMIN — PANTOPRAZOLE SODIUM 40 MG: 40 TABLET, DELAYED RELEASE ORAL at 16:28

## 2024-12-29 RX ADMIN — PROPRANOLOL HYDROCHLORIDE 60 MG: 60 CAPSULE, EXTENDED RELEASE ORAL at 08:31

## 2024-12-29 RX ADMIN — LORAZEPAM 1 MG: 1 TABLET ORAL at 13:36

## 2024-12-29 RX ADMIN — IPRATROPIUM BROMIDE 3 SPRAY: 42 SPRAY NASAL at 08:33

## 2024-12-29 RX ADMIN — FIDAXOMICIN 200 MG: 200 TABLET, FILM COATED ORAL at 08:31

## 2024-12-29 RX ADMIN — BUDESONIDE 3 MG: 3 CAPSULE ORAL at 08:31

## 2024-12-29 ASSESSMENT — ACTIVITIES OF DAILY LIVING (ADL)
ADLS_ACUITY_SCORE: 36
ADLS_ACUITY_SCORE: 40
ADLS_ACUITY_SCORE: 36
ADLS_ACUITY_SCORE: 36
ADLS_ACUITY_SCORE: 40
ADLS_ACUITY_SCORE: 36
ADLS_ACUITY_SCORE: 40
ADLS_ACUITY_SCORE: 36
ADLS_ACUITY_SCORE: 40
ADLS_ACUITY_SCORE: 36

## 2024-12-29 NOTE — PLAN OF CARE
Goal Outcome Evaluation:    12/27/24 [id number]  Orientation: A/Ox4   Aggression Stop Light: Green   Activity: Ind   Diet/BS Checks: Regular diet  Tele:  N/A  IV Access/Drains: R PIV SL   Pain Management: C/O generalized body ache PRN Norco given x2  Abnormal VS/Results: VSS on RA.   Bowel/Bladder: Continent B/B. Pt reported 2 bm's overnight  Skin/Wounds: WDL   Consults: GI, ID, CC.   D/C Disposition: Pending clinical improvement     Other Info:   Enteric precautions for C-diff   Recheck K+, Mg, & Phos in AM.

## 2024-12-29 NOTE — PLAN OF CARE
Occupational Therapy: Orders received. Chart reviewed and discussed with care team.? Occupational Therapy not indicated. Per RN and discussion with pt, pt is back to baseline and has no concerns with mobility (stairs) nor I/ADLs. Has assistance at home, if needed. Pt is ind in room. Of note, PT evaluated on 12/23 and determined no PT needs at that time as well. Pt safe from a functional perspective to discharge home with assistance once medically ready. No OT/PT needs at this time.? Defer discharge recommendations to care team.? Will complete orders.

## 2024-12-29 NOTE — PROGRESS NOTES
GASTROENTEROLOGY PROGRESS NOTE        SUBJECTIVE:  No BM today, she is feeling good and wanting to go home.    OBJECTIVE:  General Appearance:  Not in distress   /76 (BP Location: Left arm)   Pulse 77   Temp 98.7  F (37.1  C) (Oral)   Resp 17   Wt 57.8 kg (127 lb 8 oz)   SpO2 97%   BMI 22.59 kg/m    Temp (24hrs), Av.1  F (36.7  C), Min:97.5  F (36.4  C), Max:98.7  F (37.1  C)    No data found.    Intake/Output Summary (Last 24 hours) at 2024 0757  Last data filed at 2024 1800  Gross per 24 hour   Intake 720 ml   Output --   Net 720 ml        PHYSICAL EXAM  General: alert, oriented, NAD  SKIN: no suspicious lesions, rashes, jaundice, or spider angiomas   EYES: No scleral icterus   RESPIRATORY: Non labored breathing  NEURO:Grossly WNL.    Additional Comments:  ROS, FH, SH: See initial GI consult for details.          Recent Labs   Lab Test 24  0924 24  0735 24  0956   WBC 8.0 7.2 9.4   HGB 11.6* 10.3* 9.9*   MCV 94 95 94    252 234     Recent Labs   Lab Test 24  1112 24  1456 24  0917 24  0902 24  0924 24  1333 24  0735 24  1825 24  0956   POTASSIUM 3.7 3.7 3.4   < > 3.5   < > 3.2*   < > 2.6*   CHLORIDE  --   --   --   --  102  --  105  --  103   CO2  --   --   --   --  28  --  25  --  29   BUN  --   --   --   --  2.1*  --  1.8*  --  4.8*   ANIONGAP  --   --   --   --  10  --  10  --  7    < > = values in this interval not displayed.     Recent Labs   Lab Test 24  0525 24  0254 23  1020 23  1838 23  1314 23  1314 23  1008 22  1428   ALBUMIN  --  3.4*  --  4.5  --  4.3  --   --    BILITOTAL  --  0.7  --  0.4  --  0.3  --   --    ALT  --  9 14 15   < > 18  --   --    AST  --  17  --  20  --  22  --   --    PROTEIN 10*  --   --   --   --   --  Negative Negative   LIPASE  --   --   --  9*  --   --   --   --     < > = values in this interval not displayed.        Imaging  and procedures:    Colonoscopy 12/17/24 for abnormal CT and weight loss:  Entire colon demonstrated granular and mildly edematous consistent with microscopic colitis, hx confirmed collagenous colitis.     CT 12/22  IMPRESSION:   1.  No free gas or free fluid. Diffuse thickening of the colon from the cecum to the distal rectum representing long segment colitis (infectious or inflammatory), without secondary complications.   2.  Moderate hiatal hernia.   3.  Degenerative changes involving the spine and joints of the pelvis. Left convex lumbar curve.    I have reviewed the patient's new clinical lab results    Problem list pertaining to GI:  C diff  collagenous colitis     Assessment: 74 year old with hx of collagenous colitis and concern for adrenal insufficiency, admitted with c diff colitis after presenting with diarrhea, weakness, and fever. CT evidence of diffuse colon inflammation 12/22 suggestive of severe infection. Enteric pathogens were negative. Transitioned from Vancomycin to Fidaxomicin 12/25. Pain/frequency of stools is starting to improve.     Plan:  - Fidaxomicin twice daily for 10 days.  IV flagyl stopped on 12/27  - Continue oral vanco after completing fidaxomicin per ID recommendation    -Budesonide, 1 cap (3 mg) daily. Do not suggest escalation of this, she would like to come off of this once the diarrhea improves.  - She is starting to have formed stools, We could consider adding 1 gram of colestipol once daily in the afternoon.   - Diet as tolerated  - GI will sign off    I will discuss with Dr. Styles    Time spent: 20 minutes, greater than 50% of the visit was spent in counseling/coordination of care.     Mirta Edmonds CNP  [facility] ([facility])  Mobile # [phone]

## 2024-12-29 NOTE — PROGRESS NOTES
[facility]    Medicine Progress Note - Hospitalist Service    Date of Admission:  12/22/2024    Assessment & Plan   Laurel Parra is a 74 year-old female with past medical history significant for lymphocytic colitis with chronic diarrhea, anorexia, adrenal insufficiency who was admitted to [facility] on 12/23/2024 for weakness, fever, abdominal pain secondary to nonsevere C. Difficile    Generalized weakness  Sepsis  Non-severe C-Diff  Chronic diarrhea in setting of lymphocytic colitis  Risk factors: Omeprazole and recent abx (within 1 month)  CT AP W 12/2012/24: Diffuse colonic thickening from cecum to distal rectum  Enteric panel negative  C Diff PCR & EIA Toxin positive  BCX NTD.  Improving diarrhea.  - Continue fidaxomicin 200 mg twice dailyx 10 days (flagyl stopped on 12/27).  - Oral Vanc 250 mg BID for at least a month after above treatment for prevention.  - Continue budesonide.  - Colestipol started today by GI.  - ID/GI assistance appreciated.    Hypokalemia  Hypomagnesemia   Secondary to diarrhea and poor intake.  - Monitor with replacement protocol    Secondary adrenal insufficiency - Followed by Dr. Navas of [facility]. Continue home budesonide   Chronic pain syndrome - Continue prior to admission gabapentin, hydrocodone-APAP, cyclobenzaprine, baclofen when dose confirmed by pharmacy   GERD - Continue prior to admission PPI when dose confirmed by pharmacy   Anxiety - Continue prior to admission lorazepam, paroxetine when dose confirmed by pharmacy   Migraine headaches - Propranolol & riboflavin           Diet: Combination Diet Regular Diet Adult  Snacks/Supplements Adult: Ensure Enlive; With Meals    DVT Prophylaxis: Enoxaparin (Lovenox) SQ  Bates Catheter: Not present  Lines: None     Cardiac Monitoring: None  Code Status: Full Code            Disposition Plan     Medically Ready for Discharge: tomorrow pending improved diet tolerance and  continued diarrhea improvement.  D/W pt.             Gunner Rizzo MD  Hospitalist Service  [facility]  Securely message with [employer] (more info)  Text page via [employer] Paging/Directory   ______________________________________________________________________    Interval History   Had 5 BMs yesterday. Last was last night. Some of them semiformed. Mild abd discomfort.  Mild nausea.  No cp/sob.  Oriented x3.      Physical Exam   Vital Signs: Temp: 98.2  F (36.8  C) Temp src: Oral BP: (!) 143/78 Pulse: 60   Resp: 18 SpO2: 95 % O2 Device: None (Room air)    Weight: 127 lbs 8 oz    General Appearance: Elderly and weak  Respiratory: No tripoding or accessory muscle usage  Cardiovascular: Regular rate and rhythm.  No edema in BLE  GI: Soft, mild tenderness diffusely with deep palpation.  No guarding, rigidity or rebound tenderness  Skin: No rashes or lesions  Other: Alert and oriented x 4, conversing coherently    Data   ------------------------- PAST 24 HR DATA REVIEWED -----------------------------------------------

## 2024-12-29 NOTE — PLAN OF CARE
Goal Outcome Evaluation:Generalized weakness  Sepsis  Non-severe C-Diff  Chronic diarrhea in setting of lymphocytic colitisHypokalemia  Hypomagnesemia Secondary adrenal insufficiency Chronic pain syndrome        Orientation: A/O x4, calm/quiet, denies general distress, short of breath, chest discomfort, nausea , vomiting and headache, meanwhile, pt reports moderate abdominal  and chronic lower back pain 6-7/10  PRN: given. Schedule ativan for anxiety     Vitals: vss on R A.     IV Access/drains: R PIV  SL DCI.    Diet: Regular with good appetite.     Mobility: Independent in room     GI/: Continent both, B/B  .no bowel movement in this shift     Wound/Skin: with normal baseline     Consults:  GI following     Discharge Plan: TBD      See Flow sheets for assessment

## 2024-12-29 NOTE — PLAN OF CARE
Goal Outcome Evaluation:    Orientation: A/Ox4  Aggression Stop Light: Green   Activity: Ind   Diet/BS Checks: Regular diet; PRN zofran for nausea   Tele:  N/A  IV Access/Drains: R PIV SL   Pain Management: C/O generalized body ache and headache;  PRN Norco given   Abnormal VS/Results: VSS on RA. K+, Mg, & Phos- WDL, recheck tm am  Bowel/Bladder: Continent B/B; diarrhea but starting to become more former per pt report; abd discomfort improving   Skin/Wounds: intact; scattered bruising   Consults: GI, ID, CC. PT/OT consulted  D/C Disposition: Pending clinical improvement    Other Info:   Enteric precautions for C-diff

## 2024-12-30 LAB
ATRIAL RATE - MUSE: 65 BPM
ATRIAL RATE - MUSE: 77 BPM
DIASTOLIC BLOOD PRESSURE - MUSE: NORMAL MMHG
DIASTOLIC BLOOD PRESSURE - MUSE: NORMAL MMHG
INTERPRETATION ECG - MUSE: NORMAL
INTERPRETATION ECG - MUSE: NORMAL
MAGNESIUM SERPL-MCNC: 1.7 MG/DL (ref 1.7–2.3)
P AXIS - MUSE: 59 DEGREES
P AXIS - MUSE: 74 DEGREES
PHOSPHATE SERPL-MCNC: 3.3 MG/DL (ref 2.5–4.5)
POTASSIUM SERPL-SCNC: 3.6 MMOL/L (ref 3.4–5.3)
PR INTERVAL - MUSE: 146 MS
PR INTERVAL - MUSE: 148 MS
QRS DURATION - MUSE: 72 MS
QRS DURATION - MUSE: 74 MS
QT - MUSE: 426 MS
QT - MUSE: 476 MS
QTC - MUSE: 482 MS
QTC - MUSE: 495 MS
R AXIS - MUSE: -17 DEGREES
R AXIS - MUSE: -17 DEGREES
SYSTOLIC BLOOD PRESSURE - MUSE: NORMAL MMHG
SYSTOLIC BLOOD PRESSURE - MUSE: NORMAL MMHG
T AXIS - MUSE: 70 DEGREES
T AXIS - MUSE: 70 DEGREES
TROPONIN T SERPL HS-MCNC: 208 NG/L
VENTRICULAR RATE- MUSE: 65 BPM
VENTRICULAR RATE- MUSE: 77 BPM

## 2024-12-30 PROCEDURE — 83735 ASSAY OF MAGNESIUM: CPT | Performed by: HOSPITALIST

## 2024-12-30 PROCEDURE — 250N000013 HC RX MED GY IP 250 OP 250 PS 637: Performed by: NURSE PRACTITIONER

## 2024-12-30 PROCEDURE — 250N000013 HC RX MED GY IP 250 OP 250 PS 637: Performed by: INTERNAL MEDICINE

## 2024-12-30 PROCEDURE — 99232 SBSQ HOSP IP/OBS MODERATE 35: CPT | Performed by: HOSPITALIST

## 2024-12-30 PROCEDURE — 84484 ASSAY OF TROPONIN QUANT: CPT

## 2024-12-30 PROCEDURE — 36415 COLL VENOUS BLD VENIPUNCTURE: CPT

## 2024-12-30 PROCEDURE — 258N000003 HC RX IP 258 OP 636: Performed by: HOSPITALIST

## 2024-12-30 PROCEDURE — 84132 ASSAY OF SERUM POTASSIUM: CPT | Performed by: HOSPITALIST

## 2024-12-30 PROCEDURE — 250N000011 HC RX IP 250 OP 636: Performed by: INTERNAL MEDICINE

## 2024-12-30 PROCEDURE — 250N000011 HC RX IP 250 OP 636: Performed by: HOSPITALIST

## 2024-12-30 PROCEDURE — 210N000002 HC R&B HEART CARE

## 2024-12-30 PROCEDURE — 250N000011 HC RX IP 250 OP 636: Mod: JZ

## 2024-12-30 PROCEDURE — 84100 ASSAY OF PHOSPHORUS: CPT | Performed by: HOSPITALIST

## 2024-12-30 PROCEDURE — 250N000013 HC RX MED GY IP 250 OP 250 PS 637

## 2024-12-30 PROCEDURE — 99222 1ST HOSP IP/OBS MODERATE 55: CPT | Performed by: INTERNAL MEDICINE

## 2024-12-30 PROCEDURE — 99233 SBSQ HOSP IP/OBS HIGH 50: CPT | Mod: 25

## 2024-12-30 RX ORDER — IOPAMIDOL 755 MG/ML
INJECTION, SOLUTION INTRAVASCULAR
Status: DISCONTINUED | OUTPATIENT
Start: 2024-12-29 | End: 2024-12-31

## 2024-12-30 RX ORDER — HYDROMORPHONE HCL IN WATER/PF 6 MG/30 ML
0.2 PATIENT CONTROLLED ANALGESIA SYRINGE INTRAVENOUS EVERY 4 HOURS PRN
Status: DISCONTINUED | OUTPATIENT
Start: 2024-12-30 | End: 2024-12-31 | Stop reason: HOSPADM

## 2024-12-30 RX ORDER — LISINOPRIL 2.5 MG/1
2.5 TABLET ORAL DAILY
Status: DISCONTINUED | OUTPATIENT
Start: 2024-12-30 | End: 2024-12-31 | Stop reason: HOSPADM

## 2024-12-30 RX ORDER — HYDROMORPHONE HCL IN WATER/PF 6 MG/30 ML
0.2 PATIENT CONTROLLED ANALGESIA SYRINGE INTRAVENOUS EVERY 4 HOURS PRN
Status: DISCONTINUED | OUTPATIENT
Start: 2024-12-30 | End: 2024-12-30

## 2024-12-30 RX ORDER — CLOPIDOGREL BISULFATE 75 MG/1
75 TABLET ORAL DAILY
Status: DISCONTINUED | OUTPATIENT
Start: 2024-12-30 | End: 2024-12-31 | Stop reason: HOSPADM

## 2024-12-30 RX ORDER — METOPROLOL TARTRATE 25 MG/1
25 TABLET, FILM COATED ORAL 2 TIMES DAILY
Status: DISCONTINUED | OUTPATIENT
Start: 2024-12-30 | End: 2024-12-31 | Stop reason: HOSPADM

## 2024-12-30 RX ADMIN — ZOLPIDEM TARTRATE 5 MG: 5 TABLET, COATED ORAL at 22:01

## 2024-12-30 RX ADMIN — LORAZEPAM 1 MG: 1 TABLET ORAL at 13:00

## 2024-12-30 RX ADMIN — LORATADINE 10 MG: 10 TABLET ORAL at 07:54

## 2024-12-30 RX ADMIN — LORAZEPAM 1 MG: 1 TABLET ORAL at 07:53

## 2024-12-30 RX ADMIN — LORAZEPAM 1 MG: 1 TABLET ORAL at 00:16

## 2024-12-30 RX ADMIN — HYDROCODONE BITARTRATE AND ACETAMINOPHEN 2 TABLET: 10; 325 TABLET ORAL at 12:18

## 2024-12-30 RX ADMIN — LISINOPRIL 2.5 MG: 2.5 TABLET ORAL at 13:59

## 2024-12-30 RX ADMIN — GABAPENTIN 300 MG: 300 CAPSULE ORAL at 20:19

## 2024-12-30 RX ADMIN — MORPHINE SULFATE 2 MG: 2 INJECTION, SOLUTION INTRAMUSCULAR; INTRAVENOUS at 00:16

## 2024-12-30 RX ADMIN — FIDAXOMICIN 200 MG: 200 TABLET, FILM COATED ORAL at 00:16

## 2024-12-30 RX ADMIN — BUDESONIDE 3 MG: 3 CAPSULE ORAL at 08:01

## 2024-12-30 RX ADMIN — PAROXETINE HYDROCHLORIDE 25 MG: 20 TABLET, FILM COATED ORAL at 07:53

## 2024-12-30 RX ADMIN — CLOPIDOGREL BISULFATE 75 MG: 75 TABLET ORAL at 13:59

## 2024-12-30 RX ADMIN — HYDROCODONE BITARTRATE AND ACETAMINOPHEN 2 TABLET: 10; 325 TABLET ORAL at 08:01

## 2024-12-30 RX ADMIN — METOPROLOL TARTRATE 25 MG: 25 TABLET, FILM COATED ORAL at 20:19

## 2024-12-30 RX ADMIN — HYDROMORPHONE HYDROCHLORIDE 0.2 MG: 0.2 INJECTION, SOLUTION INTRAMUSCULAR; INTRAVENOUS; SUBCUTANEOUS at 19:56

## 2024-12-30 RX ADMIN — ONDANSETRON 4 MG: 2 INJECTION, SOLUTION INTRAMUSCULAR; INTRAVENOUS at 18:12

## 2024-12-30 RX ADMIN — LORAZEPAM 1 MG: 1 TABLET ORAL at 22:01

## 2024-12-30 RX ADMIN — PAROXETINE HYDROCHLORIDE 25 MG: 20 TABLET, FILM COATED ORAL at 20:19

## 2024-12-30 RX ADMIN — LORAZEPAM 1 MG: 1 TABLET ORAL at 18:12

## 2024-12-30 RX ADMIN — HYDROMORPHONE HYDROCHLORIDE 0.2 MG: 0.2 INJECTION, SOLUTION INTRAMUSCULAR; INTRAVENOUS; SUBCUTANEOUS at 15:23

## 2024-12-30 RX ADMIN — ATORVASTATIN CALCIUM 40 MG: 40 TABLET, FILM COATED ORAL at 00:15

## 2024-12-30 RX ADMIN — MONTELUKAST SODIUM 1 G: 4 TABLET, CHEWABLE ORAL at 08:01

## 2024-12-30 RX ADMIN — HYDROCODONE BITARTRATE AND ACETAMINOPHEN 2 TABLET: 10; 325 TABLET ORAL at 22:00

## 2024-12-30 RX ADMIN — HYDROCODONE BITARTRATE AND ACETAMINOPHEN 2 TABLET: 10; 325 TABLET ORAL at 16:53

## 2024-12-30 RX ADMIN — PANTOPRAZOLE SODIUM 40 MG: 40 TABLET, DELAYED RELEASE ORAL at 16:53

## 2024-12-30 RX ADMIN — PAROXETINE HYDROCHLORIDE 25 MG: 20 TABLET, FILM COATED ORAL at 00:45

## 2024-12-30 RX ADMIN — ATORVASTATIN CALCIUM 40 MG: 40 TABLET, FILM COATED ORAL at 20:20

## 2024-12-30 RX ADMIN — AMIODARONE HYDROCHLORIDE 0.5 MG/MIN: 1.8 INJECTION, SOLUTION INTRAVENOUS at 03:26

## 2024-12-30 RX ADMIN — GABAPENTIN 300 MG: 300 CAPSULE ORAL at 00:16

## 2024-12-30 RX ADMIN — IPRATROPIUM BROMIDE 3 SPRAY: 42 SPRAY NASAL at 09:16

## 2024-12-30 RX ADMIN — HYDROMORPHONE HYDROCHLORIDE 0.2 MG: 0.2 INJECTION, SOLUTION INTRAMUSCULAR; INTRAVENOUS; SUBCUTANEOUS at 11:22

## 2024-12-30 RX ADMIN — FIDAXOMICIN 200 MG: 200 TABLET, FILM COATED ORAL at 08:01

## 2024-12-30 RX ADMIN — ASPIRIN 81 MG: 81 TABLET, COATED ORAL at 07:54

## 2024-12-30 RX ADMIN — PROPRANOLOL HYDROCHLORIDE 60 MG: 60 CAPSULE, EXTENDED RELEASE ORAL at 07:54

## 2024-12-30 RX ADMIN — HYDROCODONE BITARTRATE AND ACETAMINOPHEN 2 TABLET: 10; 325 TABLET ORAL at 01:20

## 2024-12-30 RX ADMIN — PANTOPRAZOLE SODIUM 40 MG: 40 TABLET, DELAYED RELEASE ORAL at 07:54

## 2024-12-30 RX ADMIN — SODIUM CHLORIDE 75 ML/HR: 9 INJECTION, SOLUTION INTRAVENOUS at 00:46

## 2024-12-30 RX ADMIN — FIDAXOMICIN 200 MG: 200 TABLET, FILM COATED ORAL at 20:19

## 2024-12-30 ASSESSMENT — ACTIVITIES OF DAILY LIVING (ADL)
ADLS_ACUITY_SCORE: 36
ADLS_ACUITY_SCORE: 41
ADLS_ACUITY_SCORE: 41
ADLS_ACUITY_SCORE: 36
ADLS_ACUITY_SCORE: 41
ADLS_ACUITY_SCORE: 36
ADLS_ACUITY_SCORE: 36
ADLS_ACUITY_SCORE: 37
ADLS_ACUITY_SCORE: 40
ADLS_ACUITY_SCORE: 41
ADLS_ACUITY_SCORE: 41
ADLS_ACUITY_SCORE: 37
ADLS_ACUITY_SCORE: 40
ADLS_ACUITY_SCORE: 36
ADLS_ACUITY_SCORE: 36
ADLS_ACUITY_SCORE: 37
ADLS_ACUITY_SCORE: 41

## 2024-12-30 NOTE — CONSULTS
CARDIAC ELECTROPHYSIOLOGY CONSULTATION  December 30, 2024    REQUESTING PROVIDER:  Dr. Cohen    REASON FOR CONSULTATION: VF arrest.      HISTORY OF PRESENT ILLNESS:    74-year-old female with h/o anorexia, depression/anxiety, GERD, migraine headaches, adrenal insufficiency secondary to budesonide use for lymphocytic colitis who was admitted on 12/22/2024 with weakness, fever and abdominal pain. She was diagnosed with C difficile colitis and was being treated with slow improvement. Of note, in late 8/2023 the patient was admitted with chest pain/NSTEMI, troponin 177. Cardiac catheterization revealed minimal non-obstructive CAD.    On Sunday afternoon 12/29/2024 she developed left-sided pressure chest pain. RRT was called and she was given nitroglycerin which helped her discomfort. ECG showed anterior T-wave abnormality. While RRT was ongoing, the patient lost consciousness and cardiac monitor showed VF. She was pulseless, chest compressions were promptly started and apparently the arrhythmia spontaneously terminated with regaining of consciousness (no shock delivery.)    Emergent cardiac catheterization showed occlusion of mid/distal LAD concerning for SCAD. Left ventricular angiography showed apical akinesis.    Today the patient complains of chest wall pain after CPR. She is alert and oriented and has clear recollection of yesterday's events.    The patient lives with her , a retired pathologist. She is a non-smoker.      DIAGNOSTIC STUDIES:  Labs: hs 16 - 208 troponin, potassium 3.5, sodium 140, creatinine 0.64  12-lead ECG: tracing from 12/29/2024, 8:50 PM, shows sinus rhythm, consider anterior MI.  Echocardiogram: ordered, pending.      IMPRESSION:  VF arrest in the setting of acute mid LAD occlusion, NSTEMI.  Hospital admission with chest pain and abnormal troponin in 8/2023. Essentially normal coronary arteries at the time. Cardiac MRI evidence of small mid inferior wall subendocardial infarct  (9/2023).    RECOMMENDATIONS:    Echocardiogram pending, will review when available.  No clear indication for ICD or chronic antiarrhythmic drug therapy in this clinical setting.  Stop IV amiodarone  Treatment with a beta-blocker is reasonable.  ?embolic coronary event; will discuss with my general cardiology colleagues.    I appreciate the opportunity to be part of this patient's care.  Please feel free to call me with any questions.    High complexity medical decision-making.     Eduardo Red MD, [facility]        PHYSICAL EXAM:  Vitals: BP (!) 164/105   Pulse 77   Temp 99.5  F (37.5  C) (Oral)   Resp 14   Wt 57.2 kg (126 lb 3.2 oz)   SpO2 97%   BMI 22.36 kg/m      Intake/Output Summary (Last 24 hours) at 12/30/2024 0822  Last data filed at 12/30/2024 0600  Gross per 24 hour   Intake 380 ml   Output 450 ml   Net -70 ml     Vitals:    12/22/24 0721 12/30/24 0600   Weight: 57.8 kg (127 lb 8 oz) 57.2 kg (126 lb 3.2 oz)     Constitutional:  Alert and oriented x3.  Pt appears comfortable, has no CP or respiratory distress.  Head: Normocephalic and atraumatic.   Skin:  Normal color and texture.  No rashes, lesions or eruptions.  Eyes:  no jaundice, EOMI.  ENT:  Supple, normal JVP, no apparent thyroid enlargement.  Lymph:  No lymphadenopathy   Chest/Lungs:  Clear bilaterally, no rales or wheezing.  Cardiac:  Regular rhythm, normal S1 and S2.  No murmur, rub or gallop.    GI:  Normal bowel sounds. Abdomen is soft, non-tender & not distended.  No rebound or guarding.    Extremities:  Radial pulses 2+ bilaterally.  DP and PT pulses palpable bilaterally.  No lower extremity edema is present.   Neurological:  CN 2-12 grossly intact.  Strength in UE is normal & symmetric.    Back:  No CVA tenderness.     REVIEW OF SYSTEMS:  A complete review of system was performed and was negative with the exception of what was described in the HPI section.     CURRENT MEDICATIONS:  Current Facility-Administered Medications   Medication  Dose Route Frequency Provider Last Rate Last Admin    aspirin EC tablet 81 mg  81 mg Oral Daily Alatorre, Jaeathone, DO   81 mg at 12/30/24 0754    atorvastatin (LIPITOR) tablet 40 mg  40 mg Oral QPM Eden Cohen MD   40 mg at 12/30/24 0015    budesonide (ENTOCORT EC) EC capsule 3 mg  3 mg Oral Daily Vasyl Mart MD   3 mg at 12/30/24 0801    colestipol (COLESTID) tablet 1 g  1 g Oral Daily Mirta Edmonds APRN CNP   1 g at 12/30/24 0801    fidaxomicin (DIFICID) tablet 200 mg  200 mg Oral BID Alatorre, Johnathone, DO   200 mg at 12/30/24 0801    gabapentin (NEURONTIN) capsule 300 mg  300 mg Oral At Bedtime Alatorre, Johnathone, DO   300 mg at 12/30/24 0016    ipratropium (ATROVENT) 0.06 % spray 3 spray  3 spray Both Nostrils Daily Alatorre, Johnathone, DO   3 spray at 12/29/24 0833    loratadine (CLARITIN) tablet 10 mg  10 mg Oral QAM Jae Alatorreathone, DO   10 mg at 12/30/24 0754    LORazepam (ATIVAN) tablet 1 mg  1 mg Oral 4x Daily Alatorre, Johnathone, DO   1 mg at 12/30/24 0753    pantoprazole (PROTONIX) EC tablet 40 mg  40 mg Oral BID AC Xiang Mariano MD   40 mg at 12/30/24 0754    PARoxetine (PAXIL) half-tab 25 mg  25 mg Oral BID Alatorre, Johnathone, DO   25 mg at 12/30/24 0753    propranolol ER (INDERAL LA) 24 hr capsule 60 mg  60 mg Oral Daily Alatorre, Johnathone, DO   60 mg at 12/30/24 0754    riboflavin CAPS 400 mg  400 mg Oral Daily Ugo Alonso RPH   400 mg at 12/29/24 0832    sodium chloride (PF) 0.9% PF flush 3 mL  3 mL Intracatheter Q8H Xiang Mariano MD   3 mL at 12/30/24 0022    [Held by provider] vitamin C (ASCORBIC ACID) tablet 1,000 mg  1,000 mg Oral QPM Ken Alatorre, DO        [Held by provider] Vitamin D3 (CHOLECALCIFEROL) tablet 250 mcg  250 mcg Oral QPM Ken Alatorre, DO           ALLERGIES  Allergies   Allergen Reactions    Betamethasone Other (See Comments)     agitation    Chocolate Other (See Comments)     Triggers migraines    Compazine [Prochlorperazine] Other (See  "Comments)     \"crawl out of my skin\"    Linzess [Linaclotide]     Penicillins Nausea and Vomiting and Hives    Pneumococcal Vaccine Other (See Comments)     Temporary paralization    Tizanidine Other (See Comments)     Severe agitation       PAST MEDICAL HISTORY:  Past Medical History:   Diagnosis Date    Adrenal insufficiency (H)     Dr. Navas, secondary to meds    Anxiety     Dr. Adelina Meehan    ASCVD (arteriosclerotic cardiovascular disease) 08/2023    cp and pos trop, angio with trivial cad, echo nl    Central sensitization to pain     Chronic constipation     Chronic headache     sees neuro Dr Danielson, 3 types of headaches    Chronic narcotic use     [facility]    Chronic pain     Dr. Orta as of 2015    Chronic urethritis     Dr. Little    Colonic polyp 11/2011    one polyp, fu 5 years, fu 10/17 and no lesions, fu 9/20 and negative, fu 5 years    Depression, major, in partial remission (H)     Dr. Meehan    Depressive disorder 1971    Diarrhea 2012    eval by [facility], resolved with gluten free diet    DJD (degenerative joint disease)     Dyspepsia 2020    eval by [facility], had colonoscopy, egd, ct abd and pelvis, mrcp.  [place] to be dyspepsia and constipation    H/O gastroesophageal reflux (GERD)     LBP (low back pain) 2013    Dr. Jae Tong in [place], then seeing [facility] Dr. Orta    Light headed 07/2020    nl est echo    Lymphocytic colitis 2021    [facility]; seen on flex sig 9/24 [facility], added budesonide    Migraines 2009    neuro eval    NSTEMI (non-ST elevated myocardial infarction) (H) 08/31/2023    echo nl and angio trivial cad    Palpitations 2006    holter with pvc's and pac's, echo nl    Screening 2010    dexa nl at gyn       PAST SURGICAL HISTORY:  Past Surgical History:   Procedure Laterality Date    APPENDECTOMY  2016    ARTHRODESIS WRIST Left 04/05/2021    Procedure: LEFT TOTAL WRIST FUSION;  Surgeon: Beatrice Philippe MD;  Location: [facility] OR    ARTHROPLASTY " CARPOMETACARPAL (THUMB JOINT) Left 2017    Procedure: ARTHROPLASTY CARPOMETACARPAL (THUMB JOINT);  REVISION  LEFT THUMB CARPOMETACARPAL ARTHROPOASTY WITH 1.1 TIGHT ROPE;  Surgeon: Beatrice Philippe MD;  Location:  [facility]    ARTHROSCOPY WRIST Left 2019    Procedure: LEFT WRIST ARTHROSCOPY REMOVAL OF TIGHT ROPE ; LEFT EXTENSOR POLLICIS LONGUS /EXTENSOR CARPI RADIALIS BREVIS /EXTENSOR CARPI RADIALUS LONGUS SYNOVECTOMY ; EXTENSOR POLLICIS LONGUS TRANSPOSITION ; ANTERIOR INTEROSSEOUS NEURECTOMY ; POSTERIOR INTEROSSEOUS NEURECTOMY;  Surgeon: Beatrice Philippe MD;  Location:  OR    BIOPSY      CARPAL TUNNEL RELEASE RT/LT      CATARACT EXTRACTION  2021     SECTION      COLONOSCOPY      CV CORONARY ANGIOGRAM N/A 2023    Procedure: Coronary Angiogram;  Surgeon: Moody Miranda MD;  Location:  HEART CARDIAC CATH LAB    CV CORONARY ANGIOGRAM N/A 2024    Procedure: Coronary Angiogram;  Surgeon: Tyler England MD;  Location:  HEART CARDIAC CATH LAB    ESOPHAGOSCOPY, GASTROSCOPY, DUODENOSCOPY (EGD), COMBINED N/A 2020    Procedure: ESOPHAGOGASTRODUODENOSCOPY, WITH BIOPSY;  Surgeon: Georges Pop MD;  Location:  GI    EXCIS BARTHOLIN GLAND/CYST      EYE SURGERY      HERNIORRHAPHY INGUINAL  70's    x5    INJECT STEROID (LOCATION) Right 2019    Procedure: RIGHT WRIST CORTISONE INJECTION;  Surgeon: Beatrice Philippe MD;  Location:  OR    OPERATIVE HYSTEROSCOPY WITH MORCELLATOR N/A 2018    Procedure: OPERATIVE HYSTEROSCOPY WITH MORCELLATOR (MARTIN [employer] NEPHEW);  OPERATIVE HYSTEROSCOPY WITH TRUECLEAR MORCELLATOR 5C SCOPE ;  Surgeon: Iris Fernandez MD;  Location: [facility]    ORTHOPEDIC SURGERY Bilateral     SHOULDER ARTHROSCOPY    ORTHOPEDIC SURGERY Right 2017    right foot for plantar fasciitis    thumb surgery   and ,        FAMILY HISTORY:  Family History   Problem Relation Age of Onset    Diabetes Father     Hypertension Father      Depression Father     Substance Abuse Father     Anesthesia Reaction Father         went into shock with surgery     Obesity Father     Anxiety Disorder Father     Coronary Artery Disease Father     Hyperlipidemia Father     Diabetes Mother     Cerebrovascular Disease Mother         Cadasils disease    Depression Mother     Mental Illness Mother         borderline personality disorder, Chemical dependency    Substance Abuse Mother     Genetic Disorder Mother         Cadasils    Obesity Mother     Anxiety Disorder Mother     Cerebrovascular Disease Paternal Grandfather     Cerebrovascular Disease Brother         Cadasils disease    Depression Brother     Anxiety Disorder Brother     Substance Abuse Brother     Genetic Disorder Brother         cadasils    Obesity Brother     Hypertension Brother     Hyperlipidemia Brother     Cerebrovascular Disease Sister         Cadasils disease    Depression Sister     Anxiety Disorder Sister     Genetic Disorder Sister         cadasils    Obesity Sister     Cerebrovascular Disease Sister         Cadasils disease    Genetic Disorder Sister         cadasils    Breast Cancer Sister     Depression Sister     Anxiety Disorder Sister     Obesity Sister     Hypertension Sister     Hyperlipidemia Sister     Depression Son     Anxiety Disorder Son        SOCIAL HISTORY:  Social History     Socioeconomic History    Marital status:     Number of children: 1   Occupational History     Employer: SELF   Tobacco Use    Smoking status: Former     Current packs/day: 0.00     Types: Cigarettes     Quit date: 1974     Years since quittin.5    Smokeless tobacco: Never    Tobacco comments:     infrequent use for about 5 years   Substance and Sexual Activity    Alcohol use: No    Drug use: No    Sexual activity: Not Currently     Partners: Male     Birth control/protection: Post-menopausal   Other Topics Concern    Parent/sibling w/ CABG, MI or angioplasty before 65F 55M? Yes      Comment: father     Social Drivers of Health     Financial Resource Strain: Low Risk  (12/23/2024)    Financial Resource Strain     Within the past 12 months, have you or your family members you live with been unable to get utilities (heat, electricity) when it was really needed?: No   Food Insecurity: Low Risk  (12/23/2024)    Food Insecurity     Within the past 12 months, did you worry that your food would run out before you got money to buy more?: No     Within the past 12 months, did the food you bought just not last and you didn t have money to get more?: No   Transportation Needs: Low Risk  (12/23/2024)    Transportation Needs     Within the past 12 months, has lack of transportation kept you from medical appointments, getting your medicines, non-medical meetings or appointments, work, or from getting things that you need?: No   Physical Activity: Inactive (10/14/2024)    Exercise Vital Sign     Days of Exercise per Week: 0 days     Minutes of Exercise per Session: 0 min   Stress: No Stress Concern Present (10/14/2024)    [place] [employer] - Occupational Stress Questionnaire     Feeling of Stress : Only a little   Social Connections: Unknown (10/14/2024)    Social Connection and Isolation Panel [NHANES]     Frequency of Social Gatherings with Friends and Family: Twice a week   Interpersonal Safety: Low Risk  (12/23/2024)    Interpersonal Safety     Do you feel physically and emotionally safe where you currently live?: Yes     Within the past 12 months, have you been hit, slapped, kicked or otherwise physically hurt by someone?: No     Within the past 12 months, have you been humiliated or emotionally abused in other ways by your partner or ex-partner?: No   Housing Stability: Low Risk  (12/23/2024)    Housing Stability     Do you have housing? : Yes     Are you worried about losing your housing?: No         Recent Lab Results:  Recent Labs   Lab 12/29/24 2056 12/29/24 2023  12/29/24  1958 12/29/24  1148 12/26/24  0902 12/25/24  0924   WBC  --  10.6  --  12.2*  --  8.0   HGB 12.6 12.3  --  12.5  --  11.6*   MCV  --  92  --  93  --  94   PLT  --  480*  --  424  --  286    141  --  139  --  140   POTASSIUM 3.5 3.9  --  4.0   < > 3.5   CHLORIDE  --  102  --  103  --  102   CO2  --  26  --  24  --  28   BUN  --  8.4  --  8.5  --  2.1*   CR  --  0.64  --  0.55  --  0.49*   ANIONGAP  --  13  --  12  --  10   THOR  --  9.4  --  9.0  --  8.6*   GLC  --  130* 127* 123*  --  91    < > = values in this interval not displayed.         Clinically Significant Risk Factors               # Hypoalbuminemia: Lowest albumin = 3.4 g/dL at 12/22/2024  2:54 AM, will monitor as appropriate                 # Moderate Malnutrition: based on nutrition assessment    # Financial/Environmental Concerns: none (denies)

## 2024-12-30 NOTE — PROCEDURES
[facility]    Procedure: CAG  C  LV    Date/Time: 12/29/2024 10:34 PM    Performed by: Tyler England MD  Authorized by: Tyler England MD      UNIVERSAL PROTOCOL   Site Marked: Yes  Prior Images Obtained and Reviewed:  Yes  Required items: Required blood products, implants, devices and special equipment available    Patient identity confirmed:  Verbally with patient  Patient was reevaluated immediately before administering moderate or deep sedation or anesthesia  Confirmation Checklist:  Patient's identity using two indicators  Time out: Immediately prior to the procedure a time out was called    Universal Protocol: the Joint Commission Universal Protocol was followed    Preparation: Patient was prepped and draped in usual sterile fashion       ANESTHESIA    Local Anesthetic:  Lidocaine 1% without epinephrine      SEDATION  Patient Sedated: Yes    Sedation:  Fentanyl and midazolam  Vital signs: Vital signs monitored during sedation      PROCEDURE  Describe Procedure: Procedure  1) CAG  2) LHC  3) LV  Approach RFA  Complications none  Indication Chest pain, VF arrest  Findings  We initially used a right radial approach, but could not advance a wire into the aorta. 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 Appearance consistent with SCAD    LVEDP 13 mmHg  LV apical dyskinesis    Assessment : VF arrest in setting of distal LAD occlusion. The coronaries are otherwise smooth without obvious plaque. She does have occlusion of the brachiocephalic artery and also has peripheral atherosclerosis. Although she is 74 it seems as if Spontaneous Coronary Artery Dissection ( SCAD)  with apical MI  is the most likely cause for her arrest.     Recommendations   1) No indication for PCI  2) maintain eukalemia and normal magnesium levels  3) DAPT controversial, but would  use aspirin 81 daily  4) beta blocker  5) high potency statin ( given occluded brachiocephalic artery)  6) continue amiodarone for now, would consult EP for opinion regarding further therapy  7) Screen carotid and renal beds ( given association with Fibromuscular Dysplasia      [place]  Patient Tolerance:  Patient tolerated the procedure well with no immediate complications  Length of time physician/provider present for 1:1 monitoring during sedation: 45

## 2024-12-30 NOTE — PROGRESS NOTES
[facility]    ~Cardiology Progress Note~    Primary Cardiologist: Dr. England    Date of Admission: 12/22/2024  Service Date: 12/30/2024    Summary:  Ms. Laurel Parra is a very pleasant 74 year old female with a past medical history of chronic anxiety, osteoarthritis, chronic pain syndrome, migraine headaches, exertional lightheadedness and palpitations who was admitted on 12/22/2024 for fever and abd pain. She has been treated for c.diff colitis.     On 12/29/2024, an RRT was call due to patient reporting left sided chest pain. She received a nitroglycerin which helped with her pain. EKG showed slight T wave changes. While the RRT was occurring, the patient lost consciousness. Cardiac monitor showed ventricular fibrillation. Chest compressions were started and ROSC was achieved within 1 minute and patient gained consciousness. No shock or meds were gives. She underwent coronary angiogram which showed a total occlusion of the distal LAD, concerning for SCAD.      Interval December 30, 2024:  Resting in bed, friend at bedside. Reports pain to the chest after receiving compressions yesterday. Without cognitive deficits. Denies any dyspnea, lightheadedness or palpitations.           Assessment:     One vessel CAD consistent with SCAD    VF arrest with ROSC without shock or medication, only received compressions, secondary to #1     C.diff   Colitis     Anxiety            Plan:     Echocardiogram ordered   Start lisinopril 2.5 mg daily  Start plavix 75 mg daily   Continue ASA 81 mg daily, atorvastatin 40 mg daily   Discontinue propranolol   Start metoprolol tartrate 25 mg BID   Order placed for cardiac rehab   Coordinating outpatient cardiology follow up   Cardiology to follow     Plan of care was formulated under the direction and guidance of Dr. Garcia.         Edith Travis PA-C  Physician Assistant   [facility]    24 minutes spent in coordination of care,  and discussion with the patient and/or family regarding diagnostic results, prognosis, symptom management, risks and benefits of management options, and development of the plan of care of above.        Patient Active Problem List   Diagnosis    Frequency of urination and polyuria    Anxiety    Colonic polyp    Chronic urethritis    Palpitations    Chronic narcotic use    Intractable chronic migraine without aura and without status migrainosus    Recurrent major depressive disorder, in partial remission (H)    Gastroesophageal reflux disease without esophagitis    Chronic low back pain, unspecified back pain laterality, with sciatica presence unspecified    Dyspepsia    Lymphocytic colitis    Pelvic floor dysfunction    Voiding dysfunction    Recurrent UTI    Urinary urgency    Somatic dysfunction of pelvic region    Urge incontinence of urine    Anorexia nervosa (H)    ASCVD (arteriosclerotic cardiovascular disease)    Adrenal insufficiency (H)    Central sensitization to pain    Hypokalemia    Hypomagnesemia    Colitis    Generalized weakness    Fever, unspecified fever cause    Diarrhea, unspecified type       Physical Exam   Temp: 99.5  F (37.5  C) Temp src: Oral BP: 125/78 Pulse: 67   Resp: 14 SpO2: 95 % O2 Device: None (Room air) Oxygen Delivery: 4 LPM  Vitals:    12/22/24 0721 12/30/24 0600   Weight: 57.8 kg (127 lb 8 oz) 57.2 kg (126 lb 3.2 oz)     I/O last 3 completed shifts:  In: 640 [P.O.:640]  Out: 675 [Urine:675]    Constitutional: Appears stated age, well nourished, NAD.  Neck: Supple. JVD not visualized.   Respiratory: Non-labored. Lungs CTAB.  Cardiovascular: RRR, normal S1 and S2. No M/G/R. Bilateral lower extremities with no edema.   GI: Soft, non-distended, non-tender.  Skin: Warm and dry.   Musculoskeletal/Extremities: Symmetrical movement.  Neurologic: No gross focal deficits. Alert, awake.  Psychiatric: Affect appropriate. Mentation normal.    Medications   Current Facility-Administered  Medications   Medication Dose Route Frequency Provider Last Rate Last Admin     Current Facility-Administered Medications   Medication Dose Route Frequency Provider Last Rate Last Admin    aspirin EC tablet 81 mg  81 mg Oral Daily Tonya Alatorreone, DO   81 mg at 12/30/24 0754    atorvastatin (LIPITOR) tablet 40 mg  40 mg Oral QPM Eden Cohen MD   40 mg at 12/30/24 0015    budesonide (ENTOCORT EC) EC capsule 3 mg  3 mg Oral Daily Vasyl Mart MD   3 mg at 12/30/24 0801    clopidogrel (PLAVIX) tablet 75 mg  75 mg Oral Daily Tomás Garcia MD   75 mg at 12/30/24 1359    colestipol (COLESTID) tablet 1 g  1 g Oral Daily Mirta Edmonds APRN CNP   1 g at 12/30/24 0801    fidaxomicin (DIFICID) tablet 200 mg  200 mg Oral BID Tonya Alatorreone, DO   200 mg at 12/30/24 0801    gabapentin (NEURONTIN) capsule 300 mg  300 mg Oral At Bedtime Tonya Alatorreone, DO   300 mg at 12/30/24 0016    ipratropium (ATROVENT) 0.06 % spray 3 spray  3 spray Both Nostrils Daily Tonya Alatorreone, DO   3 spray at 12/30/24 0916    lisinopril (ZESTRIL) tablet 2.5 mg  2.5 mg Oral Daily Tomás Garcia MD   2.5 mg at 12/30/24 1359    loratadine (CLARITIN) tablet 10 mg  10 mg Oral QAM Tonya Alatorreone, DO   10 mg at 12/30/24 0754    LORazepam (ATIVAN) tablet 1 mg  1 mg Oral 4x Daily Jae Alatorreathone, DO   1 mg at 12/30/24 1300    pantoprazole (PROTONIX) EC tablet 40 mg  40 mg Oral BID Xiang De La O MD   40 mg at 12/30/24 0754    PARoxetine (PAXIL) half-tab 25 mg  25 mg Oral BID Jae Alatorreathone, DO   25 mg at 12/30/24 0753    propranolol ER (INDERAL LA) 24 hr capsule 60 mg  60 mg Oral Daily Jae Alatorreathone, DO   60 mg at 12/30/24 0754    riboflavin CAPS 400 mg  400 mg Oral Daily Ugo Alonso [facility]   400 mg at 12/30/24 0918    sodium chloride (PF) 0.9% PF flush 3 mL  3 mL Intracatheter Q8H Xiang Mariano MD   3 mL at 12/30/24 1526    [Held by provider] vitamin C (ASCORBIC ACID) tablet 1,000 mg  1,000  mg Oral QPM Ken Alatorre DO        [Held by provider] Vitamin D3 (CHOLECALCIFEROL) tablet 250 mcg  250 mcg Oral QPM Ken Alatorre, DO           Data   Last 24 hours labs personally reviewed.  Echo: No results found for this or any previous visit (from the past 4320 hours).

## 2024-12-30 NOTE — PLAN OF CARE
Rcvd pt from station 88 s/p cardiac arrest. Amiodarone drip initiated, pt sent to cath lab. Right groin and right radial sites WDL, CMS intact. A&Ox4, c/o pain in chest with deep breaths likely from chest compressions. Angiogram revealed SCAD.  at bedside.

## 2024-12-30 NOTE — PROGRESS NOTES
[facility]    Medicine Progress Note - Hospitalist Service    Date of Admission:  12/22/2024    Assessment & Plan   Laurel Parra is a 74 year-old female with past medical history significant for lymphocytic colitis with chronic diarrhea, anorexia, adrenal insufficiency who was admitted to [facility] on 12/23/2024 for weakness, fever, abdominal pain secondary to nonsevere C. Difficile      NSTEMI  CAD (LAD occlusion).  V fib/arrest.  Code blue last night with successful ROSC. Emegent cath showed total occlusion of LAD with apical akinesis and presumptive infarction.  Asymptomatic at this time except chest wall pain 2/2 CPR.  In NSR.  Cards/EP on bopard.  - Telemetry.  - Follow TTE.  - Follow cards/EP recs.  - Pain contro for mskl pain.    Generalized weakness  Sepsis  Non-severe C-Diff  Chronic diarrhea in setting of lymphocytic colitis  Risk factors: Omeprazole and recent abx (within 1 month)  CT AP W 12/2012/24: Diffuse colonic thickening from cecum to distal rectum  Enteric panel negative  C Diff PCR & EIA Toxin positive  BCX NTD.  Diarrhea resolved.  - Continue fidaxomicin 200 mg twice dailyx 10 days (flagyl stopped on 12/27).  - Oral Vanc 250 mg BID for at least a month after above treatment for prevention.  - Continue budesonide.  - Colestipol started today by GI.  - ID/GI assistance appreciated.    Hypokalemia  Hypomagnesemia   Secondary to diarrhea and poor intake.  - Monitor with replacement protocol    Secondary adrenal insufficiency - Followed by Dr. Navas of [facility]. Continue home budesonide   Chronic pain syndrome - Continue prior to admission gabapentin, hydrocodone-APAP, cyclobenzaprine, baclofen when dose confirmed by pharmacy   GERD - Continue prior to admission PPI when dose confirmed by pharmacy   Anxiety - Continue prior to admission lorazepam, paroxetine when dose confirmed by pharmacy   Migraine headaches - Propranolol & riboflavin            Diet: Snacks/Supplements Adult: Ensure Enlive; With Meals  Combination Diet Regular Diet Adult    DVT Prophylaxis: Enoxaparin (Lovenox) SQ  Bates Catheter: Not present  Lines: None     Cardiac Monitoring: ACTIVE order. Indication: Post- PCI/Angiogram (24 hours)  Code Status: Full Code            Disposition Plan     Medically Ready for Discharge: TBD. When ok with cards.  Initial plan was to discontinue her today but eventful night last night.              Gunner Rizoz MD  Hospitalist Service  [facility]  Securely message with [employer] (more info)  Text page via [employer] Paging/Directory   ______________________________________________________________________    Interval History   No BM since yesterday,  No cp/sob.  Oriented x3.    Code blue last night.      Physical Exam   Vital Signs: Temp: 99.5  F (37.5  C) Temp src: Oral BP: (!) 164/105 Pulse: 77   Resp: 14 SpO2: 97 % O2 Device: None (Room air) Oxygen Delivery: 4 LPM  Weight: 126 lbs 3.2 oz    General Appearance: Elderly and weak  Respiratory: No tripoding or accessory muscle usage  Cardiovascular: Regular rate and rhythm.  No edema in BLE  GI: Soft, mild tenderness diffusely with deep palpation.  No guarding, rigidity or rebound tenderness  Skin: No rashes or lesions  Other: Alert and oriented x 4, conversing coherently  MSKL: chest wall tenderness (CPR).    Data   ------------------------- PAST 24 HR DATA REVIEWED -----------------------------------------------

## 2024-12-30 NOTE — CODE/RAPID RESPONSE
[facility]  House PADMINI RRT and CODE BLUE Note  12/29/2024   Time called: 1956  RRT called for: Chest pain  Code status: Full Code    Assessment & Plan   Laurel Parra is a 74 year-old female with past medical history significant for lymphocytic colitis with chronic diarrhea, anorexia, adrenal insufficiency who was admitted to [facility] on 12/23/2024 for weakness, fever, abdominal pain secondary to nonsevere C. Difficile     I was paged to the bedside to evaluate Ms. Laurel Parra for an acute episode of chest pain 7/10. During RRT patient had a Vfib arrest, CODE BLUE activated and high quality CPR was initiated, patient's rhythm converted to SR after ~60s and without intervention aside from CPR.    Diagnosis:  Chest pain, cannot rule out ACS  Vfib arrest  Upon my arrival the patient was supine in bed and appeared to be in acute distress. Patient was pale and diaphoretic and endorsing 7/10 midsternal chest pain that was not reproducible with palpation and did not change with respiration. Patient was warm and had 2+ pulses in all extremities. EKG showed mild ST elevation in V2 and resolution of previously noted ST depression in lateral leads and T wave inversions in anterolateral leads. Patient was hypertensive 165/110 mmHg but otherwise vitally stable on room air. Morphine 2mg IV was given as well as nitrostat which improved pain to 4/10, a second dose of nitrostat was given and patient's pain resolved. Patient was talking to me regarding her pain resolution when her rhythm on bedside monitor appeared to be ventricular fibrillation. Patient became pale and stated she was feeling unwell, no pulse felt on palpation. CODE BLUE was activated and high-quality CPR was started. Zoll was prepared to defibrillate when patient woke, pushed staff away, and rhythm check revealed SR with 2+ pulses. Post-arrest EKG was non-ischemic and continued to display resolution of ST  depressions in lateral leads and T-wave inversions seen on admission EKG from 12/22.     I notified on-call cardiologist who reviewed EKGs. Recommendation was to start amiodarone bolus and infusion and activate cath lab for angiogram and possible PCI. Patient did have previous angiogram in 2023 showing nonobstructive CAD. Patient was transferred to CCU on IMC status while awaiting cath lab team arrival.    Plan:  -- Nitrostat x2 tabs  -- Morphine 2mg IV Q2h PRN (total 4mg given throughout RRT and pre-cath lab)  -- Labs   *CBC   *BMP   *Magnesium   *Phosphorous   *Troponin: 16 (taken pre-arrest, repeat in 2 hours)    At the conclusion of this RRT patient was hemodynamically stable and will remain on current unit      Her history is significant for:  Past Medical History:   Diagnosis Date    Adrenal insufficiency (H)     Dr. Navas, secondary to meds    Anxiety     Dr. Adelina Meehan    ASCVD (arteriosclerotic cardiovascular disease) 08/2023    cp and pos trop, angio with trivial cad, echo nl    Central sensitization to pain     Chronic constipation     Chronic headache     sees neuro Dr Danielson, 3 types of headaches    Chronic narcotic use     [facility]    Chronic pain     Dr. Orta as of 2015    Chronic urethritis     Dr. Little    Colonic polyp 11/2011    one polyp, fu 5 years, fu 10/17 and no lesions, fu 9/20 and negative, fu 5 years    Depression, major, in partial remission (H)     Dr. Meehan    Depressive disorder 1971    Diarrhea 2012    eval by [facility], resolved with gluten free diet    DJD (degenerative joint disease)     Dyspepsia 2020    eval by [facility], had colonoscopy, egd, ct abd and pelvis, mrcp.  [place] to be dyspepsia and constipation    H/O gastroesophageal reflux (GERD)     LBP (low back pain) 2013    Dr. Jae Tong in [place], then seeing [facility] Dr. You Cannon headed 07/2020    nl est echo    Lymphocytic colitis 2021    [facility]; seen on flex sig 9/24 [facility]  gi, added budesonide    Migraines     neuro eval    NSTEMI (non-ST elevated myocardial infarction) (H) 2023    echo nl and angio trivial cad    Palpitations     holter with pvc's and pac's, echo nl    Screening     dexa nl at gyn     Past Surgical History:   Procedure Laterality Date    APPENDECTOMY  2016    ARTHRODESIS WRIST Left 2021    Procedure: LEFT TOTAL WRIST FUSION;  Surgeon: Beatrice Philippe MD;  Location:  OR    ARTHROPLASTY CARPOMETACARPAL (THUMB JOINT) Left 2017    Procedure: ARTHROPLASTY CARPOMETACARPAL (THUMB JOINT);  REVISION  LEFT THUMB CARPOMETACARPAL ARTHROPOASTY WITH 1.1 TIGHT ROPE;  Surgeon: Beatrice Philippe MD;  Location:  SD    ARTHROSCOPY WRIST Left 2019    Procedure: LEFT WRIST ARTHROSCOPY REMOVAL OF TIGHT ROPE ; LEFT EXTENSOR POLLICIS LONGUS /EXTENSOR CARPI RADIALIS BREVIS /EXTENSOR CARPI RADIALUS LONGUS SYNOVECTOMY ; EXTENSOR POLLICIS LONGUS TRANSPOSITION ; ANTERIOR INTEROSSEOUS NEURECTOMY ; POSTERIOR INTEROSSEOUS NEURECTOMY;  Surgeon: Beatrice Philippe MD;  Location:  OR    BIOPSY      CARPAL TUNNEL RELEASE RT/LT      CATARACT EXTRACTION  2021     SECTION      COLONOSCOPY      CV CORONARY ANGIOGRAM N/A 2023    Procedure: Coronary Angiogram;  Surgeon: Moody Miranda MD;  Location:  HEART CARDIAC CATH LAB    ESOPHAGOSCOPY, GASTROSCOPY, DUODENOSCOPY (EGD), COMBINED N/A 2020    Procedure: ESOPHAGOGASTRODUODENOSCOPY, WITH BIOPSY;  Surgeon: Georges Pop MD;  Location:  GI    EXCIS BARTHOLIN GLAND/CYST      EYE SURGERY      HERNIORRHAPHY INGUINAL  70's    x5    INJECT STEROID (LOCATION) Right 2019    Procedure: RIGHT WRIST CORTISONE INJECTION;  Surgeon: Beatrice Philippe MD;  Location:  OR    OPERATIVE HYSTEROSCOPY WITH MORCELLATOR N/A 2018    Procedure: OPERATIVE HYSTEROSCOPY WITH MORCELLATOR ([employer]);  OPERATIVE HYSTEROSCOPY WITH TRUECLEAR MORCELLATOR 5C SCOPE ;  Surgeon: Jim  "MD Iris;  Location: [facility]    ORTHOPEDIC SURGERY Bilateral     SHOULDER ARTHROSCOPY    ORTHOPEDIC SURGERY Right 11/2017    right foot for plantar fasciitis    thumb surgery  2000 and 2004, 2007       Review of Systems   The 10 point Review of Systems is negative other than noted in the HPI or here.     Allergies   Allergies   Allergen Reactions    Betamethasone Other (See Comments)     agitation    Chocolate Other (See Comments)     Triggers migraines    Compazine [Prochlorperazine] Other (See Comments)     \"crawl out of my skin\"    Linzess [Linaclotide]     Penicillins Nausea and Vomiting and Hives    Pneumococcal Vaccine Other (See Comments)     Temporary paralization    Tizanidine Other (See Comments)     Severe agitation       Physical Exam   Physical Exam  Constitutional:       General: She is not in acute distress.  Eyes:      Pupils: Pupils are equal, round, and reactive to light.   Cardiovascular:      Rate and Rhythm: Normal rate and regular rhythm.   Pulmonary:      Effort: Pulmonary effort is normal.      Breath sounds: Normal breath sounds.   Abdominal:      General: Abdomen is flat.      Palpations: Abdomen is soft.   Musculoskeletal:      Right lower leg: No edema.      Left lower leg: No edema.   Skin:     General: Skin is warm and dry.      Capillary Refill: Capillary refill takes less than 2 seconds.   Neurological:      General: No focal deficit present.      Mental Status: She is alert and oriented to person, place, and time.         Vital Signs with Ranges:  Temp:  [98.2  F (36.8  C)-98.7  F (37.1  C)] 98.4  F (36.9  C)  Pulse:  [60-77] 77  Resp:  [16-18] 16  BP: (126-152)/(76-97) 152/97  SpO2:  [95 %-98 %] 98 %  I/O last 3 completed shifts:  In: 380 [P.O.:380]  Out: -     Data   Results for orders placed or performed during the hospital encounter of 12/22/24 (from the past 24 hours)   CBC with platelets   Result Value Ref Range    WBC Count 12.2 (H) 4.0 - 11.0 10e3/uL    RBC Count 4.00 3.80 - " 5.20 10e6/uL    Hemoglobin 12.5 11.7 - 15.7 g/dL    Hematocrit 37.0 35.0 - 47.0 %    MCV 93 78 - 100 fL    MCH 31.3 26.5 - 33.0 pg    MCHC 33.8 31.5 - 36.5 g/dL    RDW 12.7 10.0 - 15.0 %    Platelet Count 424 150 - 450 10e3/uL   Basic metabolic panel   Result Value Ref Range    Sodium 139 135 - 145 mmol/L    Potassium 4.0 3.4 - 5.3 mmol/L    Chloride 103 98 - 107 mmol/L    Carbon Dioxide (CO2) 24 22 - 29 mmol/L    Anion Gap 12 7 - 15 mmol/L    Urea Nitrogen 8.5 8.0 - 23.0 mg/dL    Creatinine 0.55 0.51 - 0.95 mg/dL    GFR Estimate >90 >60 mL/min/1.73m2    Calcium 9.0 8.8 - 10.4 mg/dL    Glucose 123 (H) 70 - 99 mg/dL   Magnesium   Result Value Ref Range    Magnesium 1.7 1.7 - 2.3 mg/dL   Phosphorus   Result Value Ref Range    Phosphorus 3.0 2.5 - 4.5 mg/dL   Glucose by meter   Result Value Ref Range    GLUCOSE BY METER POCT 127 (H) 70 - 99 mg/dL   EKG 12-lead, tracing only   Result Value Ref Range    Systolic Blood Pressure  mmHg    Diastolic Blood Pressure  mmHg    Ventricular Rate 77 BPM    Atrial Rate 77 BPM    WA Interval 148 ms    QRS Duration 72 ms     ms    QTc 482 ms    P Axis 74 degrees    R AXIS -17 degrees    T Axis 70 degrees    Interpretation ECG       Sinus rhythm with Premature atrial complexes  Cannot rule out Anterior infarct (cited on or before 31-Aug-2023)  Abnormal ECG  When compared with ECG of 22-Dec-2024 04:04,  Premature atrial complexes are now Present  Non-specific change in ST segment in Anterior leads  T wave inversion no longer evident in Inferior leads  T wave inversion no longer evident in Anterolateral leads     Nt probnp inpatient   Result Value Ref Range    N terminal Pro BNP Inpatient 445 0 - 900 pg/mL   Troponin T, High Sensitivity   Result Value Ref Range    Troponin T, High Sensitivity 16 (H) <=14 ng/L   CBC with platelets   Result Value Ref Range    WBC Count 10.6 4.0 - 11.0 10e3/uL    RBC Count 4.01 3.80 - 5.20 10e6/uL    Hemoglobin 12.3 11.7 - 15.7 g/dL    Hematocrit 36.9  35.0 - 47.0 %    MCV 92 78 - 100 fL    MCH 30.7 26.5 - 33.0 pg    MCHC 33.3 31.5 - 36.5 g/dL    RDW 12.5 10.0 - 15.0 %    Platelet Count 480 (H) 150 - 450 10e3/uL   Basic metabolic panel   Result Value Ref Range    Sodium 141 135 - 145 mmol/L    Potassium 3.9 3.4 - 5.3 mmol/L    Chloride 102 98 - 107 mmol/L    Carbon Dioxide (CO2) 26 22 - 29 mmol/L    Anion Gap 13 7 - 15 mmol/L    Urea Nitrogen 8.4 8.0 - 23.0 mg/dL    Creatinine 0.64 0.51 - 0.95 mg/dL    GFR Estimate >90 >60 mL/min/1.73m2    Calcium 9.4 8.8 - 10.4 mg/dL    Glucose 130 (H) 70 - 99 mg/dL   Magnesium   Result Value Ref Range    Magnesium 1.7 1.7 - 2.3 mg/dL   Phosphorus   Result Value Ref Range    Phosphorus 3.0 2.5 - 4.5 mg/dL   XR Chest Port 1 View    Narrative    EXAM: XR CHEST PORT 1 VIEW  LOCATION: [facility]  DATE: 12/29/2024    INDICATION: RRT: chest pain  COMPARISON: None.      Impression    IMPRESSION:   Heart size and pulmonary vascularity within normal limits. No focal lung infiltrates. Osseous structures grossly intact. Visualized upper abdomen unremarkable.   EKG 12-lead, tracing only   Result Value Ref Range    Systolic Blood Pressure  mmHg    Diastolic Blood Pressure  mmHg    Ventricular Rate 65 BPM    Atrial Rate 65 BPM    GA Interval 146 ms    QRS Duration 74 ms     ms    QTc 495 ms    P Axis 59 degrees    R AXIS -17 degrees    T Axis 70 degrees    Interpretation ECG       Sinus rhythm  Cannot rule out Anterior infarct (cited on or before 31-Aug-2023)  Abnormal ECG  When compared with ECG of 29-Dec-2024 20:35, (unconfirmed)  No significant change was found     iStat Gases Electrolytes Venous, POCT   Result Value Ref Range    CPB Applied No     Hematocrit POCT 37 35 - 47 %    Bicarbonate Venous POCT 27 21 - 28 mmol/L    Hemoglobin POCT 12.6 11.7 - 15.7 g/dL    Potassium POCT 3.5 3.4 - 5.3 mmol/L    Sodium POCT 140 135 - 145 mmol/L    pCO2 Venous POCT 46 40 - 50 mm Hg    pH Venous POCT 7.38 7.32 - 7.43     pO2 Venous POCT 59 (H) 25 - 47 mm Hg    O2 Sat, Venous POCT 89 (H) 70 - 75 %    Base Excess/Deficit (+/-) POCT 2.0 -3.0 - 3.0 mmol/L   Extra Tube ([place] Draw)    Narrative    The following orders were created for panel order Extra Tube ([place] Draw).  Procedure                               Abnormality         Status                     ---------                               -----------         ------                     Extra Green Top (Lithium...[[id number]]                      In process                 Extra Heparinized Syringe[[id number]]                        In process                   Please view results for these tests on the individual orders.     COVID-19 PCR Results          4/7/2023    10:38 12/22/2024    02:58   COVID-19 PCR Results   SARS CoV2 PCR Negative  Negative      COVID-19 Antibody Results, Testing for Immunity           No data to display              EKG:  Interpreted by FAVIO Gonzales CNP  Time reviewed: 2016  Symptoms at time of EKG: Chest pain 7/10   Rhythm: normal sinus   Rate: Normal  Axis: Normal  Ectopy: none  Conduction: normal  ST Segments/ T Waves: ST Segement Elevation V2  Q Waves: none  Comparison to prior: Resolution of ST depression in lateral leads and T wave inversions in anterolateral leads. New mild ST elevation in V2 which resolved after nitrostat.  Clinical Impression: myocardial ischemia    Time Spent on this Encounter   I spent 90 minutes of critical care time on the unit/floor managing the care of Laurel Parra. Upon evaluation, this patient had a high probability of imminent or life-threatening deterioration due to chest pain and vfib arrest, which required my direct attention, intervention, and personal management. 100% of my time was spent at the bedside counseling the patient and/or coordinating care regarding services listed in this note.      FAVIO Gonzales, CNP  Hospitalist - House MISHEL  Text me on the [employer] mishel for a textback

## 2024-12-30 NOTE — PLAN OF CARE
Neuro: A&Ox4  Tele/Cardiac: SR. R radial site, R groin site WDL  Resp: WDL  Activity: pt did not ambulate this shift  Pain: pt reports pain from chest compressions and chronic pain. 1x dose of morphine given, PRN Norco given x1. Heat applied to back with improvement in pain.  Drips/IV: amio 0.5 mg/hr  GI/: purewick in place overnight. Can be incontinent of bowel at times.  Skin: WDL  Diet: Diet: Snacks/Supplements Adult: Ensure Enlive; With Meals  Advance Diet as Tolerated: Clear Liquid Diet     Test/Procedures: n/a  Plan: EP consult today.

## 2024-12-30 NOTE — CONSULTS
[facility] Cardiology Consultation    Date of Admission:  12/22/2024  Date of Service: 12/29/24  Patient Name: Laurel Parra  MRN: [id number]    Subjective   REASON FOR CONSULTATION  [facility]    History of Present Illness   Ms. Parra is a 74 year old female with pmh    MINOCA Sept/2024  Angiogram showed non-obstructive CAD  CMR: showed small infarct  C.diff infection  Lymphocytic colitis   Secondary adrenal insufficiency   Hyperlipidemia    The patient has was admitted on 12/22 with fever and abdominal pain. She's been receiving treatment for c.diff colitis with overall improvement in her clinical status. Earlier today, she had a left-sided, pressure-like chest pain. A RRT was called and she was given a nitroglycerin which helped with her pain. EKG showed slight T changes in the anterior leads. Following this while RRT was in the room, the patient lost consciousness and cardiac monitor showed ventricular fibrillation and she was pulseless. Prompt chest compressions were started and ROSC was achieved within 1 minutes as patient gained consiousness. No shock or meds were given as ROSC was achieved quickly. EKG post arrest showed no significant changed, T waves more peaked in anterior leads but minor. I discussed the case with Dr. England and we agreed to activate the cath lab. Coronary angiogram showed total occlusion of the distal LAD, concerning for SCAD. LV gram showed apical akinesis     Review of Systems   The comprehensive 10 point Review of Systems is negative other than noted in the HPI or here.     Past Medical History    I have reviewed this patient's medical history and updated it with pertinent information if needed.   Past Medical History:   Diagnosis Date    Adrenal insufficiency (H)     Dr. Navas, secondary to meds    Anxiety     Dr. Adelina Meehan    ASCVD (arteriosclerotic cardiovascular disease) 08/2023    cp and pos trop, angio with trivial  cad, echo nl    Central sensitization to pain     Chronic constipation     Chronic headache     sees neuro Dr Danielson, 3 types of headaches    Chronic narcotic use     [facility]    Chronic pain     Dr. Orta as of 2015    Chronic urethritis     Dr. Little    Colonic polyp 11/2011    one polyp, fu 5 years, fu 10/17 and no lesions, fu 9/20 and negative, fu 5 years    Depression, major, in partial remission (H)     Dr. Meehan    Depressive disorder 1971    Diarrhea 2012    eval by [facility], resolved with gluten free diet    DJD (degenerative joint disease)     Dyspepsia 2020    eval by [facility], had colonoscopy, egd, ct abd and pelvis, mrcp.  [place]lin to be dyspepsia and constipation    H/O gastroesophageal reflux (GERD)     LBP (low back pain) 2013    Dr. Jae Tong in [place], then seeing [facility] Dr. Orta    Light headed 07/2020    nl est echo    Lymphocytic colitis 2021    [facility]; seen on flex sig 9/24 [facility], added budesonide    Migraines 2009    neuro eval    NSTEMI (non-ST elevated myocardial infarction) (H) 08/31/2023    echo nl and angio trivial cad    Palpitations 2006    holter with pvc's and pac's, echo nl    Screening 2010    dexa nl at gyn       Past Surgical History   I have reviewed this patient's surgical history and updated it with pertinent information if needed.  Past Surgical History:   Procedure Laterality Date    APPENDECTOMY  2016    ARTHRODESIS WRIST Left 04/05/2021    Procedure: LEFT TOTAL WRIST FUSION;  Surgeon: Beatrice Philippe MD;  Location:  OR    ARTHROPLASTY CARPOMETACARPAL (THUMB JOINT) Left 06/07/2017    Procedure: ARTHROPLASTY CARPOMETACARPAL (THUMB JOINT);  REVISION  LEFT THUMB CARPOMETACARPAL ARTHROPOASTY WITH 1.1 TIGHT ROPE;  Surgeon: Beatrice Philippe MD;  Location:  SD    ARTHROSCOPY WRIST Left 08/26/2019    Procedure: LEFT WRIST ARTHROSCOPY REMOVAL OF TIGHT ROPE ; LEFT EXTENSOR POLLICIS LONGUS /EXTENSOR CARPI RADIALIS BREVIS /EXTENSOR CARPI  RADIALUS LONGUS SYNOVECTOMY ; EXTENSOR POLLICIS LONGUS TRANSPOSITION ; ANTERIOR INTEROSSEOUS NEURECTOMY ; POSTERIOR INTEROSSEOUS NEURECTOMY;  Surgeon: Beatrice Philippe MD;  Location:  OR    BIOPSY      CARPAL TUNNEL RELEASE RT/LT      CATARACT EXTRACTION  2021     SECTION      COLONOSCOPY      CV CORONARY ANGIOGRAM N/A 2023    Procedure: Coronary Angiogram;  Surgeon: Moody Miranda MD;  Location:  HEART CARDIAC CATH LAB    ESOPHAGOSCOPY, GASTROSCOPY, DUODENOSCOPY (EGD), COMBINED N/A 2020    Procedure: ESOPHAGOGASTRODUODENOSCOPY, WITH BIOPSY;  Surgeon: Georges Pop MD;  Location:  GI    EXCIS BARTHOLIN GLAND/CYST      EYE SURGERY      HERNIORRHAPHY INGUINAL  70's    x5    INJECT STEROID (LOCATION) Right 2019    Procedure: RIGHT WRIST CORTISONE INJECTION;  Surgeon: Beatrice Philippe MD;  Location:  OR    OPERATIVE HYSTEROSCOPY WITH MORCELLATOR N/A 2018    Procedure: OPERATIVE HYSTEROSCOPY WITH MORCELLATOR (MARTIN [employer] NEPHEW);  OPERATIVE HYSTEROSCOPY WITH TRUECLEAR MORCELLATOR 5C SCOPE ;  Surgeon: Iris Fernandez MD;  Location: [facility] [place]    ORTHOPEDIC SURGERY Bilateral     SHOULDER ARTHROSCOPY    ORTHOPEDIC SURGERY Right 2017    right foot for plantar fasciitis    thumb surgery   and 2007       Social History    reports that she quit smoking about 50 years ago. Her smoking use included cigarettes. She has never used smokeless tobacco. She reports that she does not drink alcohol and does not use drugs.    Family History   Family History   Problem Relation Age of Onset    Diabetes Father     Hypertension Father     Depression Father     Substance Abuse Father     Anesthesia Reaction Father         went into shock with surgery     Obesity Father     Anxiety Disorder Father     Coronary Artery Disease Father     Hyperlipidemia Father     Diabetes Mother     Cerebrovascular Disease Mother         Cadasils disease    Depression Mother     Mental  Illness Mother         borderline personality disorder, Chemical dependency    Substance Abuse Mother     Genetic Disorder Mother         Cadasils    Obesity Mother     Anxiety Disorder Mother     Cerebrovascular Disease Paternal Grandfather     Cerebrovascular Disease Brother         Cadasils disease    Depression Brother     Anxiety Disorder Brother     Substance Abuse Brother     Genetic Disorder Brother         cadasils    Obesity Brother     Hypertension Brother     Hyperlipidemia Brother     Cerebrovascular Disease Sister         Cadasils disease    Depression Sister     Anxiety Disorder Sister     Genetic Disorder Sister         cadasils    Obesity Sister     Cerebrovascular Disease Sister         Cadasils disease    Genetic Disorder Sister         cadasils    Breast Cancer Sister     Depression Sister     Anxiety Disorder Sister     Obesity Sister     Hypertension Sister     Hyperlipidemia Sister     Depression Son     Anxiety Disorder Son        Prior to Admission Medications   Prior to Admission Medications   Prescriptions Last Dose Informant Patient Reported? Taking?   Baclofen (LIORESAL) 5 MG tablet Unknown Self Yes Yes   Sig: Take 5 mg by mouth as needed for muscle spasms   HYDROcodone-Acetaminophen  MG TABS 12/21/2024 Evening  No Yes   Sig: Take 1-2 tablets by mouth every 4 hours as needed (maximum of 10 tablets in 24 hour period) Prescription is written for 1-2 tabs every 4-6 hrs prn (max of 10 tabs daily).   LORazepam (ATIVAN) 1 MG tablet 12/21/2024 Evening  No Yes   Sig: Take 1 tablet (1 mg) by mouth 4 times daily.   PARoxetine (PAXIL-CR) 25 MG 24 hr tablet 12/21/2024 Evening  No Yes   Sig: Take 1 tablet (25 mg) by mouth 2 times daily.   Vitamin D3 (CHOLECALCIFEROL) 125 MCG (5000 UT) tablet 12/21/2024 Evening Self Yes Yes   Sig: Take 2 tablets by mouth every evening   aspirin 81 MG EC tablet 12/21/2024 Morning  No Yes   Sig: Take 1 tablet (81 mg) by mouth daily   budesonide (ENTOCORT EC) 3 MG  EC capsule 12/21/2024 Morning  No Yes   Sig: Take 3 capsules (9 mg) by mouth every morning.   Patient taking differently: Take 3 mg by mouth every morning.   carboxymethylcellulose (REFRESH PLUS) 0.5 % SOLN ophthalmic solution Past Week Self Yes Yes   Sig: Place 1 drop into both eyes 3 times daily as needed for dry eyes   cyclobenzaprine (FLEXERIL) 10 MG tablet Unknown Self Yes Yes   Sig: Take 10 mg by mouth daily as needed for muscle spasms.   gabapentin (NEURONTIN) 300 MG capsule 12/21/2024 Bedtime  No Yes   Sig: Take 1 capsule (300 mg) by mouth at bedtime.   ipratropium (ATROVENT) 0.06 % spray Unknown Self No Yes   Sig: Spray 3 sprays into both nostrils daily   loratadine (CLARITIN) 10 MG tablet 12/21/2024 Morning Self Yes Yes   Sig: Take 10 mg by mouth every morning    naloxone (NARCAN) 4 MG/0.1ML nasal spray Unknown Self No Yes   Sig: Spray 1 spray (4 mg) into one nostril alternating nostrils as needed for opioid reversal Every 2-3 minutes in alternating nostrils   nitroGLYcerin (NITROSTAT) 0.4 MG sublingual tablet   No Yes   Sig: For chest pain place 1 tablet under the tongue every 5 minutes for 3 doses. If symptoms persist 5 minutes after 1st dose call 911.   omeprazole (PRILOSEC) 40 MG DR capsule 12/21/2024 Evening Self Yes Yes   Sig: Take 40 mg by mouth 2 times daily (before meals)   ondansetron (ZOFRAN) 4 MG tablet Unknown Self Yes Yes   Sig: Take 4 mg by mouth every 8 hours as needed for nausea or vomiting   polyethylene glycol (MIRALAX) 17 GM/Dose powder Unknown Self Yes Yes   Sig: Take 1 capful. by mouth as needed for constipation   propranolol ER (INDERAL LA) 60 MG 24 hr capsule 12/21/2024 Bedtime  No Yes   Sig: TAKE 1 CAPSULE(60 MG) BY MOUTH DAILY   riboflavin 400 MG CAPS Unknown Self Yes Yes   Sig: Take 400 mg by mouth daily   ubrogepant (UBRELVY) 50 MG tablet Unknown  No Yes   Sig: Take 1 tablet (50 mg) by mouth at onset of headache (may repeat in 2 hours, total daily dose is 100 mg/day, but limit  "use to less than 10 days per month)   vitamin C (ASCORBIC ACID) 1000 MG TABS 12/21/2024 Evening Self Yes Yes   Sig: Take 1,000 mg by mouth every evening    zolpidem ER (AMBIEN CR) 12.5 MG CR tablet 12/21/2024 Bedtime  No Yes   Sig: Take 1 tablet (12.5 mg) by mouth at bedtime.      Facility-Administered Medications: None       Allergies   Allergies   Allergen Reactions    Betamethasone Other (See Comments)     agitation    Chocolate Other (See Comments)     Triggers migraines    Compazine [Prochlorperazine] Other (See Comments)     \"crawl out of my skin\"    Linzess [Linaclotide]     Penicillins Nausea and Vomiting and Hives    Pneumococcal Vaccine Other (See Comments)     Temporary paralization    Tizanidine Other (See Comments)     Severe agitation       Objective   VITAL SIGNS  Temp:  [98.2  F (36.8  C)-98.7  F (37.1  C)] 98.4  F (36.9  C)  Pulse:  [75-77] 75  Resp:  [16-17] 16  BP: (126-190)/() 143/93  SpO2:  [97 %-98 %] 97 %  Wt Readings from Last 4 Encounters:   12/22/24 57.8 kg (127 lb 8 oz)   10/16/24 59.4 kg (131 lb)   10/12/23 67.6 kg (149 lb)   09/15/23 65.8 kg (145 lb)     I/O last 3 completed shifts:  In: 380 [P.O.:380]  Out: -     PHYSICAL EXAMINATION  Constitutional: Appears stated age, well nourished, NAD. On NC  Neck: Supple.  JVD not visualized.   Respiratory: Non-labored. Lungs CTAB.  Cardiovascular: RRR. Bilateral lower extremities with trace edema.   GI: Soft, non-distended, non-tender.  Skin: Warm and dry.   Musculoskeletal/Extremities: Symmetrical movement.  Neurologic: No gross focal deficits. Alert, awake.  Psychiatric: Affect appropriate. Mentation normal.    DIAGNOSTICS   Recent Labs   Lab 12/29/24 2056 12/29/24 2023 12/29/24 1958 12/29/24  1148 12/26/24  0902 12/25/24  0924   WBC  --  10.6  --  12.2*  --  8.0   HGB 12.6 12.3  --  12.5  --  11.6*   MCV  --  92  --  93  --  94   PLT  --  480*  --  424  --  286    141  --  139  --  140   POTASSIUM 3.5 3.9  --  4.0   < > 3.5 " "  CHLORIDE  --  102  --  103  --  102   CO2  --  26  --  24  --  28   BUN  --  8.4  --  8.5  --  2.1*   CR  --  0.64  --  0.55  --  0.49*   GFRESTIMATED  --  >90  --  >90  --  >90   ANIONGAP  --  13  --  12  --  10   THOR  --  9.4  --  9.0  --  8.6*   GLC  --  130* 127* 123*  --  91    < > = values in this interval not displayed.     Recent Labs   Lab Test 12/07/23 1020 08/31/23 2037   CHOL 140 197   HDL 55 54   LDL 65 111*   TRIG 101 162*     Recent Labs   Lab 12/29/24 2056 12/29/24 2023 12/29/24  1148 12/25/24  0924   WBC  --  10.6 12.2* 8.0   HGB 12.6 12.3 12.5 11.6*   HCT 37 36.9 37.0 34.9*   MCV  --  92 93 94   PLT  --  480* 424 286     Recent Labs   Lab 12/29/24 2056   PHV 7.38   PO2V 59*   PCO2V 46   HCO3V 27     Recent Labs   Lab 12/29/24 2023   NTBNPI 445     No results for input(s): \"DD\" in the last 168 hours.  No results for input(s): \"SED\", \"CRP\" in the last 168 hours.  Recent Labs   Lab 12/29/24 2023 12/29/24  1148 12/25/24  0924   * 424 286     No results for input(s): \"TSH\" in the last 168 hours.  No results for input(s): \"COLOR\", \"APPEARANCE\", \"URINEGLC\", \"URINEBILI\", \"URINEKETONE\", \"SG\", \"UBLD\", \"URINEPH\", \"PROTEIN\", \"UROBILINOGEN\", \"NITRITE\", \"LEUKEST\", \"RBCU\", \"WBCU\" in the last 168 hours.    Most Recent Transthoracic Echocardiogram:  Echo result w/o MOPS: Interpretation Summary 1. Left ventricular systolic function is normal. The visual ejection fractionis 60-65%.2. No regional wall motion abnormalities noted.3. The right ventricle is normal in structure, function and size.4. No evidence for significant valvular pathology.      Most Recent Cardiac Catheterization:  Cardiac Catheterization    Result Date: 9/1/2023  Trivial non-obstructive CAD          Assessment & Plan      Assessment     SCAD of distal LAD   [facility]  Vfib arrest after spidoe of chest pain   Downtime ~1 mins  MINOCA Sept/2024  Angiogram showed non-obstructive CAD  CMR: showed small infarct  C.diff infection  Lymphocytic " colitis   Secondary adrenal insufficiency   Hyperlipidemia     Ms. Parra is a 74 year old female who was admitted on 12/22 with fever and abdominal pain. She's been receiving treatment for c.diff colitis with overall improvement in her clinical status. Earlier today, she had a left-sided, pressure-like chest pain. A RRT was called and she was given a nitroglycerin which helped with her pain. EKG showed slight T changes in the anterior leads. Following this while RRT was in the room, the patient lost consciousness and cardiac monitor showed ventricular fibrillation and she was pulseless. ROSC was achieved within 1 minutes as patient gained consiousness. EKG post arrest showed no significant changed, T waves more peaked in anterior leads but minor. We started amiodarone infusion, discussed the case with Dr. England and we agreed to activate the cath lab.      Coronary angiogram showed total occlusion of the distal LAD, otherwise normal vessels with minimal CAD. These findings are concerning for SCAD. LV gram showed apical akinesis. No intervention was performed.    Plan     Continue ASA  Unclear role for DAPT in SCAD ACS   Will defer for now, can be revisited in AM  Started atorvastatin 40  Keep amio gtt overnight  TTE ordered  Consider screening for FMD    Thank you for involving us in the care of this patient.  We will follow     Eden Cohen MD

## 2024-12-30 NOTE — SIGNIFICANT EVENT
"RRT called at 1957 due to patient complaining of chest pain described as tight pressure, upon arrival here BP was 190/110, patient denied any SOB. Patient stated \"she felt like this when she had a heart attack last year\". Code blue called during RRT d/t pt in Vfib arrest with RRT at bedside. See code/RRT note. Patient transferred over to CCU, RN given report. Patient sent with her belongings.  "

## 2024-12-31 ENCOUNTER — APPOINTMENT (OUTPATIENT)
Dept: OCCUPATIONAL THERAPY | Facility: CLINIC | Age: 74
DRG: 871 | End: 2024-12-31
Payer: MEDICARE

## 2024-12-31 ENCOUNTER — APPOINTMENT (OUTPATIENT)
Dept: CARDIOLOGY | Facility: CLINIC | Age: 74
DRG: 871 | End: 2024-12-31
Attending: PHYSICIAN ASSISTANT
Payer: MEDICARE

## 2024-12-31 VITALS
RESPIRATION RATE: 16 BRPM | OXYGEN SATURATION: 95 % | BODY MASS INDEX: 22.18 KG/M2 | DIASTOLIC BLOOD PRESSURE: 71 MMHG | HEART RATE: 79 BPM | SYSTOLIC BLOOD PRESSURE: 111 MMHG | WEIGHT: 125.2 LBS | TEMPERATURE: 99.7 F

## 2024-12-31 PROBLEM — I42.9 SECONDARY CARDIOMYOPATHY (H): Status: ACTIVE | Noted: 2024-12-31

## 2024-12-31 LAB
ANION GAP SERPL CALCULATED.3IONS-SCNC: 10 MMOL/L (ref 7–15)
BUN SERPL-MCNC: 7.4 MG/DL (ref 8–23)
CALCIUM SERPL-MCNC: 8.4 MG/DL (ref 8.8–10.4)
CHLORIDE SERPL-SCNC: 101 MMOL/L (ref 98–107)
CREAT SERPL-MCNC: 0.62 MG/DL (ref 0.51–0.95)
EGFRCR SERPLBLD CKD-EPI 2021: >90 ML/MIN/1.73M2
ERYTHROCYTE [DISTWIDTH] IN BLOOD BY AUTOMATED COUNT: 12.7 % (ref 10–15)
GLUCOSE SERPL-MCNC: 145 MG/DL (ref 70–99)
HCO3 SERPL-SCNC: 27 MMOL/L (ref 22–29)
HCT VFR BLD AUTO: 34.4 % (ref 35–47)
HGB BLD-MCNC: 11.7 G/DL (ref 11.7–15.7)
LVEF ECHO: NORMAL
MCH RBC QN AUTO: 31.3 PG (ref 26.5–33)
MCHC RBC AUTO-ENTMCNC: 34 G/DL (ref 31.5–36.5)
MCV RBC AUTO: 92 FL (ref 78–100)
PLATELET # BLD AUTO: 347 10E3/UL (ref 150–450)
POTASSIUM SERPL-SCNC: 3.3 MMOL/L (ref 3.4–5.3)
RBC # BLD AUTO: 3.74 10E6/UL (ref 3.8–5.2)
SODIUM SERPL-SCNC: 138 MMOL/L (ref 135–145)
WBC # BLD AUTO: 11.7 10E3/UL (ref 4–11)

## 2024-12-31 PROCEDURE — 97535 SELF CARE MNGMENT TRAINING: CPT | Mod: GO | Performed by: OCCUPATIONAL THERAPIST

## 2024-12-31 PROCEDURE — 250N000013 HC RX MED GY IP 250 OP 250 PS 637: Performed by: NURSE PRACTITIONER

## 2024-12-31 PROCEDURE — 97110 THERAPEUTIC EXERCISES: CPT | Mod: GO | Performed by: OCCUPATIONAL THERAPIST

## 2024-12-31 PROCEDURE — 255N000002 HC RX 255 OP 636: Performed by: PHYSICIAN ASSISTANT

## 2024-12-31 PROCEDURE — 250N000013 HC RX MED GY IP 250 OP 250 PS 637: Performed by: INTERNAL MEDICINE

## 2024-12-31 PROCEDURE — 93306 TTE W/DOPPLER COMPLETE: CPT | Mod: 26 | Performed by: INTERNAL MEDICINE

## 2024-12-31 PROCEDURE — C8929 TTE W OR WO FOL WCON,DOPPLER: HCPCS

## 2024-12-31 PROCEDURE — 85014 HEMATOCRIT: CPT | Performed by: HOSPITALIST

## 2024-12-31 PROCEDURE — 99239 HOSP IP/OBS DSCHRG MGMT >30: CPT | Performed by: HOSPITALIST

## 2024-12-31 PROCEDURE — 97165 OT EVAL LOW COMPLEX 30 MIN: CPT | Mod: GO | Performed by: OCCUPATIONAL THERAPIST

## 2024-12-31 PROCEDURE — 36415 COLL VENOUS BLD VENIPUNCTURE: CPT | Performed by: HOSPITALIST

## 2024-12-31 PROCEDURE — 250N000013 HC RX MED GY IP 250 OP 250 PS 637: Performed by: HOSPITALIST

## 2024-12-31 PROCEDURE — 250N000013 HC RX MED GY IP 250 OP 250 PS 637

## 2024-12-31 PROCEDURE — 80051 ELECTROLYTE PANEL: CPT | Performed by: HOSPITALIST

## 2024-12-31 PROCEDURE — 99233 SBSQ HOSP IP/OBS HIGH 50: CPT | Mod: 25

## 2024-12-31 RX ORDER — PANTOPRAZOLE SODIUM 40 MG/1
40 TABLET, DELAYED RELEASE ORAL
Qty: 60 TABLET | Refills: 0 | Status: SHIPPED | OUTPATIENT
Start: 2024-12-31 | End: 2025-01-30

## 2024-12-31 RX ORDER — COLESTIPOL HYDROCHLORIDE 1 G/1
1 TABLET ORAL DAILY
Qty: 5 TABLET | Refills: 0 | Status: SHIPPED | OUTPATIENT
Start: 2025-01-01 | End: 2025-01-06

## 2024-12-31 RX ORDER — VANCOMYCIN HYDROCHLORIDE 250 MG/1
250 CAPSULE ORAL 2 TIMES DAILY
Qty: 60 CAPSULE | Refills: 0 | Status: SHIPPED | OUTPATIENT
Start: 2025-01-06 | End: 2025-02-05

## 2024-12-31 RX ORDER — LISINOPRIL 2.5 MG/1
2.5 TABLET ORAL DAILY
Qty: 30 TABLET | Refills: 0 | Status: SHIPPED | OUTPATIENT
Start: 2025-01-01

## 2024-12-31 RX ORDER — POTASSIUM CHLORIDE 1.5 G/1.58G
40 POWDER, FOR SOLUTION ORAL 2 TIMES DAILY
Status: DISCONTINUED | OUTPATIENT
Start: 2024-12-31 | End: 2024-12-31 | Stop reason: HOSPADM

## 2024-12-31 RX ORDER — METOPROLOL TARTRATE 25 MG/1
25 TABLET, FILM COATED ORAL 2 TIMES DAILY
Qty: 60 TABLET | Refills: 0 | Status: SHIPPED | OUTPATIENT
Start: 2024-12-31 | End: 2025-01-30

## 2024-12-31 RX ORDER — CLOPIDOGREL BISULFATE 75 MG/1
75 TABLET ORAL DAILY
Qty: 30 TABLET | Refills: 0 | Status: SHIPPED | OUTPATIENT
Start: 2025-01-01

## 2024-12-31 RX ORDER — ATORVASTATIN CALCIUM 40 MG/1
40 TABLET, FILM COATED ORAL EVERY EVENING
Qty: 30 TABLET | Refills: 0 | Status: SHIPPED | OUTPATIENT
Start: 2024-12-31 | End: 2025-01-30

## 2024-12-31 RX ADMIN — LORATADINE 10 MG: 10 TABLET ORAL at 08:52

## 2024-12-31 RX ADMIN — LORAZEPAM 1 MG: 1 TABLET ORAL at 08:52

## 2024-12-31 RX ADMIN — FIDAXOMICIN 200 MG: 200 TABLET, FILM COATED ORAL at 08:51

## 2024-12-31 RX ADMIN — POTASSIUM CHLORIDE 40 MEQ: 1.5 POWDER, FOR SOLUTION ORAL at 10:50

## 2024-12-31 RX ADMIN — MONTELUKAST SODIUM 1 G: 4 TABLET, CHEWABLE ORAL at 08:51

## 2024-12-31 RX ADMIN — LORAZEPAM 1 MG: 1 TABLET ORAL at 17:28

## 2024-12-31 RX ADMIN — METOPROLOL TARTRATE 25 MG: 25 TABLET, FILM COATED ORAL at 08:51

## 2024-12-31 RX ADMIN — LORAZEPAM 1 MG: 1 TABLET ORAL at 12:54

## 2024-12-31 RX ADMIN — PANTOPRAZOLE SODIUM 40 MG: 40 TABLET, DELAYED RELEASE ORAL at 17:28

## 2024-12-31 RX ADMIN — HYDROCODONE BITARTRATE AND ACETAMINOPHEN 2 TABLET: 10; 325 TABLET ORAL at 04:12

## 2024-12-31 RX ADMIN — CLOPIDOGREL BISULFATE 75 MG: 75 TABLET ORAL at 08:52

## 2024-12-31 RX ADMIN — BUDESONIDE 3 MG: 3 CAPSULE ORAL at 08:52

## 2024-12-31 RX ADMIN — LISINOPRIL 2.5 MG: 2.5 TABLET ORAL at 08:52

## 2024-12-31 RX ADMIN — PANTOPRAZOLE SODIUM 40 MG: 40 TABLET, DELAYED RELEASE ORAL at 08:51

## 2024-12-31 RX ADMIN — HYDROCODONE BITARTRATE AND ACETAMINOPHEN 2 TABLET: 10; 325 TABLET ORAL at 12:54

## 2024-12-31 RX ADMIN — HYDROCODONE BITARTRATE AND ACETAMINOPHEN 2 TABLET: 10; 325 TABLET ORAL at 08:51

## 2024-12-31 RX ADMIN — ASPIRIN 81 MG: 81 TABLET, COATED ORAL at 08:52

## 2024-12-31 RX ADMIN — PAROXETINE HYDROCHLORIDE 25 MG: 20 TABLET, FILM COATED ORAL at 08:51

## 2024-12-31 RX ADMIN — HYDROCODONE BITARTRATE AND ACETAMINOPHEN 2 TABLET: 10; 325 TABLET ORAL at 17:28

## 2024-12-31 RX ADMIN — PERFLUTREN 4 ML: 6.52 INJECTION, SUSPENSION INTRAVENOUS at 09:32

## 2024-12-31 ASSESSMENT — ACTIVITIES OF DAILY LIVING (ADL)
ADLS_ACUITY_SCORE: 38
ADLS_ACUITY_SCORE: 41
ADLS_ACUITY_SCORE: 38
ADLS_ACUITY_SCORE: 38
ADLS_ACUITY_SCORE: 41
PREVIOUS_RESPONSIBILITIES: HOUSEKEEPING;LAUNDRY;SHOPPING;MEDICATION MANAGEMENT;FINANCES;DRIVING
ADLS_ACUITY_SCORE: 41
ADLS_ACUITY_SCORE: 38
ADLS_ACUITY_SCORE: 41
ADLS_ACUITY_SCORE: 38
ADLS_ACUITY_SCORE: 41
ADLS_ACUITY_SCORE: 38
ADLS_ACUITY_SCORE: 41
ADLS_ACUITY_SCORE: 41
ADLS_ACUITY_SCORE: 38

## 2024-12-31 NOTE — PLAN OF CARE
Neuro: A&Ox4, anxious  Tele/Cardiac: SR  Resp: BENITEZ, LS clear.   Activity: ind/ SBA  Pain: pt reported significant chest pain related to chest compressions in the evening. PRN and scheduled medications with some relief. Pt reports chronic back pain and some chest pain overnight PRN Norco given. Pt reports pain with changing of positions, RN educated patient about ways to reduce discomfort. Ice applied to chest between cares.    Drips/IV: SL  GI/: WDL  Skin: scattered scabs and bruising.  Diet: Diet: Snacks/Supplements Adult: Ensure Enlive; With Meals  Combination Diet Regular Diet Adult     Test/Procedures: Echo  Plan: plan of care ongoing.

## 2024-12-31 NOTE — PROGRESS NOTES
12/31/24 1002   Appointment Info   Signing Clinician's Name / Credentials (OT) Saima Lamb OTR/L   Rehab Comments (OT) Initial Evaluation   Living Environment   People in Home spouse;child(lisset), adult  (son)   Current Living Arrangements house   Home Accessibility stairs to enter home;stairs within home   Number of Stairs, Main Entrance 3   Number of Stairs, Within Home, Primary greater than 10 stairs  (15 stairs to upstairs bedroom and basement laundry.)   Transportation Anticipated family or friend will provide   Self-Care   Equipment Currently Used at Home grab bar, tub/shower   Fall history within last six months no   Activity/Exercise/Self-Care Comment tub/shower combo grab bar in tub. has chair but doesn't need.non skid mat. uses a w.c for longer distances ie going to museum. due to fibromyoalgia, etc.   Instrumental Activities of Daily Living (IADL)   Previous Responsibilities housekeeping;laundry;shopping;medication management;finances;driving  (pt is retired,  does the yard work.)   IADL Comments can't cook due to osteoarthritis and multiple surgeries, difficulty with slicing and dicing , chopping, etc.  does all the cooking, A with cleaning and does the laundry.   General Information   Onset of Illness/Injury or Date of Surgery 12/22/24   Referring Physician Edith Travis PA-C   Patient/Family Therapy Goal Statement (OT) home   Additional Occupational Profile Info/Pertinent History of Current Problem Ms. Laurel Parra is a very pleasant 74 year old female with a past medical history of chronic anxiety, osteoarthritis, chronic pain syndrome, migraine headaches, exertional lightheadedness and palpitations who was admitted on 12/22/2024 for fever and abd pain. She has been treated for c.diff colitis.      On 12/29/2024, an RRT was call due to patient reporting left sided chest pain. She received a nitroglycerin which helped with her pain. EKG showed slight T wave  changes. While the RRT was occurring, the patient lost consciousness. Cardiac monitor showed ventricular fibrillation. Chest compressions were started and ROSC was achieved within 1 minute and patient gained consciousness. No shock or meds were gives. She underwent coronary angiogram which showed a total occlusion of the distal LAD, concerning for SCAD.   Existing Precautions/Restrictions fall;cardiac   Cognitive Status Examination   Orientation Status orientation to person, place and time   Affect/Mental Status (Cognitive) WFL   Follows Commands WFL   Cognitive Status Comments denies any changes with thinking and memories.   Visual Perception   Visual Impairment/Limitations corrective lenses for reading  (has corrective implants)   Pain Assessment   Patient Currently in Pain   (chest pain from compressions, 3/10 sitting in chair. 10/10- pain when in processing of supine <> sit andlying pain increases.)   Transfers   Transfer Comments Pt independent with ADl's and functtional mobility.   Clinical Impression   Criteria for Skilled Therapeutic Interventions Met (OT) Yes, treatment indicated   OT Diagnosis impaired safety with strenuous ADL/IADL's   OT Problem List-Impairments impacting ADL problems related to;activity tolerance impaired;pain   Assessment of Occupational Performance 1-3 Performance Deficits   Identified Performance Deficits impaired safety with strenuous IADL's ie heavier laundry, shopping bags, vacuuming, etc   Planned Therapy Interventions (OT) home program guidelines;progressive activity/exercise   Clinical Decision Making Complexity (OT) problem focused assessment/low complexity   Risk & Benefits of therapy have been explained evaluation/treatment results reviewed;care plan/treatment goals reviewed;risks/benefits reviewed;current/potential barriers reviewed;participants voiced agreement with care plan;participants included;patient   OT Total Evaluation Time   OT Eval, Low Complexity Minutes (83467)  10   OT Goals   Therapy Frequency (OT) Daily   OT Predicted Duration/Target Date for Goal Attainment 01/02/25   OT Goals Cardiac Phase 1;Cognition   OT: Cognitive Patient/caregiver will verbalize understanding of cognitive assessment results/recommendations as needed for safe discharge planning  (as needed)   OT: Understanding of cardiac education to maximize quality of life, condition management, and health outcomes Patient;Verbalize;Goal Met  (post SCAD ed)   OT: Perform aerobic activity with stable cardiovascular response continuous;10 minutes;ambulation;treadmill   OT: Functional/aerobic ambulation tolerance with stable cardiovascular response in order to return to home and community environment Independent;Modified independent;Greater than 300 feet;Goal Met   OT: Navigation of stairs simulating home set up with stable cardiovascular response in order to return to home and community environment Independent;Greater than 10 stairs;Goal Met  (15 stairs)   Self-Care/Home Management   Self-Care/Home Mgmt/ADL, Compensatory, Meal Prep Minutes (43881) 13   Treatment Detail/Skilled Intervention OT: Pt ed in post MI ed, see below for details.   Therapeutic Procedures/Exercise   Therapeutic Procedure: strength, endurance, ROM, flexibillity minutes (35605) 23   Symptoms Noted During/After Treatment fatigue  (no increased chest pain with mobility.)   Treatment Detail/Skilled Intervention OT: see below for details. pt independnet with ADls and functional mobility. pt having increased pain when getting in and out of bed and lying down. pt reports has a wedge pillow she''ll use and ed in log roll tech to aide in decreasing chest  pain post CPR, due to reported fractured ribs.   Treatment Time Includes (CR Only) See specific exercise details intervention group(s);Monitoring of vital signs (see vital signs flowsheet for details)   Ambulation   Duration (minutes) 6 minutes   Effort Scale 3   Symptoms Denies symptoms  "  Cardiovascular Response Normal   Vital Signs Details see vs flow sheet   Stairs   Workload 15   Effort Scale 4   Symptoms Denies symptoms   Cardiovascular Response Normal   Vital Signs Details HR 96   Cardiac Education   Education Provided OMNI Scale;Outpatient Cardiac Rehab;Precautions;Resuming home activities;Stop light tool;Signs and symptoms;Home exercise program   Education Packet Given to Patient Yes   All Patient Education Handouts Reviewed with Patient and/or Family Yes   Cardiac Rehab Phase II Plan   Phase II Order Received Yes   Phase II Appointment Status Scheduled   Date/Time 1/16, 7:45 am   Location [facility]   OT Discharge Planning   OT Plan OT plan; if still here, TDM, timed ambulation, teachback post MI ed following SCAD, monitor cognition post V fib arrest   OT Discharge Recommendation (DC Rec) home with assist;home with outpatient cardiac rehab   OT Rationale for DC Rec Anticipate when pt medically stable, return home with family/friend A with strenuous ADl/IADL\"s ie vacuuming, shopping, carrying heavy shopping bags, laundry baskets, etc. and OP CR for monitored progressive ex and cont'd SCAD ed.   OT Brief overview of current status Goals of therapy will be to address safe mobility and make recs for d/c to next level of care. Pt and RN will continue to follow all falls risk precautions as documented by RN staff while hospitalized. pt ambulated and completed 15 stairs for a total of approx 6 mins with no increased pain, etc. VSS. see vs flow sheet.     "

## 2024-12-31 NOTE — PLAN OF CARE
'Cat' is alert, oriented, pleasant, anxious. VSS on room air. Reports chest pain r/t compressions. She takes Norco every 4 hours for this discomfort. Ice helpful. She is discharging to home today with new medications, including Plavix, protonix, lisinopril, metoprolol. She has not had a bowel movement for several days. Enteric precautions maintained for + C-Diff. She is up ad kandice independently or calls for assistance as needed. Follow up appointments arranged.

## 2024-12-31 NOTE — PROGRESS NOTES
[facility]    ~Cardiology Progress Note~    Primary Cardiologist: Dr. England    Date of Admission: 12/22/2024  Service Date: 12/31/2024    Summary:  Ms. Laurel Parra is a very pleasant 74 year old female with a past medical history of chronic anxiety, osteoarthritis, chronic pain syndrome, migraine headaches, exertional lightheadedness and palpitations who was admitted on 12/22/2024 for fever and abd pain. She has been treated for c.diff colitis.     On 12/29/2024, an RRT was call due to patient reporting left sided chest pain. She received a nitroglycerin which helped with her pain. EKG showed slight T wave changes. While the RRT was occurring, the patient lost consciousness. Cardiac monitor showed ventricular fibrillation. Chest compressions were started and ROSC was achieved within 1 minute and patient gained consciousness. No shock or meds were gives. She underwent coronary angiogram which showed a total occlusion of the distal LAD, concerning for SCAD.      Interval December 31, 2024:  No acute events overnight. Still reports reproducible pain to chest that becomes worse with movement. Denies shortness of breath, palpitations, and lightheadedness. Echocardiogram results reviewed at bedside.          Assessment:     One vessel CAD consistent with SCAD    New cardiomyopathy   LVEF 40-45% with WMA  Etiology: likely stress induced     VF arrest with ROSC without shock or medication, only received compressions, secondary to #1     C.diff colitis     Anxiety            Plan:     Cardiac rehab   Continue ASA 81 mg daily, atorvastatin 40 mg daily, plavix 75 mg daily, lisinopril 2.5 mg daily, metoprolol tartrate 25 mg BID   Pharmacy liaison placed for GDMT to further optimize in the outpatient setting   Consider repeat echocardiogram in about 3 months to reassess left ventricular dysfunction   Follow up with cardiology PADMINI on 1/27  Patient cleared to discharge from cardiology  standpoint   Cardiology to sign off     Plan of care was formulated under the direction and guidance of Dr. Garcia.         Edith FREITASC  Physician Assistant   [facility]    24 minutes spent in coordination of care, and discussion with the patient and/or family regarding diagnostic results, prognosis, symptom management, risks and benefits of management options, and development of the plan of care of above.        Patient Active Problem List   Diagnosis    Frequency of urination and polyuria    Anxiety    Colonic polyp    Chronic urethritis    Palpitations    Chronic narcotic use    Intractable chronic migraine without aura and without status migrainosus    Recurrent major depressive disorder, in partial remission (H)    Gastroesophageal reflux disease without esophagitis    Chronic low back pain, unspecified back pain laterality, with sciatica presence unspecified    Dyspepsia    Lymphocytic colitis    Pelvic floor dysfunction    Voiding dysfunction    Recurrent UTI    Urinary urgency    Somatic dysfunction of pelvic region    Urge incontinence of urine    Anorexia nervosa (H)    ASCVD (arteriosclerotic cardiovascular disease)    Adrenal insufficiency (H)    Central sensitization to pain    Hypokalemia    Hypomagnesemia    Colitis    Generalized weakness    Fever, unspecified fever cause    Diarrhea, unspecified type       Physical Exam   Temp: 99.7  F (37.6  C) Temp src: Oral BP: 128/73 Pulse: 71   Resp: 16 SpO2: 95 % O2 Device: None (Room air)    Vitals:    12/22/24 0721 12/30/24 0600 12/31/24 0400   Weight: 57.8 kg (127 lb 8 oz) 57.2 kg (126 lb 3.2 oz) 56.8 kg (125 lb 3.2 oz)     I/O last 3 completed shifts:  In: 1280 [P.O.:1280]  Out: 225 [Urine:225]    Constitutional: Appears stated age, well nourished, NAD.  Neck: Supple. JVD not visualized.   Respiratory: Non-labored. Lungs CTAB.  Cardiovascular: RRR, normal S1 and S2. No M/G/R. Bilateral lower extremities with trace edema.   GI:  Soft, non-distended, non-tender.  Skin: Warm and dry. Right radial access site c/d/I   Musculoskeletal/Extremities: Symmetrical movement. Compression stockings on BLE.   Neurologic: No gross focal deficits. Alert, awake.  Psychiatric: Affect appropriate. Mentation normal.    Medications   Current Facility-Administered Medications   Medication Dose Route Frequency Provider Last Rate Last Admin     Current Facility-Administered Medications   Medication Dose Route Frequency Provider Last Rate Last Admin    aspirin EC tablet 81 mg  81 mg Oral Daily Jae Alatorreathone, DO   81 mg at 12/30/24 0754    atorvastatin (LIPITOR) tablet 40 mg  40 mg Oral QPM Eden Cohen MD   40 mg at 12/30/24 2020    budesonide (ENTOCORT EC) EC capsule 3 mg  3 mg Oral Daily Vasyl Mart MD   3 mg at 12/30/24 0801    clopidogrel (PLAVIX) tablet 75 mg  75 mg Oral Daily Tomás Garcia MD   75 mg at 12/30/24 1359    colestipol (COLESTID) tablet 1 g  1 g Oral Daily Mirta Edmonds APRN CNP   1 g at 12/30/24 0801    fidaxomicin (DIFICID) tablet 200 mg  200 mg Oral BID Jae Alatorreathone, DO   200 mg at 12/30/24 2019    gabapentin (NEURONTIN) capsule 300 mg  300 mg Oral At Bedtime Jae Alatorreathone, DO   300 mg at 12/30/24 2019    ipratropium (ATROVENT) 0.06 % spray 3 spray  3 spray Both Nostrils Daily AlatorreTonyaone, DO   3 spray at 12/30/24 0916    lisinopril (ZESTRIL) tablet 2.5 mg  2.5 mg Oral Daily Tomás Garcia MD   2.5 mg at 12/30/24 1359    loratadine (CLARITIN) tablet 10 mg  10 mg Oral QAM Jae Alatorreathone, DO   10 mg at 12/30/24 0754    LORazepam (ATIVAN) tablet 1 mg  1 mg Oral 4x Daily Jae Alatorreathone, DO   1 mg at 12/30/24 2201    metoprolol tartrate (LOPRESSOR) tablet 25 mg  25 mg Oral BID Edith Travis PA-C   25 mg at 12/30/24 2019    pantoprazole (PROTONIX) EC tablet 40 mg  40 mg Oral BID Xiang De La O MD   40 mg at 12/30/24 2537    PARoxetine (PAXIL) half-tab 25 mg  25 mg Oral BID Landry  Tonyaone, DO   25 mg at 12/30/24 2019    riboflavin CAPS 400 mg  400 mg Oral Daily Ugo Alonso, RPH   400 mg at 12/30/24 0918    sodium chloride (PF) 0.9% PF flush 3 mL  3 mL Intracatheter Q8H Xiang Mariano MD   3 mL at 12/31/24 0412    [Held by provider] vitamin C (ASCORBIC ACID) tablet 1,000 mg  1,000 mg Oral QPM Ken Alatorre DO        [Held by provider] Vitamin D3 (CHOLECALCIFEROL) tablet 250 mcg  250 mcg Oral QPM Ken Alatorre DO           Data   Last 24 hours labs personally reviewed.  Echo: No results found for this or any previous visit (from the past 4320 hours).

## 2024-12-31 NOTE — CONSULTS
Patient has Medicare D through [employer] for 2024, and  Medicare[employer]RX for 2025.    Of note, patient currently uses [facility], which is not a preferred pharmacy under MedicareBlueRX.  She will pay a lower cost-share (20% vs 25%) at a preferred pharmacy.    Jardiance/Farxiga  2024:  --$147 for 1 mo.  Upon receipt of RX, Discharge Pharmacy can provide 1 mo free Farxiga only using one-time  voucher.    2025:  --$153/mo, or $123/mo at [facility].  --If/when total out of pocket drug costs exceed $2000, patient will pay $0 for all covered drugs for the remainder of the year.    Entresto  2024:  --$166 for 1 mo.  Upon receipt of RX, Discharge Pharmacy can provide 1 mo free using one-time  voucher.    2025:  --$172/mo, or $138/mo at [facility].  --If/when total out of pocket drug costs exceed $2000, patient will pay $0 for all covered drugs for the remainder of the year.    If these prices are cost-prohibitive, I can pursue a $10,000 [employer] jose d to pay copays on all cardiomyopathy medications for at least the next 12 months.    If patient does not want to pursue the jose d, I recommend patient consider participating in the Medicare Prescription Payment Plan, which would spread drug costs more evenly throughout the year.  Patient may get cost estimates and/or enroll in this program now, or at any time during 2025, by calling the member services phone number for their plan.      Alpa Jordan  Pharmacy Technician/Liaison, Discharge Pharmacy   [phone] (voice or text)  [email or web address]  Pharmacy test claims are estimates and may not reflect final costs.   Suggested alternatives aim to be cost-effective but may not be therapeutically equivalent as this consult is informational and does not constitute medical advice.   Clinical decisions should be made by qualified healthcare providers.

## 2024-12-31 NOTE — PROGRESS NOTES
CLINICAL NUTRITION SERVICES - REASSESSMENT NOTE    Recommendations Ordered by Registered Dietitian (RD):   Ordered vanilla Ensure Enlive at 2pm   Malnutrition: (12/23)  % Weight Loss:  Weight loss does not meet criteria for malnutrition - wt is down ~8# (6%) from 5 months ago, down ~4# (3%) from 2 months ago   % Intake:  </= 75% for >/= 1 month (severe malnutrition)  Subcutaneous Fat Loss:  Orbital region - mild depletion  Muscle Loss:  Clavicle bone region - mild depletion  Fluid Retention:  None noted     Malnutrition Diagnosis: Moderate malnutrition  In Context of:  Acute on Chronic illness or disease     EVALUATION OF PROGRESS TOWARD GOALS   Diet:  Regular  Supplements: Ensure Enlive PRN    Intake/Tolerance:    - Intakes variable between % per nursing flowsheets, on average about 50%  - Ordering BID-TID meals per healthtouch  - Spoke with patient in room. She said she continues to have a low appetite due to feeling sick and just because of her prolonged admission. She said she had chicken noodle soup twice yesterday and has been having eggs for breakfast. She said she is leaving today however she is accepting of a vanilla Ensure in case she stays. She also shared she has been seeing an eating disorder therapist for a very long time (weekly meetings) due to her anorexia. She asked if she should be drinking Ensure's post discharge - writer recommended at least 1-2 per day while her appetite remains low.    ASSESSED NUTRITION NEEDS:  Dosing Weight 57.8 kg (12/22)  Estimated Energy Needs: 5249-4731 kcals (25-30 Kcal/Kg)  Justification: maintenance  Estimated Protein Needs: 70-85 grams protein (1.2-1.5 g pro/Kg)  Justification: preservation of lean body mass  Estimated Fluid Needs: 2494-7253  mL (1 mL/Kcal)  Justification: maintenance    NEW FINDINGS:   General:   Code blue 12/29 - vfib arrest    Meds:  Vitamin C  Vitamin D3    Labs:  K 3.1 (L)  BUN 7.4 (L)    Previous Goals:   Consume % of nutritionally  adequate meals/supplements TID   Evaluation: Not met    Previous Nutrition Diagnosis:   Altered GI function related to cdiff and lymphocytic colitis as evidenced by pt report of n/v and diarrhea after taking any po   Evaluation: No change, updated below    CURRENT NUTRITION DIAGNOSIS  Inadequate oral intake related to reduced appetite from cdiff and lymphocytic colitis as evidenced by pt report of reduced appetite and eating less while hospitalized, need for ONS to help meet nutritional needs    INTERVENTIONS  Recommendations / Nutrition Prescription  See above    Implementation  Medical Food Supplement - ordered    Goals  Patient to consume at least 75% of meals on average and 1 supplement daily    MONITORING AND EVALUATION:  Progress towards goals will be monitored and evaluated per protocol and Practice Guidelines    Lisa Larson RD, LD  Clinical Dietitian - [facility]

## 2024-12-31 NOTE — PLAN OF CARE
A&Ox4, c/o chest pain from compressions with relief from norco and dilaudid. Tele remains SR, VSS on RA. Started lisinopril and plavix today for SCAD. No stools today. Febrile at 100.2, ambulated in hallway, using IS. Some nausea, poor appetite. Discharge home next 1-2 days. ECHO pending.

## 2024-12-31 NOTE — DISCHARGE SUMMARY
[facility]  Hospitalist Discharge Summary      Date of Admission:  12/22/2024  Date of Discharge:  12/31/2024  Discharging Provider: Tad Gallagher MD  Discharge Service: Hospitalist Service    Discharge Diagnoses   Acute on chronic diarrhea, C diff positive  NSTEMI - LAD occlusion - V fib arrest  New cardiomyopathy, possibly stress induced  Mild hypokalemia and hypomagnesemia  See below      Clinically Significant Risk Factors     # Moderate Malnutrition: based on nutrition assessment      Follow-ups Needed After Discharge   Follow-up Appointments       Follow Up      Follow up with GI in 1-2 weeks (please check with them).  F/U ID (please check with them).        Hospital Follow-up with Existing Primary Care Provider (PCP)      Please see details below         Schedule Primary Care visit within: 30 Days   Recommended labs and Imaging (to be ordered by Primary Care Provider): potassium and magnesium level               Unresulted Labs Ordered in the Past 30 Days of this Admission       No orders found from 11/22/2024 to 12/23/2024.        These results will be followed up by NA    Discharge Disposition   Discharged to home  Condition at discharge: Stable    Hospital Course   Laurel Parra is a 74 year-old female with past medical history significant for lymphocytic colitis with chronic diarrhea, anorexia, adrenal insufficiency who was admitted to [facility] on 12/23/2024 for weakness, fever, abdominal pain secondary to nonsevere C. Difficile        NSTEMI  CAD (LAD occlusion)  V fib/arrest  New cardiomyopathy, possibly stress induced  Code blue 12/29 evening with successful ROSC. Emegent cath showed total occlusion of LAD with apical akinesis and presumptive infarction.  Appears to be ongoing pain from recent CPR.  In NSR.  Cards/EP on bopard.  - Telemetry.  - TTE with new cardiomyopathy - EF 40-45%   *Severe hypokinesis of the distal anterior and anteroseptal walls and  apex.  *Severe hypokinesis of the distal inferior wall.  *Left ventricular systolic function is mild to moderately reduced.  *The visual ejection fraction is 40-45%.  - Follow cards/EP recs as below - follow up January with repeat echo to be determined              - lisinopril 2.5 daily              - clopidogrel 75 daily              - ASA 81               - atorvastatin 40 daily              - metoprolol tartrate 25 bid (stopped pta propranolol)              - cardiology arranging outpatient follow up - further GDMT optimization in clinic setting  - Pain contro for mskl pain.     Generalized weakness  Sepsis  Non-severe C-Diff  Chronic diarrhea in setting of lymphocytic colitis  Risk factors: Omeprazole and recent abx (within 1 month)  CT AP W 12/2012/24: Diffuse colonic thickening from cecum to distal rectum  Enteric panel negative  C Diff PCR & EIA Toxin positive  BCX NTD.  Diarrhea resolved.  - Continue fidaxomicin 200 mg twice daily x 10 days (flagyl stopped on 12/27). Patient ok with potentially high cost of fidaxomicin - 4 more days at discharge.  - Oral Vanc 250 mg BID for at least a month after above treatment for prevention (starting Jan 5th)  - Continue budesonide.  - Colestipol started per GI, continue short course at discharge.  - ID/GI assistance appreciated.     Hypokalemia  Hypomagnesemia   Secondary to diarrhea and poor intake.  - Monitor with replacement protocol  - she reports usually eating 1-2 bananas daily at home, which can be continued  - BMP follow up with PCP     Secondary adrenal insufficiency - Followed by Dr. Navas of [facility]. Continue home budesonide   Chronic pain syndrome - Continue prior to admission gabapentin, hydrocodone-APAP, cyclobenzaprine, baclofen   GERD - Continue PPI - switched to protonix given on plavix now as above  Anxiety - Continue prior to admission lorazepam, paroxetine - consider revisiting regimen with outpatient team  Migraine  headaches - Propranolol & riboflavin (propranolol stopped given on metop as above)       Consultations This Hospital Stay   GASTROENTEROLOGY IP CONSULT  PHYSICAL THERAPY ADULT IP CONSULT  OCCUPATIONAL THERAPY ADULT IP CONSULT  CARE MANAGEMENT / SOCIAL WORK IP CONSULT  INFECTIOUS DISEASES IP CONSULT  PHYSICAL THERAPY ADULT IP CONSULT  OCCUPATIONAL THERAPY ADULT IP CONSULT  CARDIOLOGY IP CONSULT  PHARMACY IP CONSULT  ELECTROPHYSIOLOGY IP CONSULT  CARDIAC REHAB IP CONSULT  PHARMACY LIAISON FOR MEDICATION COVERAGE CONSULT  SMOKING CESSATION PROGRAM IP CONSULT    Code Status   Full Code    Time Spent on this Encounter   I, Tad Gallagher MD, personally saw the patient today and spent greater than 30 minutes discharging this patient.       Tad Gallagher MD  [facility]  [address]  [facility] [address]  Phone: [phone]  ______________________________________________________________________    Physical Exam   Vital Signs: Temp: 99.7  F (37.6  C) Temp src: Oral BP: 111/71 Pulse: 79   Resp: 16 SpO2: 95 % O2 Device: None (Room air)    Weight: 125 lbs 3.2 oz    Gen: NAD, pleasant  HEENT: EOMI, MMM  Resp: no focal crackles,  no wheezes, no increased work of resp  CV: S1S2 heard, reg rhythm, becky rate  Abdo: soft, nontender, nondistended, bowel sounds present  Ext: calves nontender, well perfused  Neuro: aa, conversant, moving ext, CN grossly intact, no facial asymmetry         Primary Care Physician   Georges Lebron    Discharge Orders      Physical Therapy  Referral      Occupational Therapy  Referral      Follow-Up with Cardiology PADMINI      Cardiac Rehab  Referral      Reason for your hospital stay    weakness     Activity    Your activity upon discharge: activity as tolerated     Follow Up    Follow up with GI in 1-2 weeks (please check with them).  F/U ID (please check with them).     Brief Discharge Instructions    Do NOT stop your  aspirin or platelet inhibitor unless directed by your Cardiologist.  These medications help to prevent platelets in your blood from sticking together and forming a clot.  Examples of these medications are:  Ticagrelor (Brilinta), Clopidigrel (Plavix), Prasugrel (Effient)     When to call - Contact the Heart Clinic    You may experience symptoms that require follow-up before your scheduled appointment. Contact the Heart Clinic if you develop: Fever over 100.4o Fahrenheit, that lasts more than one day; Redness, heat, or pus at the puncture site; Change in color or temperature in your groin or leg.     When to call - Reasons to Call 911    If your groin starts to bleed or begins to swell suddenly after leaving the hospital, lie flat and apply firm pressure just above the puncture site for 15 minutes.  If bleeding continues, call 9-1-1.     Precautions - Lifting    NO lifting of more than 10 pounds for at least 3 days.  If you usually lift 50 pounds or more daily, talk with your Cardiologist.     Precautions - Household Activities    Avoid any hard work or tiring activities.  NO physical activity such as mowing the lawn, raking, vacuuming, changing sheets on your bed, snow shoveling, or using a .     Precautions - Active Sports Activities    Avoid any tiring sports activities.  This includes, yard work, jogging, biking, bowling, swimming, tennis or golf, and sexual activity.     Precautions - Elective Dental Work    NO elective dental work for 6 weeks after receiving a stent.     Comfort and Pain Management - Pain after Surgery    Pain after surgery is normal and expected.  Your leg may be sore or stiff for a few days, and your pain will improve with time. You may take Tylenol or a pain medicine recommended by your Cardiologist.     Comfort and Pain Management - Bruising after Surgery    Bruising around the groin area is normal.  It may take 2-3 weeks for this to go away.  It is normal for the bruised area to  turn green and/or yellow as it is healing.  A small lump may also be present and may last 2-3 months.     Activity - Daily Walking    During the day get up and walk around every 2 hours.     Activity - Light Household Activities    Light household activities are ok.     Activity - Elevate Legs    Elevate legs in between all activities.     Activity - Cardiac Rehab    You are encouraged to enroll in an Outpatient Cardiac Rehab program after discharge from the hospital.  Our Cardiac Rehab staff may visit briefly with you while you're in the hospital.  If they miss you, someone will contact you after you are home.     Return to Driving    Driving is NOT permitted for 24 hours after surgery     Return to work    You may return to work after 72 hours if you are feeling well and your job does not involve heavy lifting.     Dressing Removal    You may take off the dressing on your groin the day after your procedure.     Incision Care    Keep the incision area dry and clean.  You do not need to use a bandage on your incision.     Shower / Bathing    It is ok to shower with regular soap. Pat dry, do not rub. No tub bath for 3 days. No swimming in a pool or hot tub immersion for 1 week     Medication Instructions - Anticoagulants    Do NOT stop your aspirin or platelet inhibitor unless directed by your Cardiologist.  These medications help to prevent platelets in your blood from sticking together and forming a clot.  Examples of these medications are:  Ticagrelor (Brilinta), Clopidigrel (Plavix), Prasugrel (Effient)     When to call - Contact the Heart Clinic    You may experience symptoms that require follow-up before your scheduled appointment. Contact the Heart Clinic if you develop: Fever over 100.4o Fahrenheit, that lasts more than one day; Redness, heat, or pus at the puncture site; Change in color or temperature in your hand or arm.     When to call - Reasons to Call 911    If your wrist puncture site starts bleeding  after discharge, sit down and apply firm pressure with your thumb against the puncture site and fingers against the back of the wrist for 10 minutes. If the bleeding stops, continue to rest, keeping your wrist still for 2 hours. Notify your doctor as soon as possible.  IF BLEEDING DOES NOT STOP OR THERE IS A LARGER AMOUNT OF BLEEDING OR SPURTING CALL 9-1-1 immediately.DO NOT drive yourself to the hospital.     Precautions - Lifting    DO NOT lift more than 5 pounds with affected arm for 48 hours     Precautions - Household Activities    Avoid excessive bending or movement of your wrist for 72 hours.  Do not subject hand/arm to any forceful movements for 24 hours, such as supporting weight when rising from a chair or bed.     Remove the band-aid on the puncture site after 24 hours and leave open to air. If minor oozing, you may apply a band-aid and remove after 12 hours.     Precautions - Active Sports Activities    DO NOT engage in vigorous exercise using your affected arm for 3 days after discharge.  This includes golf, tennis or swimming.     Precautions - Operating yard equipment or vehicles    Do not operate a chainsaw, lawnmower, motorcycle, or all-terrain vehicle for 48 hours after the procedure.     Precautions - Elective Dental Work    NO elective dental work for 6 weeks after receiving a stent.     Comfort and Pain Management - Bruising after Surgery    Expect mild tingling of hand and tenderness at the wrist puncture site for up to 3 days. You may take Tylenol or a pain medicine recommended by your doctor.     Activity - Cardiac Rehab    You are encouraged to enroll in an Outpatient Cardiac Rehab program after discharge from the hospital.  Our Cardiac Rehab staff may visit briefly with you while you're in the hospital.  If they miss you, someone will contact you after you are home.     Return to Driving    Driving is NOT permitted for 24 hours after surgery     Return to work    You may return to work  after 72 hours if you are feeling well and your job does not involve heavy lifting.     Shower / Bathing    You may shower on the day after your procedure.  DO NOT soak of wrist with the puncture site in water for 3 days to prevent infection. DO NOT take a tub bath or wash dishes for 3 days after the procedure     Dressing Removal    Remove the band-aid on the puncture site after 24 hours and leave open to air. If minor oozing, you may apply a band-aid and remove after 12 hours     Full Code     Diet    Follow this diet upon discharge: Current Diet:Orders Placed This Encounter      Snacks/Supplements Adult: Ensure Enlive; With Meals      Combination Diet Regular Diet Adult     Hospital Follow-up with Existing Primary Care Provider (PCP)    Please see details below            Significant Results and Procedures   Most Recent 3 CBC's:  Recent Labs   Lab Test 12/31/24  0555 12/29/24 2056 12/29/24 2023 12/29/24  1148   WBC 11.7*  --  10.6 12.2*   HGB 11.7 12.6 12.3 12.5   MCV 92  --  92 93     --  480* 424     Most Recent 3 BMP's:  Recent Labs   Lab Test 12/31/24  0555 12/30/24  0053 12/29/24 2056 12/29/24 2023 12/29/24 1958 12/29/24  1148     --  140 141  --  139   POTASSIUM 3.3* 3.6 3.5 3.9  --  4.0   CHLORIDE 101  --   --  102  --  103   CO2 27  --   --  26  --  24   BUN 7.4*  --   --  8.4  --  8.5   CR 0.62  --   --  0.64  --  0.55   ANIONGAP 10  --   --  13  --  12   THOR 8.4*  --   --  9.4  --  9.0   *  --   --  130* 127* 123*     Most Recent 3 Troponin's:No lab results found.  Most Recent Cholesterol Panel:  Recent Labs   Lab Test 12/07/23  1020   CHOL 140   LDL 65   HDL 55   TRIG 101     Most Recent Hemoglobin A1c:  Recent Labs   Lab Test 12/15/16  0827   A1C 5.3   ,   Results for orders placed or performed during the hospital encounter of 12/22/24   CT Abdomen Pelvis w Contrast    Narrative    EXAM: CT ABDOMEN AND PELVIS WITH CONTRAST  LOCATION: [facility]  DATE:  12/22/2024    INDICATION: Fever, chronic diarrhea, abdominal pain, colonoscopy with biopsy five days ago.  COMPARISON: CT abdomen and pelvis without IV contrast 08/16/2023.  TECHNIQUE: CT scan of the abdomen and pelvis was performed following injection of IV contrast. Multiplanar reformats were obtained. Dose reduction techniques were used.  CONTRAST: 65 mL Isovue 370.    FINDINGS:   LOWER CHEST: Minor strand of linear atelectasis left lower lobe. No pleural effusion on either side. Normal cardiac size. No pericardial effusion. Moderate hiatal hernia. No significant change.    HEPATOBILIARY: Small amount of focal fat in the usual location in the left hepatic lobe anteroinferiorly. No discrete hepatic lesion, calcified gallstones or biliary dilatation. Patent hepatic and portal veins.    PANCREAS: Normal.    SPLEEN: Normal.    ADRENAL GLANDS: Normal.    KIDNEYS/BLADDER: No urinary tract calculi. Both kidneys are well perfused without hydronephrosis or hydroureter. Normal urinary bladder.    BOWEL: No free gas or free fluid. Diffuse thickening of the colon from the cecum to the distal rectum representing long segment colitis (infectious or inflammatory). No pneumatosis, mechanical obstruction, perforation, abscess or fistula formation.    LYMPH NODES: No suspicious abdominopelvic adenopathy.    VASCULATURE: Normal caliber mildly atherosclerotic distal abdominal aorta measuring 1.8 x 1.9 cm (image 90, series 3). Normal caliber IVC.    PELVIC ORGANS: Anteverted postmenopausal uterus. Rectosigmoid diffuse thickening representing long segment colitis. Slight atherosclerotic changes. No suspicious adenopathy or free fluid.    MUSCULOSKELETAL: Degenerative changes involving the spine and joints of the pelvis. Left convex lumbar curve.      Impression    IMPRESSION:   1.  No free gas or free fluid. Diffuse thickening of the colon from the cecum to the distal rectum representing long segment colitis (infectious or  inflammatory), without secondary complications.    2.  Moderate hiatal hernia.    3.  Degenerative changes involving the spine and joints of the pelvis. Left convex lumbar curve.     XR Chest Port 1 View    Narrative    EXAM: XR CHEST PORT 1 VIEW  LOCATION: [facility]  DATE: 2024    INDICATION: RRT: chest pain  COMPARISON: None.      Impression    IMPRESSION:   Heart size and pulmonary vascularity within normal limits. No focal lung infiltrates. Osseous structures grossly intact. Visualized upper abdomen unremarkable.   Echocardiogram Complete     Value    LVEF  40-45%    Narrative    [id number]  [id number]  [id number]^ALDA^ELVIRA^BOONE     [facility]  Echocardiography Laboratory  [address] [place] [id number]     Name: RACHEL CHRISTINA  MRN: [id number]  : 1950  Study Date: 2024 08:52 AM  Age: 74 yrs  Gender: Female  Patient Location: [facility]  Reason For Study: Cardiac Arrest  Ordering Physician: ELVIRA LIZAMA  Performed By: Radha Becker     BSA: 1.6 m2  Height: 63 in  Weight: 125 lb  BP: 128/73 mmHg  ______________________________________________________________________________  Procedure  Echocardiogram with two-dimensional, color and spectral Doppler. Definity (NDC  #[id number]) given intravenously.  ______________________________________________________________________________  Interpretation Summary     Severe hypokinesis of the distal anterior and anteroseptal walls and apex.  Severe hypokinesis of the distal inferior wall.  Left ventricular systolic function is mild to moderately reduced.  The visual ejection fraction is 40-45%.     Compared to the previous study, the wall motion abnormalities are new and the  LV function has declined.  ______________________________________________________________________________  Left Ventricle  The left ventricle is normal in size. There is normal left ventricular  wall  thickness. Grade I or early diastolic dysfunction. Diastolic Doppler findings  (E/E' ratio and/or other parameters) suggest left ventricular filling  pressures are indeterminate. Left ventricular systolic function is mild to  moderately reduced. The visual ejection fraction is 40-45%. Severe hypokinesis  of the distal anterior and anteroseptal walls and apex. Severe hypokinesis of  the distal inferior wall.     Right Ventricle  The right ventricle is normal in structure, function and size.     Atria  Normal left atrial size. Right atrial size is normal. There is no atrial shunt  seen.     Mitral Valve  The mitral valve is normal in structure and function. There is trace mitral  regurgitation.     Tricuspid Valve  The tricuspid valve is normal in structure and function. There is trace  tricuspid regurgitation. Right ventricular systolic pressure could not be  approximated due to inadequate tricuspid regurgitation. IVC diameter <2.1 cm  collapsing >50% with sniff suggests a normal RA pressure of 3 mmHg.     Aortic Valve  There is trace aortic regurgitation. No aortic stenosis is present.     Pulmonic Valve  The pulmonic valve is not well seen, but is grossly normal. There is trace  pulmonic valvular regurgitation.     Vessels  Normal size aorta. IVC diameter <2.1 cm collapsing >50% with sniff suggests a  normal RA pressure of 3 mmHg.     Pericardium  There is no pericardial effusion.     Rhythm  Sinus rhythm was noted.  ______________________________________________________________________________  MMode/2D Measurements & Calculations  IVSd: 0.80 cm  LVIDd: 4.0 cm  LVIDs: 2.4 cm  LVPWd: 0.90 cm  FS: 39.3 %  LV mass(C)d: 101.4 grams  LV mass(C)dI: 64.0 grams/m2  Ao root diam: 3.5 cm  LA dimension: 3.3 cm  asc Aorta Diam: 3.6 cm  LA/Ao: 0.94  LVOT diam: 2.0 cm  LVOT area: 3.1 cm2  Ao root diam index Ht(cm/m): 2.2  Ao root diam index BSA (cm/m2): 2.2  Asc Ao diam index BSA (cm/m2): 2.3  Asc Ao diam index Ht(cm/m):  2.3  LA Volume (BP): 39.6 ml     LA Volume Index (BP): 25.1 ml/m2  RV Base: 3.2 cm  RWT: 0.45  TAPSE: 2.3 cm     Doppler Measurements & Calculations  MV E max maurice: 50.7 cm/sec  MV A max maurice: 82.3 cm/sec  MV E/A: 0.62  MV dec time: 0.21 sec  E/E' av.0  Lateral E/e': 6.7  Medial E/e': 11.4  RV S Maurice: 14.3 cm/sec     ______________________________________________________________________________  Report approved by: Milad Tavarez MD on 2024 10:04 AM         Cardiac Catheterization    Narrative      Mid LAD to Dist LAD lesion is 100% stenosed.    - 1v CAD c/w SCAD.    Attending Cardiologist Attestation note    I was scrubbed in and personally supervised the procedure and agree with   the procedural description per . The patient suffered a VF arrest due   to distal occlusion of LAD, new since her angiogram from . We had to   use a femoral approach as the brachiocephalic artery appears occluded at   its origin thwarting an attempt to perform procedure via RTR approach. The   coronaries are otherwise smooth and the angiographic appearance is highly   suggestive of Spontaneous Coronary Artery Dissection despite her advanced   age of 74. The LV confirms apical dyskinesis and presumptive infarction.     Plan  1) Medical management  2) TTE  3) beta blocker  4) Continue amiodarone for now, consult EP service regarding long term   mangment  5) high potency statin ( presumptive atherosclerotic occlusion of   brachiocephalic)  6) aspirin 81 daily  7) Screen carotic and renal beds with CT angiography given known   association of SCAD with Fibromuscular Dysplasia         Discharge Medications   Current Discharge Medication List        START taking these medications    Details   atorvastatin (LIPITOR) 40 MG tablet Take 1 tablet (40 mg) by mouth every evening.  Qty: 30 tablet, Refills: 0    Associated Diagnoses: ASCVD (arteriosclerotic cardiovascular disease)      clopidogrel (PLAVIX) 75 MG tablet Take 1 tablet (75 mg)  by mouth daily.  Qty: 30 tablet, Refills: 0    Associated Diagnoses: ASCVD (arteriosclerotic cardiovascular disease)      colestipol (COLESTID) 1 g tablet Take 1 tablet (1 g) by mouth daily for 5 days.  Qty: 5 tablet, Refills: 0    Associated Diagnoses: Colitis      fidaxomicin (DIFICID) 200 MG tablet Take 1 tablet (200 mg) by mouth 2 times daily for 8 doses.  Qty: 8 tablet, Refills: 0    Associated Diagnoses: Colitis      lisinopril (ZESTRIL) 2.5 MG tablet Take 1 tablet (2.5 mg) by mouth daily.  Qty: 30 tablet, Refills: 0    Associated Diagnoses: ASCVD (arteriosclerotic cardiovascular disease); Secondary cardiomyopathy (H)      metoprolol tartrate (LOPRESSOR) 25 MG tablet Take 1 tablet (25 mg) by mouth 2 times daily.  Qty: 60 tablet, Refills: 0    Associated Diagnoses: ASCVD (arteriosclerotic cardiovascular disease)      !! naloxone (NARCAN) 4 MG/0.1ML nasal spray Spray 1 spray (4 mg) into one nostril alternating nostrils as needed for opioid reversal. every 2-3 minutes until assistance arrives  Qty: 2 each, Refills: 0    Associated Diagnoses: Central sensitization to pain      pantoprazole (PROTONIX) 40 MG EC tablet Take 1 tablet (40 mg) by mouth 2 times daily (before meals).  Qty: 60 tablet, Refills: 0    Associated Diagnoses: Dyspepsia      vancomycin (VANCOCIN) 250 MG capsule Take 1 capsule (250 mg) by mouth 2 times daily.  Qty: 60 capsule, Refills: 0    Associated Diagnoses: Colitis       !! - Potential duplicate medications found. Please discuss with provider.        CONTINUE these medications which have NOT CHANGED    Details   aspirin 81 MG EC tablet Take 1 tablet (81 mg) by mouth daily  Qty: 90 tablet, Refills: 0    Comments: Future refills by PCP Dr. Georges Lebron with phone number [phone].  Associated Diagnoses: NSTEMI (non-ST elevated myocardial infarction) (H)      Baclofen (LIORESAL) 5 MG tablet Take 5 mg by mouth as needed for muscle spasms      budesonide (ENTOCORT EC) 3 MG EC capsule Take 3  capsules (9 mg) by mouth every morning.    Associated Diagnoses: Lymphocytic colitis      carboxymethylcellulose (REFRESH PLUS) 0.5 % SOLN ophthalmic solution Place 1 drop into both eyes 3 times daily as needed for dry eyes      cyclobenzaprine (FLEXERIL) 10 MG tablet Take 10 mg by mouth daily as needed for muscle spasms.    Associated Diagnoses: Preop general physical exam      gabapentin (NEURONTIN) 300 MG capsule Take 1 capsule (300 mg) by mouth at bedtime.    Associated Diagnoses: Night sweats      HYDROcodone-Acetaminophen  MG TABS Take 1-2 tablets by mouth every 4 hours as needed (maximum of 10 tablets in 24 hour period) Prescription is written for 1-2 tabs every 4-6 hrs prn (max of 10 tabs daily).  Refills: 0    Associated Diagnoses: Chronic narcotic use      ipratropium (ATROVENT) 0.06 % spray Spray 3 sprays into both nostrils daily  Qty: 45 mL, Refills: 3    Associated Diagnoses: Chronic rhinitis, unspecified type      loratadine (CLARITIN) 10 MG tablet Take 10 mg by mouth every morning       LORazepam (ATIVAN) 1 MG tablet Take 1 tablet (1 mg) by mouth 4 times daily.  Qty: 180 tablet, Refills: 0    Associated Diagnoses: Anxiety      !! naloxone (NARCAN) 4 MG/0.1ML nasal spray Spray 1 spray (4 mg) into one nostril alternating nostrils as needed for opioid reversal Every 2-3 minutes in alternating nostrils  Qty: 2 each, Refills: 0    Associated Diagnoses: Chronic narcotic use      nitroGLYcerin (NITROSTAT) 0.4 MG sublingual tablet For chest pain place 1 tablet under the tongue every 5 minutes for 3 doses. If symptoms persist 5 minutes after 1st dose call 911.  Qty: 50 tablet, Refills: 0    Comments: Future refills by PCP Dr. Georges Lebron with phone number [phone].  Associated Diagnoses: NSTEMI (non-ST elevated myocardial infarction) (H)      ondansetron (ZOFRAN) 4 MG tablet Take 4 mg by mouth every 8 hours as needed for nausea or vomiting      PARoxetine (PAXIL-CR) 25 MG 24 hr tablet Take 1 tablet  "(25 mg) by mouth 2 times daily.  Qty: 180 tablet, Refills: 1    Associated Diagnoses: Recurrent major depressive disorder, in partial remission (H)      polyethylene glycol (MIRALAX) 17 GM/Dose powder Take 1 capful. by mouth as needed for constipation      riboflavin 400 MG CAPS Take 400 mg by mouth daily      ubrogepant (UBRELVY) 50 MG tablet Take 1 tablet (50 mg) by mouth at onset of headache (may repeat in 2 hours, total daily dose is 100 mg/day, but limit use to less than 10 days per month)  Qty: 9 tablet, Refills: 3    Associated Diagnoses: Migraine with aura and without status migrainosus, not intractable      vitamin C (ASCORBIC ACID) 1000 MG TABS Take 1,000 mg by mouth every evening       Vitamin D3 (CHOLECALCIFEROL) 125 MCG (5000 UT) tablet Take 2 tablets by mouth every evening      zolpidem ER (AMBIEN CR) 12.5 MG CR tablet Take 1 tablet (12.5 mg) by mouth at bedtime.  Qty: 30 tablet, Refills: 0    Associated Diagnoses: Anxiety       !! - Potential duplicate medications found. Please discuss with provider.        STOP taking these medications       omeprazole (PRILOSEC) 40 MG DR capsule Comments:   Reason for Stopping:         propranolol ER (INDERAL LA) 60 MG 24 hr capsule Comments:   Reason for Stopping:             Allergies   Allergies   Allergen Reactions    Betamethasone Other (See Comments)     agitation    Chocolate Other (See Comments)     Triggers migraines    Compazine [Prochlorperazine] Other (See Comments)     \"crawl out of my skin\"    Linzess [Linaclotide]     Penicillins Nausea and Vomiting and Hives    Pneumococcal Vaccine Other (See Comments)     Temporary paralization    Tizanidine Other (See Comments)     Severe agitation         "

## 2025-01-02 ENCOUNTER — TELEPHONE (OUTPATIENT)
Dept: CARDIOLOGY | Facility: CLINIC | Age: 75
End: 2025-01-02
Payer: MEDICARE

## 2025-01-02 ENCOUNTER — PATIENT OUTREACH (OUTPATIENT)
Dept: CARE COORDINATION | Facility: CLINIC | Age: 75
End: 2025-01-02
Payer: MEDICARE

## 2025-01-02 NOTE — PROGRESS NOTES
"Occupational Therapy Discharge Summary    Reason for therapy discharge:    Discharged to home with outpatient therapy.    Progress towards therapy goal(s). See goals on Care Plan in [facility] electronic health record for goal details.  Goals partially met.  Barriers to achieving goals:   discharge from facility.    Therapy recommendation(s):    Continued therapy is recommended.  Rationale/Recommendations:  \"Anticipate when pt medically stable, return home with family/friend A with strenuous ADl/IADL\"s ie vacuuming, shopping, carrying heavy shopping bags, laundry baskets, etc. and OP CR for monitored progressive ex and cont'd SCAD ed.\" Per treating therapist recommendation. This writer not treating therapist.      "

## 2025-01-02 NOTE — TELEPHONE ENCOUNTER
"Patient was admitted to [facility] on 12/22/24 for fever and abd pain. She has been treated for C. diff colitis.     PMH:  chronic anxiety, osteoarthritis, chronic pain syndrome, migraine headaches, exertional lightheadedness and palpitations.     \"On 12/29/2024, an RRT was call due to patient reporting left sided chest pain. She received a nitroglycerin which helped with her pain. EKG showed slight T wave changes. While the RRT was occurring, the patient lost consciousness. Cardiac monitor showed ventricular fibrillation. Chest compressions were started and ROSC was achieved within 1 minute and patient gained consciousness. No shock or meds were given.\"     12/29/24: Coronary angiogram via RFA showed:      Mid LAD to Dist LAD lesion is 100% stenosed.     - 1v CAD c/w SCAD.    12/31/24: Echo showed EF of 40-45% with WMA. Likely stress induced.    Pt was started on Lipitor, Plavix, Metoprolol, Lisinopril, Protonix. PTA Prevacid and Propranolol were discontinued.    Called patient to discuss any post hospital d/c questions she may have, review medication changes, and confirm f/u appts. Patient denied any questions regarding new medications or changes with their PTA medications.    Patient denied any SOB or lightheadedness. States she has chest/rib pain from CPR and is taking PRN Tylenol and heat.    RFA cardiac cath site is without bleeding, swelling, redness or signs of infection.     RN confirmed with patient that she is scheduled for an OV on 1/27/25 at 1230 with MISHEL Edith Travis at our [place] Office. Dr. De La Vega's Team RN phone number provided.    Cardiac rehab is scheduled on 1/16/25 at 0745 in [place].    Patient advised to call clinic with any cardiac related questions or concerns prior to this mishel't. Patient verbalized understanding and agreed with plan. ANA MARÍA Haney RN.           "

## 2025-01-02 NOTE — PROGRESS NOTES
[facility]    Background: Transitional Care Management program identified per system criteria and reviewed by [facility] team for possible outreach.    Assessment: Upon chart review, CCR Team member will not proceed with patient outreach related to this episode of Transitional Care Management program due to reason below:    Patient has active communication with a nurse, provider or care team for reason of post-hospital follow up plan.  Outreach call by CCR team not indicated to minimize duplicative efforts.     Plan: Transitional Care Management episode addressed appropriately per reason noted above.          AFSHIN Ayers  [phone]  [facility]

## 2025-01-07 ENCOUNTER — VIRTUAL VISIT (OUTPATIENT)
Dept: FAMILY MEDICINE | Facility: CLINIC | Age: 75
End: 2025-01-07
Attending: HOSPITALIST
Payer: MEDICARE

## 2025-01-07 DIAGNOSIS — I21.A9 OTHER TYPE OF ACUTE MYOCARDIAL INFARCTION (H): ICD-10-CM

## 2025-01-07 DIAGNOSIS — E83.42 HYPOMAGNESEMIA: ICD-10-CM

## 2025-01-07 DIAGNOSIS — A04.72 C. DIFFICILE COLITIS: ICD-10-CM

## 2025-01-07 DIAGNOSIS — K52.832 LYMPHOCYTIC COLITIS: ICD-10-CM

## 2025-01-07 DIAGNOSIS — I50.21 ACUTE SYSTOLIC HEART FAILURE (H): ICD-10-CM

## 2025-01-07 DIAGNOSIS — E87.6 HYPOKALEMIA: ICD-10-CM

## 2025-01-07 DIAGNOSIS — I49.01 CARDIAC ARREST WITH VENTRICULAR FIBRILLATION (H): Primary | ICD-10-CM

## 2025-01-07 DIAGNOSIS — I46.9 CARDIAC ARREST WITH VENTRICULAR FIBRILLATION (H): Primary | ICD-10-CM

## 2025-01-07 DIAGNOSIS — F33.1 MODERATE EPISODE OF RECURRENT MAJOR DEPRESSIVE DISORDER (H): ICD-10-CM

## 2025-01-07 DIAGNOSIS — I25.5 ISCHEMIC CARDIOMYOPATHY: ICD-10-CM

## 2025-01-07 PROBLEM — R50.9 FEVER, UNSPECIFIED FEVER CAUSE: Status: RESOLVED | Noted: 2024-12-22 | Resolved: 2025-01-07

## 2025-01-07 PROBLEM — I42.9 SECONDARY CARDIOMYOPATHY (H): Status: RESOLVED | Noted: 2024-12-31 | Resolved: 2025-01-07

## 2025-01-07 PROBLEM — K52.9 COLITIS: Status: RESOLVED | Noted: 2024-12-22 | Resolved: 2025-01-07

## 2025-01-07 PROCEDURE — 98014 SYNCH AUDIO-ONLY EST MOD 30: CPT | Performed by: INTERNAL MEDICINE

## 2025-01-07 NOTE — PROGRESS NOTES
Laurel is a 74 year old who is being evaluated via a billable telephone visit.    What phone number would you like to be contacted at? [phone]    How would you like to obtain your AVS? Rose  Originating Location (pt. Location): Home    Distant Location (provider location):  On-site  Telephone visit completed due to the patient did not consent to a video visit.    This is a 74-year-old who I am seeing for audio visit to follow-up from her recent hospital stay at [facility].  As noted and reviewed she was admitted on December 22 and discharged on December 31 for acute on chronic diarrhea, C. difficile positive.  She also had a non-ST elevation MI with LAD occlusion and a V-fib arrest.  She was found to have new cardiomyopathy possibly stress-induced with mild hypokalemia and hypomagnesemia.    Patient was admitted for weakness, fever, as well as abdominal pain secondary to C. difficile.  She also has lymphocytic colitis.  She had been on omeprazole and recent antibiotics.  A CT of her chest, abdomen and pelvis on the day of admission showed diffuse thickening of the colon from the cecum to the distal rectum without secondary complications.  Admitting labs showed an elevated white count of 15.7 with a left shift, potassium of 2.6, calcium of 8.5 with low proteins.  Magnesium was low at 1.3.  Test for flu, RSV and COVID were negative.  Blood cultures were done and ultimately returned negative.  C. difficile toxin returned positive.  C. difficile antigen also returned as positive.  Cortisol level was elevated at 60.5.    The patient was treated with Pradaxa Meissen 200 mg twice daily for 10 days, Flagyl was stopped on December 27.  She is to be on oral vancomycin 250 mg twice daily for at least a month starting on January 5 and to continue with budesonide.  Colestipol was started by GI.  She is to follow-up with GI.    Her potassium and magnesium are replaced.    On the evening of December 29 CODE BLUE  was called and she was successfully resuscitated.  Emergent heart cath showed total occlusion of the LAD with apical akinesis and presumptive infarction.  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  Transthoracic echo showed a new cardiomyopathy with an EF of 40 to 45% and severe hypokinesis in several walls.  She was placed on lisinopril, Plavix, aspirin, Lipitor, and metoprolol with follow-up with cardiology.    At this time she has ongoing rib pain and weakness, but not having diarrhea.  Her depression has been stable.  Her appetite is not good.  She is not having chest pain or shortness of breath, no palpitations or syncope.  Her weight is down some.  No abdomen pain, some nausea.      Hosp follow up  C. Diff colitis, doing well, on vanco, follow up gi, call if problems  Low k, to have banana daily, to get follow up labs soon for this and low mg  Ischemic cmyop, stable, follow up cards  Lad dissection, stable, follow up cards  Cardiac arrest, due to lad dz  Depression, stable  Lymphocytic colitis, stable    PLAN:  Follow up for labs  Call if changes  Follow up cards and gi  See me as scheduled Feb.    Georges Lebron M.D.  31 minutes on the day of the encounter doing chart review, history and exam, documentation and further activities as noted above.

## 2025-01-09 ENCOUNTER — TRANSFERRED RECORDS (OUTPATIENT)
Dept: HEALTH INFORMATION MANAGEMENT | Facility: CLINIC | Age: 75
End: 2025-01-09
Payer: MEDICARE

## 2025-01-13 ENCOUNTER — TRANSFERRED RECORDS (OUTPATIENT)
Dept: HEALTH INFORMATION MANAGEMENT | Facility: CLINIC | Age: 75
End: 2025-01-13
Payer: MEDICARE

## 2025-01-13 DIAGNOSIS — F41.9 ANXIETY: ICD-10-CM

## 2025-01-14 RX ORDER — LORAZEPAM 1 MG/1
TABLET ORAL
Qty: 180 TABLET | Refills: 0 | Status: SHIPPED | OUTPATIENT
Start: 2025-01-14

## 2025-01-22 ENCOUNTER — NURSE TRIAGE (OUTPATIENT)
Dept: FAMILY MEDICINE | Facility: CLINIC | Age: 75
End: 2025-01-22
Payer: MEDICARE

## 2025-01-23 NOTE — TELEPHONE ENCOUNTER
"Nurse Triage SBAR    Is this a 2nd Level Triage? NO    Situation:  Pt complains of urinary frequency and dysuria since yesterday.     Background: Pt reports she gets frequent UTI's. Pt denies back, abdominal pain, fever or hematuria.    States PCP usually sends abx for her.  Assessment: Pt needs to be evaluated for UTI.     Protocol Recommended Disposition:   See in Office Today    Recommendation: Pt declined e-visit and wants message sent to PCP.  She is unable to leave a urine specimen since \"since I just back from the hospital from a heart attack.     Routed to provider Please reply to pt or reroute message to triage with recommendations on UTI tx. Pt is requesting response today.    Does the patient meet one of the following criteria for ADS visit consideration? No      Reason for Disposition   Urinating more frequently than usual (i.e., frequency) OR new-onset of the feeling of an urgent need to urinate (i.e., urgency)    Additional Information   Negative: Shock suspected (e.g., cold/pale/clammy skin, too weak to stand, low BP, rapid pulse)   Negative: Sounds like a life-threatening emergency to the triager   Negative: Followed a female genital area injury (e.g., labia, vagina, vulva)   Negative: Followed a male genital area injury (penis, scrotum)   Negative: Vaginal discharge   Negative: Pus (white, yellow) or bloody discharge from end of penis   Negative: Pain or burning with passing urine (urination) and pregnant   Negative: Pain or burning with passing urine (urination) and female   Negative: Pain or burning with passing urine (urination) and male   Negative: Pain or itching in the vulvar area   Negative: Pain in scrotum is main symptom   Negative: Blood in the urine is main symptom   Negative: Symptoms arising from use of a urinary catheter (e.g., coude, Bates)   Negative: Unable to urinate (or only a few drops) > 4 hours and bladder feels very full (e.g., palpable bladder or strong urge to urinate)   " Negative: Decreased urination and drinking very little and dehydration suspected (e.g., dark urine, no urine > 12 hours, very dry mouth, very lightheaded)   Negative: Patient sounds very sick or weak to the triager   Negative: Fever > 100.4 F  (38.0 C)   Negative: Side (flank) or lower back pain present    Protocols used: Urinary Symptoms-A-[place]

## 2025-01-27 ENCOUNTER — LAB (OUTPATIENT)
Dept: LAB | Facility: CLINIC | Age: 75
End: 2025-01-27
Payer: MEDICARE

## 2025-01-27 ENCOUNTER — OFFICE VISIT (OUTPATIENT)
Dept: CARDIOLOGY | Facility: CLINIC | Age: 75
End: 2025-01-27
Payer: MEDICARE

## 2025-01-27 VITALS
OXYGEN SATURATION: 94 % | HEIGHT: 63 IN | HEART RATE: 62 BPM | BODY MASS INDEX: 22.11 KG/M2 | WEIGHT: 124.8 LBS | SYSTOLIC BLOOD PRESSURE: 94 MMHG | DIASTOLIC BLOOD PRESSURE: 62 MMHG

## 2025-01-27 DIAGNOSIS — Z86.79 HISTORY OF VENTRICULAR FIBRILLATION: ICD-10-CM

## 2025-01-27 DIAGNOSIS — I25.10 ASCVD (ARTERIOSCLEROTIC CARDIOVASCULAR DISEASE): ICD-10-CM

## 2025-01-27 DIAGNOSIS — I25.5 ISCHEMIC CARDIOMYOPATHY: ICD-10-CM

## 2025-01-27 DIAGNOSIS — E83.42 HYPOMAGNESEMIA: ICD-10-CM

## 2025-01-27 DIAGNOSIS — I42.9 SECONDARY CARDIOMYOPATHY (H): ICD-10-CM

## 2025-01-27 DIAGNOSIS — I25.42 SPONTANEOUS DISSECTION OF CORONARY ARTERY: Primary | ICD-10-CM

## 2025-01-27 DIAGNOSIS — I21.4 NSTEMI (NON-ST ELEVATED MYOCARDIAL INFARCTION) (H): ICD-10-CM

## 2025-01-27 DIAGNOSIS — E87.6 HYPOKALEMIA: ICD-10-CM

## 2025-01-27 LAB
ANION GAP SERPL CALCULATED.3IONS-SCNC: 9 MMOL/L (ref 7–15)
BUN SERPL-MCNC: 9.5 MG/DL (ref 8–23)
CALCIUM SERPL-MCNC: 9.6 MG/DL (ref 8.8–10.4)
CHLORIDE SERPL-SCNC: 99 MMOL/L (ref 98–107)
CREAT SERPL-MCNC: 1.03 MG/DL (ref 0.51–0.95)
EGFRCR SERPLBLD CKD-EPI 2021: 57 ML/MIN/1.73M2
GLUCOSE SERPL-MCNC: 100 MG/DL (ref 70–99)
HCO3 SERPL-SCNC: 30 MMOL/L (ref 22–29)
MAGNESIUM SERPL-MCNC: 1.7 MG/DL (ref 1.7–2.3)
POTASSIUM SERPL-SCNC: 4.9 MMOL/L (ref 3.4–5.3)
SODIUM SERPL-SCNC: 138 MMOL/L (ref 135–145)

## 2025-01-27 PROCEDURE — 99214 OFFICE O/P EST MOD 30 MIN: CPT

## 2025-01-27 RX ORDER — CLOPIDOGREL BISULFATE 75 MG/1
75 TABLET ORAL DAILY
Qty: 30 TABLET | Refills: 0 | Status: SHIPPED | OUTPATIENT
Start: 2025-01-27

## 2025-01-27 RX ORDER — LISINOPRIL 2.5 MG/1
2.5 TABLET ORAL DAILY
Qty: 30 TABLET | Refills: 0 | Status: SHIPPED | OUTPATIENT
Start: 2025-01-27

## 2025-01-27 RX ORDER — METOPROLOL SUCCINATE 25 MG/1
12.5 TABLET, EXTENDED RELEASE ORAL DAILY
Qty: 60 TABLET | Refills: 2 | Status: SHIPPED | OUTPATIENT
Start: 2025-01-27

## 2025-01-27 RX ORDER — ATORVASTATIN CALCIUM 40 MG/1
40 TABLET, FILM COATED ORAL EVERY EVENING
Qty: 30 TABLET | Refills: 0 | Status: SHIPPED | OUTPATIENT
Start: 2025-01-27

## 2025-01-27 NOTE — PROGRESS NOTES
~Cardiology Clinic Visit~    Laurel Parra MRN# [id number]   YOB: 1950 Age: 74 year old   Primary Cardiologist: Dr. England            Assessment and Plan:   Laurle Parra is a very pleasant 74 year old female who is here today for post hospital follow up     NSTEMI, 12/2024  Secondary to SCAD  ***      Ischemic cardiomyopathy   ***  ***      ***  ***  ***      ***  ***  ***      ***  ***  ***      Plan:   - In order to optimize GDMT with softer blood pressure. Will discontinue metoprolol tartrate and start metoprolol XL 12.5 mg daily. This may also help improve patients fatigue she has been reporting recently.   - Will price out jardiance and spironolactone with patients new insurance. If not cost prohibitive, will start jardiance 10 mg daily with a BMP in one week. If jardiance is too expensive, will start spironolactone 12.5 mg daily with a BMP in one week    - Echocardiogram ordered   - SCAD clinic at [place] ***       Follow up with Dr. England in 3 months with echo prior       Edith Travis PA-C  Physician Assistant   [facility]  Pager: [phone]          History of Presenting Illness:    Laurel Parra is a very pleasant 74 year old female with a history of chronic anxiety, osteoarthritis, chronic pain syndrome, migraine headaches, exertional lightheadedness and palpitations.     In brief, I had the pleasure of meeting Ms. Parra in December 2024 during an admission for fever and abdominal pain. She was being treated for C. Diff colitis. During admission, an RRT was call due to patient reporting left sided chest pain. She received a nitroglycerin which helped with her pain. EKG showed slight T wave changes. While the RRT was occurring, the patient lost consciousness. Cardiac monitor showed ventricular fibrillation. Chest compressions were started and ROSC was achieved within 1 minute and patient gained consciousness. No shock or  meds were gives. She underwent coronary angiogram which showed a total occlusion of the distal LAD, concerning for SCAD. Recommendation was to treat medically. Echocardiogram demonstrated an LVEF of 40-45% with severe hypokinesis of the distal anterior and anteroseptal walls and apex, and severe hypokinesis of the distal inferior wall.     Patient is accompanied by her spouse today. She is participating in cardiac rehab. She continues to have sternal chest pain and pain along the sides of her rib cage bilaterally. This has gotten better since her hospitalization. She notices it becomes worse with movement, and touch. She has occasional palpitations.     Denies shortness of breath, orthopnea and PND. Denies chest pain, lightheadedness, dizziness, near syncope and syncope. Denies epistaxis, ecchymosis, and melena.     Taking medications daily as prescribed.      Blood pressure 94/62 and HR 62 in clinic today. Weights at home ~122-124 lbs.         Social History       Social History     Socioeconomic History    Marital status:      Spouse name: Not on file    Number of children: 1    Years of education: Not on file    Highest education level: Not on file   Occupational History     Employer: SELF   Tobacco Use    Smoking status: Former     Current packs/day: 0.00     Types: Cigarettes     Quit date: 1974     Years since quittin.6    Smokeless tobacco: Never    Tobacco comments:     infrequent use for about 5 years   Substance and Sexual Activity    Alcohol use: No    Drug use: No    Sexual activity: Not Currently     Partners: Male     Birth control/protection: Post-menopausal   Other Topics Concern    Parent/sibling w/ CABG, MI or angioplasty before 65F 55M? Yes     Comment: father   Social History Narrative    Not on file     Social Drivers of Health     Financial Resource Strain: Low Risk  (2024)    Financial Resource Strain     Within the past 12 months, have you or your family members you  live with been unable to get utilities (heat, electricity) when it was really needed?: No   Food Insecurity: Low Risk  (12/23/2024)    Food Insecurity     Within the past 12 months, did you worry that your food would run out before you got money to buy more?: No     Within the past 12 months, did the food you bought just not last and you didn t have money to get more?: No   Transportation Needs: Low Risk  (12/23/2024)    Transportation Needs     Within the past 12 months, has lack of transportation kept you from medical appointments, getting your medicines, non-medical meetings or appointments, work, or from getting things that you need?: No   Physical Activity: Inactive (10/14/2024)    Exercise Vital Sign     Days of Exercise per Week: 0 days     Minutes of Exercise per Session: 0 min   Stress: No Stress Concern Present (10/14/2024)    [employer] [place] [employer] - Occupational Stress Questionnaire     Feeling of Stress : Only a little   Social Connections: Unknown (10/14/2024)    Social Connection and Isolation Panel [NHANES]     Frequency of Communication with Friends and Family: Not on file     Frequency of Social Gatherings with Friends and Family: Twice a week     Attends Church Services: Not on file     Active Member of Clubs or Organizations: Not on file     Attends Club or Organization Meetings: Not on file     Marital Status: Not on file   Interpersonal Safety: Low Risk  (12/23/2024)    Interpersonal Safety     Do you feel physically and emotionally safe where you currently live?: Yes     Within the past 12 months, have you been hit, slapped, kicked or otherwise physically hurt by someone?: No     Within the past 12 months, have you been humiliated or emotionally abused in other ways by your partner or ex-partner?: No   Housing Stability: Low Risk  (12/23/2024)    Housing Stability     Do you have housing? : Yes     Are you worried about losing your housing?: No            Review of  "Systems:   Please see HPI         Physical Exam:   Vitals: BP 94/62   Pulse 62   Ht 1.6 m (5' 3\")   Wt 56.6 kg (124 lb 12.8 oz)   SpO2 94%   BMI 22.11 kg/m     Wt Readings from Last 4 Encounters:   12/31/24 56.8 kg (125 lb 3.2 oz)   10/16/24 59.4 kg (131 lb)   10/12/23 67.6 kg (149 lb)   09/15/23 65.8 kg (145 lb)     GEN: well nourished, in no acute distress.  NECK: Supple. JVP was not appreciated. Bilateral carotid arteries without bruits.  C/V:  Regular rate and rhythm, no murmur, rub or gallop.    RESP: Respirations are unlabored. Clear to auscultation bilaterally without wheezing, rales, or rhonchi.  EXTREM: Bilateral lower extremities with no edema.   SKIN: Warm and dry.        Data:   LIPID RESULTS:  Lab Results   Component Value Date    CHOL 140 12/07/2023    CHOL 244 (H) 11/19/2019    HDL 55 12/07/2023    HDL 66 11/19/2019    LDL 65 12/07/2023     (H) 11/19/2019    TRIG 101 12/07/2023    TRIG 266 (H) 11/19/2019    CHOLHDLRATIO 3.4 10/23/2015     LIVER ENZYME RESULTS:  Lab Results   Component Value Date    AST 17 12/22/2024    AST 18 12/17/2020    ALT 9 12/22/2024    ALT 18 12/17/2020     CBC RESULTS:  Lab Results   Component Value Date    WBC 11.7 (H) 12/31/2024    WBC 7.1 12/17/2020    RBC 3.74 (L) 12/31/2024    RBC 4.24 12/17/2020    HGB 11.7 12/31/2024    HGB 13.4 12/17/2020    HCT 34.4 (L) 12/31/2024    HCT 40.6 12/17/2020    MCV 92 12/31/2024    MCV 96 12/17/2020    MCH 31.3 12/31/2024    MCH 31.6 12/17/2020    MCHC 34.0 12/31/2024    MCHC 33.0 12/17/2020    RDW 12.7 12/31/2024    RDW 12.3 12/17/2020     12/31/2024     12/17/2020     BMP RESULTS:  Lab Results   Component Value Date     12/31/2024     12/17/2020    POTASSIUM 3.3 (L) 12/31/2024    POTASSIUM 3.5 12/29/2024    POTASSIUM 4.2 12/17/2020    CHLORIDE 101 12/31/2024    CHLORIDE 103 12/17/2020    CO2 27 12/31/2024    CO2 27 12/17/2020    ANIONGAP 10 12/31/2024    ANIONGAP 6 12/17/2020     (H) " "12/31/2024     (H) 12/29/2024    GLC 85 12/17/2020    BUN 7.4 (L) 12/31/2024    BUN 10 12/17/2020    CR 0.62 12/31/2024    CR 0.61 04/06/2021    GFRESTIMATED >90 12/31/2024    GFRESTIMATED >90 04/06/2021    GFRESTBLACK >90 04/06/2021    THOR 8.4 (L) 12/31/2024    THOR 8.8 12/17/2020      A1C RESULTS:  Lab Results   Component Value Date    A1C 5.3 12/15/2016     INR RESULTS:  No results found for: \"INR\"         Medications     Current Outpatient Medications   Medication Sig Dispense Refill    aspirin 81 MG EC tablet Take 1 tablet (81 mg) by mouth daily 90 tablet 0    atorvastatin (LIPITOR) 40 MG tablet Take 1 tablet (40 mg) by mouth every evening. 30 tablet 0    Baclofen (LIORESAL) 5 MG tablet Take 5 mg by mouth as needed for muscle spasms      budesonide (ENTOCORT EC) 3 MG EC capsule Take 3 capsules (9 mg) by mouth every morning. (Patient taking differently: Take 3 mg by mouth every morning.)      carboxymethylcellulose (REFRESH PLUS) 0.5 % SOLN ophthalmic solution Place 1 drop into both eyes 3 times daily as needed for dry eyes      clopidogrel (PLAVIX) 75 MG tablet Take 1 tablet (75 mg) by mouth daily. 30 tablet 0    cyclobenzaprine (FLEXERIL) 10 MG tablet Take 10 mg by mouth daily as needed for muscle spasms.      gabapentin (NEURONTIN) 300 MG capsule Take 1 capsule (300 mg) by mouth at bedtime.      HYDROcodone-Acetaminophen  MG TABS Take 1-2 tablets by mouth every 4 hours as needed (maximum of 10 tablets in 24 hour period) Prescription is written for 1-2 tabs every 4-6 hrs prn (max of 10 tabs daily).  0    ipratropium (ATROVENT) 0.06 % spray Spray 3 sprays into both nostrils daily 45 mL 3    lisinopril (ZESTRIL) 2.5 MG tablet Take 1 tablet (2.5 mg) by mouth daily. 30 tablet 0    loratadine (CLARITIN) 10 MG tablet Take 10 mg by mouth every morning       LORazepam (ATIVAN) 1 MG tablet TAKE 1 TABLET(1 MG) BY MOUTH FOUR TIMES DAILY 180 tablet 0    magnesium 100 MG CAPS Take by mouth.      metoprolol " tartrate (LOPRESSOR) 25 MG tablet Take 1 tablet (25 mg) by mouth 2 times daily. 60 tablet 0    nitroFURantoin macrocrystal-monohydrate (MACROBID) 100 MG capsule Take 1 capsule (100 mg) by mouth 2 times daily. 10 capsule 0    nitroGLYcerin (NITROSTAT) 0.4 MG sublingual tablet For chest pain place 1 tablet under the tongue every 5 minutes for 3 doses. If symptoms persist 5 minutes after 1st dose call 911. 50 tablet 0    ondansetron (ZOFRAN) 4 MG tablet Take 4 mg by mouth every 8 hours as needed for nausea or vomiting      pantoprazole (PROTONIX) 40 MG EC tablet Take 1 tablet (40 mg) by mouth 2 times daily (before meals). 60 tablet 0    PARoxetine (PAXIL-CR) 25 MG 24 hr tablet Take 1 tablet (25 mg) by mouth 2 times daily. 180 tablet 1    polyethylene glycol (MIRALAX) 17 GM/Dose powder Take 1 capful. by mouth as needed for constipation      riboflavin 400 MG CAPS Take 400 mg by mouth daily      ubrogepant (UBRELVY) 50 MG tablet Take 1 tablet (50 mg) by mouth at onset of headache (may repeat in 2 hours, total daily dose is 100 mg/day, but limit use to less than 10 days per month) 9 tablet 3    vitamin C (ASCORBIC ACID) 1000 MG TABS Take 1,000 mg by mouth every evening       Vitamin D3 (CHOLECALCIFEROL) 125 MCG (5000 UT) tablet Take 2 tablets by mouth every evening      zolpidem ER (AMBIEN CR) 12.5 MG CR tablet Take 1 tablet (12.5 mg) by mouth at bedtime. 30 tablet 0    naloxone (NARCAN) 4 MG/0.1ML nasal spray Spray 1 spray (4 mg) into one nostril alternating nostrils as needed for opioid reversal. every 2-3 minutes until assistance arrives 2 each 0    naloxone (NARCAN) 4 MG/0.1ML nasal spray Spray 1 spray (4 mg) into one nostril alternating nostrils as needed for opioid reversal Every 2-3 minutes in alternating nostrils 2 each 0          Past Medical History     Past Medical History:   Diagnosis Date    Adrenal insufficiency     Dr. Navas, secondary to meds    Anxiety     Dr. Adelina Meehan    ASC (arteriosclerotic  cardiovascular disease) 08/2023    cp and pos trop, angio with trivial cad, echo nl    Cardiac arrest with ventricular fibrillation (H) 12/29/2024    angio with occlussion of lad    Central sensitization to pain     Chronic constipation     Chronic headache     sees neuro Dr Danielson, 3 types of headaches    Chronic narcotic use     [facility]    Chronic pain     Dr. Orta as of 2015    Chronic urethritis     Dr. Little    Colonic polyp 11/2011    one polyp, fu 5 years, fu 10/17 and no lesions, fu 9/20 and negative, fu 5 years    Depression, major, in partial remission     Dr. Meehan    Diarrhea 2012    eval by [facility], resolved with gluten free diet    DJD (degenerative joint disease)     Dyspepsia 2020    eval by [facility], had colonoscopy, egd, ct abd and pelvis, mrcp.  [place] to be dyspepsia and constipation    H/O gastroesophageal reflux (GERD)     Ischemic cardiomyopathy 12/2024    echo with wma and ef 40-45%    LBP (low back pain) 2013    Dr. Jae Tong in [place], then seeing [facility] Dr. Orta    Light headed 07/2020    nl est echo    Lymphocytic colitis 2021    [facility]; seen on flex sig 9/24 [facility], added budesonide    Migraines 2009    neuro eval    NSTEMI (non-ST elevated myocardial infarction) (H) 08/31/2023    echo nl and angio trivial cad; cardiac arrest in hospital 12/29/2024, angio with lad occlussion    Palpitations 2006    holter with pvc's and pac's, echo nl    Screening 2010    dexa nl at gyn     Past Surgical History:   Procedure Laterality Date    APPENDECTOMY  2016    ARTHRODESIS WRIST Left 04/05/2021    Procedure: LEFT TOTAL WRIST FUSION;  Surgeon: Beatrice Philippe MD;  Location:  OR    ARTHROPLASTY CARPOMETACARPAL (THUMB JOINT) Left 06/07/2017    Procedure: ARTHROPLASTY CARPOMETACARPAL (THUMB JOINT);  REVISION  LEFT THUMB CARPOMETACARPAL ARTHROPOASTY WITH 1.1 TIGHT ROPE;  Surgeon: Beatrice Philippe MD;  Location:  SD    ARTHROSCOPY WRIST Left 08/26/2019     Procedure: LEFT WRIST ARTHROSCOPY REMOVAL OF TIGHT ROPE ; LEFT EXTENSOR POLLICIS LONGUS /EXTENSOR CARPI RADIALIS BREVIS /EXTENSOR CARPI RADIALUS LONGUS SYNOVECTOMY ; EXTENSOR POLLICIS LONGUS TRANSPOSITION ; ANTERIOR INTEROSSEOUS NEURECTOMY ; POSTERIOR INTEROSSEOUS NEURECTOMY;  Surgeon: Beatrice Philippe MD;  Location:  OR    BIOPSY      CARPAL TUNNEL RELEASE RT/LT      CATARACT EXTRACTION  2021     SECTION      COLONOSCOPY      CV CORONARY ANGIOGRAM N/A 2023    Procedure: Coronary Angiogram;  Surgeon: Moody Miranda MD;  Location:  HEART CARDIAC CATH LAB    CV CORONARY ANGIOGRAM N/A 2024    Procedure: Coronary Angiogram;  Surgeon: Tyler England MD;  Location:  HEART [facility]ARD[facility] CATH LAB    ESOPHAGOSCOPY, GASTROSCOPY, DUODENOSCOPY (EGD), COMBINED N/A 2020    Procedure: ESOPHAGOGASTRODUODENOSCOPY, WITH BIOPSY;  Surgeon: Georges Pop MD;  Location:  GI    EXCIS BARTHOLIN GLAND/CYST      EYE SURGERY      HERNIORRHAPHY INGUINAL  70's    x5    INJECT STEROID (LOCATION) Right 2019    Procedure: RIGHT WRIST CORTISONE INJECTION;  Surgeon: Beatrice Philippe MD;  Location: [facility]    OPERATIVE HYSTEROSCOPY WITH MORCELLATOR N/A 2018    Procedure: OPERATIVE HYSTEROSCOPY WITH MORCELLATOR ([employer]);  OPERATIVE HYSTEROSCOPY WITH TRUECLEAR MORCELLATOR 5C SCOPE ;  Surgeon: Iris Fernandez MD;  Location: [facility]    ORTHOPEDIC SURGERY Bilateral     SHOULDER ARTHROSCOPY    ORTHOPEDIC SURGERY Right 2017    right foot for plantar fasciitis    thumb surgery   and ,      Family History   Problem Relation Age of Onset    Diabetes Father     Hypertension Father     Depression Father     Substance Abuse Father     Anesthesia Reaction Father         went into shock with surgery     Obesity Father     Anxiety Disorder Father     Coronary Artery Disease Father     Hyperlipidemia Father     Diabetes Mother     Cerebrovascular Disease Mother          Cadasils disease    Depression Mother     Mental Illness Mother         borderline personality disorder, Chemical dependency    Substance Abuse Mother     Genetic Disorder Mother         Cadasils    Obesity Mother     Anxiety Disorder Mother     Cerebrovascular Disease Paternal Grandfather     Cerebrovascular Disease Brother         Cadasils disease    Depression Brother     Anxiety Disorder Brother     Substance Abuse Brother     Genetic Disorder Brother         cadasils    Obesity Brother     Hypertension Brother     Hyperlipidemia Brother     Cerebrovascular Disease Sister         Cadasils disease    Depression Sister     Anxiety Disorder Sister     Genetic Disorder Sister         cadasils    Obesity Sister     Cerebrovascular Disease Sister         Cadasils disease    Genetic Disorder Sister         cadasils    Breast Cancer Sister     Depression Sister     Anxiety Disorder Sister     Obesity Sister     Hypertension Sister     Hyperlipidemia Sister     Depression Son     Anxiety Disorder Son             Allergies   Betamethasone, Chocolate, Compazine [prochlorperazine], Linzess [linaclotide], Penicillins, Pneumococcal vaccine, and Tizanidine      This note was completed in part using dictation via the Dragon voice recognition software. Some word and grammatical errors may occur and must be interpreted in the appropriate clinical context.  If there are any questions pertaining to this issue, please contact me for further clarification.   voice recognition software. Some word and grammatical errors may occur and must be interpreted in the appropriate clinical context.  If there are any questions pertaining to this issue, please contact me for further clarification.

## 2025-01-27 NOTE — PATIENT INSTRUCTIONS
Thank you for your visit with the [facility] today.    Today's plan:   Medication changes:   Start metoprolol XL 12.5 mg daily   My nursing team will update you with the cost of spironolactone and jardiance to decide what medication to start   Follow up with Dr. England in 3 months with labs and echo prior     If you have questions or concerns please call the nurse team at [phone] or send a [employer] message.     Scheduling phone number: [phone]    It was a pleasure seeing you today!     Edith Travis PA-C   Physician Assistant   [facility]

## 2025-01-27 NOTE — RESULT ENCOUNTER NOTE
As you can see your magnesium level is normal.  On the chemistries your potassium is normal but your creatinine or kidney test has gone up.  I do suspect this could relate to the lisinopril.  I think would better have you stop that just to be safe.  It looks like you are seeing me in February so I can repeat the creatinine then.  Additionally, please avoid NSAIDs which includes Advil, Aleve, and ibuprofen.  These can affect the kidneys as well.    Georges

## 2025-01-27 NOTE — Clinical Note
1/27/2025    Georges Lebron MD  [address]  [place] [address]    RE: Laurel Parra       Dear Colleague,     I had the pleasure of seeing Laurel Parra in the [facility].              ~Cardiology Clinic Visit~    Laurel Parra MRN# [id number]   YOB: 1950 Age: 74 year old   Primary Cardiologist: Dr. England            Assessment and Plan:   Laurel Parra is a very pleasant 74 year old female who is here today for post hospital follow up     NSTEMI, 12/2024  Secondary to SCAD  ***      Ischemic cardiomyopathy   ***  ***      ***  ***  ***      ***  ***  ***      ***  ***  ***      Plan:   - ***   - ***  - ***       Follow up with ***      Edith Travis PA-C  Physician Assistant   [facility]- [facility]  Pager: [phone]          History of Presenting Illness:    Laurel Parra is a very pleasant 74 year old female with a history of chronic anxiety, osteoarthritis, chronic pain syndrome, migraine headaches, exertional lightheadedness and palpitations     In brief, I had the pleasure of meeting Ms. Parra in December 2024 during an admission for fever and abdominal pain. She was being treated for C. Diff colitis. During admission, an RRT was call due to patient reporting left sided chest pain. She received a nitroglycerin which helped with her pain. EKG showed slight T wave changes. While the RRT was occurring, the patient lost consciousness. Cardiac monitor showed ventricular fibrillation. Chest compressions were started and ROSC was achieved within 1 minute and patient gained consciousness. No shock or meds were gives. She underwent coronary angiogram which showed a total occlusion of the distal LAD, concerning for SCAD. Recommendation was to treat medically. Echocardiogram demonstrated an LVEF of 40-45% with severe hypokinesis of the distal anterior and anteroseptal walls and apex, and severe hypokinesis  of the distal inferior wall.     Patient ***    Denies shortness of breath, orthopnea and PND. Denies chest pain, palpitations, lightheadedness, dizziness, near syncope and syncope. Denies epistaxis, ecchymosis, and melena. ***    Taking medications daily as prescribed. ***     Blood pressure *** and HR *** in clinic today. Blood pressure at home ***. Weights at home ***     Diet ***  Exercise ***  Alcohol***  Tobacco***        Social History       Social History     Socioeconomic History    Marital status:      Spouse name: Not on file    Number of children: 1    Years of education: Not on file    Highest education level: Not on file   Occupational History     Employer: SELF   Tobacco Use    Smoking status: Former     Current packs/day: 0.00     Types: Cigarettes     Quit date: 1974     Years since quittin.6    Smokeless tobacco: Never    Tobacco comments:     infrequent use for about 5 years   Substance and Sexual Activity    Alcohol use: No    Drug use: No    Sexual activity: Not Currently     Partners: Male     Birth control/protection: Post-menopausal   Other Topics Concern    Parent/sibling w/ CABG, MI or angioplasty before 65F 55M? Yes     Comment: father   Social History Narrative    Not on file     Social Drivers of Health     Financial Resource Strain: Low Risk  (2024)    Financial Resource Strain     Within the past 12 months, have you or your family members you live with been unable to get utilities (heat, electricity) when it was really needed?: No   Food Insecurity: Low Risk  (2024)    Food Insecurity     Within the past 12 months, did you worry that your food would run out before you got money to buy more?: No     Within the past 12 months, did the food you bought just not last and you didn t have money to get more?: No   Transportation Needs: Low Risk  (2024)    Transportation Needs     Within the past 12 months, has lack of transportation kept you from medical  "appointments, getting your medicines, non-medical meetings or appointments, work, or from getting things that you need?: No   Physical Activity: Inactive (10/14/2024)    Exercise Vital Sign     Days of Exercise per Week: 0 days     Minutes of Exercise per Session: 0 min   Stress: No Stress Concern Present (10/14/2024)    [employer] - Occupational Stress Questionnaire     Feeling of Stress : Only a little   Social Connections: Unknown (10/14/2024)    Social Connection and Isolation Panel [NHANES]     Frequency of Communication with Friends and Family: Not on file     Frequency of Social Gatherings with Friends and Family: Twice a week     Attends Synagogue Services: Not on file     Active Member of Clubs or Organizations: Not on file     Attends Club or Organization Meetings: Not on file     Marital Status: Not on file   Interpersonal Safety: Low Risk  (12/23/2024)    Interpersonal Safety     Do you feel physically and emotionally safe where you currently live?: Yes     Within the past 12 months, have you been hit, slapped, kicked or otherwise physically hurt by someone?: No     Within the past 12 months, have you been humiliated or emotionally abused in other ways by your partner or ex-partner?: No   Housing Stability: Low Risk  (12/23/2024)    Housing Stability     Do you have housing? : Yes     Are you worried about losing your housing?: No            Review of Systems:   Please see HPI         Physical Exam:   Vitals: Ht 1.6 m (5' 3\")   BMI 22.18 kg/m     Wt Readings from Last 4 Encounters:   12/31/24 56.8 kg (125 lb 3.2 oz)   10/16/24 59.4 kg (131 lb)   10/12/23 67.6 kg (149 lb)   09/15/23 65.8 kg (145 lb)     GEN: well nourished, in no acute distress.  NECK: Supple. JVP was not appreciated.*** Bilateral carotid arteries without bruits.***  C/V:  Regular rate and rhythm, no murmur, rub or gallop.  ***  RESP: Respirations are unlabored. Clear to auscultation bilaterally without " "wheezing, rales, or rhonchi.  EXTREM: Bilateral lower extremities with *** edema.   SKIN: Warm and dry. *** acces site healing well, without bruit.***       Data:   LIPID RESULTS:  Lab Results   Component Value Date    CHOL 140 12/07/2023    CHOL 244 (H) 11/19/2019    HDL 55 12/07/2023    HDL 66 11/19/2019    LDL 65 12/07/2023     (H) 11/19/2019    TRIG 101 12/07/2023    TRIG 266 (H) 11/19/2019    CHOLHDLRATIO 3.4 10/23/2015     LIVER ENZYME RESULTS:  Lab Results   Component Value Date    AST 17 12/22/2024    AST 18 12/17/2020    ALT 9 12/22/2024    ALT 18 12/17/2020     CBC RESULTS:  Lab Results   Component Value Date    WBC 11.7 (H) 12/31/2024    WBC 7.1 12/17/2020    RBC 3.74 (L) 12/31/2024    RBC 4.24 12/17/2020    HGB 11.7 12/31/2024    HGB 13.4 12/17/2020    HCT 34.4 (L) 12/31/2024    HCT 40.6 12/17/2020    MCV 92 12/31/2024    MCV 96 12/17/2020    MCH 31.3 12/31/2024    MCH 31.6 12/17/2020    MCHC 34.0 12/31/2024    MCHC 33.0 12/17/2020    RDW 12.7 12/31/2024    RDW 12.3 12/17/2020     12/31/2024     12/17/2020     BMP RESULTS:  Lab Results   Component Value Date     12/31/2024     12/17/2020    POTASSIUM 3.3 (L) 12/31/2024    POTASSIUM 3.5 12/29/2024    POTASSIUM 4.2 12/17/2020    CHLORIDE 101 12/31/2024    CHLORIDE 103 12/17/2020    CO2 27 12/31/2024    CO2 27 12/17/2020    ANIONGAP 10 12/31/2024    ANIONGAP 6 12/17/2020     (H) 12/31/2024     (H) 12/29/2024    GLC 85 12/17/2020    BUN 7.4 (L) 12/31/2024    BUN 10 12/17/2020    CR 0.62 12/31/2024    CR 0.61 04/06/2021    GFRESTIMATED >90 12/31/2024    GFRESTIMATED >90 04/06/2021    GFRESTBLACK >90 04/06/2021    THOR 8.4 (L) 12/31/2024    THOR 8.8 12/17/2020      A1C RESULTS:  Lab Results   Component Value Date    A1C 5.3 12/15/2016     INR RESULTS:  No results found for: \"INR\"         Medications     Current Outpatient Medications   Medication Sig Dispense Refill    aspirin 81 MG EC tablet Take 1 tablet (81 mg) " by mouth daily 90 tablet 0    atorvastatin (LIPITOR) 40 MG tablet Take 1 tablet (40 mg) by mouth every evening. 30 tablet 0    Baclofen (LIORESAL) 5 MG tablet Take 5 mg by mouth as needed for muscle spasms      budesonide (ENTOCORT EC) 3 MG EC capsule Take 3 capsules (9 mg) by mouth every morning. (Patient taking differently: Take 3 mg by mouth every morning.)      carboxymethylcellulose (REFRESH PLUS) 0.5 % SOLN ophthalmic solution Place 1 drop into both eyes 3 times daily as needed for dry eyes      clopidogrel (PLAVIX) 75 MG tablet Take 1 tablet (75 mg) by mouth daily. 30 tablet 0    cyclobenzaprine (FLEXERIL) 10 MG tablet Take 10 mg by mouth daily as needed for muscle spasms.      gabapentin (NEURONTIN) 300 MG capsule Take 1 capsule (300 mg) by mouth at bedtime.      HYDROcodone-Acetaminophen  MG TABS Take 1-2 tablets by mouth every 4 hours as needed (maximum of 10 tablets in 24 hour period) Prescription is written for 1-2 tabs every 4-6 hrs prn (max of 10 tabs daily).  0    ipratropium (ATROVENT) 0.06 % spray Spray 3 sprays into both nostrils daily 45 mL 3    lisinopril (ZESTRIL) 2.5 MG tablet Take 1 tablet (2.5 mg) by mouth daily. 30 tablet 0    loratadine (CLARITIN) 10 MG tablet Take 10 mg by mouth every morning       LORazepam (ATIVAN) 1 MG tablet TAKE 1 TABLET(1 MG) BY MOUTH FOUR TIMES DAILY 180 tablet 0    magnesium 100 MG CAPS Take by mouth.      metoprolol tartrate (LOPRESSOR) 25 MG tablet Take 1 tablet (25 mg) by mouth 2 times daily. 60 tablet 0    nitroFURantoin macrocrystal-monohydrate (MACROBID) 100 MG capsule Take 1 capsule (100 mg) by mouth 2 times daily. 10 capsule 0    nitroGLYcerin (NITROSTAT) 0.4 MG sublingual tablet For chest pain place 1 tablet under the tongue every 5 minutes for 3 doses. If symptoms persist 5 minutes after 1st dose call 911. 50 tablet 0    ondansetron (ZOFRAN) 4 MG tablet Take 4 mg by mouth every 8 hours as needed for nausea or vomiting      pantoprazole (PROTONIX) 40  MG EC tablet Take 1 tablet (40 mg) by mouth 2 times daily (before meals). 60 tablet 0    PARoxetine (PAXIL-CR) 25 MG 24 hr tablet Take 1 tablet (25 mg) by mouth 2 times daily. 180 tablet 1    polyethylene glycol (MIRALAX) 17 GM/Dose powder Take 1 capful. by mouth as needed for constipation      riboflavin 400 MG CAPS Take 400 mg by mouth daily      ubrogepant (UBRELVY) 50 MG tablet Take 1 tablet (50 mg) by mouth at onset of headache (may repeat in 2 hours, total daily dose is 100 mg/day, but limit use to less than 10 days per month) 9 tablet 3    vitamin C (ASCORBIC ACID) 1000 MG TABS Take 1,000 mg by mouth every evening       Vitamin D3 (CHOLECALCIFEROL) 125 MCG (5000 UT) tablet Take 2 tablets by mouth every evening      zolpidem ER (AMBIEN CR) 12.5 MG CR tablet Take 1 tablet (12.5 mg) by mouth at bedtime. 30 tablet 0    naloxone (NARCAN) 4 MG/0.1ML nasal spray Spray 1 spray (4 mg) into one nostril alternating nostrils as needed for opioid reversal. every 2-3 minutes until assistance arrives 2 each 0    naloxone (NARCAN) 4 MG/0.1ML nasal spray Spray 1 spray (4 mg) into one nostril alternating nostrils as needed for opioid reversal Every 2-3 minutes in alternating nostrils 2 each 0          Past Medical History     Past Medical History:   Diagnosis Date    Adrenal insufficiency     Dr. Naavs, secondary to meds    Anxiety     Dr. Adelina Meehan    ASCVD (arteriosclerotic cardiovascular disease) 08/2023    cp and pos trop, angio with trivial cad, echo nl    Cardiac arrest with ventricular fibrillation (H) 12/29/2024    angio with occlussion of lad    Central sensitization to pain     Chronic constipation     Chronic headache     sees neuro Dr Danielson, 3 types of headaches    Chronic narcotic use     [facility]    Chronic pain     Dr. Orta as of 2015    Chronic urethritis     Dr. Little    Colonic polyp 11/2011    one polyp, fu 5 years, fu 10/17 and no lesions, fu 9/20 and negative, fu 5 years     Depression, major, in partial remission     Dr. Meehan    Diarrhea     eval by [facility], resolved with gluten free diet    DJD (degenerative joint disease)     Dyspepsia     eval by [facility], had colonoscopy, egd, ct abd and pelvis, mrcp.  [place] to be dyspepsia and constipation    H/O gastroesophageal reflux (GERD)     Ischemic cardiomyopathy 2024    echo with wma and ef 40-45%    LBP (low back pain)     Dr. Jae Tong in [place], then seeing [facility] Dr. You Cannon headed 2020    nl est echo    Lymphocytic colitis     [facility] Floyd [facility]; seen on flex sig  [facility] gi, added budesonide    Migraines     neuro eval    NSTEMI (non-ST elevated myocardial infarction) (H) 2023    echo nl and angio trivial cad; cardiac arrest in hospital 2024, angio with lad occlussion    Palpitations     holter with pvc's and pac's, echo nl    Screening     dexa nl at gyn     Past Surgical History:   Procedure Laterality Date    APPENDECTOMY  2016    ARTHRODESIS WRIST Left 2021    Procedure: LEFT TOTAL WRIST FUSION;  Surgeon: Beatrice Philippe MD;  Location:  OR    ARTHROPLASTY CARPOMETACARPAL (THUMB JOINT) Left 2017    Procedure: ARTHROPLASTY CARPOMETACARPAL (THUMB JOINT);  REVISION  LEFT THUMB CARPOMETACARPAL ARTHROPOASTY WITH 1.1 TIGHT ROPE;  Surgeon: Beatrice Philippe MD;  Location:  SD    ARTHROSCOPY WRIST Left 2019    Procedure: LEFT WRIST ARTHROSCOPY REMOVAL OF TIGHT ROPE ; LEFT EXTENSOR POLLICIS LONGUS /EXTENSOR CARPI RADIALIS BREVIS /EXTENSOR CARPI RADIALUS LONGUS SYNOVECTOMY ; EXTENSOR POLLICIS LONGUS TRANSPOSITION ; ANTERIOR INTEROSSEOUS NEURECTOMY ; POSTERIOR INTEROSSEOUS NEURECTOMY;  Surgeon: Beatrice Philippe MD;  Location:  OR    BIOPSY      CARPAL TUNNEL RELEASE RT/LT      CATARACT EXTRACTION  2021     SECTION      COLONOSCOPY      CV CORONARY ANGIOGRAM N/A 2023    Procedure: Coronary Angiogram;  Surgeon: Moody Miranda  MD Darron;  Location:  HEART CARDIAC CATH LAB    CV CORONARY ANGIOGRAM N/A 12/29/2024    Procedure: Coronary Angiogram;  Surgeon: Tyler England MD;  Location:  HEART CARDIAC CATH LAB    ESOPHAGOSCOPY, GASTROSCOPY, DUODENOSCOPY (EGD), COMBINED N/A 08/18/2020    Procedure: ESOPHAGOGASTRODUODENOSCOPY, WITH BIOPSY;  Surgeon: Georges Pop MD;  Location:  GI    EXCIS BARTHOLIN GLAND/CYST  2005    EYE SURGERY  2023    HERNIORRHAPHY INGUINAL  70's    x5    INJECT STEROID (LOCATION) Right 08/26/2019    Procedure: RIGHT WRIST CORTISONE INJECTION;  Surgeon: Beatrice Philippe MD;  Location:  OR    OPERATIVE HYSTEROSCOPY WITH MORCELLATOR N/A 02/09/2018    Procedure: OPERATIVE HYSTEROSCOPY WITH MORCELLATOR (MARTIN [employer] NEPHEW);  OPERATIVE HYSTEROSCOPY WITH TRUECLEAR MORCELLATOR 5C SCOPE ;  Surgeon: Iris Fernandez MD;  Location:  SD    ORTHOPEDIC SURGERY Bilateral     SHOULDER ARTHROSCOPY    ORTHOPEDIC SURGERY Right 11/2017    right foot for plantar fasciitis    thumb surgery  2000 and 2004, 2007     Family History   Problem Relation Age of Onset    Diabetes Father     Hypertension Father     Depression Father     Substance Abuse Father     Anesthesia Reaction Father         went into shock with surgery     Obesity Father     Anxiety Disorder Father     Coronary Artery Disease Father     Hyperlipidemia Father     Diabetes Mother     Cerebrovascular Disease Mother         Cadasils disease    Depression Mother     Mental Illness Mother         borderline personality disorder, Chemical dependency    Substance Abuse Mother     Genetic Disorder Mother         Cadasils    Obesity Mother     Anxiety Disorder Mother     Cerebrovascular Disease Paternal Grandfather     Cerebrovascular Disease Brother         Cadasils disease    Depression Brother     Anxiety Disorder Brother     Substance Abuse Brother     Genetic Disorder Brother         cadasils    Obesity Brother     Hypertension Brother     Hyperlipidemia Brother      Cerebrovascular Disease Sister         Cadasils disease    Depression Sister     Anxiety Disorder Sister     Genetic Disorder Sister         cadasils    Obesity Sister     Cerebrovascular Disease Sister         Cadasils disease    Genetic Disorder Sister         cadasils    Breast Cancer Sister     Depression Sister     Anxiety Disorder Sister     Obesity Sister     Hypertension Sister     Hyperlipidemia Sister     Depression Son     Anxiety Disorder Son             Allergies   Betamethasone, Chocolate, Compazine [prochlorperazine], Linzess [linaclotide], Penicillins, Pneumococcal vaccine, and Tizanidine      This note was completed in part using dictation via the Dragon voice recognition software. Some word and grammatical errors may occur and must be interpreted in the appropriate clinical context.  If there are any questions pertaining to this issue, please contact me for further clarification.                ~Cardiology Clinic Visit~    Laurel Parra MRN# [id number]   YOB: 1950 Age: 74 year old   Primary Cardiologist: Dr. England            Assessment and Plan:   Laurel Parra is a very pleasant 74 year old female who is here today for post hospital follow up     NSTEMI, 12/2024  Secondary to SCAD  ***      Ischemic cardiomyopathy   ***  ***      ***  ***  ***      ***  ***  ***      ***  ***  ***      Plan:   - ***   - ***  - ***       Follow up with ***      Edith Travis PA-C  Physician Assistant   [facility]- [facility]  Pager: [phone]          History of Presenting Illness:    Laurel Parra is a very pleasant 74 year old female with a history of chronic anxiety, osteoarthritis, chronic pain syndrome, migraine headaches, exertional lightheadedness and palpitations.     In brief, I had the pleasure of meeting Ms. Parra in December 2024 during an admission for fever and abdominal pain. She was being treated for C. Diff colitis. During admission,  an RRT was call due to patient reporting left sided chest pain. She received a nitroglycerin which helped with her pain. EKG showed slight T wave changes. While the RRT was occurring, the patient lost consciousness. Cardiac monitor showed ventricular fibrillation. Chest compressions were started and ROSC was achieved within 1 minute and patient gained consciousness. No shock or meds were gives. She underwent coronary angiogram which showed a total occlusion of the distal LAD, concerning for SCAD. Recommendation was to treat medically. Echocardiogram demonstrated an LVEF of 40-45% with severe hypokinesis of the distal anterior and anteroseptal walls and apex, and severe hypokinesis of the distal inferior wall.     Patient ***  Spouse with   Started cardiac rehab   Pain middle chest and edges, has gotten better   Pain with movement, laying down, touch, using ice and heat   Similar pain in the hospital, getting better   Palpitations     Denies shortness of breath, orthopnea and PND. Denies chest pain, lightheadedness, dizziness, near syncope and syncope. Denies epistaxis, ecchymosis, and melena.     Taking medications daily as prescribed.      Blood pressure 94/62 and HR 62 in clinic today. Weights at home ~122-124 lbs.         Social History       Social History     Socioeconomic History     Marital status:      Spouse name: Not on file     Number of children: 1     Years of education: Not on file     Highest education level: Not on file   Occupational History     Employer: SELF   Tobacco Use     Smoking status: Former     Current packs/day: 0.00     Types: Cigarettes     Quit date: 1974     Years since quittin.6     Smokeless tobacco: Never     Tobacco comments:     infrequent use for about 5 years   Substance and Sexual Activity     Alcohol use: No     Drug use: No     Sexual activity: Not Currently     Partners: Male     Birth control/protection: Post-menopausal   Other Topics Concern      Parent/sibling w/ CABG, MI or angioplasty before 65F 55M? Yes     Comment: father   Social History Narrative     Not on file     Social Drivers of Health     Financial Resource Strain: Low Risk  (12/23/2024)    Financial Resource Strain      Within the past 12 months, have you or your family members you live with been unable to get utilities (heat, electricity) when it was really needed?: No   Food Insecurity: Low Risk  (12/23/2024)    Food Insecurity      Within the past 12 months, did you worry that your food would run out before you got money to buy more?: No      Within the past 12 months, did the food you bought just not last and you didn t have money to get more?: No   Transportation Needs: Low Risk  (12/23/2024)    Transportation Needs      Within the past 12 months, has lack of transportation kept you from medical appointments, getting your medicines, non-medical meetings or appointments, work, or from getting things that you need?: No   Physical Activity: Inactive (10/14/2024)    Exercise Vital Sign      Days of Exercise per Week: 0 days      Minutes of Exercise per Session: 0 min   Stress: No Stress Concern Present (10/14/2024)    [employer] - Occupational Stress Questionnaire      Feeling of Stress : Only a little   Social Connections: Unknown (10/14/2024)    Social Connection and Isolation Panel [NHANES]      Frequency of Communication with Friends and Family: Not on file      Frequency of Social Gatherings with Friends and Family: Twice a week      Attends [employer] Services: Not on file      Active Member of Clubs or Organizations: Not on file      Attends Club or Organization Meetings: Not on file      Marital Status: Not on file   Interpersonal Safety: Low Risk  (12/23/2024)    Interpersonal Safety      Do you feel physically and emotionally safe where you currently live?: Yes      Within the past 12 months, have you been hit, slapped, kicked or otherwise physically hurt  "by someone?: No      Within the past 12 months, have you been humiliated or emotionally abused in other ways by your partner or ex-partner?: No   Housing Stability: Low Risk  (12/23/2024)    Housing Stability      Do you have housing? : Yes      Are you worried about losing your housing?: No            Review of Systems:   Please see HPI         Physical Exam:   Vitals: BP 94/62   Pulse 62   Ht 1.6 m (5' 3\")   Wt 56.6 kg (124 lb 12.8 oz)   SpO2 94%   BMI 22.11 kg/m     Wt Readings from Last 4 Encounters:   12/31/24 56.8 kg (125 lb 3.2 oz)   10/16/24 59.4 kg (131 lb)   10/12/23 67.6 kg (149 lb)   09/15/23 65.8 kg (145 lb)     GEN: well nourished, in no acute distress.  NECK: Supple. JVP was not appreciated.*** Bilateral carotid arteries without bruits.***  C/V:  Regular rate and rhythm, no murmur, rub or gallop.  ***  RESP: Respirations are unlabored. Clear to auscultation bilaterally without wheezing, rales, or rhonchi.  EXTREM: Bilateral lower extremities with *** edema.   SKIN: Warm and dry. *** acces site healing well, without bruit.***       Data:   LIPID RESULTS:  Lab Results   Component Value Date    CHOL 140 12/07/2023    CHOL 244 (H) 11/19/2019    HDL 55 12/07/2023    HDL 66 11/19/2019    LDL 65 12/07/2023     (H) 11/19/2019    TRIG 101 12/07/2023    TRIG 266 (H) 11/19/2019    CHOLHDLRATIO 3.4 10/23/2015     LIVER ENZYME RESULTS:  Lab Results   Component Value Date    AST 17 12/22/2024    AST 18 12/17/2020    ALT 9 12/22/2024    ALT 18 12/17/2020     CBC RESULTS:  Lab Results   Component Value Date    WBC 11.7 (H) 12/31/2024    WBC 7.1 12/17/2020    RBC 3.74 (L) 12/31/2024    RBC 4.24 12/17/2020    HGB 11.7 12/31/2024    HGB 13.4 12/17/2020    HCT 34.4 (L) 12/31/2024    HCT 40.6 12/17/2020    MCV 92 12/31/2024    MCV 96 12/17/2020    MCH 31.3 12/31/2024    MCH 31.6 12/17/2020    MCHC 34.0 12/31/2024    MCHC 33.0 12/17/2020    RDW 12.7 12/31/2024    RDW 12.3 12/17/2020     12/31/2024    PLT " "326 12/17/2020     BMP RESULTS:  Lab Results   Component Value Date     12/31/2024     12/17/2020    POTASSIUM 3.3 (L) 12/31/2024    POTASSIUM 3.5 12/29/2024    POTASSIUM 4.2 12/17/2020    CHLORIDE 101 12/31/2024    CHLORIDE 103 12/17/2020    CO2 27 12/31/2024    CO2 27 12/17/2020    ANIONGAP 10 12/31/2024    ANIONGAP 6 12/17/2020     (H) 12/31/2024     (H) 12/29/2024    GLC 85 12/17/2020    BUN 7.4 (L) 12/31/2024    BUN 10 12/17/2020    CR 0.62 12/31/2024    CR 0.61 04/06/2021    GFRESTIMATED >90 12/31/2024    GFRESTIMATED >90 04/06/2021    GFRESTBLACK >90 04/06/2021    THOR 8.4 (L) 12/31/2024    THOR 8.8 12/17/2020      A1C RESULTS:  Lab Results   Component Value Date    A1C 5.3 12/15/2016     INR RESULTS:  No results found for: \"INR\"         Medications     Current Outpatient Medications   Medication Sig Dispense Refill     aspirin 81 MG EC tablet Take 1 tablet (81 mg) by mouth daily 90 tablet 0     atorvastatin (LIPITOR) 40 MG tablet Take 1 tablet (40 mg) by mouth every evening. 30 tablet 0     Baclofen (LIORESAL) 5 MG tablet Take 5 mg by mouth as needed for muscle spasms       budesonide (ENTOCORT EC) 3 MG EC capsule Take 3 capsules (9 mg) by mouth every morning. (Patient taking differently: Take 3 mg by mouth every morning.)       carboxymethylcellulose (REFRESH PLUS) 0.5 % SOLN ophthalmic solution Place 1 drop into both eyes 3 times daily as needed for dry eyes       clopidogrel (PLAVIX) 75 MG tablet Take 1 tablet (75 mg) by mouth daily. 30 tablet 0     cyclobenzaprine (FLEXERIL) 10 MG tablet Take 10 mg by mouth daily as needed for muscle spasms.       gabapentin (NEURONTIN) 300 MG capsule Take 1 capsule (300 mg) by mouth at bedtime.       HYDROcodone-Acetaminophen  MG TABS Take 1-2 tablets by mouth every 4 hours as needed (maximum of 10 tablets in 24 hour period) Prescription is written for 1-2 tabs every 4-6 hrs prn (max of 10 tabs daily).  0     ipratropium (ATROVENT) 0.06 % " spray Spray 3 sprays into both nostrils daily 45 mL 3     lisinopril (ZESTRIL) 2.5 MG tablet Take 1 tablet (2.5 mg) by mouth daily. 30 tablet 0     loratadine (CLARITIN) 10 MG tablet Take 10 mg by mouth every morning        LORazepam (ATIVAN) 1 MG tablet TAKE 1 TABLET(1 MG) BY MOUTH FOUR TIMES DAILY 180 tablet 0     magnesium 100 MG CAPS Take by mouth.       metoprolol tartrate (LOPRESSOR) 25 MG tablet Take 1 tablet (25 mg) by mouth 2 times daily. 60 tablet 0     nitroFURantoin macrocrystal-monohydrate (MACROBID) 100 MG capsule Take 1 capsule (100 mg) by mouth 2 times daily. 10 capsule 0     nitroGLYcerin (NITROSTAT) 0.4 MG sublingual tablet For chest pain place 1 tablet under the tongue every 5 minutes for 3 doses. If symptoms persist 5 minutes after 1st dose call 911. 50 tablet 0     ondansetron (ZOFRAN) 4 MG tablet Take 4 mg by mouth every 8 hours as needed for nausea or vomiting       pantoprazole (PROTONIX) 40 MG EC tablet Take 1 tablet (40 mg) by mouth 2 times daily (before meals). 60 tablet 0     PARoxetine (PAXIL-CR) 25 MG 24 hr tablet Take 1 tablet (25 mg) by mouth 2 times daily. 180 tablet 1     polyethylene glycol (MIRALAX) 17 GM/Dose powder Take 1 capful. by mouth as needed for constipation       riboflavin 400 MG CAPS Take 400 mg by mouth daily       ubrogepant (UBRELVY) 50 MG tablet Take 1 tablet (50 mg) by mouth at onset of headache (may repeat in 2 hours, total daily dose is 100 mg/day, but limit use to less than 10 days per month) 9 tablet 3     vitamin C (ASCORBIC ACID) 1000 MG TABS Take 1,000 mg by mouth every evening        Vitamin D3 (CHOLECALCIFEROL) 125 MCG (5000 UT) tablet Take 2 tablets by mouth every evening       zolpidem ER (AMBIEN CR) 12.5 MG CR tablet Take 1 tablet (12.5 mg) by mouth at bedtime. 30 tablet 0     naloxone (NARCAN) 4 MG/0.1ML nasal spray Spray 1 spray (4 mg) into one nostril alternating nostrils as needed for opioid reversal. every 2-3 minutes until assistance arrives 2  each 0     naloxone (NARCAN) 4 MG/0.1ML nasal spray Spray 1 spray (4 mg) into one nostril alternating nostrils as needed for opioid reversal Every 2-3 minutes in alternating nostrils 2 each 0          Past Medical History     Past Medical History:   Diagnosis Date     Adrenal insufficiency     Dr. Navas, secondary to meds     Anxiety     Dr. Adelina Meehan     ASCVD (arteriosclerotic cardiovascular disease) 08/2023    cp and pos trop, angio with trivial cad, echo nl     Cardiac arrest with ventricular fibrillation (H) 12/29/2024    angio with occlussion of lad     Central sensitization to pain      Chronic constipation      Chronic headache     sees neuro Dr Danielson, 3 types of headaches     Chronic narcotic use     [facility]     Chronic pain     Dr. Orta as of 2015     Chronic urethritis     Dr. Little     Colonic polyp 11/2011    one polyp, fu 5 years, fu 10/17 and no lesions, fu 9/20 and negative, fu 5 years     Depression, major, in partial remission     Dr. Meehan     Diarrhea 2012    eval by [facility], resolved with gluten free diet     DJD (degenerative joint disease)      Dyspepsia 2020    eval by [facility], had colonoscopy, egd, ct abd and pelvis, mrcp.  Hercules to be dyspepsia and constipation     H/O gastroesophageal reflux (GERD)      Ischemic cardiomyopathy 12/2024    echo with wma and ef 40-45%     LBP (low back pain) 2013    Dr. Jae Tong in [place], then seeing [facility] Dr. Orta     Light headed 07/2020    nl est echo     Lymphocytic colitis 2021    [facility] Floyd Martinez; seen on flex sig 9/24 [facility], added budesonide     Migraines 2009    neuro eval     NSTEMI (non-ST elevated myocardial infarction) (H) 08/31/2023    echo nl and angio trivial cad; cardiac arrest in hospital 12/29/2024, angio with lad occlussion     Palpitations 2006    holter with pvc's and pac's, echo nl     Screening 2010    dexa nl at gyn     Past Surgical History:   Procedure Laterality Date     APPENDECTOMY   2016     ARTHRODESIS WRIST Left 2021    Procedure: LEFT TOTAL WRIST FUSION;  Surgeon: Beatrice Philippe MD;  Location:  OR     ARTHROPLASTY CARPOMETACARPAL (THUMB JOINT) Left 2017    Procedure: ARTHROPLASTY CARPOMETACARPAL (THUMB JOINT);  REVISION  LEFT THUMB CARPOMETACARPAL ARTHROPOASTY WITH 1.1 TIGHT ROPE;  Surgeon: Beatrice Philippe MD;  Location: [facility]     ARTHROSCOPY WRIST Left 2019    Procedure: LEFT WRIST ARTHROSCOPY REMOVAL OF TIGHT ROPE ; LEFT EXTENSOR POLLICIS LONGUS /EXTENSOR CARPI RADIALIS BREVIS /EXTENSOR CARPI RADIALUS LONGUS SYNOVECTOMY ; EXTENSOR POLLICIS LONGUS TRANSPOSITION ; ANTERIOR INTEROSSEOUS NEURECTOMY ; POSTERIOR INTEROSSEOUS NEURECTOMY;  Surgeon: Beatrice Philippe MD;  Location:  OR     BIOPSY       CARPAL TUNNEL RELEASE RT/LT       CATARACT EXTRACTION  2021      SECTION       COLONOSCOPY       CV CORONARY ANGIOGRAM N/A 2023    Procedure: Coronary Angiogram;  Surgeon: Moody Miranda MD;  Location:  HEART CARDIAC CATH LAB     CV CORONARY ANGIOGRAM N/A 2024    Procedure: Coronary Angiogram;  Surgeon: Tyler England MD;  Location:  HEART CARDIAC CATH LAB     ESOPHAGOSCOPY, GASTROSCOPY, DUODENOSCOPY (EGD), COMBINED N/A 2020    Procedure: ESOPHAGOGASTRODUODENOSCOPY, WITH BIOPSY;  Surgeon: Georges Pop MD;  Location:  GI     EXCIS BARTHOLIN GLAND/CYST  2005     EYE SURGERY       HERNIORRHAPHY INGUINAL  70's    x5     INJECT STEROID (LOCATION) Right 2019    Procedure: RIGHT WRIST CORTISONE INJECTION;  Surgeon: Beatrice Philippe MD;  Location:  OR     OPERATIVE HYSTEROSCOPY WITH MORCELLATOR N/A 2018    Procedure: OPERATIVE HYSTEROSCOPY WITH MORCELLATOR (MARTIN [employer] NEPHEW);  OPERATIVE HYSTEROSCOPY WITH TRUECLEAR MORCELLATOR 5C SCOPE ;  Surgeon: Iris Fernandez MD;  Location: [facility]     ORTHOPEDIC SURGERY Bilateral     SHOULDER ARTHROSCOPY     ORTHOPEDIC SURGERY Right 2017    right foot for plantar fasciitis      thumb surgery  2000 and 2004, 2007     Family History   Problem Relation Age of Onset     Diabetes Father      Hypertension Father      Depression Father      Substance Abuse Father      Anesthesia Reaction Father         went into shock with surgery      Obesity Father      Anxiety Disorder Father      Coronary Artery Disease Father      Hyperlipidemia Father      Diabetes Mother      Cerebrovascular Disease Mother         Cadasils disease     Depression Mother      Mental Illness Mother         borderline personality disorder, Chemical dependency     Substance Abuse Mother      Genetic Disorder Mother         Cadasils     Obesity Mother      Anxiety Disorder Mother      Cerebrovascular Disease Paternal Grandfather      Cerebrovascular Disease Brother         Cadasils disease     Depression Brother      Anxiety Disorder Brother      Substance Abuse Brother      Genetic Disorder Brother         cadasils     Obesity Brother      Hypertension Brother      Hyperlipidemia Brother      Cerebrovascular Disease Sister         Cadasils disease     Depression Sister      Anxiety Disorder Sister      Genetic Disorder Sister         cadasils     Obesity Sister      Cerebrovascular Disease Sister         Cadasils disease     Genetic Disorder Sister         cadasils     Breast Cancer Sister      Depression Sister      Anxiety Disorder Sister      Obesity Sister      Hypertension Sister      Hyperlipidemia Sister      Depression Son      Anxiety Disorder Son             Allergies   Betamethasone, Chocolate, Compazine [prochlorperazine], Linzess [linaclotide], Penicillins, Pneumococcal vaccine, and Tizanidine      This note was completed in part using dictation via the Dragon voice recognition software. Some word and grammatical errors may occur and must be interpreted in the appropriate clinical context.  If there are any questions pertaining to this issue, please contact me for further clarification.      Thank you for  allowing me to participate in the care of your patient.      Sincerely,     Edith Travis PA-C     [facility]  cc:   Georges Lebron MD  [address] [address]

## 2025-01-28 ENCOUNTER — MYC MEDICAL ADVICE (OUTPATIENT)
Dept: CARDIOLOGY | Facility: CLINIC | Age: 75
End: 2025-01-28
Payer: MEDICARE

## 2025-01-28 ENCOUNTER — PATIENT OUTREACH (OUTPATIENT)
Dept: CARE COORDINATION | Facility: CLINIC | Age: 75
End: 2025-01-28
Payer: MEDICARE

## 2025-01-28 NOTE — TELEPHONE ENCOUNTER
[facility] message sent to patient-      Edith Travis PA-C [facility] Heart Team 2  Good morning team,    I saw this patient yesterday in clinic for post hospital follow up. She recently had SCAD and I mentioned to her about the [facility] and their resources. The patient was interested in looking into this. Does the patient need a referral? Or do we have contact information for them to get plugged in with their SCAD program?    Thanks!

## 2025-01-29 ENCOUNTER — TELEPHONE (OUTPATIENT)
Dept: CARDIOLOGY | Facility: CLINIC | Age: 75
End: 2025-01-29

## 2025-01-29 ENCOUNTER — MYC MEDICAL ADVICE (OUTPATIENT)
Dept: CARDIOLOGY | Facility: CLINIC | Age: 75
End: 2025-01-29
Payer: MEDICARE

## 2025-01-29 ENCOUNTER — DOCUMENTATION ONLY (OUTPATIENT)
Dept: CARDIOLOGY | Facility: CLINIC | Age: 75
End: 2025-01-29
Payer: MEDICARE

## 2025-01-29 NOTE — PROGRESS NOTES
Patient has Medicare D through [employer].     Of note, patient is NOT using a plan preferred pharmacy.  Her cost share will be less (20% vs 25%) if she uses [facility], [facility], Mail Order.     Jardiance/Farxiga:   --$158/mo ($127/mo at [facility], [facility], Mail Order)     Entresto:   --$172/mo ($138/mo at [facility], [facility], Mail Order)     Spironolactone:   --$5/mo ($0/mo at [facility], [facility], Mail Order)     If/when total out of pocket drug costs exceed $2000, patient will pay $0 for all covered drugs for the remainder of the year.     Alpa Jordan   Pharmacy Technician/Liaison, Discharge Pharmacy   [phone] (voice or text)  [email or web address]   Pharmacy test claims are estimates and may not reflect final costs.

## 2025-01-29 NOTE — TELEPHONE ENCOUNTER
Message from PADMINI Edith Power PA-C [facility] Heart Team 2  Please update patient with the below information. Recommend starting jardiance. However, if cost prohibitive will hold on optimizing GDMT further with her K being 4.9 on 1/27 and her softer blood pressures.    Also, please ask patient if she would like a referral to [place] to be part of their SCAD program? If so, can we please complete this referral form?    Please share the below link with her about the [place] SCAD research program: http://www.scadresearch.org/about/support-additional-information/     ====================  Called patient to discuss options.    She will call her insurance and see if the jardiance is affordable for her. She is deciding about which pharmacy or if she could use mail order. She will call back with a plan.    Patient would like to read the information about the SCAD research and then she will let us know if she wants a referral to [place] SCAD research.  My chart sent with the link for patient to review

## 2025-01-30 NOTE — TELEPHONE ENCOUNTER
My chart message from patient:  Laurel Parra [facility] Heart Team 2  Phone Number: [phone]     What should i be taking for my reflux tums is not enough.    Thank you  Laurel Parra  [phone] patient also left a voice mail at 5AM with the same question and a request for a metoprolol refill.    Per EPIC notes, a refill for toprol XL 25mg daily was sent on 1/27/2025.    0835 attempted to contact patient and left a detailed message that her script refill was sent on Monday. Reviewed that she can't use her old number from the bottle but has to ask for the new script to be used.  Reviewed with patient to contact her PCP for a new medication for GI distress.    My chart reply sent as follow up  Paulino, Ms. Parra,    We sent the metoprolol refill on Monday. Call the pharmacy again but ask for the new script and do not use your old bottle #. That old number is discarded when they enter the new script. Let us know if you have any other issues getting the refill.    Cardiology does not manage meds for GI distress. Please contact Dr. Black for a recommendation for your reflux.    Thank you  Team 2 R.N.s  [phone]

## 2025-02-03 ENCOUNTER — HOSPITAL ENCOUNTER (OUTPATIENT)
Dept: CARDIAC REHAB | Facility: CLINIC | Age: 75
Discharge: HOME | End: 2025-02-03
Attending: INTERNAL MEDICINE
Payer: MEDICARE

## 2025-02-03 PROCEDURE — 93798 PHYS/QHP OP CAR RHAB W/ECG: CPT

## 2025-02-04 NOTE — TELEPHONE ENCOUNTER
Laurel [facility] Heart Team 2 (supporting You)6 minutes ago (3:44 PM)     Yes, will it be very time sensitive? But I definitely would like to be seen by someone who understands SCAD. Thank you so much for following up with me. I am not doing a very good job with staying on to of things.        Called [facility] referral line to place referral to SCAD clinic. [facility] MRN [id number]. Cardiology contact numbers for referrals is [phone], spoke to nursing and relayed requested info. Records are visible in CareEverywhere. [facility] will reach out to patient to schedule.

## 2025-02-05 ENCOUNTER — HOSPITAL ENCOUNTER (OUTPATIENT)
Dept: CARDIAC REHAB | Facility: CLINIC | Age: 75
Discharge: HOME | End: 2025-02-05
Attending: INTERNAL MEDICINE
Payer: MEDICARE

## 2025-02-05 PROCEDURE — 93798 PHYS/QHP OP CAR RHAB W/ECG: CPT | Performed by: REHABILITATION PRACTITIONER

## 2025-02-09 PROBLEM — Z86.79 HISTORY OF VENTRICULAR FIBRILLATION: Status: ACTIVE | Noted: 2025-02-09

## 2025-02-09 PROBLEM — I25.42 SPONTANEOUS DISSECTION OF CORONARY ARTERY: Status: ACTIVE | Noted: 2025-02-09

## 2025-02-11 ENCOUNTER — MYC MEDICAL ADVICE (OUTPATIENT)
Dept: FAMILY MEDICINE | Facility: CLINIC | Age: 75
End: 2025-02-11
Payer: MEDICARE

## 2025-02-11 DIAGNOSIS — F41.9 ANXIETY: ICD-10-CM

## 2025-02-11 RX ORDER — ZOLPIDEM TARTRATE 12.5 MG/1
12.5 TABLET, FILM COATED, EXTENDED RELEASE ORAL AT BEDTIME
Qty: 90 TABLET | Refills: 0 | Status: SHIPPED | OUTPATIENT
Start: 2025-02-11

## 2025-02-12 ENCOUNTER — HOSPITAL ENCOUNTER (OUTPATIENT)
Dept: CARDIAC REHAB | Facility: CLINIC | Age: 75
Discharge: HOME | End: 2025-02-12
Attending: INTERNAL MEDICINE
Payer: MEDICARE

## 2025-02-12 PROCEDURE — 93798 PHYS/QHP OP CAR RHAB W/ECG: CPT | Performed by: REHABILITATION PRACTITIONER

## 2025-02-13 ENCOUNTER — TRANSFERRED RECORDS (OUTPATIENT)
Dept: HEALTH INFORMATION MANAGEMENT | Facility: CLINIC | Age: 75
End: 2025-02-13
Payer: MEDICARE

## 2025-02-14 ENCOUNTER — HOSPITAL ENCOUNTER (OUTPATIENT)
Dept: CARDIAC REHAB | Facility: CLINIC | Age: 75
Discharge: HOME | End: 2025-02-14
Attending: INTERNAL MEDICINE
Payer: MEDICARE

## 2025-02-14 PROCEDURE — 93798 PHYS/QHP OP CAR RHAB W/ECG: CPT | Performed by: REHABILITATION PRACTITIONER

## 2025-02-18 ENCOUNTER — VIRTUAL VISIT (OUTPATIENT)
Dept: FAMILY MEDICINE | Facility: CLINIC | Age: 75
End: 2025-02-18
Payer: MEDICARE

## 2025-02-18 DIAGNOSIS — K52.832 LYMPHOCYTIC COLITIS: ICD-10-CM

## 2025-02-18 DIAGNOSIS — I46.9 CARDIAC ARREST WITH VENTRICULAR FIBRILLATION (H): ICD-10-CM

## 2025-02-18 DIAGNOSIS — G89.29 CHRONIC LOW BACK PAIN, UNSPECIFIED BACK PAIN LATERALITY, UNSPECIFIED WHETHER SCIATICA PRESENT: ICD-10-CM

## 2025-02-18 DIAGNOSIS — M54.50 CHRONIC LOW BACK PAIN, UNSPECIFIED BACK PAIN LATERALITY, UNSPECIFIED WHETHER SCIATICA PRESENT: ICD-10-CM

## 2025-02-18 DIAGNOSIS — I25.5 ISCHEMIC CARDIOMYOPATHY: ICD-10-CM

## 2025-02-18 DIAGNOSIS — R10.13 DYSPEPSIA: ICD-10-CM

## 2025-02-18 DIAGNOSIS — F33.41 RECURRENT MAJOR DEPRESSIVE DISORDER, IN PARTIAL REMISSION: Primary | ICD-10-CM

## 2025-02-18 DIAGNOSIS — F33.1 MODERATE EPISODE OF RECURRENT MAJOR DEPRESSIVE DISORDER (H): ICD-10-CM

## 2025-02-18 DIAGNOSIS — F11.90 CHRONIC NARCOTIC USE: ICD-10-CM

## 2025-02-18 DIAGNOSIS — I49.01 CARDIAC ARREST WITH VENTRICULAR FIBRILLATION (H): ICD-10-CM

## 2025-02-18 DIAGNOSIS — I25.42 SPONTANEOUS DISSECTION OF CORONARY ARTERY: ICD-10-CM

## 2025-02-18 DIAGNOSIS — I25.10 ASCVD (ARTERIOSCLEROTIC CARDIOVASCULAR DISEASE): ICD-10-CM

## 2025-02-18 DIAGNOSIS — I21.4 NSTEMI (NON-ST ELEVATED MYOCARDIAL INFARCTION) (H): ICD-10-CM

## 2025-02-18 PROCEDURE — 98006 SYNCH AUDIO-VIDEO EST MOD 30: CPT | Performed by: INTERNAL MEDICINE

## 2025-02-18 RX ORDER — LISINOPRIL 2.5 MG/1
2.5 TABLET ORAL DAILY
Qty: 90 TABLET | Refills: 2 | Status: SHIPPED | OUTPATIENT
Start: 2025-02-18

## 2025-02-18 RX ORDER — PANTOPRAZOLE SODIUM 40 MG/1
40 TABLET, DELAYED RELEASE ORAL 2 TIMES DAILY
Qty: 180 TABLET | Refills: 2 | Status: SHIPPED | OUTPATIENT
Start: 2025-02-18

## 2025-02-18 RX ORDER — ATORVASTATIN CALCIUM 40 MG/1
40 TABLET, FILM COATED ORAL EVERY EVENING
Qty: 90 TABLET | Refills: 2 | Status: SHIPPED | OUTPATIENT
Start: 2025-02-18

## 2025-02-18 RX ORDER — NITROGLYCERIN 0.4 MG/1
TABLET SUBLINGUAL
Qty: 50 TABLET | Refills: 3 | Status: SHIPPED | OUTPATIENT
Start: 2025-02-18

## 2025-02-18 RX ORDER — CLOPIDOGREL BISULFATE 75 MG/1
75 TABLET ORAL DAILY
Qty: 90 TABLET | Refills: 1 | Status: SHIPPED | OUTPATIENT
Start: 2025-02-18

## 2025-02-18 ASSESSMENT — PATIENT HEALTH QUESTIONNAIRE - PHQ9
SUM OF ALL RESPONSES TO PHQ QUESTIONS 1-9: 19
10. IF YOU CHECKED OFF ANY PROBLEMS, HOW DIFFICULT HAVE THESE PROBLEMS MADE IT FOR YOU TO DO YOUR WORK, TAKE CARE OF THINGS AT HOME, OR GET ALONG WITH OTHER PEOPLE: VERY DIFFICULT
SUM OF ALL RESPONSES TO PHQ QUESTIONS 1-9: 19

## 2025-02-18 NOTE — PROGRESS NOTES
Laurel is a 74 year old who is being evaluated via a billable video visit.    How would you like to obtain your AVS? MyChart  If the video visit is dropped, the invitation should be resent by: Text to cell phone: [phone]  Will anyone else be joining your video visit? No          Subjective   Laurel is a 74 year old, presenting for the following health issues:    This is a complicated 74-year-old who I am seeing for follow-up.  As noted in review she was recently hospitalized at [facility] from December 22 through December 31 and then I saw her in January 7.  This was for acute on chronic diarrhea with positive C. difficile.  She also had a non-STEMI MI with LAD occlusion and a V-fib arrest.  She was found to have a new cardiomyopathy possibly stress-induced with mild hypokalemia and hypomagnesemia.  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    The patient was admitted for weakness on , fever and abdominal pain felt due to C. difficile on December 22.  She also has lymphocytic colitis.  Admitting labs showed a white count of 15.7 and a CT showed diffuse thickening of the colon.  C. difficile returned positive.  She was treated with oral antibiotics twice a day for 10 days.  She was to be on oral vancomycin for at least a month starting on January 5 and to continue with budesonide.  Colestipol was started by GI.  Her potassium and magnesium were replaced.    On the evening of December 29 she had a CODE BLUE and was successfully resuscitated.  Emergent heart cath showed total occlusion of the LAD and an echo showed an EF of 40 to 45% with severe hypokinesis in several walls.  She was felt to have a spontaneous coronary artery dissection.  She was placed on lisinopril, Plavix, aspirin, Lipitor and metoprolol.  When I saw her on January 7 she was felt to be stable.    She was subsequently seen by [facility] on January 13 virtually.  At that point she was felt to be stable  and they were tapering off the vancomycin.  They continue the budesonide 3 mg a day.  Benefiber was recommended.    The patient was seen by endocrine on January 9 for follow-up to adrenal insufficiency.  They will be monitoring her for possible adrenal insufficiency when she comes off the budesonide.    The patient was seen on January 27 by cardiology in follow-up.  Her LAD lesion was felt secondary to sudden cardiac dissection.  She was continued on beta-blocker, lisinopril.  They noted brachiocephalic artery occluded at its origin on angiogram.  They talked about considering Jardiance and spironolactone if not cost prohibitive.  I recommended a follow-up echocardiogram.    At this time she is having some depression issues and is seeing her therapist today.  She does not feel suicidal and does not have self-harm ideation.  She is not having chest pain or shortness of breath.  Her GI symptoms seems to be doing fairly well.  She needs refill on some of her medications.    I am now seeing her by virtual visit.  RECHECK      Video Start Time: 2:30 PM        Vitals:  No vitals were obtained today due to virtual visit.    Physical Exam   GENERAL: alert and no distress  EYES: Eyes grossly normal to inspection.  No discharge or erythema, or obvious scleral/conjunctival abnormalities.  RESP: No audible wheeze, cough, or visible cyanosis.    SKIN: Visible skin clear. No significant rash, abnormal pigmentation or lesions.  NEURO: Cranial nerves grossly intact.  Mentation and speech appropriate for age.  PSYCH: Appropriate affect, tone, and pace of words    ASSESSMENT:  Send coronary artery dissection with non-ST elevation MI and cardiac arrest, overall stable, has had follow-up with cardiology.  Ischemic cardiomyopathy, guideline directed medical therapy per cardiology  Depression, control not great.  She will see her therapist today, if she worsens at all she will let me know  Chronic pains, low back pain and other pain,  stable, using medications noted  Lymphocytic colitis, stable  Dyspepsia, on pantoprazole    PLAN:  Medications refilled  Follow-up with specialist  Call if changes  Call if depression worsens or to ER  See me in 2 months    Georges Lebron M.D.            Video-Visit Details    Type of service:  Video Visit   Video End Time:2:46 PM  Originating Location (pt. Location): Home    Distant Location (provider location):  On-site  Platform used for Video Visit: [facility]  Signed Electronically by: Georges Lebron MD

## 2025-02-21 ENCOUNTER — LAB (OUTPATIENT)
Dept: LAB | Facility: CLINIC | Age: 75
End: 2025-02-21
Payer: MEDICARE

## 2025-02-21 DIAGNOSIS — R19.7 DIARRHEA: ICD-10-CM

## 2025-02-21 LAB — C DIFF TOX B STL QL: NEGATIVE

## 2025-02-21 PROCEDURE — 87493 C DIFF AMPLIFIED PROBE: CPT

## 2025-02-24 ENCOUNTER — MYC MEDICAL ADVICE (OUTPATIENT)
Dept: FAMILY MEDICINE | Facility: CLINIC | Age: 75
End: 2025-02-24
Payer: MEDICARE

## 2025-02-24 ENCOUNTER — TELEPHONE (OUTPATIENT)
Dept: FAMILY MEDICINE | Facility: CLINIC | Age: 75
End: 2025-02-24

## 2025-02-24 ENCOUNTER — APPOINTMENT (OUTPATIENT)
Dept: LAB | Facility: CLINIC | Age: 75
End: 2025-02-24
Payer: MEDICARE

## 2025-02-24 ENCOUNTER — TRANSFERRED RECORDS (OUTPATIENT)
Dept: HEALTH INFORMATION MANAGEMENT | Facility: CLINIC | Age: 75
End: 2025-02-24

## 2025-02-24 DIAGNOSIS — F41.9 ANXIETY: ICD-10-CM

## 2025-02-24 LAB
ADV 40+41 DNA STL QL NAA+NON-PROBE: NEGATIVE
ASTRO TYP 1-8 RNA STL QL NAA+NON-PROBE: NEGATIVE
C CAYETANENSIS DNA STL QL NAA+NON-PROBE: NEGATIVE
CAMPYLOBACTER DNA SPEC NAA+PROBE: NEGATIVE
CRYPTOSP DNA STL QL NAA+NON-PROBE: NEGATIVE
E COLI O157 DNA STL QL NAA+NON-PROBE: NORMAL
E HISTOLYT DNA STL QL NAA+NON-PROBE: NEGATIVE
EAEC ASTA GENE ISLT QL NAA+PROBE: NEGATIVE
EC STX1+STX2 GENES STL QL NAA+NON-PROBE: NEGATIVE
EPEC EAE GENE STL QL NAA+NON-PROBE: NEGATIVE
ETEC LTA+ST1A+ST1B TOX ST NAA+NON-PROBE: NEGATIVE
G LAMBLIA DNA STL QL NAA+NON-PROBE: NEGATIVE
NOROVIRUS GI+II RNA STL QL NAA+NON-PROBE: NEGATIVE
P SHIGELLOIDES DNA STL QL NAA+NON-PROBE: NEGATIVE
RVA RNA STL QL NAA+NON-PROBE: NEGATIVE
SALMONELLA SP RPOD STL QL NAA+PROBE: NEGATIVE
SAPO I+II+IV+V RNA STL QL NAA+NON-PROBE: NEGATIVE
SHIGELLA SP+EIEC IPAH ST NAA+NON-PROBE: NEGATIVE
V CHOLERAE DNA SPEC QL NAA+PROBE: NEGATIVE
VIBRIO DNA SPEC NAA+PROBE: NEGATIVE
Y ENTEROCOL DNA STL QL NAA+PROBE: NEGATIVE

## 2025-02-24 PROCEDURE — 87507 IADNA-DNA/RNA PROBE TQ 12-25: CPT | Mod: GZ

## 2025-02-24 RX ORDER — LORAZEPAM 1 MG/1
1 TABLET ORAL EVERY 8 HOURS PRN
Qty: 90 TABLET | Refills: 0 | Status: SHIPPED | OUTPATIENT
Start: 2025-02-24

## 2025-02-24 NOTE — TELEPHONE ENCOUNTER
Prior Authorization Retail Medication Request    Medication/Dose: Paroxetine ER 25 mg  Diagnosis and ICD code (if different than what is on RX):  F33.41  New/renewal/insurance change PA/secondary ins. PA:  Previously Tried and Failed:  None  Rationale:  Patient stable on medication and was originally started on BID dosing by psychiatrist. PCP has since taken over prescribing as psychiatrist has retired.    Insurance   Primary: [facility]  Insurance ID:  [id number]    Secondary (if applicable):[facility]  Insurance ID:  [id number]    Pharmacy Information (if different than what is on RX)  Name:  [id number]  Phone:  [phone]  Fax:[phone]    Clinic Information  Preferred routing pool for dept communication: [facility] Pool ([id number])

## 2025-02-25 ENCOUNTER — TRANSFERRED RECORDS (OUTPATIENT)
Dept: HEALTH INFORMATION MANAGEMENT | Facility: CLINIC | Age: 75
End: 2025-02-25
Payer: MEDICARE

## 2025-02-26 ENCOUNTER — TRANSFERRED RECORDS (OUTPATIENT)
Dept: HEALTH INFORMATION MANAGEMENT | Facility: CLINIC | Age: 75
End: 2025-02-26
Payer: MEDICARE

## 2025-02-26 NOTE — TELEPHONE ENCOUNTER
Prior Authorization Not Needed per Insurance    Medication: Paroxetine ER 25 mg  Insurance Company: [employer] - Phone [phone] Fax [phone]  Expected CoPay:      Pharmacy Filling the Rx: [id number] - [place], [place] - [address] AT [facility] [address] [facility]  Pharmacy Notified:  Yes  Patient Notified:  **Instructed pharmacy to notify patient when script is ready to /ship.**

## 2025-03-11 ENCOUNTER — TRANSFERRED RECORDS (OUTPATIENT)
Dept: HEALTH INFORMATION MANAGEMENT | Facility: CLINIC | Age: 75
End: 2025-03-11
Payer: MEDICARE

## 2025-03-13 ENCOUNTER — TRANSFERRED RECORDS (OUTPATIENT)
Dept: HEALTH INFORMATION MANAGEMENT | Facility: CLINIC | Age: 75
End: 2025-03-13
Payer: MEDICARE

## 2025-03-20 DIAGNOSIS — F41.9 ANXIETY: ICD-10-CM

## 2025-03-20 RX ORDER — LORAZEPAM 1 MG/1
TABLET ORAL
Qty: 90 TABLET | Refills: 0 | Status: SHIPPED | OUTPATIENT
Start: 2025-03-20

## 2025-03-26 ENCOUNTER — MYC MEDICAL ADVICE (OUTPATIENT)
Dept: FAMILY MEDICINE | Facility: CLINIC | Age: 75
End: 2025-03-26
Payer: MEDICARE

## 2025-03-26 ENCOUNTER — TELEPHONE (OUTPATIENT)
Dept: FAMILY MEDICINE | Facility: CLINIC | Age: 75
End: 2025-03-26
Payer: MEDICARE

## 2025-03-26 NOTE — TELEPHONE ENCOUNTER
Hi Team,    Please fax DX Bone Density order (by Dr. Lebron) to [facility] at: [phone]        Thank you~

## 2025-03-26 NOTE — TELEPHONE ENCOUNTER
Disregard -- see separate MY CHART message form pt at 3/26/25  3:53 PM  requesting local [facility] instead of [facility].      Davida GOTTI MA

## 2025-03-27 ENCOUNTER — VIRTUAL VISIT (OUTPATIENT)
Dept: FAMILY MEDICINE | Facility: CLINIC | Age: 75
End: 2025-03-27
Payer: MEDICARE

## 2025-03-27 ENCOUNTER — MYC MEDICAL ADVICE (OUTPATIENT)
Dept: FAMILY MEDICINE | Facility: CLINIC | Age: 75
End: 2025-03-27

## 2025-03-27 DIAGNOSIS — G43.109 MIGRAINE WITH AURA AND WITHOUT STATUS MIGRAINOSUS, NOT INTRACTABLE: ICD-10-CM

## 2025-03-27 DIAGNOSIS — F41.9 ANXIETY: ICD-10-CM

## 2025-03-27 RX ORDER — LORAZEPAM 1 MG/1
TABLET ORAL
Qty: 90 TABLET | Refills: 0 | Status: SHIPPED | OUTPATIENT
Start: 2025-03-27

## 2025-03-27 ASSESSMENT — ANXIETY QUESTIONNAIRES
1. FEELING NERVOUS, ANXIOUS, OR ON EDGE: NEARLY EVERY DAY
3. WORRYING TOO MUCH ABOUT DIFFERENT THINGS: NEARLY EVERY DAY
5. BEING SO RESTLESS THAT IT IS HARD TO SIT STILL: MORE THAN HALF THE DAYS
6. BECOMING EASILY ANNOYED OR IRRITABLE: SEVERAL DAYS
7. FEELING AFRAID AS IF SOMETHING AWFUL MIGHT HAPPEN: NEARLY EVERY DAY
GAD7 TOTAL SCORE: 17
IF YOU CHECKED OFF ANY PROBLEMS ON THIS QUESTIONNAIRE, HOW DIFFICULT HAVE THESE PROBLEMS MADE IT FOR YOU TO DO YOUR WORK, TAKE CARE OF THINGS AT HOME, OR GET ALONG WITH OTHER PEOPLE: VERY DIFFICULT
2. NOT BEING ABLE TO STOP OR CONTROL WORRYING: MORE THAN HALF THE DAYS
GAD7 TOTAL SCORE: 17

## 2025-03-27 ASSESSMENT — PATIENT HEALTH QUESTIONNAIRE - PHQ9
SUM OF ALL RESPONSES TO PHQ QUESTIONS 1-9: 18
5. POOR APPETITE OR OVEREATING: NEARLY EVERY DAY

## 2025-03-27 NOTE — PROGRESS NOTES
Laurel is a 74 year old who is being evaluated via a billable video visit.    How would you like to obtain your AVS? Charismahart  If the video visit is dropped, the invitation should be resent by: Text to cell phone: [phone]  Will anyone else be joining your video visit? No      Assessment & Plan     Anxiety  This is a very difficult problem  She has been on lorazepam for many years and will probably take her several months if not over a year to taper off of this medication without significant rebound anxiety or withdrawal symptoms  I recommended that she connect with a new psychiatrist to get input on other ways to manage her severe anxiety other than benzodiazepines  I told her that she will need to taper her dose of lorazepam as it is very unsafe to be on the combination of benzodiazepines, opioids and muscle relaxants  I reviewed with her the risks of benzodiazepines especially as they pertain to seniors including respiratory depression and death, memory problems as well as increased risk of falls  I recommended that she try to reduce her lorazepam dose to 1 mg in the morning half a milligram in the middle of the day and 1 mg at bedtime for at least 1 month  I recommended that she follow-up with her primary care physician at that interval to decide if she can continue to further taper her lorazepam dose  I also gave her the contact information for the [facility] so she can set up an appointment for a consultation  I did provide her with a refill of Ativan outlining the new dosing schedule  - LORazepam (ATIVAN) 1 MG tablet; Take one tablet in the AM, one-half tablet around NOON and one tablet at bedtime for anxiety.  - Adult Mental [facility] Referral; Future        FUTURE APPOINTMENTS:       - Return in about 1 month (around 4/27/2025) for Virtual visit with Dr. Lebron to follow-up anxiety. Patient instructed to return to clinic or contact us sooner if symptoms worsen or  new symptoms develop.     Jerry Barragan is a 74 year old, presenting for the following health issues:  Anxiety and Recheck Medication      Video Start Time:  17:24    History of Present Illness       Reason for visit:  Withdraws from the lorazepam. refill. anxiety               Pleasant 74-year-old female with multiple medical problems.  She has severe anxiety and has been taking lorazepam for many years under the care of a psychiatrist who recently left her practice.  As such, she has recently switched the medication management of her lorazepam to her primary care physician who is out of the clinic this week.  Her primary care physician had encouraged her to try to reduce the dose of lorazepam that she is taking.  Her longstanding dose was 1 mg 4 times daily  Around January, her dose was decreased to 1 mg 3 times daily  More recently, Dr. Lebron had encouraged her to decrease her dose even further  That being said, at times she was having such severe anxiety that she found herself taking four 1 mg tablets on certain days  When she realized that she may be running low on lorazepam tablets she tried to further lower her dose to 0.5 mg 3 times daily but found that her anxiety was overwhelming after several days of doing so  She cannot refill her lorazepam until March 30 and she is down to her last few tablets  She is worried that she will run out of lorazepam over the weekend  She thinks that she may have tried to taper her lorazepam too quickly  She has tried taking BuSpar for her anxiety but it has not been helpful  She is on Paxil as well  Unfortunately, she is also on opioid analgesics as well as a muscle relaxer        Objective           Vitals:  No vitals were obtained today due to virtual visit.    Physical Exam   GENERAL: alert and no distress  EYES: Eyes grossly normal to inspection.  No discharge or erythema, or obvious scleral/conjunctival abnormalities.  RESP: No audible wheeze, cough, or  visible cyanosis.    SKIN: Visible skin clear. No significant rash, abnormal pigmentation or lesions.  NEURO: Cranial nerves grossly intact.  Mentation and speech appropriate for age.  PSYCH: Appropriate affect, tone, and pace of words          Video-Visit Details    Type of service:  Video Visit   Video End Time:5:47 PM  Originating Location (pt. Location): Home    Distant Location (provider location):  On-site  Platform used for Video Visit: Romeo  Signed Electronically by: Esau York MD

## 2025-03-27 NOTE — TELEPHONE ENCOUNTER
This was addressed on the [facility] encounter from 3/26. Pt was seen via VV today.    Tatiana Whitman RN

## 2025-03-28 NOTE — TELEPHONE ENCOUNTER
Last Written Prescription Date:  6/25/24  Last Fill Quantity: 9,  # refills: 3   Last office visit: 9/15/2023 ; last virtual visit: 6/25/2024   Future Office Visit: 4/11/25    Routing refill request to provider for review/approval because:  Drug not on the [facility] refill protocol     CARLOS Brown, BSN  [facility]

## 2025-03-30 RX ORDER — UBROGEPANT 50 MG/1
TABLET ORAL
Qty: 9 TABLET | Refills: 0 | Status: SHIPPED | OUTPATIENT
Start: 2025-03-30

## 2025-04-10 ENCOUNTER — TRANSFERRED RECORDS (OUTPATIENT)
Dept: ADMINISTRATIVE | Facility: CLINIC | Age: 75
End: 2025-04-10
Payer: MEDICARE

## 2025-04-10 NOTE — PROCEDURES
[facility]  [address], [place] [address]    Patient Name: Laurel Parra (Cat) Gender:  Female  Exam Date: 04/10/2025 Patient ID:  [id number]  :   1950   Medical Record #:  [id number]  Attending MD: Kyara Montelongo MD  ================================================================================  Procedure: Hemorrhoid Banding  Indications: Internal hemorrhoids  Referring MD: Referral Self  Primary MD: Georges Lebron MD     Medications: none      Complications: No immediate complications  =================================================================================  Procedure:   The risks and benefits were explained to the patient. The patient understood these risks and consented to the procedure. A rectal exam was performed. A single band was placed per the standard technique.     Symptoms:  Pain:  improved but returned             Findings:    Hemorrhoids:  Location:           Banding:   Location: RA Date: 04/10/2025. Location: RP Date: 04/10/2025.         Impression:    Other hemorrhoids      Banded hemorrhoids  Date Location   04/10/2025 RA RP   2025  LL     Plan:  Repeat hemorrhoid banding date not indicated.  She has another one scheduled , will keep it if she has ongoing symptoms and we can do lighted anoscopy at that time and band more if indicated.      Electronically Signed________________________________   Kyara Montelongo MD                    04/10/2025

## 2025-04-12 ENCOUNTER — HEALTH MAINTENANCE LETTER (OUTPATIENT)
Age: 75
End: 2025-04-12

## 2025-04-17 ENCOUNTER — APPOINTMENT (OUTPATIENT)
Dept: CT IMAGING | Facility: CLINIC | Age: 75
End: 2025-04-17
Attending: EMERGENCY MEDICINE
Payer: MEDICARE

## 2025-04-17 ENCOUNTER — HOSPITAL ENCOUNTER (EMERGENCY)
Facility: CLINIC | Age: 75
Discharge: HOME | End: 2025-04-17
Attending: EMERGENCY MEDICINE
Payer: MEDICARE

## 2025-04-17 VITALS
RESPIRATION RATE: 16 BRPM | OXYGEN SATURATION: 97 % | HEART RATE: 65 BPM | DIASTOLIC BLOOD PRESSURE: 88 MMHG | TEMPERATURE: 97 F | SYSTOLIC BLOOD PRESSURE: 147 MMHG

## 2025-04-17 DIAGNOSIS — E83.42 HYPOMAGNESEMIA: ICD-10-CM

## 2025-04-17 DIAGNOSIS — D72.829 LEUKOCYTOSIS, UNSPECIFIED TYPE: ICD-10-CM

## 2025-04-17 DIAGNOSIS — F33.41 RECURRENT MAJOR DEPRESSIVE DISORDER, IN PARTIAL REMISSION: ICD-10-CM

## 2025-04-17 DIAGNOSIS — E87.6 HYPOKALEMIA: ICD-10-CM

## 2025-04-17 DIAGNOSIS — K62.89 RECTAL PAIN: ICD-10-CM

## 2025-04-17 LAB
ALBUMIN SERPL BCG-MCNC: 4.4 G/DL (ref 3.5–5.2)
ALP SERPL-CCNC: 126 U/L (ref 40–150)
ALT SERPL W P-5'-P-CCNC: 13 U/L (ref 0–50)
ANION GAP SERPL CALCULATED.3IONS-SCNC: 16 MMOL/L (ref 7–15)
AST SERPL W P-5'-P-CCNC: 16 U/L (ref 0–45)
ATRIAL RATE - MUSE: 77 BPM
BASOPHILS # BLD AUTO: 0 10E3/UL (ref 0–0.2)
BASOPHILS NFR BLD AUTO: 0 %
BILIRUB SERPL-MCNC: 0.4 MG/DL
BUN SERPL-MCNC: 11.8 MG/DL (ref 8–23)
CALCIUM SERPL-MCNC: 9.6 MG/DL (ref 8.8–10.4)
CHLORIDE SERPL-SCNC: 97 MMOL/L (ref 98–107)
CREAT SERPL-MCNC: 0.7 MG/DL (ref 0.51–0.95)
DIASTOLIC BLOOD PRESSURE - MUSE: NORMAL MMHG
EGFRCR SERPLBLD CKD-EPI 2021: >90 ML/MIN/1.73M2
EOSINOPHIL # BLD AUTO: 0.1 10E3/UL (ref 0–0.7)
EOSINOPHIL NFR BLD AUTO: 1 %
ERYTHROCYTE [DISTWIDTH] IN BLOOD BY AUTOMATED COUNT: 13.8 % (ref 10–15)
GLUCOSE SERPL-MCNC: 111 MG/DL (ref 70–99)
HCO3 SERPL-SCNC: 26 MMOL/L (ref 22–29)
HCT VFR BLD AUTO: 42 % (ref 35–47)
HGB BLD-MCNC: 13.6 G/DL (ref 11.7–15.7)
IMM GRANULOCYTES # BLD: 0 10E3/UL
IMM GRANULOCYTES NFR BLD: 0 %
INTERPRETATION ECG - MUSE: NORMAL
LYMPHOCYTES # BLD AUTO: 2.3 10E3/UL (ref 0.8–5.3)
LYMPHOCYTES NFR BLD AUTO: 16 %
MAGNESIUM SERPL-MCNC: 1.5 MG/DL (ref 1.7–2.3)
MCH RBC QN AUTO: 29.9 PG (ref 26.5–33)
MCHC RBC AUTO-ENTMCNC: 32.4 G/DL (ref 31.5–36.5)
MCV RBC AUTO: 92 FL (ref 78–100)
MONOCYTES # BLD AUTO: 1.1 10E3/UL (ref 0–1.3)
MONOCYTES NFR BLD AUTO: 8 %
NEUTROPHILS # BLD AUTO: 10.4 10E3/UL (ref 1.6–8.3)
NEUTROPHILS NFR BLD AUTO: 75 %
NRBC # BLD AUTO: 0 10E3/UL
NRBC BLD AUTO-RTO: 0 /100
P AXIS - MUSE: 66 DEGREES
PLATELET # BLD AUTO: 415 10E3/UL (ref 150–450)
POTASSIUM SERPL-SCNC: 3.1 MMOL/L (ref 3.4–5.3)
PR INTERVAL - MUSE: 146 MS
PROT SERPL-MCNC: 7.3 G/DL (ref 6.4–8.3)
QRS DURATION - MUSE: 76 MS
QT - MUSE: 406 MS
QTC - MUSE: 459 MS
R AXIS - MUSE: -45 DEGREES
RBC # BLD AUTO: 4.55 10E6/UL (ref 3.8–5.2)
SODIUM SERPL-SCNC: 139 MMOL/L (ref 135–145)
SYSTOLIC BLOOD PRESSURE - MUSE: NORMAL MMHG
T AXIS - MUSE: 2 DEGREES
TROPONIN T SERPL HS-MCNC: 10 NG/L
TROPONIN T SERPL HS-MCNC: 9 NG/L
VENTRICULAR RATE- MUSE: 77 BPM
WBC # BLD AUTO: 14 10E3/UL (ref 4–11)

## 2025-04-17 PROCEDURE — 84484 ASSAY OF TROPONIN QUANT: CPT | Performed by: EMERGENCY MEDICINE

## 2025-04-17 PROCEDURE — 83735 ASSAY OF MAGNESIUM: CPT | Performed by: EMERGENCY MEDICINE

## 2025-04-17 PROCEDURE — 250N000011 HC RX IP 250 OP 636: Performed by: EMERGENCY MEDICINE

## 2025-04-17 PROCEDURE — 85004 AUTOMATED DIFF WBC COUNT: CPT | Performed by: EMERGENCY MEDICINE

## 2025-04-17 PROCEDURE — 250N000013 HC RX MED GY IP 250 OP 250 PS 637: Performed by: EMERGENCY MEDICINE

## 2025-04-17 PROCEDURE — 36415 COLL VENOUS BLD VENIPUNCTURE: CPT | Performed by: EMERGENCY MEDICINE

## 2025-04-17 PROCEDURE — 250N000009 HC RX 250: Performed by: EMERGENCY MEDICINE

## 2025-04-17 PROCEDURE — 85025 COMPLETE CBC W/AUTO DIFF WBC: CPT | Performed by: EMERGENCY MEDICINE

## 2025-04-17 PROCEDURE — 96365 THER/PROPH/DIAG IV INF INIT: CPT | Mod: 59

## 2025-04-17 PROCEDURE — 93005 ELECTROCARDIOGRAM TRACING: CPT

## 2025-04-17 PROCEDURE — 82947 ASSAY GLUCOSE BLOOD QUANT: CPT | Performed by: EMERGENCY MEDICINE

## 2025-04-17 PROCEDURE — 80053 COMPREHEN METABOLIC PANEL: CPT | Performed by: EMERGENCY MEDICINE

## 2025-04-17 PROCEDURE — 99285 EMERGENCY DEPT VISIT HI MDM: CPT | Mod: 25

## 2025-04-17 PROCEDURE — 84155 ASSAY OF PROTEIN SERUM: CPT | Performed by: EMERGENCY MEDICINE

## 2025-04-17 PROCEDURE — 74177 CT ABD & PELVIS W/CONTRAST: CPT

## 2025-04-17 PROCEDURE — 96367 TX/PROPH/DG ADDL SEQ IV INF: CPT

## 2025-04-17 PROCEDURE — 96375 TX/PRO/DX INJ NEW DRUG ADDON: CPT

## 2025-04-17 RX ORDER — KETOROLAC TROMETHAMINE 15 MG/ML
15 INJECTION, SOLUTION INTRAMUSCULAR; INTRAVENOUS ONCE
Status: COMPLETED | OUTPATIENT
Start: 2025-04-17 | End: 2025-04-17

## 2025-04-17 RX ORDER — PAROXETINE HYDROCHLORIDE HEMIHYDRATE 25 MG/1
25 TABLET, FILM COATED, EXTENDED RELEASE ORAL 2 TIMES DAILY
Qty: 180 TABLET | Refills: 1 | Status: SHIPPED | OUTPATIENT
Start: 2025-04-17

## 2025-04-17 RX ORDER — MAGNESIUM SULFATE HEPTAHYDRATE 40 MG/ML
2 INJECTION, SOLUTION INTRAVENOUS ONCE
Status: COMPLETED | OUTPATIENT
Start: 2025-04-17 | End: 2025-04-17

## 2025-04-17 RX ORDER — IOPAMIDOL 755 MG/ML
60 INJECTION, SOLUTION INTRAVASCULAR ONCE
Status: COMPLETED | OUTPATIENT
Start: 2025-04-17 | End: 2025-04-17

## 2025-04-17 RX ORDER — POTASSIUM CHLORIDE 7.45 MG/ML
10 INJECTION INTRAVENOUS ONCE
Status: COMPLETED | OUTPATIENT
Start: 2025-04-17 | End: 2025-04-17

## 2025-04-17 RX ORDER — POTASSIUM CHLORIDE 1.5 G/1.58G
20 POWDER, FOR SOLUTION ORAL ONCE
Status: COMPLETED | OUTPATIENT
Start: 2025-04-17 | End: 2025-04-17

## 2025-04-17 RX ORDER — OXYCODONE AND ACETAMINOPHEN 5; 325 MG/1; MG/1
1 TABLET ORAL ONCE
Status: COMPLETED | OUTPATIENT
Start: 2025-04-17 | End: 2025-04-17

## 2025-04-17 RX ADMIN — POTASSIUM CHLORIDE 10 MEQ: 7.46 INJECTION, SOLUTION INTRAVENOUS at 21:24

## 2025-04-17 RX ADMIN — SODIUM CHLORIDE 60 ML: 9 INJECTION, SOLUTION INTRAVENOUS at 19:01

## 2025-04-17 RX ADMIN — OXYCODONE HYDROCHLORIDE AND ACETAMINOPHEN 1 TABLET: 5; 325 TABLET ORAL at 18:50

## 2025-04-17 RX ADMIN — IOPAMIDOL 60 ML: 755 INJECTION, SOLUTION INTRAVENOUS at 19:01

## 2025-04-17 RX ADMIN — POTASSIUM CHLORIDE 20 MEQ: 1.5 POWDER, FOR SOLUTION ORAL at 21:25

## 2025-04-17 RX ADMIN — MAGNESIUM SULFATE HEPTAHYDRATE 2 G: 40 INJECTION, SOLUTION INTRAVENOUS at 20:34

## 2025-04-17 RX ADMIN — KETOROLAC TROMETHAMINE 15 MG: 15 INJECTION, SOLUTION INTRAMUSCULAR; INTRAVENOUS at 21:25

## 2025-04-17 ASSESSMENT — COLUMBIA-SUICIDE SEVERITY RATING SCALE - C-SSRS
6. HAVE YOU EVER DONE ANYTHING, STARTED TO DO ANYTHING, OR PREPARED TO DO ANYTHING TO END YOUR LIFE?: NO
1. IN THE PAST MONTH, HAVE YOU WISHED YOU WERE DEAD OR WISHED YOU COULD GO TO SLEEP AND NOT WAKE UP?: NO
2. HAVE YOU ACTUALLY HAD ANY THOUGHTS OF KILLING YOURSELF IN THE PAST MONTH?: NO

## 2025-04-17 ASSESSMENT — ACTIVITIES OF DAILY LIVING (ADL)
ADLS_ACUITY_SCORE: 58

## 2025-04-17 NOTE — ED NOTES
Pt came to desk stating they are having CP and they have a long heart history related to MI's and dissections

## 2025-04-17 NOTE — ED PROVIDER NOTES
Emergency Department Note      History of Present Illness     Chief Complaint   Post-op problem and rectal pain    HPI   Laurel Parra is a 74 year old female 7 days status post internal hemorrhoid banding here for evaluation of a post-op problem and rectal pain. Patient reports onset of worsening, continuous rectal pain one day after having a hemorrhoid banding procedure for 2 internal hemorrhoids performed on April 10 by Waylon WATTS. A colonoscopy revealed the hemorrhoids and she had no bleeding. The patient reports having rectal pain at baseline due to central sensitivity syndrome that got better right after the procedure, but it worsened a day later. The rectal pain is currently the worst it has ever been. She took one Norco earlier today, which did not help with the pain. She reports having diarrhea, which is normal with her lymphocytic colitis. She also reports having lower abdominal pain, sweats, and nausea. She denies fever and chills. She is not eating or drinking like normal due to feeling awful, but she is still drinking water. She denies taking any stool softeners after the procedure. She did have a banding procedure for one hemorrhoid before the latest procedure on April 10, and it helped with the rectal pain.    The patient also reports having palpitations in the waiting room of the ER. She reports having had atrial fibrillation in the past, but she denies being on any blood thinners. She is on 81 mg aspirin. The patient reports having a history of heart attack and coronary artery dissection. She denies having stents or open heart surgery for the heart attack.    Independent Historian   Sister, Sonia, as detailed above.    Review of External Notes   None    Past Medical History     Medical History and Problem List   Frequency of urination and polyuria  Anxiety  Colonic polyp  Chronic urethritis  Palpitations  Chronic narcotic use  Intractable chronic migraine without aura and without  status migrainosus  Recurrent major depressive disorder, in partial remission  Gastroesophageal reflux disease without esophagitis  Chronic low back pain, unspecified back pain laterality, with sciatica presence unspecified  Dyspepsia  Lymphocytic colitis  Pelvic floor dysfunction  Voiding dysfunction  Recurrent UTI  Urinary urgency  Somatic dysfunction of pelvic region  Urge incontinence of urine  Anorexia nervosa (H)  ASCVD (arteriosclerotic cardiovascular disease)  Adrenal insufficiency  Central sensitization to pain  Hypokalemia  Hypomagnesemia  Generalized weakness  Diarrhea, unspecified type  Cardiac arrest with ventricular fibrillation (H)  Moderate episode of recurrent major depressive disorder (H)  Ischemic cardiomyopathy  Spontaneous dissection of coronary artery  History of ventricular fibrillation  C-diff  Central sensitivity syndrome  Osteoarthritis  Disc degeneration in neck  Osteoarthrosis  Diverticulitis  Generalized anxiety disorder       Medications   aspirin 81 MG EC tablet  atorvastatin (LIPITOR) 40 MG tablet  Baclofen (LIORESAL) 5 MG tablet  budesonide (ENTOCORT EC) 3 MG EC capsule  carboxymethylcellulose (REFRESH PLUS) 0.5 % SOLN ophthalmic solution  clopidogrel (PLAVIX) 75 MG tablet  colestipol (COLESTID) 1 g tablet  cyclobenzaprine (FLEXERIL) 10 MG tablet  gabapentin (NEURONTIN) 300 MG capsule  HYDROcodone-Acetaminophen  MG TABS  ipratropium (ATROVENT) 0.06 % spray  lisinopril (ZESTRIL) 2.5 MG tablet  loratadine (CLARITIN) 10 MG tablet  LORazepam (ATIVAN) 1 MG tablet  magnesium 100 MG CAPS  metoprolol succinate ER (TOPROL XL) 25 MG 24 hr tablet  ondansetron (ZOFRAN) 4 MG tablet  pantoprazole (PROTONIX) 40 MG EC tablet  PARoxetine (PAXIL-CR) 25 MG 24 hr tablet  polyethylene glycol (MIRALAX) 17 GM/Dose powder  riboflavin 400 MG CAPS  ubrogepant (UBRELVY) 50 MG tablet  vitamin C (ASCORBIC ACID) 1000 MG TABS  Vitamin D3 (CHOLECALCIFEROL) 125 MCG (5000 UT) tablet  zolpidem ER (AMBIEN CR)  12.5 MG CR tablet    Surgical History   Past Surgical History:   Procedure Laterality Date    APPENDECTOMY  2016    ARTHRODESIS WRIST Left 2021    Procedure: LEFT TOTAL WRIST FUSION;  Surgeon: Beatrice Philippe MD;  Location:  OR    ARTHROPLASTY CARPOMETACARPAL (THUMB JOINT) Left 2017    Procedure: ARTHROPLASTY CARPOMETACARPAL (THUMB JOINT);  REVISION  LEFT THUMB CARPOMETACARPAL ARTHROPOASTY WITH 1.1 TIGHT ROPE;  Surgeon: Beatrice Philippe MD;  Location: [facility]    ARTHROSCOPY WRIST Left 2019    Procedure: LEFT WRIST ARTHROSCOPY REMOVAL OF TIGHT ROPE ; LEFT EXTENSOR POLLICIS LONGUS /EXTENSOR CARPI RADIALIS BREVIS /EXTENSOR CARPI RADIALUS LONGUS SYNOVECTOMY ; EXTENSOR POLLICIS LONGUS TRANSPOSITION ; ANTERIOR INTEROSSEOUS NEURECTOMY ; POSTERIOR INTEROSSEOUS NEURECTOMY;  Surgeon: Beatrice Philippe MD;  Location:  OR    BIOPSY      CARPAL TUNNEL RELEASE RT/LT      CATARACT EXTRACTION  2021     SECTION      COLONOSCOPY      CV CORONARY ANGIOGRAM N/A 2023    Procedure: Coronary Angiogram;  Surgeon: Moody Miranda MD;  Location:  HEART [facility]ARDI[facility] CATH LAB    CV CORONARY ANGIOGRAM N/A 2024    Procedure: Coronary Angiogram;  Surgeon: Tyler England MD;  Location:  HEART [facility]ARD[facility] CATH LAB    ESOPHAGOSCOPY, GASTROSCOPY, DUODENOSCOPY (EGD), COMBINED N/A 2020    Procedure: ESOPHAGOGASTRODUODENOSCOPY, WITH BIOPSY;  Surgeon: Georges Pop MD;  Location:  GI    EXCIS BARTHOLIN GLAND/CYST      EYE SURGERY      HERNIORRHAPHY INGUINAL  70's    x5    INJECT STEROID (LOCATION) Right 2019    Procedure: RIGHT WRIST CORTISONE INJECTION;  Surgeon: Beatrice Philippe MD;  Location:  OR    OPERATIVE HYSTEROSCOPY WITH MORCELLATOR N/A 2018    Procedure: OPERATIVE HYSTEROSCOPY WITH MORCELLATOR ([employer]);  OPERATIVE HYSTEROSCOPY WITH TRUECLEAR MORCELLATOR 5C SCOPE ;  Surgeon: Iris Fernandez MD;  Location: [facility]    ORTHOPEDIC SURGERY Bilateral      SHOULDER ARTHROSCOPY    ORTHOPEDIC SURGERY Right 11/2017    right foot for plantar fasciitis    thumb surgery  2000 and 2004, 2007       Physical Exam     Patient Vitals for the past 24 hrs:   BP Temp Pulse Resp SpO2   04/17/25 2021 -- -- -- -- 97 %   04/17/25 2020 (!) 147/88 -- 65 -- --   04/17/25 1855 (!) 166/103 -- 80 16 98 %   04/17/25 1556 (!) 186/116 -- 79 -- --   04/17/25 1510 (!) 157/96 97  F (36.1  C) 80 16 98 %     Physical Exam  General:  Sitting on bed.  HENT:  No obvious trauma to head  Right Ear:  External ear normal.   Left Ear:  External ear normal.   Nose:  Nose normal.   Eyes:  Conjunctivae and EOM are normal. Pupils are equal, round, and reactive.   Neck: Normal range of motion. Neck supple. No tracheal deviation present.   CV:  Normal heart sounds. No murmur heard.  Pulm/Chest: Effort normal and breath sounds normal.   Abd: Soft. No distension. There is no tenderness. There is no rigidity, no rebound and no guarding.   M/S: Normal range of motion.   Neuro: Awake and alert.   Skin: Skin is warm and dry. No rash noted. Not diaphoretic.   Psych: Normal mood and affect. Behavior is normal.   Rectal/Colon: (exam performed with CARLOS Muñoz) no thrombosed external hemorrhoids or rash or abscess around rectum.    Diagnostics     Lab Results   Labs Ordered and Resulted from Time of ED Arrival to Time of ED Departure   COMPREHENSIVE METABOLIC PANEL - Abnormal       Result Value    Sodium 139      Potassium 3.1 (*)     Carbon Dioxide (CO2) 26      Anion Gap 16 (*)     Urea Nitrogen 11.8      Creatinine 0.70      GFR Estimate >90      Calcium 9.6      Chloride 97 (*)     Glucose 111 (*)     Alkaline Phosphatase 126      AST 16      ALT 13      Protein Total 7.3      Albumin 4.4      Bilirubin Total 0.4     CBC WITH PLATELETS AND DIFFERENTIAL - Abnormal    WBC Count 14.0 (*)     RBC Count 4.55      Hemoglobin 13.6      Hematocrit 42.0      MCV 92      MCH 29.9      MCHC 32.4      RDW 13.8      Platelet Count 415       % Neutrophils 75      % Lymphocytes 16      % Monocytes 8      % Eosinophils 1      % Basophils 0      % Immature Granulocytes 0      NRBCs per 100 WBC 0      Absolute Neutrophils 10.4 (*)     Absolute Lymphocytes 2.3      Absolute Monocytes 1.1      Absolute Eosinophils 0.1      Absolute Basophils 0.0      Absolute Immature Granulocytes 0.0      Absolute NRBCs 0.0     MAGNESIUM - Abnormal    Magnesium 1.5 (*)    TROPONIN T, HIGH SENSITIVITY - Normal    Troponin T, High Sensitivity 9     TROPONIN T, HIGH SENSITIVITY - Normal    Troponin T, High Sensitivity 10         Imaging   CT Abdomen Pelvis w Contrast   Final Result   IMPRESSION:       1.  No acute abnormality or specific cause of the patient's symptoms are identified.      2.  Moderate hiatal hernia.          EKG   ECG results from 04/17/25   EKG 12 lead     Value    Systolic Blood Pressure     Diastolic Blood Pressure     Ventricular Rate 77    Atrial Rate 77    NJ Interval 146    QRS Duration 76        QTc 459    P Axis 66    R AXIS -45    T Axis 2    Interpretation ECG      Sinus rhythm  Low voltage QRS  Left anterior fascicular block  Cannot rule out Anterior infarct (cited on or before 31-Aug-2023)  Abnormal ECG  When compared with ECG of 29-Dec-2024 20:50,  T wave inversion now evident in Inferior leads  Nonspecific T wave abnormality now evident in Anterior leads  Confirmed by GENERATED REPORT, COMPUTER ([id number]),  Michael Cassidy ([id number]) on 4/17/2025 4:08:58 PM         Independent Interpretation   None    ED Course      Medications Administered   Medications   oxyCODONE-acetaminophen (PERCOCET) 5-325 MG per tablet 1 tablet (1 tablet Oral $Given 4/17/25 1850)   iopamidol (ISOVUE-370) solution 60 mL (60 mLs Intravenous $Given 4/17/25 1901)   sodium chloride 0.9 % bag for CT scan flush (60 mLs Intravenous $Given 4/17/25 1901)   magnesium sulfate 2 g in 50 mL sterile water intermittent infusion (0 g Intravenous Stopped 4/17/25 2125)    potassium chloride (KLOR-CON) Packet 20 mEq (20 mEq Oral $Given 4/17/25 2125)   potassium chloride 10 mEq in 100 mL sterile water infusion (0 mEq Intravenous Stopped 4/17/25 2226)   ketorolac (TORADOL) injection 15 mg (15 mg Intravenous $Given 4/17/25 2125)       Procedures   Procedures     Discussion of Management   None    ED Course   ED Course as of 04/17/25 2258   Thu Apr 17, 2025   1823 I obtained history and examined the patient as noted above.    2013 I rechecked and updated the patient.   2228 We discussed plan for discharge and the patient is comfortable with this.       Additional Documentation  None    Medical Decision Making / Diagnosis     CMS Diagnoses: None    MIPS       None    MDM   Laurel Parra is a very pleasant 74 year old female who presents with concern of rectal pain.  The patient has a history of central sensitivity syndrome.  She gets rectal pain with that.  She was found to have internal hemorrhoids.  She had one of them banded but continued to have rectal pain so had the other 2 banded on April 10.  Since then she has been having increased rectal pain.  Patient has a benign abdominal exam.  External rectal exam was unremarkable.  She underwent a CT that does not show any post banding complications such as rectal abscess or hematoma.  Patient does have a trace leukocytosis which I reviewed with her and I cannot definitively explain.  No definitive evidence for infection and this may be a reactive leukocytosis from her recent procedure.  Potassium and magnesium were low and magnesium replaced followed by potassium.  Patient provided a Percocet initially which did not help her pain.  Then provided the magnesium which did not help her pain.  Then provided a Toradol which did not help her pain.  Patient wonders if it is her central sensitivity syndrome causing this discomfort.  I reviewed the risk and benefit of observation admission to the hospital to see GI in the morning and  after doing so she declined.  She feels comfortable and desires to follow-up in the outpatient setting.  Discussed reasons to return, including any fever.  Of note, the patient reports that she felt stressed being in the waiting room and developed some chest pain.  She has a history of coronary artery dissection.  A screening EKG had been performed and was unremarkable.  She felt better and the symptoms went away once roomed.  Serial troponins are negative as well.  Doubt ACS or non-STEMI or recurrent coronary artery dissection at this time.  She agrees.    The treatment plan was discussed with the patient and they expressed understanding of this plan and consented to the plan.  In addition, the patient will return to the emergency department if their symptoms persist, worsen, if new symptoms arise or if there is any concern as other pathology may be present that is not evident at this time. They also understand the importance of close follow up in the clinic and if unable to do so will return to the emergency department for a reevaluation. All questions were answered.    Disposition   The patient was discharged.     Diagnosis     ICD-10-CM    1. Rectal pain  K62.89       2. Hypomagnesemia  E83.42       3. Hypokalemia  E87.6       4. Leukocytosis, unspecified type  D72.829            Discharge Medications   New Prescriptions    No medications on file         Scribe Disclosure:  I, Albert Carroll, am serving as a scribe at 6:21 PM on 4/17/2025 to document services personally performed by Carrillo Michaels DO based on my observations and the provider's statements to me.       Carrillo Michaels DO  04/17/25 [id number]

## 2025-04-17 NOTE — ED TRIAGE NOTES
Pt arrives via triage presenting with post op problem. Pt hd hemorrhoid banding done at [facility] on 4/10 pain started on 4/12 and has been constant since. Denies any bleeding or drainage. No pain relief with Norco. Normal BM today     Triage Assessment (Adult)       Row Name 04/17/25 1509          Triage Assessment    Airway WDL WDL        Respiratory WDL    Respiratory WDL WDL        Cardiac WDL    Cardiac WDL WDL        Peripheral/Neurovascular WDL    Peripheral Neurovascular WDL WDL        Cognitive/Neuro/Behavioral WDL    Cognitive/Neuro/Behavioral WDL WDL

## 2025-04-18 ENCOUNTER — HOSPITAL ENCOUNTER (EMERGENCY)
Facility: CLINIC | Age: 75
Discharge: HOME | End: 2025-04-18
Attending: PHYSICIAN ASSISTANT | Admitting: PHYSICIAN ASSISTANT
Payer: MEDICARE

## 2025-04-18 ENCOUNTER — APPOINTMENT (OUTPATIENT)
Dept: GENERAL RADIOLOGY | Facility: CLINIC | Age: 75
End: 2025-04-18
Attending: PHYSICIAN ASSISTANT
Payer: MEDICARE

## 2025-04-18 ENCOUNTER — APPOINTMENT (OUTPATIENT)
Dept: CT IMAGING | Facility: CLINIC | Age: 75
End: 2025-04-18
Attending: PHYSICIAN ASSISTANT
Payer: MEDICARE

## 2025-04-18 ENCOUNTER — PATIENT OUTREACH (OUTPATIENT)
Dept: FAMILY MEDICINE | Facility: CLINIC | Age: 75
End: 2025-04-18

## 2025-04-18 VITALS
RESPIRATION RATE: 16 BRPM | HEART RATE: 79 BPM | TEMPERATURE: 98.4 F | OXYGEN SATURATION: 98 % | DIASTOLIC BLOOD PRESSURE: 85 MMHG | SYSTOLIC BLOOD PRESSURE: 153 MMHG

## 2025-04-18 DIAGNOSIS — E87.6 HYPOKALEMIA: ICD-10-CM

## 2025-04-18 DIAGNOSIS — R03.0 ELEVATED BLOOD PRESSURE READING: ICD-10-CM

## 2025-04-18 DIAGNOSIS — R00.2 PALPITATIONS: ICD-10-CM

## 2025-04-18 LAB
ALBUMIN SERPL BCG-MCNC: 4 G/DL (ref 3.5–5.2)
ALP SERPL-CCNC: 100 U/L (ref 40–150)
ALT SERPL W P-5'-P-CCNC: 12 U/L (ref 0–50)
ANION GAP SERPL CALCULATED.3IONS-SCNC: 13 MMOL/L (ref 7–15)
AST SERPL W P-5'-P-CCNC: 16 U/L (ref 0–45)
ATRIAL RATE - MUSE: 78 BPM
BASOPHILS # BLD AUTO: 0 10E3/UL (ref 0–0.2)
BASOPHILS NFR BLD AUTO: 1 %
BILIRUB SERPL-MCNC: 0.3 MG/DL
BUN SERPL-MCNC: 10.9 MG/DL (ref 8–23)
CALCIUM SERPL-MCNC: 9 MG/DL (ref 8.8–10.4)
CHLORIDE SERPL-SCNC: 100 MMOL/L (ref 98–107)
CREAT SERPL-MCNC: 0.73 MG/DL (ref 0.51–0.95)
DIASTOLIC BLOOD PRESSURE - MUSE: NORMAL MMHG
EGFRCR SERPLBLD CKD-EPI 2021: 87 ML/MIN/1.73M2
EOSINOPHIL # BLD AUTO: 0.2 10E3/UL (ref 0–0.7)
EOSINOPHIL NFR BLD AUTO: 3 %
ERYTHROCYTE [DISTWIDTH] IN BLOOD BY AUTOMATED COUNT: 14 % (ref 10–15)
GLUCOSE SERPL-MCNC: 114 MG/DL (ref 70–99)
HCO3 SERPL-SCNC: 26 MMOL/L (ref 22–29)
HCT VFR BLD AUTO: 40.2 % (ref 35–47)
HGB BLD-MCNC: 12.8 G/DL (ref 11.7–15.7)
HOLD SPECIMEN: NORMAL
IMM GRANULOCYTES # BLD: 0 10E3/UL
IMM GRANULOCYTES NFR BLD: 0 %
INTERPRETATION ECG - MUSE: NORMAL
LYMPHOCYTES # BLD AUTO: 1.4 10E3/UL (ref 0.8–5.3)
LYMPHOCYTES NFR BLD AUTO: 17 %
MAGNESIUM SERPL-MCNC: 1.9 MG/DL (ref 1.7–2.3)
MCH RBC QN AUTO: 29.6 PG (ref 26.5–33)
MCHC RBC AUTO-ENTMCNC: 31.8 G/DL (ref 31.5–36.5)
MCV RBC AUTO: 93 FL (ref 78–100)
MONOCYTES # BLD AUTO: 0.8 10E3/UL (ref 0–1.3)
MONOCYTES NFR BLD AUTO: 10 %
NEUTROPHILS # BLD AUTO: 5.9 10E3/UL (ref 1.6–8.3)
NEUTROPHILS NFR BLD AUTO: 70 %
NRBC # BLD AUTO: 0 10E3/UL
NRBC BLD AUTO-RTO: 0 /100
P AXIS - MUSE: 59 DEGREES
PLATELET # BLD AUTO: 375 10E3/UL (ref 150–450)
POTASSIUM SERPL-SCNC: 3.2 MMOL/L (ref 3.4–5.3)
PR INTERVAL - MUSE: 148 MS
PROT SERPL-MCNC: 6.3 G/DL (ref 6.4–8.3)
QRS DURATION - MUSE: 76 MS
QT - MUSE: 414 MS
QTC - MUSE: 471 MS
R AXIS - MUSE: -36 DEGREES
RBC # BLD AUTO: 4.33 10E6/UL (ref 3.8–5.2)
SODIUM SERPL-SCNC: 139 MMOL/L (ref 135–145)
SYSTOLIC BLOOD PRESSURE - MUSE: NORMAL MMHG
T AXIS - MUSE: -1 DEGREES
TROPONIN T SERPL HS-MCNC: 8 NG/L
VENTRICULAR RATE- MUSE: 78 BPM
WBC # BLD AUTO: 8.4 10E3/UL (ref 4–11)

## 2025-04-18 PROCEDURE — 36415 COLL VENOUS BLD VENIPUNCTURE: CPT | Performed by: PHYSICIAN ASSISTANT

## 2025-04-18 PROCEDURE — 99285 EMERGENCY DEPT VISIT HI MDM: CPT | Mod: 25

## 2025-04-18 PROCEDURE — 82947 ASSAY GLUCOSE BLOOD QUANT: CPT | Performed by: PHYSICIAN ASSISTANT

## 2025-04-18 PROCEDURE — 85004 AUTOMATED DIFF WBC COUNT: CPT | Performed by: PHYSICIAN ASSISTANT

## 2025-04-18 PROCEDURE — 250N000013 HC RX MED GY IP 250 OP 250 PS 637: Performed by: PHYSICIAN ASSISTANT

## 2025-04-18 PROCEDURE — 83735 ASSAY OF MAGNESIUM: CPT | Performed by: PHYSICIAN ASSISTANT

## 2025-04-18 PROCEDURE — 84484 ASSAY OF TROPONIN QUANT: CPT | Performed by: PHYSICIAN ASSISTANT

## 2025-04-18 PROCEDURE — 70450 CT HEAD/BRAIN W/O DYE: CPT

## 2025-04-18 PROCEDURE — 71046 X-RAY EXAM CHEST 2 VIEWS: CPT

## 2025-04-18 PROCEDURE — 84155 ASSAY OF PROTEIN SERUM: CPT | Performed by: PHYSICIAN ASSISTANT

## 2025-04-18 PROCEDURE — 93005 ELECTROCARDIOGRAM TRACING: CPT

## 2025-04-18 RX ORDER — POTASSIUM CHLORIDE 1500 MG/1
40 TABLET, EXTENDED RELEASE ORAL ONCE
Status: COMPLETED | OUTPATIENT
Start: 2025-04-18 | End: 2025-04-18

## 2025-04-18 RX ADMIN — POTASSIUM CHLORIDE 40 MEQ: 1500 TABLET, EXTENDED RELEASE ORAL at 17:44

## 2025-04-18 ASSESSMENT — COLUMBIA-SUICIDE SEVERITY RATING SCALE - C-SSRS
5. HAVE YOU STARTED TO WORK OUT OR WORKED OUT THE DETAILS OF HOW TO KILL YOURSELF? DO YOU INTEND TO CARRY OUT THIS PLAN?: NO
3. HAVE YOU BEEN THINKING ABOUT HOW YOU MIGHT KILL YOURSELF?: NO
1. IN THE PAST MONTH, HAVE YOU WISHED YOU WERE DEAD OR WISHED YOU COULD GO TO SLEEP AND NOT WAKE UP?: YES
4. HAVE YOU HAD THESE THOUGHTS AND HAD SOME INTENTION OF ACTING ON THEM?: NO
6. HAVE YOU EVER DONE ANYTHING, STARTED TO DO ANYTHING, OR PREPARED TO DO ANYTHING TO END YOUR LIFE?: YES
2. HAVE YOU ACTUALLY HAD ANY THOUGHTS OF KILLING YOURSELF IN THE PAST MONTH?: YES

## 2025-04-18 ASSESSMENT — ACTIVITIES OF DAILY LIVING (ADL)
ADLS_ACUITY_SCORE: 58

## 2025-04-18 NOTE — DISCHARGE INSTRUCTIONS
Your labs and imaging are reassuring today. Schedule a potassium recheck with primary care as discussed. Continue to monitor blood pressure and bring recordings to primary care visit. Return to ED with any new or worsening symptoms such as chest pain or shortness of breath.

## 2025-04-18 NOTE — ED PROVIDER NOTES
"  Emergency Department Note      History of Present Illness     Chief Complaint   Irregular Heart Beat      HPI   Laurel Parra is a 74 year old female with a history of atrial fibrilation, NSTEMI, adrenal insufficiency, anxiety, migraine, chronic narcotic use and ischemic cardiomyopathy who presents to the Emergency Department for evaluation of palpitations. She reports that since last night she has been feeling like \"my heart's skipping a beat\". She reports her smart watch also showed that she was in arrhythmia so she called her heart clinic today and was referred here for further evaluation. She denies any chest pain, shortness of breath and reports her symptoms doesn't feel like a past heart attacks. She reports coronary arterial dissection in Dec 2024. She takes a daily aspirin and plavix. She reports her blood pressure usually runs in 110/70 however it was 158/88 this morning. She has been compliant with home blood pressure medications. She also reports she has been having headache and lightheadedness as well. She denies any dysuria, new urinary changes or blood in stool, black/tarry stools. No recent fever, cough, cold, sore throat or runny nose. She reports she takes magnesium at night daily.      Independent Historian   None    Review of External Notes   I reviewed the ED note from yesterday. She came in for rectal pain. Labs showed leukocytosis at 14.  Hypomagnesemia and hypokalemia noted.  She had a internal hemorrhoids banding 8 days ago.     I reviewed the cardiology note from today.   Past Medical History     Medical History and Problem List   Adrenal insufficiency  Anorectal disorder  Anorexia nervosa  Arteriosclerotic cardiovascular disease  Central sensitization to pain  Chronic constipation  Chronic narcotic use  Chronic pain  Chronic urethritis  Degenerative disc cervical & lumbar  Depression major in partial remission  Fibromyalgia  ALEXA  Gastroenteritis  GERD  Hernia " diaphragm  Iatrogenic cushing disease  Lymphocytic colitis  Migraine  NSTEMI  Osteoarthritis  Pelvic floor dysfunction  Spondylosis w/o myelopathy or radiculopathy cervical     Medications   aspirin  baclofen  budesonide  cyclobenzaprine  gabapentin  hydrocodone-acetaminophen  ipratropium  loratadine  naloxone  nitrofurantoin macrocrystal-monohydrate  nitroglycerin  omeprazole  ondansetron  paroxetine  polyethylene glycol  propranolol  ubrogepant  zolpidem     Surgical History   Appendectomy  Carpal tunnel release B/L  Cataract extraction  Cortisone injection wrist R  Cyst excision bartholin gland    Herniorrhaphy inguinal  Plantar fascitis repair R  Physical Exam     Patient Vitals for the past 24 hrs:   BP Temp Temp src Pulse Resp SpO2   25 1726 (!) 153/85 -- -- 79 -- 98 %   25 1402 (!) 147/90 98.4  F (36.9  C) Oral 83 16 99 %     Physical Exam  Constitutional: Alert, attentive, GCS 15  HENT:    Nose: Nose normal.    Mouth/Throat: Oropharynx is clear, mucous membranes are moist   Eyes: EOM are normal. Pupils equal and reactive.  Neck: Normal range of motion. No rigidity.  CV: regular rate and rhythm; no murmurs, rubs or gallups  Chest: Effort normal and breath sounds normal.   GI:  There is no tenderness. No distension. Normal bowel sounds  MSK: Normal range of motion.   Neurological: Alert, attentive  Skin: Skin is warm and dry.   Diagnostics     Lab Results   Labs Ordered and Resulted from Time of ED Arrival to Time of ED Departure   COMPREHENSIVE METABOLIC PANEL - Abnormal       Result Value    Sodium 139      Potassium 3.2 (*)     Carbon Dioxide (CO2) 26      Anion Gap 13      Urea Nitrogen 10.9      Creatinine 0.73      GFR Estimate 87      Calcium 9.0      Chloride 100      Glucose 114 (*)     Alkaline Phosphatase 100      AST 16      ALT 12      Protein Total 6.3 (*)     Albumin 4.0      Bilirubin Total 0.3     TROPONIN T, HIGH SENSITIVITY - Normal    Troponin T, High Sensitivity 8      MAGNESIUM - Normal    Magnesium 1.9     CBC WITH PLATELETS AND DIFFERENTIAL    WBC Count 8.4      RBC Count 4.33      Hemoglobin 12.8      Hematocrit 40.2      MCV 93      MCH 29.6      MCHC 31.8      RDW 14.0      Platelet Count 375      % Neutrophils 70      % Lymphocytes 17      % Monocytes 10      % Eosinophils 3      % Basophils 1      % Immature Granulocytes 0      NRBCs per 100 WBC 0      Absolute Neutrophils 5.9      Absolute Lymphocytes 1.4      Absolute Monocytes 0.8      Absolute Eosinophils 0.2      Absolute Basophils 0.0      Absolute Immature Granulocytes 0.0      Absolute NRBCs 0.0         Imaging   Head CT w/o contrast   Final Result   IMPRESSION:     1.  No evidence of acute intracranial hemorrhage or mass effect.      Chest XR,  PA & LAT   Final Result   IMPRESSION: No infiltrate, pleural effusion or pneumothorax. The cardiac and mediastinal silhouettes are normal. Stable small hiatal hernia.          EKG   ECG results from 04/18/25   EKG 12 lead     Value    Systolic Blood Pressure     Diastolic Blood Pressure     Ventricular Rate 78    Atrial Rate 78    NH Interval 148    QRS Duration 76        QTc 471    P Axis 59    R AXIS -36    T Axis -1    Interpretation ECG      Sinus rhythm  Possible Left atrial enlargement  Left axis deviation  Minimal voltage criteria for LVH, may be normal variant ( R in aVL )  Anterior infarct (cited on or before 31-Aug-2023)  When compared with ECG of 17-Apr-2025 15:56,  No significant change was found          Independent Interpretation   CXR: No pneumothorax, infiltrate, or pleural effusion.  ED Course      Medications Administered   Medications   potassium chloride riaz ER (KLOR-CON M20) CR tablet 40 mEq (40 mEq Oral $Given 4/18/25 5337)       Procedures   Procedures     Discussion of Management   None    ED Course   ED Course as of 04/18/25 2000   Fri Apr 18, 2025   0469 I obtained the history and examined the patient as above.        Additional  Documentation  None    Medical Decision Making / Diagnosis     CMS Diagnoses: None    MIPS       None    [facility]   Laurel Parra is a 74 year old female with history of atrial fibrilation, NSTEMI on dual antiplatelet therapy who presents for evaluation of palpitations.  Palpitations are on and off throughout the day.  She was seen yesterday in [facility] ED for rectal pain related to recent internal hemorrhoid banding procedure with [facility] and is found to have hypokalemia and hypomagnesemia.  Supplementation was provided.  She currently has no fevers, abdominal pain or rectal pain here today.  Differential includes ACS, coronary arterial dissection, electrolyte abnormality.  EKG shows normal sinus rhythm without evidence of arrhythmia or infarction.  Initial troponin is normal at 8.  Patient declines repeat troponin and given that her symptoms resolved while in the department, I have low suspicion for ACS or coronary arterial dissection.  Labs otherwise show resolution of leukocytosis from yesterday.  She has had no fevers or new abdominal pain and so repeat abdominal imaging is not indicated. Chest x-ray shows no infiltrates or effusion and head CT obtained due to concerns of elevated blood pressure shows no intracranial hemorrhage.  She also reports no dark or bloody stools and I have low suspicion for GI bleeding at this time.  Repeat electrolyte labs reveal persistent hypokalemia at 3.2.  Magnesium is normal today.  She was provided with oral supplementation.  Recommended a recheck with her primary care provider and she states that she has an appointment 3 days which is appropriate.  She is otherwise well-appearing and states no chest pain or shortness of breath. Recommend close follow-up with her cardiology team and then return precautions discussed.  Patient comfortable with above plan and she is discharged home.    Disposition   The patient was discharged.     Diagnosis     ICD-10-CM    1. Palpitations  R00.2        2. Hypokalemia  E87.6       3. Elevated blood pressure reading  R03.0            Discharge Medications   Discharge Medication List as of 4/18/2025  5:37 PM            Scribe Disclosure:  I, Maciel Heart, am serving as a scribe at 2:22 PM on 4/18/2025 to document services personally performed by Iman Hayward PA-C based on my observations and the provider's statements to me.        Iman Hayward PA-C  04/18/25 4840

## 2025-04-18 NOTE — ED TRIAGE NOTES
Pt sts she is in Afib and was here yesterday for same. Pt c/o headache     Triage Assessment (Adult)       Row Name 04/18/25 1409          Triage Assessment    Airway WDL WDL        Respiratory WDL    Respiratory WDL WDL        Skin Circulation/Temperature WDL    Skin Circulation/Temperature WDL WDL        Cardiac WDL    Cardiac WDL rhythm        Peripheral/Neurovascular WDL    Peripheral Neurovascular WDL WDL        Cognitive/Neuro/Behavioral WDL    Cognitive/Neuro/Behavioral WDL WDL

## 2025-04-21 ENCOUNTER — OFFICE VISIT (OUTPATIENT)
Dept: FAMILY MEDICINE | Facility: CLINIC | Age: 75
End: 2025-04-21
Payer: MEDICARE

## 2025-04-21 VITALS
RESPIRATION RATE: 16 BRPM | HEIGHT: 63 IN | OXYGEN SATURATION: 98 % | HEART RATE: 84 BPM | TEMPERATURE: 97.2 F | SYSTOLIC BLOOD PRESSURE: 127 MMHG | WEIGHT: 119 LBS | DIASTOLIC BLOOD PRESSURE: 90 MMHG | BODY MASS INDEX: 21.09 KG/M2

## 2025-04-21 DIAGNOSIS — R61 NIGHT SWEATS: ICD-10-CM

## 2025-04-21 DIAGNOSIS — R00.2 PALPITATIONS: ICD-10-CM

## 2025-04-21 DIAGNOSIS — I25.10 ASCVD (ARTERIOSCLEROTIC CARDIOVASCULAR DISEASE): ICD-10-CM

## 2025-04-21 DIAGNOSIS — I25.42 SPONTANEOUS DISSECTION OF CORONARY ARTERY: ICD-10-CM

## 2025-04-21 DIAGNOSIS — F33.41 RECURRENT MAJOR DEPRESSIVE DISORDER, IN PARTIAL REMISSION: ICD-10-CM

## 2025-04-21 DIAGNOSIS — I46.9 CARDIAC ARREST WITH VENTRICULAR FIBRILLATION (H): Primary | ICD-10-CM

## 2025-04-21 DIAGNOSIS — G43.719 INTRACTABLE CHRONIC MIGRAINE WITHOUT AURA AND WITHOUT STATUS MIGRAINOSUS: ICD-10-CM

## 2025-04-21 DIAGNOSIS — K52.832 LYMPHOCYTIC COLITIS: ICD-10-CM

## 2025-04-21 DIAGNOSIS — E83.42 HYPOMAGNESEMIA: ICD-10-CM

## 2025-04-21 DIAGNOSIS — Z86.79 HISTORY OF VENTRICULAR FIBRILLATION: ICD-10-CM

## 2025-04-21 DIAGNOSIS — I49.01 CARDIAC ARREST WITH VENTRICULAR FIBRILLATION (H): Primary | ICD-10-CM

## 2025-04-21 DIAGNOSIS — E87.6 HYPOKALEMIA: ICD-10-CM

## 2025-04-21 DIAGNOSIS — I25.5 ISCHEMIC CARDIOMYOPATHY: ICD-10-CM

## 2025-04-21 DIAGNOSIS — E27.40 ADRENAL INSUFFICIENCY: ICD-10-CM

## 2025-04-21 DIAGNOSIS — F33.1 MODERATE EPISODE OF RECURRENT MAJOR DEPRESSIVE DISORDER (H): ICD-10-CM

## 2025-04-21 DIAGNOSIS — F41.9 ANXIETY: ICD-10-CM

## 2025-04-21 DIAGNOSIS — R19.7 DIARRHEA, UNSPECIFIED TYPE: ICD-10-CM

## 2025-04-21 PROCEDURE — 36415 COLL VENOUS BLD VENIPUNCTURE: CPT | Performed by: INTERNAL MEDICINE

## 2025-04-21 PROCEDURE — 80048 BASIC METABOLIC PNL TOTAL CA: CPT | Performed by: INTERNAL MEDICINE

## 2025-04-21 PROCEDURE — 84443 ASSAY THYROID STIM HORMONE: CPT | Performed by: INTERNAL MEDICINE

## 2025-04-21 PROCEDURE — 83735 ASSAY OF MAGNESIUM: CPT | Performed by: INTERNAL MEDICINE

## 2025-04-21 PROCEDURE — 3074F SYST BP LT 130 MM HG: CPT | Performed by: INTERNAL MEDICINE

## 2025-04-21 PROCEDURE — 84460 ALANINE AMINO (ALT) (SGPT): CPT | Performed by: INTERNAL MEDICINE

## 2025-04-21 PROCEDURE — 3080F DIAST BP >= 90 MM HG: CPT | Performed by: INTERNAL MEDICINE

## 2025-04-21 PROCEDURE — G2211 COMPLEX E/M VISIT ADD ON: HCPCS | Performed by: INTERNAL MEDICINE

## 2025-04-21 PROCEDURE — 80061 LIPID PANEL: CPT | Performed by: INTERNAL MEDICINE

## 2025-04-21 PROCEDURE — 99214 OFFICE O/P EST MOD 30 MIN: CPT | Performed by: INTERNAL MEDICINE

## 2025-04-21 PROCEDURE — 1125F AMNT PAIN NOTED PAIN PRSNT: CPT | Performed by: INTERNAL MEDICINE

## 2025-04-21 RX ORDER — GABAPENTIN 300 MG/1
300 CAPSULE ORAL 2 TIMES DAILY
Qty: 180 CAPSULE | Refills: 3 | Status: SHIPPED | OUTPATIENT
Start: 2025-04-21

## 2025-04-21 RX ORDER — PAROXETINE HYDROCHLORIDE HEMIHYDRATE 25 MG/1
25 TABLET, FILM COATED, EXTENDED RELEASE ORAL 2 TIMES DAILY
Qty: 180 TABLET | Refills: 1 | Status: SHIPPED | OUTPATIENT
Start: 2025-04-21

## 2025-04-21 ASSESSMENT — PAIN SCALES - GENERAL: PAINLEVEL_OUTOF10: SEVERE PAIN (8)

## 2025-04-21 NOTE — PROGRESS NOTES
This is a very complicated patient who I am seeing in follow-up.  Also, she was in the emergency room on 4/17 and 4/18.  I reviewed those notes as follows:    On April 18 she presented with palpitations.  She does have a history of A-fib and an non-STEMI as well as ischemic cardiomyopathy.  She noted that her heart was skipping a beat.  She also noted headaches and lightheadedness.  Evaluation in the ER revealed mild hypertension and otherwise normal vitals.  Labs showed a normal troponin and magnesium with slight hypokalemia and hyperglycemia.  CBC was normal.  Chest x-ray was clear and CT head showed no evidence of acute intracranial hemorrhage or mass effect.  EKG was done which I personally reviewed showing sinus rhythm with inferior T wave inversion and anterior T wave inversion.  It was noted that it was unchanged compared with the prior 1.  Her symptoms resolved in the ED.  She was given oral potassium and recommended to follow-up with her primary care doctor.    Prior to that she was in the ER on April 17 with rectal pain 7 days post internal hemorrhoid banding.  Nothing acutely or urgently wrong was found.    She was seen by her neurologist on April 11 as noted and reviewed.  That was for follow-up to her headaches.  She was found to be orthostatic as she complained about dizziness.  It was recommended to increase her fluid intake.  No adjustments were made in her chronic migraine medication.    A virtual visit was done by my partner on March 27 for her anxiety.  It was recommended that she see psychiatry to manage this.  Concern was that she was on lorazepam in addition to muscle relaxants as well as opioids.  It was recommended she slowly taper her lorazepam.  Historically she has been under the care of psychiatry but that person left the practice and she subsequently transferring her lorazepam to me.    Patient does have chronic pain for which she goes to the [facility] pain clinic and was there as  noted and reviewed on March 13 of this year.  Pain in areas include low back, hands, knees and rectum.  She is on Norco as prescribed by them.    The patient sees [place] [facility].  Recent stool testing there was negative.    I did a virtual visit with her on February 18 as noted and reviewed.  At that time I noted chronic diarrhea for which she was hospitalized at the end of December for which C. difficile was positive.  Recent follow-up C. difficile has been negative.  She also has a history of a non-STEMI with LAD occlusion and V-fib arrest with a new cardiomyopathy possibly stress-induced as well as hypokalemia and hypomagnesemia.  She was felt to have some coronary artery dissection.  She has a history of lymphocytic colitis.  Her arrest was felt to be likely from a spontaneous coronary artery dissection.    She has been seen by endocrine for adrenal insufficiency.  They will be monitoring her for the risk of that when she comes off budesonide.    There is some confusion regarding her atorvastatin.  She is not taking it.  Per the patient when she was told by cardiology she does not need it when she saw them last.  I do not see this in the note.  I reviewed the prior coronary angiogram note and on that they did say she should be on high-dose statin due to her brachiocephalic atherosclerosis.    Her biggest issue at this time is ongoing palpitations as well as her anxiety.  The latter relates to her cardiac arrest.  She is feeling very anxious since then.  He has noted my partner lowered her lorazepam.  She is down to 2.5 mg a day.  She used to be on 3 to 4 mg a day.    Additionally, she takes muscle relaxant, lorazepam, narcotic medication, and Ambien and we discussed the potential risks of this.    She does have palpitations but not having dizziness or syncope.  No chest pain or shortness of breath.  Her GI system is improved.  Her bowels are no longer diarrhea.    Past Medical History:   Diagnosis  Date    Adrenal insufficiency     Dr. Navas, secondary to meds    Anxiety     Dr. Adelina Meehan    ASCVD (arteriosclerotic cardiovascular disease) 08/2023    cp and pos trop, angio with trivial cad, echo nl    Cardiac arrest with ventricular fibrillation (H) 12/29/2024    angio with occlussion of lad; felt to have scad    Central sensitization to pain     Chronic constipation     Chronic headache     sees neuro Dr Danielson, 3 types of headaches    Chronic narcotic use     [facility]    Chronic pain     Dr. Orta as of 2015    Chronic urethritis     Dr. Little    Colonic polyp 11/2011    one polyp, fu 5 years, fu 10/17 and no lesions, fu 9/20 and negative, fu 5 years    Depression, major, in partial remission     Dr. Meehan    Diarrhea 2012    eval by [facility], resolved with gluten free diet    DJD (degenerative joint disease)     Dyspepsia 2020    eval by [facility], had colonoscopy, egd, ct abd and pelvis, [facility].  [place] to be dyspepsia and constipation    H/O gastroesophageal reflux (GERD)     Ischemic cardiomyopathy 12/2024    echo with wma and ef 40-45%    LBP (low back pain) 2013    Dr. Jae Tong in [place], then seeing [facility] Dr. Orta    Light headed 07/2020    nl est echo    Lymphocytic colitis 2021    [facility]; seen on flex sig 9/24 [facility], added budesonide    Migraines 2009    neuro eval    NSTEMI (non-ST elevated myocardial infarction) (H) 08/31/2023    echo nl and angio trivial cad; cardiac arrest in hospital 12/29/2024, angio with lad occlussion    Palpitations 2006    holter with pvc's and pac's, echo nl    Screening 2010    dexa nl at gyn     Past Surgical History:   Procedure Laterality Date    APPENDECTOMY  2016    ARTHRODESIS WRIST Left 04/05/2021    Procedure: LEFT TOTAL WRIST FUSION;  Surgeon: Beatrice Philippe MD;  Location: [facility]    ARTHROPLASTY CARPOMETACARPAL (THUMB JOINT) Left 06/07/2017    Procedure: ARTHROPLASTY CARPOMETACARPAL (THUMB JOINT);  REVISION  LEFT  THUMB CARPOMETACARPAL ARTHROPOASTY WITH 1.1 TIGHT ROPE;  Surgeon: Beatrice Philippe MD;  Location:  [facility]    ARTHROSCOPY WRIST Left 2019    Procedure: LEFT WRIST ARTHROSCOPY REMOVAL OF TIGHT ROPE ; LEFT EXTENSOR POLLICIS LONGUS /EXTENSOR CARPI RADIALIS BREVIS /EXTENSOR CARPI RADIALUS LONGUS SYNOVECTOMY ; EXTENSOR POLLICIS LONGUS TRANSPOSITION ; ANTERIOR INTEROSSEOUS NEURECTOMY ; POSTERIOR INTEROSSEOUS NEURECTOMY;  Surgeon: Beatrice Philippe MD;  Location:  OR    BIOPSY      CARPAL TUNNEL RELEASE RT/LT      CATARACT EXTRACTION  2021     SECTION      COLONOSCOPY      CV CORONARY ANGIOGRAM N/A 2023    Procedure: Coronary Angiogram;  Surgeon: Moody Miranda MD;  Location:  [facility]    CV CORONARY ANGIOGRAM N/A 2024    Procedure: Coronary Angiogram;  Surgeon: Tyler England MD;  Location:  [facility]    ESOPHAGOSCOPY, GASTROSCOPY, DUODENOSCOPY (EGD), COMBINED N/A 2020    Procedure: ESOPHAGOGASTRODUODENOSCOPY, WITH BIOPSY;  Surgeon: Georges Pop MD;  Location:  GI    EXCIS BARTHOLIN GLAND/CYST      EYE SURGERY      HERNIORRHAPHY INGUINAL  70's    x5    INJECT STEROID (LOCATION) Right 2019    Procedure: RIGHT WRIST CORTISONE INJECTION;  Surgeon: Beatrice Philippe MD;  [facility]: [facility]    OPERATIVE HYSTEROSCOPY WITH MORCELLATOR N/A 2018    Procedure: OPERATIVE HYSTEROSCOPY WITH MORCELLATOR ([employer]ARTIN [employer] NEPHEW);  OPERATIVE HYSTEROSCOPY WITH TRUECLEAR MORCELLATOR 5C SCOPE ;  Surgeon: Iris Fernandez MD;  Location: [facility]    ORTHOPEDIC SURGERY Bilateral     SHOULDER ARTHROSCOPY    ORTHOPEDIC SURGERY Right 2017    right foot for plantar fasciitis    thumb surgery   and ,      Social History     Socioeconomic History    Marital status:      Spouse name: Not on file    Number of children: 1    Years of education: Not on file    Highest education level: Not on file   Occupational History     Employer: SELF    Tobacco Use    Smoking status: Former     Current packs/day: 0.00     Types: Cigarettes     Quit date: 1974     Years since quittin.8    Smokeless tobacco: Never    Tobacco comments:     infrequent use for about 5 years   Substance and Sexual Activity    Alcohol use: No    Drug use: No    Sexual activity: Not Currently     Partners: Male     Birth control/protection: Post-menopausal   Other Topics Concern    Parent/sibling w/ CABG, MI or angioplasty before 65F 55M? Yes     Comment: father   Social History Narrative    Not on file     Social Drivers of Health     Financial Resource Strain: Low Risk  (2024)    Financial Resource Strain     Within the past 12 months, have you or your family members you live with been unable to get utilities (heat, electricity) when it was really needed?: No   Food Insecurity: Low Risk  (2024)    Food Insecurity     Within the past 12 months, did you worry that your food would run out before you got money to buy more?: No     Within the past 12 months, did the food you bought just not last and you didn t have money to get more?: No   Transportation Needs: Low Risk  (2024)    Transportation Needs     Within the past 12 months, has lack of transportation kept you from medical appointments, getting your medicines, non-medical meetings or appointments, work, or from getting things that you need?: No   Physical Activity: Inactive (10/14/2024)    Exercise Vital Sign     Days of Exercise per Week: 0 days     Minutes of Exercise per Session: 0 min   Stress: No Stress Concern Present (10/14/2024)    [place] of Occupational [employer] - Occupational Stress Questionnaire     Feeling of Stress : Only a little   Social Connections: Unknown (10/14/2024)    Social Connection and Isolation Panel [NHANES]     Frequency of Communication with Friends and Family: Not on file     Frequency of Social Gatherings with Friends and Family: Twice a week     Attends Orthodox  Services: Not on file     Active Member of Clubs or Organizations: Not on file     Attends Club or Organization Meetings: Not on file     Marital Status: Not on file   Interpersonal Safety: Low Risk  (12/23/2024)    Interpersonal Safety     Do you feel physically and emotionally safe where you currently live?: Yes     Within the past 12 months, have you been hit, slapped, kicked or otherwise physically hurt by someone?: No     Within the past 12 months, have you been humiliated or emotionally abused in other ways by your partner or ex-partner?: No   Housing Stability: Low Risk  (12/23/2024)    Housing Stability     Do you have housing? : Yes     Are you worried about losing your housing?: No     Current Outpatient Medications   Medication Sig Dispense Refill    aspirin 81 MG EC tablet Take 1 tablet (81 mg) by mouth daily 90 tablet 0    Baclofen (LIORESAL) 5 MG tablet Take 5 mg by mouth as needed for muscle spasms      budesonide (ENTOCORT EC) 3 MG EC capsule Take 3 capsules (9 mg) by mouth every morning. (Patient taking differently: Take 3 mg by mouth every morning.)      carboxymethylcellulose (REFRESH PLUS) 0.5 % SOLN ophthalmic solution Place 1 drop into both eyes 3 times daily as needed for dry eyes      colestipol (COLESTID) 1 g tablet       cyclobenzaprine (FLEXERIL) 10 MG tablet Take 10 mg by mouth daily as needed for muscle spasms.      gabapentin (NEURONTIN) 300 MG capsule Take 1 capsule (300 mg) by mouth 2 times daily. 180 capsule 3    HYDROcodone-Acetaminophen  MG TABS Take 1-2 tablets by mouth every 4 hours as needed (maximum of 10 tablets in 24 hour period) Prescription is written for 1-2 tabs every 4-6 hrs prn (max of 10 tabs daily).  0    ipratropium (ATROVENT) 0.06 % spray Spray 3 sprays into both nostrils daily 45 mL 3    loratadine (CLARITIN) 10 MG tablet Take 10 mg by mouth every morning       LORazepam (ATIVAN) 1 MG tablet Take one tablet in the AM, one-half tablet around NOON and one  "tablet at bedtime for anxiety. 90 tablet 0    magnesium 100 MG CAPS Take by mouth.      metoprolol succinate ER (TOPROL XL) 25 MG 24 hr tablet Take 0.5 tablets (12.5 mg) by mouth daily. 60 tablet 2    ondansetron (ZOFRAN) 4 MG tablet Take 4 mg by mouth every 8 hours as needed for nausea or vomiting      pantoprazole (PROTONIX) 40 MG EC tablet Take 1 tablet (40 mg) by mouth 2 times daily. 180 tablet 2    PARoxetine (PAXIL-CR) 25 MG 24 hr tablet Take 1 tablet (25 mg) by mouth 2 times daily. 180 tablet 1    polyethylene glycol (MIRALAX) 17 GM/Dose powder Take 1 capful. by mouth as needed for constipation      riboflavin 400 MG CAPS Take 400 mg by mouth daily      ubrogepant (UBRELVY) 50 MG tablet Take 1 tablet (50 mg) by mouth at onset of headache. May repeat in 2 hours. Max dose is 100 mg in 24 hours. 9 tablet 5    vitamin C (ASCORBIC ACID) 1000 MG TABS Take 1,000 mg by mouth every evening       Vitamin D3 (CHOLECALCIFEROL) 125 MCG (5000 UT) tablet Take 2 tablets by mouth every evening      zolpidem ER (AMBIEN CR) 12.5 MG CR tablet Take 1 tablet (12.5 mg) by mouth at bedtime. 90 tablet 0    nitroGLYcerin (NITROSTAT) 0.4 MG sublingual tablet For chest pain place 1 tablet under the tongue every 5 minutes for 3 doses. If symptoms persist 5 minutes after 1st dose call 911. 50 tablet 3     Allergies   Allergen Reactions    Betamethasone Other (See Comments)     agitation    Chocolate Other (See Comments)     Triggers migraines    Compazine [Prochlorperazine] Other (See Comments)     \"crawl out of my skin\"    Linzess [Linaclotide]     Penicillins Nausea and Vomiting and Hives    Pneumococcal Vaccine Other (See Comments)     Temporary paralization    Tizanidine Other (See Comments)     Severe agitation     FAMILY HISTORY NOTED AND REVIEWED    REVIEW OF SYSTEMS: above    PHYSICAL EXAM    /90 (BP Location: Left arm, Patient Position: Sitting, Cuff Size: Adult Regular)   Pulse 84   Temp 97.2  F (36.2  C)   Resp 16   " "Ht 1.6 m (5' 3\")   Wt 54 kg (119 lb)   SpO2 98%   BMI 21.08 kg/m      Patient appears non toxic  Cv reglar rate and rhythm    ASSESSMENT:  Significant anxiety, agree with trying to taper the lorazepam and she does have psych follow-up but not until June.  Given her significant anxiety I will raise the dose of her gabapentin to twice a day, recognizing she is on multiple other medications.  If she does have side effects she will let me know.  Will leave the lorazepam at 2.5 mg daily for now.  We will do a follow-up video visit in 2 weeks to reevaluate  Elevated cholesterol, no longer on statin, I sent a note to cardiology to address this  Ongoing palpitations, I will do a Zio patch and she has ongoing follow-up with cardiology and an echo upcoming.  Low potassium and magnesium, check labs today  Cardiac arrest due to V-fib and scad, stable, check Zio patch  Coronary artery disease  Diarrhea due to C. difficile and lymphocytic colitis, improved  Depression, stable  Ischemic cardiomyopathy, follow  Adrenal insufficiency, stable  Migraines, stable    PLAN:  Labs  Note to cardiology  Zio patch  Video visit in 2 weeks  Increase gabapentin to twice a day  Call if side effects    This is a very complication and I reviewed significant amount of data.    Georges Lebron M.D.    The longitudinal plan of care for the diagnosis(es)/condition(s) as documented were addressed during this visit. Due to the added complexity in care, I will continue to support Laurel in the subsequent management and with ongoing continuity of care.          Ativan 2.5 a day, was 3 to 4 a day  "

## 2025-04-21 NOTE — PATIENT INSTRUCTIONS
Change the dose of the gabapentin to twice daily to help with anxiety.  If you have side effects let me know.  For now leave the lorazepam at 2.5mg total daily.    Wear the ziopatch to see about rhythm issues.

## 2025-04-21 NOTE — NURSING NOTE
Laurel Parra arrived here on 4/21/2025 1:39 PM for 8-14 Days  Zio monitor placement per ordering provider Dr Lebron for the diagnosis Palpitations [R00.2] .  Patient s skin was prepped per protocol. Dr. Lebron is the supervising MD.  Zio monitor was placed.  Instructions were reviewed with and given to the patient.  Patient verbalized understanding of wear, troubleshooting and monitor return instructions.Joana Laguerre, CMA

## 2025-04-22 LAB
ALT SERPL W P-5'-P-CCNC: 13 U/L (ref 0–50)
ANION GAP SERPL CALCULATED.3IONS-SCNC: 12 MMOL/L (ref 7–15)
BUN SERPL-MCNC: 10.7 MG/DL (ref 8–23)
CALCIUM SERPL-MCNC: 9.6 MG/DL (ref 8.8–10.4)
CHLORIDE SERPL-SCNC: 104 MMOL/L (ref 98–107)
CHOLEST SERPL-MCNC: 157 MG/DL
CREAT SERPL-MCNC: 0.73 MG/DL (ref 0.51–0.95)
EGFRCR SERPLBLD CKD-EPI 2021: 87 ML/MIN/1.73M2
FASTING STATUS PATIENT QL REPORTED: NO
FASTING STATUS PATIENT QL REPORTED: NO
GLUCOSE SERPL-MCNC: 87 MG/DL (ref 70–99)
HCO3 SERPL-SCNC: 27 MMOL/L (ref 22–29)
HDLC SERPL-MCNC: 71 MG/DL
LDLC SERPL CALC-MCNC: 54 MG/DL
MAGNESIUM SERPL-MCNC: 1.9 MG/DL (ref 1.7–2.3)
NONHDLC SERPL-MCNC: 86 MG/DL
POTASSIUM SERPL-SCNC: 3.7 MMOL/L (ref 3.4–5.3)
SODIUM SERPL-SCNC: 143 MMOL/L (ref 135–145)
TRIGL SERPL-MCNC: 162 MG/DL
TSH SERPL DL<=0.005 MIU/L-ACNC: 1.42 UIU/ML (ref 0.3–4.2)

## 2025-04-22 NOTE — TELEPHONE ENCOUNTER
Triage Patient Outreach    Attempt # 1    Was call answered?  No.  Left voicemail to return call to Triage at [facility]. Upon callback, please complete hospital follow-up    Mikayla Shetty RN

## 2025-04-22 NOTE — RESULT ENCOUNTER NOTE
It was very nice to see you.  As you can see your labs look very good.  Your magnesium, thyroid, potassium and chemistries are all fine.    Please let me know if you have questions.    Georges

## 2025-04-22 NOTE — TELEPHONE ENCOUNTER
Transitions of Care Outreach  Chief Complaint   Patient presents with    Hospital F/U       Most Recent Admission Date: 4/18/2025   Most Recent Admission Diagnosis:      Most Recent Discharge Date: 4/18/2025   Most Recent Discharge Diagnosis: Palpitations - R00.2  Hypokalemia - E87.6  Elevated blood pressure reading - R03.0     Transitions of Care Assessment    Discharge Assessment  How are you doing now that you are home?: pt reporting she is wearing monitor and doing better  How are your symptoms? (Red Flag symptoms escalate to triage hotline per guidelines): Improved  Do you know how to contact your clinic care team if you have future questions or changes to your health status? : Yes  Does the patient have their discharge instructions? : Yes  Does the patient have questions regarding their discharge instructions? : Yes (see comment)  Were you started on any new medications or were there changes to any of your previous medications? : No  Does the patient have all of their medications?: Yes  Do you have questions regarding any of your medications? : No  Do you have all of your needed medical supplies or equipment (DME)?  (i.e. oxygen tank, CPAP, cane, etc.): No - What equipment or supplies are needed?    Follow up Plan     Discharge Follow-Up  Discharge follow up appointment scheduled in alignment with recommended follow up timeframe or Transitions of Risk Category? (Low = within 30 days; Moderate= within 14 days; High= within 7 days): Yes  Discharge Follow Up Appointment Date: 05/06/25  Discharge Follow Up Appointment Scheduled with?: Primary Care Provider    Future Appointments   Date Time Provider Department Center   5/6/2025  3:30 PM Georges Lebron MD [facility]    5/12/2025  1:30 PM SHMADX1 SHDEXA [facility]   6/3/2025  8:00 AM CS LAB CSLABR CS   6/3/2025  8:45 AM SHCVECHR3 SHCVCV CVIMG   6/11/2025  1:20 PM Edith Travis PA-C [facility] P PSA [facility]   6/17/2025 12:30 PM Olivia Alanis LPCC [facility]   6/17/2025  1:00 PM Марина Edwards DO [facility]   10/10/2025  1:00 PM Ej Castanon MD CSNEUR CS   10/17/2025  1:00 PM Georges Lebron MD [facility] CS       Outpatient Plan as outlined on AVS reviewed with patient.    For any urgent concerns, please contact our 24 hour nurse triage line: [phone] ([phone])       Suzi Howard RN

## 2025-04-22 NOTE — RESULT ENCOUNTER NOTE
Results and recommendations routed Via My Chart with call back information if needed.   I have reviewed her lipid profile. I do not recommend any new medications . I would advise a Mediterranean style diet and regular exercise. We can address any other issues at the time of follow up. I believe Edith's recommendations from her last visit are excellent.

## 2025-04-24 ENCOUNTER — TELEPHONE (OUTPATIENT)
Dept: NEUROLOGY | Facility: CLINIC | Age: 75
End: 2025-04-24

## 2025-04-24 ENCOUNTER — DOCUMENTATION ONLY (OUTPATIENT)
Dept: CARDIOLOGY | Facility: CLINIC | Age: 75
End: 2025-04-24
Payer: MEDICARE

## 2025-04-24 NOTE — TELEPHONE ENCOUNTER
Prior Authorization Retail Medication Request    Medication/Dose: ubrogepant (UBRELVY) 50 MG tablet  Diagnosis and ICD code (if different than what is on RX):    New/renewal/insurance change PA/secondary ins. PA:  Previously Tried and Failed:    Rationale:      Insurance   Primary: MEDICARE - MEDICARE   Insurance ID:  [id number]    Secondary (if applicable):BCBS - [place] MED[place]LE[place]   Insurance ID:  [id number]    Pharmacy Information (if different than what is on RX)  Name:    Phone:    Fax:    Clinic Information  Preferred routing pool for dept communication:  [facility] [place] POOL

## 2025-04-24 NOTE — PROGRESS NOTES
Tyler England MD Anderson, Barbara E, RN  Cc: Edith Travis PA-C; Georges Lebron MD Hi Paul  The recommendation for statin is based upon the presence of peripheral atherosclerosis. CT imaging has shown aortic plaque, but more importantly, when we attempted right transradial coronary angiography, contrast injection showed the right brachiocephalic artery was occluded at its origin. Her coronaries are angiographically normal. A moderate potency statin could also could be used if she cannot tolerate higher potency stating. Thanks Doron England          Previous Messages       ----- Message -----  From: Esthela Michaels RN  Sent: 4/24/2025  11:18 AM CDT  To: Tyler England MD  Subject: please see question from Dr. Lebron re: sta*      ----- Message -----  From: Georges Lebron MD  Sent: 4/21/2025   1:05 PM CDT  To: MAGDALENO Flanagan,    I am with patient now and there is some confusion re statin.  She believes she was told she does not need statin as the event was from scad, but the angio note suggests high potency statin.  Can you help clarify this please.    ThanksGeorges

## 2025-04-28 ENCOUNTER — MYC MEDICAL ADVICE (OUTPATIENT)
Dept: NEUROLOGY | Facility: CLINIC | Age: 75
End: 2025-04-28
Payer: MEDICARE

## 2025-04-29 NOTE — TELEPHONE ENCOUNTER
PA Initiation    Medication: UBRELVY 50 MG PO TABS  Insurance Company: [employer] - Phone [phone] Fax [phone]  Pharmacy Filling the Rx: [id number] - [place] [id number] AT [facility] [address]  Filling Pharmacy Phone:    Filling Pharmacy Fax:    Start Date: 4/29/2025

## 2025-04-30 NOTE — TELEPHONE ENCOUNTER
Prior Authorization Approval    Medication: UBRELVY 50 MG PO TABS  Authorization Effective Date:  01/30/2025  Authorization Expiration Date:  4/30/2026  Approved Dose/Quantity:   Reference #:     Insurance Company: [employer] - Phone [phone] Fax [phone]  Expected CoPay: $    CoPay Card Available:      Financial Assistance Needed:   Which Pharmacy is filling the prescription: [id number] [address] AT [facility] [address] [facility]  Pharmacy Notified: Y  Patient Notified: Instructed pharmacy to notify patient once order is ready.

## 2025-04-30 NOTE — TELEPHONE ENCOUNTER
[facility] Call Center    Phone Message    May a detailed message be left on voicemail: yes     Reason for Call: Pt requesting a status update on the medication UBRELVY, she states she is in a lot of pain, and it has been 2 weeks, please call Laurel at [phone] to discuss further.    Action Taken: Message routed to:  Other: CS Neurology    Travel Screening: Not Applicable     Date of Service:

## 2025-05-05 ASSESSMENT — PATIENT HEALTH QUESTIONNAIRE - PHQ9
10. IF YOU CHECKED OFF ANY PROBLEMS, HOW DIFFICULT HAVE THESE PROBLEMS MADE IT FOR YOU TO DO YOUR WORK, TAKE CARE OF THINGS AT HOME, OR GET ALONG WITH OTHER PEOPLE: VERY DIFFICULT
SUM OF ALL RESPONSES TO PHQ QUESTIONS 1-9: 14
SUM OF ALL RESPONSES TO PHQ QUESTIONS 1-9: 14

## 2025-05-06 ENCOUNTER — VIRTUAL VISIT (OUTPATIENT)
Dept: FAMILY MEDICINE | Facility: CLINIC | Age: 75
End: 2025-05-06
Payer: MEDICARE

## 2025-05-06 DIAGNOSIS — Z86.79 HISTORY OF VENTRICULAR FIBRILLATION: ICD-10-CM

## 2025-05-06 DIAGNOSIS — F33.1 MODERATE EPISODE OF RECURRENT MAJOR DEPRESSIVE DISORDER (H): ICD-10-CM

## 2025-05-06 DIAGNOSIS — I25.42 SPONTANEOUS DISSECTION OF CORONARY ARTERY: ICD-10-CM

## 2025-05-06 DIAGNOSIS — I46.9 CARDIAC ARREST WITH VENTRICULAR FIBRILLATION (H): ICD-10-CM

## 2025-05-06 DIAGNOSIS — R00.2 PALPITATIONS: ICD-10-CM

## 2025-05-06 DIAGNOSIS — K62.89 RECTAL PAIN: ICD-10-CM

## 2025-05-06 DIAGNOSIS — F41.9 ANXIETY: Primary | ICD-10-CM

## 2025-05-06 DIAGNOSIS — I49.01 CARDIAC ARREST WITH VENTRICULAR FIBRILLATION (H): ICD-10-CM

## 2025-05-06 DIAGNOSIS — I25.5 ISCHEMIC CARDIOMYOPATHY: ICD-10-CM

## 2025-05-06 PROCEDURE — 98005 SYNCH AUDIO-VIDEO EST LOW 20: CPT | Performed by: INTERNAL MEDICINE

## 2025-05-06 RX ORDER — GABAPENTIN 300 MG/1
300 CAPSULE ORAL DAILY
Qty: 180 CAPSULE | Refills: 3 | Status: SHIPPED | OUTPATIENT
Start: 2025-05-06

## 2025-05-06 NOTE — PROGRESS NOTES
Laurel is a 74 year old who is being evaluated via a billable video visit.    How would you like to obtain your AVS? MyChart  If the video visit is dropped, the invitation should be resent by: Text to cell phone: [phone]  Will anyone else be joining your video visit? No          Subjective   Laurel is a 74 year old, presenting for the following health issues:  No chief complaint on file.      Video Start Time: 3:22 PM    This is a follow-up to our April 21 office visit.  This is a very complicated 74-year-old with a history of coronary artery disease with a cardiac arrest with V-fib in December 2024, angio showed occlusion of the LAD, it was felt likely due to scad.  When I saw her most recently on April 11 this was a follow-up to the ER visit of April 17 and 18.  She presented with palpitations, headaches and lightheadedness.  She was hypertensive in the ER with normal troponin and magnesium and slightly low potassium and a normal CBC.  Chest x-ray was clear.  CT head was negative.  EKG was unchanged.  She was given oral potassium.  Her symptoms resolved in the ER.  On April 17 she had rectal pain 7 days post internal hemorrhoid banding.  Nothing acute was found in the ER.    She has been seen by neurology for her headaches.  She was seen by my partner on March 27 for anxiety.  She has seen psychiatry in the past, not recently.  At that point he had recommended tapering lorazepam as she was on that in addition to muscle relaxant and opioids.    The patient has chronic pain and goes to the [facility] pain clinic for that.  Pain areas include low back, hands, knees and she is on Norco as prescribed by them.    Patient had diarrhea for which she was hospitalized at the end of December with C. difficile being positive.  Follow-up C. difficile has been negative.    She has a coronary history as well as cardiomyopathy possibly stress-induced.  She also has a history of lymphocytic colitis.  She has a history  of adrenal insufficiency and has seen endocrine for this.    Patient is on statin mostly due to to her brachiocephalic atherosclerosis.    When I saw her recently in April 21 she noted ongoing palpitations as well as anxiety, the latter related to her cardiac arrest.  She has been feeling very anxious since then.  The lorazepam dose was reduced as noted to 2.5 mg a day down from 3 to 4 mg a day.  She also takes Ambien for sleep.    At that visit I agreed with tapering the lorazepam and suggested we raise the dose of the gabapentin to twice a day.  I recommended lorazepam continue at 2.5 mg daily.  I am seeing in follow-up now.    With respect to her palpitations I recommended a Zio patch.  She just sent it in today.  She has upcoming follow-up with cardiology as well as an echo.    Labs were done on the 21st, her lipids were good and her potassium was back to normal as noted.  ALT and TSH were normal as well as magnesium.    She does have a psych appointment on June 17.    She is not doing very well at this time.  She has ongoing and significant rectal pain for which she has talked to me in [place] may have adjusted meds but it has not helped.  She is frustrated that she cannot get in there.  I did suggest colorectal surgery and I made a urgent referral there.    Her depression and anxiety continue to be significant.  She takes lorazepam 2.5 a day and did try the gabapentin up to twice a day but felt dizzy with it.  She takes her other medications regularly.    She has reduced her Norco dose a bit.    She has ongoing palpitations.  She is not having chest pain or shortness of breath.  No dizziness or syncope.    ASSESSMENT:  Anxiety, significant, in large part related to her recent event but she has had it before.  She also has significant depression.  I really do not think there is much more I can do to help her and she will see psychiatry.  If she worsens she will let me know.  Cardiac arrest with V-fib, scad.   Overall stable, Zio patch results pending, has upcoming echo and cardiology appointment, call if symptoms change  Ischemic cardiomyopathy, as above follow-up with echo and cardiology  Palpitations, doubt V-fib, check Zio patch  Rectal pain, refer to colorectal surgery    PLAN:  Check Zio patch  Refer to colorectal surgery  Follow-up cardiology  Follow-up psychiatry  See me in mid July or sooner as needed      Prior notes reviewed, prior labs reviewed, we will try once again raising the gabapentin but she will do 2 in the morning and see if this works.  If side effects go back on the 1.    Georges Lebron M.D.    The longitudinal plan of care for the diagnosis(es)/condition(s) as documented were addressed during this visit. Due to the added complexity in care, I will continue to support Laurel in the subsequent management and with ongoing continuity of care.        Vitals:  No vitals were obtained today due to virtual visit.    Physical Exam   GENERAL: alert and no distress  EYES: Eyes grossly normal to inspection.  No discharge or erythema, or obvious scleral/conjunctival abnormalities.  RESP: No audible wheeze, cough, or visible cyanosis.    SKIN: Visible skin clear. No significant rash, abnormal pigmentation or lesions.  NEURO: Cranial nerves grossly intact.  Mentation and speech appropriate for age.  PSYCH: Appropriate affect, tone, and pace of words          Video-Visit Details    Type of service:  Video Visit   Video End Time:3:35 PM  Originating Location (pt. Location): Home    Distant Location (provider location):  On-site  Platform used for Video Visit: Ben  Signed Electronically by: Georges Lebron MD

## 2025-05-08 ENCOUNTER — TELEPHONE (OUTPATIENT)
Dept: SURGERY | Facility: CLINIC | Age: 75
End: 2025-05-08
Payer: MEDICARE

## 2025-05-08 NOTE — TELEPHONE ENCOUNTER
Patient Contacted regarding  the following:    Appointment type: New Patient   Provider: Marce Garcia NP, Vivi Mcgill PA-C, Marlen Marcelo PA-C   Return date: Next available   Specialty phone number: [phone]  Additional appointment(s) needed: n/a  Additonal Notes: Pt being referred by , pt declined to schedule at this time and will ki back when appointment is needed

## 2025-05-09 ENCOUNTER — TRANSFERRED RECORDS (OUTPATIENT)
Dept: HEALTH INFORMATION MANAGEMENT | Facility: CLINIC | Age: 75
End: 2025-05-09
Payer: MEDICARE

## 2025-05-12 ENCOUNTER — HOSPITAL ENCOUNTER (OUTPATIENT)
Dept: BONE DENSITY | Facility: CLINIC | Age: 75
Discharge: HOME | End: 2025-05-12
Attending: INTERNAL MEDICINE | Admitting: INTERNAL MEDICINE
Payer: MEDICARE

## 2025-05-12 DIAGNOSIS — Z78.0 ASYMPTOMATIC MENOPAUSAL STATE: ICD-10-CM

## 2025-05-12 DIAGNOSIS — Z13.820 SCREENING FOR OSTEOPOROSIS: ICD-10-CM

## 2025-05-12 PROCEDURE — 77080 DXA BONE DENSITY AXIAL: CPT

## 2025-05-13 LAB — CV ZIO PRELIM RESULTS: NORMAL

## 2025-05-14 ENCOUNTER — RESULTS FOLLOW-UP (OUTPATIENT)
Dept: FAMILY MEDICINE | Facility: CLINIC | Age: 75
End: 2025-05-14

## 2025-05-14 ENCOUNTER — TRANSFERRED RECORDS (OUTPATIENT)
Dept: HEALTH INFORMATION MANAGEMENT | Facility: CLINIC | Age: 75
End: 2025-05-14
Payer: MEDICARE

## 2025-05-14 NOTE — RESULT ENCOUNTER NOTE
I am happy to report that your bone density for the most part looks good.  You have osteopenia but not osteoporosis and based on the results you do not need prescription medication.  Please be sure to try to exercise regularly if you can.  Additionally, I would encourage you to continue taking vitamin D3.  In terms of calcium the daily intake should be 1200 mg.  If you get this through diet that is great otherwise use the supplement.    Please let me know if you have questions.    Georges

## 2025-05-19 ENCOUNTER — DOCUMENTATION ONLY (OUTPATIENT)
Dept: LAB | Facility: CLINIC | Age: 75
End: 2025-05-19

## 2025-05-19 ENCOUNTER — RESULTS FOLLOW-UP (OUTPATIENT)
Dept: FAMILY MEDICINE | Facility: CLINIC | Age: 75
End: 2025-05-19

## 2025-05-19 DIAGNOSIS — F41.9 ANXIETY: ICD-10-CM

## 2025-05-19 PROBLEM — I47.10 SVT (SUPRAVENTRICULAR TACHYCARDIA): Status: ACTIVE | Noted: 2025-04-01

## 2025-05-19 LAB — CV ZIO PRELIM RESULTS: NORMAL

## 2025-05-19 RX ORDER — ZOLPIDEM TARTRATE 12.5 MG/1
TABLET, FILM COATED, EXTENDED RELEASE ORAL
Qty: 90 TABLET | Refills: 0 | Status: SHIPPED | OUTPATIENT
Start: 2025-05-19

## 2025-05-19 NOTE — PROGRESS NOTES
Laurel Parra has an upcoming lab appointment:    Future Appointments   Date Time Provider Department Center   6/3/2025  8:00 AM CS LAB CSLABR    6/3/2025  8:45 AM SHCVECHR3 SHCVCV CVIMG   6/11/2025  1:20 PM Edith Travis PA-C SUUMHT UMP PSA CLIN   6/17/2025 12:30 PM Olivia Alanis, [facility]   6/17/2025  1:00 PM Марина Edwards DO [facility]   7/18/2025  2:30 PM Georges Lebron MD CSFPIM    10/10/2025  1:00 PM Ej Castanon MD CSNEUR    10/17/2025  1:00 PM [facility]     Patient is requesting lab work from you at their upcoming lab appt (6/3). However their are no orders in their chart. Please place orders if necessary. Thank you.     There is no order available. Please review and place either future orders or HMPO (Review of Health Maintenance Protocol Orders), as appropriate.    Health Maintenance Due   Topic    ANNUAL REVIEW OF HM ORDERS      Davida Ortiz

## 2025-05-19 NOTE — RESULT ENCOUNTER NOTE
Hello,    I wanted to get back to you on the Zio patch monitor results.  Fortunately there were no serious rhythm problems and no ventricular tachycardia.  You did have several runs of what we call supraventricular tachycardia which is not at all concerning or dangerous.  I believe you have an upcoming echo and then an appointment with cardiology so would like you to go through with at that time and see if there is anything further to do.  I doubt they will change anything.  If you have questions or concerns before then please let me know.    Georges

## 2025-05-21 ENCOUNTER — TRANSFERRED RECORDS (OUTPATIENT)
Dept: HEALTH INFORMATION MANAGEMENT | Facility: CLINIC | Age: 75
End: 2025-05-21
Payer: MEDICARE

## 2025-06-05 DIAGNOSIS — F41.9 ANXIETY: ICD-10-CM

## 2025-06-05 RX ORDER — LORAZEPAM 1 MG/1
TABLET ORAL
Qty: 90 TABLET | Refills: 0 | Status: SHIPPED | OUTPATIENT
Start: 2025-06-05

## 2025-06-09 DIAGNOSIS — R11.0 NAUSEA: Primary | ICD-10-CM

## 2025-06-09 RX ORDER — ONDANSETRON 4 MG/1
4 TABLET, FILM COATED ORAL EVERY 8 HOURS PRN
Qty: 18 TABLET | Refills: 11 | Status: SHIPPED | OUTPATIENT
Start: 2025-06-09 | End: 2025-06-12

## 2025-06-11 ENCOUNTER — VIRTUAL VISIT (OUTPATIENT)
Dept: CARDIOLOGY | Facility: CLINIC | Age: 75
End: 2025-06-11
Payer: MEDICARE

## 2025-06-11 DIAGNOSIS — I25.42 SPONTANEOUS DISSECTION OF CORONARY ARTERY: ICD-10-CM

## 2025-06-11 DIAGNOSIS — I50.32 HEART FAILURE WITH RECOVERED EJECTION FRACTION (HFRECEF) (H): ICD-10-CM

## 2025-06-11 DIAGNOSIS — I47.10 SVT (SUPRAVENTRICULAR TACHYCARDIA): Primary | ICD-10-CM

## 2025-06-11 DIAGNOSIS — F41.9 ANXIETY: ICD-10-CM

## 2025-06-11 DIAGNOSIS — Z86.79 HISTORY OF VENTRICULAR FIBRILLATION: ICD-10-CM

## 2025-06-11 PROCEDURE — 98007 SYNCH AUDIO-VIDEO EST HI 40: CPT

## 2025-06-11 RX ORDER — VANCOMYCIN HYDROCHLORIDE 25 MG/ML
KIT ORAL 4 TIMES DAILY
COMMUNITY

## 2025-06-11 NOTE — PROGRESS NOTES
Laurel is a 75 year old who is being evaluated via a billable video visit.    How would you like to obtain your AVS? MyChart  If the video visit is dropped, the invitation should be resent by: Send to e-mail at: [email or web address]  Will anyone else be joining your video visit? No    Review Of Systems  Skin: NEGATIVE  Eyes:Ears/Nose/Throat: NEGATIVE  Respiratory: NEGATIVE  Cardiovascular:NEGATIVE  Gastrointestinal: NEGATIVE  Genitourinary:NEGATIVE   Musculoskeletal: NEGATIVE  Neurologic: NEGATIVE  Psychiatric: NEGATIVE  Hematologic/Lymphatic/Immunologic: NEGATIVE  Endocrine:  NEGATIVE  Vitals - Patient Reported  Systolic (Patient Reported): 120  Diastolic (Patient Reported): 81  Weight (Patient Reported): 54.4 kg (120 lb)  Pulse (Patient Reported):  (n/a)     Telephone number of patient: [phone]    Ritu Lazcano LPN  Video-Visit Details    Type of service:  Video Visit   Originating Location (pt. Location): Home    Distant Location (provider location):  On-site  Platform used for Video Visit: Romeo

## 2025-06-11 NOTE — PROGRESS NOTES
Cardiology Virtual Visit Progress Note    Service Date: June 11, 2025  Primary Cardiologist: Dr. England   Reason for visit: 6 month follow up     Ms. Laurel Parra is a 75 year old female who is being evaluated via a billable video visit.    Patient has given verbal consent for virtual visit?  Yes         Assessment and Plan:     Mid-distal LAD lesion secondary to SCAD, 12/2024  Medically managed   On ASA 81 mg and metoprolol XL 12.5 mg    2.   Cardiomyopathy, resolved   Echo 12/2024 LVEF 40-45%, most recent echo 5/2025 LVEF 66%    3.   SVT         PACs  Noted on Zio 5/2025  Symptomatic   On metoprolol XL 12.5 mg     4.   Hx of VF arrest with ROSC without shock or medication in the setting of SCAD in mid-distal LAD, 12/2024     5.   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   Anxiety         PTSD  Follows with therapy       Plan:   - Reviewed most recent lab work and Zio monitor. All questions answered   - Start cardiac rehab tomorrow   - Patient has a known hx of palpitations and had documented triggered events with SVT and PACs on most recent Zio. Discussed increasing metoprolol to 25 mg daily. Patient has a hx of softer blood pressures. Patient would prefer to monitor palpitations at this time prior to making any further medication changes.   - Continue present medical therapy       Follow up with Dr. England in 12 months with labs prior     Edith Travis PA-C  Physician Assistant   [facility]      I spent 19 minutes on the date of encounter of which (10+) minutes spent in medical discussion.    _________________________________________________________        History of Presenting Illness:    Laurel Parra is a very pleasant 75 year old female with a history of chronic anxiety, osteoarthritis, chronic pain syndrome, migraine headaches, exertional  lightheadedness, SCAD and palpitations.     In brief, I had the pleasure of meeting Ms. Parra in December 2024 during an admission for fever and abdominal pain. She was being treated for C. Diff colitis. During admission, an RRT was call due to patient reporting left sided chest pain. She received a nitroglycerin which helped with her pain. EKG showed slight T wave changes. While the RRT was occurring, the patient lost consciousness. Cardiac monitor showed ventricular fibrillation. Chest compressions were started and ROSC was achieved within 1 minute and patient gained consciousness. No shock or meds were given. She underwent coronary angiogram which showed a total occlusion of the distal LAD, concerning for SCAD. Recommendation was to treat medically. Echocardiogram demonstrated an LVEF of 40-45% with severe hypokinesis of the distal anterior and anteroseptal walls and apex, and severe hypokinesis of the distal inferior wall.     Patient was seen at [facility] for further evaluation in May 2025. Please see further details in Dr. Medina note on 5/20/2025.     Patient concerned about elevated triglycerides and CRP. Starting cardiac rehab tomorrow. Follows the mediterranean diet. Wanting to start going to [employer]. Colitis and chronic pain limit activity. Seeing a therapist for PTSD after cardiac arrest in December. Chest pain episode a month ago, localized to left side of chest. Present for multiple days. Was not similar to previous chest pain during hospital admission.     Denies shortness of breath, orthopnea and PND. Denies lightheadedness, dizziness, near syncope and syncope.     Taking medications daily as prescribed.     Blood pressure yesterday was 120/81. Normally, 107/62.     Lipid panel 5/2025, reviewed.  Triglycerides 172 and LDL 50.     Echocardiogram May 2025 demonstrated LVEF of 66% with no valvular disease.    Zio monitor 5/2025-underlying rhythm was sinus rhythm with 54 runs of SVT. PAC burden <1%.  Patient triggered events correlated with NSR, PACs and runs of SVT.     CTA head and neck 2025 showed a patent right subclavian artery. Negative for arterial stenosis in the head or neck. No FMD or dissection. Substantial tortuosity of cervical ICA bilaterally without stenoses.   _________________________________________________________    Provider location: [facility]   Patient location: Home  Total time on phone call: 19 minutes           Social History       Social History     Socioeconomic History    Marital status:      Spouse name: Not on file    Number of children: 1    Years of education: Not on file    Highest education level: Not on file   Occupational History     Employer: SELF   Tobacco Use    Smoking status: Former     Current packs/day: 0.00     Types: Cigarettes     Quit date: 1974     Years since quittin.0    Smokeless tobacco: Never    Tobacco comments:     infrequent use for about 5 years   Vaping Use    Vaping status: Never Used   Substance and Sexual Activity    Alcohol use: No    Drug use: No    Sexual activity: Not Currently     Partners: Male     Birth control/protection: Post-menopausal   Other Topics Concern    Parent/sibling w/ CABG, MI or angioplasty before 65F 55M? Yes     Comment: father   Social History Narrative    Not on file     Social Drivers of Health     Financial Resource Strain: Low Risk  (2024)    Financial Resource Strain     Within the past 12 months, have you or your family members you live with been unable to get utilities (heat, electricity) when it was really needed?: No   Food Insecurity: No Food Insecurity (2025)    Received from [facility]    Hunger Vital Sign     Worried About Running Out of Food in the Last Year: Never true     Ran Out of Food in the Last Year: Never true   Transportation Needs: No Transportation Needs (2025)    Received from [facility]    PRAPARE - Transportation     Lack of Transportation (Medical):  No     Lack of Transportation (Non-Medical): No   Physical Activity: Inactive (4/27/2025)    Received from [facility]    Exercise Vital Sign     Days of Exercise per Week: 0 days     Minutes of Exercise per Session: 0 min   Stress: No Stress Concern Present (10/14/2024)    [employer] [place] [employer] - Occupational Stress Questionnaire     Feeling of Stress : Only a little   Social Connections: Unknown (10/14/2024)    Social Connection and Isolation Panel [NHANES]     Frequency of Communication with Friends and Family: Not on file     Frequency of Social Gatherings with Friends and Family: Twice a week     Attends Congregation Services: Not on file     Active Member of Clubs or Organizations: Not on file     Attends Club or Organization Meetings: Not on file     Marital Status: Not on file   Interpersonal Safety: Low Risk  (12/23/2024)    Interpersonal Safety     Do you feel physically and emotionally safe where you currently live?: Yes     Within the past 12 months, have you been hit, slapped, kicked or otherwise physically hurt by someone?: No     Within the past 12 months, have you been humiliated or emotionally abused in other ways by your partner or ex-partner?: No   Housing Stability: Low Risk  (4/27/2025)    Received from [facility]    Housing Stability     What is your living situation today?: I have a steady place to live            Review of Systems:     Review of Systems:  Eyes: negative  Respiratory: No shortness of breath, dyspnea on exertion, cough, or hemoptysis  Cardiovascular: chest pain  Gastrointestinal: negative and abdominal pain  Musculoskeletal: negative and joint pain  Neurologic: negative  Psychiatric: negative and anxiety         Physical Exam:     Patient Reported Vitals:   BP: NA  Pulse: NA  Weight: NA    PSYCH: Alert and oriented times 3; coherent speech, normal   rate and volume, able to articulate logical thoughts, able   to abstract reason, no tangential thoughts, no  "hallucinations   or delusions. her affect is normal  RESP: No cough, no audible wheezing, able to talk in full sentences    Remainder of the comprehensive physical exam is unable to be completed due to today's visit being completed via telephone call.       Data:   LIPID RESULTS:  Lab Results   Component Value Date    CHOL 157 04/21/2025    CHOL 244 (H) 11/19/2019    HDL 71 04/21/2025    HDL 66 11/19/2019    LDL 54 04/21/2025     (H) 11/19/2019    TRIG 172 (H) 05/20/2025    TRIG 266 (H) 11/19/2019    CHOLHDLRATIO 3.4 10/23/2015     LIVER ENZYME RESULTS:  Lab Results   Component Value Date    AST 16 04/18/2025    AST 18 12/17/2020    ALT 13 04/21/2025    ALT 18 12/17/2020     CBC RESULTS:  Lab Results   Component Value Date    WBC 8.4 04/18/2025    WBC 7.1 12/17/2020    RBC 4.33 04/18/2025    RBC 4.24 12/17/2020    HGB 12.8 04/18/2025    HGB 13.4 12/17/2020    HCT 40.2 04/18/2025    HCT 40.6 12/17/2020    MCV 93 04/18/2025    MCV 96 12/17/2020    MCH 29.6 04/18/2025    MCH 31.6 12/17/2020    MCHC 31.8 04/18/2025    MCHC 33.0 12/17/2020    RDW 14.0 04/18/2025    RDW 12.3 12/17/2020     04/18/2025     12/17/2020     BMP RESULTS:  Lab Results   Component Value Date     04/21/2025     12/17/2020    POTASSIUM 3.7 04/21/2025    POTASSIUM 3.5 12/29/2024    POTASSIUM 4.2 12/17/2020    CHLORIDE 102 05/20/2025    CHLORIDE 103 12/17/2020    CO2 27 04/21/2025    CO2 27 12/17/2020    ANIONGAP 12 04/21/2025    ANIONGAP 6 12/17/2020    GLC 87 04/21/2025     (H) 12/29/2024    GLC 85 12/17/2020    BUN 10.7 04/21/2025    BUN 10 12/17/2020    CR 0.73 04/21/2025    CR 0.61 04/06/2021    GFRESTIMATED 87 04/21/2025    GFRESTIMATED >90 04/06/2021    GFRESTBLACK >90 04/06/2021    THOR 9.6 04/21/2025    THOR 8.8 12/17/2020      A1C RESULTS:  Lab Results   Component Value Date    A1C 5.3 12/15/2016     INR RESULTS:  No results found for: \"INR\"         Medications     Current Outpatient Medications "   Medication Sig Dispense Refill    aspirin 81 MG EC tablet Take 1 tablet (81 mg) by mouth daily 90 tablet 0    Baclofen (LIORESAL) 5 MG tablet Take 5 mg by mouth as needed for muscle spasms      budesonide (ENTOCORT EC) 3 MG EC capsule Take 3 capsules (9 mg) by mouth every morning. (Patient taking differently: Take 3 mg by mouth every morning.)      carboxymethylcellulose (REFRESH PLUS) 0.5 % SOLN ophthalmic solution Place 1 drop into both eyes 3 times daily as needed for dry eyes      colestipol (COLESTID) 1 g tablet       cyclobenzaprine (FLEXERIL) 10 MG tablet Take 10 mg by mouth daily as needed for muscle spasms.      gabapentin (NEURONTIN) 300 MG capsule Take 1 capsule (300 mg) by mouth daily. 180 capsule 3    HYDROcodone-Acetaminophen  MG TABS Take 1-2 tablets by mouth every 4 hours as needed (maximum of 10 tablets in 24 hour period) Prescription is written for 1-2 tabs every 4-6 hrs prn (max of 10 tabs daily).  0    ipratropium (ATROVENT) 0.06 % spray Spray 3 sprays into both nostrils daily 45 mL 3    loratadine (CLARITIN) 10 MG tablet Take 10 mg by mouth every morning       LORazepam (ATIVAN) 1 MG tablet TAKE 1 TABLET BY MOUTH IN THE MORNING, 1/2 TABLET AROUND NOON AND 1 TABLET BEFORE BEDTIME FOR ANXIETY 90 tablet 0    magnesium 100 MG CAPS Take by mouth.      metoprolol succinate ER (TOPROL XL) 25 MG 24 hr tablet Take 0.5 tablets (12.5 mg) by mouth daily. 60 tablet 2    nitroFURantoin macrocrystal-monohydrate (MACROBID) 100 MG capsule Take 1 capsule (100 mg) by mouth 2 times daily. 10 capsule 1    nitroGLYcerin (NITROSTAT) 0.4 MG sublingual tablet For chest pain place 1 tablet under the tongue every 5 minutes for 3 doses. If symptoms persist 5 minutes after 1st dose call 911. 50 tablet 3    ondansetron (ZOFRAN) 4 MG tablet Take 1 tablet (4 mg) by mouth every 8 hours as needed for nausea or vomiting. 18 tablet 11    pantoprazole (PROTONIX) 40 MG EC tablet Take 1 tablet (40 mg) by mouth 2 times daily.  180 tablet 2    PARoxetine (PAXIL-CR) 25 MG 24 hr tablet Take 1 tablet (25 mg) by mouth 2 times daily. 180 tablet 1    polyethylene glycol (MIRALAX) 17 GM/Dose powder Take 1 capful. by mouth as needed for constipation      riboflavin 400 MG CAPS Take 400 mg by mouth daily      ubrogepant (UBRELVY) 50 MG tablet Take 1 tablet (50 mg) by mouth at onset of headache. May repeat in 2 hours. Max dose is 100 mg in 24 hours. 9 tablet 5    vancomycin (FIRVANQ) 25 MG/ML oral solution Take by mouth 4 times daily.      vitamin C (ASCORBIC ACID) 1000 MG TABS Take 1,000 mg by mouth every evening       Vitamin D3 (CHOLECALCIFEROL) 125 MCG (5000 UT) tablet Take 2 tablets by mouth every evening      zolpidem ER (AMBIEN CR) 12.5 MG CR tablet TAKE 1 TABLET(12.5 MG) BY MOUTH AT BEDTIME 90 tablet 0    atorvastatin (LIPITOR) 40 MG tablet Take 1 tablet (40 mg) by mouth daily. 90 tablet 3          Past Medical History     Past Medical History:   Diagnosis Date    Adrenal insufficiency     Dr. Navas, secondary to meds    Anxiety     Dr. Adelina Meehan    ASCVD (arteriosclerotic cardiovascular disease) 08/2023    cp and pos trop, angio with trivial cad, echo nl    Cardiac arrest with ventricular fibrillation (H) 12/29/2024    angio with occlussion of lad; felt to have scad    Central sensitization to pain     Chronic constipation     Chronic headache     sees neuro Dr Danielson, 3 types of headaches    Chronic narcotic use     [facility]    Chronic pain     Dr. Orta as of 2015    Chronic urethritis     Dr. Little    Colonic polyp 11/2011    one polyp, fu 5 years, fu 10/17 and no lesions, fu 9/20 and negative, fu 5 years    Depression, major, in partial remission     Dr. Meehan    Diarrhea 2012    eval by [facility], resolved with gluten free diet    DJD (degenerative joint disease)     Dyspepsia 2020    eval by [facility], had colonoscopy, egd, ct abd and pelvis, mrcp.  [place] to be dyspepsia and constipation    H/O gastroesophageal  reflux (GERD)     Ischemic cardiomyopathy 2024    echo with wma and ef 40-45%    LBP (low back pain)     Dr. Jae Tong in [place], then seeing [facility] Dr. You Cannon headed 2020    nl est echo    Lymphocytic colitis     [facility]; seen on flex sig  [facility] gi, added budesonide    Migraines     neuro eval    NSTEMI (non-ST elevated myocardial infarction) (H) 2023    echo nl and angio trivial cad; cardiac arrest in hospital 2024, angio with lad occlussion    Palpitations     holter with pvc's and pac's, echo nl    Palpitations 2025    54 runs of SVT, longest lasting 41 seconds, no ventricular dysrhythmias    Screening     dexa nl at gyn;  osteopenia, low frax score    SVT (supraventricular tachycardia) 2025    54 runs of SVT, longest lasting 41 seconds, on ziopatch done for palp     Past Surgical History:   Procedure Laterality Date    APPENDECTOMY  2016    ARTHRODESIS WRIST Left 2021    Procedure: LEFT TOTAL WRIST FUSION;  Surgeon: Beatrice Philippe MD;  Location:  OR    ARTHROPLASTY CARPOMETACARPAL (THUMB JOINT) Left 2017    Procedure: ARTHROPLASTY CARPOMETACARPAL (THUMB JOINT);  REVISION  LEFT THUMB CARPOMETACARPAL ARTHROPOASTY WITH 1.1 TIGHT ROPE;  Surgeon: Beatrice Philippe MD;  Location:  SD    ARTHROSCOPY WRIST Left 2019    Procedure: LEFT WRIST ARTHROSCOPY REMOVAL OF TIGHT ROPE ; LEFT EXTENSOR POLLICIS LONGUS /EXTENSOR CARPI RADIALIS BREVIS /EXTENSOR CARPI RADIALUS LONGUS SYNOVECTOMY ; EXTENSOR POLLICIS LONGUS TRANSPOSITION ; ANTERIOR INTEROSSEOUS NEURECTOMY ; POSTERIOR INTEROSSEOUS NEURECTOMY;  Surgeon: Beatrice Philippe MD;  Location:  OR    BIOPSY      CARPAL TUNNEL RELEASE RT/LT      CATARACT EXTRACTION  2021     SECTION      COLONOSCOPY      CV CORONARY ANGIOGRAM N/A 2023    Procedure: Coronary Angiogram;  Surgeon: Moody Miranda MD;  Location:  HEART CARDIAC CATH LAB    CV  CORONARY ANGIOGRAM N/A 12/29/2024    Procedure: Coronary Angiogram;  Surgeon: Tyler England MD;  Location:  HEART [facility] CATH LAB    ESOPHAGOSCOPY, GASTROSCOPY, DUODENOSCOPY (EGD), COMBINED N/A 08/18/2020    Procedure: ESOPHAGOGASTRODUODENOSCOPY, WITH BIOPSY;  Surgeon: Georges Pop MD;  Location:  GI    EXCIS BARTHOLIN GLAND/CYST  2005    EYE SURGERY  2023    HERNIORRHAPHY INGUINAL  70's    x5    INJECT STEROID (LOCATION) Right 08/26/2019    Procedure: RIGHT WRIST CORTISONE INJECTION;  Surgeon: Beatrice Philippe MD;  Location:  OR    OPERATIVE HYSTEROSCOPY WITH MORCELLATOR N/A 02/09/2018    Procedure: OPERATIVE HYSTEROSCOPY WITH MORCELLATOR ([employer]);  OPERATIVE HYSTEROSCOPY WITH TRUECLEAR MORCELLATOR 5C SCOPE ;  Surgeon: Iris Fernandez MD;  Location: [facility]    ORTHOPEDIC SURGERY Bilateral     SHOULDER ARTHROSCOPY    ORTHOPEDIC SURGERY Right 11/2017    right foot for plantar fasciitis    thumb surgery  2000 and 2004, 2007     Family History   Problem Relation Age of Onset    Diabetes Father     Hypertension Father     Depression Father     Substance Abuse Father     Anesthesia Reaction Father         went into shock with surgery     Obesity Father     Anxiety Disorder Father     Coronary Artery Disease Father     Hyperlipidemia Father     Diabetes Mother     Cerebrovascular Disease Mother         Cadasils disease    Depression Mother     Mental Illness Mother         borderline personality disorder, Chemical dependency    Substance Abuse Mother     Genetic Disorder Mother         Cadasils    Obesity Mother     Anxiety Disorder Mother     Cerebrovascular Disease Paternal Grandfather     Cerebrovascular Disease Brother         Cadasils disease    Depression Brother     Anxiety Disorder Brother     Substance Abuse Brother     Genetic Disorder Brother         cadasils    Obesity Brother     Hypertension Brother     Hyperlipidemia Brother     Cerebrovascular Disease Sister         Cadasils disease     Depression Sister     Anxiety Disorder Sister     Genetic Disorder Sister         cadasils    Obesity Sister     Cerebrovascular Disease Sister         Cadasils disease    Genetic Disorder Sister         cadasils    Breast Cancer Sister     Depression Sister     Anxiety Disorder Sister     Obesity Sister     Hypertension Sister     Hyperlipidemia Sister     Depression Son     Anxiety Disorder Son             Allergies   Betamethasone, Chocolate, Compazine [prochlorperazine], Linzess [linaclotide], Penicillins, Pneumococcal vaccine, and Tizanidine      This note was completed in part using dictation via the Dragon voice recognition software. Some word and grammatical errors may occur and must be interpreted in the appropriate clinical context.  If there are any questions pertaining to this issue, please contact me for further clarification.

## 2025-06-11 NOTE — LETTER
6/11/2025    Georges Lebron MD  [address]  [facility] [address]    RE: Laurel Parra       Dear Colleague,     I had the pleasure of seeing Laurel Parra in the [facility].  Laurel is a 75 year old who is being evaluated via a billable video visit.    How would you like to obtain your AVS? MyChart  If the video visit is dropped, the invitation should be resent by: Send to e-mail at: [email or web address]  Will anyone else be joining your video visit? No  {If patient encounters technical issues they should call [phone] :[id number]}  Review Of Systems  Skin: NEGATIVE  Eyes:Ears/Nose/Throat: NEGATIVE  Respiratory: NEGATIVE  Cardiovascular:NEGATIVE  Gastrointestinal: NEGATIVE  Genitourinary:NEGATIVE   Musculoskeletal: NEGATIVE  Neurologic: NEGATIVE  Psychiatric: NEGATIVE  Hematologic/Lymphatic/Immunologic: NEGATIVE  Endocrine:  NEGATIVE  Vitals - Patient Reported  Systolic (Patient Reported): 120  Diastolic (Patient Reported): 81  Weight (Patient Reported): 54.4 kg (120 lb)  Pulse (Patient Reported):  (n/a)     Telephone number of patient: [phone]    Ritu Lazcano LPN  Video-Visit Details    Type of service:  Video Visit   Video End Time:{video visit start/end time for provider to select:[id number]}  Originating Location (pt. Location): Home  {PROVIDER LOCATION On-site should be selected for visits conducted from your clinic location or adjoining [facility] hospital, academic office, or other nearby [facility] building. Off-site should be selected for all other provider locations, including home:[id number]}  Distant Location (provider location):  On-site  Platform used for Video Visit: Service Seeking         Cardiology Virtual Visit Progress Note    Service Date: June 11, 2025  Primary Cardiologist: Dr. England   Reason for visit: 6 month follow up     Ms. Laurel Parra is a 75 year old female who is being evaluated via a billable video visit.    Patient has given verbal  consent for virtual visit?  Yes         Assessment and Plan:     Mid-distal LAD lesion secondary to SCAD, 12/2024  Medically managed   On ASA 81 mg and metoprolol XL 12.5 mg    2.   Cardiomyopathy, resolved   Echo 12/2024 LVEF 40-45%, most recent echo 5/2025 LVEF 66%    3.   SVT         PACs  Noted on Zio 5/2025  Symptomatic   On metoprolol XL 12.5 mg     4.   Hx of VF arrest with ROSC without shock or medication in the setting of SCAD in mid-distal LAD, 12/2024     5.   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   Anxiety         PTSD  Follows with therapy       Plan:   - Reviewed most recent lab work and Zio monitor. All questions answered   - Start cardiac rehab tomorrow   - Patient has a known hx of palpitations and had documented triggered events with SVT and PACs on most recent Zio. Discussed increasing metoprolol to 25 mg daily. Patient has a hx of softer blood pressures. Patient would prefer to monitor palpitations at this time prior to making any further medication changes.   - Continue present medical therapy       Follow up with Dr. England in 12 months with labs prior     Edith Travis PA-C  Physician Assistant   [facility]      I spent 19 minutes on the date of encounter of which (10+) minutes spent in medical discussion.    _________________________________________________________        History of Presenting Illness:    Laurel Parra is a very pleasant 75 year old female with a history of chronic anxiety, osteoarthritis, chronic pain syndrome, migraine headaches, exertional lightheadedness, SCAD and palpitations.     In brief, I had the pleasure of meeting Ms. Parra in December 2024 during an admission for fever and abdominal pain. She was being treated for C. Diff colitis. During admission, an RRT was call due to patient reporting left sided chest pain. She  received a nitroglycerin which helped with her pain. EKG showed slight T wave changes. While the RRT was occurring, the patient lost consciousness. Cardiac monitor showed ventricular fibrillation. Chest compressions were started and ROSC was achieved within 1 minute and patient gained consciousness. No shock or meds were given. She underwent coronary angiogram which showed a total occlusion of the distal LAD, concerning for SCAD. Recommendation was to treat medically. Echocardiogram demonstrated an LVEF of 40-45% with severe hypokinesis of the distal anterior and anteroseptal walls and apex, and severe hypokinesis of the distal inferior wall.     Patient was seen at [facility] for further evaluation in May 2025. Please see further details in Dr. Medina note on 5/20/2025.     Patient concerned about elevated triglycerides and CRP. Starting cardiac rehab tomorrow. Follows the mediterranean diet. Wanting to start going to [employer]. Colitis and chronic pain limit activity. Seeing a therapist for PTSD after cardiac arrest in December. Chest pain episode a month ago, localized to left side of chest. Present for multiple days. Was not similar to previous chest pain during hospital admission.     Denies shortness of breath, orthopnea and PND. Denies lightheadedness, dizziness, near syncope and syncope.     Taking medications daily as prescribed.     Blood pressure yesterday was 120/81. Normally, 107/62.     Lipid panel 5/2025, reviewed.  Triglycerides 172 and LDL 50.     Echocardiogram May 2025 demonstrated LVEF of 66% with no valvular disease.    Zio monitor 5/2025-underlying rhythm was sinus rhythm with 54 runs of SVT. PAC burden <1%. Patient triggered events correlated with NSR, PACs and runs of SVT.     CTA head and neck 5/2025 showed a patent right subclavian artery. Negative for arterial stenosis in the head or neck. No FMD or dissection. Substantial tortuosity of cervical ICA bilaterally without stenoses.    _________________________________________________________    Provider location: [facility]   Patient location: Home  Total time on phone call: 19 minutes           Social History       Social History     Socioeconomic History     Marital status:      Spouse name: Not on file     Number of children: 1     Years of education: Not on file     Highest education level: Not on file   Occupational History     Employer: SELF   Tobacco Use     Smoking status: Former     Current packs/day: 0.00     Types: Cigarettes     Quit date: 1974     Years since quittin.0     Smokeless tobacco: Never     Tobacco comments:     infrequent use for about 5 years   Vaping Use     Vaping status: Never Used   Substance and Sexual Activity     Alcohol use: No     Drug use: No     Sexual activity: Not Currently     Partners: Male     Birth control/protection: Post-menopausal   Other Topics Concern     Parent/sibling w/ CABG, MI or angioplasty before 65F 55M? Yes     Comment: father   Social History Narrative     Not on file     Social Drivers of Health     Financial Resource Strain: Low Risk  (2024)    Financial Resource Strain      Within the past 12 months, have you or your family members you live with been unable to get utilities (heat, electricity) when it was really needed?: No   Food Insecurity: No Food Insecurity (2025)    Received from [facility]    Hunger Vital Sign      Worried About Running Out of Food in the Last Year: Never true      Ran Out of Food in the Last Year: Never true   Transportation Needs: No Transportation Needs (2025)    Received from [facility]    PRAPARE - Transportation      Lack of Transportation (Medical): No      Lack of Transportation (Non-Medical): No   Physical Activity: Inactive (2025)    Received from [facility]    Exercise Vital Sign      Days of Exercise per Week: 0 days      Minutes of Exercise per Session: 0 min   Stress: No Stress Concern Present  (10/14/2024)    Citizen of [place] of Occupational Health - Occupational Stress Questionnaire      Feeling of Stress : Only a little   Social Connections: Unknown (10/14/2024)    Social Connection and Isolation Panel [NHANES]      Frequency of Communication with Friends and Family: Not on file      Frequency of Social Gatherings with Friends and Family: Twice a week      Attends [employer] Services: Not on file      Active Member of Clubs or Organizations: Not on file      Attends Club or Organization Meetings: Not on file      Marital Status: Not on file   Interpersonal Safety: Low Risk  (12/23/2024)    Interpersonal Safety      Do you feel physically and emotionally safe where you currently live?: Yes      Within the past 12 months, have you been hit, slapped, kicked or otherwise physically hurt by someone?: No      Within the past 12 months, have you been humiliated or emotionally abused in other ways by your partner or ex-partner?: No   Housing Stability: Low Risk  (4/27/2025)    Received from [facility]    Housing Stability      What is your living situation today?: I have a steady place to live            Review of Systems:     Review of Systems:  Eyes: negative  Respiratory: No shortness of breath, dyspnea on exertion, cough, or hemoptysis  Cardiovascular: chest pain  Gastrointestinal: negative and abdominal pain  Musculoskeletal: negative and joint pain  Neurologic: negative  Psychiatric: negative and anxiety         Physical Exam:     Patient Reported Vitals:   BP: NA  Pulse: NA  Weight: NA    PSYCH: Alert and oriented times 3; coherent speech, normal   rate and volume, able to articulate logical thoughts, able   to abstract reason, no tangential thoughts, no hallucinations   or delusions. her affect is normal  RESP: No cough, no audible wheezing, able to talk in full sentences    Remainder of the comprehensive physical exam is unable to be completed due to today's visit being completed via telephone  "call.       Data:   LIPID RESULTS:  Lab Results   Component Value Date    CHOL 157 04/21/2025    CHOL 244 (H) 11/19/2019    HDL 71 04/21/2025    HDL 66 11/19/2019    LDL 54 04/21/2025     (H) 11/19/2019    TRIG 172 (H) 05/20/2025    TRIG 266 (H) 11/19/2019    CHOLHDLRATIO 3.4 10/23/2015     LIVER ENZYME RESULTS:  Lab Results   Component Value Date    AST 16 04/18/2025    AST 18 12/17/2020    ALT 13 04/21/2025    ALT 18 12/17/2020     CBC RESULTS:  Lab Results   Component Value Date    WBC 8.4 04/18/2025    WBC 7.1 12/17/2020    RBC 4.33 04/18/2025    RBC 4.24 12/17/2020    HGB 12.8 04/18/2025    HGB 13.4 12/17/2020    HCT 40.2 04/18/2025    HCT 40.6 12/17/2020    MCV 93 04/18/2025    MCV 96 12/17/2020    MCH 29.6 04/18/2025    MCH 31.6 12/17/2020    MCHC 31.8 04/18/2025    MCHC 33.0 12/17/2020    RDW 14.0 04/18/2025    RDW 12.3 12/17/2020     04/18/2025     12/17/2020     BMP RESULTS:  Lab Results   Component Value Date     04/21/2025     12/17/2020    POTASSIUM 3.7 04/21/2025    POTASSIUM 3.5 12/29/2024    POTASSIUM 4.2 12/17/2020    CHLORIDE 102 05/20/2025    CHLORIDE 103 12/17/2020    CO2 27 04/21/2025    CO2 27 12/17/2020    ANIONGAP 12 04/21/2025    ANIONGAP 6 12/17/2020    GLC 87 04/21/2025     (H) 12/29/2024    GLC 85 12/17/2020    BUN 10.7 04/21/2025    BUN 10 12/17/2020    CR 0.73 04/21/2025    CR 0.61 04/06/2021    GFRESTIMATED 87 04/21/2025    GFRESTIMATED >90 04/06/2021    GFRESTBLACK >90 04/06/2021    THOR 9.6 04/21/2025    THOR 8.8 12/17/2020      A1C RESULTS:  Lab Results   Component Value Date    A1C 5.3 12/15/2016     INR RESULTS:  No results found for: \"INR\"         Medications     Current Outpatient Medications   Medication Sig Dispense Refill     aspirin 81 MG EC tablet Take 1 tablet (81 mg) by mouth daily 90 tablet 0     Baclofen (LIORESAL) 5 MG tablet Take 5 mg by mouth as needed for muscle spasms       budesonide (ENTOCORT EC) 3 MG EC capsule Take 3 " capsules (9 mg) by mouth every morning. (Patient taking differently: Take 3 mg by mouth every morning.)       carboxymethylcellulose (REFRESH PLUS) 0.5 % SOLN ophthalmic solution Place 1 drop into both eyes 3 times daily as needed for dry eyes       colestipol (COLESTID) 1 g tablet        cyclobenzaprine (FLEXERIL) 10 MG tablet Take 10 mg by mouth daily as needed for muscle spasms.       gabapentin (NEURONTIN) 300 MG capsule Take 1 capsule (300 mg) by mouth daily. 180 capsule 3     HYDROcodone-Acetaminophen  MG TABS Take 1-2 tablets by mouth every 4 hours as needed (maximum of 10 tablets in 24 hour period) Prescription is written for 1-2 tabs every 4-6 hrs prn (max of 10 tabs daily).  0     ipratropium (ATROVENT) 0.06 % spray Spray 3 sprays into both nostrils daily 45 mL 3     loratadine (CLARITIN) 10 MG tablet Take 10 mg by mouth every morning        LORazepam (ATIVAN) 1 MG tablet TAKE 1 TABLET BY MOUTH IN THE MORNING, 1/2 TABLET AROUND NOON AND 1 TABLET BEFORE BEDTIME FOR ANXIETY 90 tablet 0     magnesium 100 MG CAPS Take by mouth.       metoprolol succinate ER (TOPROL XL) 25 MG 24 hr tablet Take 0.5 tablets (12.5 mg) by mouth daily. 60 tablet 2     nitroFURantoin macrocrystal-monohydrate (MACROBID) 100 MG capsule Take 1 capsule (100 mg) by mouth 2 times daily. 10 capsule 1     nitroGLYcerin (NITROSTAT) 0.4 MG sublingual tablet For chest pain place 1 tablet under the tongue every 5 minutes for 3 doses. If symptoms persist 5 minutes after 1st dose call 911. 50 tablet 3     ondansetron (ZOFRAN) 4 MG tablet Take 1 tablet (4 mg) by mouth every 8 hours as needed for nausea or vomiting. 18 tablet 11     pantoprazole (PROTONIX) 40 MG EC tablet Take 1 tablet (40 mg) by mouth 2 times daily. 180 tablet 2     PARoxetine (PAXIL-CR) 25 MG 24 hr tablet Take 1 tablet (25 mg) by mouth 2 times daily. 180 tablet 1     polyethylene glycol (MIRALAX) 17 GM/Dose powder Take 1 capful. by mouth as needed for constipation        riboflavin 400 MG CAPS Take 400 mg by mouth daily       ubrogepant (UBRELVY) 50 MG tablet Take 1 tablet (50 mg) by mouth at onset of headache. May repeat in 2 hours. Max dose is 100 mg in 24 hours. 9 tablet 5     vancomycin (FIRVANQ) 25 MG/ML oral solution Take by mouth 4 times daily.       vitamin C (ASCORBIC ACID) 1000 MG TABS Take 1,000 mg by mouth every evening        Vitamin D3 (CHOLECALCIFEROL) 125 MCG (5000 UT) tablet Take 2 tablets by mouth every evening       zolpidem ER (AMBIEN CR) 12.5 MG CR tablet TAKE 1 TABLET(12.5 MG) BY MOUTH AT BEDTIME 90 tablet 0     atorvastatin (LIPITOR) 40 MG tablet Take 1 tablet (40 mg) by mouth daily. 90 tablet 3          Past Medical History     Past Medical History:   Diagnosis Date     Adrenal insufficiency     Dr. Navas, secondary to meds     Anxiety     Dr. Adelina Meehan     ASCVD (arteriosclerotic cardiovascular disease) 08/2023    cp and pos trop, angio with trivial cad, echo nl     Cardiac arrest with ventricular fibrillation (H) 12/29/2024    angio with occlussion of lad; felt to have scad     Central sensitization to pain      Chronic constipation      Chronic headache     sees neuro Dr Danielson, 3 types of headaches     Chronic narcotic use     [facility]     Chronic pain     Dr. Orta as of 2015     Chronic urethritis     Dr. Little     Colonic polyp 11/2011    one polyp, fu 5 years, fu 10/17 and no lesions, fu 9/20 and negative, fu 5 years     Depression, major, in partial remission     Dr. Meehan     Diarrhea 2012    eval by [facility], resolved with gluten free diet     DJD (degenerative joint disease)      Dyspepsia 2020    eval by [facility], had colonoscopy, egd, ct abd and pelvis, mrcp.  [place] to be dyspepsia and constipation     H/O gastroesophageal reflux (GERD)      Ischemic cardiomyopathy 12/2024    echo with wma and ef 40-45%     LBP (low back pain) 2013    Dr. Jae Tong in [place], then seeing [facility] Dr. Orta     Light headed  2020    nl est echo     Lymphocytic colitis     [facility]; seen on flex sig  [facility], added budesonide     Migraines     neuro eval     NSTEMI (non-ST elevated myocardial infarction) (H) 2023    echo nl and angio trivial cad; cardiac arrest in hospital 2024, angio with lad occlussion     Palpitations     holter with pvc's and pac's, echo nl     Palpitations 2025    54 runs of SVT, longest lasting 41 seconds, no ventricular dysrhythmias     Screening     dexa nl at gyn;  osteopenia, low frax score     SVT (supraventricular tachycardia) 2025    54 runs of SVT, longest lasting 41 seconds, on ziopatch done for palp     Past Surgical History:   Procedure Laterality Date     APPENDECTOMY  2016     ARTHRODESIS WRIST Left 2021    Procedure: LEFT TOTAL WRIST FUSION;  Surgeon: Beatrice Philippe MD;  Location:  OR     ARTHROPLASTY CARPOMETACARPAL (THUMB JOINT) Left 2017    Procedure: ARTHROPLASTY CARPOMETACARPAL (THUMB JOINT);  REVISION  LEFT THUMB CARPOMETACARPAL ARTHROPOASTY WITH 1.1 TIGHT ROPE;  Surgeon: Beatrice Philippe MD;  Location:  SD     ARTHROSCOPY WRIST Left 2019    Procedure: LEFT WRIST ARTHROSCOPY REMOVAL OF TIGHT ROPE ; LEFT EXTENSOR POLLICIS LONGUS /EXTENSOR CARPI RADIALIS BREVIS /EXTENSOR CARPI RADIALUS LONGUS SYNOVECTOMY ; EXTENSOR POLLICIS LONGUS TRANSPOSITION ; ANTERIOR INTEROSSEOUS NEURECTOMY ; POSTERIOR INTEROSSEOUS NEURECTOMY;  Surgeon: Beatrice Philippe MD;  Location:  OR     BIOPSY       CARPAL TUNNEL RELEASE RT/LT       CATARACT EXTRACTION  2021      SECTION       COLONOSCOPY       CV CORONARY ANGIOGRAM N/A 2023    Procedure: Coronary Angiogram;  Surgeon: Moody Miranda MD;  Location:  HEART CARDIAC CATH LAB     CV CORONARY ANGIOGRAM N/A 2024    Procedure: Coronary Angiogram;  Surgeon: Tyler England MD;  Location:  HEART [facility]ARD[facility] CATH LAB     ESOPHAGOSCOPY, GASTROSCOPY, DUODENOSCOPY  (EGD), COMBINED N/A 08/18/2020    Procedure: ESOPHAGOGASTRODUODENOSCOPY, WITH BIOPSY;  Surgeon: Georges Pop MD;  Location:  GI     EXCIS BARTHOLIN GLAND/CYST  2005     EYE SURGERY  2023     HERNIORRHAPHY INGUINAL  70's    x5     INJECT STEROID (LOCATION) Right 08/26/2019    Procedure: RIGHT WRIST CORTISONE INJECTION;  Surgeon: Beatrice Philippe MD;  Location:  OR     OPERATIVE HYSTEROSCOPY WITH MORCELLATOR N/A 02/09/2018    Procedure: OPERATIVE HYSTEROSCOPY WITH MORCELLATOR ([employer]IN [employer] NEPHEW);  OPERATIVE HYSTEROSCOPY WITH TRUECLEAR MORCELLATOR 5C SCOPE ;  Surgeon: Iris Fernandez MD;  Location:  SD     ORTHOPEDIC SURGERY Bilateral     SHOULDER ARTHROSCOPY     ORTHOPEDIC SURGERY Right 11/2017    right foot for plantar fasciitis     thumb surgery  2000 and 2004, 2007     Family History   Problem Relation Age of Onset     Diabetes Father      Hypertension Father      Depression Father      Substance Abuse Father      Anesthesia Reaction Father         went into shock with surgery      Obesity Father      Anxiety Disorder Father      Coronary Artery Disease Father      Hyperlipidemia Father      Diabetes Mother      Cerebrovascular Disease Mother         Cadasils disease     Depression Mother      Mental Illness Mother         borderline personality disorder, Chemical dependency     Substance Abuse Mother      Genetic Disorder Mother         Cadasils     Obesity Mother      Anxiety Disorder Mother      Cerebrovascular Disease Paternal Grandfather      Cerebrovascular Disease Brother         Cadasils disease     Depression Brother      Anxiety Disorder Brother      Substance Abuse Brother      Genetic Disorder Brother         cadasils     Obesity Brother      Hypertension Brother      Hyperlipidemia Brother      Cerebrovascular Disease Sister         Cadasils disease     Depression Sister      Anxiety Disorder Sister      Genetic Disorder Sister         cadasils     Obesity Sister      Cerebrovascular Disease Sister          Cadasils disease     Genetic Disorder Sister         cadasils     Breast Cancer Sister      Depression Sister      Anxiety Disorder Sister      Obesity Sister      Hypertension Sister      Hyperlipidemia Sister      Depression Son      Anxiety Disorder Son             Allergies   Betamethasone, Chocolate, Compazine [prochlorperazine], Linzess [linaclotide], Penicillins, Pneumococcal vaccine, and Tizanidine      This note was completed in part using dictation via the Dragon voice recognition software. Some word and grammatical errors may occur and must be interpreted in the appropriate clinical context.  If there are any questions pertaining to this issue, please contact me for further clarification.    Thank you for allowing me to participate in the care of your patient.      Sincerely,     Edith Travis PA-C     [facility]  cc:   Edith Travis PA-C  [address] [place] [address]

## 2025-06-12 ENCOUNTER — TELEPHONE (OUTPATIENT)
Dept: SURGERY | Facility: CLINIC | Age: 75
End: 2025-06-12
Payer: MEDICARE

## 2025-06-12 NOTE — TELEPHONE ENCOUNTER
Patient Contacted for the patient to call back and schedule the following:    Appointment type: New Patient   Provider: Vivi Rahman PA-C   Return date: 6/30/25  Specialty phone number: [phone]  Additional appointment(s) needed: n/a  Additonal Notes: rectal pain

## 2025-06-13 ENCOUNTER — HOSPITAL ENCOUNTER (OUTPATIENT)
Dept: CARDIAC REHAB | Facility: CLINIC | Age: 75
Discharge: HOME | End: 2025-06-13
Attending: INTERNAL MEDICINE
Payer: MEDICARE

## 2025-06-13 PROCEDURE — 93798 PHYS/QHP OP CAR RHAB W/ECG: CPT | Performed by: REHABILITATION PRACTITIONER

## 2025-06-16 ENCOUNTER — TELEPHONE (OUTPATIENT)
Dept: SURGERY | Facility: CLINIC | Age: 75
End: 2025-06-16
Payer: MEDICARE

## 2025-06-16 ENCOUNTER — TRANSFERRED RECORDS (OUTPATIENT)
Dept: HEALTH INFORMATION MANAGEMENT | Facility: CLINIC | Age: 75
End: 2025-06-16
Payer: MEDICARE

## 2025-06-16 NOTE — TELEPHONE ENCOUNTER
Left Voicemail (2nd Attempt) for the patient to call back and schedule the following:         Appointment type: New Patient   Provider: Marce Garcia NP, Vivi Mcgill PA-C, Marlen Marcelo PA-C   Return date: Next available   Specialty phone number: [phone]  Additional appointment(s) needed: n/a  Additonal Notes: Pt being referred by //rectal pain

## 2025-06-17 ENCOUNTER — VIRTUAL VISIT (OUTPATIENT)
Dept: BEHAVIORAL HEALTH | Facility: CLINIC | Age: 75
End: 2025-06-17
Payer: MEDICARE

## 2025-06-17 ENCOUNTER — VIRTUAL VISIT (OUTPATIENT)
Dept: PSYCHIATRY | Facility: CLINIC | Age: 75
End: 2025-06-17
Payer: MEDICARE

## 2025-06-17 VITALS — HEIGHT: 63 IN | BODY MASS INDEX: 20.73 KG/M2 | WEIGHT: 117 LBS

## 2025-06-17 DIAGNOSIS — F11.20 UNCOMPLICATED OPIOID DEPENDENCE (H): ICD-10-CM

## 2025-06-17 DIAGNOSIS — Z86.59 HISTORY OF OCD (OBSESSIVE COMPULSIVE DISORDER): ICD-10-CM

## 2025-06-17 DIAGNOSIS — F33.1 MODERATE EPISODE OF RECURRENT MAJOR DEPRESSIVE DISORDER (H): ICD-10-CM

## 2025-06-17 DIAGNOSIS — F41.1 GAD (GENERALIZED ANXIETY DISORDER): ICD-10-CM

## 2025-06-17 DIAGNOSIS — F13.20 BENZODIAZEPINE DEPENDENCE (H): Primary | ICD-10-CM

## 2025-06-17 DIAGNOSIS — F50.00 ANOREXIA NERVOSA (H): ICD-10-CM

## 2025-06-17 DIAGNOSIS — F43.9 TRAUMA AND STRESSOR-RELATED DISORDER: ICD-10-CM

## 2025-06-17 DIAGNOSIS — F50.9 EATING DISORDER, UNSPECIFIED TYPE: ICD-10-CM

## 2025-06-17 DIAGNOSIS — F33.1 MODERATE EPISODE OF RECURRENT MAJOR DEPRESSIVE DISORDER (H): Primary | ICD-10-CM

## 2025-06-17 DIAGNOSIS — G89.29 CENTRAL SENSITIZATION TO PAIN: ICD-10-CM

## 2025-06-17 PROCEDURE — 98003 SYNCH AUDIO-VIDEO NEW HI 60: CPT | Performed by: PSYCHIATRY & NEUROLOGY

## 2025-06-17 PROCEDURE — 1125F AMNT PAIN NOTED PAIN PRSNT: CPT | Performed by: PSYCHIATRY & NEUROLOGY

## 2025-06-17 PROCEDURE — 90791 PSYCH DIAGNOSTIC EVALUATION: CPT | Mod: 95 | Performed by: COUNSELOR

## 2025-06-17 RX ORDER — DULOXETIN HYDROCHLORIDE 20 MG/1
CAPSULE, DELAYED RELEASE ORAL
Qty: 115 CAPSULE | Refills: 0 | Status: SHIPPED | OUTPATIENT
Start: 2025-06-17 | End: 2025-08-16

## 2025-06-17 ASSESSMENT — ANXIETY QUESTIONNAIRES
3. WORRYING TOO MUCH ABOUT DIFFERENT THINGS: NEARLY EVERY DAY
IF YOU CHECKED OFF ANY PROBLEMS ON THIS QUESTIONNAIRE, HOW DIFFICULT HAVE THESE PROBLEMS MADE IT FOR YOU TO DO YOUR WORK, TAKE CARE OF THINGS AT HOME, OR GET ALONG WITH OTHER PEOPLE: VERY DIFFICULT
1. FEELING NERVOUS, ANXIOUS, OR ON EDGE: NEARLY EVERY DAY
2. NOT BEING ABLE TO STOP OR CONTROL WORRYING: MORE THAN HALF THE DAYS
6. BECOMING EASILY ANNOYED OR IRRITABLE: NOT AT ALL
7. FEELING AFRAID AS IF SOMETHING AWFUL MIGHT HAPPEN: NOT AT ALL
IF YOU CHECKED OFF ANY PROBLEMS ON THIS QUESTIONNAIRE, HOW DIFFICULT HAVE THESE PROBLEMS MADE IT FOR YOU TO DO YOUR WORK, TAKE CARE OF THINGS AT HOME, OR GET ALONG WITH OTHER PEOPLE: VERY DIFFICULT
GAD7 TOTAL SCORE: 9
1. FEELING NERVOUS, ANXIOUS, OR ON EDGE: NEARLY EVERY DAY
GAD7 TOTAL SCORE: 9
4. TROUBLE RELAXING: SEVERAL DAYS
GAD7 TOTAL SCORE: 9
7. FEELING AFRAID AS IF SOMETHING AWFUL MIGHT HAPPEN: NOT AT ALL
8. IF YOU CHECKED OFF ANY PROBLEMS, HOW DIFFICULT HAVE THESE MADE IT FOR YOU TO DO YOUR WORK, TAKE CARE OF THINGS AT HOME, OR GET ALONG WITH OTHER PEOPLE?: VERY DIFFICULT
GAD7 TOTAL SCORE: 9
6. BECOMING EASILY ANNOYED OR IRRITABLE: NOT AT ALL
3. WORRYING TOO MUCH ABOUT DIFFERENT THINGS: NEARLY EVERY DAY
5. BEING SO RESTLESS THAT IT IS HARD TO SIT STILL: NOT AT ALL
5. BEING SO RESTLESS THAT IT IS HARD TO SIT STILL: NOT AT ALL
7. FEELING AFRAID AS IF SOMETHING AWFUL MIGHT HAPPEN: NOT AT ALL
8. IF YOU CHECKED OFF ANY PROBLEMS, HOW DIFFICULT HAVE THESE MADE IT FOR YOU TO DO YOUR WORK, TAKE CARE OF THINGS AT HOME, OR GET ALONG WITH OTHER PEOPLE?: VERY DIFFICULT
GAD7 TOTAL SCORE: 9
2. NOT BEING ABLE TO STOP OR CONTROL WORRYING: MORE THAN HALF THE DAYS
7. FEELING AFRAID AS IF SOMETHING AWFUL MIGHT HAPPEN: NOT AT ALL
4. TROUBLE RELAXING: SEVERAL DAYS
GAD7 TOTAL SCORE: 9

## 2025-06-17 ASSESSMENT — PATIENT HEALTH QUESTIONNAIRE - PHQ9
10. IF YOU CHECKED OFF ANY PROBLEMS, HOW DIFFICULT HAVE THESE PROBLEMS MADE IT FOR YOU TO DO YOUR WORK, TAKE CARE OF THINGS AT HOME, OR GET ALONG WITH OTHER PEOPLE: EXTREMELY DIFFICULT
SUM OF ALL RESPONSES TO PHQ QUESTIONS 1-9: 15
10. IF YOU CHECKED OFF ANY PROBLEMS, HOW DIFFICULT HAVE THESE PROBLEMS MADE IT FOR YOU TO DO YOUR WORK, TAKE CARE OF THINGS AT HOME, OR GET ALONG WITH OTHER PEOPLE: EXTREMELY DIFFICULT
SUM OF ALL RESPONSES TO PHQ QUESTIONS 1-9: 15

## 2025-06-17 ASSESSMENT — PAIN SCALES - GENERAL: PAINLEVEL_OUTOF10: SEVERE PAIN (8)

## 2025-06-17 NOTE — PATIENT INSTRUCTIONS
Treatment Plan:  Continue lorazepam/Ativan as you have been taking (1 mg once daily and up to two x 0.5 mg doses)  Discussed PAUSING taper for now   OK to take additional 0.5-1 mg daily as needed for the first few weeks of transition to duloxetine (not to exceed 3 mg total on any given day)   Discontinue Paxil/paroxetine and start duloxetine/Cymbalta:  Days 1-5  Reduce paroxetine to 25 mg ONCE daily (maybe discontinue AM dose first)  Start duloxetine 20 mg ONCE daily in AM   Days 6-10  Discontinue paroxetine   Increase duloxetine to 20 mg TWICE daily   Continue Ambien CR as prescribed by primary care provider.   Continue therapy as planned. Recommend IOP - referral placed by [facility] today.   MTM referral placed to meet with psychiatric pharmacist.   Continue all other cares per primary care provider.   Continue all other medications as reviewed per electronic medical record today.   Safety plan reviewed. To the Emergency Department as needed or call after hours crisis line at [phone] or [phone]. [place] Crisis Text Line: Text [place] to 594644  or  Suicide LifeLine Chat: suicidepreventionlifeline.org/chat  Schedule an appointment with me in 1 month or sooner as needed.  Call [facility] at [phone] to schedule.  Follow up with primary care provider as planned or sooner if needed for acute medical concerns.  Call the psychiatric nurse line with medication questions or concerns at [phone].  MyChart may be used to communicate with your provider, but this is not intended to be used for emergencies.    Patient Education:  Risks of benzodiazepine (Ativan, Xanax, Klonopin, Valium, etc) use including, but not limited to, sedation, tolerance, risk for addiction/dependence. Do not drink alcohol while taking benzodiazepines due to risk of trouble breathing and potential death. Do not drive or operate heavy machinery until it is known how the drug affects you. Discuss with physician or pharmacist  "before ever taking a benzodiazepine with a narcotic/opioid pain medication.     Get Out of Your Mind & Into Your Life   By Elijah Medina    The Happiness Trap: How to Stop Struggling and Start Living  By Alex Hamilton    The Reality Slap: Finding Peace & Fulfillment When Life Hurts  By Alex Hamilton    Things Might Go Terribly, Horribly Wrong: A Guide to Life Liberated from Anxiety  By Brigid Gu, PhD    Stress Less, Live More: How Acceptance and Commitment Therapy Can Help You Live a Busy Yet Balanced Life  By Valentino Dunbar    Finding Life Beyond Trauma: Using Acceptance and Commitment Therapy to Heal From Post-Traumatic Stress and Trauma-Related Problems  By Marya Pompa and Katya Arriaga    Other ACT Skills References     Alex Hamilton on YouTube - Demonstrates several different skills to deal with difficult thoughts and feelings     Book:  \"A Liberated Mind: How to Pivot Toward What Matters\" by Elijah Medina     Psychological flexibility: How love turns pain into purpose  Tedx Talk by Dr. Medina discussing his struggle with anxiety and panic disorder which motivated him to develop ACT therapy (Acceptance and Commitment Therapy)     You Are Not Your Thoughts    Serotonin syndrome symptoms usually occur within several hours of taking a new drug or increasing the dose of a drug you're already taking.   Signs and symptoms include:  Agitation or restlessness  Confusion  Rapid heart rate and high blood pressure  Dilated pupils  Loss of muscle coordination or twitching muscles  Muscle rigidity  Heavy sweating  Diarrhea  Headache  Shivering  Goose bumps    Severe serotonin syndrome can be life-threatening. Signs and symptoms include:  High fever  Seizures  Irregular heartbeat  Unconsciousness    If you suspect you might have serotonin syndrome after starting a new drug or increasing the dose of a drug you're already taking, call your doctor right away or go to the emergency room. If you have severe or " rapidly worsening symptoms, seek emergency treatment immediately.    There are some over-the-counter medications AND non-mental health prescribed drugs that can cause or contribute to serotonin syndrome when you are taking a medication already that increases serotonin.     Medications and supplements (Rx'd and OTC) that can increase risk for serotonin syndrome:  https://www.[email or web address]/medications-and-serotonin-syndrome-[email or web address]      Care team has reviewed attendance agreement with patient. Patient advised that two failed appointments within 6 months may lead to termination of current episode of care.     Community Resources:    National Suicide Prevention Lifeline: 578.876.9815 (TTY: 417.884.2225). Call anytime for help.  (www.suicidepreventionlifeline.org)  National [place] [employer] Mental Illness (www.arturo.org): 279.913.7251 or 676-287-6262.   Mental Health Association (www.mentalhealth.org): 742.257.3661 or 950-132-2826.  [place] Crisis Text Line: Text [place] to 820298  Suicide LifeLine Chat: suicidebyUsline.org/chat    Patient Education   Collaborative Care Psychiatry Service  What to Expect  Here's what to expect from your Collaborative Care Psychiatry Service (CCPS).   About CCPS  CCPS means 2 people work together to help you get better. You'll meet with a behavioral health clinician and a psychiatric doctor. A behavioral health clinician helps people with mental health problems by talking with them. A psychiatric doctor helps people by giving them medicine.  How it works  At every visit, you'll see the behavioral health clinician (BHC) first. They'll talk with you about how you're doing and teach you how to feel better.   Then you'll see the psychiatric doctor. This doctor can help you deal with troubling thoughts and feelings by giving you medicine. They'll make sure you know the plan for your care.   CCPS usually takes 3 to 6 visits. If you need more visits, we may have you start seeing a different  "psychiatric doctor for ongoing care.  If you have any questions or concerns, we'll be glad to talk with you.  About visits  Be open  At your visits, please talk openly about your problems. It may feel hard, but it's the best way for us to help you.  Cancelling visits  If you can't come to your visit, please call us right away at [phone]. If you don't cancel at least 24 hours (1 full day) before your visit, that's \"late cancellation.\"  Being late to visits  Being very late is the same as not showing up. You will be a \"no show\" if:  Your appointment starts with a [facility] [place], and you're more than 15 minutes late for a 30-minute (half hour) visit. This will also cancel your appointment with the psychiatric doctor.  Your appointment is with a psychiatric doctor only, and you're more than 15 minutes late for a 30-minute (half hour) visit.  Your appointment is with a psychiatric doctor only, and you're more than 30 minutes late for a 60-minute (full hour) visit.  If you cancel late or don't show up 2 times within 6 months, we may end your care.   Getting help between visits  If you need help between visits, you can call us Monday to Friday from 8 a.m. to 4:30 p.m. at [phone].  Emergency care  Call 911 or go to the nearest emergency department if your life or someone else's life is in danger.  Call 988 anytime to reach the national Suicide and Crisis hotline.  Medicine refills  To refill your medicine, call your pharmacy. You can also call [facility]'s Behavioral Access at [phone], Monday to Friday, 8 a.m. to 4:30 p.m. It can take 1 to 3 business days to get a refill.   Forms, letters, and tests  You may have papers to fill out, like FMLA, short-term disability, and workability. We can help you with these forms at your visits, but you must have an appointment. You may need more than 1 visit for this, to be in an intensive therapy program, or both.  Before we can give you medicine for ADHD, we may refer " you to get tested for it or confirm it another way.  We may not be able to give you an emotional support animal letter.  We don't do mental health checks ordered by the court.   We don't do mental health testing, but we can refer you to get tested.   Thank you for choosing us for your care.  For informational purposes only. Not to replace the advice of your health care provider. Copyright   2022 [facility]. All rights reserved. Yes[employer] 518724 - Rev 11/24.

## 2025-06-17 NOTE — NURSING NOTE
[facility] [address] [facility] [address]    Is the patient currently in the state of [place]? YES    Visit mode: VIDEO    If the visit is dropped, the patient can be reconnected by:VIDEO VISIT: Send to e-mail at: [email or web address]    Will anyone else be joining the visit? NO  (If patient encounters technical issues they should call [phone])    Are changes needed to the allergy or medication list? Yes Definity Perflutren Lipid Microsphere new reaction    Are refills needed on medications prescribed by this physician? NO    Rooming Documentation:  Questionnaire(s) completed Attendance guidelines (MH&A only)    Reason for visit: Consult    Gregoria MCELROY       outdoor environmental allergies/indoor environmental allergies

## 2025-06-17 NOTE — PROGRESS NOTES
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 patient s history indicate the need for special precautions or particular staffing patterns in the facility?  No        LOCUS Worksheet     Name: Laurel Parra MRN: [id number]    : 1950      Gender:  female    PMI:  [id number]    Provider Name: Olivia Alanis MA [facility]   Provider NPI:  [id number]      Actual level of Care Provided:  IOP/day tx    Service(s) receiving or referred to:  IOP/day tx    Reason for Variance: n/a      Rating completed by: Olivia Alanis MA [facility]      I. Risk of Harm:   3      Moderate Risk of Harm    II. Functional Status:   3      Moderate Impairment    III. Co-Morbidity:   3      Significant Co-Morbidity    IV - A. Recovery Environment - Level of Stress:   3      Moderately Stress Environment    IV - B. Recovery Environment - Level of Support:   3      Limited Support in Environment    V. Treatment and Recovery History:   2      Significant Response to Treatment and Recovery Management    VI. Engagement and Recovery Project:   2      Positive Engagement and Recovery       19 Composite Score    Level of Care Recommendation:   17 to 19       High Intensity Community Based Services          NOTE: If this  screening indicates that the patient is at risk to harm self or others, notify staff at referral location.    Clinical Summary:  1. Psychosocial Factors:  Medical complexities, Trauma history, Occupational Issues, Interpersonal concerns.  Cultural and Contextual Factors: as above  2. Principal DSM5 Diagnoses  (Sustained by DSM5 Criteria Listed Above):   296.32 (F33.1) Major Depressive Disorder, Recurrent Episode, Moderate _ and With anxious distress.  3. Other Diagnoses that is relevant to services:   300.02 (F41.1) Generalized Anxiety Disorder  309.9 (F43.9) Unspecified Trauma and Stressor Related Disorder. F50.00 anorexia nervosa . Z86.59 Hx of OCD  4. Provisional Diagnosis:  r/o personality disorder vs traits  5. Prognosis: Relieve Acute Symptoms.  6. Likely consequences of symptoms if not treated: decompensation of MH.  7. Patient strengths include:  empathetic, good listener, has a previous history of therapy, insightful, intelligent, motivated, and open to learning .     Recommendations:     1. Plan for Safety and Risk Management:   Safety and Risk: A safety and risk management plan has been developed including: Patient consented to co-developed safety plan.  Safety and risk management plan was completed - see below.  Patient agreed to use safety plan should any safety concerns arise.  A copy was given to the patient.        Report to child / adult protection services was NA.     2. Patient's identified mental health concerns with a cultural influence will be addressed by per Pt request.     3. Initial Treatment will focus on:    Depressed Mood - .  Anxiety - ..     4. Resources/Service Plan:    services are not indicated.   Modifications to assist communication are not indicated.   Additional disability accommodations are not indicated.      5. Collaboration:   Collaboration / coordination of treatment will be initiated with the following  support professionals: psychiatry.      6.   Referrals:   The following referral(s) will be initiated: [facility].       A Release of Information has been obtained for the following: n/a.     Clinical Substantiation/medical necessity for the above recommendations:  Pt has a hx of depression, anxiety and trauma symptoms that are impacting daily functioning in daily living and social settings. It's recommended that receiving support through IOP/DT via learning the use of coping skills and therapy to help combat these symptoms may provide pt with relief. Pt reports that they are struggling to manage depressive, anxiety and panic attacks in a lower level of care with limited success. IOP/DT can assist with providing coping skills, group therapy, safety planning and mmgt along with other interventions such as medication mgmt.   At this time pt's symptoms are able to be managed with higher level of care services that remain least restrictive and community based.  On going assessment for safety will be provided and pt will be referred to a higher level of care if deemed necessary.   .    7. GODWIN:    GODWIN:  Discussed the general effects of drugs and alcohol on health and well-being. Provider gave patient printed information about the  effects of chemical use on their health and well being. Recommendations:  n/a .     8. Records:   These were reviewed at time of assessment.   Information in this assessment was obtained from the medical record and  provided by patient who is a good historian.    Patient will have open access to their mental health medical record.    9.   Interactive Complexity: No    10. Safety Plan:   Yamilka Safety Plan      Creation Date: 6/17/25 Last Update Date: 6/17/25      Step 1: Warning signs:    Warning Signs    pain, medical illness      Step 2: Internal coping strategies - Things I can do to take my mind off my problems without contacting another person:    Strategies    Read    Make jewelry    Friends that come over     being active as possible      Step 3: People and social settings that provide distraction:    Name Contact Information    ania ramirez in phone/lives with    sister in phone    friends in phone       Places    Lifetime when ready      Step 4: People whom I can ask for help during a crisis:    Name Contact Information    ania ramirez in phone/lives with    sister in phone    friends in phone      Step 5: Professionals or agencies I can contact during a crisis:    Clinician/Agency Name Phone Emergency Contact    Abbie Fernandez therapist, in phone       Local Emergency Department Emergency Department Address Emergency Department Phone    [facility]        Suicide Prevention Lifeline Phone: Call or Text 109  Crisis Text Line: Text HOME to 161251     Step 6: Making the environment safer (plan for lethal means safety):   Did not identify any lethal methods     Optional: What is most important to me and worth living for?:   My son  My family        Things I am able to do on my own to cope or help me feel better: watching a favorite tv show or movie, listening to music I enjoy, going outside and breathing fresh air, going for a walk or exercising, taking a shower or bath, a cold or hot beverage, a healthy snack, drawing/coloring/painting, journaling, singing or dancing, deep breathing     I can try practicing square breathing when I begin to feel anxious - inhale through the nose for the count of 4 and the first line on the square. Exhale through the mouth for the count of 4 for the second line of the square. Repeat to complete the square. Repeat the square as many times as needed.    I can also use my five senses to practice mindfulness and grounding. What are five things I can see, four things I can hear, three things I can feel, two things I can smell, and one thing I can taste.     Things that I am able to do with others to cope or help me feel better: sometimes just talking or spending time with someone  "else, sharing a meal or having coffee, watching a movie or playing a game, going for a walk or exercising    I can also use community resources including mental health hotlines, [facility] crisis teams, or apps.     Things I can use or do for distraction: movies/tv, music, reading, games, drawing/coloring/painting or other art, essential oils, exercise, cleaning/organizing, puzzles, crossword puzzles, word search, Sudoku       I can also download a meditation or relaxation mishel, like Calm, Headspace, or Insight Timer (all three offer a free version)    A great website resource is Change to Chill with active coping skills    Changes I can make to support my mental health and wellness: Attend scheduled mental health therapy and psychiatric appointments. Take my medications as prescribed. Maintain a daily schedule/routine. Abstain from all mood altering substances, including drugs, alcohol, or medications not currently prescribed to me. Implement a self-care routine.      Your [facility] has a mental health crisis team you can call 24/7: [facility], [phone]    Other things that are important when I'm in crisis: to remember that the feelings I am having right now are temporary, and it won't feel like this forever, and that it is okay and important to ask for help    Community Resources  Fast Tracker  Linking people to mental health and substance use disorder resources  [employer].org     [place] Mental Health Warm Line  Peer to peer support  Monday thru Saturday, 12 pm to 10 pm  933.735.5477 or 5.753.184.0176  Text \"Support\" to 50988    National [place] on Mental Illness (TURNER)  337.027.8741 or 1.888.TURNER.HELPS      Mental Health Apps  My3  https://my3app.org/    VirtualHopeBox  https://[employer].org/apps/virtual-hope-box/      Crisis beds are short term community residential facilities that provide 1-10 day stabilization services.  You can self refer via calling them and inquiring into current " openings.  This can be an alternative to hospitalization if you are able to be safe within the community.  This is a voluntary stay.  Some options in the [place] include:    [facility]  ([place]) [phone]  [facility] ( [facility]) [phone]  [facility] ([place]) [phone]  [facility] ([facility]) [phone]  [facility] ([place]) [phone]  Evelyn Kim ([place]) [phone]  [facility]ir[facility] ([facility]) [phone]    If you feel like you are in need of more emergent support for safety concerns you can present to your local emergency room for a mental health evaluation.  Once you met with a mental health clinician and emergency room health care provider they will provide level of care recommendations and next steps.    Local Emergency Rooms can include:  [facility] ([facility]) in [facility] ([facility] in [facility] in [place].  This [facility] also supports EmPATH   EmPATH (Emergency Psychiatric Assessment, Treatment, and Healing) mental health unit. EmPATH is an innovative approach to emergency mental health care that is designed to guide people safely through a crisis while helping them build coping skills for the future. The unit is designed for acute psychiatric patients to receive assessment and evaluation in a therapeutic and least restrictive setting.  [facility]) in [facility] ([facility]) in [place]         Yamilka Safety Plan. Esthela Bustamante and Apollo Musa. Used with permission of the authors.          Provider Name/ Credentials:  Olivia Alanis [facility]  June 17, 2025

## 2025-06-17 NOTE — PROGRESS NOTES
"Telemedicine Visit: The patient's condition can be safely assessed and treated via synchronous audio and visual telemedicine encounter.      Reason for Telemedicine Visit: Patient has requested telehealth visit    Originating Site (Patient Location): Patient's home    Distant Location (provider location):  Off-site    Consent:  The patient/guardian has verbally consented to: the potential risks and benefits of telemedicine (video visit) versus in person care; bill my insurance or make self-payment for services provided; and responsibility for payment of non-covered services.     Mode of Communication:  Video Conference via [employer]    As the provider I attest to compliance with applicable laws and regulations related to telemedicine.                                              Outpatient Psychiatric Evaluation- Standard  Adult    Name:  Laurel Parra  : 1950    Source of Referral:  Primary Care Provider: Georges Lebron MD   Current Psychotherapist: None    Per primary care provider referral 3/27/2025 (referral from provider who is not assigned pcp, Dr. York):  My Clinical Question Is: help with anxiety       Identifying Data:  Patient is a 75 year old,   White Not  or  who presents for initial visit with me.  Patient is currently retired. Patient attended the phone/video session alone. Patient prefers to be called: \"Laurel\"    Chief Complaint:  Patient presents with:  Consult      HPI:  Laurel Parra is a 75 year old with past history including depression, trauma, eating disorder, and anxiety who presents today for psychiatric assessment. Pt also with significant cardiac history, central sensitization disorder/chronic fatigue syndrome/fibromyalgia.     Patient with significant psychiatric history dating back to adolescence.  Traumatic upbringing.  Both parents struggled with alcohol use and mother with borderline personality disorder.  Patient with eating " "disorder, depression, and anxiety struggles for many years that have waxed and waned over time.  Also history of OCD that was pretty much put into remission with psychotherapy/exposure response prevention therapy.  Patient has been on a few psychiatric medication trials over the years.  Had been seeing Dr. Thelma Meehan with [facility] until provider was abruptly gone from the clinic about a year ago. Pt then had primary care provider take over Rxs. Primary care provider concerned about high risk medication use and recommended pt start taper of pain meds and lorazepam. Pt has been agreeable to the taper. A lot pain and fatigue. Does have some good days every now and again. A couple weeks ago had two really good days in a row after ART (accelerated resolution therapy). Effects waned and has not experienced similar results yet after other ART sessions.  Patient struggles at times with restrictive eating behaviors.  Attended [facility] for treatment in the past for unspecified eating disorder, primarily restrictive eating.  Patient retired /researcher.  Has obtained to graduate degrees.  Patient helps care for her adult son with \"Asperger's\" and who lives at home.  Patient's  also with \"Asperger's.\"  This has led to challenging marital dynamics at times.  Chronic intermittent suicidal ideation.  Typically just passive but can be intense at times.  History of overdose attempt decades ago.  No plan or intent to harm self.  No acute safety concerns today.  No problematic drug or alcohol use.  See [facility] note and review of symptoms below for additional details.    Hx of cardiac arrest during admission in 12/2024  Discharge Dxs:  Acute on chronic diarrhea, C diff positive  NSTEMI - LAD occlusion - V fib arrest  New cardiomyopathy, possibly stress induced  Mild hypokalemia and hypomagnesemia    Psych Meds at Intake:  Paxil CR 25 mg BID   Ativan 1 mg TID - at least 10 years, before this klonopin "   Zolpidem ER 12.5 mg at bedtime     Other potentially psychoactive:  Gabapentin 300 mg daily  Hydrocodone-acetominophen : current taper per chart review (was given 300 tabs monthly until recently, now at 240 tabs - 8 a day- with most recent 1 x month Rx)  Baclofen 5 mg as needed  Cyclobenzaprine 10 mg daily as needed     Past Psych Meds:  Sertraline   Fluoxetine  Wellbutrin   Celexa   Nortriptyline   Klonopin     Per [facility] CHARLOTTE Pereira, during today's team-based visit:  See note in electronic healthcare record    Past diagnoses include: depression, trauma, eating disorder, and anxiety  Current medications include: has a current medication list which includes the following prescription(s): aspirin, baclofen, budesonide, carboxymethylcellulose, colestipol, cyclobenzaprine, gabapentin, hydrocodone-acetaminophen, ipratropium, loratadine, lorazepam, magnesium, metoprolol succinate er, nitrofurantoin macrocrystal-monohydrate, nitroglycerin, ondansetron, pantoprazole, paroxetine, polyethylene glycol, riboflavin, ubrogepant, vancomycin, vitamin c, vitamin d3, and zolpidem er.   Medication side effects: See HPI above  Current stressors include: Symptoms and see HPI above  Coping mechanisms and supports include: Family, Hobbies, and Friends    Psychiatric Review of Symptoms Per [facility] CHARLOTTE Pereira, during today's team-based visit:  Depression:     Lack of interest or pleasure in doing things, Feeling sad, down, or depressed, Feelings of hopelessness, Change in energy level, Change in appetite, Low self-worth, Difficulties concentrating, Suicidal ideation, Excessive or inappropriate guilt, Feelings of helplessness, Ruminations, and Withdrawn  Tiny:             No Symptoms  Psychosis:       No Symptoms  Anxiety:           Excessive worry, Nervousness, Physical complaints, such as headaches, stomachaches, muscle tension, Social anxiety, Sleep disturbance, Ruminations, and Poor concentration  Panic:               Palpitations, Shortness of breath, and Sense of impending doom  Post Traumatic Stress Disorder:  Reexperiencing of trauma and Increased arousal  Hx of emotional/verbal  Eating Disorder:          Restriction   ADD / ADHD:              No symptoms  Conduct Disorder:       No symptoms  Autism Spectrum Disorder:     No symptoms  Obsessive Compulsive Disorder:       Checking, Counting, and Obsessions.  Pt previously attended specific OCD treatment of which resolved OCD sx to the degree of impacting her functioning.  Personality Disorders:  Con't to assess     Patient reports the following compulsive behaviors and treatment history: None.      All other ROS negative.     PHQ-9 scores:       3/27/2025    10:35 AM 5/5/2025    12:59 PM 6/17/2025     9:41 AM   PHQ-9 SCORE   PHQ-9 Total Score MyChart Incomplete 14 (Moderate depression) 15 (Moderately severe depression)   PHQ-9 Total Score  14  15        Patient-reported       ALEXA-7 scores:        2/8/2021     2:57 PM 3/27/2025    10:28 AM 6/17/2025    10:20 AM   ALEXA-7 SCORE   Total Score   9 (mild anxiety)   Total Score 2 17 9        Patient-reported       Medical Review of Systems:  10 systems (general, cardiovascular, respiratory, eyes, ENT, endocrine, GI, , M/S, neurological) were reviewed. Most pertinent finding(s) is/are: Chronic pain. The remaining systems are all unremarkable.    A 12-item WHODAS 2.0 assessment was not completed.    Psychiatric History:   Hospitalizations: None  History of Commitment? No   Past Treatment: counseling, medication(s) from physician / PCP, and psychiatry; treatment at [facility] for unspecified eating disorder-predominantly restrictive  Suicide Attempts: Yes 1 overdose attempt decades ago   Current Suicide Risk: Suicide Assessment Completed Today.  Self-injurious Behavior: Denies  Electroconvulsive Therapy (ECT) or Transcranial Magnetic Stimulation (TMS): No   [employer] Genetic Testing: No     Past medication trials include but are not  limited to:   Psych Meds at Intake:  Paxil CR 25 mg BID   Ativan 1 mg TID - at least 10 years, before this klonopin   Zolpidem ER 12.5 mg at bedtime     Other potentially psychoactive:  Gabapentin 300 mg daily  Hydrocodone-acetominophen : current taper per chart review (was given 300 tabs monthly until recently, now at 240 tabs - 8 a day- with most recent 1 x month Rx)  Baclofen 5 mg as needed  Cyclobenzaprine 10 mg daily as needed     Past Psych Meds:  Sertraline   Fluoxetine  Wellbutrin   Celexa   Nortriptyline   Klonopin     Substance Use History:  Current Use of Drugs/Alcohol: Denies alcohol use; recently trying medicinal cannabis for pain -not sure she likes how she feels on it and so is experimenting more with CBD  Past Use of Drugs/Alcohol: Denies historical problematic drug or alcohol use  Patient reports no problems as a result of their drinking / drug use.   Patient has not received chemical dependency treatment in the past  Recovery Programming Involvement: None    Tobacco use: No    Based on the clinical interview, there  are not indications of drug or alcohol abuse. Continue to monitor.   Discussed effect of substance use on overall health.     Past Medical History:  Past Medical History:   Diagnosis Date    Adrenal insufficiency     Dr. Navas, secondary to meds    Anxiety     Dr. Adelina Meehan    ASCVD (arteriosclerotic cardiovascular disease) 08/2023    cp and pos trop, angio with trivial cad, echo nl    Cardiac arrest with ventricular fibrillation (H) 12/29/2024    angio with occlussion of lad; felt to have scad    Central sensitization to pain     Chronic constipation     Chronic headache     sees neuro Dr Danielson, 3 types of headaches    Chronic narcotic use     [facility]    Chronic pain     Dr. Orta as of 2015    Chronic urethritis     Dr. Little    Colonic polyp 11/2011    one polyp, fu 5 years, fu 10/17 and no lesions, fu 9/20 and negative, fu 5 years    Depression,  major, in partial remission     Dr. Meehan    Diarrhea 2012    eval by [facility], resolved with gluten free diet    DJD (degenerative joint disease)     Dyspepsia 2020    eval by [facility], had colonoscopy, egd, ct abd and pelvis, mrcp.  Prentice to be dyspepsia and constipation    H/O gastroesophageal reflux (GERD)     Ischemic cardiomyopathy 12/2024    echo with wma and ef 40-45%    LBP (low back pain) 2013    Dr. Jae Tong in [place], then seeing [facility] Dr. You Cannon headed 07/2020    nl est echo    Lymphocytic colitis 2021    [facility]; seen on flex sig 9/24 [facility], added budesonide    Migraines 2009    neuro eval    NSTEMI (non-ST elevated myocardial infarction) (H) 08/31/2023    echo nl and angio trivial cad; cardiac arrest in hospital 12/29/2024, angio with lad occlussion    Palpitations 2006    holter with pvc's and pac's, echo nl    Palpitations 04/2025    54 runs of SVT, longest lasting 41 seconds, no ventricular dysrhythmias    Screening 2010    dexa nl at gyn; 5/25 osteopenia, low frax score    SVT (supraventricular tachycardia) 04/2025    54 runs of SVT, longest lasting 41 seconds, on ziopatch done for palp      Surgery:   Past Surgical History:   Procedure Laterality Date    APPENDECTOMY  2016    ARTHRODESIS WRIST Left 04/05/2021    Procedure: LEFT TOTAL WRIST FUSION;  Surgeon: Beatrice Philippe MD;  Location:  OR    ARTHROPLASTY CARPOMETACARPAL (THUMB JOINT) Left 06/07/2017    Procedure: ARTHROPLASTY CARPOMETACARPAL (THUMB JOINT);  REVISION  LEFT THUMB CARPOMETACARPAL ARTHROPOASTY WITH 1.1 TIGHT ROPE;  Surgeon: Beatrice Philippe MD;  Location:  SD    ARTHROSCOPY WRIST Left 08/26/2019    Procedure: LEFT WRIST ARTHROSCOPY REMOVAL OF TIGHT ROPE ; LEFT EXTENSOR POLLICIS LONGUS /EXTENSOR CARPI RADIALIS BREVIS /EXTENSOR CARPI RADIALUS LONGUS SYNOVECTOMY ; EXTENSOR POLLICIS LONGUS TRANSPOSITION ; ANTERIOR INTEROSSEOUS NEURECTOMY ; POSTERIOR INTEROSSEOUS NEURECTOMY;  Surgeon: Beatrice Philippe,  "MD;  Location:  OR    BIOPSY      CARPAL TUNNEL RELEASE RT/LT      CATARACT EXTRACTION  2021     SECTION      COLONOSCOPY      CV CORONARY ANGIOGRAM N/A 2023    Procedure: Coronary Angiogram;  Surgeon: Moody Miranda MD;  Location:  [facility]    CV CORONARY ANGIOGRAM N/A 2024    Procedure: Coronary Angiogram;  Surgeon: Tyler England MD;  Location:  [facility]    ESOPHAGOSCOPY, GASTROSCOPY, DUODENOSCOPY (EGD), COMBINED N/A 2020    Procedure: ESOPHAGOGASTRODUODENOSCOPY, WITH BIOPSY;  Surgeon: Georges Pop MD;  Location:  GI    EXCIS BARTHOLIN GLAND/CYST      EYE SURGERY      HERNIORRHAPHY INGUINAL  70's    x5    INJECT STEROID (LOCATION) Right 2019    Procedure: RIGHT WRIST CORTISONE INJECTION;  Surgeon: Beatrice Philippe MD;  Location:  OR    OPERATIVE HYSTEROSCOPY WITH MORCELLATOR N/A 2018    Procedure: OPERATIVE HYSTEROSCOPY WITH MORCELLATOR ([employer]IN [employer]PHE[employer]);  OPERATIVE HYSTEROSCOPY WITH TRUECLEAR MORCELLATOR 5C SCOPE ;  Surgeon: Iris Fernandez MD;  Location: [facility]    ORTHOPEDIC SURGERY Bilateral     SHOULDER ARTHROSCOPY    ORTHOPEDIC SURGERY Right 2017    right foot for plantar fasciitis    thumb surgery   and ,      Food and Medicine Allergies:     Allergies   Allergen Reactions    Betamethasone Other (See Comments)     agitation    Chocolate Other (See Comments)     Triggers migraines    Compazine [Prochlorperazine] Other (See Comments)     \"crawl out of my skin\"    Linzess [Linaclotide]     Penicillins Nausea and Vomiting and Hives    Pneumococcal Vaccine Other (See Comments)     Temporary paralization    Tizanidine Other (See Comments)     Severe agitation     Seizures or Head Injury: No  Diet: Patient can be restrictive-history of anorexia nervosa  Exercise: Not discussed  Supplements: Reviewed per Electronic Medical Record Today    Current Medications:    Current Outpatient " Medications:     aspirin 81 MG EC tablet, Take 1 tablet (81 mg) by mouth daily, Disp: 90 tablet, Rfl: 0    Baclofen (LIORESAL) 5 MG tablet, Take 5 mg by mouth as needed for muscle spasms, Disp: , Rfl:     budesonide (ENTOCORT EC) 3 MG EC capsule, Take 3 capsules (9 mg) by mouth every morning. (Patient taking differently: Take 3 mg by mouth every morning.), Disp: , Rfl:     carboxymethylcellulose (REFRESH PLUS) 0.5 % SOLN ophthalmic solution, Place 1 drop into both eyes 3 times daily as needed for dry eyes, Disp: , Rfl:     colestipol (COLESTID) 1 g tablet, , Disp: , Rfl:     cyclobenzaprine (FLEXERIL) 10 MG tablet, Take 10 mg by mouth daily as needed for muscle spasms., Disp: , Rfl:     gabapentin (NEURONTIN) 300 MG capsule, Take 1 capsule (300 mg) by mouth daily., Disp: 180 capsule, Rfl: 3    HYDROcodone-Acetaminophen  MG TABS, Take 1-2 tablets by mouth every 4 hours as needed (maximum of 10 tablets in 24 hour period) Prescription is written for 1-2 tabs every 4-6 hrs prn (max of 10 tabs daily)., Disp: , Rfl: 0    ipratropium (ATROVENT) 0.06 % spray, Spray 3 sprays into both nostrils daily, Disp: 45 mL, Rfl: 3    loratadine (CLARITIN) 10 MG tablet, Take 10 mg by mouth every morning , Disp: , Rfl:     LORazepam (ATIVAN) 1 MG tablet, TAKE 1 TABLET BY MOUTH IN THE MORNING, 1/2 TABLET AROUND NOON AND 1 TABLET BEFORE BEDTIME FOR ANXIETY, Disp: 90 tablet, Rfl: 0    magnesium 100 MG CAPS, Take by mouth., Disp: , Rfl:     metoprolol succinate ER (TOPROL XL) 25 MG 24 hr tablet, Take 0.5 tablets (12.5 mg) by mouth daily., Disp: 60 tablet, Rfl: 2    nitroFURantoin macrocrystal-monohydrate (MACROBID) 100 MG capsule, Take 1 capsule (100 mg) by mouth 2 times daily., Disp: 10 capsule, Rfl: 1    nitroGLYcerin (NITROSTAT) 0.4 MG sublingual tablet, For chest pain place 1 tablet under the tongue every 5 minutes for 3 doses. If symptoms persist 5 minutes after 1st dose call 911., Disp: 50 tablet, Rfl: 3    ondansetron (ZOFRAN) 4  MG tablet, Take 1 tablet (4 mg) by mouth every 8 hours as needed for nausea or vomiting., Disp: 90 tablet, Rfl: 1    pantoprazole (PROTONIX) 40 MG EC tablet, Take 1 tablet (40 mg) by mouth 2 times daily., Disp: 180 tablet, Rfl: 2    PARoxetine (PAXIL-CR) 25 MG 24 hr tablet, Take 1 tablet (25 mg) by mouth 2 times daily., Disp: 180 tablet, Rfl: 1    polyethylene glycol (MIRALAX) 17 GM/Dose powder, Take 1 capful. by mouth as needed for constipation, Disp: , Rfl:     riboflavin 400 MG CAPS, Take 400 mg by mouth daily, Disp: , Rfl:     ubrogepant (UBRELVY) 50 MG tablet, Take 1 tablet (50 mg) by mouth at onset of headache. May repeat in 2 hours. Max dose is 100 mg in 24 hours., Disp: 9 tablet, Rfl: 5    vancomycin (FIRVANQ) 25 MG/ML oral solution, Take by mouth 4 times daily., Disp: , Rfl:     vitamin C (ASCORBIC ACID) 1000 MG TABS, Take 1,000 mg by mouth every evening , Disp: , Rfl:     Vitamin D3 (CHOLECALCIFEROL) 125 MCG (5000 UT) tablet, Take 2 tablets by mouth every evening, Disp: , Rfl:     zolpidem ER (AMBIEN CR) 12.5 MG CR tablet, TAKE 1 TABLET(12.5 MG) BY MOUTH AT BEDTIME, Disp: 90 tablet, Rfl: 0    The [place] Prescription Monitoring Program has been reviewed and there are no concerns about diversionary activity for controlled substances at this time.    06/06/2025 06/05/2025 2 Lorazepam 1 Mg Tablet 90.00 36 Pa Got [id number] [facility] (44) 0/0 2.50 LME [place]   05/30/2025 05/14/2025 2 Hydrocodone-Acetamin  Mg 240.00 30 Am Kre [id number] [facility] (44) 0/0 80.00 [place]   05/22/2025 05/19/2025 2 Zolpidem Tart Er 12.5 Mg Tab 90.00 90 Pa Got [id number] [facility] ([id number]) 0/0 0.63 LME [place]   05/21/2025 05/14/2025 1 Hydrocodone Bitartrate Powder 0.90 22 Am Kre [id number] [facility] ([id number]) 0/0 40.91 MME Private [place]         Vital Signs:  None since this is a phone/video visit.     Labs:  Most recent laboratory results reviewed and the pertinent results include:   External Order Results on 05/20/2025   Component Date Value  Ref Range Status    Scan Lab Results (External) 05/20/2025 See Scanned Report   Final    Comment: Troponin T, 5th Generation  Lipoprotein (a)  Homocysteine, Total, Serum      Triglycerides (External) 05/20/2025 172 (H)  <150 mg/dL Final    Cholesterol (External) 05/20/2025 144  <200 mg/dL Final    LDL Cholesterol Calculated (Extern* 05/20/2025 50  <100 mg/dL Final    HDL Cholesterol (External) 05/20/2025 66  >=50 mg/dL Final    Absolute Neutrophils (External) 05/20/2025 6.17  1.56 - 6.45 x10(9)/L Final    Absolute Lymphocytes (External) 05/20/2025 1.85  0.95 - 3.07 x10(9)/L Final    Absolute Monocytes (External) 05/20/2025 0.78  0.26 - 0.81 x10(9)/L Final    Absolute Eosinophils (External) 05/20/2025 0.18  0.03 - 0.48 x10(9)/L Final    Absolute Basophils (External) 05/20/2025 0.05  0.01 - 0.08 x10(9)/L Final    Hemoglobin (External) 05/20/2025 12.2  11.6 - 15.0 g/dL Final    Hematocrit (External) 05/20/2025 39.1  35.5 - 44.9 % Final    RBC Count (External) 05/20/2025 4.13  3.92 - 5.13 x10(12)/L Final    MCV (External) 05/20/2025 94.7  78.2 - 97.9 fL Final    RDW (External) 05/20/2025 13.6  12.2 - 16.1 % Final    Platelet Count (External) 05/20/2025 333  157 - 371 x10(9)/L Final    WBC Count (External) 05/20/2025 9.0  3.4 - 9.6 x10(9)/L Final    TSH (External) 05/20/2025 3.3  0.3 - 4.2 mIU/L Final    CRP Inflammation (External) 05/20/2025 4.0 (H)  <2.0 mg/L Final    AST (External) 05/20/2025 16  8 - 43 U/L Final    Potassium (External) 05/20/2025 4.0  3.6 - 5.2 % Final    Creatinine (External) 05/20/2025 0.76  0.59 - 1.04 mg/dL Final    GFR Estimated (External) 05/20/2025 83  >=60 mL/min Final    Sodium (External) 05/20/2025 143  135 - 145 % Final    Urea Nitrogen (External) 05/20/2025 11  6 - 21 mg/dL Final    Chloride (External) 05/20/2025 102  98 - 107 % Final    Calcium (External) 05/20/2025 9.2  8.8 - 10.2 % Final    Glucose (External) 05/20/2025 96  70 - 100 mg/dL Final   Office Visit on 04/21/2025   Component  Date Value Ref Range Status    [facility] Prelim Results 05/13/2025    Preliminary                    Value:Patient had a min HR of 52 bpm, max HR of 190 bpm, and avg HR of 77 bpm. Predominant underlying rhythm was Sinus Rhythm. 54 Supraventricular Tachycardia runs occurred, the run with the fastest interval lasting 5 beats with a max rate of 190 bpm, the longest lasting 41.0 secs with an avg rate of 121 bpm. Supraventricular Tachycardia was detected within +/- 45 seconds of symptomatic patient event(s). Isolated SVEs were rare (<1.0%), SVE Couplets were rare (<1.0%), and SVE Triplets were rare (<1.0%). Isolated VEs were rare (<1.0%), and no VE Couplets or VE Triplets were present.       Magnesium 04/21/2025 1.9  1.7 - 2.3 mg/dL Final    TSH 04/21/2025 1.42  0.30 - 4.20 uIU/mL Final    ALT 04/21/2025 13  0 - 50 U/L Final    Sodium 04/21/2025 143  135 - 145 mmol/L Final    Potassium 04/21/2025 3.7  3.4 - 5.3 mmol/L Final    Chloride 04/21/2025 104  98 - 107 mmol/L Final    Carbon Dioxide (CO2) 04/21/2025 27  22 - 29 mmol/L Final    Anion Gap 04/21/2025 12  7 - 15 mmol/L Final    Urea Nitrogen 04/21/2025 10.7  8.0 - 23.0 mg/dL Final    Creatinine 04/21/2025 0.73  0.51 - 0.95 mg/dL Final    GFR Estimate 04/21/2025 87  >60 mL/min/1.73m2 Final    eGFR calculated using 2021 CKD-EPI equation.    Calcium 04/21/2025 9.6  8.8 - 10.4 mg/dL Final    Glucose 04/21/2025 87  70 - 99 mg/dL Final    Patient Fasting > 8hrs? 04/21/2025 No   Final    Cholesterol 04/21/2025 157  <200 mg/dL Final    Triglycerides 04/21/2025 162 (H)  <150 mg/dL Final    Direct Measure HDL 04/21/2025 71  >=50 mg/dL Final    LDL Cholesterol Calculated 04/21/2025 54  <100 mg/dL Final    Non HDL Cholesterol 04/21/2025 86  <130 mg/dL Final    Patient Fasting > 8hrs? 04/21/2025 No   Final   Lab on 02/21/2025   Component Date Value Ref Range Status    C Difficile Toxin B by PCR 02/21/2025 Negative  Negative Final    A negative result does not exclude actual disease due  to C. difficile and may be due to improper collection, handling and storage of the specimen or the number of organisms in the specimen is below the detection limit of the assay.    Campylobacter species 02/24/2025 Negative  Negative Final    Salmonella species 02/24/2025 Negative  Negative Final    Vibrio species 02/24/2025 Negative  Negative Final    Vibrio cholerae 02/24/2025 Negative  Negative Final    Yersinia enterocolitica 02/24/2025 Negative  Negative Final    Enteropathogenic E. coli (EPEC) 02/24/2025 Negative  Negative, NA Final    Shiga-like toxin-producing E. coli* 02/24/2025 Negative  Negative Final    Shigella/Enteroinvasive E. coli (E* 02/24/2025 Negative  Negative Final    Cryptosporidium species 02/24/2025 Negative  Negative Final    Giardia lamblia 02/24/2025 Negative  Negative Final    Norovirus Gl/Gll 02/24/2025 Negative  Negative Final    Rotavirus A 02/24/2025 Negative  Negative Final    Plesiomonas shigelloides 02/24/2025 Negative  Negative Final    Enteroaggregative E. coli (EAEC) 02/24/2025 Negative  Negative Final    Enterotoxigenic E. coli (ETEC) 02/24/2025 Negative  Negative Final    E. coli O157 02/24/2025 NA  Negative, NA Final    Cyclospora cayetanensis 02/24/2025 Negative  Negative Final    Entamoeba histolytica 02/24/2025 Negative  Negative Final    Adenovirus F40/41 02/24/2025 Negative  Negative Final    Astrovirus 02/24/2025 Negative  Negative Final    Sapovirus 02/24/2025 Negative  Negative Final   Lab on 01/27/2025   Component Date Value Ref Range Status    Sodium 01/27/2025 138  135 - 145 mmol/L Final    Potassium 01/27/2025 4.9  3.4 - 5.3 mmol/L Final    Chloride 01/27/2025 99  98 - 107 mmol/L Final    Carbon Dioxide (CO2) 01/27/2025 30 (H)  22 - 29 mmol/L Final    Anion Gap 01/27/2025 9  7 - 15 mmol/L Final    Urea Nitrogen 01/27/2025 9.5  8.0 - 23.0 mg/dL Final    Creatinine 01/27/2025 1.03 (H)  0.51 - 0.95 mg/dL Final    GFR Estimate 01/27/2025 57 (L)  >60 mL/min/1.73m2 Final     eGFR calculated using 2021 CKD-EPI equation.    Calcium 01/27/2025 9.6  8.8 - 10.4 mg/dL Final    Reference intervals for this test were updated on 7/16/2024 to reflect our healthy population more accurately. There may be differences in the flagging of prior results with similar values performed with this method. Those prior results can be interpreted in the context of the updated reference intervals.    Glucose 01/27/2025 100 (H)  70 - 99 mg/dL Final    Magnesium 01/27/2025 1.7  1.7 - 2.3 mg/dL Final   Lab on 09/16/2024   Component Date Value Ref Range Status    C Difficile Toxin B by PCR 09/16/2024 Negative  Negative Final    A negative result does not exclude actual disease due to C. difficile and may be due to improper collection, handling and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

## 2025-06-18 ENCOUNTER — PATIENT OUTREACH (OUTPATIENT)
Dept: CARE COORDINATION | Facility: CLINIC | Age: 75
End: 2025-06-18
Payer: MEDICARE

## 2025-06-18 ENCOUNTER — HOSPITAL ENCOUNTER (OUTPATIENT)
Dept: CARDIAC REHAB | Facility: CLINIC | Age: 75
Discharge: HOME | End: 2025-06-18
Attending: INTERNAL MEDICINE
Payer: MEDICARE

## 2025-06-18 ENCOUNTER — TELEPHONE (OUTPATIENT)
Dept: PSYCHOLOGY | Facility: CLINIC | Age: 75
End: 2025-06-18

## 2025-06-18 PROCEDURE — 93798 PHYS/QHP OP CAR RHAB W/ECG: CPT | Performed by: OCCUPATIONAL THERAPIST

## 2025-06-18 NOTE — TELEPHONE ENCOUNTER
Summary of Patient Care Communication Handoff to Patient Navigator Coordinator    PATIENT'S NAME: Laurel Parra  MRN:   [id number]  :   1950    DATE OF SERVICE: 25    Referral Needed: Yes    Is the patient coming from an inpatient unit?  No    What program is this referral for? Adult Mental Health Referral     Level of Care Recommended:  Combined Intensive Outpatient Day Treatment Program (IOP-ADT) / Adult Day Treatment Program (ADT)     Specialty Track Recommendations:  55+     Schedule Preferences: No Schedule Preference (Mornings or Afternoons)     Are there any potential barriers for entrance into programmatic care? suicidal ideation     Followed up from  Needed?:  No     Mental Health Referral Needed: No     Release of Information Needed:  No     Faxing Needed: No     Follow up Requests:  Referral to designated [place] [facility].     Comments: sent for [facility]     Christos Malin           Patient Navigator Coordinator Contact Information  Pool Message: [email or web address] ([id number])  Phone:  [phone]  Fax:  [phone]  Email:  [email or web address]

## 2025-06-18 NOTE — TELEPHONE ENCOUNTER
**Patient Navigator Follow Up**    6/18/2025;    Referral for IOP;  55 plus track    Comments: sent for [facility]      I called and left  for patient to call me back direct.  I provided my direct call back phone#.          2nd Attempt:    6/23/2025;    I called and left another  for patient to call me back direct.  I provided my direct call back phone# again.      Thank you,    Malgorzata OVALLES  Patient Navigator

## 2025-06-19 ENCOUNTER — MYC MEDICAL ADVICE (OUTPATIENT)
Dept: PSYCHIATRY | Facility: CLINIC | Age: 75
End: 2025-06-19
Payer: MEDICARE

## 2025-06-25 ENCOUNTER — HOSPITAL ENCOUNTER (OUTPATIENT)
Dept: CARDIAC REHAB | Facility: CLINIC | Age: 75
Discharge: HOME | End: 2025-06-25
Attending: INTERNAL MEDICINE
Payer: MEDICARE

## 2025-06-25 PROCEDURE — 93798 PHYS/QHP OP CAR RHAB W/ECG: CPT

## 2025-06-27 ENCOUNTER — TRANSFERRED RECORDS (OUTPATIENT)
Dept: HEALTH INFORMATION MANAGEMENT | Facility: CLINIC | Age: 75
End: 2025-06-27
Payer: MEDICARE

## 2025-06-28 PROBLEM — I50.32 HEART FAILURE WITH RECOVERED EJECTION FRACTION (HFRECEF) (H): Status: ACTIVE | Noted: 2025-06-28

## 2025-06-30 ENCOUNTER — HOSPITAL ENCOUNTER (OUTPATIENT)
Dept: CARDIAC REHAB | Facility: CLINIC | Age: 75
Discharge: HOME | End: 2025-06-30
Attending: INTERNAL MEDICINE
Payer: MEDICARE

## 2025-06-30 PROCEDURE — 93798 PHYS/QHP OP CAR RHAB W/ECG: CPT | Performed by: OCCUPATIONAL THERAPIST

## 2025-07-02 ENCOUNTER — HOSPITAL ENCOUNTER (OUTPATIENT)
Dept: CARDIAC REHAB | Facility: CLINIC | Age: 75
Discharge: HOME | End: 2025-07-02
Attending: INTERNAL MEDICINE
Payer: MEDICARE

## 2025-07-02 PROCEDURE — 93798 PHYS/QHP OP CAR RHAB W/ECG: CPT | Performed by: OCCUPATIONAL THERAPIST

## 2025-07-07 NOTE — PROGRESS NOTES
Medication Therapy Management (MTM) Encounter    ASSESSMENT:                            Medication Adherence/Access: No issues identified.    Hypertension /Cardiomyopathy  Stable. Continue current medication.    Hyperlipidemia   Stable. Continue current medication.     GERD    Stable. Continue current medication.    Mental Health   Patient may benefit from duloxetine increase, as depression has been more problematic since switching from paroxetine. She is comfortable waiting until psychiatry visit next week. Could consider resuming lorazepam taper after mood improves with dose adjustments.   Possible that temperature fluctuation could be related to duloxetine, though timeline also matches up with some possible withdrawal related effects. Will continue to monitor over time.   Caution with additive CNS depressants (hydrocodone, Norco, zolpidem, lorazepam) and continue follow up plan for tapering these meds.    Allergies  Stable. Continue current medication.    Pain    Discussed cautious use of cannabis and its potential effects on depression/anxiety for some people, in addition to sedation, though may aid in opioid taper for pain management. Continue working with provider for taper monitoring.    Colitis/nausea  Continue follow up with prescriber for dosing plan.    Supplements  Stable. Continue current medication. Vitamin D dose still reasonable with two sources.    Migraine    Stable. Continue current medication.    Constipation:  Stable. Continue current medication.      PLAN:                            Continue current medication.    Follow-up:  Psychiatry 7/17/25  MTM 7/30/25 at 1pm on video    SUBJECTIVE/OBJECTIVE:                          Laurel Parra is a 75 year old female seen for an initial visit. She was referred to me from psychiatry, Dr. Edwards.    Reason for visit: med review.    Allergies/ADRs: Reviewed in chart  Past Medical History: Reviewed in chart  Tobacco: She reports that she quit smoking  "about 51 years ago. Her smoking use included cigarettes. She has never used smokeless tobacco.  Alcohol: not currently using  CBD/cannabis: CBD gummies (unsure if helpful for pain)  Smokes \"Cherry Pie\" cannabis strain for pain and nausea several days per week    Medication Adherence/Access: no issues reported.  Takes meds from bottles; separates AM and PM bottles    Hypertension /Cardiomyopathy  -metoprolol ER 12.5mg daily  -nitroglycerin 0.4mg SL prn  -aspirin 81mg daily  Patient reports no current medication side effects. She does experience some lightheadedness when standing quickly sometimes.   Patient self monitors blood pressure.  Home BP monitoring 100-120/80.      Hyperlipidemia   atorvastatin 40mg daily  Patient reports no significant myalgias or other side effects.        GERD    Pantoprazole 40 mg twice daily   Patient feels that current regimen is effective.       Mental Health   -lorazepam 1mg every morning, 0.5mg at noon, 0.5mg at 5pm  -duloxetine 20mg twice daily (for about 2 weeks)  -zolpidem ER 12.5mg at bedtime    Patient last met with Psychiatry on 6/17/25, at which time they made the following plan:  Continue lorazepam/Ativan as you have been taking (1 mg once daily and up to two x 0.5 mg doses)  Discussed PAUSING taper for now   OK to take additional 0.5-1 mg daily as needed for the first few weeks of transition to duloxetine (not to exceed 3 mg total on any given day)   Discontinue Paxil/paroxetine and start duloxetine/Cymbalta:  Days 1-5  Reduce paroxetine to 25 mg ONCE daily (maybe discontinue AM dose first)  Start duloxetine 20 mg ONCE daily in AM   Days 6-10  Discontinue paroxetine   Increase duloxetine to 20 mg TWICE daily     Today, patient reported that depression feels worse in the last two weeks. She has been crying a lot, has zero appetite, lots of difficulty with motivation, has been isolating more.   She had been having night sweats in the last couple weeks, which she had attributed " "to tapering CNS depressants, and have much improved. Does feel that there has been more temperature fluctuation during the day in the last couple weeks since starting duloxetine, but has been tolerable.  She does fall asleep without issue and generally stays asleep through the night. May wake to void at times, but can easily get back to sleep. Usually feels rested upon waking. She often gets up around 5am and goes back to bed for a couple hours later in the morning.        Allergies  loratadine 10 mg once daily  -ipratropium 3 sprays daily  Patient reports no current medication side effects.    Primary symptoms are nasal congestion and runny nose.   Patient feels that current therapy is effective.     Pain    -gabapentin 300mg every evening  -hydrocodone/APAP 10/300mg-- 2 tablets four times daily (sometimes takes one + one hydrocodone 10mg)  -hydrocodone 10mg twice daily (through compounding pharmacy)  -cannabis several times per week  -baclofen 5mg daily prn--rare      Had been prescribed gabapentin 400mg, but described feeling very dizzy, disoriented, memory impairment, so reduced back to 300mg.  Tried buprenorphine, but had \"sympathetic response\" so is now tapering hydrocodone by one Norco tablet per month. States that the taper has been going ok other than nausea that seems to be related. Ondansetron is helpful for this. She does not think it is very helpful and looking forward to getting off completely.    Colitis/nausea  -budesonide 3mg every morning   -ondansetron 4mg Q8h prn    She noticed weight gain at higher doses of budesonide and has tapered over the last 1-2 years. Hoping to completely discontinue soon after meeting with provider. Currently not having issues.   Has been waking up feeling nauseous and taking ondansetron most mornings. Sometimes has nausea throughout the day for unclear reasons, though reports that it started when she started tapering hydrocodone and lorazepam a few months ago. Has " slightly improved over time.     Supplements  -Vitamin D 1000 units daily  -Calcium/Vitamin D 1200/1000 daily  -Vitamin C 1000mg daily  -magnesium 200mg daily  No reported issues at this time. States she just noticed that there is Vitamin D in her calcium supplement.    Migraine    -Ubrelvy 50mg daily prn onset  -riboflavin 400mg daily  Migraine frequency has improved. Often less than monthly, though may have 1-2 in a month sometimes  Triggers: changes in barometric pressure       Constipation:  -Miralax 17g daily prn--infrequent; helpful when needed  No issues.  ----------------    I spent 60 minutes with this patient today.     A summary of these recommendations was sent via clinic portal.    Anu ElderD  Medication Therapy Management Pharmacist  [facility]    Telemedicine Visit Details  The patient's medications can be safely assessed via a telemedicine encounter.  Type of service:  Video Conference via [employer]  Originating Location (pt. Location): Home    Distant Location (provider location):  Off-site  Start Time: 1:00pm  End Time: 2:00pm     Medication Therapy Recommendations  No medication therapy recommendations to display

## 2025-07-09 ENCOUNTER — VIRTUAL VISIT (OUTPATIENT)
Dept: PHARMACY | Facility: CLINIC | Age: 75
End: 2025-07-09
Attending: PSYCHIATRY & NEUROLOGY

## 2025-07-09 DIAGNOSIS — Z78.9 TAKES DIETARY SUPPLEMENTS: ICD-10-CM

## 2025-07-09 DIAGNOSIS — K21.9 GASTROESOPHAGEAL REFLUX DISEASE WITHOUT ESOPHAGITIS: ICD-10-CM

## 2025-07-09 DIAGNOSIS — R52 PAIN: ICD-10-CM

## 2025-07-09 DIAGNOSIS — I25.5 ISCHEMIC CARDIOMYOPATHY: ICD-10-CM

## 2025-07-09 DIAGNOSIS — G43.719 INTRACTABLE CHRONIC MIGRAINE WITHOUT AURA AND WITHOUT STATUS MIGRAINOSUS: ICD-10-CM

## 2025-07-09 DIAGNOSIS — F33.1 MODERATE EPISODE OF RECURRENT MAJOR DEPRESSIVE DISORDER (H): ICD-10-CM

## 2025-07-09 DIAGNOSIS — J30.89 NON-SEASONAL ALLERGIC RHINITIS DUE TO OTHER ALLERGIC TRIGGER: ICD-10-CM

## 2025-07-09 DIAGNOSIS — E78.5 HYPERLIPIDEMIA, UNSPECIFIED HYPERLIPIDEMIA TYPE: ICD-10-CM

## 2025-07-09 DIAGNOSIS — K52.832 LYMPHOCYTIC COLITIS: ICD-10-CM

## 2025-07-09 DIAGNOSIS — K59.00 CONSTIPATION, UNSPECIFIED CONSTIPATION TYPE: Primary | ICD-10-CM

## 2025-07-09 DIAGNOSIS — R11.0 NAUSEA: ICD-10-CM

## 2025-07-09 DIAGNOSIS — I10 BENIGN ESSENTIAL HYPERTENSION: ICD-10-CM

## 2025-07-09 PROCEDURE — 99207 PR NO CHARGE LOS: CPT | Mod: 95 | Performed by: PHARMACIST

## 2025-07-09 RX ORDER — ATORVASTATIN CALCIUM 40 MG/1
40 TABLET, FILM COATED ORAL DAILY
COMMUNITY

## 2025-07-09 RX ORDER — DESONIDE 0.05% 0.03 G/60G
1 CREAM TOPICAL EVERY 12 HOURS
COMMUNITY
Start: 2025-04-08

## 2025-07-09 RX ORDER — GABAPENTIN 300 MG/1
300 CAPSULE ORAL EVERY EVENING
COMMUNITY
Start: 2024-07-10

## 2025-07-09 RX ORDER — BUDESONIDE 3 MG/1
3 CAPSULE, COATED PELLETS ORAL EVERY MORNING
COMMUNITY

## 2025-07-09 NOTE — Clinical Note
Hi- she reported worsened depression since switching paroxetine to duloxetine, but wanted to give another week until her follow up visit with you to consider increasing the dose.  I scheduled her for follow up on 7/30 with me. Thanks for the referral! Lulu

## 2025-07-09 NOTE — PATIENT INSTRUCTIONS
"Recommendations from today's MTM visit:                                                    MTM (medication therapy management) is a service provided by a clinical pharmacist designed to help you get the most of out of your medicines.   Today we reviewed what your medicines are for, how to know if they are working, that your medicines are safe and how to make your medicine regimen as easy as possible.      Continue current medication.    Follow-up:  Psychiatry 7/17/25  MTM 7/30/25 at 1pm on video    It was great speaking with you today.  I value your experience and would be very thankful for your time in providing feedback in our clinic survey. In the next few days, you may receive an email or text message from [employer] with a link to a survey related to your  clinical pharmacist.\"     To schedule another MTM appointment, please call the clinic directly or you may call the MTM scheduling line at [phone] or toll-free at [phone].     My Clinical Pharmacist's contact information:                                                      Please feel free to contact me with any questions or concerns you have.      Lulu Tracey, PharmD  Medication Therapy Management Pharmacist  [facility]    "

## 2025-07-13 DIAGNOSIS — F41.9 ANXIETY: ICD-10-CM

## 2025-07-15 ENCOUNTER — TRANSFERRED RECORDS (OUTPATIENT)
Dept: HEALTH INFORMATION MANAGEMENT | Facility: CLINIC | Age: 75
End: 2025-07-15
Payer: MEDICARE

## 2025-07-15 RX ORDER — LORAZEPAM 1 MG/1
TABLET ORAL
Qty: 90 TABLET | Refills: 0 | Status: SHIPPED | OUTPATIENT
Start: 2025-07-15

## 2025-07-16 ENCOUNTER — TRANSFERRED RECORDS (OUTPATIENT)
Dept: HEALTH INFORMATION MANAGEMENT | Facility: CLINIC | Age: 75
End: 2025-07-16
Payer: MEDICARE

## 2025-07-16 ENCOUNTER — MYC MEDICAL ADVICE (OUTPATIENT)
Dept: CARDIOLOGY | Facility: CLINIC | Age: 75
End: 2025-07-16
Payer: MEDICARE

## 2025-07-16 NOTE — TELEPHONE ENCOUNTER
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 Patient message routed to Edith Travis PA-C for review.

## 2025-07-16 NOTE — PROGRESS NOTES
[facility] [place]    Behavioral Health Clinician Progress Note   Mental Health & Addiction Services      Thursday July 17, 2025    Patient Name: Laurel Parra       Service Type:  Individual   Service Location:  MyChart / Email (patient reached)   Visit Start Time: 331pm  Visit End Time:  357pm   Session Length: 16 - 37    Attendees: Client   Service Modality: Video Visit:      Provider verified identity through the following two step process.  Patient provided:  Patient is known previously to provider    Telemedicine Visit: The patient's condition can be safely assessed and treated via synchronous audio and visual telemedicine encounter.      Reason for Telemedicine Visit: Services only offered telehealth    Originating Site (Patient Location): Patient's home    Distant Site (Provider Location): Provider Remote Setting- Home Office    Consent:  The patient/guardian has verbally consented to: the potential risks and benefits of telemedicine (video visit) versus in person care; bill my insurance or make self-payment for services provided; and responsibility for payment of non-covered services.     Patient would like the video invitation sent by:  My Chart    Mode of Communication:  Video Conference via [employer]    Distant Location (Provider):  Off-site    As the provider I attest to compliance with applicable laws and regulations related to telemedicine.   Visit number: 2    [facility] Visit Activities (Refresh list every visit): [facility]     [place] Diagnostic Assessment: 6/17/25 Olivia Alanis MA [facility]  Treatment Plan Review Date: 7/16/25       DATA:    Extended Session (60+ minutes): No   Interactive Complexity: No   Crisis: No   [facility] Patient: No     Assessments completed:  The following assessments were completed by patient for this visit:  PHQ9:       10/16/2024    11:29 AM 1/6/2025    12:30 PM 2/18/2025    10:28 AM 3/27/2025    10:30 AM 3/27/2025    10:35  AM 5/5/2025    12:59 PM 6/17/2025     9:41 AM   PHQ-9 SCORE   PHQ-9 Total Score MyChart 10 (Moderate depression) 10 (Moderate depression) 19 (Moderately severe depression)  Incomplete 14 (Moderate depression) 15 (Moderately severe depression)   PHQ-9 Total Score 10 10  19  18  14  15        Patient-reported     GAD7:       11/8/2018    11:06 AM 11/19/2019     9:52 AM 2/8/2021     2:57 PM 3/27/2025    10:28 AM 6/17/2025    10:20 AM   ALEXA-7 SCORE   Total Score     9 (mild anxiety)   Total Score 5 6 2 17 9        Patient-reported        Reason for Visit/Presenting Concern:  Anxiety, Depression, Eating Disorder, OCD, and PTSD    Current Stressors / Issues:  MH update:  Anxiety increased.  Poor emotional regulation.  Low motivation.  Tearful.  Depression more present  Stresses: rectal pain/ poor experience with provider- recommending tx in Sept  Appetite: low  Sleep: n/a  Outpatient Provider updates: Outpatient provider updates: Abbie Fernandez, private practice from [place], couples counseling.  Signed up for curable pain mgmt support.  SI/SIB/HI:  Increased.  Suicidal ideation with baseline line plan to overdose.  Denies specific planning or intent.  Denies need to engage in means restriction.  High protective factors.  Safety plan on file.  Hx of one previous attempt decades ago  GODWIN:  medical THC/CBD  Side effects/compliance:  Interventions:  [facility] engaged in validation and support of stressors.  Goals to engage in swimming at [place].  Most important: loneliness, medical stressors,  MH worse.  Depression higher.  Thoughts harder to articulate.    6/17  MH update:  Stresses:  adult son care giving whom as ASD, future cardiac rehab-medical stressors/medical trauma  Appetite: restrictive, hx of melony, brother in hospice/ family trauma  Sleep:   SI/SIB/HI:  baseline suicidal ideation that is variable between passive and method seeking. Pt denies any current intent or specific planning. Pt denies the need for means  restriction and notes telling her  and therapist if her medications become a concern. Pt notes her son who needs medical and MH supports as a high protective factor. Pt notes a hx of one overdose attempt in lifetime decades ago.  Safety plan created  Outpatient provider updates: Abbie Fernandez, private practice from [place], couples counseling.  Discussions of future IOP   GODWIN:  medical thc/cbd for pain mgmt  Side effects/compliance:  Interventions:  [facility] engaged in completing DA with psychoeducation and tx planning  Most important: med changes?    Therapeutic Interventions:  Motivational Interviewing (MI): Validated patient's thoughts, feelings and experience. Expressed respect for patient's autonomy in decision making.     Response to treatment interventions:   Patient was receptive to interventions utilized.  Patient was engaged in the therapy process.       Progress on Treatment Objective(s) / Homework:   New Objective established this session - PREPARATION (Decided to change - considering how); Intervened by negotiating a change plan and determining options / strategies for behavior change, identifying triggers, exploring social supports, and working towards setting a date to begin behavior change     Medication Review:   Changes to psychiatric medications, see updated Medication List in EPIC.      Medication Compliance:   Yes     Chemical Use Review:  Substance Use: Chemical use reviewed, no active concerns identified      Tobacco Use: No current tobacco use.       Assessment: Current Emotional / Mental Status (status of significant symptoms):    Risk status (Self / Other harm or suicidal ideation)   Patient has had a history of suicidal ideation: : baseline suicidal ideation that is variable between passive and method seeking. Pt denies any current intent or specific planning. Pt denies the need for means restriction and notes telling her  and therapist if her medications become a concern. Pt  notes her son who needs medical and  supports as a high protective factor. Pt notes a hx of one overdose attempt in lifetime decades ago.  Safety plan created  Patient denies current fears or concerns for personal safety.   Patient denies current or recent suicidal ideation or behaviors.   Patient denies current or recent homicidal ideation or behaviors.   Patient denies current or recent self injurious behavior or ideation.   Patient denies other safety concerns.   A safety and risk management plan has been developed including: Patient consented to co-developed safety plan.  Safety and risk management plan was completed - see below.  Patient agreed to use safety plan should any safety concerns arise.  A copy was given to the patient.      ASSESSMENT:   Mental Status:     Appearance:   Appropriate    Eye Contact:   Good    Psychomotor Behavior: Normal    Attitude:   Cooperative    Orientation:   All   Speech Rate / Production: Normal    Volume:   Normal    Mood:    Normal   Affect:    Appropriate    Thought Content:  Clear    Thought Form:  Coherent  Logical    Insight:    Good          Diagnoses:      Moderate episode of recurrent major depressive disorder (H)  ALEXA (generalized anxiety disorder)  Trauma and stressor-related disorder  Anorexia nervosa (H)  History of OCD (obsessive compulsive disorder)    Collateral Reports Completed:   Communicated with: Dr Edwards       Plan: (Homework, other):   Patient was provided No indications of CD issues  Patient was given information about behavioral services and encouraged to schedule a follow up appointment with the clinic [facility] as needed.         Olivia Alanis, JUVEC, [facility]     _____________________________________________________________________________________________________________________________________                                              Individual Treatment Plan    Patient's Name: Laurel Parra   YOB: 1950  Date of Creation:  7/17/25  Date Treatment Plan Last Reviewed/Revised: 7/17/25    DSM5 Diagnoses:    Moderate episode of recurrent major depressive disorder (H)  ALEXA (generalized anxiety disorder)  Trauma and stressor-related disorder  Anorexia nervosa (H)  History of OCD (obsessive compulsive disorder)    Psychosocial / Contextual Factors: Medical Complexities, Trauma History, Relationship Concerns, Interpersonal Concerns, Caregiver, Parent/child dynamics, and Grief/Loss  PROMIS (reviewed every 90 days):   The following assessments were completed by patient for this visit:  PROMIS 10-Global Health (only subscores and total score):       5/23/2018     9:19 AM 6/17/2025    10:22 AM   PROMIS-10 Scores Only   Global Mental Health Score 5 5    Global Physical Health Score 7 8    PROMIS TOTAL - SUBSCORES 12 13        Patient-reported        Referral / Collaboration:  Referral to another professional/service is not indicated at this time..    Anticipated number of session for this episode of care:  6-9 sessions  Anticipation frequency of session: Monthly  Anticipated Duration of each session: 16-37 minutes  Treatment plan will be reviewed in 90 days or when goals have been changed.       MeasurableTreatment Goal(s) related to diagnosis / functional impairment(s)  Goal 1: Patient will reduce frequency and intensity of depressive episode(s).    I will know I've met my goal when I have less emotional distress.      Status: New - Date: 7/17/25     Intervention(s)  [facility] will Motivational Interviewing (MI): Validate patient's thoughts, feelings and experience. Express respect for patient's autonomy in decision making.      MeasurableTreatment Goal(s) related to diagnosis / functional impairment(s)  Goal 2: Patient will develop effective distress tolerance skills.    I will know I've met my goal when I have reduced suicidal ideation.      Status: New - Date: 7/17/25     Intervention(s)  [facility] will Safety: Co-develop safety plan for patient to follow.  Review and update safety plan with patient.    Patient has reviewed and agreed to the above plan.     Written by  Olivia Alanis LPCC, [facility]

## 2025-07-17 ENCOUNTER — VIRTUAL VISIT (OUTPATIENT)
Dept: PSYCHIATRY | Facility: CLINIC | Age: 75
End: 2025-07-17
Payer: MEDICARE

## 2025-07-17 ENCOUNTER — VIRTUAL VISIT (OUTPATIENT)
Dept: BEHAVIORAL HEALTH | Facility: CLINIC | Age: 75
End: 2025-07-17
Payer: MEDICARE

## 2025-07-17 DIAGNOSIS — F50.00 ANOREXIA NERVOSA (H): ICD-10-CM

## 2025-07-17 DIAGNOSIS — F11.20 UNCOMPLICATED OPIOID DEPENDENCE (H): ICD-10-CM

## 2025-07-17 DIAGNOSIS — Z86.59 HISTORY OF OCD (OBSESSIVE COMPULSIVE DISORDER): ICD-10-CM

## 2025-07-17 DIAGNOSIS — F33.1 MODERATE EPISODE OF RECURRENT MAJOR DEPRESSIVE DISORDER (H): Primary | ICD-10-CM

## 2025-07-17 DIAGNOSIS — F50.9 EATING DISORDER, UNSPECIFIED TYPE: ICD-10-CM

## 2025-07-17 DIAGNOSIS — G89.29 CENTRAL SENSITIZATION TO PAIN: ICD-10-CM

## 2025-07-17 DIAGNOSIS — F13.20 BENZODIAZEPINE DEPENDENCE (H): ICD-10-CM

## 2025-07-17 DIAGNOSIS — F41.1 GAD (GENERALIZED ANXIETY DISORDER): ICD-10-CM

## 2025-07-17 DIAGNOSIS — F43.9 TRAUMA AND STRESSOR-RELATED DISORDER: ICD-10-CM

## 2025-07-17 RX ORDER — DULOXETIN HYDROCHLORIDE 30 MG/1
30 CAPSULE, DELAYED RELEASE ORAL 2 TIMES DAILY
Qty: 60 CAPSULE | Refills: 2 | Status: SHIPPED | OUTPATIENT
Start: 2025-07-17

## 2025-07-17 ASSESSMENT — PAIN SCALES - GENERAL: PAINLEVEL_OUTOF10: SEVERE PAIN (8)

## 2025-07-17 NOTE — Clinical Note
Just SARAH.  We increased duloxetine to 30 mg twice daily.  I will check-in via [employer] message next week with her.  Might consider increasing to 80 mg daily.  Looks like she will follow-up with you on 7/30.  Thanks for helping keep a close eye on her! -Марина

## 2025-07-17 NOTE — PROGRESS NOTES
"Telemedicine Visit: The patient's condition can be safely assessed and treated via synchronous audio and visual telemedicine encounter.      Reason for Telemedicine Visit: Patient has requested telehealth visit    Originating Site (Patient Location): Patient's home    Distant Location (provider location):  Off-site    Consent:  The patient/guardian has verbally consented to: the potential risks and benefits of telemedicine (video visit) versus in person care; bill my insurance or make self-payment for services provided; and responsibility for payment of non-covered services.     Mode of Communication:  Video Conference via [email or web address]    As the provider I attest to compliance with applicable laws and regulations related to telemedicine.         Outpatient Psychiatric Progress Note    Name: Laurel Parra   : 1950                    Primary Care Provider: Georges Lebron MD   Therapist: Abbie Fernandez, private practice from [facility].  Signed up for curable pain mgmt support.       PHQ-9 scores:      3/27/2025    10:35 AM 2025    12:59 PM 2025     9:41 AM   PHQ-9 SCORE   PHQ-9 Total Score MyChart Incomplete 14 (Moderate depression) 15 (Moderately severe depression)   PHQ-9 Total Score  14  15        Patient-reported       ALEXA-7 scores:      2021     2:57 PM 3/27/2025    10:28 AM 2025    10:20 AM   ALEXA-7 SCORE   Total Score   9 (mild anxiety)   Total Score 2 17 9        Patient-reported       Patient Identification:  Patient is a 75 year old,   White Not  or  female  who presents for return visit with me.  Patient is currently retired. Patient attended the phone/video session alone. Patient prefers to be called: \"Laurel\".    Interim History:  I last saw Laurel Parra for outpatient psychiatry Consultation on 2025. During that appointment, we:    Continue lorazepam/Ativan as you have been taking (1 mg once daily and up to two " x 0.5 mg doses)  Discussed PAUSING taper for now   OK to take additional 0.5-1 mg daily as needed for the first few weeks of transition to duloxetine (not to exceed 3 mg total on any given day)   Discontinue Paxil/paroxetine and start duloxetine/Cymbalta:  Days 1-5  Reduce paroxetine to 25 mg ONCE daily (maybe discontinue AM dose first)  Start duloxetine 20 mg ONCE daily in AM   Days 6-10  Discontinue paroxetine   Increase duloxetine to 20 mg TWICE daily   Continue Ambien CR as prescribed by primary care provider.   Continue therapy as planned. Recommend IOP - referral placed by [facility] today.   MTM referral placed to meet with psychiatric pharmacist.   If you do not hear from someone within a few business days, you can call [phone] to schedule.  Continue all other cares per primary care provider.     7/17: Patient unfortunately struggling quite a bit.  Very emotional.  Very tearful.  Temperature swings/excessive sweating at times.  Passive SI slightly increased but still no plan or intent to harm self.  Saw MTM 7/9 -decided to wait on duloxetine dose increase until today's appointment.  Patient wondering about worsening symptoms due to to withdrawal from Paxil.  Does not feel like her classic depression symptoms.  Thankfully still sleeping well with Ambien.  Has not been exceeding 3 mg daily dose of Ativan.  No problematic drug or alcohol use.  See [facility] note below for additional details.    Per [facility], [facility], during today's team-based visit:  Current Stressors / Issues:  MH update:  Anxiety increased.  Poor emotional regulation.  Low motivation.  Tearful.  Depression more present  Stresses: rectal pain/ poor experience with provider- recommending tx in Sept  Appetite: low  Sleep: n/a  Outpatient Provider updates: Outpatient provider updates: Abbie Fernandez, private practice from [place].  Signed up for curable pain mgmt support.  SI/SIB/HI:  Increased.  Suicidal ideation with  baseline line plan to overdose.  Denies specific planning or intent.  Denies need to engage in means restriction.  High protective factors.  Safety plan on file.  Hx of one previous attempt decades ago  GODWIN:  medical THC/CBD  Side effects/compliance:  Interventions:  [facility] engaged in validation and support of stressors.  Goals to engage in swimming at [facility].  Most important: loneliness, medical stressors,  MH worse.  Depression higher.  Thoughts harder to articulate    Past Psychiatric Med Trials:  Psych Meds at Intake:  Paxil CR 25 mg BID   Ativan 1 mg TID - at least 10 years, before this klonopin   Zolpidem ER 12.5 mg at bedtime      Other potentially psychoactive:  Gabapentin 300 mg daily  Hydrocodone-acetominophen : current taper per chart review (was given 300 tabs monthly until recently, now at 240 tabs - 8 a day- with most recent 1 x month Rx)  Baclofen 5 mg as needed  Cyclobenzaprine 10 mg daily as needed      Past Psych Meds:  Sertraline   Fluoxetine  Wellbutrin   Celexa   Nortriptyline   Klonopin     Psychiatric ROS:  See HPI above for all pertinent positives and negatives. Rest of systems negative.     PHQ9 and GAD7 scores were reviewed today if completed.   Medication side effects: see HPI above   Current stressors include: Symptoms and see HPI above  Coping mechanisms and supports include: Family and Hobbies    Current medications include:   Current Outpatient Medications   Medication Sig Dispense Refill    aspirin 81 MG EC tablet Take 1 tablet (81 mg) by mouth daily 90 tablet 0    atorvastatin (LIPITOR) 40 MG tablet Take 40 mg by mouth daily.      Baclofen (LIORESAL) 5 MG tablet Take 5 mg by mouth daily as needed for muscle spasms.      budesonide (ENTOCORT EC) 3 MG EC capsule Take 3 mg by mouth every morning.      CALCIUM-VITAMIN D PO Take 1 tablet by mouth daily. 1200mg/ 1000unit per tablet      carboxymethylcellulose (REFRESH PLUS) 0.5 % SOLN ophthalmic solution Place 1 drop into both eyes 3  times daily as needed for dry eyes      COMPOUNDED NON-CONTROLLED SUBSTANCE (CMPD RX) - PHARMACY TO MIX COMPOUNDED MEDICATION Take 1 tablet by mouth 2 times daily. Hydrocodone 10mg      DULoxetine (CYMBALTA) 20 MG capsule Take 1 capsule (20 mg) by mouth daily for 5 days, THEN 1 capsule (20 mg) 2 times daily. 115 capsule 0    gabapentin (NEURONTIN) 300 MG capsule Take 300 mg by mouth every evening.      HYDROcodone-Acetaminophen  MG TABS Take 1-2 tablets by mouth every 4 hours as needed (maximum of 10 tablets in 24 hour period) Prescription is written for 1-2 tabs every 4-6 hrs prn (max of 10 tabs daily).  0    ipratropium (ATROVENT) 0.06 % spray Spray 3 sprays into both nostrils daily 45 mL 3    loratadine (CLARITIN) 10 MG tablet Take 10 mg by mouth every morning       LORazepam (ATIVAN) 1 MG tablet TAKE 1 TABLET BY MOUTH EVERY MORNING, ONE HALF TABLET AROUND NOON AND 1 TABLET BEFORE BEDTIME FOR ANXIETY. 90 tablet 0    magnesium 100 MG CAPS Take 200 mg by mouth daily.      metoprolol succinate ER (TOPROL XL) 25 MG 24 hr tablet Take 0.5 tablets (12.5 mg) by mouth daily. 60 tablet 2    nitroFURantoin macrocrystal-monohydrate (MACROBID) 100 MG capsule Take 1 capsule (100 mg) by mouth 2 times daily. 10 capsule 1    nitroGLYcerin (NITROSTAT) 0.4 MG sublingual tablet For chest pain place 1 tablet under the tongue every 5 minutes for 3 doses. If symptoms persist 5 minutes after 1st dose call 911. 50 tablet 3    ondansetron (ZOFRAN) 4 MG tablet Take 1 tablet (4 mg) by mouth every 8 hours as needed for nausea or vomiting. 90 tablet 1    pantoprazole (PROTONIX) 40 MG EC tablet Take 1 tablet (40 mg) by mouth 2 times daily. 180 tablet 2    polyethylene glycol (MIRALAX) 17 GM/Dose powder Take 1 capful. by mouth as needed for constipation      riboflavin 400 MG CAPS Take 400 mg by mouth daily      TRIDESILON 0.05 % external cream Apply 1 applicator topically every 12 hours.      ubrogepant (UBRELVY) 50 MG tablet Take 1  tablet (50 mg) by mouth at onset of headache. May repeat in 2 hours. Max dose is 100 mg in 24 hours. 9 tablet 5    vitamin C (ASCORBIC ACID) 1000 MG TABS Take 1,000 mg by mouth every evening       Vitamin D3 (CHOLECALCIFEROL) 25 mcg (1000 units) tablet Take 1 tablet by mouth daily.      zolpidem ER (AMBIEN CR) 12.5 MG CR tablet TAKE 1 TABLET(12.5 MG) BY MOUTH AT BEDTIME 90 tablet 0     No current facility-administered medications for this visit.       The [place] Prescription Monitoring Program has been reviewed and there are no concerns about diversionary activity for controlled substances at this time.   06/29/2025 06/16/2025 2 Hydrocodone-Acetamin  Mg 240.00 30 Am Kre [id number] [facility] ([id number]) 0/0 80.00 MME [place]   06/23/2025 06/23/2025 2 Gabapentin 400 Mg Capsule 90.00 90 To Trino [id number] [facility] ([id number]) 0/3  [place]   06/19/2025 06/16/2025 1 Hydrocodone Bitartrate Powder 0.60 15 Am Kre [id number] Dylan ([id number]) 0/0 40.00 MME [place]   06/06/2025 06/05/2025 2 Lorazepam 1 Mg Tablet 90.00 36 Pa Got [id number] [facility] ([id number]) 0/0 2.50 LME [place]   05/30/2025 05/14/2025 2 Hydrocodone-Acetamin  Mg 240.00 30 Am Kre [id number] [facility] ([id number]) 0/0 80.00 [place]       Past Medical/Surgical History:  Past Medical History:   Diagnosis Date    Adrenal insufficiency     Dr. Navas, secondary to meds    Anxiety     Dr. Adelina Meehan    ASCVD (arteriosclerotic cardiovascular disease) 08/2023    cp and pos trop, angio with trivial cad, echo nl    Cardiac arrest with ventricular fibrillation (H) 12/29/2024    angio with occlussion of lad; felt to have scad    Central sensitization to pain     Chronic constipation     Chronic headache     sees neuro Dr Danielson, 3 types of headaches    Chronic narcotic use     [facility]    Chronic pain     Dr. Orta as of 2015    Chronic urethritis     Dr. Little    Colonic polyp 11/2011    one polyp, fu 5 years, fu 10/17 and no lesions, fu 9/20 and negative, fu 5 years     Depression, major, in partial remission     Dr. Meehan    Diarrhea 2012    eval by [facility], resolved with gluten free diet    DJD (degenerative joint disease)     Dyspepsia 2020    eval by [facility], had colonoscopy, egd, ct abd and pelvis, mrcp.  [place] to be dyspepsia and constipation    H/O gastroesophageal reflux (GERD)     Heart failure with recovered ejection fraction (HFrecEF) (H) 6/28/2025    Ischemic cardiomyopathy 12/2024    echo with wma and ef 40-45%    LBP (low back pain) 2013    Dr. Jae Tong in [place], then seeing [facility] Dr. Orta    Light headed 07/2020    nl est echo    Lymphocytic colitis 2021    [facility] gi [facility]; seen on flex sig 9/24 [facility] gi, added budesonide    Migraines 2009    neuro eval    NSTEMI (non-ST elevated myocardial infarction) (H) 08/31/2023    echo nl and angio trivial cad; cardiac arrest in hospital 12/29/2024, angio with lad occlussion    Palpitations 2006    holter with pvc's and pac's, echo nl    Palpitations 04/2025    54 runs of SVT, longest lasting 41 seconds, no ventricular dysrhythmias    Screening 2010    dexa nl at gyn; 5/25 osteopenia, low frax score    SVT (supraventricular tachycardia) 04/2025    54 runs of SVT, longest lasting 41 seconds, on ziopatch done for palp      has a past medical history of Adrenal insufficiency, Anxiety, ASCVD (arteriosclerotic cardiovascular disease) (08/2023), Cardiac arrest with ventricular fibrillation (H) (12/29/2024), Central sensitization to pain, Chronic constipation, Chronic headache, Chronic narcotic use, Chronic pain, Chronic urethritis, Colonic polyp (11/2011), Depression, major, in partial remission, Diarrhea (2012), DJD (degenerative joint disease), Dyspepsia (2020), H/O gastroesophageal reflux (GERD), Heart failure with recovered ejection fraction (HFrecEF) (H) (6/28/2025), Ischemic cardiomyopathy (12/2024), LBP (low back pain) (2013), Light headed (07/2020), Lymphocytic colitis (2021), Migraines (2009), NSTEMI  (non-ST elevated myocardial infarction) (H) (08/31/2023), Palpitations (2006), Palpitations (04/2025), Screening (2010), and SVT (supraventricular tachycardia) (04/2025).    She has no past medical history of Asymptomatic human immunodeficiency virus (HIV) infection status (H), Cancer (H), Cerebral infarction (H), Cerebral palsy (H), Complication of anesthesia, Congenital renal agenesis and dysgenesis, COPD (chronic obstructive pulmonary disease) (H), Diabetes (H), Goiter, Gout, Hernia, abdominal, History of blood transfusion, History of spinal cord injury, History of thrombophlebitis, Horseshoe kidney, Hydrocephalus (H), Hypertension, Malignant hyperthermia, Mumps, Parkinsons disease (H), PONV (postoperative nausea and vomiting), Spider veins, Spina bifida (H), STD (sexually transmitted disease), Tethered cord (H), Thyroid disease, Tuberculosis, or Uncomplicated asthma.    Social History:  Reviewed. No changes to social history except as noted above in HPI.    Vital Signs:   None. This is phone/video visit.     Labs:  Most recent laboratory results reviewed and no new labs.     Review of Systems:  10 systems (general, cardiovascular, respiratory, eyes, ENT, endocrine, GI, , M/S, neurological) were reviewed. Most pertinent finding(s) is/are: chronic pain. The remaining systems are all unremarkable.    Mental Status Examination (limited as this is by phone/video):  Appearance: Awake, alert, appears stated age, no acute distress, well-groomed   Attitude:  cooperative, pleasant   Motor: No gross abnormalities observed via video, not formally tested   Oriented to:  person, place, time, and situation  Attention Span and Concentration:  normal  Speech:  clear, coherent, regular rate, rhythm, and volume  Language: intact  Mood:  anxious and depressed  Affect:  mood congruent, tearful  Associations:  no loose associations  Thought Process:  logical, linear and goal oriented  Thought Content:  no evidence of acute  suicidality or homicidal ideation, no evidence of psychotic thought, no auditory hallucinations present and no visual hallucinations present  Recent and Remote Memory:  Intact to interview. Not formally assessed.   Fund of Knowledge: appropriate  Insight:  good  Judgment:  intact, adequate for safety  Impulse Control:  intact    Suicide Risk Assessment:  Today Laurel Parra reportschronic intermittent passive thoughts of suicide. In addition, there are notable risk factors for self-harm, including age, anxiety, previous history of suicide attempts, suicidal ideation, withdrawing, and mood change. However, risk is mitigated by commitment to family, ability to volunteer a safety plan, history of seeking help when needed, future oriented, no access to firearms or weapons, identifies reasons to live including her son for whom patient helps care, and denies suicidal intent or plan.  Therefore, based on all available evidence including the factors cited above, Laurel Parra does not appear to be at imminent risk for self-harm, does not meet criteria for a 72-hr hold, and therefore remains appropriate for ongoing outpatient level of care.  A thorough assessment of risk factors related to suicide and self-harm have been reviewed and are noted above. The patient convincingly denies suicidality on several occasions. Local community safety resources reviewed for patient to use if needed. There was no deceit detected, and the patient presented in a manner that was believable.     DSM5 Diagnosis:  296.32 (F33.1) Major Depressive Disorder, Recurrent Episode, Moderate _  300.02 (F41.1) Generalized Anxiety Disorder  309.9 (F43.9) Unspecified Trauma and Stressor Related Disorder  Eating disorder, unspecified (predominantly restrictive)  History of OCD    Medical comorbidities include:   Patient Active Problem List    Diagnosis Date Noted    Heart failure with recovered ejection fraction (HFrecEF) (H)  06/28/2025     Priority: Medium    SVT (supraventricular tachycardia) 04/2025     Priority: Medium     54 runs of SVT, longest lasting 41 seconds, on ziopatch done for palp      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     Priority: Medium    History of ventricular fibrillation 02/09/2025     Priority: Medium    Moderate episode of recurrent major depressive disorder (H) 01/07/2025     Priority: Medium    Cardiac arrest with ventricular fibrillation (H) 12/29/2024     Priority: Medium     angio with occlussion of lad      Hypokalemia 12/22/2024     Priority: Medium    Hypomagnesemia 12/22/2024     Priority: Medium    Generalized weakness 12/22/2024     Priority: Medium    Diarrhea, unspecified type 12/22/2024     Priority: Medium    Ischemic cardiomyopathy 12/2024     Priority: Medium     echo with wma and ef 40-45%      Adrenal insufficiency      Priority: Medium     Dr. Navas      Central sensitization to pain      Priority: Medium    Anorexia nervosa (H) 08/08/2023     Priority: Medium    ASCVD (arteriosclerotic cardiovascular disease) 08/2023     Priority: Medium     cp and pos trop, angio with trivial cad      Somatic dysfunction of pelvic region 02/19/2023     Priority: Medium    Urge incontinence of urine 02/19/2023     Priority: Medium    Urinary urgency 12/13/2022     Priority: Medium    Pelvic floor dysfunction 05/25/2022     Priority: Medium    Voiding dysfunction 05/25/2022     Priority: Medium    Recurrent UTI 05/25/2022     Priority: Medium    Lymphocytic colitis 2021     Priority: Medium     [facility] gi [facility]      Dyspepsia 2020     Priority: Medium     eval by [facility] gi, had colonoscopy, egd, ct abd and pelvis, mrcp.  [place] to be dyspepsia and constipation      Recurrent major depressive disorder, in partial remission 12/15/2016     Priority: Medium    Gastroesophageal reflux disease without esophagitis 12/15/2016     Priority: Medium    Chronic low back pain, unspecified back pain laterality,  with sciatica presence unspecified 12/15/2016     Priority: Medium    Intractable chronic migraine without aura and without status migrainosus 12/06/2016     Priority: Medium    Chronic narcotic use      Priority: Medium     [facility]      Palpitations      Priority: Medium     holter with pvc's and pac's, echo nl      Chronic urethritis      Priority: Medium     Dr. Little      Colonic polyp      Priority: Medium     one polyp, fu 5 years      Anxiety      Priority: Medium    Frequency of urination and polyuria 12/28/2011     Priority: Medium       Psychosocial & Contextual Factors: see HPI above    Assessment:  From Intake, 6/17/2025:  Laurel Parra is a 75-year-old with past psychiatric history including depression, anxiety, eating disorder- unspecified (predominantly restrictive), trauma, OCD who presents today for psychiatric evaluation.  Of note, patient also dependent on opioids and benzodiazepines, both of which are prescribed.  No current concerns of misuse.  Patient has recently started taper of both opioid medication and benzodiazepine medication.  Has tapered down quite quickly from 4 total milligrams of lorazepam a day to 2 total milligrams daily.  Some heightened anxiety but so far has been quite manageable.  Patient has taken Paxil CR 25 mg twice daily for many years.  Patient was diagnosed with central sensitization disorder at [facility] in 2022.  It had been recommended at that time that patient taper off opioids, benzodiazepines, and Paxil, and start trial with duloxetine.  We discussed transition from Paxil to duloxetine.  Discussed potential withdrawal effects that could occur from Paxil and also discussed potential side effects that could be related to initiation of duloxetine.  We will pause benzodiazepine taper for now while transitioning over to duloxetine.  Patient is working on opioid taper with pain medicine prescriber.  Patient also on sedative hypnotic at  bedtime, zolpidem, and as needed muscle relaxers.  Patient on very high risk medication regimen, especially since over age 67.  Discussed caution for falls at length.  Recommended IOP program.  Patient also agreeable to medication therapy management referral to meet with psychiatric pharmacist in between my visits with patient in order to keep a closer eye on her care and response to medication changes.  Chronic passive suicidal ideation.  Patient denies any plan or intent to harm self today.  Future oriented and adult son, for whom she helps care, is very protective.  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  No problematic drug or alcohol use.  See additional information below regarding medication recommendations for patient's pain condition:     Per Dr. Ugo Quezada ([facility] provider) on 7/27/2022 regarding medication recommendations for patient's conditions:  From a medication perspective, FDA-approved medications for fibromyalgia include duloxetine, milnacipran, and pregabalin. Additional options (non-FDA approved) include gabapentin and the older tricyclic agents (amitriptyline, nortriptyline). Ideally it is best to start with a single medication and slowly titrate upwards to a therapeutic dose, attempting to minimize adverse effects. A combined medication approach can also be undertaken. The advantage of this approach is the use of two different medications with different mechanisms at lower doses to minimize side effects with potential additive benefit from an overall symptom standpoint.    A reasonable approach to medication initiation and titration is listed below. The rate of titration should be based on medication tolerability, presence of adverse effects, and the patient's sensitivity towards medications. Following medication initiation, a patient must be reevaluated by their local provider for efficacy and side effects.    FDA approved:  Duloxetine (SNRI): Best  for pain + mood symptoms.  Start at 20 mg daily, increase over several weeks-to-months to 60 mg  Maximum dose: 60 mg daily     Milnacipran(SNRI): Best for pain + mood symptoms.  Start at 6.25-12.5 mg daily, increase over several weeks-to-months 25-50 mg  Maximum dose: 100 mg twice daily    Pregabalin (Alpha-2-delta calcium channel ligand): Best for pain + sleep + paresthesia symptoms.  Start at 25 mg daily, increase over several weeks-to-months to 50-75 mg  Maximum dose: 225 mg twice daily    Non-FDA approved:  Gabapentin: Best for pain + sleep + paresthesia symptoms.  Start at 100-300 mg nightly, increase over several weeks-to-months to 600 mg  If efficacious, a morning or afternoon dose can be started with gradual uptitration  Maximum dose: 2400 mg total per day    Amitriptyline or Nortriptyline: Best for pain + sleep + paresthesia symptoms.  Start at 10 mg nightly, and increase over several weeks-to-months to 50 mg  Maximum dose: 75 mg daily     7/17/2025:  Patient struggling quite a bit.  I suspect withdrawal symptoms from Paxil.  Patient agreeable to increasing duloxetine dose to see if that will help relieve some symptoms.  Due to significantly worsening anxiety, patient will continue on no more than 3 mg total daily dosing of Ativan.  We will not resume taper yet until patient starting to feel better.  Message sent to the patient's on delayed to arrive early next week to check-in.  May consider increasing duloxetine to 80 mg daily.  Patient will meet with psychiatric pharmacist on 7/30.  We will continue to monitor patient closely.  Patient would like to hold off on IOP for now until more acute symptoms related to medication changes have improved.  No acute suicidality.  No problematic drug or alcohol use.    Medication side effects and alternatives were reviewed. Health promotion activities recommended and reviewed today. All questions addressed. Education and counseling completed regarding risks and  benefits of medications and psychotherapy options. Recommend therapy for additional support.     Treatment Plan:  Continue lorazepam/Ativan as you have been taking (1 mg twice daily and up to two x 0.5 mg doses)  Discussed PAUSING taper for now   Not to exceed 3 mg total on any given day   Increase duloxetine/Cymbalta to 30 mg twice daily for pain, depression, and anxiety.   Message will be sent next Monday to check-in and see how you are doing - we might consider further dose increase.   Continue Ambien CR as prescribed by primary care provider.   Continue therapy as planned. Consider IOP - referral placed previously by [facility] today.   Continue to meet with psychiatric pharmacist every 4 weeks between our visits.    You can call [phone] to schedule with pharmacist Anu OsbornD.  Continue all other cares per primary care provider.   Continue all other medications as reviewed per electronic medical record today.   Safety plan reviewed. To the Emergency Department as needed or call after hours crisis line at [phone] or [phone]. [place] Crisis Text Line. Text [place] to 989455 or Suicide LifeLine Chat: suicidepreventionlifeline.org/chat  Schedule an appointment with me in 4 weeks or sooner as needed. Call [facility] at [phone] to schedule.  Follow up with primary care provider as planned or for acute medical concerns.  Call the psychiatric nurse line with medication questions or concerns at [phone].  [employer] may be used to communicate with your provider, but this is not intended to be used for emergencies.    Risks of benzodiazepine (Ativan, Xanax, Klonopin, Valium, etc) use including, but not limited to, sedation, tolerance, risk for addiction/dependence. Do not drink alcohol while taking benzodiazepines due to risk of trouble breathing and potential death. Do not drive or operate heavy machinery until it is known how the drug affects you. Discuss with physician or  pharmacist before ever taking a benzodiazepine with a narcotic/opioid pain medication.       Administrative Billing:   Phone Call/Video Duration: 15 Minutes  Start: 4:04p  Stop: 4:19p    The longitudinal plan of care for the diagnosis(es)/condition(s) as documented were addressed during this visit. Due to the added complexity in care, I will continue to support Laurel in the subsequent management and with ongoing continuity of care.    Episode of Care #: 2    Patient Status:  Patient will continue to be seen for ongoing consultation and stabilization.    Signed:   Марина Edwards DO  [facility]    Disclaimer: This note consists of symbols derived from keyboarding, dictation and/or voice recognition software. As a result, there may be errors in the script that have gone undetected. Please consider this when interpreting information found in this chart.

## 2025-07-17 NOTE — PATIENT INSTRUCTIONS
Treatment Plan:  Continue lorazepam/Ativan as you have been taking (1 mg twice daily and up to two x 0.5 mg doses)  Discussed PAUSING taper for now   Not to exceed 3 mg total on any given day   Increase duloxetine/Cymbalta to 30 mg twice daily for pain, depression, and anxiety.   Message will be sent next Monday to check-in and see how you are doing - we might consider further dose increase.   Continue Ambien CR as prescribed by primary care provider.   Continue therapy as planned. Consider IOP - referral placed previously by [facility] today.   Continue to meet with psychiatric pharmacist every 4 weeks between our visits.    You can call [phone] to schedule with pharmacist Negrita Tracey, Patricia.  Continue all other cares per primary care provider.   Continue all other medications as reviewed per electronic medical record today.   Safety plan reviewed. To the Emergency Department as needed or call after hours crisis line at [phone] or [phone]. [place] Crisis Text Line. Text [place] to 733244 or Suicide LifeLine Chat: suicidepreventionlifeline.org/chat  Schedule an appointment with me in 4 weeks or sooner as needed. Call [facility] at [phone] to schedule.  Follow up with primary care provider as planned or for acute medical concerns.  Call the psychiatric nurse line with medication questions or concerns at [phone].  MyChart may be used to communicate with your provider, but this is not intended to be used for emergencies.    Risks of benzodiazepine (Ativan, Xanax, Klonopin, Valium, etc) use including, but not limited to, sedation, tolerance, risk for addiction/dependence. Do not drink alcohol while taking benzodiazepines due to risk of trouble breathing and potential death. Do not drive or operate heavy machinery until it is known how the drug affects you. Discuss with physician or pharmacist before ever taking a benzodiazepine with a narcotic/opioid pain medication.     Patient  "Education   Collaborative Care Psychiatry Service  What to Expect  Here's what to expect from your Collaborative Care Psychiatry Service (CCPS).   About CCPS  CCPS means 2 people work together to help you get better. You'll meet with a behavioral health clinician and a psychiatric doctor. A behavioral health clinician helps people with mental health problems by talking with them. A psychiatric doctor helps people by giving them medicine.  How it works  At every visit, you'll see the behavioral health clinician (BHC) first. They'll talk with you about how you're doing and teach you how to feel better.   Then you'll see the psychiatric doctor. This doctor can help you deal with troubling thoughts and feelings by giving you medicine. They'll make sure you know the plan for your care.   CCPS usually takes 3 to 6 visits. If you need more visits, we may have you start seeing a different psychiatric doctor for ongoing care.  If you have any questions or concerns, we'll be glad to talk with you.  About visits  Be open  At your visits, please talk openly about your problems. It may feel hard, but it's the best way for us to help you.  Cancelling visits  If you can't come to your visit, please call us right away at [phone]. If you don't cancel at least 24 hours (1 full day) before your visit, that's \"late cancellation.\"  Being late to visits  Being very late is the same as not showing up. You will be a \"no show\" if:  Your appointment starts with a BHC, and you're more than 15 minutes late for a 30-minute (half hour) visit. This will also cancel your appointment with the psychiatric doctor.  Your appointment is with a psychiatric doctor only, and you're more than 15 minutes late for a 30-minute (half hour) visit.  Your appointment is with a psychiatric doctor only, and you're more than 30 minutes late for a 60-minute (full hour) visit.  If you cancel late or don't show up 2 times within 6 months, we may end your care. "   Getting help between visits  If you need help between visits, you can call us Monday to Friday from 8 a.m. to 4:30 p.m. at [phone].  Emergency care  Call 911 or go to the nearest emergency department if your life or someone else's life is in danger.  Call 988 anytime to reach the national Suicide and Crisis hotline.  Medicine refills  To refill your medicine, call your pharmacy. You can also call [facility]'s Behavioral Access at [phone], Monday to Friday, 8 a.m. to 4:30 p.m. It can take 1 to 3 business days to get a refill.   Forms, letters, and tests  You may have papers to fill out, like FMLA, short-term disability, and workability. We can help you with these forms at your visits, but you must have an appointment. You may need more than 1 visit for this, to be in an intensive therapy program, or both.  Before we can give you medicine for ADHD, we may refer you to get tested for it or confirm it another way.  We may not be able to give you an emotional support animal letter.  We don't do mental health checks ordered by the court.   We don't do mental health testing, but we can refer you to get tested.   Thank you for choosing us for your care.  For informational purposes only. Not to replace the advice of your health care provider. Copyright   2022 [facility]. All rights reserved. [employer] 833136 - Rev 11/24.

## 2025-07-18 PROBLEM — I25.5 ISCHEMIC CARDIOMYOPATHY: Status: RESOLVED | Noted: 2024-12-01 | Resolved: 2025-07-18

## 2025-07-18 PROBLEM — I50.32 HEART FAILURE WITH RECOVERED EJECTION FRACTION (HFRECEF) (H): Status: RESOLVED | Noted: 2025-06-28 | Resolved: 2025-07-18

## 2025-07-21 ENCOUNTER — PATIENT OUTREACH (OUTPATIENT)
Dept: CARE COORDINATION | Facility: CLINIC | Age: 75
End: 2025-07-21
Payer: MEDICARE

## 2025-07-23 ENCOUNTER — PATIENT OUTREACH (OUTPATIENT)
Dept: CARE COORDINATION | Facility: CLINIC | Age: 75
End: 2025-07-23
Payer: MEDICARE

## 2025-07-23 NOTE — TELEPHONE ENCOUNTER
[employer] message sent to patient with Edith/Dr England' response-      Edith Travis PA-C  P [facility] Heart Team 2  Please let patient know I discussed her concerns with the interventionalist. Recommendations are noted below. Thanks     ----- Message -----  From: Tyler England MD  Sent: 7/23/2025   1:14 AM CDT  To: Edith Travis PA-C    Thank you for your question Edith  1) Botox injections should be safe  2) They can be done anywhere the GI doctor and patient prefer. If they are most comfortable at [facility] that may be best.   Thanks

## 2025-07-30 ENCOUNTER — VIRTUAL VISIT (OUTPATIENT)
Dept: PHARMACY | Facility: CLINIC | Age: 75
End: 2025-07-30

## 2025-07-30 DIAGNOSIS — F33.1 MODERATE EPISODE OF RECURRENT MAJOR DEPRESSIVE DISORDER (H): ICD-10-CM

## 2025-07-30 DIAGNOSIS — F41.1 GAD (GENERALIZED ANXIETY DISORDER): ICD-10-CM

## 2025-07-30 DIAGNOSIS — G89.29 CENTRAL SENSITIZATION TO PAIN: ICD-10-CM

## 2025-07-30 PROCEDURE — 99207 PR NO CHARGE LOS: CPT | Mod: 95 | Performed by: PHARMACIST

## 2025-07-30 RX ORDER — DULOXETIN HYDROCHLORIDE 60 MG/1
60 CAPSULE, DELAYED RELEASE ORAL DAILY
Qty: 30 CAPSULE | Refills: 1 | Status: SHIPPED | OUTPATIENT
Start: 2025-07-30

## 2025-07-30 RX ORDER — DULOXETIN HYDROCHLORIDE 30 MG/1
30 CAPSULE, DELAYED RELEASE ORAL DAILY
Qty: 30 CAPSULE | Refills: 1 | Status: SHIPPED | OUTPATIENT
Start: 2025-07-30

## 2025-07-30 NOTE — PROGRESS NOTES
"Medication Therapy Management (MTM) Encounter    ASSESSMENT:                            Medication Adherence/Access: No issues identified.    Mental Health   Possible that temperature fluctuation could be related duloxetine, though may be related to duloxetine and will continue to monitor. As she has otherwise been tolerating medication without issue and is still not noticing any improvement, would consider further increasing the dose. She may also benefit from consolidating into once daily dosing to minimize pill burden and possibly having higher serum concentration.     PLAN:                            -increase/change duloxetine from 30mg twice daily to 90mg every morning--discussed with Dr. Edwards    Follow-up: Psychiatry, Dr. Edwards on 8/15  MTM on 8/27/25    SUBJECTIVE/OBJECTIVE:                          Laurel Parra is a 75 year old female seen for a follow-up visit.       Reason for visit: med check.    Allergies/ADRs: Reviewed in chart  Past Medical History: Reviewed in chart  Tobacco: She reports that she quit smoking about 51 years ago. Her smoking use included cigarettes. She has never used smokeless tobacco.  Alcohol: not currently using    Medication Adherence/Access: no issues reported.    Mental Health   -lorazepam 1mg every morning, 0.5mg at noon, 0.5mg at 5pm  -duloxetine 30mg twice daily (for about 2 weeks)  -zolpidem ER 12.5mg at bedtime    Patient last met with psychiatry on 7/17/25, at which time duloxetine was increased to 30mg twice daily.  Today, she reported that she has been on this higher dose for 2 weeks now and hasn't noticed any positive or negative change since that time. Has no appetite, is crying all the time, has no motivation to do anything.  Endorsed passive SI every day, thinks it might be getting more frequent, \"but I try really hard not to think about it.\" Stated that she would never act on these thoughts and wants to be around for her family.     Continues to report pretty " constant nausea, not necessarily worsen with depression or anxiety symptoms and ondansetron is only sometimes helpful. Also continues to have some temperature fluctuation. Psychiatry has considered that she may still be experiencing some withdrawal effects from discontinuing paroxetine. Continues to be generally sleeping well through the night.      ----------------      I spent 30 minutes with this patient today. All changes were made via verbal approval with Марина Edwards.     A summary of these recommendations was sent via clinic portal.    Lulu Tracey, PharmD  Medication Therapy Management Pharmacist  [facility]      Telemedicine Visit Details  The patient's medications can be safely assessed via a telemedicine encounter.  Type of service:  Video Conference via [employer]  Originating Location (pt. Location): Home    Distant Location (provider location):  Off-site  Start Time: 1:00m  End Time: 1:30pm     Medication Therapy Recommendations  Moderate episode of recurrent major depressive disorder (H)   1 Current Medication: DULoxetine (CYMBALTA) 30 MG capsule (Discontinued)   Current Medication Sig: Take 1 capsule (30 mg) by mouth 2 times daily.   Rationale: Dose too low - Dosage too low - Effectiveness   Recommendation: Increase Dose - per plan   Status: Accepted per Provider   Identified Date: 7/30/2025 Completed Date: 7/30/2025

## 2025-07-30 NOTE — PATIENT INSTRUCTIONS
"Recommendations from today's MTM visit:                                                       I talked with Dr. Edwards, who agreed with increasing duloxetine dose. We are going to increase the total dose to 90mg daily and move it all to the morning.  You can use up your 30mg capsules and take three every morning.   Insurance doesn't usually cover 3 capsules per day, so I sent a new prescription for taking ONE 60mg + ONE 30mg each day when you need a refill.     Follow-up: Psychiatry, Dr. Edwards on 8/15  MTM on 8/27/25    It was great speaking with you today.  I value your experience and would be very thankful for your time in providing feedback in our clinic survey. In the next few days, you may receive an email or text message from "[employer] with a link to a survey related to your  clinical pharmacist.\"     To schedule another MTM appointment, please call the clinic directly or you may call the MTM scheduling line at [phone] or toll-free at [phone].     My Clinical Pharmacist's contact information:                                                      Please feel free to contact me with any questions or concerns you have.      Lulu Tracey, PharmD  Medication Therapy Management Pharmacist  [facility]    "

## 2025-08-13 DIAGNOSIS — F41.9 ANXIETY: ICD-10-CM

## 2025-08-13 RX ORDER — ZOLPIDEM TARTRATE 12.5 MG/1
TABLET, FILM COATED, EXTENDED RELEASE ORAL
Qty: 90 TABLET | Refills: 0 | Status: SHIPPED | OUTPATIENT
Start: 2025-08-13

## 2025-08-16 ENCOUNTER — MYC MEDICAL ADVICE (OUTPATIENT)
Dept: PSYCHIATRY | Facility: CLINIC | Age: 75
End: 2025-08-16
Payer: MEDICARE

## 2025-08-16 DIAGNOSIS — F41.9 ANXIETY: ICD-10-CM

## 2025-08-17 ENCOUNTER — MYC MEDICAL ADVICE (OUTPATIENT)
Dept: PSYCHIATRY | Facility: CLINIC | Age: 75
End: 2025-08-17
Payer: MEDICARE

## 2025-08-17 DIAGNOSIS — F33.1 MODERATE EPISODE OF RECURRENT MAJOR DEPRESSIVE DISORDER (H): ICD-10-CM

## 2025-08-17 DIAGNOSIS — F41.1 GAD (GENERALIZED ANXIETY DISORDER): Primary | ICD-10-CM

## 2025-08-17 DIAGNOSIS — F43.9 TRAUMA AND STRESSOR-RELATED DISORDER: ICD-10-CM

## 2025-08-18 ENCOUNTER — MYC MEDICAL ADVICE (OUTPATIENT)
Dept: FAMILY MEDICINE | Facility: CLINIC | Age: 75
End: 2025-08-18
Payer: MEDICARE

## 2025-08-20 ENCOUNTER — TRANSFERRED RECORDS (OUTPATIENT)
Dept: HEALTH INFORMATION MANAGEMENT | Facility: CLINIC | Age: 75
End: 2025-08-20
Payer: MEDICARE

## 2025-08-21 RX ORDER — DOXEPIN HYDROCHLORIDE 10 MG/1
10 CAPSULE ORAL AT BEDTIME
Qty: 30 CAPSULE | Refills: 1 | Status: SHIPPED | OUTPATIENT
Start: 2025-08-21

## 2025-08-22 ENCOUNTER — TRANSFERRED RECORDS (OUTPATIENT)
Dept: HEALTH INFORMATION MANAGEMENT | Facility: CLINIC | Age: 75
End: 2025-08-22
Payer: MEDICARE

## 2025-08-24 RX ORDER — LORAZEPAM 0.5 MG/1
TABLET ORAL
Qty: 120 TABLET | Refills: 1 | Status: SHIPPED | OUTPATIENT
Start: 2025-08-24

## 2025-09-02 ENCOUNTER — ANESTHESIA EVENT (OUTPATIENT)
Dept: SURGERY | Facility: CLINIC | Age: 75
End: 2025-09-02
Payer: MEDICARE

## 2025-09-02 ASSESSMENT — ENCOUNTER SYMPTOMS: DYSRHYTHMIAS: 1

## 2025-09-03 ENCOUNTER — HOSPITAL ENCOUNTER (OUTPATIENT)
Facility: CLINIC | Age: 75
Discharge: HOME | End: 2025-09-03
Attending: COLON & RECTAL SURGERY | Admitting: COLON & RECTAL SURGERY
Payer: MEDICARE

## 2025-09-03 ENCOUNTER — ANESTHESIA (OUTPATIENT)
Dept: SURGERY | Facility: CLINIC | Age: 75
End: 2025-09-03
Payer: MEDICARE

## 2025-09-03 VITALS
HEIGHT: 63 IN | TEMPERATURE: 97.5 F | SYSTOLIC BLOOD PRESSURE: 130 MMHG | WEIGHT: 116.1 LBS | BODY MASS INDEX: 20.57 KG/M2 | RESPIRATION RATE: 16 BRPM | HEART RATE: 86 BPM | DIASTOLIC BLOOD PRESSURE: 98 MMHG | OXYGEN SATURATION: 95 %

## 2025-09-03 PROCEDURE — 250N000009 HC RX 250: Performed by: COLON & RECTAL SURGERY

## 2025-09-03 PROCEDURE — 999N000141 HC STATISTIC PRE-PROCEDURE NURSING ASSESSMENT: Performed by: COLON & RECTAL SURGERY

## 2025-09-03 PROCEDURE — 710N000009 HC RECOVERY PHASE 1, LEVEL 1, PER MIN: Performed by: COLON & RECTAL SURGERY

## 2025-09-03 PROCEDURE — 250N000009 HC RX 250: Performed by: NURSE ANESTHETIST, CERTIFIED REGISTERED

## 2025-09-03 PROCEDURE — 258N000003 HC RX IP 258 OP 636: Performed by: NURSE ANESTHETIST, CERTIFIED REGISTERED

## 2025-09-03 PROCEDURE — 272N000001 HC OR GENERAL SUPPLY STERILE: Performed by: COLON & RECTAL SURGERY

## 2025-09-03 PROCEDURE — 250N000011 HC RX IP 250 OP 636: Performed by: ANESTHESIOLOGY

## 2025-09-03 PROCEDURE — 360N000074 HC SURGERY LEVEL 1, PER MIN: Performed by: COLON & RECTAL SURGERY

## 2025-09-03 PROCEDURE — 370N000017 HC ANESTHESIA TECHNICAL FEE, PER MIN: Performed by: COLON & RECTAL SURGERY

## 2025-09-03 PROCEDURE — 250N000011 HC RX IP 250 OP 636: Performed by: COLON & RECTAL SURGERY

## 2025-09-03 PROCEDURE — 250N000020 HC RX IP 250 OP 636 J0585: Mod: JZ | Performed by: COLON & RECTAL SURGERY

## 2025-09-03 PROCEDURE — 250N000013 HC RX MED GY IP 250 OP 250 PS 637: Performed by: COLON & RECTAL SURGERY

## 2025-09-03 PROCEDURE — 250N000011 HC RX IP 250 OP 636: Performed by: NURSE ANESTHETIST, CERTIFIED REGISTERED

## 2025-09-03 PROCEDURE — 710N000012 HC RECOVERY PHASE 2, PER MINUTE: Performed by: COLON & RECTAL SURGERY

## 2025-09-03 RX ORDER — FENTANYL CITRATE 0.05 MG/ML
25 INJECTION, SOLUTION INTRAMUSCULAR; INTRAVENOUS EVERY 5 MIN PRN
Status: DISCONTINUED | OUTPATIENT
Start: 2025-09-03 | End: 2025-09-03 | Stop reason: HOSPADM

## 2025-09-03 RX ORDER — ONDANSETRON 2 MG/ML
4 INJECTION INTRAMUSCULAR; INTRAVENOUS EVERY 30 MIN PRN
Status: DISCONTINUED | OUTPATIENT
Start: 2025-09-03 | End: 2025-09-03 | Stop reason: HOSPADM

## 2025-09-03 RX ORDER — ONDANSETRON 2 MG/ML
INJECTION INTRAMUSCULAR; INTRAVENOUS PRN
Status: DISCONTINUED | OUTPATIENT
Start: 2025-09-03 | End: 2025-09-03

## 2025-09-03 RX ORDER — HYDROMORPHONE HCL IN WATER/PF 6 MG/30 ML
0.2 PATIENT CONTROLLED ANALGESIA SYRINGE INTRAVENOUS EVERY 5 MIN PRN
Status: DISCONTINUED | OUTPATIENT
Start: 2025-09-03 | End: 2025-09-03 | Stop reason: HOSPADM

## 2025-09-03 RX ORDER — OXYCODONE HYDROCHLORIDE 5 MG/1
10 TABLET ORAL
Status: DISCONTINUED | OUTPATIENT
Start: 2025-09-03 | End: 2025-09-03 | Stop reason: HOSPADM

## 2025-09-03 RX ORDER — FENTANYL CITRATE 50 UG/ML
INJECTION, SOLUTION INTRAMUSCULAR; INTRAVENOUS PRN
Status: DISCONTINUED | OUTPATIENT
Start: 2025-09-03 | End: 2025-09-03

## 2025-09-03 RX ORDER — ONDANSETRON 4 MG/1
4 TABLET, ORALLY DISINTEGRATING ORAL EVERY 30 MIN PRN
Status: DISCONTINUED | OUTPATIENT
Start: 2025-09-03 | End: 2025-09-03 | Stop reason: HOSPADM

## 2025-09-03 RX ORDER — PROPOFOL 10 MG/ML
INJECTION, EMULSION INTRAVENOUS CONTINUOUS PRN
Status: DISCONTINUED | OUTPATIENT
Start: 2025-09-03 | End: 2025-09-03

## 2025-09-03 RX ORDER — PROPOFOL 10 MG/ML
INJECTION, EMULSION INTRAVENOUS PRN
Status: DISCONTINUED | OUTPATIENT
Start: 2025-09-03 | End: 2025-09-03

## 2025-09-03 RX ORDER — ACETAMINOPHEN 325 MG/1
975 TABLET ORAL ONCE
Status: COMPLETED | OUTPATIENT
Start: 2025-09-03 | End: 2025-09-03

## 2025-09-03 RX ORDER — MAGNESIUM HYDROXIDE 1200 MG/15ML
LIQUID ORAL PRN
Status: DISCONTINUED | OUTPATIENT
Start: 2025-09-03 | End: 2025-09-03 | Stop reason: HOSPADM

## 2025-09-03 RX ORDER — ONDANSETRON 2 MG/ML
4 INJECTION INTRAMUSCULAR; INTRAVENOUS ONCE
Status: DISCONTINUED | OUTPATIENT
Start: 2025-09-03 | End: 2025-09-03 | Stop reason: HOSPADM

## 2025-09-03 RX ORDER — FENTANYL CITRATE 0.05 MG/ML
50 INJECTION, SOLUTION INTRAMUSCULAR; INTRAVENOUS EVERY 5 MIN PRN
Status: DISCONTINUED | OUTPATIENT
Start: 2025-09-03 | End: 2025-09-03 | Stop reason: HOSPADM

## 2025-09-03 RX ORDER — SODIUM CHLORIDE, SODIUM LACTATE, POTASSIUM CHLORIDE, CALCIUM CHLORIDE 600; 310; 30; 20 MG/100ML; MG/100ML; MG/100ML; MG/100ML
INJECTION, SOLUTION INTRAVENOUS CONTINUOUS PRN
Status: DISCONTINUED | OUTPATIENT
Start: 2025-09-03 | End: 2025-09-03

## 2025-09-03 RX ORDER — LIDOCAINE HYDROCHLORIDE 20 MG/ML
INJECTION, SOLUTION INFILTRATION; PERINEURAL PRN
Status: DISCONTINUED | OUTPATIENT
Start: 2025-09-03 | End: 2025-09-03

## 2025-09-03 RX ORDER — HYDROMORPHONE HCL IN WATER/PF 6 MG/30 ML
0.4 PATIENT CONTROLLED ANALGESIA SYRINGE INTRAVENOUS EVERY 5 MIN PRN
Status: DISCONTINUED | OUTPATIENT
Start: 2025-09-03 | End: 2025-09-03 | Stop reason: HOSPADM

## 2025-09-03 RX ORDER — OXYCODONE HYDROCHLORIDE 5 MG/1
5 TABLET ORAL
Status: DISCONTINUED | OUTPATIENT
Start: 2025-09-03 | End: 2025-09-03 | Stop reason: HOSPADM

## 2025-09-03 RX ORDER — SODIUM CHLORIDE, SODIUM LACTATE, POTASSIUM CHLORIDE, CALCIUM CHLORIDE 600; 310; 30; 20 MG/100ML; MG/100ML; MG/100ML; MG/100ML
INJECTION, SOLUTION INTRAVENOUS CONTINUOUS
Status: DISCONTINUED | OUTPATIENT
Start: 2025-09-03 | End: 2025-09-03 | Stop reason: HOSPADM

## 2025-09-03 RX ORDER — NALOXONE HYDROCHLORIDE 0.4 MG/ML
0.1 INJECTION, SOLUTION INTRAMUSCULAR; INTRAVENOUS; SUBCUTANEOUS
Status: DISCONTINUED | OUTPATIENT
Start: 2025-09-03 | End: 2025-09-03 | Stop reason: HOSPADM

## 2025-09-03 RX ORDER — ACETAMINOPHEN 325 MG/1
650 TABLET ORAL
Status: DISCONTINUED | OUTPATIENT
Start: 2025-09-03 | End: 2025-09-03 | Stop reason: HOSPADM

## 2025-09-03 RX ORDER — BUPIVACAINE HYDROCHLORIDE 2.5 MG/ML
INJECTION, SOLUTION EPIDURAL; INFILTRATION; INTRACAUDAL; PERINEURAL
Status: DISCONTINUED
Start: 2025-09-03 | End: 2025-09-03 | Stop reason: HOSPADM

## 2025-09-03 RX ADMIN — PROPOFOL 30 MG: 10 INJECTION, EMULSION INTRAVENOUS at 08:16

## 2025-09-03 RX ADMIN — ACETAMINOPHEN 975 MG: 325 TABLET ORAL at 06:49

## 2025-09-03 RX ADMIN — SODIUM CHLORIDE, SODIUM LACTATE, POTASSIUM CHLORIDE, AND CALCIUM CHLORIDE: .6; .31; .03; .02 INJECTION, SOLUTION INTRAVENOUS at 08:12

## 2025-09-03 RX ADMIN — ONDANSETRON 4 MG: 2 INJECTION INTRAMUSCULAR; INTRAVENOUS at 08:23

## 2025-09-03 RX ADMIN — FENTANYL CITRATE 25 MCG: 50 INJECTION INTRAMUSCULAR; INTRAVENOUS at 08:14

## 2025-09-03 RX ADMIN — FENTANYL CITRATE 50 MCG: 50 INJECTION, SOLUTION INTRAMUSCULAR; INTRAVENOUS at 09:41

## 2025-09-03 RX ADMIN — PROPOFOL 30 MG: 10 INJECTION, EMULSION INTRAVENOUS at 08:14

## 2025-09-03 RX ADMIN — PROPOFOL 80 MCG/KG/MIN: 10 INJECTION, EMULSION INTRAVENOUS at 08:14

## 2025-09-03 RX ADMIN — LIDOCAINE HYDROCHLORIDE 40 MG: 20 INJECTION, SOLUTION INFILTRATION; PERINEURAL at 08:13

## 2025-09-03 ASSESSMENT — ACTIVITIES OF DAILY LIVING (ADL)
ADLS_ACUITY_SCORE: 58

## 2025-09-03 ASSESSMENT — ENCOUNTER SYMPTOMS: SEIZURES: 0

## (undated) DEVICE — KIT SURGICAL TURNOVER FVSD-01D

## (undated) DEVICE — SOL WATER IRRIG 1000ML BOTTLE 2F7114

## (undated) DEVICE — Device

## (undated) DEVICE — SUCTION CANISTER MEDIVAC LINER 3000ML W/LID 65651-530

## (undated) DEVICE — DRSG KERLIX FLUFFS X5

## (undated) DEVICE — GLOVE PROTEXIS MICRO 6.5  2D73PM65

## (undated) DEVICE — MANIFOLD KIT ANGIO AUTOMATED 014613

## (undated) DEVICE — SU VICRYL 3-0 RB-1 27" UND J215H

## (undated) DEVICE — DRSG STERI STRIP 1/4X3" R1541

## (undated) DEVICE — CATH ANGIO INFINITI 3DRC 4FRX100CM 538476

## (undated) DEVICE — SU PROLENE 4-0 PC-3 18" 8634G

## (undated) DEVICE — ESU CORD BIPOLAR 12' E0509

## (undated) DEVICE — K-WIRE .045

## (undated) DEVICE — SUCTION CANISTER BEMIS HI FLOW 006772-901

## (undated) DEVICE — GLIDEWIRE TERUMO .035X180CM 1.5,, J-TIP GR3525

## (undated) DEVICE — DRSG STERI STRIP 1/2X4" R1547

## (undated) DEVICE — SOL NACL 0.9% IRRIG 1000ML BOTTLE 07138-09

## (undated) DEVICE — SU MONOCRYL 4-0 PC-3 18" UND Y845G

## (undated) DEVICE — TOURNIQUET SGL BLADDER 18"X4" RED 5921-218-135

## (undated) DEVICE — KIT HYSTEROSCOPIC 7209827

## (undated) DEVICE — DILATOR PROBE UTERINE OS CANAL FINDER 260-610

## (undated) DEVICE — GOWN IMPERVIOUS BREATHABLE SMART LG 89015

## (undated) DEVICE — DRAPE MINI C-ARM 4003

## (undated) DEVICE — DEFIB PRO-PADZ LVP LQD GEL ADULT 8900-2105-01

## (undated) DEVICE — CAST PLASTER SPLINT 4X15" 7394

## (undated) DEVICE — KIT HAND CONTROL ANGIOTOUCH ACIST 65CM AT-P65

## (undated) DEVICE — LINEN TOWEL PACK X5 5464

## (undated) DEVICE — VESSEL LOOPS RED MAXI

## (undated) DEVICE — SLEEVE TR BAND RADIAL COMPRESSION DEVICE 24CM TRB24-REG

## (undated) DEVICE — NDL 25GA 1.5" 305127

## (undated) DEVICE — BLADE KNIFE SURG 15 371115

## (undated) DEVICE — WIRE GLIDE 0.035"X150CM VASC GR3506

## (undated) DEVICE — PACK TVT HYSTEROSCOPY SMA15HYFSE

## (undated) DEVICE — MANIFOLD NEPTUNE 4 PORT 700-20

## (undated) DEVICE — SOL BENZOIN 0.5OZ

## (undated) DEVICE — TOTE ANGIO CORP PC15AT SAN32CC83O

## (undated) DEVICE — SOL NACL 0.9% IRRIG 3000ML BAG 2B7477

## (undated) DEVICE — SLING ARM MED 79-99155

## (undated) DEVICE — DRAPE MAYO STAND 23X54 8337

## (undated) DEVICE — SYR 05ML LL W/O NDL

## (undated) DEVICE — BLADE SHAVER ARTHRO 2.9MM CUDA C9951

## (undated) DEVICE — TRAP FINGER GRASP DEVICE SM  9906

## (undated) DEVICE — DRAPE IOBAN INCISE 23X17" 6650EZ

## (undated) DEVICE — INTRO SHEATH 4FRX10CM PINNACLE RSS402

## (undated) DEVICE — SUCTION MANIFOLD NEPTUNE 2 SYS 4 PORT 0702-020-000

## (undated) DEVICE — BAG DECANTER STERILE WHITE DYNJDEC09

## (undated) DEVICE — ESU CORD BIPOLAR 12' E0512

## (undated) DEVICE — GLOVE PROTEXIS MICRO 7.0  2D73PM70

## (undated) DEVICE — INTRO GLIDESHEATH SLENDER 6FR 10X45CM 60-1060

## (undated) DEVICE — BLADE MORCELLATOR TRUCLEAR INSICOR PLUS 2.9 72202536

## (undated) DEVICE — NDL 19GA 1.5"

## (undated) DEVICE — NDL 22GA 1.5"

## (undated) DEVICE — CATH DIAG 4FR ANG PIG 538453S

## (undated) DEVICE — CATH DIAGNOSTIC RADIAL 5FR TIG 4.0

## (undated) DEVICE — INTRODUCER CATH VASC 5FRX10CM  MPIS-501-NT-U-SST

## (undated) DEVICE — DRSG GAUZE 4X4" 3033

## (undated) DEVICE — TUBING ARTHRO SM JOINT SET  9350

## (undated) DEVICE — PACK HAND WRIST SOP15HWFSP

## (undated) DEVICE — BUR ROUND 2MM CARBIDE LONG 5092-224

## (undated) DEVICE — BNDG ELASTIC 4"X5YDS UNSTERILE 6611-40

## (undated) DEVICE — DRAPE MINOR PROCEDURE LAP 29496

## (undated) DEVICE — DRAPE STERI TOWEL LG 1010

## (undated) DEVICE — VESSEL LOOPS YELLOW MINI 31145660

## (undated) DEVICE — GLOVE PROTEXIS W/NEU-THERA 6.0  2D73TE60

## (undated) DEVICE — CATH DIAG 4FR JL 4.5 538417

## (undated) DEVICE — DRAPE SHEET REV FOLD 3/4 9349

## (undated) DEVICE — SYR 10ML FINGER CONTROL W/O NDL 309695

## (undated) DEVICE — WIRE GUIDE 0.035"X260CM SAFE-T-J EXCHANGE G00517

## (undated) DEVICE — DRILL BIT ACUMED QUICK RELEASE 2.8MM 80-0387

## (undated) DEVICE — SPONGE BALL KERLIX ROUND XL W/O STRING LATEX 4935

## (undated) DEVICE — VIAL DECANTER STERILE WHITE DYNJDEC06

## (undated) DEVICE — SOL ADH LIQUID BENZOIN SWAB 0.6ML C1544

## (undated) DEVICE — DRSG ADAPTIC 3X8" 6113

## (undated) DEVICE — DRSG KERLIX 4 1/2"X4YDS ROLL 6715

## (undated) DEVICE — SU MONOCRYL 4-0 PS-2 18" UND Y496G

## (undated) RX ORDER — PROPOFOL 10 MG/ML
INJECTION, EMULSION INTRAVENOUS
Status: DISPENSED
Start: 2021-04-05

## (undated) RX ORDER — ONDANSETRON 2 MG/ML
INJECTION INTRAMUSCULAR; INTRAVENOUS
Status: DISPENSED
Start: 2019-08-26

## (undated) RX ORDER — ACETAMINOPHEN 325 MG/1
TABLET ORAL
Status: DISPENSED
Start: 2025-09-03

## (undated) RX ORDER — HEPARIN SODIUM 1000 [USP'U]/ML
INJECTION, SOLUTION INTRAVENOUS; SUBCUTANEOUS
Status: DISPENSED
Start: 2023-09-01

## (undated) RX ORDER — DEXAMETHASONE SODIUM PHOSPHATE 4 MG/ML
INJECTION, SOLUTION INTRA-ARTICULAR; INTRALESIONAL; INTRAMUSCULAR; INTRAVENOUS; SOFT TISSUE
Status: DISPENSED
Start: 2017-06-07

## (undated) RX ORDER — ONDANSETRON 2 MG/ML
INJECTION INTRAMUSCULAR; INTRAVENOUS
Status: DISPENSED
Start: 2021-04-05

## (undated) RX ORDER — FENTANYL CITRATE 0.05 MG/ML
INJECTION, SOLUTION INTRAMUSCULAR; INTRAVENOUS
Status: DISPENSED
Start: 2019-08-26

## (undated) RX ORDER — PROPOFOL 10 MG/ML
INJECTION, EMULSION INTRAVENOUS
Status: DISPENSED
Start: 2018-02-09

## (undated) RX ORDER — NITROGLYCERIN 5 MG/ML
VIAL (ML) INTRAVENOUS
Status: DISPENSED
Start: 2023-09-01

## (undated) RX ORDER — FENTANYL CITRATE 50 UG/ML
INJECTION, SOLUTION INTRAMUSCULAR; INTRAVENOUS
Status: DISPENSED
Start: 2019-08-26

## (undated) RX ORDER — ONDANSETRON 2 MG/ML
INJECTION INTRAMUSCULAR; INTRAVENOUS
Status: DISPENSED
Start: 2025-09-03

## (undated) RX ORDER — LIDOCAINE HYDROCHLORIDE 10 MG/ML
INJECTION, SOLUTION EPIDURAL; INFILTRATION; INTRACAUDAL; PERINEURAL
Status: DISPENSED
Start: 2024-12-29

## (undated) RX ORDER — ONDANSETRON 2 MG/ML
INJECTION INTRAMUSCULAR; INTRAVENOUS
Status: DISPENSED
Start: 2018-02-09

## (undated) RX ORDER — FENTANYL CITRATE 50 UG/ML
INJECTION, SOLUTION INTRAMUSCULAR; INTRAVENOUS
Status: DISPENSED
Start: 2018-02-09

## (undated) RX ORDER — NEOSTIGMINE METHYLSULFATE 1 MG/ML
VIAL (ML) INJECTION
Status: DISPENSED
Start: 2021-04-05

## (undated) RX ORDER — LIDOCAINE HYDROCHLORIDE 20 MG/ML
INJECTION, SOLUTION EPIDURAL; INFILTRATION; INTRACAUDAL; PERINEURAL
Status: DISPENSED
Start: 2018-02-09

## (undated) RX ORDER — HYDROMORPHONE HYDROCHLORIDE 1 MG/ML
INJECTION, SOLUTION INTRAMUSCULAR; INTRAVENOUS; SUBCUTANEOUS
Status: DISPENSED
Start: 2019-08-26

## (undated) RX ORDER — DEXAMETHASONE SODIUM PHOSPHATE 4 MG/ML
INJECTION, SOLUTION INTRA-ARTICULAR; INTRALESIONAL; INTRAMUSCULAR; INTRAVENOUS; SOFT TISSUE
Status: DISPENSED
Start: 2021-04-05

## (undated) RX ORDER — FENTANYL CITRATE 50 UG/ML
INJECTION, SOLUTION INTRAMUSCULAR; INTRAVENOUS
Status: DISPENSED
Start: 2017-06-07

## (undated) RX ORDER — FENTANYL CITRATE 50 UG/ML
INJECTION, SOLUTION INTRAMUSCULAR; INTRAVENOUS
Status: DISPENSED
Start: 2023-09-01

## (undated) RX ORDER — HEPARIN SODIUM 1000 [USP'U]/ML
INJECTION, SOLUTION INTRAVENOUS; SUBCUTANEOUS
Status: DISPENSED
Start: 2024-12-29

## (undated) RX ORDER — ONDANSETRON 2 MG/ML
INJECTION INTRAMUSCULAR; INTRAVENOUS
Status: DISPENSED
Start: 2017-06-07

## (undated) RX ORDER — CEFAZOLIN SODIUM/WATER 2 G/20 ML
SYRINGE (ML) INTRAVENOUS
Status: DISPENSED
Start: 2025-09-03

## (undated) RX ORDER — KETOROLAC TROMETHAMINE 30 MG/ML
INJECTION, SOLUTION INTRAMUSCULAR; INTRAVENOUS
Status: DISPENSED
Start: 2018-02-09

## (undated) RX ORDER — HYDROMORPHONE HYDROCHLORIDE 1 MG/ML
INJECTION, SOLUTION INTRAMUSCULAR; INTRAVENOUS; SUBCUTANEOUS
Status: DISPENSED
Start: 2021-04-05

## (undated) RX ORDER — OXYCODONE HYDROCHLORIDE 5 MG/1
TABLET ORAL
Status: DISPENSED
Start: 2018-02-09

## (undated) RX ORDER — PROPOFOL 10 MG/ML
INJECTION, EMULSION INTRAVENOUS
Status: DISPENSED
Start: 2017-06-07

## (undated) RX ORDER — VERAPAMIL HYDROCHLORIDE 2.5 MG/ML
INJECTION, SOLUTION INTRAVENOUS
Status: DISPENSED
Start: 2023-09-01

## (undated) RX ORDER — HEPARIN SODIUM 200 [USP'U]/100ML
INJECTION, SOLUTION INTRAVENOUS
Status: DISPENSED
Start: 2024-12-29

## (undated) RX ORDER — DEXAMETHASONE SODIUM PHOSPHATE 4 MG/ML
INJECTION, SOLUTION INTRA-ARTICULAR; INTRALESIONAL; INTRAMUSCULAR; INTRAVENOUS; SOFT TISSUE
Status: DISPENSED
Start: 2018-02-09

## (undated) RX ORDER — LIDOCAINE HYDROCHLORIDE 20 MG/ML
INJECTION, SOLUTION EPIDURAL; INFILTRATION; INTRACAUDAL; PERINEURAL
Status: DISPENSED
Start: 2019-08-26

## (undated) RX ORDER — CLINDAMYCIN PHOSPHATE 900 MG/50ML
INJECTION, SOLUTION INTRAVENOUS
Status: DISPENSED
Start: 2017-06-07

## (undated) RX ORDER — LIDOCAINE HYDROCHLORIDE 20 MG/ML
INJECTION, SOLUTION EPIDURAL; INFILTRATION; INTRACAUDAL; PERINEURAL
Status: DISPENSED
Start: 2021-04-05

## (undated) RX ORDER — DEXAMETHASONE SODIUM PHOSPHATE 4 MG/ML
INJECTION, SOLUTION INTRA-ARTICULAR; INTRALESIONAL; INTRAMUSCULAR; INTRAVENOUS; SOFT TISSUE
Status: DISPENSED
Start: 2019-08-26

## (undated) RX ORDER — FENTANYL CITRATE 0.05 MG/ML
INJECTION, SOLUTION INTRAMUSCULAR; INTRAVENOUS
Status: DISPENSED
Start: 2025-09-03

## (undated) RX ORDER — HYDROCODONE BITARTRATE AND ACETAMINOPHEN 10; 325 MG/1; MG/1
TABLET ORAL
Status: DISPENSED
Start: 2018-02-09

## (undated) RX ORDER — CLINDAMYCIN PHOSPHATE 900 MG/50ML
INJECTION, SOLUTION INTRAVENOUS
Status: DISPENSED
Start: 2021-04-05

## (undated) RX ORDER — CLINDAMYCIN PHOSPHATE 900 MG/50ML
INJECTION, SOLUTION INTRAVENOUS
Status: DISPENSED
Start: 2019-08-26

## (undated) RX ORDER — PROPOFOL 10 MG/ML
INJECTION, EMULSION INTRAVENOUS
Status: DISPENSED
Start: 2025-09-03

## (undated) RX ORDER — LIDOCAINE HYDROCHLORIDE 10 MG/ML
INJECTION, SOLUTION EPIDURAL; INFILTRATION; INTRACAUDAL; PERINEURAL
Status: DISPENSED
Start: 2023-09-01

## (undated) RX ORDER — KETOROLAC TROMETHAMINE 15 MG/ML
INJECTION, SOLUTION INTRAMUSCULAR; INTRAVENOUS
Status: DISPENSED
Start: 2019-08-26

## (undated) RX ORDER — HEPARIN SODIUM 200 [USP'U]/100ML
INJECTION, SOLUTION INTRAVENOUS
Status: DISPENSED
Start: 2023-09-01

## (undated) RX ORDER — LIDOCAINE HYDROCHLORIDE 20 MG/ML
INJECTION, SOLUTION EPIDURAL; INFILTRATION; INTRACAUDAL; PERINEURAL
Status: DISPENSED
Start: 2017-06-07

## (undated) RX ORDER — FENTANYL CITRATE 50 UG/ML
INJECTION, SOLUTION INTRAMUSCULAR; INTRAVENOUS
Status: DISPENSED
Start: 2021-04-05

## (undated) RX ORDER — GLYCOPYRROLATE 0.2 MG/ML
INJECTION, SOLUTION INTRAMUSCULAR; INTRAVENOUS
Status: DISPENSED
Start: 2021-04-05

## (undated) RX ORDER — FENTANYL CITRATE 50 UG/ML
INJECTION, SOLUTION INTRAMUSCULAR; INTRAVENOUS
Status: DISPENSED
Start: 2020-08-18

## (undated) RX ORDER — DEXAMETHASONE SODIUM PHOSPHATE 4 MG/ML
INJECTION, SOLUTION INTRA-ARTICULAR; INTRALESIONAL; INTRAMUSCULAR; INTRAVENOUS; SOFT TISSUE
Status: DISPENSED
Start: 2025-09-03

## (undated) RX ORDER — HYDROXYZINE HYDROCHLORIDE 25 MG/1
TABLET, FILM COATED ORAL
Status: DISPENSED
Start: 2019-08-26

## (undated) RX ORDER — FENTANYL CITRATE 50 UG/ML
INJECTION, SOLUTION INTRAMUSCULAR; INTRAVENOUS
Status: DISPENSED
Start: 2025-09-03

## (undated) RX ORDER — FENTANYL CITRATE 0.05 MG/ML
INJECTION, SOLUTION INTRAMUSCULAR; INTRAVENOUS
Status: DISPENSED
Start: 2021-04-05